# Patient Record
Sex: FEMALE | Race: BLACK OR AFRICAN AMERICAN | Employment: UNEMPLOYED | URBAN - METROPOLITAN AREA
[De-identification: names, ages, dates, MRNs, and addresses within clinical notes are randomized per-mention and may not be internally consistent; named-entity substitution may affect disease eponyms.]

---

## 2021-06-14 ENCOUNTER — TELEPHONE (OUTPATIENT)
Dept: OBGYN CLINIC | Age: 56
End: 2021-06-14

## 2021-06-14 NOTE — TELEPHONE ENCOUNTER
I called the patient back just a few minutes after receiving message. No answer. Left message for patient to call me back if she needs any assistance. Questions answered about his upcoming surgery

## 2021-06-14 NOTE — TELEPHONE ENCOUNTER
Patient is having burning when she urinates and wants to speak to a nurse about it she has some other questions, she does have an appt scheduled on 6/21/21 @ 8:15 am for this reason. She would like a call back.

## 2021-06-14 NOTE — TELEPHONE ENCOUNTER
Patient called back to the office stating that she was having burning with urination. Patient given appointment tomorrow per Dr Dulce Maria Ruff. Appointment for 6/21/2021 was cancelled.

## 2021-06-15 ENCOUNTER — TELEPHONE (OUTPATIENT)
Dept: OBGYN CLINIC | Age: 56
End: 2021-06-15

## 2021-06-15 ENCOUNTER — OFFICE VISIT (OUTPATIENT)
Dept: OBGYN CLINIC | Age: 56
End: 2021-06-15
Payer: COMMERCIAL

## 2021-06-15 VITALS
BODY MASS INDEX: 22.15 KG/M2 | HEART RATE: 77 BPM | DIASTOLIC BLOOD PRESSURE: 87 MMHG | OXYGEN SATURATION: 99 % | TEMPERATURE: 97.7 F | SYSTOLIC BLOOD PRESSURE: 107 MMHG | RESPIRATION RATE: 16 BRPM | HEIGHT: 63 IN | WEIGHT: 125 LBS

## 2021-06-15 DIAGNOSIS — N90.89 VULVAR LESION: ICD-10-CM

## 2021-06-15 DIAGNOSIS — R30.0 DYSURIA: Primary | ICD-10-CM

## 2021-06-15 DIAGNOSIS — N30.01 ACUTE CYSTITIS WITH HEMATURIA: ICD-10-CM

## 2021-06-15 DIAGNOSIS — N89.8 VAGINAL PRURITUS: ICD-10-CM

## 2021-06-15 LAB
BILIRUB UR QL STRIP: NEGATIVE
GLUCOSE UR-MCNC: NEGATIVE MG/DL
KETONES P FAST UR STRIP-MCNC: NEGATIVE MG/DL
PH UR STRIP: 5.5 [PH] (ref 4.6–8)
PROT UR QL STRIP: NEGATIVE
SP GR UR STRIP: 1.01 (ref 1–1.03)
UA UROBILINOGEN AMB POC: NORMAL (ref 0.2–1)
URINALYSIS CLARITY POC: NORMAL
URINALYSIS COLOR POC: NORMAL
URINE BLOOD POC: NORMAL
URINE LEUKOCYTES POC: NORMAL
URINE NITRITES POC: POSITIVE
WET MOUNT POCT, WMPOCT: NORMAL

## 2021-06-15 PROCEDURE — 81003 URINALYSIS AUTO W/O SCOPE: CPT | Performed by: OBSTETRICS & GYNECOLOGY

## 2021-06-15 PROCEDURE — 99214 OFFICE O/P EST MOD 30 MIN: CPT | Performed by: OBSTETRICS & GYNECOLOGY

## 2021-06-15 PROCEDURE — 87210 SMEAR WET MOUNT SALINE/INK: CPT | Performed by: OBSTETRICS & GYNECOLOGY

## 2021-06-15 RX ORDER — CROMOLYN SODIUM 40 MG/ML
1 SOLUTION/ DROPS OPHTHALMIC DAILY
COMMUNITY
Start: 2021-06-07 | End: 2022-10-20

## 2021-06-15 RX ORDER — MULTIVITAMIN WITH FOLIC ACID 400 MCG
TABLET ORAL
COMMUNITY
Start: 2021-04-28 | End: 2021-07-02 | Stop reason: SDUPTHER

## 2021-06-15 RX ORDER — NITROFURANTOIN 25; 75 MG/1; MG/1
100 CAPSULE ORAL 2 TIMES DAILY
Qty: 10 CAPSULE | Refills: 0 | Status: SHIPPED | OUTPATIENT
Start: 2021-06-15 | End: 2021-06-20

## 2021-06-15 NOTE — PATIENT INSTRUCTIONS
Urinary Tract Infection (UTI) in Women: Care Instructions  Overview     A urinary tract infection, or UTI, is a general term for an infection anywhere between the kidneys and the urethra (where urine comes out). Most UTIs are bladder infections. They often cause pain or burning when you urinate. UTIs are caused by bacteria and can be cured with antibiotics. Be sure to complete your treatment so that the infection does not get worse. Follow-up care is a key part of your treatment and safety. Be sure to make and go to all appointments, and call your doctor if you are having problems. It's also a good idea to know your test results and keep a list of the medicines you take. How can you care for yourself at home? · Take your antibiotics as directed. Do not stop taking them just because you feel better. You need to take the full course of antibiotics. · Drink extra water and other fluids for the next day or two. This will help make the urine less concentrated and help wash out the bacteria that are causing the infection. (If you have kidney, heart, or liver disease and have to limit fluids, talk with your doctor before you increase the amount of fluids you drink.)  · Avoid drinks that are carbonated or have caffeine. They can irritate the bladder. · Urinate often. Try to empty your bladder each time. · To relieve pain, take a hot bath or lay a heating pad set on low over your lower belly or genital area. Never go to sleep with a heating pad in place. To prevent UTIs  · Drink plenty of water each day. This helps you urinate often, which clears bacteria from your system. (If you have kidney, heart, or liver disease and have to limit fluids, talk with your doctor before you increase the amount of fluids you drink.)  · Urinate when you need to. · If you are sexually active, urinate right after you have sex. · Change sanitary pads often.   · Avoid douches, bubble baths, feminine hygiene sprays, and other feminine hygiene products that have deodorants. · After going to the bathroom, wipe from front to back. When should you call for help? Call your doctor now or seek immediate medical care if:    · Symptoms such as fever, chills, nausea, or vomiting get worse or appear for the first time.     · You have new pain in your back just below your rib cage. This is called flank pain.     · There is new blood or pus in your urine.     · You have any problems with your antibiotic medicine. Watch closely for changes in your health, and be sure to contact your doctor if:    · You are not getting better after taking an antibiotic for 2 days.     · Your symptoms go away but then come back. Where can you learn more? Go to http://www.gray.com/  Enter T430 in the search box to learn more about \"Urinary Tract Infection (UTI) in Women: Care Instructions. \"  Current as of: June 29, 2020               Content Version: 12.8  © 2006-2021 [employer]. Care instructions adapted under license by [employer] (which disclaims liability or warranty for this information). If you have questions about a medical condition or this instruction, always ask your healthcare professional. [address] any warranty or liability for your use of this information.

## 2021-06-15 NOTE — PROGRESS NOTES
Kristen Jarrell is a 63 y.o. female, , menopausal, who presents today for the following:  Chief Complaint   Patient presents with    Urinary Pain     Pain with urination    Vaginitis     Patient is also having some vaginal itching      She complains of dysuria for a few days- stinging sensation. She has vaginal pruritus. She denies vaginal discharge or odor. She is sexually active with one male partner. She does not use condoms. She denies h/o STIs. Review of Systems   Constitutional: Negative for chills and fever. Respiratory: Negative for shortness of breath. Cardiovascular: Negative for chest pain. Gastrointestinal: Positive for constipation. Negative for abdominal pain, diarrhea, nausea and vomiting. Genitourinary: Positive for dysuria and frequency. Allergies   Allergen Reactions    Bactrim [Sulfamethoprim] Swelling     Swelling of the lilps    Penicillin V Potassium Swelling       Current Outpatient Medications   Medication Sig    nitrofurantoin, macrocrystal-monohydrate, (MACROBID) 100 mg capsule Take 1 Capsule by mouth two (2) times a day for 5 days.  cromolyn (OPTICROM) 4 % ophthalmic solution     One Daily Essential 400 mcg tab tablet TAKE 1 TABLET BY MOUTH EVERY DAY     No current facility-administered medications for this visit. Past Medical History:   Diagnosis Date    Anxiety and depression            /87 (BP 1 Location: Left upper arm, BP Patient Position: Sitting, BP Cuff Size: Adult)   Pulse 77   Temp 97.7 °F (36.5 °C) (Temporal)   Resp 16   Ht 5' 3\" (1.6 m)   Wt 125 lb (56.7 kg)   SpO2 99%   BMI 22.14 kg/m²    Physical Exam  Constitutional:       Appearance: Normal appearance.    Genitourinary:      Genitourinary Comments: Vulva: no masses, atrophy, herpetic like lesion noted at introitus- superficial ulcer with circumferential erythema and yellow center, no discharge, tender; Vagina: no tenderness, erythema, cystocele, rectocele, abnormal vaginal discharge, or vesicle(s), lesions, or ulcers. Cervix: no cervical motion tenderness or abnormal discharge; swab obtained Uterus: normal size and shape and midline, mobile, non-tender, and no uterine prolapse. Bladder/Urethra: no urethral discharge or mass and normal meatus and bladder non distended. Adnexa/Parametria: no parametrial tenderness or mass and no adnexal tenderness or ovarian mass. HENT:      Head: Normocephalic and atraumatic. Eyes:      Extraocular Movements: Extraocular movements intact. Pulmonary:      Effort: Pulmonary effort is normal.   Abdominal:      General: Abdomen is flat. Palpations: Abdomen is soft. Musculoskeletal:      Cervical back: Normal range of motion. Neurological:      Mental Status: She is alert and oriented to person, place, and time. Skin:     General: Skin is warm and dry. Psychiatric:         Mood and Affect: Mood normal.         Behavior: Behavior normal.   Vitals reviewed. Results for orders placed or performed in visit on 06/15/21   AMB POC URINALYSIS DIP STICK AUTO W/O MICRO   Result Value Ref Range    Color (UA POC) Nabila     Clarity (UA POC) Slightly Cloudy     Glucose (UA POC) Negative Negative    Bilirubin (UA POC) Negative Negative    Ketones (UA POC) Negative Negative    Specific gravity (UA POC) 1.015 1.001 - 1.035    Blood (UA POC) Trace Negative    pH (UA POC) 5.5 4.6 - 8.0    Protein (UA POC) Negative Negative    Urobilinogen (UA POC) 0.2 mg/dL 0.2 - 1    Nitrites (UA POC) Positive Negative    Leukocyte esterase (UA POC) 4+ Negative   AMB POC SMEAR, STAIN & INTERPRET, WET MOUNT   Result Value Ref Range    Wet mount (POC)      Narrative    Wet mount: neg for BV, yeast, or trich  Koh prep: neg whiff, neg for yeast        Impression    Negative         Assessment/plan:     ICD-10-CM ICD-9-CM    1. Dysuria  R30.0 788.1 AMB POC URINALYSIS DIP STICK AUTO W/O MICRO      CULTURE, URINE   2.  Acute cystitis with hematuria  N30.01 595.0 nitrofurantoin, macrocrystal-monohydrate, (MACROBID) 100 mg capsule   3. Vulvar lesion  N90.89 624.8 HERPES SIMPLEX VIRUS (HSV) RAMÓN   4. Vaginal pruritus  N89.8 698.1 AMB POC SMEAR, STAIN & INTERPRET, WET MOUNT     -Urinalysis showing urinary tract infection. Nitrofurantoin Rx sent. Urine culture sent. -Vulvar lesion noted during vaginal exam. Patient denies history of herpes. Will test for herpes via vaginal swab. Declines STI testing. If swab comes back positive for herpes, will perform lab work to see if acute or chronic infection.  -Wet mount negative for vaginitis.

## 2021-06-15 NOTE — PROGRESS NOTES
Chief Complaint   Patient presents with    Urinary Pain     Pain with urination    Vaginitis     Patient is also having some vaginal itching

## 2021-06-15 NOTE — TELEPHONE ENCOUNTER
I called the patient and No Answer. Left message for patient to call me on my direct line if she needs anything.

## 2021-06-17 LAB
HSV1 DNA SPEC QL NAA+PROBE: NEGATIVE
HSV2 DNA SPEC QL NAA+PROBE: NEGATIVE

## 2021-06-18 ENCOUNTER — TELEPHONE (OUTPATIENT)
Dept: OBGYN CLINIC | Age: 56
End: 2021-06-18

## 2021-06-18 DIAGNOSIS — L29.2 VULVAR PRURITUS: Primary | ICD-10-CM

## 2021-06-18 LAB — BACTERIA UR CULT: ABNORMAL

## 2021-06-18 RX ORDER — TERCONAZOLE 8 MG/G
1 CREAM VAGINAL
Qty: 20 G | Refills: 1 | Status: SHIPPED | OUTPATIENT
Start: 2021-06-18 | End: 2021-06-21

## 2021-06-18 NOTE — PROGRESS NOTES
Please let her know that the vaginal swab actually came back negative for herpes. So the sores I saw could have been from her skin tearing from intercourse, versus dryness, versus irritation.

## 2021-07-02 ENCOUNTER — OFFICE VISIT (OUTPATIENT)
Dept: OBGYN CLINIC | Age: 56
End: 2021-07-02
Payer: COMMERCIAL

## 2021-07-02 VITALS
RESPIRATION RATE: 16 BRPM | WEIGHT: 126 LBS | HEART RATE: 72 BPM | DIASTOLIC BLOOD PRESSURE: 76 MMHG | SYSTOLIC BLOOD PRESSURE: 119 MMHG | TEMPERATURE: 97.6 F | HEIGHT: 63 IN | OXYGEN SATURATION: 100 % | BODY MASS INDEX: 22.32 KG/M2

## 2021-07-02 DIAGNOSIS — Z01.419 ENCOUNTER FOR GYNECOLOGICAL EXAMINATION WITHOUT ABNORMAL FINDING: Primary | ICD-10-CM

## 2021-07-02 DIAGNOSIS — Z12.4 SCREENING FOR CERVICAL CANCER: ICD-10-CM

## 2021-07-02 DIAGNOSIS — Z12.31 ENCOUNTER FOR SCREENING MAMMOGRAM FOR BREAST CANCER: ICD-10-CM

## 2021-07-02 DIAGNOSIS — B00.1 COLD SORE: ICD-10-CM

## 2021-07-02 PROBLEM — Z86.718 HISTORY OF BLOOD CLOTS: Status: ACTIVE | Noted: 2021-07-02

## 2021-07-02 PROCEDURE — 99396 PREV VISIT EST AGE 40-64: CPT | Performed by: OBSTETRICS & GYNECOLOGY

## 2021-07-02 RX ORDER — DOCOSANOL 100 MG/G
CREAM TOPICAL
Qty: 2 G | Refills: 5 | Status: SHIPPED | OUTPATIENT
Start: 2021-07-02 | End: 2022-03-14 | Stop reason: SDUPTHER

## 2021-07-02 RX ORDER — MULTIVITAMIN
1 TABLET ORAL DAILY
Qty: 90 TABLET | Refills: 3 | Status: SHIPPED | OUTPATIENT
Start: 2021-07-02 | End: 2022-02-09 | Stop reason: SDUPTHER

## 2021-07-02 NOTE — PATIENT INSTRUCTIONS
Preventing Osteoporosis: Care Instructions  Your Care Instructions     Osteoporosis means the bones are weak and thin enough that they can break easily. The older you are, the more likely you are to get osteoporosis. But with plenty of calcium, vitamin D, and exercise, you can help prevent osteoporosis. The preteen and teen years are a key time for bone building. With the help of calcium, vitamin D, and exercise in those early years and beyond, the bones reach their peak density and strength by age 27. After age 27, your bones naturally start to thin and weaken. The stronger your bones are at around age 27, the lower your risk for osteoporosis. But no matter what your age and risk are, your bones still need calcium, vitamin D, and exercise to stay strong. Also avoid smoking, and limit alcohol. Smoking and heavy alcohol use can make your bones thinner. Talk to your doctor about any special risks you might have, such as having a close relative with osteoporosis or taking a medicine that can weaken bones. Your doctor can tell you the best ways to protect your bones from thinning. Follow-up care is a key part of your treatment and safety. Be sure to make and go to all appointments, and call your doctor if you are having problems. It's also a good idea to know your test results and keep a list of the medicines you take. How can you care for yourself at home? Get enough calcium and vitamin D.  · Eat foods rich in calcium, like yogurt, cheese, milk, and dark green vegetables. · Eat foods rich in vitamin D, like eggs, fatty fish, cereal, and fortified milk. · Get some sunshine. Your body uses sunshine to make its own vitamin D.  · Talk to your doctor about taking a calcium plus vitamin D supplement if you don't think you are getting enough through your diet and sunshine. · Get regular bone-building exercise. Walking, jogging, dancing, and lifting weights can make your bones stronger. · Limit alcohol.  Drink no more than 1 alcohol drink a day if you are a woman. Drink no more than 2 alcohol drinks a day if you are a man. · Do not smoke. Smoking can make bones thin faster. If you need help quitting, talk to your doctor about stop-smoking programs and medicines. These can increase your chances of quitting for good. When should you call for help? Watch closely for changes in your health, and be sure to contact your doctor if you have any problems. Where can you learn more? Go to http://www.gray.com/  Enter E478 in the search box to learn more about \"Preventing Osteoporosis: Care Instructions. \"  Current as of: December 7, 2020               Content Version: 12.8  © 2006-2021 [employer]. Care instructions adapted under license by [employer] (which disclaims liability or warranty for this information). If you have questions about a medical condition or this instruction, always ask your healthcare professional. [address] any warranty or liability for your use of this information. Learning About Colon Cancer  What is colon cancer? Colon cancer happens when cells that are not normal grow in your colon. These cells grow together and form tumors. Colon cancer occurs most often in people older than 48. As with other cancers, treatment works best when colon cancer is found early. What happens when you have colon cancer? Most cases begin as polyps, which are small growths inside the colon. These polyps are very common, and most of them do not turn into cancer. Colon cancer usually grows very slowly. It usually takes years for the cancer to become large enough to cause symptoms. If the cancer is not removed and keeps growing, it eventually will invade and destroy nearby tissues and then spread farther, first to nearby lymph nodes. From there it may spread to other parts of the body. What are the symptoms?   Colon cancer in its early stages usually doesn't cause any symptoms. Symptoms occur later, when the cancer may be harder to treat. The most common symptoms include:  · Pain in the belly. · Blood in your stool or very dark stools. · A change in your bowel habits, such as more frequent stools or a feeling that your bowels are not emptying completely. · Always feeling tired. How can you prevent colon cancer? Screening tests can find or prevent many cases of colon cancer. They look for a certain disease or condition before any symptoms appear. Screening tests that may find colon cancer early include:  · Stool tests, such as the fecal immunochemical test or the guaiac fecal occult blood test.  · Sigmoidoscopy, which lets your doctor look at the inside of the lower part of your colon using a lighted tube. · Colonoscopy, which lets your doctor look at the inside of your entire colon using a thin, flexible tube. Your risk for colorectal cancer gets higher as you get older. Some experts say that adults should start regular screening at age 48 and stop at age 76. Others say to start before age 48 or continue after age 76. Talk with your doctor about your risk and when to start and stop screening. People with a higher risk, such as those with a strong family history of colon cancer, should be tested earlier than those with an average risk. Here are other things you can do to help prevent colon cancer:  · Watch your weight. Being very overweight may increase your chance of getting colon cancer. · Eat well. Eat more whole grains, fruits, vegetables, poultry, and fish. And eat less red meat, refined grains, and sweets. · If you drink alcohol, limit how much you drink. Any amount of alcohol may increase your risk for some types of cancer. · Get active. Keep up a physically active lifestyle. · Quit smoking. If you smoke cigarettes, quit smoking to reduce your chance of getting colon cancer. How is colon cancer treated?   · Surgery is almost always used to treat colon cancer. The cancer is more easily removed when it is found early. · If the cancer has spread into the wall of the colon or farther, you may also need radiation or chemotherapy. Follow-up care is a key part of your treatment and safety. Be sure to make and go to all appointments, and call your doctor if you are having problems. It's also a good idea to know your test results and keep a list of the medicines you take. Where can you learn more? Go to http://www.gray.com/  Enter M414 in the search box to learn more about \"Learning About Colon Cancer. \"  Current as of: December 17, 2020               Content Version: 12.8  © 2006-2021 [employer]. Care instructions adapted under license by [employer] (which disclaims liability or warranty for this information). If you have questions about a medical condition or this instruction, always ask your healthcare professional. [address] any warranty or liability for your use of this information.

## 2021-07-02 NOTE — PROGRESS NOTES
1. Have you been to the ER, urgent care clinic since your last visit? Hospitalized since your last visit? No    2. Have you seen or consulted any other health care providers outside of the [address] [place] [address] since your last visit? Include any pap smears or colon screening.  No   Chief Complaint   Patient presents with   Taya Fung

## 2021-07-02 NOTE — PROGRESS NOTES
HPI: Kelsie Habermann is a 61 y.o. female , postmenopausal, who presents today for the following:  Chief Complaint   Patient presents with   Lorence Chess Exam        She denies abnormal vaginal bleeding, vaginal/vulvar pruritus; abnormal vaginal discharge or vaginal odor. She has completed the antibiotics for Ecoli UTI from 6/15/21 visit. Denies h/o STIs or abnormal pap smears. Pap/HPV neg in 2019. Last mammogram: last year, neg. Last colonoscopy: never. Past Medical History:   Diagnosis Date    Anxiety and depression     Back pain     DVT (deep venous thrombosis) (HCC)     and PE    Rheumatoid arthritis (HCC)     Uterine fibroid        Past Surgical History:   Procedure Laterality Date    HX  SECTION         Family History   Problem Relation Age of Onset    Lung Cancer Mother     Hypertension Sister     Breast Cancer Maternal Aunt        Social History     Socioeconomic History    Marital status: SINGLE     Spouse name: Not on file    Number of children: Not on file    Years of education: Not on file    Highest education level: Some college, no degree   Occupational History    Occupation: disability   Tobacco Use    Smoking status: Former Smoker     Types: Cigarettes    Smokeless tobacco: Never Used    Tobacco comment: Quit 15 years ago   Vaping Use    Vaping Use: Never used   Substance and Sexual Activity    Alcohol use: Yes     Comment: Occasional    Drug use: Never    Sexual activity: Yes     Partners: Male   Other Topics Concern    Not on file   Social History Narrative    Not on file     Social Determinants of Health     Financial Resource Strain:     Difficulty of Paying Living Expenses:    Food Insecurity:     Worried About Running Out of Food in the Last Year:     Ran Out of Food in the Last Year:    Transportation Needs:     Lack of Transportation (Medical):      Lack of Transportation (Non-Medical):    Physical Activity: Inactive    Days of Exercise per Week: 0 days    Minutes of Exercise per Session: 0 min   Stress:     Feeling of Stress :    Social Connections:     Frequency of Communication with Friends and Family:     Frequency of Social Gatherings with Friends and Family:     Attends Mu-ism Services:     Active Member of Clubs or Organizations:     Attends Club or Organization Meetings:     Marital Status:    Intimate Partner Violence:     Fear of Current or Ex-Partner:     Emotionally Abused:     Physically Abused:     Sexually Abused: Allergies   Allergen Reactions    Bactrim [Sulfamethoprim] Swelling     Swelling of the lilps    Penicillin V Potassium Swelling          Current Outpatient Medications:     docosanoL (ABREVA) 10 % topical cream, Apply  to affected area five (5) times daily. , Disp: 2 g, Rfl: 5    multivitamin with folic acid (One Daily Essential) 400 mcg tab tablet, Take 1 Tablet by mouth daily. , Disp: 90 Tablet, Rfl: 3    cromolyn (OPTICROM) 4 % ophthalmic solution, , Disp: , Rfl:          Review of Systems: Denies issues with eyes, ears, mouth, nose. Denies fevers/chills, significant weight loss/gain. Denies chest pain, shortness of breath, nausea, vomiting, constipation, diarrhea or abdominal pain. Denies dysuria. Denies muscle weakness, numbness or tingling. +Back pain. Denies issues with breasts. Denies bleeding/clotting d/o's. Denies anxiety/depression, S/HI.        OBJECTIVE:  /76 (BP 1 Location: Left upper arm, BP Patient Position: Sitting, BP Cuff Size: Adult)   Pulse 72   Temp 97.6 °F (36.4 °C) (Temporal)   Resp 16   Ht 5' 3\" (1.6 m)   Wt 126 lb (57.2 kg)   LMP  (LMP Unknown)   SpO2 100%   BMI 22.32 kg/m²      Constitutional  · Appearance: well-nourished, well developed, alert, in no acute distress    HENT  · Head and Face: appears normal    Neck  · Inspection/Palpation: normal appearance      Breasts   Symmetric, no palpable masses, no tenderness, no skin changes, no nipple abnormality, no nipple discharge, no axillary or supraclavicular lymphadenopathy. Chest  · Respiratory Effort: normal      Gastrointestinal  · Abdominal Examination: abdomen non-tender to palpation, no masses present  · Liver and spleen: no hepatomegaly present, spleen not palpable      Genitourinary  · External Genitalia: normal appearance for age, no discharge present, no tenderness present, no inflammatory lesions present, no masses present; atrophy present; previously seen lesions on perineal skin have resolved  · Vagina: normal vaginal vault without central or paravaginal defects, no discharge present, no inflammatory lesions present, no masses present; atrophic  · Bladder: non-tender to palpation  · Urethra: appears normal  · Cervix: normal, no cervical motion tenderness or abnormal discharge  · Uterus: 6 week sized, palpable anterior fibroid  · Adnexa: no adnexal tenderness present, no adnexal masses present  · Perineum: perineum within normal limits, no evidence of trauma, no rashes or skin lesions present  · Anus: anus within normal limits, no hemorrhoids present    Skin  · General Inspection: no rash, no lesions identified    Neurologic/Psychiatric  · Mental Status:  · Orientation: grossly oriented to person, place and time  · Mood and Affect: mood normal, affect appropriate          Assessment/plan:    ICD-10-CM ICD-9-CM    1. Encounter for gynecological examination without abnormal finding  Z01.419 V72.31 multivitamin with folic acid (One Daily Essential) 400 mcg tab tablet   2. Encounter for screening mammogram for breast cancer  Z12.31 V76.12 [facility] 3D GABI W MAMMO BI SCREENING INCL CAD   3. Cold sore  B00.1 054.9 docosanoL (ABREVA) 10 % topical cream   4. Screening for cervical cancer  Z12.4 V76.2     Pap/HPV neg in 2019, due in 2024        -Annual gynecologic exam.    -Cervical cancer screening- pap smear and HPV co testing up to date- neg in 2019, due in 2024.     -Breast cancer screening- breast awareness discussed; mammogram ordered. -STI screening-declines testing.    -Colon cancer screening- declines colonoscopy. She is to speak to PCP about other modes of colon ca screening.    -Bone health-discussed weightbearing exercises, vitamin D and calcium supplementation. -H/o cold sores- requesting rx for abreva.

## 2021-07-08 ENCOUNTER — TELEPHONE (OUTPATIENT)
Dept: OBGYN CLINIC | Age: 56
End: 2021-07-08

## 2021-07-08 NOTE — TELEPHONE ENCOUNTER
Patient left message on my voice mail stating that her [place] needed a PA before it will be covered. I called the pharmacy and was told that they were out of stock and that the medication was on back order. I asked if this was covered under patient's insurance, and I was told that the only problem with filling this medication was that it is on back order. I called the patient and she stated that she is not currently having an outbreak and that she would check with the pharmacy daily to see if it has come in. I told her to let me know if she has any problem with insurance coverage when she gets it filled.

## 2021-07-29 ENCOUNTER — OFFICE VISIT (OUTPATIENT)
Dept: INTERNAL MEDICINE CLINIC | Age: 56
End: 2021-07-29
Payer: COMMERCIAL

## 2021-07-29 VITALS
SYSTOLIC BLOOD PRESSURE: 136 MMHG | DIASTOLIC BLOOD PRESSURE: 85 MMHG | TEMPERATURE: 97 F | HEIGHT: 63 IN | HEART RATE: 72 BPM | BODY MASS INDEX: 22.04 KG/M2 | WEIGHT: 124.4 LBS | OXYGEN SATURATION: 100 % | RESPIRATION RATE: 18 BRPM

## 2021-07-29 DIAGNOSIS — Z00.00 ROUTINE ADULT HEALTH MAINTENANCE: ICD-10-CM

## 2021-07-29 DIAGNOSIS — Z12.31 ENCOUNTER FOR SCREENING MAMMOGRAM FOR MALIGNANT NEOPLASM OF BREAST: Primary | ICD-10-CM

## 2021-07-29 DIAGNOSIS — B00.2 RECURRENT ORAL HERPES SIMPLEX: ICD-10-CM

## 2021-07-29 DIAGNOSIS — I83.92 VARICOSE VEINS OF LEFT LOWER EXTREMITY, UNSPECIFIED WHETHER COMPLICATED: ICD-10-CM

## 2021-07-29 DIAGNOSIS — E88.09 HYPERPROTEINEMIA: ICD-10-CM

## 2021-07-29 DIAGNOSIS — Z28.21 NO VACCINATION-PT REFUSE: ICD-10-CM

## 2021-07-29 DIAGNOSIS — Z12.11 COLON CANCER SCREENING: ICD-10-CM

## 2021-07-29 DIAGNOSIS — Z87.39 HISTORY OF RHEUMATOID ARTHRITIS: ICD-10-CM

## 2021-07-29 DIAGNOSIS — E61.1 IRON DEFICIENCY: ICD-10-CM

## 2021-07-29 DIAGNOSIS — R76.8 POSITIVE HEPATITIS C ANTIBODY TEST: ICD-10-CM

## 2021-07-29 DIAGNOSIS — M25.50 PAIN IN JOINT, MULTIPLE SITES: ICD-10-CM

## 2021-07-29 DIAGNOSIS — M05.79 RHEUMATOID ARTHRITIS INVOLVING MULTIPLE SITES WITH POSITIVE RHEUMATOID FACTOR (HCC): ICD-10-CM

## 2021-07-29 DIAGNOSIS — Z11.59 NEED FOR HEPATITIS C SCREENING TEST: ICD-10-CM

## 2021-07-29 PROCEDURE — 99204 OFFICE O/P NEW MOD 45 MIN: CPT | Performed by: INTERNAL MEDICINE

## 2021-07-29 RX ORDER — LANOLIN ALCOHOL/MO/W.PET/CERES
CREAM (GRAM) TOPICAL
COMMUNITY
End: 2021-10-13 | Stop reason: ALTCHOICE

## 2021-07-29 RX ORDER — VALACYCLOVIR HYDROCHLORIDE 1 G/1
1000 TABLET, FILM COATED ORAL DAILY
Qty: 90 TABLET | Refills: 1 | Status: SHIPPED | OUTPATIENT
Start: 2021-07-29 | End: 2021-12-13 | Stop reason: ALTCHOICE

## 2021-07-29 RX ORDER — DEXTROMETHORPHAN HYDROBROMIDE, GUAIFENESIN 5; 100 MG/5ML; MG/5ML
650 LIQUID ORAL
Qty: 90 TABLET | Refills: 2 | Status: SHIPPED | OUTPATIENT
Start: 2021-07-29 | End: 2021-12-21

## 2021-07-29 NOTE — PROGRESS NOTES
Jimmy Carrasco is a 61 y.o. female and presents with New Patient, Arthritis, Back Pain, Migraine, Fibroids, and Blood Clot    She says that she thinks she has Rheumatoid arthritis, she has seen a Rheumatologist in [address] last year, she can not recall the name, she was told she was ok and she didn't go back because it was vey far, denies any swelling or joint pain at this moment, but she has multiple joint pains on and off, she says they are not disabling and they get better with Tylenol. She says a long time ago she was on some medication for this but she has been fine for a long time without any other treatment. She says she has h/o of a distal leg dvt years ago, off asnticoagulants for years already, no recurrence. Will obtain records. She says she feels well, has no concerns, dos stretches sometimes, she walks sometimes. She sees obgyn Dr. Viviana Bills. Note reviewed and appreciated. She says she has appointment with eye doctor in August, last appointment was in May    She says she has mouth sores once a month or once every 2 months, this has been happening for years, she uses abreva when this happens but wants to know if there is anything else she can do to decrease the frequency of these episodes. Review of Systems  Review of Systems   Constitutional: Negative for chills, fatigue, fever and unexpected weight change. HENT: Negative for congestion, ear pain, sneezing and sore throat. Eyes: Negative for pain and discharge. Respiratory: Negative for cough, shortness of breath and wheezing. Cardiovascular: Negative for chest pain, palpitations and leg swelling. Gastrointestinal: Negative for abdominal pain, blood in stool, constipation and diarrhea. Endocrine: Negative for polydipsia and polyuria. Genitourinary: Negative for difficulty urinating, dysuria, frequency, hematuria and urgency. Musculoskeletal: Negative for arthralgias, back pain and joint swelling.    Skin: Negative for rash.   Allergic/Immunologic: Negative for environmental allergies and food allergies. Neurological: Negative for dizziness, speech difficulty, weakness, light-headedness, numbness and headaches. Hematological: Negative for adenopathy. Psychiatric/Behavioral: Negative for behavioral problems (Depression), sleep disturbance and suicidal ideas. Past Medical History:   Diagnosis Date    Anemia     Back pain     DVT (deep venous thrombosis) (HCC)     and PE    Rheumatoid arthritis (HCC)     Uterine fibroid      Past Surgical History:   Procedure Laterality Date    HX  SECTION       Social History     Socioeconomic History    Marital status: SINGLE     Spouse name: Not on file    Number of children: Not on file    Years of education: Not on file    Highest education level: Some college, no degree   Occupational History    Occupation: disability   Tobacco Use    Smoking status: Former Smoker     Packs/day: 0.25     Years: 30.00     Pack years: 7.50     Types: Cigarettes    Smokeless tobacco: Never Used    Tobacco comment: Quit 15 years ago   Vaping Use    Vaping Use: Never used   Substance and Sexual Activity    Alcohol use: Yes     Comment: Occasional    Drug use: Never    Sexual activity: Yes     Partners: Male     Social Determinants of Health     Financial Resource Strain:     Difficulty of Paying Living Expenses:    Food Insecurity:     Worried About Running Out of Food in the Last Year:     Ran Out of Food in the Last Year:    Transportation Needs:     Lack of Transportation (Medical):      Lack of Transportation (Non-Medical):    Physical Activity: Inactive    Days of Exercise per Week: 0 days    Minutes of Exercise per Session: 0 min   Stress:     Feeling of Stress :    Social Connections:     Frequency of Communication with Friends and Family:     Frequency of Social Gatherings with Friends and Family:     Attends Manda[employer] Services:     Active Member of [employer] or Organizations:     Attends Club or Organization Meetings:     Marital Status:      Family History   Problem Relation Age of Onset    Lung Cancer Mother     Hypertension Sister     Breast Cancer Maternal Aunt      Current Outpatient Medications   Medication Sig Dispense Refill    ferrous sulfate 325 mg (65 mg iron) tablet Take  by mouth Daily (before breakfast).  valACYclovir (VALTREX) 1 gram tablet Take 1 Tablet by mouth daily for 180 days. 90 Tablet 1    acetaminophen (Tylenol Arthritis Pain) 650 mg TbER Take 1 Tablet by mouth every eight (8) hours as needed for Pain for up to 90 days. 90 Tablet 2    docosanoL (ABREVA) 10 % topical cream Apply  to affected area five (5) times daily. 2 g 5    multivitamin with folic acid (One Daily Essential) 400 mcg tab tablet Take 1 Tablet by mouth daily. 90 Tablet 3    cromolyn (OPTICROM) 4 % ophthalmic solution Administer 1 Drop to both eyes daily. Allergies   Allergen Reactions    Bactrim [Sulfamethoprim] Swelling     Swelling of the lilps    Penicillin V Potassium Swelling       Objective:  Visit Vitals  /85 (BP 1 Location: Left upper arm, BP Patient Position: Sitting, BP Cuff Size: Adult)   Pulse 72   Temp 97 °F (36.1 °C) (Oral)   Resp 18   Ht 5' 3\" (1.6 m)   Wt 124 lb 6.4 oz (56.4 kg)   LMP  (LMP Unknown)   SpO2 100% Comment: RA   BMI 22.04 kg/m²     Physical Exam:   Physical Exam  Constitutional:       General: She is not in acute distress. Appearance: Normal appearance. HENT:      Head: Normocephalic and atraumatic. Mouth/Throat:      Mouth: Mucous membranes are moist.   Eyes:      Extraocular Movements: Extraocular movements intact. Conjunctiva/sclera: Conjunctivae normal.      Pupils: Pupils are equal, round, and reactive to light. Cardiovascular:      Rate and Rhythm: Normal rate and regular rhythm. Pulses: Normal pulses. Heart sounds: Normal heart sounds.       Comments: Grade 3 varicose veins left lower extremity. Distal   Pulmonary:      Effort: Pulmonary effort is normal.      Breath sounds: Normal breath sounds. Abdominal:      General: Abdomen is flat. Bowel sounds are normal. There is no distension. Palpations: Abdomen is soft. There is no mass. Tenderness: There is no abdominal tenderness. Musculoskeletal:         General: No swelling or deformity. Cervical back: Normal range of motion and neck supple. Right lower leg: No edema. Left lower leg: No edema. Lymphadenopathy:      Cervical: No cervical adenopathy. Skin:     General: Skin is warm and dry. Capillary Refill: Capillary refill takes less than 2 seconds. Coloration: Skin is not jaundiced or pale. Findings: No erythema or rash. Neurological:      General: No focal deficit present. Mental Status: She is alert and oriented to person, place, and time. Psychiatric:         Mood and Affect: Mood normal.         Behavior: Behavior normal.         Thought Content:  Thought content normal.         Judgment: Judgment normal.          Results for orders placed or performed in visit on 06/15/21   CULTURE, URINE   Result Value Ref Range    Urine Culture, Routine (A)      Escherichia coli  Greater than 100,000 colony forming units per mL         Susceptibility    Escherichia coli -  (no method available)*     Amoxicillin/Clavulanic A S Susceptible ug/mL     Ampicillin ($) S Susceptible ug/mL     Cefepime ($$) S Susceptible ug/mL     Ceftriaxone ($) S Susceptible ug/mL     Cefuroxime ($) I Intermediate ug/mL     Ciprofloxacin ($) S Susceptible ug/mL     Ertapenem ($$$$) S Susceptible ug/mL     Gentamicin ($) S Susceptible ug/mL     Imipenem S Susceptible ug/mL     Levofloxacin ($) S Susceptible ug/mL     Meropenem ($$) S Susceptible ug/mL     Nitrofurantoin S Susceptible ug/mL     Piperacillin/Tazobac ($) S Susceptible ug/mL     Tetracycline S Susceptible ug/mL     Tobramycin ($) S Susceptible ug/mL Trimeth-Sulfamethoxa S Susceptible ug/mL     * Performed at:  [address]  [id number] Director: David Reaves MD, Phone:  [phone]   HERPES SIMPLEX VIRUS (HSV) RAMÓN   Result Value Ref Range    HSV 1, RAMÓN Negative Negative    HSV 2, RAMÓN Negative Negative   AMB POC URINALYSIS DIP STICK AUTO W/O MICRO   Result Value Ref Range    Color (UA POC) Nabila     Clarity (UA POC) Slightly Cloudy     Glucose (UA POC) Negative Negative    Bilirubin (UA POC) Negative Negative    Ketones (UA POC) Negative Negative    Specific gravity (UA POC) 1.015 1.001 - 1.035    Blood (UA POC) Trace Negative    pH (UA POC) 5.5 4.6 - 8.0    Protein (UA POC) Negative Negative    Urobilinogen (UA POC) 0.2 mg/dL 0.2 - 1    Nitrites (UA POC) Positive Negative    Leukocyte esterase (UA POC) 4+ Negative   AMB POC SMEAR, STAIN & INTERPRET, WET MOUNT   Result Value Ref Range    Wet mount (POC)         Assessment/Plan:          ICD-10-CM ICD-9-CM    1. Encounter for screening mammogram for malignant neoplasm of breast  Z12.31 V76.12 [facility] MAMMO BI SCREENING INCL CAD   2. Need for hepatitis C screening test  Z11.59 V73.89 HEPATITIS C AB   3. Iron deficiency  E61.1 280.9 CBC WITH AUTOMATED DIFF      IRON PROFILE      FERRITIN   4. Routine adult health maintenance  J82.75 I91.8 METABOLIC PANEL, COMPREHENSIVE      LIPID PANEL      TSH 3RD GENERATION   5. Colon cancer screening  Z12.11 V76.51 COLOGUARD TEST (FECAL DNA COLORECTAL CANCER SCREENING)   6. No vaccination-pt refuse  Z28.21 V64.06    7. Varicose veins of left lower extremity, unspecified whether complicated  B59.89 670.4 REFERRAL TO CARDIOLOGY   8. History of rheumatoid arthritis  Z87.39 V13.4 RHEUMATOID FACTOR, QL      CAROLYN, DIRECT, W/REFLEX      C REACTIVE PROTEIN, QT      CYCLIC CITRUL PEPTIDE AB, IGG      acetaminophen (Tylenol Arthritis Pain) 650 mg TbER   9. Recurrent oral herpes simplex  B00.2 054.2 valACYclovir (VALTREX) 1 gram tablet   10.  Pain in joint, multiple sites  M25.50 719.49 acetaminophen (Tylenol Arthritis Pain) 650 mg TbER     Orders Placed This Encounter    [facility] MAMMO BI SCREENING INCL CAD     Standing Status:   Future     Standing Expiration Date:   8/29/2022    CBC WITH AUTOMATED DIFF    METABOLIC PANEL, COMPREHENSIVE    LIPID PANEL    TSH 3RD GENERATION    HEPATITIS C AB    COLOGUARD TEST (FECAL DNA COLORECTAL CANCER SCREENING)    IRON PROFILE    FERRITIN    RHEUMATOID FACTOR, QL    CAROLYN, DIRECT, W/REFLEX    C REACTIVE PROTEIN, QT    CYCLIC CITRUL PEPTIDE AB, IGG    [facility]ovascular [facility]     Referral Priority:   Routine     Referral Type:   Consultation     Referral Reason:   Specialty Services Required     Referred to Provider:   Chet Milner MD     Number of Visits Requested:   1    ferrous sulfate 325 mg (65 mg iron) tablet     Sig: Take  by mouth Daily (before breakfast).  valACYclovir (VALTREX) 1 gram tablet     Sig: Take 1 Tablet by mouth daily for 180 days. Dispense:  90 Tablet     Refill:  1    acetaminophen (Tylenol Arthritis Pain) 650 mg TbER     Sig: Take 1 Tablet by mouth every eight (8) hours as needed for Pain for up to 90 days. Dispense:  90 Tablet     Refill:  2     routine labs ordered, call if any problems  There are no Patient Instructions on file for this visit. Follow-up and Dispositions    · Return in about 6 months (around 1/29/2022).

## 2021-07-29 NOTE — PROGRESS NOTES
1. Have you been to the ER, urgent care clinic since your last visit? Hospitalized since your last visit? No    2. Have you seen or consulted any other health care providers outside of the [address] [place] [address] since your last visit? Include any pap smears or colon screening. No     Chief Complaint   Patient presents with    New Patient    Arthritis    Back Pain    Migraine    Fibroids    Blood Clot     Pt states that she needs to establish a new PCP. States that she has had problems with blood clots in her left leg that traveled to her brain she stated that happened a long time ago.       Previous PCP- Dr. Madi Teixeira Fax number [phone] (v) [phone]    Obgyn- Dr. Reno Molina     opthamologist- [facility] [address] [place] [address] located at [address] [place] [address]

## 2021-07-30 LAB
ALBUMIN SERPL-MCNC: 3.7 G/DL (ref 3.8–4.9)
ALBUMIN/GLOB SERPL: 0.5 {RATIO} (ref 1.2–2.2)
ALP SERPL-CCNC: 79 IU/L (ref 48–121)
ALT SERPL-CCNC: 9 IU/L (ref 0–32)
AST SERPL-CCNC: 20 IU/L (ref 0–40)
BASOPHILS # BLD AUTO: 0 X10E3/UL (ref 0–0.2)
BASOPHILS NFR BLD AUTO: 0 %
BILIRUB SERPL-MCNC: 0.8 MG/DL (ref 0–1.2)
BUN SERPL-MCNC: 15 MG/DL (ref 6–24)
BUN/CREAT SERPL: 25 (ref 9–23)
CALCIUM SERPL-MCNC: 9.5 MG/DL (ref 8.7–10.2)
CHLORIDE SERPL-SCNC: 103 MMOL/L (ref 96–106)
CHOLEST SERPL-MCNC: 141 MG/DL (ref 100–199)
CO2 SERPL-SCNC: 20 MMOL/L (ref 20–29)
CREAT SERPL-MCNC: 0.61 MG/DL (ref 0.57–1)
EOSINOPHIL # BLD AUTO: 0 X10E3/UL (ref 0–0.4)
EOSINOPHIL NFR BLD AUTO: 0 %
ERYTHROCYTE [DISTWIDTH] IN BLOOD BY AUTOMATED COUNT: 12.7 % (ref 11.7–15.4)
FERRITIN SERPL-MCNC: 377 NG/ML (ref 15–150)
GLOBULIN SER CALC-MCNC: 6.9 G/DL (ref 1.5–4.5)
GLUCOSE SERPL-MCNC: 97 MG/DL (ref 65–99)
HCT VFR BLD AUTO: 32.7 % (ref 34–46.6)
HCV AB S/CO SERPL IA: 1.9 S/CO RATIO (ref 0–0.9)
HDLC SERPL-MCNC: 57 MG/DL
HGB BLD-MCNC: 11.2 G/DL (ref 11.1–15.9)
IMM GRANULOCYTES # BLD AUTO: 0 X10E3/UL (ref 0–0.1)
IMM GRANULOCYTES NFR BLD AUTO: 0 %
IRON SATN MFR SERPL: 28 % (ref 15–55)
IRON SERPL-MCNC: 66 UG/DL (ref 27–159)
LDLC SERPL CALC-MCNC: 70 MG/DL (ref 0–99)
LYMPHOCYTES # BLD AUTO: 1 X10E3/UL (ref 0.7–3.1)
LYMPHOCYTES NFR BLD AUTO: 31 %
MCH RBC QN AUTO: 29.5 PG (ref 26.6–33)
MCHC RBC AUTO-ENTMCNC: 34.3 G/DL (ref 31.5–35.7)
MCV RBC AUTO: 86 FL (ref 79–97)
MONOCYTES # BLD AUTO: 0.3 X10E3/UL (ref 0.1–0.9)
MONOCYTES NFR BLD AUTO: 10 %
NEUTROPHILS # BLD AUTO: 2 X10E3/UL (ref 1.4–7)
NEUTROPHILS NFR BLD AUTO: 59 %
PLATELET # BLD AUTO: 215 X10E3/UL (ref 150–450)
POTASSIUM SERPL-SCNC: 4 MMOL/L (ref 3.5–5.2)
PROT SERPL-MCNC: 10.6 G/DL (ref 6–8.5)
RBC # BLD AUTO: 3.8 X10E6/UL (ref 3.77–5.28)
SODIUM SERPL-SCNC: 135 MMOL/L (ref 134–144)
TIBC SERPL-MCNC: 240 UG/DL (ref 250–450)
TRIGL SERPL-MCNC: 69 MG/DL (ref 0–149)
TSH SERPL DL<=0.005 MIU/L-ACNC: 1 UIU/ML (ref 0.45–4.5)
UIBC SERPL-MCNC: 174 UG/DL (ref 131–425)
VLDLC SERPL CALC-MCNC: 14 MG/DL (ref 5–40)
WBC # BLD AUTO: 3.4 X10E3/UL (ref 3.4–10.8)

## 2021-07-30 NOTE — PROGRESS NOTES
Please tell her her iron stores are high and she does not have anemia, she does not need to take iron, please ask her to completely stop it. Her Hepatitis C antibody is positive, we have to do a confirmatory test and if it confirms we have to do some other several things to treat the hepatitis C   Also, she has abnormal elevation of protein in the blood, we need to do additional testing for this, I will place orders please ask her to come to he lab to have that done. I will recommend further steps after I see those results.    Thanks

## 2021-07-31 LAB
ANA SER QL: POSITIVE
CCP IGA+IGG SERPL IA-ACNC: 14 UNITS (ref 0–19)
CENTROMERE B AB SER-ACNC: <0.2 AI (ref 0–0.9)
CHROMATIN AB SERPL-ACNC: <0.2 AI (ref 0–0.9)
CRP SERPL-MCNC: 15 MG/L (ref 0–10)
DSDNA AB SER-ACNC: <1 IU/ML (ref 0–9)
ENA JO1 AB SER-ACNC: <0.2 AI (ref 0–0.9)
ENA RNP AB SER-ACNC: 0.9 AI (ref 0–0.9)
ENA SCL70 AB SER-ACNC: <0.2 AI (ref 0–0.9)
ENA SM AB SER-ACNC: 0.3 AI (ref 0–0.9)
ENA SS-A AB SER-ACNC: >8 AI (ref 0–0.9)
ENA SS-B AB SER-ACNC: >8 AI (ref 0–0.9)
RHEUMATOID FACT SERPL-ACNC: 73.9 IU/ML (ref 0–13.9)
SEE BELOW, 164869: ABNORMAL

## 2021-08-03 NOTE — PROGRESS NOTES
Please let her know her labs confirm rheumatoid arthritis and there are signs of some inflammation at this moment. She needs treatment and needs to see Rheumatology. I am referring her to Dr. Gissell Ramirez who's in [facility]. Since she told me she does not have usually symptoms that are severe or frequent and she has managed with tylenol for now, I won't start anything at this moment, but, she definitely needs to see rheumatology and make sure there is no progression and she's started in the proper treatment for this.  Thanks

## 2021-08-12 ENCOUNTER — TELEPHONE (OUTPATIENT)
Dept: INTERNAL MEDICINE CLINIC | Age: 56
End: 2021-08-12

## 2021-08-12 NOTE — TELEPHONE ENCOUNTER
Patient called and would like Dr. Brina Bhakta to put in a order for a lift chair - hard to get up and down. Also she went to vein doctor and has to go back on Sept. 1st.    Please call her and let her know when the chair will be ordered.

## 2021-08-17 NOTE — TELEPHONE ENCOUNTER
Called patient and let  patient know that Dr. Edgar Ortega will discuss the lift chair on her next appointment 8/24.

## 2021-09-02 ENCOUNTER — OFFICE VISIT (OUTPATIENT)
Dept: INTERNAL MEDICINE CLINIC | Age: 56
End: 2021-09-02
Payer: COMMERCIAL

## 2021-09-02 VITALS
BODY MASS INDEX: 21.76 KG/M2 | RESPIRATION RATE: 18 BRPM | HEART RATE: 76 BPM | HEIGHT: 63 IN | OXYGEN SATURATION: 99 % | TEMPERATURE: 97.9 F | SYSTOLIC BLOOD PRESSURE: 155 MMHG | DIASTOLIC BLOOD PRESSURE: 82 MMHG | WEIGHT: 122.8 LBS

## 2021-09-02 DIAGNOSIS — B00.2 RECURRENT ORAL HERPES SIMPLEX: ICD-10-CM

## 2021-09-02 DIAGNOSIS — Z87.39 HISTORY OF RHEUMATOID ARTHRITIS: Primary | ICD-10-CM

## 2021-09-02 DIAGNOSIS — R26.2 DIFFICULTY WALKING: ICD-10-CM

## 2021-09-02 DIAGNOSIS — M25.50 PAIN IN JOINT, MULTIPLE SITES: ICD-10-CM

## 2021-09-02 DIAGNOSIS — M05.79 RHEUMATOID ARTHRITIS INVOLVING MULTIPLE SITES WITH POSITIVE RHEUMATOID FACTOR (HCC): ICD-10-CM

## 2021-09-02 DIAGNOSIS — I83.813 VARICOSE VEINS OF BOTH LOWER EXTREMITIES WITH PAIN: ICD-10-CM

## 2021-09-02 DIAGNOSIS — R76.8 POSITIVE ANA (ANTINUCLEAR ANTIBODY): ICD-10-CM

## 2021-09-02 PROCEDURE — 99214 OFFICE O/P EST MOD 30 MIN: CPT | Performed by: INTERNAL MEDICINE

## 2021-09-02 RX ORDER — CANE TIPS
EACH MISCELLANEOUS
Qty: 1 EACH | Refills: 0 | Status: SHIPPED | OUTPATIENT
Start: 2021-09-02 | End: 2022-09-13

## 2021-09-02 RX ORDER — FOLIC ACID 1 MG/1
1 TABLET ORAL DAILY
Qty: 90 TABLET | Refills: 1 | Status: SHIPPED | OUTPATIENT
Start: 2021-09-02 | End: 2021-12-30 | Stop reason: SDUPTHER

## 2021-09-02 RX ORDER — FLUTICASONE PROPIONATE 50 MCG
SPRAY, SUSPENSION (ML) NASAL
COMMUNITY
Start: 2021-07-27 | End: 2021-12-13 | Stop reason: ALTCHOICE

## 2021-09-02 RX ORDER — METHOTREXATE 2.5 MG/1
10 TABLET ORAL
Qty: 16 TABLET | Refills: 0 | Status: SHIPPED | OUTPATIENT
Start: 2021-09-03 | End: 2021-10-13 | Stop reason: SDUPTHER

## 2021-09-02 NOTE — PROGRESS NOTES
1. Have you been to the ER, urgent care clinic since your last visit? Hospitalized since your last visit? No    2. Have you seen or consulted any other health care providers outside of the [address] [place] [address] since your last visit? Include any pap smears or colon screening. No     Chief Complaint   Patient presents with    Gait Problem     Pt states that she needs to recliner that lifts up and down. States that she is having problems with walking.

## 2021-09-02 NOTE — PROGRESS NOTES
Arley Greenberg is a 61 y.o. female and presents with Gait Problem    Pastor Gonzalez says her pains are now getting bad, she has pain in her  Low back, the hips, the knees, she's having now difficulty going upstairs and downstairs. She says she's having problems walking many days and she can not stand up from bed or off the chair and she  She is asking for a prescription of a reclining chair to help her sit down and stand up. She says when she stries to stand up she feels weak on her hips, sometimes she feels unstable when she walks, but she absolutely refuses using a walked. Review of Systems  Review of Systems   Constitutional: Negative for chills, fatigue, fever and unexpected weight change. HENT: Negative for congestion, ear pain, sneezing and sore throat. Eyes: Negative for pain and discharge. Respiratory: Negative for cough, shortness of breath and wheezing. Cardiovascular: Negative for chest pain, palpitations and leg swelling. Gastrointestinal: Negative for abdominal pain, blood in stool, constipation and diarrhea. Endocrine: Negative for polydipsia and polyuria. Genitourinary: Negative for difficulty urinating, dysuria, frequency, hematuria and urgency. Musculoskeletal: Negative for arthralgias, back pain and joint swelling. Skin: Negative for rash. Allergic/Immunologic: Negative for environmental allergies and food allergies. Neurological: Negative for dizziness, speech difficulty, weakness, light-headedness, numbness and headaches. Hematological: Negative for adenopathy. Psychiatric/Behavioral: Negative for behavioral problems (Depression), sleep disturbance and suicidal ideas.           Past Medical History:   Diagnosis Date    Anemia     Back pain     DVT (deep venous thrombosis) ([facility])     and PE    Rheumatoid arthritis (HCC)     Uterine fibroid      Past Surgical History:   Procedure Laterality Date    HX  SECTION       Social History     Socioeconomic History  Marital status: SINGLE     Spouse name: Not on file    Number of children: Not on file    Years of education: Not on file    Highest education level: Some college, no degree   Occupational History    Occupation: disability   Tobacco Use    Smoking status: Former Smoker     Packs/day: 0.25     Years: 30.00     Pack years: 7.50     Types: Cigarettes    Smokeless tobacco: Never Used    Tobacco comment: Quit 15 years ago   Vaping Use    Vaping Use: Never used   Substance and Sexual Activity    Alcohol use: Yes     Comment: Occasional    Drug use: Never    Sexual activity: Yes     Partners: Male     Social Determinants of Health     Financial Resource Strain:     Difficulty of Paying Living Expenses:    Food Insecurity:     Worried About Running Out of Food in the Last Year:     [address] in the Last Year:    Transportation Needs:     Lack of Transportation (Medical):  Lack of Transportation (Non-Medical):    Physical Activity: Inactive    Days of Exercise per Week: 0 days    Minutes of Exercise per Session: 0 min   Stress:     Feeling of Stress :    Social Connections:     Frequency of Communication with Friends and Family:     Frequency of Social Gatherings with Friends and Family:     Attends [employer] Services:     Active Member of Clubs or Organizations:     Attends Club or Organization Meetings:     Marital Status:      Family History   Problem Relation Age of Onset    Lung Cancer Mother     Hypertension Sister     Breast Cancer Maternal Aunt      Current Outpatient Medications   Medication Sig Dispense Refill    fluticasone propionate (FLONASE) 50 mcg/actuation nasal spray SPRAY 1 SPRAY INTO EACH NOSTRIL EVERY DAY      [START ON 9/3/2021] methotrexate (RHEUMATREX) 2.5 mg tablet Take 4 Tablets by mouth Every Friday for 30 days. 16 Tablet 0    Comp. Stocking,Thigh,Reg,Medium misc For use during the day 2 Each 0    folic acid (FOLVITE) 1 mg tablet Take 1 Tablet by mouth daily for 180 days. 90 Tablet 1    Cane elizabeth For daily use to support your walking 1 Each 0    valACYclovir (VALTREX) 1 gram tablet Take 1 Tablet by mouth daily for 180 days. 90 Tablet 1    acetaminophen (Tylenol Arthritis Pain) 650 mg TbER Take 1 Tablet by mouth every eight (8) hours as needed for Pain for up to 90 days. 90 Tablet 2    docosanoL (ABREVA) 10 % topical cream Apply  to affected area five (5) times daily. 2 g 5    multivitamin with folic acid (One Daily Essential) 400 mcg tab tablet Take 1 Tablet by mouth daily. 90 Tablet 3    cromolyn (OPTICROM) 4 % ophthalmic solution Administer 1 Drop to both eyes daily.  ferrous sulfate 325 mg (65 mg iron) tablet Take  by mouth Daily (before breakfast). (Patient not taking: Reported on 9/2/2021)       Allergies   Allergen Reactions    Bactrim [Sulfamethoprim] Swelling     Swelling of the lilps    Penicillin V Potassium Swelling       Objective:  Visit Vitals  BP (!) 155/82 (BP 1 Location: Left upper arm, BP Patient Position: Sitting, BP Cuff Size: Adult)   Pulse 76   Temp 97.9 °F (36.6 °C) (Oral)   Resp 18   Ht 5' 3\" (1.6 m)   Wt 122 lb 12.8 oz (55.7 kg)   LMP  (LMP Unknown)   SpO2 99% Comment: RA   BMI 21.75 kg/m²     Physical Exam:   Physical Exam  Constitutional:       General: She is not in acute distress. Appearance: Normal appearance. HENT:      Head: Normocephalic and atraumatic. Mouth/Throat:      Mouth: Mucous membranes are moist.   Eyes:      Extraocular Movements: Extraocular movements intact. Conjunctiva/sclera: Conjunctivae normal.      Pupils: Pupils are equal, round, and reactive to light. Cardiovascular:      Rate and Rhythm: Normal rate and regular rhythm. Pulses: Normal pulses. Heart sounds: Normal heart sounds. Comments: Grade 2 and 3 varicose veins both lower extremities, more pronounced in the left   Pulmonary:      Effort: Pulmonary effort is normal.      Breath sounds: Normal breath sounds. Abdominal:      General: Abdomen is flat. Bowel sounds are normal. There is no distension. Palpations: Abdomen is soft. There is no mass. Tenderness: There is no abdominal tenderness. Musculoskeletal:         General: No swelling or deformity. Cervical back: Normal range of motion and neck supple. Lumbar back: Tenderness and bony tenderness present. Right hip: Tenderness present. Left hip: Tenderness present. Right knee: Bony tenderness and crepitus present. Left knee: Bony tenderness and crepitus present. Right lower leg: No edema. Left lower leg: No edema. Comments: Difficulty and delay standing up from the chair and going down the exam bed. Lymphadenopathy:      Cervical: No cervical adenopathy. Skin:     General: Skin is warm and dry. Capillary Refill: Capillary refill takes less than 2 seconds. Coloration: Skin is not jaundiced or pale. Findings: No erythema or rash. Neurological:      General: No focal deficit present. Mental Status: She is alert and oriented to person, place, and time. Psychiatric:         Mood and Affect: Mood normal.         Behavior: Behavior normal.         Thought Content: Thought content normal.         Judgment: Judgment normal.          Results for orders placed or performed in visit on 07/29/21   CBC WITH AUTOMATED DIFF   Result Value Ref Range    WBC 3.4 3.4 - 10.8 x10E3/uL    RBC 3.80 3.77 - 5.28 x10E6/uL    HGB 11.2 11.1 - 15.9 g/dL    HCT 32.7 (L) 34.0 - 46.6 %    MCV 86 79 - 97 fL    MCH 29.5 26.6 - 33.0 pg    MCHC 34.3 31.5 - 35.7 g/dL    RDW 12.7 11.7 - 15.4 %    PLATELET 208 004 - 636 x10E3/uL    NEUTROPHILS 59 Not Estab. %    Lymphocytes 31 Not Estab. %    MONOCYTES 10 Not Estab. %    EOSINOPHILS 0 Not Estab. %    BASOPHILS 0 Not Estab. %    ABS. NEUTROPHILS 2.0 1.4 - 7.0 x10E3/uL    Abs Lymphocytes 1.0 0.7 - 3.1 x10E3/uL    ABS. MONOCYTES 0.3 0.1 - 0.9 x10E3/uL    ABS.  EOSINOPHILS 0.0 0.0 - 0.4 x10E3/uL    ABS. BASOPHILS 0.0 0.0 - 0.2 x10E3/uL    IMMATURE GRANULOCYTES 0 Not Estab. %    ABS. IMM. GRANS. 0.0 0.0 - 0.1 [id number]/PU   METABOLIC PANEL, COMPREHENSIVE   Result Value Ref Range    Glucose 97 65 - 99 mg/dL    BUN 15 6 - 24 mg/dL    Creatinine 0.61 0.57 - 1.00 mg/dL    GFR est non- >59 mL/min/1.73    GFR est  >59 mL/min/1.73    BUN/Creatinine ratio 25 (H) 9 - 23    Sodium 135 134 - 144 mmol/L    Potassium 4.0 3.5 - 5.2 mmol/L    Chloride 103 96 - 106 mmol/L    CO2 20 20 - 29 mmol/L    Calcium 9.5 8.7 - 10.2 mg/dL    Protein, total 10.6 (HH) 6.0 - 8.5 g/dL    Albumin 3.7 (L) 3.8 - 4.9 g/dL    GLOBULIN, TOTAL 6.9 (H) 1.5 - 4.5 g/dL    A-G Ratio 0.5 (L) 1.2 - 2.2    Bilirubin, total 0.8 0.0 - 1.2 mg/dL    Alk.  phosphatase 79 48 - 121 IU/L    AST (SGOT) 20 0 - 40 IU/L    ALT (SGPT) 9 0 - 32 IU/L   LIPID PANEL   Result Value Ref Range    Cholesterol, total 141 100 - 199 mg/dL    Triglyceride 69 0 - 149 mg/dL    HDL Cholesterol 57 >39 mg/dL    VLDL, calculated 14 5 - 40 mg/dL    LDL, calculated 70 0 - 99 mg/dL   TSH 3RD GENERATION   Result Value Ref Range    TSH 1.000 0.450 - 4.500 uIU/mL   HEPATITIS C AB   Result Value Ref Range    Hep C Virus Ab 1.9 (H) 0.0 - 0.9 s/co ratio   IRON PROFILE   Result Value Ref Range    TIBC 240 (L) 250 - 450 ug/dL    UIBC 174 131 - 425 ug/dL    Iron 66 27 - 159 ug/dL    Iron % saturation 28 15 - 55 %   FERRITIN   Result Value Ref Range    Ferritin 377 (H) 15 - 150 ng/mL   RHEUMATOID FACTOR, QL   Result Value Ref Range    Rheumatoid factor 73.9 (H) 0.0 - 13.9 IU/mL   CAROLYN, DIRECT, W/REFLEX   Result Value Ref Range    Antinuclear Antibodies Direct Positive (A) Negative    Anti-DNA (DS) Ab, QT <1 0 - 9 IU/mL    RNP Abs 0.9 0.0 - 0.9 AI    Flores Abs 0.3 0.0 - 0.9 AI    Scleroderma-70 Ab <0.2 0.0 - 0.9 AI    Sjogren's Anti-SS-A >8.0 (H) 0.0 - 0.9 AI    Sjogren's Anti-SS-B >8.0 (H) 0.0 - 0.9 AI    Antichromatin Ab <0.2 0.0 - 0.9 AI    Anti-Kimberly-1 <0.2 0.0 - 0.9 AI Centromere B Ab <0.2 0.0 - 0.9 AI    See below Comment    C REACTIVE PROTEIN, QT   Result Value Ref Range    C-Reactive Protein, Qt 15 (H) 0 - 10 mg/L   CYCLIC CITRUL PEPTIDE AB, IGG   Result Value Ref Range    CCP Antibodies IgG/IgA 14 0 - 19 units       Assessment/Plan:    She has confirmed rheumatoid arthritis and it seems the pain she has her probably related to this, she has not had any treatment for this for years, she says she does not want to see the rheumatologist I referred her to around the area because of the reviews he has and he wants to be referred to a rheumatologist, explained him that only option is going to [place], she said that she was going to think about it but I explained she needs to see a rheumatologist and that is currently the only option and then she agreed for that. Referral placed. In the meantime I will go ahead and start her methotrexate starting 10 mg/week until she is able to see the rheumatologist.  She does not want to be on prednisone. She can continue taking Tylenol as needed for the pain in the meantime. Start folic acid along with her methotrexate, explained her possible side effects of methotrexate and the need to do serial blood tests to check on her blood counts and liver counts. Her walking has been worsening, she has difficulty standing up when she is sitting down or lying down, will order and recliner with lifting as per her request will find out where we can send this order. I spoke to her about using a walker but she absolutely refuses using it, had to convince her to use a cane to which she finally agreed. On the labs we did last time she was here she tested positive for hepatitis C and had proteinemia, I ordered additional work-up for her both but she never did the labs, gave her printout of those again today and explained her the importance of doing so.     She has bilateral varicose veins worse in the left side with pain and a history of ulcer, she went for first time to vein specialist but she was not happy with the attention and she wants to go to a different specialist, referring to vascular surgery as below. She needs compression stockings placing order, recommend her to use during the day. ICD-10-CM ICD-9-CM    1. History of rheumatoid arthritis  Z87.39 V13.4 REFERRAL TO RHEUMATOLOGY   2. Recurrent oral herpes simplex  B00.2 054.2    3. Pain in joint, multiple sites  M25.50 719.49 REFERRAL TO RHEUMATOLOGY      Cane elizabeth   4. Positive CAROLYN (antinuclear antibody)  R76.8 795.79 REFERRAL TO RHEUMATOLOGY   5. Rheumatoid arthritis involving multiple sites with positive rheumatoid factor (HCC)  M05.79 714.0 methotrexate (RHEUMATREX) 2.5 mg tablet      folic acid (FOLVITE) 1 mg tablet      Cane elizabeth      XR SPINE LUMB 2 OR 3 V      XR HIP LT W OR WO PELV 2-3 VWS      XR HIP RT W OR WO PELV 2-3 VWS   6. Difficulty walking  R26.2 719.7 Cane elizabeth   7. Varicose veins of both lower extremities with pain  I83.813 454.8 Comp. Stocking,Thigh,Reg,Medium misc      REFERRAL TO VASCULAR SURGERY     Orders Placed This Encounter    XR SPINE LUMB 2 OR 3 V     Standing Status:   Future     Standing Expiration Date:   10/2/2022    XR HIP LT W OR WO PELV 2-3 VWS     Standing Status:   Future     Standing Expiration Date:   10/2/2022    XR HIP RT W OR WO PELV 2-3 VWS     Standing Status:   Future     Standing Expiration Date:   10/2/2022    EMPL [facility]     Referral Priority:   Routine     Referral Type:   Consultation     Referral Reason:   Specialty Services Required     Referred to Provider:   Nate Ballesteros MD     Number of Visits Requested:   1    REFERRAL TO VASCULAR SURGERY     Referral Priority:   Routine     Referral Type:   Consultation     Referral Reason:   Specialty Services Required     Referred to Provider:   Antonio Venegas MD     Requested Specialty:   Vascular Surgery     Number of Visits Requested:   1    fluticasone propionate (FLONASE) 50 mcg/actuation nasal spray     Sig: SPRAY 1 SPRAY INTO EACH NOSTRIL EVERY DAY    methotrexate (RHEUMATREX) 2.5 mg tablet     Sig: Take 4 Tablets by mouth Every Friday for 30 days. Dispense:  16 Tablet     Refill:  0    Comp. Stocking,Thigh,Reg,Medium misc     Sig: For use during the day     Dispense:  2 Each     Refill:  0    folic acid (FOLVITE) 1 mg tablet     Sig: Take 1 Tablet by mouth daily for 180 days. Dispense:  90 Tablet     Refill:  1    Cane elizabeth     Sig: For daily use to support your walking     Dispense:  1 Each     Refill:  0     have labs drawn prior to ROV, call if any problems  There are no Patient Instructions on file for this visit. Follow-up and Dispositions    · Return in about 1 month (around 10/2/2021).

## 2021-09-03 ENCOUNTER — TELEPHONE (OUTPATIENT)
Dept: INTERNAL MEDICINE CLINIC | Age: 56
End: 2021-09-03

## 2021-09-03 RX ORDER — AMMONIUM LACTATE 12 G/100G
CREAM TOPICAL 2 TIMES DAILY
Qty: 280 G | Refills: 3 | Status: SHIPPED | OUTPATIENT
Start: 2021-09-03 | End: 2022-09-13

## 2021-09-09 LAB
ALBUMIN SERPL ELPH-MCNC: 3.4 G/DL (ref 2.9–4.4)
ALBUMIN/GLOB SERPL: 0.5 {RATIO} (ref 0.7–1.7)
ALPHA1 GLOB SERPL ELPH-MCNC: 0.2 G/DL (ref 0–0.4)
ALPHA2 GLOB SERPL ELPH-MCNC: 0.8 G/DL (ref 0.4–1)
B-GLOBULIN SERPL ELPH-MCNC: 1.4 G/DL (ref 0.7–1.3)
GAMMA GLOB SERPL ELPH-MCNC: 4.9 G/DL (ref 0.4–1.8)
GLOBULIN SER CALC-MCNC: 7.3 G/DL (ref 2.2–3.9)
HCV GENOTYPE: NORMAL
HCV RNA SERPL NAA+PROBE-ACNC: NORMAL IU/ML
HCV RNA SERPL NAA+PROBE-LOG IU: NORMAL LOG10 IU/ML
IGA SERPL-MCNC: 965 MG/DL (ref 87–352)
IGG SERPL-MCNC: 5589 MG/DL (ref 586–1602)
IGM SERPL-MCNC: 135 MG/DL (ref 26–217)
KAPPA LC FREE SER-MCNC: 103 MG/L (ref 3.3–19.4)
KAPPA LC FREE/LAMBDA FREE SER: 2.06 {RATIO} (ref 0.26–1.65)
LAMBDA LC FREE SERPL-MCNC: 50 MG/L (ref 5.7–26.3)
M PROTEIN SERPL ELPH-MCNC: ABNORMAL G/DL
PLEASE NOTE, 011150: ABNORMAL
PROT PATTERN SERPL IFE-IMP: ABNORMAL
PROT SERPL-MCNC: 10.7 G/DL (ref 6–8.5)
TEST INFORMATION: NORMAL

## 2021-09-10 DIAGNOSIS — R26.2 DIFFICULTY WALKING: Primary | ICD-10-CM

## 2021-09-17 DIAGNOSIS — D89.0 POLYCLONAL HYPERGAMMAGLOBULINEMIA: Primary | ICD-10-CM

## 2021-09-17 NOTE — PROGRESS NOTES
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 She needs to see hematology, I am placing referral. Please ask her if she has made appointment with the Rheumatologist and please send these results to them as well. Let her know there is no Hepatitis C.    Thanks

## 2021-09-21 ENCOUNTER — TELEPHONE (OUTPATIENT)
Dept: INTERNAL MEDICINE CLINIC | Age: 56
End: 2021-09-21

## 2021-09-21 NOTE — TELEPHONE ENCOUNTER
I called Dr. Tomás Warner office to schedule the pt an appt and they don't take the pt's insurance. I saw another referral in the pt's chart to schedule with Dr. Magdaleno Urena. I made the appt for oct 21, 2021 at 1230. I called to let the pt know and she stated that she was not going because she was not driving to [place]. I told the pt to call her insurance company and see who will take her insurance so she can see a rheumatologist. Also [address] does not take her insurance.  Can you refer her to a different hematologist.

## 2021-09-23 ENCOUNTER — TELEPHONE (OUTPATIENT)
Dept: INTERNAL MEDICINE CLINIC | Age: 56
End: 2021-09-23

## 2021-10-13 ENCOUNTER — OFFICE VISIT (OUTPATIENT)
Dept: INTERNAL MEDICINE CLINIC | Age: 56
End: 2021-10-13
Payer: COMMERCIAL

## 2021-10-13 VITALS
TEMPERATURE: 97.4 F | HEIGHT: 63 IN | WEIGHT: 125.6 LBS | DIASTOLIC BLOOD PRESSURE: 74 MMHG | OXYGEN SATURATION: 100 % | SYSTOLIC BLOOD PRESSURE: 122 MMHG | HEART RATE: 72 BPM | RESPIRATION RATE: 18 BRPM | BODY MASS INDEX: 22.25 KG/M2

## 2021-10-13 DIAGNOSIS — M05.79 RHEUMATOID ARTHRITIS INVOLVING MULTIPLE SITES WITH POSITIVE RHEUMATOID FACTOR (HCC): ICD-10-CM

## 2021-10-13 DIAGNOSIS — D89.0 POLYCLONAL HYPERGAMMAGLOBULINEMIA: ICD-10-CM

## 2021-10-13 DIAGNOSIS — H91.93 BILATERAL HEARING LOSS, UNSPECIFIED HEARING LOSS TYPE: Primary | ICD-10-CM

## 2021-10-13 PROCEDURE — 99214 OFFICE O/P EST MOD 30 MIN: CPT | Performed by: INTERNAL MEDICINE

## 2021-10-13 RX ORDER — METHOTREXATE 2.5 MG/1
10 TABLET ORAL
Qty: 48 TABLET | Refills: 0 | Status: SHIPPED | OUTPATIENT
Start: 2021-10-15 | End: 2021-12-17

## 2021-10-13 NOTE — PROGRESS NOTES
Abida Jones is a 61 y.o. female and presents with Follow-up and Arthritis    Gerardgm Lynn Has been taking the methotrexate 10 mg since last visit for the RA, she says she has felt improvement since she started, she continous taking the folic acid. She says she feels less pain in her joints, standing up is a little easier but still not completely improved. She's saying she doesn't think she will go to the rheumatologist next week because she has an appointment with vascular surgeon on 10/15 and 10/20 and she's not sure if she will be able to find transport to be able to go there. Denies hand stiffnness, her main issue is still standing up from sitting position, which takes her time and hurts sometimes. 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 I did not receive a note and she doesn't seem to sure about it. She really has no idea if she went and had no idea why would she go     Review of Systems  Review of Systems   Constitutional: Negative for chills, fatigue, fever and unexpected weight change. HENT: Negative for congestion, ear pain, sneezing and sore throat. Eyes: Negative for pain and discharge. Respiratory: Negative for cough, shortness of breath and wheezing. Cardiovascular: Negative for chest pain, palpitations and leg swelling. Gastrointestinal: Negative for abdominal pain, blood in stool, constipation and diarrhea. Endocrine: Negative for polydipsia and polyuria. Genitourinary: Negative for difficulty urinating, dysuria, frequency, hematuria and urgency. Musculoskeletal: Negative for arthralgias, back pain and joint swelling. Skin: Negative for rash. Allergic/Immunologic: Negative for environmental allergies and food allergies. Neurological: Negative for dizziness, speech difficulty, weakness, light-headedness, numbness and headaches. Hematological: Negative for adenopathy.    Psychiatric/Behavioral: Negative for behavioral problems (Depression), sleep disturbance and suicidal ideas. Past Medical History:   Diagnosis Date    Anemia     Back pain     DVT (deep venous thrombosis) (HCC)     and PE    Rheumatoid arthritis (HCC)     Uterine fibroid      Past Surgical History:   Procedure Laterality Date    HX  SECTION       Social History     Socioeconomic History    Marital status: SINGLE     Spouse name: Not on file    Number of children: Not on file    Years of education: Not on file    Highest education level: Some college, no degree   Occupational History    Occupation: disability   Tobacco Use    Smoking status: Former Smoker     Packs/day: 0.25     Years: 30.00     Pack years: 7.50     Types: Cigarettes    Smokeless tobacco: Never Used    Tobacco comment: Quit 15 years ago   Vaping Use    Vaping Use: Never used   Substance and Sexual Activity    Alcohol use: Yes     Comment: Occasional    Drug use: Never    Sexual activity: Yes     Partners: Male     Social Determinants of Health     Financial Resource Strain:     Difficulty of Paying Living Expenses:    Food Insecurity:     Worried About Running Out of Food in the Last Year:     Ran Out of Food in the Last Year:    Transportation Needs:     Lack of Transportation (Medical):      Lack of Transportation (Non-Medical):    Physical Activity: Inactive    Days of Exercise per Week: 0 days    Minutes of Exercise per Session: 0 min   Stress:     Feeling of Stress :    Social Connections:     Frequency of Communication with Friends and Family:     Frequency of Social Gatherings with Friends and Family:     Attends [employer] Services:     Active Member of Clubs or Organizations:     Attends Club or Organization Meetings:     Marital Status:      Family History   Problem Relation Age of Onset    Lung Cancer Mother     Hypertension Sister     Breast Cancer Maternal Aunt      Current Outpatient Medications   Medication Sig Dispense Refill    methotrexate (RHEUMATREX) 2.5 mg tablet Take 4 Tablets by mouth Every Friday for 90 days. 48 Tablet 0    ammonium lactate (AMLACTIN) 12 % topical cream Apply  to affected area two (2) times a day. rub in to affected area well 280 g 3    fluticasone propionate (FLONASE) 50 mcg/actuation nasal spray SPRAY 1 SPRAY INTO EACH NOSTRIL EVERY DAY      Comp. Stocking,Thigh,Reg,Medium misc For use during the day 2 Each 0    folic acid (FOLVITE) 1 mg tablet Take 1 Tablet by mouth daily for 180 days. 90 Tablet 1    Cane elizabeth For daily use to support your walking 1 Each 0    valACYclovir (VALTREX) 1 gram tablet Take 1 Tablet by mouth daily for 180 days. 90 Tablet 1    acetaminophen (Tylenol Arthritis Pain) 650 mg TbER Take 1 Tablet by mouth every eight (8) hours as needed for Pain for up to 90 days. 90 Tablet 2    docosanoL (ABREVA) 10 % topical cream Apply  to affected area five (5) times daily. 2 g 5    multivitamin with folic acid (One Daily Essential) 400 mcg tab tablet Take 1 Tablet by mouth daily. 90 Tablet 3    cromolyn (OPTICROM) 4 % ophthalmic solution Administer 1 Drop to both eyes daily.  miscellaneous medical supply [facility] Lift chair 1 Each 0    miscellaneous medical supply [facility] Lift chair (Patient not taking: Reported on 10/13/2021) 1 Each 0     Allergies   Allergen Reactions    Bactrim [Sulfamethoprim] Swelling     Swelling of the lilps    Penicillin V Potassium Swelling       Objective:  Visit Vitals  /74 (BP 1 Location: Right arm, BP Patient Position: Sitting, BP Cuff Size: Adult)   Pulse 72   Temp 97.4 °F (36.3 °C) (Oral)   Resp 18   Ht 5' 3\" (1.6 m)   Wt 125 lb 9.6 oz (57 kg)   LMP  (LMP Unknown)   SpO2 100% Comment: RA   BMI 22.25 kg/m²     Physical Exam:   Physical Exam  Constitutional:       General: She is not in acute distress. Appearance: Normal appearance. HENT:      Head: Normocephalic and atraumatic.       Mouth/Throat:      Mouth: Mucous membranes are moist.   Eyes: Extraocular Movements: Extraocular movements intact. Conjunctiva/sclera: Conjunctivae normal.      Pupils: Pupils are equal, round, and reactive to light. Cardiovascular:      Rate and Rhythm: Normal rate and regular rhythm. Pulses: Normal pulses. Heart sounds: Normal heart sounds. Pulmonary:      Effort: Pulmonary effort is normal.      Breath sounds: Normal breath sounds. Abdominal:      General: Abdomen is flat. Bowel sounds are normal. There is no distension. Palpations: Abdomen is soft. There is no mass. Tenderness: There is no abdominal tenderness. Musculoskeletal:         General: No swelling or deformity. Cervical back: Normal range of motion and neck supple. Right lower leg: No edema. Left lower leg: No edema. Lymphadenopathy:      Cervical: No cervical adenopathy. Skin:     General: Skin is warm and dry. Capillary Refill: Capillary refill takes less than 2 seconds. Coloration: Skin is not jaundiced or pale. Findings: No erythema or rash. Neurological:      General: No focal deficit present. Mental Status: She is alert and oriented to person, place, and time. Psychiatric:         Mood and Affect: Mood normal.         Behavior: Behavior normal.         Thought Content: Thought content normal.         Judgment: Judgment normal.          Results for orders placed or performed in visit on 07/29/21   CBC WITH AUTOMATED DIFF   Result Value Ref Range    WBC 3.4 3.4 - 10.8 x10E3/uL    RBC 3.80 3.77 - 5.28 x10E6/uL    HGB 11.2 11.1 - 15.9 g/dL    HCT 32.7 (L) 34.0 - 46.6 %    MCV 86 79 - 97 fL    MCH 29.5 26.6 - 33.0 pg    MCHC 34.3 31.5 - 35.7 g/dL    RDW 12.7 11.7 - 15.4 %    PLATELET 921 882 - 226 x10E3/uL    NEUTROPHILS 59 Not Estab. %    Lymphocytes 31 Not Estab. %    MONOCYTES 10 Not Estab. %    EOSINOPHILS 0 Not Estab. %    BASOPHILS 0 Not Estab. %    ABS.  NEUTROPHILS 2.0 1.4 - 7.0 x10E3/uL    Abs Lymphocytes 1.0 0.7 - 3.1 x10E3/uL ABS. MONOCYTES 0.3 0.1 - 0.9 x10E3/uL    ABS. EOSINOPHILS 0.0 0.0 - 0.4 x10E3/uL    ABS. BASOPHILS 0.0 0.0 - 0.2 x10E3/uL    IMMATURE GRANULOCYTES 0 Not Estab. %    ABS. IMM. GRANS. 0.0 0.0 - 0.1 X02B4/BG   METABOLIC PANEL, COMPREHENSIVE   Result Value Ref Range    Glucose 97 65 - 99 mg/dL    BUN 15 6 - 24 mg/dL    Creatinine 0.61 0.57 - 1.00 mg/dL    GFR est non- >59 mL/min/1.73    GFR est  >59 mL/min/1.73    BUN/Creatinine ratio 25 (H) 9 - 23    Sodium 135 134 - 144 mmol/L    Potassium 4.0 3.5 - 5.2 mmol/L    Chloride 103 96 - 106 mmol/L    CO2 20 20 - 29 mmol/L    Calcium 9.5 8.7 - 10.2 mg/dL    Protein, total 10.6 (HH) 6.0 - 8.5 g/dL    Albumin 3.7 (L) 3.8 - 4.9 g/dL    GLOBULIN, TOTAL 6.9 (H) 1.5 - 4.5 g/dL    A-G Ratio 0.5 (L) 1.2 - 2.2    Bilirubin, total 0.8 0.0 - 1.2 mg/dL    Alk.  phosphatase 79 48 - 121 IU/L    AST (SGOT) 20 0 - 40 IU/L    ALT (SGPT) 9 0 - 32 IU/L   LIPID PANEL   Result Value Ref Range    Cholesterol, total 141 100 - 199 mg/dL    Triglyceride 69 0 - 149 mg/dL    HDL Cholesterol 57 >39 mg/dL    VLDL, calculated 14 5 - 40 mg/dL    LDL, calculated 70 0 - 99 mg/dL   TSH 3RD GENERATION   Result Value Ref Range    TSH 1.000 0.450 - 4.500 uIU/mL   HEPATITIS C AB   Result Value Ref Range    Hep C Virus Ab 1.9 (H) 0.0 - 0.9 s/co ratio   IRON PROFILE   Result Value Ref Range    TIBC 240 (L) 250 - 450 ug/dL    UIBC 174 131 - 425 ug/dL    Iron 66 27 - 159 ug/dL    Iron % saturation 28 15 - 55 %   FERRITIN   Result Value Ref Range    Ferritin 377 (H) 15 - 150 ng/mL   RHEUMATOID FACTOR, QL   Result Value Ref Range    Rheumatoid factor 73.9 (H) 0.0 - 13.9 IU/mL   CAROLYN, DIRECT, W/REFLEX   Result Value Ref Range    Antinuclear Antibodies Direct Positive (A) Negative    Anti-DNA (DS) Ab, QT <1 0 - 9 IU/mL    RNP Abs 0.9 0.0 - 0.9 AI    Flores Abs 0.3 0.0 - 0.9 AI    Scleroderma-70 Ab <0.2 0.0 - 0.9 AI    Sjogren's Anti-SS-A >8.0 (H) 0.0 - 0.9 AI    Sjogren's Anti-SS-B >8.0 (H) 0.0 - 0.9 AI Antichromatin Ab <0.2 0.0 - 0.9 AI    Anti-Kimberly-1 <0.2 0.0 - 0.9 AI    Centromere B Ab <0.2 0.0 - 0.9 AI    See below Comment    C REACTIVE PROTEIN, QT   Result Value Ref Range    C-Reactive Protein, Qt 15 (H) 0 - 10 mg/L   CYCLIC CITRUL PEPTIDE AB, IGG   Result Value Ref Range    CCP Antibodies IgG/IgA 14 0 - 19 units   HEPATITIS C QT BY PCR WITH REFLEX GENOTYPE   Result Value Ref Range    Hepatitis C Quantitation HCV Not Detected IU/mL    HCV log10 CANCELED log10 IU/mL    Test information Comment     HCV Genotype CANCELED    PROTEIN ELECTROPHORESIS   Result Value Ref Range    Protein, total 10.7 (HH) 6.0 - 8.5 g/dL    Albumin 3.4 2.9 - 4.4 g/dL    Alpha-1-globulin 0.2 0.0 - 0.4 g/dL    ALPHA-2 GLOBULIN 0.8 0.4 - 1.0 g/dL    Beta globulin 1.4 (H) 0.7 - 1.3 g/dL    Gamma globulin 4.9 (H) 0.4 - 1.8 g/dL    M-Nathaniel Not Observed Not Observed g/dL    Globulin, total 7.3 (H) 2.2 - 3.9 g/dL    A/G ratio 0.5 (L) 0.7 - 1.7    Please note Comment    IMMUNOELECTROPHORESIS (IMMUNOFIX.)   Result Value Ref Range    Immunofixation, serum Comment (A)     Immunoglobulin G, Qt. 5,589 (H) 586 - 1,602 mg/dL    Immunoglobulin A, Qt. 965 (H) 87 - 352 mg/dL    Immunoglobulin M, Qt. 135 26 - 217 mg/dL   FREE LIGHT CHAINS, KAPPA/LAMBDA, QT   Result Value Ref Range    Free Kappa Lt Chains, serum 103.0 (H) 3.3 - 19.4 mg/L    Free Lambda Lt Chains, serum 50.0 (H) 5.7 - 26.3 mg/L    Kappa/Lambda ratio, serum 2.06 (H) 0.26 - 1.65       Assessment/Plan:    I had to explain her multiple times in different ways the reasons for she needs to go to rheumatology hematology, she did not understand when she had the call about the results does not understand today, I tried several times finally decided she was going to think about going to 1 or the other when she was not going to be able to make it may be due to a rheumatologist because his in [place] in the ED difficult for her to get transportation and she will not ask for transportation from [facility]. I asked her again to call that office encounter appointment she thinks she is not going to go and  to reschedule her appointments. I asked her to do her best to go to all her visits with someone from the family since she seems to forget things in not understanding quite well    She also has some hypoacusis, needs to see ENT. Terms of the urinary symptoms she feels she has improved with methotrexate, will continue same dose, she continues taking folic acid encouraged her to not stop it. And again explained her the importance to go to Rheumatology       ICD-10-CM ICD-9-CM    1. Bilateral hearing loss, unspecified hearing loss type  H91.93 389.9 REFERRAL TO ENT-OTOLARYNGOLOGY   2. Rheumatoid arthritis involving multiple sites with positive rheumatoid factor (HCC)  M05.79 714.0 methotrexate (RHEUMATREX) 2.5 mg tablet   3. Polyclonal hypergammaglobulinemia  D89.0 273.0      Orders Placed This Encounter   [facility] EMPL     Referral Priority:   Routine     Referral Type:   Consultation     Referral Reason:   Specialty Services Required     Referred to Provider:   Jose Zayas MD     Number of Visits Requested:   1    methotrexate (RHEUMATREX) 2.5 mg tablet     Sig: Take 4 Tablets by mouth Every Friday for 90 days. Dispense:  48 Tablet     Refill:  0     call if any problems  There are no Patient Instructions on file for this visit. Follow-up and Dispositions    · Return in about 2 months (around 12/13/2021).

## 2021-10-13 NOTE — PROGRESS NOTES
1. Have you been to the ER, urgent care clinic since your last visit? Hospitalized since your last visit? No    2. Have you seen or consulted any other health care providers outside of the [address] [place] [address] since your last visit? Include any pap smears or colon screening. No     Chief Complaint   Patient presents with    Follow-up    Arthritis     Pt states that she is here for a f/u visit.

## 2021-10-19 ENCOUNTER — TELEPHONE (OUTPATIENT)
Dept: RHEUMATOLOGY | Age: 56
End: 2021-10-19

## 2021-10-26 ENCOUNTER — TELEPHONE (OUTPATIENT)
Dept: OBGYN CLINIC | Age: 56
End: 2021-10-26

## 2021-10-26 NOTE — TELEPHONE ENCOUNTER
Spoke with patient who reports at end of stream has been urine is yellow. Denies any back pain at this time but was hurting at first.  Symptoms started two days ago.

## 2021-10-26 NOTE — TELEPHONE ENCOUNTER
Patient needs to speak to a nurse and she didn't disclose why, she said she is having an issue and only wants to discuss it with a nurse.

## 2021-10-27 ENCOUNTER — OFFICE VISIT (OUTPATIENT)
Dept: INTERNAL MEDICINE CLINIC | Age: 56
End: 2021-10-27
Payer: COMMERCIAL

## 2021-10-27 ENCOUNTER — TRANSCRIBE ORDER (OUTPATIENT)
Dept: NEUROLOGY | Age: 56
End: 2021-10-27

## 2021-10-27 ENCOUNTER — TELEPHONE (OUTPATIENT)
Dept: INTERNAL MEDICINE CLINIC | Age: 56
End: 2021-10-27

## 2021-10-27 DIAGNOSIS — R30.0 DYSURIA: Primary | ICD-10-CM

## 2021-10-27 LAB
BILIRUB UR QL STRIP: NEGATIVE
GLUCOSE UR-MCNC: NEGATIVE MG/DL
KETONES P FAST UR STRIP-MCNC: NEGATIVE MG/DL
PH UR STRIP: 6 [PH] (ref 4.6–8)
PROT UR QL STRIP: NEGATIVE
SP GR UR STRIP: 1.02 (ref 1–1.03)
UA UROBILINOGEN AMB POC: NORMAL (ref 0.2–1)
URINALYSIS CLARITY POC: NORMAL
URINALYSIS COLOR POC: YELLOW
URINE BLOOD POC: NORMAL
URINE LEUKOCYTES POC: NORMAL
URINE NITRITES POC: POSITIVE

## 2021-10-27 PROCEDURE — 81003 URINALYSIS AUTO W/O SCOPE: CPT | Performed by: INTERNAL MEDICINE

## 2021-10-27 RX ORDER — NITROFURANTOIN MACROCRYSTALS 50 MG/1
50 CAPSULE ORAL 4 TIMES DAILY
Qty: 28 CAPSULE | Refills: 0 | Status: SHIPPED | OUTPATIENT
Start: 2021-10-27 | End: 2021-12-13 | Stop reason: ALTCHOICE

## 2021-10-27 NOTE — TELEPHONE ENCOUNTER
Pt stated that she was having dysuria. I told the patient to come in and I would test her urine for a UTI. I did put the results in the computer and she is positive for nitrates and she has a small amount of leukocytes. She has a trace amount of intact blood in her urine. She states that she is allergic to Bactrim and penicillin.  Please send medication to the [facility] in [place] on [address]

## 2021-10-29 ENCOUNTER — TELEPHONE (OUTPATIENT)
Dept: ENT CLINIC | Age: 56
End: 2021-10-29

## 2021-10-29 NOTE — TELEPHONE ENCOUNTER
LVM stating appt on 11/1 has been cancelled due to [address] being out. Asked pt to return our call to reschedule.

## 2021-11-19 ENCOUNTER — TELEPHONE (OUTPATIENT)
Dept: INTERNAL MEDICINE CLINIC | Age: 56
End: 2021-11-19

## 2021-11-19 NOTE — TELEPHONE ENCOUNTER
Patient was wanting to know if you received the paperwork for her reclining lift chair.   Please call her at  [phone] or the [employer] At [phone] [place]

## 2021-12-13 ENCOUNTER — OFFICE VISIT (OUTPATIENT)
Dept: INTERNAL MEDICINE CLINIC | Age: 56
End: 2021-12-13
Payer: COMMERCIAL

## 2021-12-13 VITALS
SYSTOLIC BLOOD PRESSURE: 115 MMHG | BODY MASS INDEX: 22.18 KG/M2 | RESPIRATION RATE: 18 BRPM | HEIGHT: 63 IN | DIASTOLIC BLOOD PRESSURE: 74 MMHG | OXYGEN SATURATION: 98 % | WEIGHT: 125.2 LBS | HEART RATE: 72 BPM | TEMPERATURE: 97.4 F

## 2021-12-13 DIAGNOSIS — M05.79 RHEUMATOID ARTHRITIS INVOLVING MULTIPLE SITES WITH POSITIVE RHEUMATOID FACTOR (HCC): Primary | ICD-10-CM

## 2021-12-13 DIAGNOSIS — M05.79 RHEUMATOID ARTHRITIS INVOLVING MULTIPLE SITES WITH POSITIVE RHEUMATOID FACTOR (HCC): ICD-10-CM

## 2021-12-13 PROCEDURE — 99214 OFFICE O/P EST MOD 30 MIN: CPT | Performed by: INTERNAL MEDICINE

## 2021-12-13 NOTE — PROGRESS NOTES
1. Have you been to the ER, urgent care clinic since your last visit? Hospitalized since your last visit? No    2. Have you seen or consulted any other health care providers outside of the [address] [place] [address] since your last visit? Include any pap smears or colon screening.  No     Chief Complaint   Patient presents with    Follow-up    Arthritis     Pt states that she is here for a f/u visit

## 2021-12-13 NOTE — PROGRESS NOTES
Misael Speaker is a 61 y.o. female and presents with Follow-up and Arthritis    She says she has not been having any pains in her joints, no swelling, she has been taking the methotrexate and folic acid, she has not rescheduled appointment she missed with rheumatology. No ER visits     Review of Systems  Review of Systems   Constitutional: Negative for chills, fatigue, fever and unexpected weight change. HENT: Negative for congestion, ear pain, sneezing and sore throat. Eyes: Negative for pain and discharge. Respiratory: Negative for cough, shortness of breath and wheezing. Cardiovascular: Negative for chest pain, palpitations and leg swelling. Gastrointestinal: Negative for abdominal pain, blood in stool, constipation and diarrhea. Endocrine: Negative for polydipsia and polyuria. Genitourinary: Negative for difficulty urinating, dysuria, frequency, hematuria and urgency. Musculoskeletal: Negative for arthralgias, back pain and joint swelling. Skin: Negative for rash. Allergic/Immunologic: Negative for environmental allergies and food allergies. Neurological: Negative for dizziness, speech difficulty, weakness, light-headedness, numbness and headaches. Hematological: Negative for adenopathy. Psychiatric/Behavioral: Negative for behavioral problems (Depression), sleep disturbance and suicidal ideas.           Past Medical History:   Diagnosis Date    Anemia     Back pain     DVT (deep venous thrombosis) (HCC)     and PE    Rheumatoid arthritis (HCC)     Uterine fibroid      Past Surgical History:   Procedure Laterality Date    HX  SECTION       Social History     Socioeconomic History    Marital status: SINGLE    Highest education level: Some college, no degree   Occupational History    Occupation: disability   Tobacco Use    Smoking status: Former Smoker     Packs/day: 0.25     Years: 30.00     Pack years: 7.50     Types: Cigarettes    Smokeless tobacco: Never Used  Tobacco comment: Quit 15 years ago   Vaping Use    Vaping Use: Never used   Substance and Sexual Activity    Alcohol use: Yes     Comment: Occasional    Drug use: Never    Sexual activity: Yes     Partners: Male     Social Determinants of Health     Physical Activity: Inactive    Days of Exercise per Week: 0 days    Minutes of Exercise per Session: 0 min     Family History   Problem Relation Age of Onset    Lung Cancer Mother     Hypertension Sister     Breast Cancer Maternal Aunt      Current Outpatient Medications   Medication Sig Dispense Refill    miscellaneous medical supply misc Lift chair 1 Each 0    ammonium lactate (AMLACTIN) 12 % topical cream Apply  to affected area two (2) times a day. rub in to affected area well 280 g 3    Comp. Stocking,Thigh,Reg,Medium misc For use during the day 2 Each 0    folic acid (FOLVITE) 1 mg tablet Take 1 Tablet by mouth daily for 180 days. 90 Tablet 1    Cane elizabeth For daily use to support your walking 1 Each 0    docosanoL (ABREVA) 10 % topical cream Apply  to affected area five (5) times daily. 2 g 5    multivitamin with folic acid (One Daily Essential) 400 mcg tab tablet Take 1 Tablet by mouth daily. 90 Tablet 3    cromolyn (OPTICROM) 4 % ophthalmic solution Administer 1 Drop to both eyes daily.  methotrexate (RHEUMATREX) 2.5 mg tablet TAKE 4 TABLETS BY MOUTH EVERY FRIDAY 48 Tablet 0     Allergies   Allergen Reactions    Bactrim [Sulfamethoprim] Swelling     Swelling of the lilps    Penicillin V Potassium Swelling       Objective:  Visit Vitals  /74 (BP 1 Location: Right upper arm, BP Patient Position: Sitting, BP Cuff Size: Adult)   Pulse 72   Temp 97.4 °F (36.3 °C) (Oral)   Resp 18   Ht 5' 3\" (1.6 m)   Wt 125 lb 3.2 oz (56.8 kg)   LMP  (LMP Unknown)   SpO2 98% Comment: RA   BMI 22.18 kg/m²     Physical Exam:   Physical Exam  Constitutional:       General: She is not in acute distress. Appearance: Normal appearance.    HENT:      Head: Normocephalic and atraumatic. Mouth/Throat:      Mouth: Mucous membranes are moist.   Eyes:      Extraocular Movements: Extraocular movements intact. Conjunctiva/sclera: Conjunctivae normal.      Pupils: Pupils are equal, round, and reactive to light. Cardiovascular:      Rate and Rhythm: Normal rate and regular rhythm. Pulses: Normal pulses. Heart sounds: Normal heart sounds. Pulmonary:      Effort: Pulmonary effort is normal.      Breath sounds: Normal breath sounds. Abdominal:      General: Abdomen is flat. Bowel sounds are normal. There is no distension. Palpations: Abdomen is soft. There is no mass. Tenderness: There is no abdominal tenderness. Musculoskeletal:         General: No swelling or deformity. Cervical back: Normal range of motion and neck supple. Right lower leg: No edema. Left lower leg: No edema. Lymphadenopathy:      Cervical: No cervical adenopathy. Skin:     General: Skin is warm and dry. Capillary Refill: Capillary refill takes less than 2 seconds. Coloration: Skin is not jaundiced or pale. Findings: No erythema or rash. Neurological:      General: No focal deficit present. Mental Status: She is alert and oriented to person, place, and time. Psychiatric:         Mood and Affect: Mood normal.         Behavior: Behavior normal.         Thought Content:  Thought content normal.         Judgment: Judgment normal.          Results for orders placed or performed in visit on 10/27/21   AMB POC URINALYSIS DIP STICK AUTO W/O MICRO   Result Value Ref Range    Color (UA POC) Yellow     Clarity (UA POC) Cloudy     Glucose (UA POC) Negative Negative    Bilirubin (UA POC) Negative Negative    Ketones (UA POC) Negative Negative    Specific gravity (UA POC) 1.025 1.001 - 1.035    Blood (UA POC) Trace Negative    pH (UA POC) 6.0 4.6 - 8.0    Protein (UA POC) Negative Negative    Urobilinogen (UA POC) 0.2 mg/dL 0.2 - 1    Nitrites (UA POC) Positive Negative    Leukocyte esterase (UA POC) 1+ Negative       Assessment/Plan:    She's doing better in terms of RA symptoms, I recently sent refill on MTX, continue folic acid, check following labs. I counseled her once again about importance of making appointment with Rheumatologist.       ICD-10-CM ICD-9-CM    1. Rheumatoid arthritis involving multiple sites with positive rheumatoid factor (HCC)  M05.79 714.0 CBC WITH AUTOMATED DIFF      METABOLIC PANEL, COMPREHENSIVE      C REACTIVE PROTEIN, QT     Orders Placed This Encounter    CBC WITH AUTOMATED DIFF    METABOLIC PANEL, COMPREHENSIVE    C REACTIVE PROTEIN, QT     call if any problems  There are no Patient Instructions on file for this visit. Follow-up and Dispositions    · Return in about 3 months (around 3/13/2022) for RA (she has not made appointment with rheum due to distance) I have her on MTX and doing lab q3m.

## 2021-12-14 ENCOUNTER — TELEPHONE (OUTPATIENT)
Dept: RHEUMATOLOGY | Age: 56
End: 2021-12-14

## 2021-12-14 NOTE — TELEPHONE ENCOUNTER
Pt called to schedule her missed appt . Informed pt Wallace Baxter will be taking np and his appts are out until August. Informed pt she can give her referring doctor a call to see if its possible to get referred some where else or her doctor can try a peer to peer.

## 2021-12-15 ENCOUNTER — TELEPHONE (OUTPATIENT)
Dept: INTERNAL MEDICINE CLINIC | Age: 56
End: 2021-12-15

## 2021-12-15 NOTE — TELEPHONE ENCOUNTER
----- Message from Beverly Schwarz sent at 12/14/2021  9:02 AM EST -----  Subject: Message to Provider    QUESTIONS  Information for Provider? Patient has a rheumatoid arthritis referral. The   doctor she was referred to is not there and patient is asking for a new   referral and somewhere closer. ---------------------------------------------------------------------------  --------------  Vicki WHITAKER  What is the best way for the office to contact you? OK to leave message on   voicemail  Preferred Call Back Phone Number? [phone]  ---------------------------------------------------------------------------  --------------  SCRIPT ANSWERS  Relationship to Patient?  Self

## 2021-12-17 RX ORDER — METHOTREXATE 2.5 MG/1
TABLET ORAL
Qty: 48 TABLET | Refills: 0 | Status: SHIPPED | OUTPATIENT
Start: 2021-12-17 | End: 2022-09-13

## 2021-12-20 ENCOUNTER — TELEPHONE (OUTPATIENT)
Dept: INTERNAL MEDICINE CLINIC | Age: 56
End: 2021-12-20

## 2021-12-20 DIAGNOSIS — M25.50 PAIN IN JOINT, MULTIPLE SITES: ICD-10-CM

## 2021-12-20 DIAGNOSIS — M05.79 RHEUMATOID ARTHRITIS INVOLVING MULTIPLE SITES WITH POSITIVE RHEUMATOID FACTOR (HCC): ICD-10-CM

## 2021-12-20 DIAGNOSIS — Z87.39 HISTORY OF RHEUMATOID ARTHRITIS: ICD-10-CM

## 2021-12-20 NOTE — TELEPHONE ENCOUNTER
----- Message from Paul Evans sent at 12/20/2021  9:33 AM EST -----  Subject: Refill Request    QUESTIONS  Name of Medication? multivitamin with folic acid (One Daily Essential) 400   mcg tab tablet  Patient-reported dosage and instructions? once daiy  How many days do you have left? 0  Preferred Pharmacy? [id number]  Pharmacy phone number (if available)? [phone]  ---------------------------------------------------------------------------  --------------,  Name of Medication? terconazole (TERAZOL 3) 0.8 % vaginal cream  Patient-reported dosage and instructions? once daily  How many days do you have left? 0  Preferred Pharmacy? [id number]  Pharmacy phone number (if available)? [phone]  ---------------------------------------------------------------------------  --------------  Charline WHITAKER  What is the best way for the office to contact you? OK to leave message on   voicemail  Preferred Call Back Phone Number?  [phone]

## 2021-12-21 RX ORDER — GUAIFENESIN 100 MG/5ML
LIQUID (ML) ORAL
Qty: 90 TABLET | Refills: 2 | Status: SHIPPED | OUTPATIENT
Start: 2021-12-21 | End: 2022-02-09 | Stop reason: SDUPTHER

## 2021-12-30 RX ORDER — FOLIC ACID 1 MG/1
1 TABLET ORAL DAILY
Qty: 90 TABLET | Refills: 1 | Status: SHIPPED | OUTPATIENT
Start: 2021-12-30 | End: 2022-06-28

## 2022-01-07 DIAGNOSIS — M05.79 RHEUMATOID ARTHRITIS INVOLVING MULTIPLE SITES WITH POSITIVE RHEUMATOID FACTOR (HCC): ICD-10-CM

## 2022-01-16 RX ORDER — METHOTREXATE 2.5 MG/1
TABLET ORAL
Qty: 48 TABLET | Refills: 0 | OUTPATIENT
Start: 2022-01-16

## 2022-02-03 ENCOUNTER — TELEPHONE (OUTPATIENT)
Dept: INTERNAL MEDICINE CLINIC | Age: 57
End: 2022-02-03

## 2022-02-03 DIAGNOSIS — R05.9 COUGH: Primary | ICD-10-CM

## 2022-02-03 RX ORDER — CLOTRIMAZOLE AND BETAMETHASONE DIPROPIONATE 10; .64 MG/G; MG/G
CREAM TOPICAL
Qty: 15 G | Refills: 0 | Status: SHIPPED | OUTPATIENT
Start: 2022-02-03 | End: 2022-02-07

## 2022-02-03 RX ORDER — CODEINE PHOSPHATE AND GUAIFENESIN 10; 100 MG/5ML; MG/5ML
5 SOLUTION ORAL
Qty: 118 ML | Refills: 0 | Status: SHIPPED | OUTPATIENT
Start: 2022-02-03 | End: 2022-02-11

## 2022-02-03 NOTE — TELEPHONE ENCOUNTER
Patient called and a message was sent to Dr. Krista Walters requesting a prescription be sent in for her cough and medicine for her chin

## 2022-02-03 NOTE — TELEPHONE ENCOUNTER
Patient is calling because she has a cough and would like cough medicine sent to [facility] on [address]. And she would like some cream for her chin. She has some bumps on it.

## 2022-02-03 NOTE — TELEPHONE ENCOUNTER
----- Message from Omar Araujo sent at 2/1/2022  8:49 AM EST -----  Subject: Appointment Request    Reason for Call: Semi-Routine Cough, Cold Symptoms    QUESTIONS  Type of Appointment? Established Patient  Reason for appointment request? No appointments available during search  Additional Information for Provider? Patient would like schedule an   appointment for her cough that she noticed she had yesterday, and   something that has popped up on her chin- she would like to have that   looked at   ---------------------------------------------------------------------------  --------------  [address]  What is the best way for the office to contact you? OK to leave message on   voicemail  Preferred Call Back Phone Number? [phone]  ---------------------------------------------------------------------------  --------------  SCRIPT ANSWERS  Relationship to Patient? Self  Are you currently unable to finish sentences due to any difficulty   breathing? No  Are you unable to swallow liquids? No  Are you having fevers (100.4 or greater), chills, or sweats? No  Do you have COPD, asthma or a chronic lung condition? No  Have your symptoms been present for more than 5 days? No  Have you recently (14 days) been seen by a provider for this issue? No  Have you been diagnosed with, awaiting test results for, or told that you   are suspected of having COVID-19 (Coronavirus)? (If patient has tested   negative or was tested as a requirement for work, school, or travel and   not based on symptoms, answer no)? No  Within the past two weeks have you developed any of the following symptoms   (answer no if symptoms have been present longer than 2 weeks or began   more than 2 weeks ago)?  Fever or Chills, Cough, Shortness of breath or   difficulty breathing, Loss of taste or smell, Sore throat, Nasal   congestion, Sneezing or runny nose, Fatigue or generalized body aches   (answer no if pain is specific to a body part e.g. back pain), Diarrhea,   Headache? No  Have you had close contact with someone with COVID-19 in the last 14 days? No  (Service Expert  click yes below to proceed with [employer] As Usual   Scheduling)?  Yes

## 2022-02-07 RX ORDER — CLOTRIMAZOLE AND BETAMETHASONE DIPROPIONATE 10; .64 MG/G; MG/G
CREAM TOPICAL
Qty: 15 G | Refills: 0 | Status: SHIPPED | OUTPATIENT
Start: 2022-02-07 | End: 2022-09-13

## 2022-02-09 DIAGNOSIS — R76.8 POSITIVE ANA (ANTINUCLEAR ANTIBODY): ICD-10-CM

## 2022-02-09 DIAGNOSIS — Z87.39 HISTORY OF RHEUMATOID ARTHRITIS: ICD-10-CM

## 2022-02-09 DIAGNOSIS — M05.79 RHEUMATOID ARTHRITIS INVOLVING MULTIPLE SITES WITH POSITIVE RHEUMATOID FACTOR (HCC): ICD-10-CM

## 2022-02-09 DIAGNOSIS — Z87.39 HISTORY OF RHEUMATOID ARTHRITIS: Primary | ICD-10-CM

## 2022-02-09 DIAGNOSIS — M25.50 PAIN IN JOINT, MULTIPLE SITES: ICD-10-CM

## 2022-02-09 DIAGNOSIS — Z01.419 ENCOUNTER FOR GYNECOLOGICAL EXAMINATION WITHOUT ABNORMAL FINDING: ICD-10-CM

## 2022-02-09 RX ORDER — METHOTREXATE 2.5 MG/1
TABLET ORAL
Qty: 48 TABLET | Refills: 0 | Status: CANCELLED | OUTPATIENT
Start: 2022-02-09

## 2022-02-09 RX ORDER — FOLIC ACID 1 MG/1
1 TABLET ORAL DAILY
Qty: 90 TABLET | Refills: 1 | Status: CANCELLED | OUTPATIENT
Start: 2022-02-09 | End: 2022-08-08

## 2022-02-22 DIAGNOSIS — Z87.39 HISTORY OF RHEUMATOID ARTHRITIS: Primary | ICD-10-CM

## 2022-02-22 DIAGNOSIS — R76.8 POSITIVE ANA (ANTINUCLEAR ANTIBODY): ICD-10-CM

## 2022-02-22 DIAGNOSIS — M25.50 PAIN IN JOINT, MULTIPLE SITES: ICD-10-CM

## 2022-02-25 ENCOUNTER — TELEPHONE (OUTPATIENT)
Dept: INTERNAL MEDICINE CLINIC | Age: 57
End: 2022-02-25

## 2022-02-25 NOTE — TELEPHONE ENCOUNTER
----- Message from Shi Mills sent at 2/22/2022 10:27 AM EST -----  Subject: Message to Provider    QUESTIONS  Information for Provider? PT was speaking with Madhavi Satinder, she was given a   phone number for her referral, that is not in service. PT would like to   get a better phone number. ---------------------------------------------------------------------------  --------------  Naila WHITAKER  What is the best way for the office to contact you? Do not leave any   message, patient will call back for answer  Preferred Call Back Phone Number?  [phone]  ---------------------------------------------------------------------------  --------------  SCRIPT ANSWERS  undefined

## 2022-03-04 RX ORDER — MULTIVITAMIN
1 TABLET ORAL DAILY
Qty: 90 TABLET | Refills: 0 | Status: ON HOLD | OUTPATIENT
Start: 2022-03-04

## 2022-03-04 RX ORDER — DEXTROMETHORPHAN HYDROBROMIDE, GUAIFENESIN 5; 100 MG/5ML; MG/5ML
650 LIQUID ORAL
Qty: 90 TABLET | Refills: 2 | Status: SHIPPED | OUTPATIENT
Start: 2022-03-04 | End: 2022-06-09

## 2022-03-14 DIAGNOSIS — B00.1 COLD SORE: ICD-10-CM

## 2022-03-14 RX ORDER — DOCOSANOL 100 MG/G
CREAM TOPICAL
Qty: 2 G | Refills: 5 | Status: SHIPPED | OUTPATIENT
Start: 2022-03-14 | End: 2022-07-12 | Stop reason: SDUPTHER

## 2022-03-19 PROBLEM — Z86.718 HISTORY OF BLOOD CLOTS: Status: ACTIVE | Noted: 2021-07-02

## 2022-04-07 DIAGNOSIS — B00.2 RECURRENT ORAL HERPES SIMPLEX: ICD-10-CM

## 2022-04-07 RX ORDER — VALACYCLOVIR HYDROCHLORIDE 1 G/1
TABLET, FILM COATED ORAL
Qty: 90 TABLET | Refills: 0 | Status: SHIPPED | OUTPATIENT
Start: 2022-04-07 | End: 2022-06-09

## 2022-04-07 NOTE — TELEPHONE ENCOUNTER
Called pt back [facility] we have canceled her 7/9/22 3:30PM appointment with Dr. Renee Evangelista and rescheduled it to 7/11/22 at 3:30PM with Dr. Kely Giordano if the time/date does not work with your schedule call back at [phone] to change if not we will see you 7/11/22 at 3:30PM.

## 2022-04-07 NOTE — TELEPHONE ENCOUNTER
Called pt lm we need to cancel 7/8/22 appointment with Dr. Fernando Akins and schedule you with your doctor Dr. Marcy Marroquin, please call back to r/s .

## 2022-04-19 ENCOUNTER — TELEPHONE (OUTPATIENT)
Dept: INTERNAL MEDICINE CLINIC | Age: 57
End: 2022-04-19

## 2022-04-19 NOTE — TELEPHONE ENCOUNTER
----- Message from Coleman Parrish sent at 4/18/2022 12:20 PM EDT -----  Subject: Referral Request    QUESTIONS   Reason for referral request? Rheumatology   Has the physician seen you for this condition before? No   Preferred Specialist (if applicable)? Do you already have an appointment scheduled? Additional Information for Provider? Patient states that the referral   doesn't accept insurance (Jeff Mondragon MD). She also stated that   another location stated that they could see her next year. She would also   like to see if there a provider closer that is not in [facility]. Patient   also want to inform that she was told not to take Methotrexate, which she   takes 4-pills every Friday. I could not get any information as to who told   her not to take this medication. Please advise.   ---------------------------------------------------------------------------  --------------  CALL BACK INFO  What is the best way for the office to contact you? OK to leave message on   voicemail  Preferred Call Back Phone Number? [phone]  ---------------------------------------------------------------------------  --------------  SCRIPT ANSWERS  Relationship to Patient?  Self

## 2022-04-27 ENCOUNTER — TELEPHONE (OUTPATIENT)
Dept: ENT CLINIC | Age: 57
End: 2022-04-27

## 2022-04-27 NOTE — TELEPHONE ENCOUNTER
LVM informing pt that her appt scheduled 5/19 has been cancelled due to [facility] no longer practicing at our office. Asked pt to contact office if she is interested in rescheduling.  Pt has scheduled/rescheduled 9x

## 2022-06-06 DIAGNOSIS — B00.2 RECURRENT ORAL HERPES SIMPLEX: ICD-10-CM

## 2022-06-06 DIAGNOSIS — Z87.39 HISTORY OF RHEUMATOID ARTHRITIS: ICD-10-CM

## 2022-06-06 DIAGNOSIS — M25.50 PAIN IN JOINT, MULTIPLE SITES: ICD-10-CM

## 2022-06-09 RX ORDER — VALACYCLOVIR HYDROCHLORIDE 1 G/1
TABLET, FILM COATED ORAL
Qty: 90 TABLET | Refills: 0 | Status: SHIPPED | OUTPATIENT
Start: 2022-06-09 | End: 2022-08-05

## 2022-06-09 RX ORDER — GUAIFENESIN 100 MG/5ML
LIQUID (ML) ORAL
Qty: 100 TABLET | Refills: 2 | Status: SHIPPED | OUTPATIENT
Start: 2022-06-09 | End: 2022-10-12 | Stop reason: SDUPTHER

## 2022-07-11 ENCOUNTER — TELEPHONE (OUTPATIENT)
Dept: INTERNAL MEDICINE CLINIC | Age: 57
End: 2022-07-11

## 2022-07-11 NOTE — TELEPHONE ENCOUNTER
----- Message from Enrique Dahl sent at 7/11/2022 11:52 AM EDT -----  Subject: Refill Request    QUESTIONS  Name of Medication? docosanoL (ABREVA) 10 % topical cream  Patient-reported dosage and instructions? As needed   How many days do you have left? 0  Preferred Pharmacy? [id number]  Pharmacy phone number (if available)? [phone]  ---------------------------------------------------------------------------  --------------  Charline WHITAKER  What is the best way for the office to contact you? OK to leave message on   voicemail  Preferred Call Back Phone Number? [phone]  ---------------------------------------------------------------------------  --------------  SCRIPT ANSWERS  Relationship to Patient?  Self

## 2022-07-12 DIAGNOSIS — B00.1 COLD SORE: ICD-10-CM

## 2022-07-15 DIAGNOSIS — Z01.419 ENCOUNTER FOR GYNECOLOGICAL EXAMINATION (GENERAL) (ROUTINE) WITHOUT ABNORMAL FINDINGS: ICD-10-CM

## 2022-07-15 RX ORDER — DOCOSANOL 100 MG/G
CREAM TOPICAL
Qty: 2 G | Refills: 5 | Status: SHIPPED | OUTPATIENT
Start: 2022-07-15 | End: 2022-09-13

## 2022-07-15 RX ORDER — MULTIVITAMIN
TABLET ORAL
Qty: 30 TABLET | Refills: 1 | Status: SHIPPED | OUTPATIENT
Start: 2022-07-15 | End: 2022-09-27

## 2022-08-03 DIAGNOSIS — B00.2 RECURRENT ORAL HERPES SIMPLEX: ICD-10-CM

## 2022-08-05 RX ORDER — VALACYCLOVIR HYDROCHLORIDE 1 G/1
TABLET, FILM COATED ORAL
Qty: 90 TABLET | Refills: 0 | Status: SHIPPED | OUTPATIENT
Start: 2022-08-05 | End: 2022-09-13

## 2022-08-29 ENCOUNTER — TELEPHONE (OUTPATIENT)
Dept: INTERNAL MEDICINE CLINIC | Age: 57
End: 2022-08-29

## 2022-08-29 DIAGNOSIS — K59.00 CONSTIPATION, UNSPECIFIED CONSTIPATION TYPE: Primary | ICD-10-CM

## 2022-08-29 RX ORDER — AMOXICILLIN 250 MG
2 CAPSULE ORAL DAILY
Qty: 90 TABLET | Refills: 0 | Status: SHIPPED | OUTPATIENT
Start: 2022-08-29 | End: 2022-09-13

## 2022-08-29 NOTE — TELEPHONE ENCOUNTER
----- Message from Saray Modi sent at 8/26/2022  9:01 AM EDT -----  Subject: Medication Problem    Medication: Daily-Luan tablet  Dosage: daily  Ordering Provider: loly    Question/Problem: Patient states the vitamins she is taking has   constipated her. She would like to have something called into her pharmacy   to help her. Please call and advise.    Additional Information for Provider:     Pharmacy: [id number]- [place], [place] - [address] [place] [address]    ---------------------------------------------------------------------------  --------------  [address]  [phone]; OK to leave message on voicemail  ---------------------------------------------------------------------------  --------------    SCRIPT ANSWERS  Relationship to Patient: Self

## 2022-09-02 ENCOUNTER — TELEPHONE (OUTPATIENT)
Dept: INTERNAL MEDICINE CLINIC | Age: 57
End: 2022-09-02

## 2022-09-02 DIAGNOSIS — M35.00 SJOGREN'S SYNDROME WITHOUT EXTRAGLANDULAR INVOLVEMENT (HCC): Primary | ICD-10-CM

## 2022-09-02 DIAGNOSIS — R68.2 DRY MOUTH: ICD-10-CM

## 2022-09-02 NOTE — TELEPHONE ENCOUNTER
Pt has complaints of severe dry mouth. States that she is drinking plenty of water and nothing seems to help it go away. Please advise.

## 2022-09-02 NOTE — TELEPHONE ENCOUNTER
----- Message from [address] sent at 9/1/2022  8:14 AM EDT -----  Subject: Referral Request    Reason for referral request? REFERRAL TO RHEUMATOLOGY pt needs new   referral that provider cannot see her until next year. Pt wants someone in   the [facility] area. Provider patient wants to be referred to(if known):     Provider Phone Number(if known):     Additional Information for Provider?   ---------------------------------------------------------------------------  --------------  [address]    [phone]; OK to leave message on voicemail  ---------------------------------------------------------------------------  --------------

## 2022-09-02 NOTE — TELEPHONE ENCOUNTER
----- Message from [address] sent at 9/1/2022  8:18 AM EDT -----  Subject: Message to Provider    QUESTIONS  Information for Provider? Pt would like chap stick sent to the pharm her   lips are badly chapped   ---------------------------------------------------------------------------  --------------  Sabrina WHITAKER  [phone]; OK to leave message on voicemail  ---------------------------------------------------------------------------  --------------  SCRIPT ANSWERS  Relationship to Patient?  Self

## 2022-09-06 ENCOUNTER — TELEPHONE (OUTPATIENT)
Dept: INTERNAL MEDICINE CLINIC | Age: 57
End: 2022-09-06

## 2022-09-06 NOTE — TELEPHONE ENCOUNTER
----- Message from Elizabeth Corona sent at 9/6/2022 10:25 AM EDT -----  Subject: Appointment Request    Reason for Call: Established Patient Appointment needed: Routine Existing   Condition Follow Up    QUESTIONS    Reason for appointment request? Available appointments did not meet   patient need     Additional Information for Provider? needs an appt after 09/17 with her   PCP, no available appts, requesting a callback   ---------------------------------------------------------------------------  --------------  [id number]  [phone]; OK to leave message on voicemail  ---------------------------------------------------------------------------  --------------  SCRIPT ANSWERS  GENESISID Screen: Adan Flores

## 2022-09-07 ENCOUNTER — TELEPHONE (OUTPATIENT)
Dept: INTERNAL MEDICINE CLINIC | Age: 57
End: 2022-09-07

## 2022-09-07 RX ORDER — XYLITOL/YERBA SANTA
1 AEROSOL, SPRAY WITH PUMP (ML) MUCOUS MEMBRANE
Qty: 236 ML | Refills: 2 | Status: SHIPPED | OUTPATIENT
Start: 2022-09-07 | End: 2022-09-13

## 2022-09-07 NOTE — TELEPHONE ENCOUNTER
----- Message from Sheilalibia Montanez sent at 9/7/2022 11:45 AM EDT -----  Subject: Message to Provider    QUESTIONS  Information for Provider? Patient needs vitamins that she takes. Not sure   what she takes if we could call her back to confirm this.   ---------------------------------------------------------------------------  --------------  [address]  [phone]; OK to leave message on voicemail  ---------------------------------------------------------------------------  --------------  SCRIPT ANSWERS  Relationship to Patient?  Self

## 2022-09-11 ENCOUNTER — APPOINTMENT (OUTPATIENT)
Dept: GENERAL RADIOLOGY | Age: 57
DRG: 446 | End: 2022-09-11
Attending: STUDENT IN AN ORGANIZED HEALTH CARE EDUCATION/TRAINING PROGRAM
Payer: MEDICAID

## 2022-09-11 ENCOUNTER — HOSPITAL ENCOUNTER (EMERGENCY)
Age: 57
Discharge: HOME | DRG: 446 | End: 2022-09-11
Attending: STUDENT IN AN ORGANIZED HEALTH CARE EDUCATION/TRAINING PROGRAM
Payer: MEDICAID

## 2022-09-11 VITALS
HEART RATE: 125 BPM | TEMPERATURE: 98.4 F | HEIGHT: 63 IN | WEIGHT: 115 LBS | SYSTOLIC BLOOD PRESSURE: 138 MMHG | RESPIRATION RATE: 20 BRPM | OXYGEN SATURATION: 100 % | BODY MASS INDEX: 20.38 KG/M2 | DIASTOLIC BLOOD PRESSURE: 82 MMHG

## 2022-09-11 DIAGNOSIS — D50.9 IRON DEFICIENCY ANEMIA, UNSPECIFIED IRON DEFICIENCY ANEMIA TYPE: Primary | ICD-10-CM

## 2022-09-11 DIAGNOSIS — K59.04 CHRONIC IDIOPATHIC CONSTIPATION: ICD-10-CM

## 2022-09-11 LAB
ALBUMIN SERPL-MCNC: 2.2 G/DL (ref 3.5–5)
ALBUMIN/GLOB SERPL: 0.2 {RATIO} (ref 1.1–2.2)
ALP SERPL-CCNC: 81 U/L (ref 45–117)
ALT SERPL-CCNC: 23 U/L (ref 12–78)
ANION GAP SERPL CALC-SCNC: 11 MMOL/L (ref 5–15)
AST SERPL W P-5'-P-CCNC: 51 U/L (ref 15–37)
BASOPHILS # BLD: 0 K/UL (ref 0–0.1)
BASOPHILS NFR BLD: 0 % (ref 0–1)
BILIRUB SERPL-MCNC: 1.4 MG/DL (ref 0.2–1)
BUN SERPL-MCNC: 17 MG/DL (ref 6–20)
BUN/CREAT SERPL: 14 (ref 12–20)
CA-I BLD-MCNC: 9.2 MG/DL (ref 8.5–10.1)
CHLORIDE SERPL-SCNC: 96 MMOL/L (ref 97–108)
CO2 SERPL-SCNC: 22 MMOL/L (ref 21–32)
CREAT SERPL-MCNC: 1.19 MG/DL (ref 0.55–1.02)
DIFFERENTIAL METHOD BLD: ABNORMAL
EOSINOPHIL # BLD: 0 K/UL (ref 0–0.4)
EOSINOPHIL NFR BLD: 0 % (ref 0–7)
ERYTHROCYTE [DISTWIDTH] IN BLOOD BY AUTOMATED COUNT: 14.4 % (ref 11.5–14.5)
GLOBULIN SER CALC-MCNC: 9.1 G/DL (ref 2–4)
GLUCOSE SERPL-MCNC: 107 MG/DL (ref 65–100)
HCT VFR BLD AUTO: 23.6 % (ref 35–47)
HGB BLD-MCNC: 7.5 G/DL (ref 11.5–16)
IMM GRANULOCYTES # BLD AUTO: 0.1 K/UL (ref 0–0.04)
IMM GRANULOCYTES NFR BLD AUTO: 1 % (ref 0–0.5)
LYMPHOCYTES # BLD: 1.2 K/UL (ref 0.8–3.5)
LYMPHOCYTES NFR BLD: 16 % (ref 12–49)
MCH RBC QN AUTO: 26.7 PG (ref 26–34)
MCHC RBC AUTO-ENTMCNC: 31.8 G/DL (ref 30–36.5)
MCV RBC AUTO: 84 FL (ref 80–99)
MONOCYTES # BLD: 0.6 K/UL (ref 0–1)
MONOCYTES NFR BLD: 8 % (ref 5–13)
NEUTS SEG # BLD: 5.7 K/UL (ref 1.8–8)
NEUTS SEG NFR BLD: 75 % (ref 32–75)
PLATELET # BLD AUTO: 474 K/UL (ref 150–400)
PMV BLD AUTO: 9.6 FL (ref 8.9–12.9)
POTASSIUM SERPL-SCNC: 4.7 MMOL/L (ref 3.5–5.1)
PROT SERPL-MCNC: 11.3 G/DL (ref 6.4–8.2)
RBC # BLD AUTO: 2.81 M/UL (ref 3.8–5.2)
SODIUM SERPL-SCNC: 129 MMOL/L (ref 136–145)
WBC # BLD AUTO: 7.5 K/UL (ref 3.6–11)

## 2022-09-11 PROCEDURE — 74022 RADEX COMPL AQT ABD SERIES: CPT

## 2022-09-11 PROCEDURE — 85025 COMPLETE CBC W/AUTO DIFF WBC: CPT

## 2022-09-11 PROCEDURE — 74011250636 HC RX REV CODE- 250/636: Performed by: STUDENT IN AN ORGANIZED HEALTH CARE EDUCATION/TRAINING PROGRAM

## 2022-09-11 PROCEDURE — 80053 COMPREHEN METABOLIC PANEL: CPT

## 2022-09-11 PROCEDURE — 36415 COLL VENOUS BLD VENIPUNCTURE: CPT

## 2022-09-11 RX ORDER — LACTULOSE 10 G/15ML
20 SOLUTION ORAL; RECTAL 2 TIMES DAILY
Qty: 480 ML | Refills: 0 | Status: SHIPPED | OUTPATIENT
Start: 2022-09-11 | End: 2022-09-17

## 2022-09-11 RX ORDER — MAGNESIUM CITRATE
296 SOLUTION, ORAL ORAL
Qty: 1 EACH | Refills: 0 | Status: SHIPPED | OUTPATIENT
Start: 2022-09-11 | End: 2022-09-13 | Stop reason: ALTCHOICE

## 2022-09-11 RX ORDER — FACIAL-BODY WIPES
10 EACH TOPICAL
Qty: 10 SUPPOSITORY | Refills: 0 | Status: SHIPPED | OUTPATIENT
Start: 2022-09-11 | End: 2022-09-27

## 2022-09-11 RX ADMIN — SODIUM CHLORIDE 1000 ML: 9 INJECTION, SOLUTION INTRAVENOUS at 11:22

## 2022-09-11 NOTE — ED TRIAGE NOTES
Pt has had no desire to eat for a week and can't move her bowels. She is able to drink lots of fluids.

## 2022-09-11 NOTE — ED PROVIDER NOTES
EMERGENCY DEPARTMENT HISTORY AND PHYSICAL EXAM      Date: 9/11/2022  Patient Name: Selene Guerrero    History of Presenting Illness     Chief Complaint   Patient presents with    Constipation       History Provided By: Patient    HPI: Selene Guerrero, 61 y.o. female with a past medical history significant  DVT, anemia, lung cancer  presents to the ED with cc of inability to move bowels in over a week. Patient states that she has had poor p.o. intake over the last week, denies any nausea vomiting, denies any abdominal pain, is passing flatus. Patient denies any chest pain, shortness of breath, fevers or chills. There are no other complaints, changes, or physical findings at this time. PCP: Jorge Ordaz MD    No current facility-administered medications on file prior to encounter. Current Outpatient Medications on File Prior to Encounter   Medication Sig Dispense Refill    Daily-Luan tablet TAKE 1 TABLET BY MOUTH EVERY DAY 30 Tablet 1    Arthritis Pain Relief 650 mg TbER TAKE 1 TABLET BY MOUTH THREE (3) TIMES DAILY AS NEEDED FOR PAIN. 100 Tablet 2    multivitamin with folic acid (One Daily Essential) 400 mcg tab tablet Take 1 Tablet by mouth daily. 90 Tablet 0    cromolyn (OPTICROM) 4 % ophthalmic solution Administer 1 Drop to both eyes daily. artificial saliva (Mouth Kote) spra Take 1 Spray by mouth every four (4) hours as needed (dry mouth). (Patient not taking: Reported on 9/11/2022) 236 mL 2    senna-docusate (PERICOLACE) 8.6-50 mg per tablet Take 2 Tablets by mouth daily. (Patient not taking: Reported on 9/11/2022) 90 Tablet 0    valACYclovir (VALTREX) 1 gram tablet TAKE 1 TABLET BY MOUTH EVERY DAY (Patient not taking: Reported on 9/11/2022) 90 Tablet 0    docosanoL (ABREVA) 10 % topical cream Apply  to affected area five (5) times daily.  (Patient not taking: Reported on 9/11/2022) 2 g 5    clotrimazole-betamethasone (LOTRISONE) topical cream APPLY TO AFFECTED AREA TWICE A DAY (Patient not taking: Reported on 2022) 15 g 0    methotrexate (RHEUMATREX) 2.5 mg tablet TAKE 4 TABLETS BY MOUTH EVERY FRIDAY (Patient not taking: Reported on 2022) 48 Tablet 0    miscellaneous medical supply misc Lift chair 1 Each 0    ammonium lactate (AMLACTIN) 12 % topical cream Apply  to affected area two (2) times a day. rub in to affected area well (Patient not taking: Reported on 2022) 280 g 3    Comp. Stocking,Thigh,Reg,Medium misc For use during the day 2 Each 0    Cane elizabeth For daily use to support your walking 1 Each 0       Past History     Past Medical History:  Past Medical History:   Diagnosis Date    Anemia     Back pain     DVT (deep venous thrombosis) (HCC)     and PE    Rheumatoid arthritis (HCC)     Uterine fibroid        Past Surgical History:  Past Surgical History:   Procedure Laterality Date    HX  SECTION         Family History:  Family History   Problem Relation Age of Onset    Lung Cancer Mother     Hypertension Sister     Breast Cancer Maternal Aunt        Social History:  Social History     Tobacco Use    Smoking status: Former     Packs/day: 0.25     Years: 30.00     Pack years: 7.50     Types: Cigarettes    Smokeless tobacco: Never    Tobacco comments:     Quit 15 years ago   Vaping Use    Vaping Use: Never used   Substance Use Topics    Alcohol use: Yes     Comment: Occasional    Drug use: Never       Allergies: Allergies   Allergen Reactions    Bactrim [Sulfamethoprim] Swelling     Swelling of the lilps    Penicillin V Potassium Swelling         Review of Systems     Review of Systems   Constitutional:  Positive for appetite change. Negative for activity change, chills, fatigue and fever. Respiratory:  Negative for chest tightness and shortness of breath. Cardiovascular:  Negative for chest pain and palpitations. Gastrointestinal:  Positive for constipation. Negative for abdominal pain, nausea and vomiting.    Genitourinary:  Negative for dysuria, hematuria and vaginal bleeding. Musculoskeletal:  Negative for arthralgias and back pain. Skin:  Negative for color change. Neurological:  Negative for dizziness, numbness and headaches. Physical Exam     Physical Exam  Vitals and nursing note reviewed. Constitutional:       General: She is not in acute distress. Appearance: Normal appearance. She is normal weight. She is not ill-appearing. HENT:      Head: Normocephalic and atraumatic. Nose: Nose normal.      Mouth/Throat:      Mouth: Mucous membranes are moist.   Eyes:      Extraocular Movements: Extraocular movements intact. Pupils: Pupils are equal, round, and reactive to light. Cardiovascular:      Rate and Rhythm: Regular rhythm. Tachycardia present. Pulses: Normal pulses. Pulmonary:      Effort: Pulmonary effort is normal.      Breath sounds: Normal breath sounds. Abdominal:      General: Abdomen is flat. Bowel sounds are normal.      Palpations: Abdomen is soft. Tenderness: There is no abdominal tenderness. There is no guarding. Musculoskeletal:         General: No tenderness. Normal range of motion. Cervical back: Normal range of motion and neck supple. No muscular tenderness. Skin:     General: Skin is warm and dry. Neurological:      General: No focal deficit present. Mental Status: She is alert and oriented to person, place, and time. Sensory: No sensory deficit. Motor: No weakness.        Diagnostic Study Results     Labs -     Recent Results (from the past 12 hour(s))   CBC WITH AUTOMATED DIFF    Collection Time: 09/11/22 11:15 AM   Result Value Ref Range    WBC 7.5 3.6 - 11.0 K/uL    RBC 2.81 (L) 3.80 - 5.20 M/uL    HGB 7.5 (L) 11.5 - 16.0 g/dL    HCT 23.6 (L) 35.0 - 47.0 %    MCV 84.0 80.0 - 99.0 FL    MCH 26.7 26.0 - 34.0 PG    MCHC 31.8 30.0 - 36.5 g/dL    RDW 14.4 11.5 - 14.5 %    PLATELET 228 (H) 692 - 400 K/uL    MPV 9.6 8.9 - 12.9 FL    NEUTROPHILS 75 32 - 75 %    LYMPHOCYTES 16 12 - 49 %    MONOCYTES 8 5 - 13 %    EOSINOPHILS 0 0 - 7 %    BASOPHILS 0 0 - 1 %    IMMATURE GRANULOCYTES 1 (H) 0.0 - 0.5 %    ABS. NEUTROPHILS 5.7 1.8 - 8.0 K/UL    ABS. LYMPHOCYTES 1.2 0.8 - 3.5 K/UL    ABS. MONOCYTES 0.6 0.0 - 1.0 K/UL    ABS. EOSINOPHILS 0.0 0.0 - 0.4 K/UL    ABS. BASOPHILS 0.0 0.0 - 0.1 K/UL    ABS. IMM. GRANS. 0.1 (H) 0.00 - 0.04 K/UL    DF AUTOMATED     METABOLIC PANEL, COMPREHENSIVE    Collection Time: 09/11/22 11:15 AM   Result Value Ref Range    Sodium 129 (L) 136 - 145 mmol/L    Potassium 4.7 3.5 - 5.1 mmol/L    Chloride 96 (L) 97 - 108 mmol/L    CO2 22 21 - 32 mmol/L    Anion gap 11 5 - 15 mmol/L    Glucose 107 (H) 65 - 100 mg/dL    BUN 17 6 - 20 mg/dL    Creatinine 1.19 (H) 0.55 - 1.02 mg/dL    BUN/Creatinine ratio 14 12 - 20      GFR est AA 56 (L) >60 ml/min/1.73m2    GFR est non-AA 47 (L) >60 ml/min/1.73m2    Calcium 9.2 8.5 - 10.1 mg/dL    Bilirubin, total 1.4 (H) 0.2 - 1.0 mg/dL    AST (SGOT) 51 (H) 15 - 37 U/L    ALT (SGPT) 23 12 - 78 U/L    Alk. phosphatase 81 45 - 117 U/L    Protein, total 11.3 (H) 6.4 - 8.2 g/dL    Albumin 2.2 (L) 3.5 - 5.0 g/dL    Globulin 9.1 (H) 2.0 - 4.0 g/dL    A-G Ratio 0.2 (L) 1.1 - 2.2         Radiologic Studies -   @lastxrresult@  CT Results  (Last 48 hours)      None          CXR Results  (Last 48 hours)                 09/11/22 1108  XR ABD ACUTE W 1 V CHEST Final result    Impression:  Nonobstructive bowel gas pattern without evidence of free air. Cholelithiasis. Narrative:  EXAM:  XR ABD ACUTE W 1 V CHEST       INDICATION: Abdominal pain and constipation. COMPARISON: None       TECHNIQUE: Erect chest and abdomen and supine abdomen radiographs (acute   abdominal series). FINDINGS: The cardiomediastinal and hilar contours are within normal limits. The   lungs and pleural spaces are clear. No free air under the diaphragm. There is scattered air within the colon and small bowel.  No bowel dilatation to   suggest obstruction. No evidence of free intraperitoneal air. There are numerous subcentimeter gallstones. IVC filter is visible. No evidence   of nephrolithiasis. Bones are osteopenic. Medical Decision Making   I am the first provider for this patient. I reviewed the vital signs, available nursing notes, past medical history, past surgical history, family history and social history. Vital Signs-Reviewed the patient's vital signs. Patient Vitals for the past 12 hrs:   Temp Pulse Resp BP SpO2   09/11/22 1025 98.4 °F (36.9 °C) (!) 125 20 138/82 100 %       Records Reviewed: Nursing Notes    The patient presents with constipation, and decreased p.o. intake with a differential diagnosis of functional constipation, drug-induced constipation, obstruction, enteritis      Provider Notes (Medical Decision Making):     MDM       48-year-old female history of DVT, lung cancer presents emergency department for constipation x1 week. Next    Physical shows well-appearing female no distress, moderate tachycardia noted at triage 120, otherwise afebrile normotensive saturating 100%. Patient does not complain of any chest pains or shortness of breath, do not suspect acute PE. Patient afebrile, do not suspect sepsis. Abdomen soft nontender nondistended normal bowel sounds, low suspicion for obstruction. Will obtain abdominal x-rays to evaluate for constipation, given tachycardia will draw CBC CMP, administer 1 L normal saline. ED Course:   Initial assessment performed. The patients presenting problems have been discussed, and they are in agreement with the care plan formulated and outlined with them. I have encouraged them to ask questions as they arise throughout their visit. ED Course as of 09/11/22 1654   Sun Sep 11, 2022   1157 Sodium(!): 129 [PZ]   1157 HGB(!): 7.5  Patient's lab work resulting CBC significant for anemia hemoglobin 7.5 hematocrit 23.6, MCV 84.   Last blood work on record from 1 year ago hemoglobin was 11. Patient denies any black or tarry stool, no blood loss otherwise. At this time no indication for emergent transfusion. However instructed patient to closely follow-up her primary care physician as she may need repeat blood work if any further drop hemoglobin or patient starts to experience symptoms of anemia may require transfusion. X-ray does not show any signs of acute obstruction. Will discharge patient home with prescription for magnesium citrate, lactulose, bisacodyl, iron supplements. Instructed to follow-up with primary care physician in the next week, return to emergency department for any worsening weakness dizziness chest pains or palpitations. [PZ]      ED Course User Index  [PZ] Travis Faustin MD         PROCEDURES  Procedures         PLAN:  1. Discharge Medication List as of 9/11/2022 12:02 PM        START taking these medications    Details   Ferrous Sulfate 27 mg iron tab Take 1 Tablet by mouth daily. , Normal, Disp-30 Tablet, R-0      magnesium citrate solution Take 296 mL by mouth now for 1 dose., Normal, Disp-1 Each, R-0      lactulose (CHRONULAC) 10 gram/15 mL solution Take 30 mL by mouth two (2) times a day for 8 days. , Normal, Disp-480 mL, R-0      bisacodyL (DULCOLAX) 10 mg supp Insert 10 mg into rectum daily as needed for Constipation. , Normal, Disp-10 Suppository, R-0           CONTINUE these medications which have NOT CHANGED    Details   Daily-Luan tablet TAKE 1 TABLET BY MOUTH EVERY DAY, Normal, Disp-30 Tablet, R-1      Arthritis Pain Relief 650 mg TbER TAKE 1 TABLET BY MOUTH THREE (3) TIMES DAILY AS NEEDED FOR PAIN., Normal, Disp-100 Tablet, R-2      multivitamin with folic acid (One Daily Essential) 400 mcg tab tablet Take 1 Tablet by mouth daily. , Normal, Disp-90 Tablet, R-0Refill given in the absence of Dr. Vikash Urena send the refill request to her after 3 months      cromolyn (OPTICROM) 4 % ophthalmic solution Administer 1 Drop to both eyes daily., Historical Med      artificial saliva (Mouth Kote) spra Take 1 Spray by mouth every four (4) hours as needed (dry mouth). , Normal, Disp-236 mL, R-2      senna-docusate (PERICOLACE) 8.6-50 mg per tablet Take 2 Tablets by mouth daily. , Normal, Disp-90 Tablet, R-0      valACYclovir (VALTREX) 1 gram tablet TAKE 1 TABLET BY MOUTH EVERY DAY, Normal, Disp-90 Tablet, R-0      docosanoL (ABREVA) 10 % topical cream Apply  to affected area five (5) times daily. , Normal, Disp-2 g, R-5      clotrimazole-betamethasone (LOTRISONE) topical cream APPLY TO AFFECTED AREA TWICE A DAY, Normal, Disp-15 g, R-0      methotrexate (RHEUMATREX) 2.5 mg tablet TAKE 4 TABLETS BY MOUTH EVERY FRIDAY, Normal, Disp-48 Tablet, R-0      miscellaneous medical supply misc Lift chair, Print, Disp-1 Each, R-0      ammonium lactate (AMLACTIN) 12 % topical cream Apply  to affected area two (2) times a day. rub in to affected area well, Normal, Disp-280 g, R-3      Comp. Stocking,Thigh,Reg,Medium misc For use during the day, Print, Disp-2 Each, R-0      Cane elizabeth For daily use to support your walking, Print, Disp-1 Each, R-0           2. Follow-up Information    None       Return to ED if worse     Diagnosis     Clinical Impression:   1. Iron deficiency anemia, unspecified iron deficiency anemia type    2.  Chronic idiopathic constipation

## 2022-09-12 ENCOUNTER — APPOINTMENT (OUTPATIENT)
Dept: CT IMAGING | Age: 57
DRG: 446 | End: 2022-09-12
Attending: STUDENT IN AN ORGANIZED HEALTH CARE EDUCATION/TRAINING PROGRAM
Payer: MEDICAID

## 2022-09-12 ENCOUNTER — HOSPITAL ENCOUNTER (INPATIENT)
Age: 57
LOS: 4 days | Discharge: HOME | DRG: 446 | End: 2022-09-17
Attending: STUDENT IN AN ORGANIZED HEALTH CARE EDUCATION/TRAINING PROGRAM | Admitting: HOSPITALIST
Payer: MEDICAID

## 2022-09-12 DIAGNOSIS — Q62.11 HYDRONEPHROSIS WITH URETEROPELVIC JUNCTION (UPJ) OBSTRUCTION: Primary | ICD-10-CM

## 2022-09-12 DIAGNOSIS — Z86.718 HISTORY OF BLOOD CLOTS: ICD-10-CM

## 2022-09-12 DIAGNOSIS — N35.92 STRICTURE OF FEMALE URETHRA, UNSPECIFIED STRICTURE TYPE: ICD-10-CM

## 2022-09-12 DIAGNOSIS — K59.00 CONSTIPATION, UNSPECIFIED CONSTIPATION TYPE: ICD-10-CM

## 2022-09-12 DIAGNOSIS — C67.9 MALIGNANT NEOPLASM OF URINARY BLADDER, UNSPECIFIED SITE (HCC): ICD-10-CM

## 2022-09-12 DIAGNOSIS — D64.9 ANEMIA, UNSPECIFIED TYPE: ICD-10-CM

## 2022-09-12 LAB
ALBUMIN SERPL-MCNC: 1.9 G/DL (ref 3.5–5)
ALBUMIN/GLOB SERPL: 0.2 {RATIO} (ref 1.1–2.2)
ALP SERPL-CCNC: 70 U/L (ref 45–117)
ALT SERPL-CCNC: 28 U/L (ref 12–78)
ANION GAP SERPL CALC-SCNC: 10 MMOL/L (ref 5–15)
AST SERPL W P-5'-P-CCNC: ABNORMAL U/L (ref 15–37)
BASOPHILS # BLD: 0 K/UL (ref 0–0.1)
BASOPHILS NFR BLD: 0 % (ref 0–1)
BILIRUB SERPL-MCNC: 1 MG/DL (ref 0.2–1)
BUN SERPL-MCNC: 7 MG/DL (ref 6–20)
BUN/CREAT SERPL: 8 (ref 12–20)
CA-I BLD-MCNC: 9 MG/DL (ref 8.5–10.1)
CHLORIDE SERPL-SCNC: 99 MMOL/L (ref 97–108)
CO2 SERPL-SCNC: 19 MMOL/L (ref 21–32)
CREAT SERPL-MCNC: 0.9 MG/DL (ref 0.55–1.02)
DIFFERENTIAL METHOD BLD: ABNORMAL
EOSINOPHIL # BLD: 0 K/UL (ref 0–0.4)
EOSINOPHIL NFR BLD: 0 % (ref 0–7)
ERYTHROCYTE [DISTWIDTH] IN BLOOD BY AUTOMATED COUNT: 14.7 % (ref 11.5–14.5)
GLOBULIN SER CALC-MCNC: 8.7 G/DL (ref 2–4)
GLUCOSE SERPL-MCNC: 99 MG/DL (ref 65–100)
HCT VFR BLD AUTO: 22.3 % (ref 35–47)
HGB BLD-MCNC: 7 G/DL (ref 11.5–16)
IMM GRANULOCYTES # BLD AUTO: 0.1 K/UL (ref 0–0.04)
IMM GRANULOCYTES NFR BLD AUTO: 1 % (ref 0–0.5)
INR PPP: 1.2 (ref 0.9–1.1)
LIPASE SERPL-CCNC: 168 U/L (ref 73–393)
LYMPHOCYTES # BLD: 1.4 K/UL (ref 0.8–3.5)
LYMPHOCYTES NFR BLD: 23 % (ref 12–49)
MCH RBC QN AUTO: 26.3 PG (ref 26–34)
MCHC RBC AUTO-ENTMCNC: 31.4 G/DL (ref 30–36.5)
MCV RBC AUTO: 83.8 FL (ref 80–99)
MONOCYTES # BLD: 0.5 K/UL (ref 0–1)
MONOCYTES NFR BLD: 8 % (ref 5–13)
NEUTS SEG # BLD: 4 K/UL (ref 1.8–8)
NEUTS SEG NFR BLD: 68 % (ref 32–75)
NRBC # BLD: 0 K/UL (ref 0–0.01)
NRBC BLD-RTO: 0 PER 100 WBC
PLATELET # BLD AUTO: 429 K/UL (ref 150–400)
PMV BLD AUTO: 9.6 FL (ref 8.9–12.9)
POTASSIUM SERPL-SCNC: ABNORMAL MMOL/L (ref 3.5–5.1)
PROT SERPL-MCNC: 10.6 G/DL (ref 6.4–8.2)
PROTHROMBIN TIME: 15.2 SEC (ref 11.9–14.6)
RBC # BLD AUTO: 2.66 M/UL (ref 3.8–5.2)
SODIUM SERPL-SCNC: 128 MMOL/L (ref 136–145)
WBC # BLD AUTO: 6 K/UL (ref 3.6–11)

## 2022-09-12 PROCEDURE — 74011000636 HC RX REV CODE- 636: Performed by: STUDENT IN AN ORGANIZED HEALTH CARE EDUCATION/TRAINING PROGRAM

## 2022-09-12 PROCEDURE — 86900 BLOOD TYPING SEROLOGIC ABO: CPT

## 2022-09-12 PROCEDURE — 85610 PROTHROMBIN TIME: CPT

## 2022-09-12 PROCEDURE — 87077 CULTURE AEROBIC IDENTIFY: CPT

## 2022-09-12 PROCEDURE — 87086 URINE CULTURE/COLONY COUNT: CPT

## 2022-09-12 PROCEDURE — 99285 EMERGENCY DEPT VISIT HI MDM: CPT

## 2022-09-12 PROCEDURE — 85025 COMPLETE CBC W/AUTO DIFF WBC: CPT

## 2022-09-12 PROCEDURE — 36415 COLL VENOUS BLD VENIPUNCTURE: CPT

## 2022-09-12 PROCEDURE — 83690 ASSAY OF LIPASE: CPT

## 2022-09-12 PROCEDURE — 86923 COMPATIBILITY TEST ELECTRIC: CPT

## 2022-09-12 PROCEDURE — 80053 COMPREHEN METABOLIC PANEL: CPT

## 2022-09-12 PROCEDURE — 87186 SC STD MICRODIL/AGAR DIL: CPT

## 2022-09-12 PROCEDURE — 81001 URINALYSIS AUTO W/SCOPE: CPT

## 2022-09-12 PROCEDURE — 74177 CT ABD & PELVIS W/CONTRAST: CPT

## 2022-09-12 RX ADMIN — IOPAMIDOL 100 ML: 755 INJECTION, SOLUTION INTRAVENOUS at 23:52

## 2022-09-13 ENCOUNTER — APPOINTMENT (OUTPATIENT)
Dept: NON INVASIVE DIAGNOSTICS | Age: 57
DRG: 446 | End: 2022-09-13
Attending: INTERNAL MEDICINE
Payer: MEDICAID

## 2022-09-13 ENCOUNTER — APPOINTMENT (OUTPATIENT)
Dept: GENERAL RADIOLOGY | Age: 57
DRG: 446 | End: 2022-09-13
Attending: STUDENT IN AN ORGANIZED HEALTH CARE EDUCATION/TRAINING PROGRAM
Payer: MEDICAID

## 2022-09-13 ENCOUNTER — APPOINTMENT (OUTPATIENT)
Dept: CT IMAGING | Age: 57
DRG: 446 | End: 2022-09-13
Attending: HOSPITALIST
Payer: MEDICAID

## 2022-09-13 PROBLEM — N13.30 HYDRONEPHROSIS OF RIGHT KIDNEY: Status: ACTIVE | Noted: 2022-09-13

## 2022-09-13 PROBLEM — D62 ANEMIA DUE TO ACUTE BLOOD LOSS: Status: ACTIVE | Noted: 2022-09-13

## 2022-09-13 PROBLEM — C67.9 CARCINOMA OF BLADDER (HCC): Status: ACTIVE | Noted: 2022-09-13

## 2022-09-13 PROBLEM — E83.52 HYPERCALCEMIA: Status: ACTIVE | Noted: 2022-09-13

## 2022-09-13 PROBLEM — C67.9 BLADDER CANCER (HCC): Status: ACTIVE | Noted: 2022-09-13

## 2022-09-13 LAB
ALBUMIN SERPL-MCNC: 1.8 G/DL (ref 3.5–5)
ALBUMIN/GLOB SERPL: 0.3 {RATIO} (ref 1.1–2.2)
ALP SERPL-CCNC: 64 U/L (ref 45–117)
ALT SERPL-CCNC: 22 U/L (ref 12–78)
ANION GAP SERPL CALC-SCNC: 7 MMOL/L (ref 5–15)
APPEARANCE UR: ABNORMAL
AST SERPL W P-5'-P-CCNC: 27 U/L (ref 15–37)
ATRIAL RATE: 93 BPM
BACTERIA URNS QL MICRO: NEGATIVE /HPF
BASOPHILS # BLD: 0 K/UL (ref 0–0.1)
BASOPHILS NFR BLD: 0 % (ref 0–1)
BILIRUB SERPL-MCNC: 0.6 MG/DL (ref 0.2–1)
BILIRUB UR QL: NEGATIVE
BUN SERPL-MCNC: 6 MG/DL (ref 6–20)
BUN/CREAT SERPL: 8 (ref 12–20)
CA-I BLD-MCNC: 8.8 MG/DL (ref 8.5–10.1)
CALCULATED P AXIS, ECG09: 10 DEGREES
CALCULATED R AXIS, ECG10: 26 DEGREES
CALCULATED T AXIS, ECG11: 44 DEGREES
CHLORIDE SERPL-SCNC: 110 MMOL/L (ref 97–108)
CO2 SERPL-SCNC: 21 MMOL/L (ref 21–32)
COLLECT DATE STL: NORMAL
COLOR UR: ABNORMAL
CREAT SERPL-MCNC: 0.71 MG/DL (ref 0.55–1.02)
CRP SERPL-MCNC: 10.2 MG/DL (ref 0–0.6)
DIAGNOSIS, 93000: NORMAL
DIFFERENTIAL METHOD BLD: ABNORMAL
EOSINOPHIL # BLD: 0 K/UL (ref 0–0.4)
EOSINOPHIL NFR BLD: 0 % (ref 0–7)
ERYTHROCYTE [DISTWIDTH] IN BLOOD BY AUTOMATED COUNT: 14.8 % (ref 11.5–14.5)
ERYTHROCYTE [SEDIMENTATION RATE] IN BLOOD: 128 MM/HR (ref 0–30)
FERRITIN SERPL-MCNC: 832 NG/ML (ref 26–388)
GLOBULIN SER CALC-MCNC: 7 G/DL (ref 2–4)
GLUCOSE SERPL-MCNC: 85 MG/DL (ref 65–100)
GLUCOSE UR STRIP.AUTO-MCNC: NEGATIVE MG/DL
HCT VFR BLD AUTO: 19.4 % (ref 35–47)
HEMOCCULT SP1 STL QL: NEGATIVE
HGB BLD-MCNC: 6.1 G/DL (ref 11.5–16)
HGB UR QL STRIP: ABNORMAL
IMM GRANULOCYTES # BLD AUTO: 0 K/UL (ref 0–0.04)
IMM GRANULOCYTES NFR BLD AUTO: 1 % (ref 0–0.5)
IRON SATN MFR SERPL: 28 % (ref 20–50)
IRON SERPL-MCNC: 35 UG/DL (ref 35–150)
KETONES UR QL STRIP.AUTO: NEGATIVE MG/DL
LACTATE SERPL-SCNC: 1.2 MMOL/L (ref 0.4–2)
LEUKOCYTE ESTERASE UR QL STRIP.AUTO: ABNORMAL
LYMPHOCYTES # BLD: 1 K/UL (ref 0.8–3.5)
LYMPHOCYTES NFR BLD: 28 % (ref 12–49)
MCH RBC QN AUTO: 26.3 PG (ref 26–34)
MCHC RBC AUTO-ENTMCNC: 31.4 G/DL (ref 30–36.5)
MCV RBC AUTO: 83.6 FL (ref 80–99)
MONOCYTES # BLD: 0.3 K/UL (ref 0–1)
MONOCYTES NFR BLD: 9 % (ref 5–13)
NEUTS SEG # BLD: 2.1 K/UL (ref 1.8–8)
NEUTS SEG NFR BLD: 62 % (ref 32–75)
NITRITE UR QL STRIP.AUTO: POSITIVE
NRBC # BLD: 0 K/UL (ref 0–0.01)
NRBC BLD-RTO: 0 PER 100 WBC
OTHER URINE MICRO,5051: 1
P-R INTERVAL, ECG05: 100 MS
PH UR STRIP: 6 [PH]
PLATELET # BLD AUTO: 358 K/UL (ref 150–400)
PMV BLD AUTO: 9.1 FL (ref 8.9–12.9)
POTASSIUM SERPL-SCNC: 3.8 MMOL/L (ref 3.5–5.1)
PROCALCITONIN SERPL-MCNC: 0.19 NG/ML
PROT SERPL-MCNC: 8.8 G/DL (ref 6.4–8.2)
PROT UR STRIP-MCNC: 30 MG/DL
Q-T INTERVAL, ECG07: 372 MS
QRS DURATION, ECG06: 84 MS
QTC CALCULATION (BEZET), ECG08: 462 MS
RBC # BLD AUTO: 2.32 M/UL (ref 3.8–5.2)
RBC #/AREA URNS HPF: ABNORMAL /HPF (ref 0–5)
SODIUM SERPL-SCNC: 138 MMOL/L (ref 136–145)
SP GR UR REFRACTOMETRY: 1.01 (ref 1–1.03)
TIBC SERPL-MCNC: 123 UG/DL (ref 250–450)
UA: UC IF INDICATED,UAUC: ABNORMAL
UROBILINOGEN UR QL STRIP.AUTO: 1 EU/DL (ref 0.2–1)
VENTRICULAR RATE, ECG03: 93 BPM
WBC # BLD AUTO: 3.4 K/UL (ref 3.6–11)
WBC URNS QL MICRO: >100 /HPF (ref 0–4)

## 2022-09-13 PROCEDURE — 36415 COLL VENOUS BLD VENIPUNCTURE: CPT

## 2022-09-13 PROCEDURE — 96374 THER/PROPH/DIAG INJ IV PUSH: CPT

## 2022-09-13 PROCEDURE — 93970 EXTREMITY STUDY: CPT | Performed by: SURGERY

## 2022-09-13 PROCEDURE — 74011250636 HC RX REV CODE- 250/636: Performed by: STUDENT IN AN ORGANIZED HEALTH CARE EDUCATION/TRAINING PROGRAM

## 2022-09-13 PROCEDURE — 85025 COMPLETE CBC W/AUTO DIFF WBC: CPT

## 2022-09-13 PROCEDURE — 84145 PROCALCITONIN (PCT): CPT

## 2022-09-13 PROCEDURE — 70450 CT HEAD/BRAIN W/O DYE: CPT

## 2022-09-13 PROCEDURE — 74011250637 HC RX REV CODE- 250/637: Performed by: STUDENT IN AN ORGANIZED HEALTH CARE EDUCATION/TRAINING PROGRAM

## 2022-09-13 PROCEDURE — 86140 C-REACTIVE PROTEIN: CPT

## 2022-09-13 PROCEDURE — P9016 RBC LEUKOCYTES REDUCED: HCPCS

## 2022-09-13 PROCEDURE — 87040 BLOOD CULTURE FOR BACTERIA: CPT

## 2022-09-13 PROCEDURE — 82728 ASSAY OF FERRITIN: CPT

## 2022-09-13 PROCEDURE — 36430 TRANSFUSION BLD/BLD COMPNT: CPT

## 2022-09-13 PROCEDURE — 80053 COMPREHEN METABOLIC PANEL: CPT

## 2022-09-13 PROCEDURE — 99222 1ST HOSP IP/OBS MODERATE 55: CPT | Performed by: UROLOGY

## 2022-09-13 PROCEDURE — 83605 ASSAY OF LACTIC ACID: CPT

## 2022-09-13 PROCEDURE — 74011250637 HC RX REV CODE- 250/637: Performed by: HOSPITALIST

## 2022-09-13 PROCEDURE — 74011250636 HC RX REV CODE- 250/636: Performed by: INTERNAL MEDICINE

## 2022-09-13 PROCEDURE — 83540 ASSAY OF IRON: CPT

## 2022-09-13 PROCEDURE — 85652 RBC SED RATE AUTOMATED: CPT

## 2022-09-13 PROCEDURE — 71045 X-RAY EXAM CHEST 1 VIEW: CPT

## 2022-09-13 PROCEDURE — 93005 ELECTROCARDIOGRAM TRACING: CPT

## 2022-09-13 PROCEDURE — 65270000029 HC RM PRIVATE

## 2022-09-13 PROCEDURE — 74011000250 HC RX REV CODE- 250: Performed by: HOSPITALIST

## 2022-09-13 PROCEDURE — 82272 OCCULT BLD FECES 1-3 TESTS: CPT

## 2022-09-13 PROCEDURE — 93970 EXTREMITY STUDY: CPT

## 2022-09-13 PROCEDURE — 85018 HEMOGLOBIN: CPT

## 2022-09-13 RX ORDER — LANOLIN ALCOHOL/MO/W.PET/CERES
325 CREAM (GRAM) TOPICAL DAILY
Status: ON HOLD | COMMUNITY
Start: 2022-09-11

## 2022-09-13 RX ORDER — ONDANSETRON 2 MG/ML
4 INJECTION INTRAMUSCULAR; INTRAVENOUS
Status: DISCONTINUED | OUTPATIENT
Start: 2022-09-13 | End: 2022-09-17 | Stop reason: HOSPADM

## 2022-09-13 RX ORDER — SODIUM CHLORIDE 9 MG/ML
50 INJECTION, SOLUTION INTRAVENOUS CONTINUOUS
Status: DISCONTINUED | OUTPATIENT
Start: 2022-09-13 | End: 2022-09-14

## 2022-09-13 RX ORDER — THERA TABS 400 MCG
1 TAB ORAL DAILY
Status: DISCONTINUED | OUTPATIENT
Start: 2022-09-13 | End: 2022-09-17 | Stop reason: HOSPADM

## 2022-09-13 RX ORDER — ACETAMINOPHEN 650 MG/1
650 SUPPOSITORY RECTAL
Status: DISCONTINUED | OUTPATIENT
Start: 2022-09-13 | End: 2022-09-17 | Stop reason: HOSPADM

## 2022-09-13 RX ORDER — SODIUM CHLORIDE 0.9 % (FLUSH) 0.9 %
5-40 SYRINGE (ML) INJECTION AS NEEDED
Status: DISCONTINUED | OUTPATIENT
Start: 2022-09-13 | End: 2022-09-17 | Stop reason: HOSPADM

## 2022-09-13 RX ORDER — LANOLIN ALCOHOL/MO/W.PET/CERES
325 CREAM (GRAM) TOPICAL DAILY
Status: DISCONTINUED | OUTPATIENT
Start: 2022-09-13 | End: 2022-09-16

## 2022-09-13 RX ORDER — ONDANSETRON 4 MG/1
4 TABLET, ORALLY DISINTEGRATING ORAL
Status: DISCONTINUED | OUTPATIENT
Start: 2022-09-13 | End: 2022-09-17 | Stop reason: HOSPADM

## 2022-09-13 RX ORDER — SODIUM CHLORIDE 9 MG/ML
250 INJECTION, SOLUTION INTRAVENOUS AS NEEDED
Status: DISCONTINUED | OUTPATIENT
Start: 2022-09-13 | End: 2022-09-17 | Stop reason: HOSPADM

## 2022-09-13 RX ORDER — SODIUM CHLORIDE 0.9 % (FLUSH) 0.9 %
5-40 SYRINGE (ML) INJECTION EVERY 8 HOURS
Status: DISCONTINUED | OUTPATIENT
Start: 2022-09-13 | End: 2022-09-17 | Stop reason: HOSPADM

## 2022-09-13 RX ORDER — BISMUTH SUBSALICYLATE 262 MG
1 TABLET,CHEWABLE ORAL DAILY
Status: DISCONTINUED | OUTPATIENT
Start: 2022-09-13 | End: 2022-09-13 | Stop reason: SDUPTHER

## 2022-09-13 RX ORDER — ACETAMINOPHEN 325 MG/1
650 TABLET ORAL
Status: DISCONTINUED | OUTPATIENT
Start: 2022-09-13 | End: 2022-09-17 | Stop reason: HOSPADM

## 2022-09-13 RX ORDER — LEVOFLOXACIN 5 MG/ML
750 INJECTION, SOLUTION INTRAVENOUS ONCE
Status: COMPLETED | OUTPATIENT
Start: 2022-09-13 | End: 2022-09-13

## 2022-09-13 RX ORDER — SODIUM CHLORIDE 0.9 % (FLUSH) 0.9 %
5-10 SYRINGE (ML) INJECTION AS NEEDED
Status: DISCONTINUED | OUTPATIENT
Start: 2022-09-13 | End: 2022-09-17 | Stop reason: HOSPADM

## 2022-09-13 RX ADMIN — LEVOFLOXACIN 750 MG: 5 INJECTION, SOLUTION INTRAVENOUS at 02:49

## 2022-09-13 RX ADMIN — SODIUM CHLORIDE, PRESERVATIVE FREE 10 ML: 5 INJECTION INTRAVENOUS at 23:12

## 2022-09-13 RX ADMIN — SODIUM CHLORIDE 1000 ML: 9 INJECTION, SOLUTION INTRAVENOUS at 01:03

## 2022-09-13 RX ADMIN — SODIUM CHLORIDE 50 ML/HR: 9 INJECTION, SOLUTION INTRAVENOUS at 16:49

## 2022-09-13 RX ADMIN — FERROUS SULFATE TAB 325 MG (65 MG ELEMENTAL FE) 325 MG: 325 (65 FE) TAB at 13:38

## 2022-09-13 RX ADMIN — SODIUM PHOSPHATE 1 ENEMA: 7; 19 ENEMA RECTAL at 01:04

## 2022-09-13 NOTE — CONSULTS
Hematology and Oncology Inpatient Consult Note     Patient: Adrianna Capone MRN: [id number]  SSN: [id number]    YOB: 1965  Age: 61 y.o. Sex: female    Chief Complaint: Patient was admitted with abdominal pain and constipation    Reason for consult: Evaluation and management of patient with bladder mass    Subjective: Adrianna Capone is a 61 y.o. -American female who started having generalized weakness for last several weeks. And she started having lower abdominal pain and constipation. Patient's appetite is decreased and she is losing weight. She denies any blood in urine or stool. Patient was admitted for further evaluation and was found to have bladder mass. Patient has no previous diagnosis of any malignancy leukemia or lymphoma. She is accompanied by her  at bedside. Past Medical History:   Diagnosis Date    Anemia     Back pain     DVT (deep venous thrombosis) (HCC)     and PE    Rheumatoid arthritis (HCC)     Uterine fibroid    -Sciatica  -There is a question whether she had IVC filter placed  Past Surgical History:   Procedure Laterality Date    HX  SECTION        Family History   Problem Relation Age of Onset    Lung Cancer Mother     Hypertension Sister     Breast Cancer Maternal Aunt      Social History     Tobacco Use    Smoking status: Former     Packs/day: 0.25     Years: 30.00     Pack years: 7.50     Types: Cigarettes    Smokeless tobacco: Never    Tobacco comments:     Quit 15 years ago   Substance Use Topics    Alcohol use: Yes     Comment: Occasional      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       Current Facility-Administered Medications   Medication Dose Route Frequency Provider Last Rate Last Admin    sodium chloride (NS) flush 5-10 mL  5-10 mL IntraVENous PRN Russell Royal MD        therapeutic multivitamin (THERAGRAN) tablet 1 Tablet  1 Tablet Oral DAILY Lidya William MD        ferrous sulfate tablet 325 mg 325 mg Oral DAILY Read MD Tiffanie        sodium chloride (NS) flush 5-40 mL  5-40 mL IntraVENous Q8H Read MD Tiffanie        sodium chloride (NS) flush 5-40 mL  5-40 mL IntraVENous PRN Read MD Tiffanie        acetaminophen (TYLENOL) tablet 650 mg  650 mg Oral Q6H PRN Read MD Tiffanie        Or    acetaminophen (TYLENOL) suppository 650 mg  650 mg Rectal Q6H PRN Read MD Tiffanie        ondansetron (ZOFRAN ODT) tablet 4 mg  4 mg Oral Q6H PRN Read MD Tiffanie        Or    ondansetron (ZOFRAN) injection 4 mg  4 mg IntraVENous Q6H PRN Read MD Tiffanie            Allergies   Allergen Reactions    Bactrim [Sulfamethoprim] Swelling     Swelling of the lilps    Penicillin V Potassium Swelling       Review of Systems:  CONSTITUTIONAL: No fever, no chills. No repeated infections. No night sweats. Patient has no energy and feeling tired  HEENT: No mouth sores. No Epistaxis. No hearing impairment. No change in taste or smell sensations. CARDIOVASCULAR: No  palpitations or chest pain. No edema. No syncope. RESPIRATORY: No cough. She does have dyspnea on exertion. No Hemoptysis. No wheezing. No hoarseness of voice. GI: Patient has no dysphagia. She has nausea and vomiting. She has abdominal pain and constipation. She has no bright red blood per rectum or hematemesis or melena. : No dysuria, no hematuria. No frequency of urination. INTEGUMENTARY: No skin rash or palpable lumps or bumps. HEMATOLOGIC: No history of easy bruisability. No gingival bleeding  NEURO: No focal weakness, No paresthesia. No headache or seizures. MUSCULOSKELETAL: No back pain.     Objective:     Vitals:    09/12/22 2222 09/12/22 2222 09/13/22 0741   BP: (!) 142/83  110/66   Pulse: (!) 114  90   Resp: 18  18   Temp: 98.5 °F (36.9 °C)  97.6 °F (36.4 °C)   SpO2: 98%     Weight:  52.2 kg (115 lb)    Height:  5' 6\" (1.676 m)         Physical Exam:  Constitutional: Middle-aged -American female looks chronically ill. Eyes: Sclerae anicteric. Conjunctivae shows pallor. ENMT: Oral mucosa is moist, no thrush, mucositis, or petechiae. Neck: No adenopathy, JVD or thyromegaly. Hematologic/Lymphatic: Bilateral axillary/inguinal regions showed no adenopathy. Respiratory: Lungs are clear bilaterally. Cardiovascular: Normal sinus rhythm; no gallop or murmur. Abdomen: Soft, nontender, no hepatosplenomegaly. No guarding or rigidity. Bowel sounds present. Back/Spine: No spinal tenderness; no costovertebral angle tenderness. Extremities: No edema, cyanosis or clubbing. Skin: No petechiae; no skin rash. Neurologic: Alert/oriented x 3; no focal neurological deficits. Recent Results (from the past 24 hour(s))   TYPE & SCREEN    Collection Time: 09/12/22 10:42 PM   Result Value Ref Range    Crossmatch Expiration 09/15/2022,2359     ABO/Rh(D) A Positive     Antibody screen Negative    CBC WITH AUTOMATED DIFF    Collection Time: 09/12/22 10:43 PM   Result Value Ref Range    WBC 6.0 3.6 - 11.0 K/uL    RBC 2.66 (L) 3.80 - 5.20 M/uL    HGB 7.0 (L) 11.5 - 16.0 g/dL    HCT 22.3 (L) 35.0 - 47.0 %    MCV 83.8 80.0 - 99.0 FL    MCH 26.3 26.0 - 34.0 PG    MCHC 31.4 30.0 - 36.5 g/dL    RDW 14.7 (H) 11.5 - 14.5 %    PLATELET 415 (H) 546 - 400 K/uL    MPV 9.6 8.9 - 12.9 FL    NRBC 0.0 0.0  WBC    ABSOLUTE NRBC 0.00 0.00 - 0.01 K/uL    NEUTROPHILS 68 32 - 75 %    LYMPHOCYTES 23 12 - 49 %    MONOCYTES 8 5 - 13 %    EOSINOPHILS 0 0 - 7 %    BASOPHILS 0 0 - 1 %    IMMATURE GRANULOCYTES 1 (H) 0 - 0.5 %    ABS. NEUTROPHILS 4.0 1.8 - 8.0 K/UL    ABS. LYMPHOCYTES 1.4 0.8 - 3.5 K/UL    ABS. MONOCYTES 0.5 0.0 - 1.0 K/UL    ABS. EOSINOPHILS 0.0 0.0 - 0.4 K/UL    ABS. BASOPHILS 0.0 0.0 - 0.1 K/UL    ABS. IMM.  GRANS. 0.1 (H) 0.00 - 0.04 K/UL    DF AUTOMATED     METABOLIC PANEL, COMPREHENSIVE    Collection Time: 09/12/22 10:43 PM   Result Value Ref Range    Sodium 128 (L) 136 - 145 mmol/L    Potassium Hemolyzed, recollect requested 3.5 - 5.1 mmol/L    Chloride 99 97 - 108 mmol/L    CO2 19 (L) 21 - 32 mmol/L    Anion gap 10 5 - 15 mmol/L    Glucose 99 65 - 100 mg/dL    BUN 7 6 - 20 mg/dL    Creatinine 0.90 0.55 - 1.02 mg/dL    BUN/Creatinine ratio 8 (L) 12 - 20      GFR est AA >60 >60 ml/min/1.73m2    GFR est non-AA >60 >60 ml/min/1.73m2    Calcium 9.0 8.5 - 10.1 mg/dL    Bilirubin, total 1.0 0.2 - 1.0 mg/dL    AST (SGOT) Hemolyzed, recollect requested 15 - 37 U/L    ALT (SGPT) 28 12 - 78 U/L    Alk.  phosphatase 70 45 - 117 U/L    Protein, total 10.6 (H) 6.4 - 8.2 g/dL    Albumin 1.9 (L) 3.5 - 5.0 g/dL    Globulin 8.7 (H) 2.0 - 4.0 g/dL    A-G Ratio 0.2 (L) 1.1 - 2.2     PROTHROMBIN TIME + INR    Collection Time: 09/12/22 10:43 PM   Result Value Ref Range    Prothrombin time 15.2 (H) 11.9 - 14.6 sec    INR 1.2 (H) 0.9 - 1.1     LIPASE    Collection Time: 09/12/22 10:43 PM   Result Value Ref Range    Lipase 168 73 - 393 U/L   URINALYSIS W/ REFLEX CULTURE    Collection Time: 09/12/22 10:57 PM    Specimen: Urine   Result Value Ref Range    Color Yellow/Straw      Appearance Turbid (A) Clear      Specific gravity 1.010 1.003 - 1.030      pH (UA) 6.0      Protein 30 (A) Negative mg/dL    Glucose Negative Negative mg/dL    Ketone Negative Negative mg/dL    Bilirubin Negative Negative      Blood Large (A) Negative      Urobilinogen 1.0 0.2 - 1.0 EU/dL    Nitrites Positive (A) Negative      Leukocyte Esterase Large (A) Negative      UA:UC IF INDICATED Urine Culture Ordered (A) Culture not indicated by UA result      WBC >100 (H) 0 - 4 /hpf    RBC 10-20 0 - 5 /hpf    Bacteria Negative Negative /hpf    Other Urine Micro 1     EKG, 12 LEAD, INITIAL    Collection Time: 09/13/22 12:27 AM   Result Value Ref Range    Ventricular Rate 93 BPM    Atrial Rate 93 BPM    P-R Interval 100 ms    QRS Duration 84 ms    Q-T Interval 372 ms    QTC Calculation (Bezet) 462 ms    Calculated P Axis 10 degrees    Calculated R Axis 26 degrees Calculated T Axis 44 degrees    Diagnosis       Sinus rhythm with short KS  Cannot rule out Anterior infarct , age undetermined  Abnormal ECG  No previous ECGs available  Confirmed by Elaine Rabago ([id number]) on 9/13/2022 8:42:37 AM     OCCULT BLOOD, STOOL    Collection Time: 09/13/22 12:35 AM   Result Value Ref Range    Occult Blood,day 1 Negative Negative      Day 1 date: 9,132,022     LACTIC ACID    Collection Time: 09/13/22  2:55 AM   Result Value Ref Range    Lactic acid 1.2 0.4 - 2.0 mmol/L        CT HEAD WO CONT   Final Result   No acute intracranial abnormality. XR CHEST PORT   Final Result   Clear lungs. CT ABD PELV W CONT   Final Result   1. 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 2. Possible extension into the distal right ureter. 3. Severe right hydronephrosis. 4. No justus metastases. 5. Right gonadal vein thrombosis. The findings were called to Dr. Kelsie Dillon on 9/13/2022 at [address] hours by Dr. Montana Luna. [phone]             Assessment:     Hospital Problems  Date Reviewed: 9/13/2022            Codes Class Noted POA    Bladder cancer ([facility].) ICD-10-CM: C67.9  ICD-9-CM: 188.9  9/13/2022 Yes        Anemia due to acute blood loss ICD-10-CM: D62  ICD-9-CM: 285.1  9/13/2022 Yes        Hypercalcemia ICD-10-CM: E83.52  ICD-9-CM: 275.42  9/13/2022 Yes        Carcinoma of bladder ([facility].) ICD-10-CM: C67.9  ICD-9-CM: 188.9  9/13/2022 Unknown        Hydronephrosis of right kidney ICD-10-CM: N13.30  ICD-9-CM: 950  9/13/2022 Unknown           Assessment & Plan:     62-year-old -American female who came with abdominal pain and constipation. She is found to have bladder mass and anemia. 1) bladder mass: Obviously there is a concern for possible bladder carcinoma. Patient will need further diagnostic procedure like cystoscopy to confirm the diagnosis. -Urology evaluation will be needed. 2) anemia: May be multifactorial.  We will start anemia work-up. Patient has no obvious GI bleeding.  -If she never had screening colonoscopy she may need GI work-up. 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 Obviously because of her concern about possible malignancy which may be causing thrombosis. We will hold off anticoagulation until we have neurologic evaluation and recommendations. I have discussed my recommendations with the patient. Case was discussed with attending physician Dr. Sindi Leach. Patient's protein is somewhat high and albumin is low. In light of anemia obviously there is concern about possible monoclonal gammopathy so I will initiate that work-up. This dictation was done by dragon, computer voice recognition software. Often unanticipated grammatical, syntax, phones and other interpretive errors are inadvertently transcribed. Please excuse errors that have escaped final proofreading.      Signed By: Leroy Beach MD     September 13, 2022

## 2022-09-13 NOTE — ED NOTES
TRANSFER - OUT REPORT:    Verbal report given to Regina Ruiz RN(name) on [address] [facility]  being transferred to [facility](unit) for routine progression of care       Report consisted of patients Situation, Background, Assessment and   Recommendations(SBAR). Information from the following report(s) SBAR, Kardex, ED Summary, MAR, Accordion, and Recent Results was reviewed with the receiving nurse. Lines:   Peripheral IV 09/12/22 Right Antecubital (Active)        Opportunity for questions and clarification was provided.       Patient transported with:   Cryptonator

## 2022-09-13 NOTE — ED PROVIDER NOTES
Jan [address]88  EMERGENCY DEPARTMENT ENCOUNTER NOTE    Date: 2022  Patient Name: Dora Garcia    History of Presenting Illness     Chief Complaint   Patient presents with    Abdominal Pain    Constipation     HPI: Dora Garcia, 61 y.o. female with a past medical history and outpatient medications as listed and reviewed below  presents for constipation abdominal pain. She was seen yesterday at the freestanding emergency department for similar symptoms. During her evaluation yesterday, her CBC showed new anemia of 7.5. She denied any melena or hematochezia. KUB was showing an nonobstructive pattern. She was discharged with symptomatic treatment and follow-up with PCP for anemia. She presents today for nonresolution of her pain, persistent constipation. She denies any melena or hematochezia. No fevers or chills. No nausea or vomiting. Has not been able to pass a bowel movement for the past few days. Never had similar symptoms in the past.  She does have a history of anemia and had needed blood transfusion in the past but she is unsure of what diagnosis she had back then. She denies any lightheadedness, dizziness, or syncope. No chest pain or shortness of breath. No dysuria or hematuria. She reports some polyuria over the past few days however.     Medical History   I reviewed the medical, surgical, family, and social history, as well as allergies:    PCP: Dinesh Lay MD    Past Medical History:  Past Medical History:   Diagnosis Date    Anemia     Back pain     DVT (deep venous thrombosis) ([facility])     and PE    Rheumatoid arthritis ([address]     Uterine fibroid      Past Surgical History:  Past Surgical History:   Procedure Laterality Date    HX  SECTION       Current Outpatient Medications:  Current Outpatient Medications   Medication Instructions    ammonium lactate (AMLACTIN) 12 % topical cream Topical, 2 TIMES DAILY, rub in to affected area well    Arthritis Pain Relief 650 mg TbER TAKE 1 TABLET BY MOUTH THREE (3) TIMES DAILY AS NEEDED FOR PAIN. artificial saliva (Mouth Kote) spra 1 Spray, Oral, EVERY 4 HOURS AS NEEDED    bisacodyL (DULCOLAX) 10 mg, Rectal, DAILY AS NEEDED    Cane elizabeth For daily use to support your walking    clotrimazole-betamethasone (LOTRISONE) topical cream APPLY TO AFFECTED AREA TWICE A DAY    Comp. Stocking,Thigh,Reg,Medium misc For use during the day    cromolyn (OPTICROM) 4 % ophthalmic solution 1 Drop, Both Eyes, DAILY    Daily-Luan tablet TAKE 1 TABLET BY MOUTH EVERY DAY    docosanoL (ABREVA) 10 % topical cream Topical, 5 TIMES DAILY    Ferrous Sulfate 27 mg iron tab 1 Tablet, Oral, DAILY    lactulose (CHRONULAC) 10 gram/15 mL solution 30 mL, Oral, 2 TIMES DAILY    methotrexate (RHEUMATREX) 2.5 mg tablet TAKE 4 TABLETS BY MOUTH EVERY FRIDAY    miscellaneous medical supply misc Lift chair    multivitamin with folic acid (One Daily Essential) 400 mcg tab tablet 1 Tablet, Oral, DAILY    senna-docusate (PERICOLACE) 8.6-50 mg per tablet 2 Tablets, Oral, DAILY    valACYclovir (VALTREX) 1 gram tablet TAKE 1 TABLET BY MOUTH EVERY DAY      Family History:  Family History   Problem Relation Age of Onset    Lung Cancer Mother     Hypertension Sister     Breast Cancer Maternal Aunt      Social History:  Social History     Tobacco Use    Smoking status: Former     Packs/day: 0.25     Years: 30.00     Pack years: 7.50     Types: Cigarettes    Smokeless tobacco: Never    Tobacco comments:     Quit 15 years ago   Vaping Use    Vaping Use: Never used   Substance Use Topics    Alcohol use: Yes     Comment: Occasional    Drug use: Never     Allergies: Allergies   Allergen Reactions    Bactrim [Sulfamethoprim] Swelling     Swelling of the lilps    Penicillin V Potassium Swelling       Review of Systems     Review of Systems  Negative: Positives and pertinent negatives as per HPI.  All other systems were reviewed and are negative. Physical Exam & Vital Signs   Vital Signs - Reviewed the patient's vital signs. Patient Vitals for the past 12 hrs:   Temp Pulse Resp BP SpO2   09/12/22 2222 98.5 °F (36.9 °C) (!) 114 18 (!) 142/83 98 %     Physical Exam:    GENERAL: awake, alert, cooperative, not in distress  HEENT:  * Pupils equal, EOMI  * Head atraumatic  CV:  * audible heart sounds  * warm and perfused extremities bilaterally  PULMONARY: Good air movement, no wheezes, no crackles  ABDOMEN/: soft, no distension, no guarding, noted mild diffuse abdominal tenderness. Rectal examination reveals nonmelanotic stools without any hematochezia. Occult card sent. EXTREMITIES/BACK: warm and perfused, no tenderness, no edema  SKIN: no rashes or signs of trauma  NEURO:  * Speech clear  * Moves U&LE to command    Medical Decision Making   - I am the first and primary provider for this patient and am the primary provider of record. - I reviewed the vital signs, available nursing notes, past medical history, past surgical history, family history and social history. - Initial assessment performed. The patients presenting problems have been discussed, and the staff are in agreement with the care plan formulated and outlined with them. I have encouraged them to ask questions as they arise throughout their visit. - Available medical records, nursing notes, old EKGs, and EMS run sheets (if patient was EMS transported) were reviewed    MDM:   Patient is a 61 y.o. female presenting for AP. Vitals reveal  tachycardia  and physical exam reveals  abdominal tenderness . EKG showed no significant abnormalities. Based on the history, physical exam, risk factors, and vital signs, differential includes: Obstruction, GI bleed, colon cancer, JEROME, dehydration, electrolyte abnormalities. See ED Course and Reassessment for evaluation and discussion.     Results     Labs:  Recent Results (from the past 12 hour(s))   TYPE & SCREEN    Collection Time: 09/12/22 10:42 PM   Result Value Ref Range    Crossmatch Expiration 09/15/2022,2359     ABO/Rh(D) A Positive     Antibody screen Negative    CBC WITH AUTOMATED DIFF    Collection Time: 09/12/22 10:43 PM   Result Value Ref Range    WBC 6.0 3.6 - 11.0 K/uL    RBC 2.66 (L) 3.80 - 5.20 M/uL    HGB 7.0 (L) 11.5 - 16.0 g/dL    HCT 22.3 (L) 35.0 - 47.0 %    MCV 83.8 80.0 - 99.0 FL    MCH 26.3 26.0 - 34.0 PG    MCHC 31.4 30.0 - 36.5 g/dL    RDW 14.7 (H) 11.5 - 14.5 %    PLATELET 009 (H) 270 - 400 K/uL    MPV 9.6 8.9 - 12.9 FL    NRBC 0.0 0.0  WBC    ABSOLUTE NRBC 0.00 0.00 - 0.01 K/uL    NEUTROPHILS 68 32 - 75 %    LYMPHOCYTES 23 12 - 49 %    MONOCYTES 8 5 - 13 %    EOSINOPHILS 0 0 - 7 %    BASOPHILS 0 0 - 1 %    IMMATURE GRANULOCYTES 1 (H) 0 - 0.5 %    ABS. NEUTROPHILS 4.0 1.8 - 8.0 K/UL    ABS. LYMPHOCYTES 1.4 0.8 - 3.5 K/UL    ABS. MONOCYTES 0.5 0.0 - 1.0 K/UL    ABS. EOSINOPHILS 0.0 0.0 - 0.4 K/UL    ABS. BASOPHILS 0.0 0.0 - 0.1 K/UL    ABS. IMM. GRANS. 0.1 (H) 0.00 - 0.04 K/UL    DF AUTOMATED     METABOLIC PANEL, COMPREHENSIVE    Collection Time: 09/12/22 10:43 PM   Result Value Ref Range    Sodium 128 (L) 136 - 145 mmol/L    Potassium Hemolyzed, recollect requested 3.5 - 5.1 mmol/L    Chloride 99 97 - 108 mmol/L    CO2 19 (L) 21 - 32 mmol/L    Anion gap 10 5 - 15 mmol/L    Glucose 99 65 - 100 mg/dL    BUN 7 6 - 20 mg/dL    Creatinine 0.90 0.55 - 1.02 mg/dL    BUN/Creatinine ratio 8 (L) 12 - 20      GFR est AA >60 >60 ml/min/1.73m2    GFR est non-AA >60 >60 ml/min/1.73m2    Calcium 9.0 8.5 - 10.1 mg/dL    Bilirubin, total 1.0 0.2 - 1.0 mg/dL    AST (SGOT) Hemolyzed, recollect requested 15 - 37 U/L    ALT (SGPT) 28 12 - 78 U/L    Alk.  phosphatase 70 45 - 117 U/L    Protein, total 10.6 (H) 6.4 - 8.2 g/dL    Albumin 1.9 (L) 3.5 - 5.0 g/dL    Globulin 8.7 (H) 2.0 - 4.0 g/dL    A-G Ratio 0.2 (L) 1.1 - 2.2     PROTHROMBIN TIME + INR    Collection Time: 09/12/22 10:43 PM   Result Value Ref Range    Prothrombin time 15. 2 (H) 11.9 - 14.6 sec    INR 1.2 (H) 0.9 - 1.1     LIPASE    Collection Time: 09/12/22 10:43 PM   Result Value Ref Range    Lipase 168 73 - 393 U/L   URINALYSIS W/ REFLEX CULTURE    Collection Time: 09/12/22 10:57 PM    Specimen: Urine   Result Value Ref Range    Color Yellow/Straw      Appearance Turbid (A) Clear      Specific gravity 1.010 1.003 - 1.030      pH (UA) 6.0      Protein 30 (A) Negative mg/dL    Glucose Negative Negative mg/dL    Ketone Negative Negative mg/dL    Bilirubin Negative Negative      Blood Large (A) Negative      Urobilinogen 1.0 0.2 - 1.0 EU/dL    Nitrites Positive (A) Negative      Leukocyte Esterase Large (A) Negative      UA:UC IF INDICATED Urine Culture Ordered (A) Culture not indicated by UA result      WBC >100 (H) 0 - 4 /hpf    RBC 10-20 0 - 5 /hpf    Bacteria Negative Negative /hpf    Other Urine Micro 1     OCCULT BLOOD, STOOL    Collection Time: 09/13/22 12:35 AM   Result Value Ref Range    Occult Blood,day 1 Negative Negative      Day 1 date: 9,132,022     LACTIC ACID    Collection Time: 09/13/22  2:55 AM   Result Value Ref Range    Lactic acid 1.2 0.4 - 2.0 mmol/L     Radiologic Studies:  CT Results  (Last 48 hours)                 09/12/22 2352  CT ABD PELV W CONT Final result    Impression:  1. 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 2. Possible extension into the distal right ureter. 3. Severe right hydronephrosis. 4. No justus metastases. 5. Right gonadal vein thrombosis. The findings were called to Dr. Jas Boyd on 9/13/2022 at [address] hours by Dr. Carmelo Olivas. [phone]       Narrative:  CT ABDOMEN AND PELVIS WITH CONTRAST. 9/12/2022 11:52 PM        INDICATION: New anemia, abdominal pain and tenderness, constipation. COMPARISON: None. TECHNIQUE: CT of the abdomen and pelvis was performed after the administration   100 cc IV Isovue-370.  CT dose reduction was achieved through use of a   standardized protocol tailored for this examination and automatic exposure   control for dose modulation. FINDINGS:   Urography: A sessile bladder mass encases the right ureterovesical junction   (2-109, 80,480-50). This measures up to 31 x 26 x 30 mm in greatest dimensions. Greatest dimensions over estimate volume, as it is centrally urine filled. It   measures up to 13 mm in greatest thickness. Tumor may extend into the   distal-most right ureter (05,322-63). Severe right hydroureter, severe right   hydronephrosis, delayed right renal perfusion are associated. The left kidney is   normal.       Abdomen: One of the legs of an IVC filter extends into the right gonadal vein,   in the right gonadal vein is thrombosed (2-49, 97,821-30). There are numerous   tiny stones in the gallbladder and proximal cystic duct. The lung bases are   clear. The heart size is normal. Incidental note is made of an hepatic cyst. The   distal esophagus, stomach, duodenum, liver, pancreas, spleen, and adrenals are   otherwise normal.       Pelvis: The uterus is enlarged and replaced by numerous fibroids. The small   bowel, ileocecal junction, appendix, and colon are normal. There is trace free   pelvic fluid. No free air and no abdominopelvic lymphadenopathy. CXR Results  (Last 48 hours)                 09/13/22 0144  XR CHEST PORT Final result    Impression:  Clear lungs. Narrative:  PORTABLE CHEST RADIOGRAPH/S: 9/13/2022 1:44 AM       INDICATION: Infiltrate. COMPARISON: 9/11/2022. TECHNIQUE: Portable frontal semiupright radiograph/s of the chest.       FINDINGS:    The lungs are clear. The central airways are patent. No pneumothorax or pleural   effusion. Incidental note is made of right hemidiaphragm eventration. 09/11/22 1108  XR ABD ACUTE W 1 V CHEST Final result    Impression:  Nonobstructive bowel gas pattern without evidence of free air. Cholelithiasis.            Narrative:  EXAM:  XR ABD ACUTE W 1 V CHEST INDICATION: Abdominal pain and constipation. COMPARISON: None       TECHNIQUE: Erect chest and abdomen and supine abdomen radiographs (acute   abdominal series). FINDINGS: The cardiomediastinal and hilar contours are within normal limits. The   lungs and pleural spaces are clear. No free air under the diaphragm. There is scattered air within the colon and small bowel. No bowel dilatation to   suggest obstruction. No evidence of free intraperitoneal air. There are numerous subcentimeter gallstones. IVC filter is visible. No evidence   of nephrolithiasis. Bones are osteopenic. Medications ordered:  Medications   sodium chloride (NS) flush 5-10 mL (has no administration in time range)   sodium chloride 0.9 % bolus infusion 1,000 mL (1,000 mL IntraVENous New Bag 9/13/22 0103)   iopamidoL (ISOVUE-370) 76 % injection 100 mL (100 mL IntraVENous Given 9/12/22 2352)   sodium phosphate (FLEET'S) enema 1 Enema (1 Enema Rectal Given 9/13/22 0104)   levoFLOXacin (LEVAQUIN) 750 mg in D5W IVPB (750 mg IntraVENous New Bag 9/13/22 0249)     ED Course & Reassessment     ED Course:     ED Course as of 09/13/22 0419   Mon Sep 12, 2022   2320 Leukocytosis not present to suggest infection. Hemoglobin noted to be low suggesting acute anemia. [SS]   5611 Noted Hyponatremia. No other significant electrolyte derangements. Creatinine is not elevated more than baseline range making JEROME unlikely. No significant transaminitis noted. Normal bilirubin. Lipase is not significantly elevated. [SS]   Tue Sep 13, 2022   5220 CT:  1. 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 2. Possible extension into the distal right ureter. 3. Severe right hydronephrosis. 4. No justus metastases. 5. Right gonadal vein thrombosis. [SS]   [id number] Patient needs urology eval. [SS]   0118 UA possible UTI, will give atbx.  [SS]   0124 Atbx and cultures and lactate ordered [SS]   0200 Occult blood negative. [SS]   0240 Patient and family informed of diagnosis [SS]   0326 Chest x-ray negative for acute pathology: no CXR evidence of pulmonary edema, pleural effusion, pneumothorax, or pneumonia. [SS]   0338 Lactate is within normal limits. No concern for severe sepsis, septic shock, or ischemia. [SS]      ED Course User Index  [SS] Kevin Clifton MD       Reassessment:    Patient needs transfer for urology eval.    4:19 AM  Urology will be available at 7 AM.  Will admit here. Final Disposition     ADMITTED TO HOSPITAL    After completion of ED workup and discussion of results and diagnoses, patient was admitted to the hospital. Case was discussed with admitting provider. ED Procedures & EKGs   Performed by: Rodney Capone MD  Procedures     EKG interpretation (Preliminary):  Rhythm: normal sinus rhythm; and regular . Rate (approx.): 95. Axis: normal;  WV interval: normal;  QRS interval: normal ;  ST/T wave: normal;  Other findings: normal.    Clinical Tools & Critical Care     Heart Score: Not Applicable. CRITICAL CARE DOCUMENTATION: Critical care criteria and/or time were not met. Diagnosis     Clinical Impression:   1. Hydronephrosis with ureteropelvic junction (UPJ) obstruction    2. Malignant neoplasm of urinary bladder, unspecified site ([address]    3. Anemia, unspecified type    4. Constipation, unspecified constipation type      Attestations:    Rodney Capone MD    Please note that this dictation was completed with [employer], the computer voice recognition software. Quite often unanticipated grammatical, syntax, homophones, and other interpretive errors are inadvertently transcribed by the computer software. Please disregard these errors. Please excuse any errors that have escaped final proofreading. Thank you.

## 2022-09-13 NOTE — PROGRESS NOTES
Pt called nurse to room wanted RN to pass along contact info for her \"leg doctor\"     Kaycee Branch   [phone]

## 2022-09-13 NOTE — H&P
Hospitalist History & Physical Notes. [facility]. Name : Trinidad Honeycutt      MRN number : [id number]     YOB: 1965     Subjective :   Chief Complaint : Constipation with abdominal discomfort and pain    Source of information : Patient not a great historian but trying to give information. Speech little stuttering. From ED provider, old medical records not much available. History of present illness:   61 y.o. female with a history of venous thrombosis but not on any anticoagulation, rheumatoid arthritis stopped taking medications presents to the emergency room complaining of no bowel movement for a week, no improvement with medications given on a ED visit 2 days ago. Denies any trouble urinating, states appetite is good and eating well. No nausea or vomiting, no fever or chills. Denies any chest pain or palpitations. On evaluation she has right ureterovesical junction area of bladder cancer that is new with possible extension into right ureter with hydronephrosis. Hemoglobin is drifting down to 7.0. Laboratory data with  hypercalcemia. And electrolyte abnormalities. Past Medical History:   Diagnosis Date    Anemia     Back pain     DVT (deep venous thrombosis) (HCC)     and PE    Rheumatoid arthritis (HCC)     Uterine fibroid      Past Surgical History:   Procedure Laterality Date    HX  SECTION       Family History   Problem Relation Age of Onset    Lung Cancer Mother     Hypertension Sister     Breast Cancer Maternal Aunt       Social History     Tobacco Use    Smoking status: Former     Packs/day: 0.25     Years: 30.00     Pack years: 7.50     Types: Cigarettes    Smokeless tobacco: Never    Tobacco comments:     Quit 15 years ago   Substance Use Topics    Alcohol use: Yes     Comment: Occasional       Prior to Admission medications    Medication Sig Start Date End Date Taking?  Authorizing Provider   ferrous sulfate 325 mg (65 mg iron) tablet Take 325 mg by mouth daily. 9/11/22   Provider, Historical   lactulose (CHRONULAC) 10 gram/15 mL solution Take 30 mL by mouth two (2) times a day for 8 days. 9/11/22 9/19/22  Munir Clancy MD   bisacodyL (DULCOLAX) 10 mg supp Insert 10 mg into rectum daily as needed for Constipation. 9/11/22   Munir Clancy MD   Daily-Luan tablet TAKE 1 TABLET BY MOUTH EVERY DAY 7/15/22   John Sumner MD   Arthritis Pain Relief 650 mg TbER TAKE 1 TABLET BY MOUTH THREE (3) TIMES DAILY AS NEEDED FOR PAIN. 6/9/22   Jay Mena MD   multivitamin with folic acid (One Daily Essential) 400 mcg tab tablet Take 1 Tablet by mouth daily. 3/4/22   Parish Rivers MD   cromolyn (OPTICROM) 4 % ophthalmic solution Administer 1 Drop to both eyes daily. 6/7/21   Provider, Historical     Allergies   Allergen Reactions    Bactrim [Sulfamethoprim] Swelling     Swelling of the lilps    Penicillin V Potassium Swelling             Review of Systems: Very good historian. Reliable. Constitutional: Appetite is good, admits weight loss, no fever, no chills, no night sweats. Eye: No recent visual disturbances, no discharge, no double vision. Ear/nose/mouth/throat : No hearing disturbance, no ear pain, no nasal congestion, no sore throat, no trouble swallowing. Respiratory : No trouble breathing, no cough, no shortness of breath,  Cardiovascular : No chest pain, no palpitation,  no orthopnea, no  peripheral edema. Gastrointestinal : No nausea, no vomiting, No abdominal pain. Genitourinary : She denies any trouble urination, denies any blood in the urine. The urine in the emergency room collection shows clear. .  Endocrine : Unable to answer. .  Immunologic :  No seasonal allergies. Musculoskeletal : Denies any joint pain or swelling, no trouble ambulation.     Integumentary : Denies rash or itching.,   Hematology : Unable to answer  Neurology : Denies change in mental status,  No numbness or tingling. Psychiatric : No mood swings,     Vitals:   Patient Vitals for the past 12 hrs:   Temp Pulse Resp BP SpO2   09/12/22 2222 98.5 °F (36.9 °C) (!) 114 18 (!) 142/83 98 %       Physical Exam:   General : Looks comfortable, no acute respiratory distress noted. HEENT : PERRLA, dry oral mucosa, atraumatic normocephalic, Normal ear and nose. Neck : Supple, no JVD, no masses noted, no carotid bruit. Lungs : Breath sounds with good air entry bilaterally, no wheezes or rales, no accessory muscle use. CVS : Rhythm rate regular, S1+, S2+, no murmur or gallop. Tachycardia noted. Abdomen : Soft, nontender,  bowel sounds active. Extremities : No edema noted,  pedal pulses not palpable. Musculoskeletal : Fair range of motion, no joint swelling or effusion, muscle tone and power appears fair. Skin : Dry, warm. No rash noted  Lymphatic : No cervical lymphadenopathy. Neurological : Awake, alert, oriented to time place person. No focal deficits. When she is speaking a little stuttering is noted that she could not express properly. Psychiatric : Pleasant. Data Review:   Recent Results (from the past 24 hour(s))   TYPE & SCREEN    Collection Time: 09/12/22 10:42 PM   Result Value Ref Range    Crossmatch Expiration 09/15/2022,2359     ABO/Rh(D) A Positive     Antibody screen Negative    CBC WITH AUTOMATED DIFF    Collection Time: 09/12/22 10:43 PM   Result Value Ref Range    WBC 6.0 3.6 - 11.0 K/uL    RBC 2.66 (L) 3.80 - 5.20 M/uL    HGB 7.0 (L) 11.5 - 16.0 g/dL    HCT 22.3 (L) 35.0 - 47.0 %    MCV 83.8 80.0 - 99.0 FL    MCH 26.3 26.0 - 34.0 PG    MCHC 31.4 30.0 - 36.5 g/dL    RDW 14.7 (H) 11.5 - 14.5 %    PLATELET 636 (H) 630 - 400 K/uL    MPV 9.6 8.9 - 12.9 FL    NRBC 0.0 0.0  WBC    ABSOLUTE NRBC 0.00 0.00 - 0.01 K/uL    NEUTROPHILS 68 32 - 75 %    LYMPHOCYTES 23 12 - 49 %    MONOCYTES 8 5 - 13 %    EOSINOPHILS 0 0 - 7 %    BASOPHILS 0 0 - 1 %    IMMATURE GRANULOCYTES 1 (H) 0 - 0.5 %    ABS. NEUTROPHILS 4.0 1.8 - 8.0 K/UL    ABS. LYMPHOCYTES 1.4 0.8 - 3.5 K/UL    ABS. MONOCYTES 0.5 0.0 - 1.0 K/UL    ABS. EOSINOPHILS 0.0 0.0 - 0.4 K/UL    ABS. BASOPHILS 0.0 0.0 - 0.1 K/UL    ABS. IMM. GRANS. 0.1 (H) 0.00 - 0.04 K/UL    DF AUTOMATED     METABOLIC PANEL, COMPREHENSIVE    Collection Time: 09/12/22 10:43 PM   Result Value Ref Range    Sodium 128 (L) 136 - 145 mmol/L    Potassium Hemolyzed, recollect requested 3.5 - 5.1 mmol/L    Chloride 99 97 - 108 mmol/L    CO2 19 (L) 21 - 32 mmol/L    Anion gap 10 5 - 15 mmol/L    Glucose 99 65 - 100 mg/dL    BUN 7 6 - 20 mg/dL    Creatinine 0.90 0.55 - 1.02 mg/dL    BUN/Creatinine ratio 8 (L) 12 - 20      GFR est AA >60 >60 ml/min/1.73m2    GFR est non-AA >60 >60 ml/min/1.73m2    Calcium 9.0 8.5 - 10.1 mg/dL    Bilirubin, total 1.0 0.2 - 1.0 mg/dL    AST (SGOT) Hemolyzed, recollect requested 15 - 37 U/L    ALT (SGPT) 28 12 - 78 U/L    Alk.  phosphatase 70 45 - 117 U/L    Protein, total 10.6 (H) 6.4 - 8.2 g/dL    Albumin 1.9 (L) 3.5 - 5.0 g/dL    Globulin 8.7 (H) 2.0 - 4.0 g/dL    A-G Ratio 0.2 (L) 1.1 - 2.2     PROTHROMBIN TIME + INR    Collection Time: 09/12/22 10:43 PM   Result Value Ref Range    Prothrombin time 15.2 (H) 11.9 - 14.6 sec    INR 1.2 (H) 0.9 - 1.1     LIPASE    Collection Time: 09/12/22 10:43 PM   Result Value Ref Range    Lipase 168 73 - 393 U/L   URINALYSIS W/ REFLEX CULTURE    Collection Time: 09/12/22 10:57 PM    Specimen: Urine   Result Value Ref Range    Color Yellow/Straw      Appearance Turbid (A) Clear      Specific gravity 1.010 1.003 - 1.030      pH (UA) 6.0      Protein 30 (A) Negative mg/dL    Glucose Negative Negative mg/dL    Ketone Negative Negative mg/dL    Bilirubin Negative Negative      Blood Large (A) Negative      Urobilinogen 1.0 0.2 - 1.0 EU/dL    Nitrites Positive (A) Negative      Leukocyte Esterase Large (A) Negative      UA:UC IF INDICATED Urine Culture Ordered (A) Culture not indicated by UA result      WBC >100 (H) 0 - 4 /hpf RBC 10-20 0 - 5 /hpf    Bacteria Negative Negative /hpf    Other Urine Micro 1     OCCULT BLOOD, STOOL    Collection Time: 09/13/22 12:35 AM   Result Value Ref Range    Occult Blood,day 1 Negative Negative      Day 1 date: 9,132,022     LACTIC ACID    Collection Time: 09/13/22  2:55 AM   Result Value Ref Range    Lactic acid 1.2 0.4 - 2.0 mmol/L       Radiologic Studies :   CT Results  (Last 48 hours)                 09/12/22 2352  CT ABD PELV W CONT Final result    Impression:  1. 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 2. Possible extension into the distal right ureter. 3. Severe right hydronephrosis. 4. No justus metastases. 5. Right gonadal vein thrombosis. The findings were called to Dr. Vika Callahan on 9/13/2022 at [address] hours by Dr. Vicente Aguiar. [phone]       Narrative:  CT ABDOMEN AND PELVIS WITH CONTRAST. 9/12/2022 11:52 PM        INDICATION: New anemia, abdominal pain and tenderness, constipation. COMPARISON: None. TECHNIQUE: CT of the abdomen and pelvis was performed after the administration   100 cc IV Isovue-[id number]. CT dose reduction was achieved through use of a   standardized protocol tailored for this examination and automatic exposure   control for dose modulation. FINDINGS:   Urography: A sessile bladder mass encases the right ureterovesical junction   (2-109, 80,480-50). This measures up to 31 x 26 x 30 mm in greatest dimensions. Greatest dimensions over estimate volume, as it is centrally urine filled. It   measures up to 13 mm in greatest thickness. Tumor may extend into the   distal-most right ureter (74,591-97). Severe right hydroureter, severe right   hydronephrosis, delayed right renal perfusion are associated. The left kidney is   normal.       Abdomen: One of the legs of an IVC filter extends into the right gonadal vein,   in the right gonadal vein is thrombosed (2-49, 44,352-68).  There are numerous   tiny stones in the gallbladder and proximal cystic duct. The lung bases are   clear. The heart size is normal. Incidental note is made of an hepatic cyst. The   distal esophagus, stomach, duodenum, liver, pancreas, spleen, and adrenals are   otherwise normal.       Pelvis: The uterus is enlarged and replaced by numerous fibroids. The small   bowel, ileocecal junction, appendix, and colon are normal. There is trace free   pelvic fluid. No free air and no abdominopelvic lymphadenopathy. CXR Results  (Last 48 hours)                 09/13/22 0144  XR CHEST PORT Final result    Impression:  Clear lungs. Narrative:  PORTABLE CHEST RADIOGRAPH/S: 9/13/2022 1:44 AM       INDICATION: Infiltrate. COMPARISON: 9/11/2022. TECHNIQUE: Portable frontal semiupright radiograph/s of the chest.       FINDINGS:    The lungs are clear. The central airways are patent. No pneumothorax or pleural   effusion. Incidental note is made of right hemidiaphragm eventration. 09/11/22 1108  XR ABD ACUTE W 1 V CHEST Final result    Impression:  Nonobstructive bowel gas pattern without evidence of free air. Cholelithiasis. Narrative:  EXAM:  XR ABD ACUTE W 1 V CHEST       INDICATION: Abdominal pain and constipation. COMPARISON: None       TECHNIQUE: Erect chest and abdomen and supine abdomen radiographs (acute   abdominal series). FINDINGS: The cardiomediastinal and hilar contours are within normal limits. The   lungs and pleural spaces are clear. No free air under the diaphragm. There is scattered air within the colon and small bowel. No bowel dilatation to   suggest obstruction. No evidence of free intraperitoneal air. There are numerous subcentimeter gallstones. IVC filter is visible. No evidence   of nephrolithiasis. Bones are osteopenic. Assessment and Plan :     New diagnosis of bladder cancer near the right ureterovesical junction.   Associated with right hydronephrosis. We will consult urology and follow-up with recommendations    Anemia: Seems to be secondary to acute loss. But the last lab work to compare was 1 year ago with normal hemoglobin. No recent labs to compare need to get it from the PCPs office. 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 Cannot start on anticoagulation at this time without ruling out bleeding    History of venous thrombosis lower extremities with IVC filter, not sure why she is not on any anticoagulation. The way she is describing appears she may had a bleed from anticoagulation. She is showing the leg with blood clot and states some thing in the head and the did something in her chest.    Hypercalcemia: Secondary to malignancy and volume contraction    Multiple electrolyte abnormalities secondary to her current situation. There is hyponatremia and do not have the potassium as it was hemolyzed. Admitted to surgical floor, full CODE STATUS, home medications reviewed but not taking any medications except vitamins. Has no advance medical directives. Am not sure about her her understanding of the condition at this time, I tried my best to explain. CC : Tawana Rodriguez MD  Signed By: Obdulia Peter MD     September 13, 2022      This dictation was done by dragon, computer voice recognition software. Often unanticipated grammatical, syntax, [place] phones and other interpretive errors are inadvertently transcribed. Please excuse errors that have escaped final proofreading.

## 2022-09-13 NOTE — PROGRESS NOTES
Comprehensive Nutrition Assessment    Type and Reason for Visit: Initial    Nutrition Recommendations/Plan:   Continue current diet  Add prune juice and vanilla magic cups/day (580kcals, 18g prot) 2xday  Monitor for continued wt changes     Malnutrition Assessment:  Malnutrition Status: Moderate malnutrition (09/13/22 1321)    Context:  Chronic illness     Findings of the 6 clinical characteristics of malnutrition:   Energy Intake:  Mild decrease in energy intake (specify) (decr appetite/intakeas x1wk)  Weight Loss:  Mild weight loss (specify amount and time period) (unknown per pt, x1wk)     Body Fat Loss:  Mild body fat loss, Orbital, Buccal region   Muscle Mass Loss:  Mild muscle mass loss, Calf (gastrocnemius), Clavicles (pectoralis &deltoids), Temples (temporalis)  Fluid Accumulation:  No significant fluid accumulation,     Strength:  Normal  strength     Nutrition Assessment:    Pt admitted for constipation with abd pain. Remains inpt for constipation mgmt, carcinoma of the bladder, and hydronephrosis of R kidney. Pt endorses poor appetite and intakes (25%) with questionable wt loss x1day (10#) per EMR. Pt appeared to have bags on bed, RD removed them to get accurate bed scale wt. Per EMR, 7# wt loss x1yr, insignificant. Pt reported only eating steamed broccoli yesterday. Pt reports stronger appetite today d/t NPO since midnight, although just advanced to regular 09/13, intake expected to increase. RD discussed magic cup preference and prune juice for caloric incr and constipation remedy. Labs: H/H: 7.0/22.3, Na 128, BUN 8, prot 10.6. Meds: Ferrous sulfate, zofran, theragran, sodium phosphate enema. Nutrition Related Findings:    Per NFPE, muscle and fat loss. No edema. +N/-V. -+C,/-D last BM 09/12 with enema. No C/S difficulties.  Wound Type: None     Current Nutrition Intake & Therapies:  Average Meal Intake: 1-25%  Average Supplement Intake: None ordered  ADULT ORAL NUTRITION SUPPLEMENT Lunch, Dinner; Frozen Supplement  ADULT DIET Regular; Low Fat/Low Chol/High Fiber/2 gm Na; LIKES: Spaghetti, beef, broccoli, greens, chix, turkey, cran juice. DISLIKES: PORK  DIET NPO    Anthropometric Measures:  Height: 5' 3\" (160 cm)  Ideal Body Weight (IBW): 115 lbs (52 kg)  Admission Body Weight: 115 lb  Current Body Wt:  47.9 kg (105 lb 9.6 oz), 81.2 % IBW.  Bed scale  Current BMI (kg/m2): 17.1  Usual Body Weight: 52.2 kg (115 lb)  % Weight Change (Calculated): -8.2                    BMI Category: Underweight (BMI less than 18.5)    Wt Readings from Last 5 Encounters:   09/14/22 53 kg (116 lb 12.8 oz)   09/11/22 52.2 kg (115 lb)   12/13/21 56.8 kg (125 lb 3.2 oz)   10/13/21 57 kg (125 lb 9.6 oz)   09/02/21 55.7 kg (122 lb 12.8 oz)       Estimated Daily Nutrient Needs:  Energy Requirements Based On: Kcal/kg  Weight Used for Energy Requirements: Current  Energy (kcal/day): 1437-1676kcals/day (30-35kcals/kg)  Weight Used for Protein Requirements: Current  Protein (g/day): 67g/day (1.4g/kg)  Method Used for Fluid Requirements: 1 ml/kcal  Fluid (ml/day): 1500mL (hydronephrosis)    Nutrition Diagnosis:   Moderate malnutrition, In context of chronic illness related to altered GI function, catabolic illness (carcinoma of bladder, constipation) as evidenced by intake 0-25%, moderate loss of subcutaneous fat, moderate muscle loss    Nutrition Interventions:   Food and/or Nutrient Delivery: Continue current diet, Start oral nutrition supplement (2 Vanilla magic cups/day)  Nutrition Education/Counseling: No recommendations at this time  Coordination of Nutrition Care: Continue to monitor while inpatient  Plan of Care discussed with: pt    Goals:     Goals: PO intake 50% or greater, by next RD assessment       Nutrition Monitoring and Evaluation:   Behavioral-Environmental Outcomes: None identified  Food/Nutrient Intake Outcomes: Food and nutrient intake, Diet advancement/tolerance, Supplement intake  Physical Signs/Symptoms Outcomes: Constipation, GI status, Weight, Nutrition focused physical findings, Nausea/vomiting    Discharge Planning:     Too soon to determine    [address]: [facility] [id number]

## 2022-09-13 NOTE — PROGRESS NOTES
[facility] Hospitalist Progress Note  John Nguyen MD    Date:2022       Room:[facility]  Patient Name:Torres Etienne     YOB: 1965     Age:57 y.o.    22 admission course  61 y.o. female with a history of venous thrombosis but not on any anticoagulation, rheumatoid arthritis stopped taking medications presents to the emergency room complaining of no bowel movement for a week, no improvement with medications given on a ED visit 2 days ago. Denies any trouble urinating, states appetite is good and eating well. No nausea or vomiting, no fever or chills. Denies any chest pain or palpitations. On evaluation she has right ureterovesical junction area of bladder cancer that is new with possible extension into right ureter with hydronephrosis. Hemoglobin is drifting down to 7.0. Laboratory data with  hypercalcemia. And electrolyte abnormalities. 22 no new complaints, tolerating medications well  Oncology and urology anticipated    Subjective    Subjective:  Symptoms:  Stable. Review of Systems   All other systems reviewed and are negative. Objective         Vitals Last 24 Hours:  TEMPERATURE:  Temp  Av.1 °F (36.7 °C)  Min: 97.6 °F (36.4 °C)  Max: 98.5 °F (36.9 °C)  RESPIRATIONS RANGE: Resp  Av  Min: 18  Max: 18  PULSE OXIMETRY RANGE: SpO2  Av %  Min: 98 %  Max: 98 %  PULSE RANGE: Pulse  Av  Min: 90  Max: 114  BLOOD PRESSURE RANGE: Systolic (33MHC), SVZ:184 , Min:110 , RUR:532   ; Diastolic (26AYW), IRU:69, Min:66, Max:83    I/O (24Hr): No intake or output data in the 24 hours ending 22 1030  Objective:  General Appearance:  Comfortable. Vital signs: (most recent): Blood pressure 110/66, pulse 90, temperature 97.6 °F (36.4 °C), resp. rate 18, height 5' 6\" (1.676 m), weight 52.2 kg (115 lb), SpO2 98 %. HEENT: Normal HEENT exam.    Lungs:  Normal effort. Heart: Normal rate. Regular rhythm. Abdomen: Abdomen is soft.     Pulses: Distal pulses are intact. Neurological: Patient is alert and oriented to person, place and time. Pupils:  Pupils are equal, round, and reactive to light. Skin:  Warm and dry. Labs/Imaging/Diagnostics    Labs:  CBC:  Recent Labs     09/12/22 2243 09/11/22  1115   WBC 6.0 7.5   RBC 2.66* 2.81*   HGB 7.0* 7.5*   HCT 22.3* 23.6*   MCV 83.8 84.0   RDW 14.7* 14.4   * 474*     CHEMISTRIES:  Recent Labs     09/12/22 2243 09/11/22  1115   * 129*   K Hemolyzed, recollect requested 4.7   CL 99 96*   CO2 19* 22   BUN 7 17   CA 9.0 9.2   PT/INR:  Recent Labs     09/12/22 2243   INR 1.2*     APTT:No results for input(s): APTT in the last 72 hours. LIVER PROFILE:  Recent Labs     09/12/22 2243 09/11/22  1115   AST Hemolyzed, recollect requested 51*   ALT 28 23     Lab Results   Component Value Date/Time    ALT (SGPT) 28 09/12/2022 10:43 PM    AST (SGOT) Hemolyzed, recollect requested 09/12/2022 10:43 PM    Alk. phosphatase 70 09/12/2022 10:43 PM    Bilirubin, total 1.0 09/12/2022 10:43 PM       Imaging Last 24 Hours:  CT HEAD WO CONT    Result Date: 9/13/2022  HEAD CT WITHOUT CONTRAST: 9/13/2022 6:19 AM INDICATION: History of procedure the patient cannot explain COMPARISON: None. PROCEDURE: Axial images of the head were obtained without contrast. Coronal and sagittal reformats were performed. CT dose reduction was achieved through use of a standardized protocol tailored for this examination and automatic exposure control for dose modulation. FINDINGS: The ventricles and sulci are appropriate in size and configuration for age. No loss of gray-white differentiation to suggest late acute or early subacute infarction. No mass effect or intracranial hemorrhage. There are multiple punctate calcifications in the partially imaged parotid glands. No acute intracranial abnormality. CT ABD PELV W CONT    Result Date: 9/13/2022  CT ABDOMEN AND PELVIS WITH CONTRAST.  9/12/2022 11:52 PM INDICATION: New anemia, abdominal pain and tenderness, constipation. COMPARISON: None. TECHNIQUE: CT of the abdomen and pelvis was performed after the administration 100 cc IV Isovue-370. CT dose reduction was achieved through use of a standardized protocol tailored for this examination and automatic exposure control for dose modulation. FINDINGS: Urography: A sessile bladder mass encases the right ureterovesical junction (2-109, 80,480-50). This measures up to 31 x 26 x 30 mm in greatest dimensions. Greatest dimensions over estimate volume, as it is centrally urine filled. It measures up to 13 mm in greatest thickness. Tumor may extend into the distal-most right ureter (89,647-06). Severe right hydroureter, severe right hydronephrosis, delayed right renal perfusion are associated. The left kidney is normal. Abdomen: One of the legs of an IVC filter extends into the right gonadal vein, in the right gonadal vein is thrombosed (2-49, 72,597-28). There are numerous tiny stones in the gallbladder and proximal cystic duct. The lung bases are clear. The heart size is normal. Incidental note is made of an hepatic cyst. The distal esophagus, stomach, duodenum, liver, pancreas, spleen, and adrenals are otherwise normal. Pelvis: The uterus is enlarged and replaced by numerous fibroids. The small bowel, ileocecal junction, appendix, and colon are normal. There is trace free pelvic fluid. No free air and no abdominopelvic lymphadenopathy. 1. 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 2. Possible extension into the distal right ureter. 3. Severe right hydronephrosis. 4. No justus metastases. 5. Right gonadal vein thrombosis. The findings were called to Dr. Caroline Martin on 9/13/2022 at [address] hours by Dr. Zaynab Brannon. [phone]    XR CHEST PORT    Result Date: 9/13/2022  PORTABLE CHEST RADIOGRAPH/S: 9/13/2022 1:44 AM INDICATION: Infiltrate. COMPARISON: 9/11/2022.  TECHNIQUE: Portable frontal semiupright radiograph/s of the chest. FINDINGS: The lungs are clear. The central airways are patent. No pneumothorax or pleural effusion. Incidental note is made of right hemidiaphragm eventration. Clear lungs. Assessment//Plan   Active Problems:    Bladder cancer ([address] (9/13/2022)      Anemia due to acute blood loss (9/13/2022)      Hypercalcemia (9/13/2022)      Carcinoma of bladder ([address] (9/13/2022)      Hydronephrosis of right kidney (9/13/2022)      Assessment & Plan    9/12/22 admission course  61 y.o. female with a history of venous thrombosis but not on any anticoagulation, rheumatoid arthritis stopped taking medications presents to the emergency room complaining of no bowel movement for a week, no improvement with medications given on a ED visit 2 days ago. Denies any trouble urinating, states appetite is good and eating well. No nausea or vomiting, no fever or chills. Denies any chest pain or palpitations. On evaluation she has right ureterovesical junction area of bladder cancer that is new with possible extension into right ureter with hydronephrosis. Hemoglobin is drifting down to 7.0. Laboratory data with hypercalcemia. And electrolyte abnormalities. 9/13/22 no new complaints, tolerating medications well  Oncology and urology anticipated    MICROBIOLOGY    9/12/22 Urine  Pending  9/13/22 Blood  Pending    ASSESSMENT AND PLAN    1) Right sided hydronephrosis in the setting of newly discovered bladder cancer near the right ureterovesical junction. Supportive care   Intravenous fluids   Operative management as needed per urology    2) Anemia in the setting of acute blood loss and/or chronic disease.      Supportive care   Transfuse as needed    3) History of DVT of the lower extremity in the past, off anticoagulation     Lower extremity duplex        Electronically signed by Nic Mccallum MD on 9/13/2022 at 10:30 AM

## 2022-09-13 NOTE — CONSULTS
UROLOGY CONSULT    [place], [address] [facility] Office    Patient: Randy Patel MRN: [id number]  SSN: [id number]    YOB: 1965  Age: 61 y.o. Sex: female          Date of Encounter:  September 13, 2022  ADMITTED: 9/12/2022 to Jos Lomas MD by Rebecca Hobson MD for Carcinoma of bladder [facility]) [C67.9]; Hydronephrosis of right kidney [N13.30]; Anemia due to acute blood loss [D62]  Chief Complaint:  constipation  Reason for consult: bladder tumor           History of Present Illness:  Patient is a 61 y.o. female admitted 9/12/2022 to the hospital for [facility] bladder [facility] [C67.9]; Hydronephrosis of right kidney [N13.30]; Anemia due to acute blood loss [D62]. She has a history of DVT status post IVC filter, not on anticoagulation, rheumatoid arthritis off medication who presented to the emergency room with no bowel movement for 1 week. She was seen 2 days prior in the emergency room and had not responded to medication. She states she had an enema in the ER and it was effective    States her appetite is good and is eating well. She denies urinary frequency or dysuria. She did report some polyuria over the past few days. When asked about blood in her urine, she denied gross hematuria. She has found electrolyte abnormalities. Albumin 1.8. CRP elevated at 10.2 with procalcitonin of 0.19, concerning. Urine and blood cultures were sent. Hemoglobin  7.0, now 6.1. CT scan reveals right-sided hydronephrosis and hydroureter down to a right-sided bladder tumor approximately 3 cm in size with possible distal ureteral extension. She reportedly had a history of anemia requiring transfusion in the past.  Unsure of the diagnosis then. Past Medical History: Allergies   Allergen Reactions    Bactrim [Sulfamethoprim] Swelling     Swelling of the lilps    Penicillin V Potassium Swelling      Prior to Admission medications    Medication Sig Start Date End Date Taking? Authorizing Provider   ferrous sulfate 325 mg (65 mg iron) tablet Take 325 mg by mouth daily. 22   Provider, Historical   lactulose (CHRONULAC) 10 gram/15 mL solution Take 30 mL by mouth two (2) times a day for 8 days. 22  Diamond Taylor MD   bisacodyL (DULCOLAX) 10 mg supp Insert 10 mg into rectum daily as needed for Constipation. 22   Diamond Taylor MD   Daily-Luan tablet TAKE 1 TABLET BY MOUTH EVERY DAY 7/15/22   John Yo MD   Arthritis Pain Relief 650 mg TbER TAKE 1 TABLET BY MOUTH THREE (3) TIMES DAILY AS NEEDED FOR PAIN. 22   Radha Cotto MD   multivitamin with folic acid (One Daily Essential) 400 mcg tab tablet Take 1 Tablet by mouth daily. 3/4/22   Helen Arevalo MD   cromolyn (OPTICROM) 4 % ophthalmic solution Administer 1 Drop to both eyes daily. 21   Provider, Historical      PMHx:  has a past medical history of Anemia, Back pain, DVT (deep venous thrombosis) ([address] Rheumatoid arthritis ([address] and Uterine fibroid. PSurgHx:  has a past surgical history that includes hx  section. PSocHx:  reports that she has quit smoking. Her smoking use included cigarettes. She has a 7.50 pack-year smoking history. She has never used smokeless tobacco. She reports current alcohol use. She reports that she does not use drugs. FamilyHx:   Family History   Problem Relation Age of Onset    Lung Cancer Mother     Hypertension Sister     Breast Cancer Maternal Aunt      ROS:  Admission ROS by Shakira Bojorquez MD from 2022 were reviewed with the patient and changes (other than per HPI) include: none. All 12 systems were reviewed and were otherwise negative. Physical Exam:            Vitals[de-identified]    Temp (24hrs), Av.1 °F (36.7 °C), Min:97.6 °F (36.4 °C), Max:98.5 °F (36.9 °C)   Blood pressure 110/66, pulse 90, temperature 97.6 °F (36.4 °C), resp. rate 18, height 5' 6\" (1.676 m), weight 115 lb (52.2 kg), SpO2 98 %. Estimated body mass index is 18.56 kg/m² as calculated from the following:    Height as of this encounter: 5' 6\" (1.676 m). Weight as of this encounter: 115 lb (52.2 kg). I&O's:    No intake/output data recorded.    General Well developed, in NAD   Conjunctiva/Lids Normal without gross defects   Pupil/Iris Pupils equal, round, reactive, anicteric   External Ears/Nose No lesions or deformities   Hearing  Grossly intact   Neck Supple without obvious, masses   Lymphatic No obvious supraclavicular or cervical adenopathy   Respiratory Effort Breathing easily, no audible wheezing, rhonchi, stridor   CV RRR   Abdomen / Flank Soft, non tender without guarding or rebound, without obvious masses, no CVA tenderness   Digits/ Nails No clubbing, cyanosis, petechiae         Skin Inspection Warm and dry, no obvious rashes   Neurologic Grossly normal without focal deficits   Judgement / Insight intact   Mood / Affect normal       Lab Results   Component Value Date/Time    WBC 3.4 (L) 09/13/2022 11:02 AM    HCT 19.4 (L) 09/13/2022 11:02 AM    PLATELET 955 73/04/9556 11:02 AM    Sodium 138 09/13/2022 11:02 AM    Potassium 3.8 09/13/2022 11:02 AM    Chloride 110 (H) 09/13/2022 11:02 AM    CO2 21 09/13/2022 11:02 AM    BUN 6 09/13/2022 11:02 AM    Creatinine 0.71 09/13/2022 11:02 AM    Glucose 85 09/13/2022 11:02 AM    Calcium 8.8 09/13/2022 11:02 AM    INR 1.2 (H) 09/12/2022 10:43 PM       UA:   Lab Results   Component Value Date/Time    Color Yellow/Straw 09/12/2022 10:57 PM    Appearance Turbid (A) 09/12/2022 10:57 PM    Specific gravity 1.010 09/12/2022 10:57 PM    pH (UA) 6.0 09/12/2022 10:57 PM    Protein 30 (A) 09/12/2022 10:57 PM    Glucose Negative 09/12/2022 10:57 PM    Ketone Negative 09/12/2022 10:57 PM    Bilirubin Negative 09/12/2022 10:57 PM    Urobilinogen 1.0 09/12/2022 10:57 PM    Nitrites Positive (A) 09/12/2022 10:57 PM    Leukocyte Esterase Large (A) 09/12/2022 10:57 PM    Bacteria Negative 09/12/2022 10:57 PM    WBC >100 (H) 09/12/2022 10:57 PM    RBC 10-20 09/12/2022 10:57 PM       Xrays:       Diagnoses and all orders for this visit:    1. Hydronephrosis with ureteropelvic junction (UPJ) obstruction    2. Malignant neoplasm of urinary bladder, unspecified site ([facility]    3. Anemia, unspecified type    4.  Constipation, unspecified constipation type    Other orders  -     CBC WITH AUTOMATED DIFF; Standing  -     METABOLIC PANEL, COMPREHENSIVE; Standing  -     OCCULT BLOOD, STOOL; Standing  -     CT ABD PELV W CONT; Standing  -     TYPE & SCREEN; Standing  -     PROTHROMBIN TIME + INR; Standing  -     LIPASE; Standing  -     URINALYSIS W/ REFLEX CULTURE; Standing  -     EKG, 12 LEAD, INITIAL; Standing  -     sodium chloride 0.9 % bolus infusion 1,000 mL  -     iopamidoL (ISOVUE-370) 76 % injection 100 mL  -     sodium phosphate (FLEET'S) enema 1 Enema  -     XR CHEST PORT; Standing  -     EKG, 12 LEAD, INITIAL; Standing  -     VITAL SIGNS; Standing  -     STRICT I & O; Standing  -     MEASURE HEIGHT; Standing  -     WEIGH PATIENT; Standing  -     NEUROLOGIC STATUS ASSESSMENT; Standing  -     NOTIFY PROVIDER: SPECIFY; Standing  -     sodium chloride (NS) flush 5-10 mL  -     CULTURE, BLOOD; Standing  -     LACTIC ACID; Standing  -     levoFLOXacin (LEVAQUIN) 750 mg in D5W IVPB  -     CULTURE, URINE; Standing  -     CULTURE, BLOOD; Standing  -     therapeutic multivitamin (THERAGRAN) tablet 1 Tablet  -     ferrous sulfate tablet 325 mg  -     INITIAL PHYSICIAN ORDER: INPATIENT; Standing  -     FULL CODE; Standing  -     VITAL SIGNS; Standing  -     UP AD MARGIE; Standing  -     INTAKE AND OUTPUT; Standing  -     RT--OXIMETRY, SPOT CHECK; Standing  -     sodium chloride (NS) flush 5-40 mL  -     sodium chloride (NS) flush 5-40 mL  -     acetaminophen (TYLENOL) tablet 650 mg  -     acetaminophen (TYLENOL) suppository 650 mg  -     ondansetron (ZOFRAN ODT) tablet 4 mg  -     ondansetron (ZOFRAN) injection 4 mg  -     ANTICOAGULANT THERAPY IS CONTRAINDICATED; Standing  -     CT HEAD WO CONT; Standing  -     IP CONSULT TO UROLOGY; Standing  -     IP CONSULT TO ONCOLOGY; Standing  -     DUPLEX LOWER EXT VENOUS BILAT; Standing  -     IP CONSULT TO NEPHROLOGY; Standing  -     DIET ADULT; Standing  -     0.9% sodium chloride infusion  -     CBC WITH AUTOMATED DIFF; Standing  -     METABOLIC PANEL, COMPREHENSIVE; Standing  -     SED RATE (ESR); Standing  -     C REACTIVE PROTEIN, QT; Standing  -     IRON PROFILE; Standing  -     FERRITIN; Standing  -     PROCALCITONIN; Standing  -     CBC W/O DIFF; Standing  -     METABOLIC PANEL, BASIC; Standing             Hospital Problems  Date Reviewed: 9/13/2022            Codes Class Noted POA    [facility] ICD-10-CM: C67.9  ICD-9-CM: 188.9  9/13/2022 Yes        Anemia due to acute blood loss ICD-10-CM: D62  ICD-9-CM: 285.1  9/13/2022 Yes        Hypercalcemia ICD-10-CM: E83.52  ICD-9-CM: 275.42  9/13/2022 Yes        Carcinoma of bladder ([facility] ICD-10-CM: C67.9  ICD-9-CM: 188.9  9/13/2022 Unknown        Hydronephrosis of right kidney ICD-10-CM: N13.30  ICD-9-CM: 028  9/13/2022 Unknown           Assessment/Plan:  Hydronephrosis - right, No JEROME. No need for nephrostomy. Concern for obstructing bladder tumor   Ureteral tumor. Concern for distal right ureteral tumor extension. Bladder mass >3cm with associated right hydronephrosis. Concerning for malignancy  Needs evaluation with cystoscopy, TURBT, ureteroscopy   Anemia: Possibly associated with malignancy. She will need transfusion prior to surgery. H/o DVT with IVC filter. Gonadal vein is thrombosed. Poor nutrition with an albumin of 1.8.   Recent constipation or ileus    Plan on procedure 9/15/2022     Surgery planned: Cystoscopy, retrograde pyelograms  RIGHT  ureteral stent placement  transurethral resection of bladder tumor > 2cm  ureteroscopy    Signed By: Travis Petersen MD  - September 13, 2022

## 2022-09-13 NOTE — PROGRESS NOTES
Reason for Admission:  Anemia due to acute blood loss, Bladder cancer, Hypercalcemia, Hydronephrosis of right kidney                   RUR Score: 10%                    Plan for utilizing home health: None         PCP: First and Last name:  MD Raissa     Name of Practice:    Are you a current patient: Yes/No:    Approximate date of last visit: A few months ago   Can you participate in a virtual visit with your PCP:                     Current Advanced Directive/Advance Care Plan: Full Code    Healthcare Decision Maker:   Click here to complete [address] including selection of the Healthcare Decision Maker Relationship (ie \"Primary\")           Mimi Lawn: [phone]                  Transition of Care Plan:  CM met with patient at bedside to complete assessment. Address verified. Patient lives at home alone. DME: cane. Independent in all ADL's. Transport: friend or niece provides transportation.  No HH in the past.

## 2022-09-13 NOTE — ED TRIAGE NOTES
Abd pain, constipation seen at [facility] yesterday, sent home with mag citrate states she still has not had bm

## 2022-09-13 NOTE — PROGRESS NOTES
Verbal shift change report given to Marc Lopez (oncoming nurse) by Bruce Cabello  (offgoing nurse). Report included the following information SBAR, Kardex, and ED Summary.       Pt arrived on unit at 07:47

## 2022-09-13 NOTE — PROGRESS NOTES
Bedside and Verbal shift change report given to Cordell (oncoming nurse) by Grace Yadav (offgoing nurse). Report included the following information SBAR, Kardex, ED Summary, Intake/Output, and Recent Results.

## 2022-09-14 ENCOUNTER — APPOINTMENT (OUTPATIENT)
Dept: ULTRASOUND IMAGING | Age: 57
DRG: 446 | End: 2022-09-14
Attending: PHYSICIAN ASSISTANT
Payer: MEDICAID

## 2022-09-14 LAB
ANION GAP SERPL CALC-SCNC: 6 MMOL/L (ref 5–15)
BUN SERPL-MCNC: 5 MG/DL (ref 6–20)
BUN/CREAT SERPL: 7 (ref 12–20)
CA-I BLD-MCNC: 8.9 MG/DL (ref 8.5–10.1)
CHLORIDE SERPL-SCNC: 112 MMOL/L (ref 97–108)
CO2 SERPL-SCNC: 21 MMOL/L (ref 21–32)
CREAT SERPL-MCNC: 0.72 MG/DL (ref 0.55–1.02)
ERYTHROCYTE [DISTWIDTH] IN BLOOD BY AUTOMATED COUNT: 15.6 % (ref 11.5–14.5)
FOLATE SERPL-MCNC: 20.8 NG/ML (ref 5–21)
GLUCOSE SERPL-MCNC: 117 MG/DL (ref 65–100)
HCT VFR BLD AUTO: 19.9 % (ref 35–47)
HCT VFR BLD AUTO: 28.4 % (ref 35–47)
HGB BLD-MCNC: 6.1 G/DL (ref 11.5–16)
HGB BLD-MCNC: 9.1 G/DL (ref 11.5–16)
LDH SERPL L TO P-CCNC: 253 U/L (ref 81–246)
MCH RBC QN AUTO: 26.5 PG (ref 26–34)
MCHC RBC AUTO-ENTMCNC: 32 G/DL (ref 30–36.5)
MCV RBC AUTO: 82.8 FL (ref 80–99)
NRBC # BLD: 0 K/UL (ref 0–0.01)
NRBC BLD-RTO: 0 PER 100 WBC
PLATELET # BLD AUTO: 380 K/UL (ref 150–400)
PMV BLD AUTO: 9.4 FL (ref 8.9–12.9)
POTASSIUM SERPL-SCNC: 3.4 MMOL/L (ref 3.5–5.1)
RBC # BLD AUTO: 3.43 M/UL (ref 3.8–5.2)
RETICS # AUTO: 0.03 M/UL (ref 0.02–0.08)
RETICS/RBC NFR AUTO: 0.9 % (ref 0.7–2.1)
SODIUM SERPL-SCNC: 139 MMOL/L (ref 136–145)
VIT B12 SERPL-MCNC: 444 PG/ML (ref 193–986)
WBC # BLD AUTO: 7.2 K/UL (ref 3.6–11)

## 2022-09-14 PROCEDURE — 36430 TRANSFUSION BLD/BLD COMPNT: CPT

## 2022-09-14 PROCEDURE — 80048 BASIC METABOLIC PNL TOTAL CA: CPT

## 2022-09-14 PROCEDURE — 36415 COLL VENOUS BLD VENIPUNCTURE: CPT

## 2022-09-14 PROCEDURE — 74011250637 HC RX REV CODE- 250/637: Performed by: PHYSICIAN ASSISTANT

## 2022-09-14 PROCEDURE — 82784 ASSAY IGA/IGD/IGG/IGM EACH: CPT

## 2022-09-14 PROCEDURE — 74011000258 HC RX REV CODE- 258: Performed by: PHYSICIAN ASSISTANT

## 2022-09-14 PROCEDURE — 74011250636 HC RX REV CODE- 250/636: Performed by: INTERNAL MEDICINE

## 2022-09-14 PROCEDURE — 74011250637 HC RX REV CODE- 250/637: Performed by: HOSPITALIST

## 2022-09-14 PROCEDURE — P9016 RBC LEUKOCYTES REDUCED: HCPCS

## 2022-09-14 PROCEDURE — 83521 IG LIGHT CHAINS FREE EACH: CPT

## 2022-09-14 PROCEDURE — 65270000029 HC RM PRIVATE

## 2022-09-14 PROCEDURE — 82607 VITAMIN B-12: CPT

## 2022-09-14 PROCEDURE — 94761 N-INVAS EAR/PLS OXIMETRY MLT: CPT

## 2022-09-14 PROCEDURE — 99232 SBSQ HOSP IP/OBS MODERATE 35: CPT | Performed by: NURSE PRACTITIONER

## 2022-09-14 PROCEDURE — 83615 LACTATE (LD) (LDH) ENZYME: CPT

## 2022-09-14 PROCEDURE — 86335 IMMUNFIX E-PHORSIS/URINE/CSF: CPT

## 2022-09-14 PROCEDURE — 76705 ECHO EXAM OF ABDOMEN: CPT

## 2022-09-14 PROCEDURE — 85027 COMPLETE CBC AUTOMATED: CPT

## 2022-09-14 PROCEDURE — 74011000250 HC RX REV CODE- 250: Performed by: HOSPITALIST

## 2022-09-14 PROCEDURE — 74011250636 HC RX REV CODE- 250/636: Performed by: PHYSICIAN ASSISTANT

## 2022-09-14 PROCEDURE — 74011250637 HC RX REV CODE- 250/637: Performed by: INTERNAL MEDICINE

## 2022-09-14 PROCEDURE — 74011250637 HC RX REV CODE- 250/637: Performed by: NURSE PRACTITIONER

## 2022-09-14 PROCEDURE — 85045 AUTOMATED RETICULOCYTE COUNT: CPT

## 2022-09-14 RX ORDER — SODIUM CHLORIDE AND POTASSIUM CHLORIDE .9; .15 G/100ML; G/100ML
SOLUTION INTRAVENOUS CONTINUOUS
Status: DISCONTINUED | OUTPATIENT
Start: 2022-09-14 | End: 2022-09-15

## 2022-09-14 RX ORDER — DOCUSATE SODIUM 100 MG/1
100 CAPSULE, LIQUID FILLED ORAL 2 TIMES DAILY
Status: DISCONTINUED | OUTPATIENT
Start: 2022-09-14 | End: 2022-09-17 | Stop reason: HOSPADM

## 2022-09-14 RX ORDER — FACIAL-BODY WIPES
10 EACH TOPICAL DAILY
Status: DISCONTINUED | OUTPATIENT
Start: 2022-09-14 | End: 2022-09-15

## 2022-09-14 RX ORDER — POTASSIUM CHLORIDE 20 MEQ/1
40 TABLET, EXTENDED RELEASE ORAL
Status: COMPLETED | OUTPATIENT
Start: 2022-09-14 | End: 2022-09-14

## 2022-09-14 RX ADMIN — LACTULOSE 30 ML: 20 SOLUTION ORAL at 23:24

## 2022-09-14 RX ADMIN — FERROUS SULFATE TAB 325 MG (65 MG ELEMENTAL FE) 325 MG: 325 (65 FE) TAB at 10:54

## 2022-09-14 RX ADMIN — THERA TABS 1 TABLET: TAB at 10:54

## 2022-09-14 RX ADMIN — MEROPENEM 1 G: 1 INJECTION, POWDER, FOR SOLUTION INTRAVENOUS at 18:31

## 2022-09-14 RX ADMIN — DOCUSATE SODIUM 100 MG: 100 CAPSULE, LIQUID FILLED ORAL at 23:24

## 2022-09-14 RX ADMIN — BISACODYL 10 MG: 10 SUPPOSITORY RECTAL at 14:18

## 2022-09-14 RX ADMIN — POTASSIUM CHLORIDE 40 MEQ: 1500 TABLET, EXTENDED RELEASE ORAL at 14:18

## 2022-09-14 RX ADMIN — SODIUM CHLORIDE, PRESERVATIVE FREE 10 ML: 5 INJECTION INTRAVENOUS at 14:00

## 2022-09-14 RX ADMIN — DOCUSATE SODIUM 100 MG: 100 CAPSULE, LIQUID FILLED ORAL at 10:54

## 2022-09-14 RX ADMIN — SODIUM CHLORIDE, PRESERVATIVE FREE 10 ML: 5 INJECTION INTRAVENOUS at 22:00

## 2022-09-14 RX ADMIN — LACTULOSE 30 ML: 20 SOLUTION ORAL at 18:25

## 2022-09-14 RX ADMIN — POTASSIUM CHLORIDE AND SODIUM CHLORIDE: 900; 150 INJECTION, SOLUTION INTRAVENOUS at 14:18

## 2022-09-14 RX ADMIN — SODIUM CHLORIDE, PRESERVATIVE FREE 10 ML: 5 INJECTION INTRAVENOUS at 05:56

## 2022-09-14 NOTE — PROGRESS NOTES
Patient NPO until 2200 for US abdomen. Lactulose and soap suds enema ordered now to cleanse bowels. Patient aware of NPO status and bowel cleansing. Kaycee Cano

## 2022-09-14 NOTE — CONSULTS
Gastroenterology Consult     Referring Physician: Jules Sosa MD     Consult Date: 9/14/2022     Subjective:     Chief Complaint: Abdominal pain, constipation    History of Present Illness: Kev Quintanilla is a 61 y.o. female who is seen in consultation for constipation, possible blood loss. Ms. Cristhian Hollins seen on medical floor she reports she was given and enema in the ED and did have a normal soft stool. She denies black, tarry stool, denies hematemesis. She denies nausea, vomiting, and diarrhea. She does report constipation. Prior to admission she did not have a bowel movement in a week. She had a KUB on 9/11 showing nonobstructive bowel gas pattern without evidence of free air, cholelithiasis. She reports she has never had a colonoscopy. She reports she has a past medical history significant for uterine fibroids, rheumatoid arthritis, sciatic, DVT with IVC filter placement, and anemia. She is not taking anticoagulation. She states she has had blood transfusions in the past. Her hgB on 9/12 was 7.0 and decreased to 6.1 on 9/13. Her stool for occult on 9/13 is negative. HgB this morning is 9.1. 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 Urology consulted and plan for cystoscopy, transurethral resection of bladder tumor planned for 9/15/22. Hematology following for anemia. CT of abdomen shows numerous tiny stones in the gallbladder and proximal cystic duct. Anemia may be associated with malignancy, no obvious GI bleed. Ms. Cristhian Hollins does have dulcolax suppository daily, along with colace and lactulose. Will follow patient. CT abdomen 9/13/22: IMPRESSION  1. 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 2. Possible extension into the distal right ureter. 3. Severe right hydronephrosis. 4. No justus metastases. 5. Right gonadal vein thrombosis.     Past Medical History:   Diagnosis Date Anemia     Back pain     DVT (deep venous thrombosis) (HCC)     and PE    Rheumatoid arthritis (HCC)     Uterine fibroid      Past Surgical History:   Procedure Laterality Date    HX  SECTION        Family History   Problem Relation Age of Onset    Lung Cancer Mother     Hypertension Sister     Breast Cancer Maternal Aunt      Social History     Tobacco Use    Smoking status: Former     Packs/day: 0.25     Years: 30.00     Pack years: 7.50     Types: Cigarettes    Smokeless tobacco: Never    Tobacco comments:     Quit 15 years ago   Substance Use Topics    Alcohol use: Yes     Comment: Occasional      Allergies   Allergen Reactions    Bactrim [Sulfamethoprim] Swelling     Swelling of the lilps    Penicillin V Potassium Swelling     Current Facility-Administered Medications   Medication Dose Route Frequency    docusate sodium (COLACE) capsule 100 mg  100 mg Oral BID    bisacodyL (DULCOLAX) suppository 10 mg  10 mg Rectal DAILY    0.9% sodium chloride with KCl 20 mEq/L infusion   IntraVENous CONTINUOUS    lactulose (CHRONULAC) 10 gram/15 mL solution 30 mL  30 mL Oral TID    sodium chloride (NS) flush 5-10 mL  5-10 mL IntraVENous PRN    therapeutic multivitamin (THERAGRAN) tablet 1 Tablet  1 Tablet Oral DAILY    ferrous sulfate tablet 325 mg  325 mg Oral DAILY    sodium chloride (NS) flush 5-40 mL  5-40 mL IntraVENous Q8H    sodium chloride (NS) flush 5-40 mL  5-40 mL IntraVENous PRN    acetaminophen (TYLENOL) tablet 650 mg  650 mg Oral Q6H PRN    Or    acetaminophen (TYLENOL) suppository 650 mg  650 mg Rectal Q6H PRN    ondansetron (ZOFRAN ODT) tablet 4 mg  4 mg Oral Q6H PRN    Or    ondansetron (ZOFRAN) injection 4 mg  4 mg IntraVENous Q6H PRN    0.9% sodium chloride infusion 250 mL  250 mL IntraVENous PRN        Review of Systems:  A detailed 10 organ review of systems is obtained with pertinent positives as listed in the History of Present Illness and Past Medical History.  All others are negative. Objective:     Physical Exam:  Visit Vitals  /86   Pulse 91   Temp 97.9 °F (36.6 °C)   Resp 16   Ht 5' 3\" (1.6 m)   Wt 53 kg (116 lb 12.8 oz)   LMP  (LMP Unknown)   SpO2 100%   BMI 20.69 kg/m²        Skin:  Extremities and face reveal no rashes. No rivers erythema. No telangiectasias on the chest wall. HEENT: Sclerae anicteric. Extra-occular muscles are intact. No oral ulcers. No abnormal pigmentation of the lips. The neck is supple. Cardiovascular: Regular rate and rhythm. Respiratory:  Comfortable breathing with no accessory muscle use. GI:  Abdomen nondistended, soft, and nontender. Normal active bowel sounds. No enlargement of the liver or spleen. No masses palpable. Rectal:  Deferred  Musculoskeletal: Extremities have good range of motion. Neurological:  Gross memory appears intact. Patient is alert and oriented. Psychiatric:  Mood appears appropriate with judgement intact. Lymphatic:  No cervical or supraclavicular adenopathy. Lab/Data Review:  CMP:   Lab Results   Component Value Date/Time     09/14/2022 09:24 AM    K 3.4 (L) 09/14/2022 09:24 AM     (H) 09/14/2022 09:24 AM    CO2 21 09/14/2022 09:24 AM    AGAP 6 09/14/2022 09:24 AM     (H) 09/14/2022 09:24 AM    BUN 5 (L) 09/14/2022 09:24 AM    CREA 0.72 09/14/2022 09:24 AM    GFRAA >60 09/14/2022 09:24 AM    GFRNA >60 09/14/2022 09:24 AM    CA 8.9 09/14/2022 09:24 AM     CBC:   Lab Results   Component Value Date/Time    WBC 7.2 09/14/2022 09:24 AM    HGB 9.1 (L) 09/14/2022 09:24 AM    HCT 28.4 (L) 09/14/2022 09:24 AM     09/14/2022 09:24 AM         Assessment/Plan:   Constipation      Continue dulcolax suppository daily      Colace daily      Lactulose daily  2.  Anemia possibly 2/2 malignancy      Hx of anemia with hx of blood transfusions     Stool for occult negative     Transfuse for hgB less than 7     Iron 35, TIBC 123, Iron % 28     Folate 20.8, Vitamin B12 444     Monitor H&H     S/P blood transfusion on 9/12/22     Hematology input appreciated  3. Possible Bladder cancer      Urology following       Cystoscopy with stent placement and tumor resection planned for 9/15/22        Thank you for allowing me to participate in this patients care   Plan discussed with Dr. Fernando Hanson and he approves.

## 2022-09-14 NOTE — PROGRESS NOTES
UROLOGY Progress Note         [phone]      Daily Progress Note: 9/14/2022      Subjective: The patient is seen for UROLOGIC follow up for bladder mass. CT scan reveals right-sided hydronephrosis and hydroureter down to a right-sided bladder tumor approximately 3 cm in size with possible distal ureteral extension. H/H low with hx of anemia. She has a history of DVT status post IVC filter, not on anticoagulation, rheumatoid arthritis off medication who presented to the emergency room with no bowel movement for 1 week. No voiding symptoms. No gross hematuria. No hx of tobacco usage, chemical exposure. Problem List:  Patient Active Problem List   Diagnosis Code    History of blood clots Z86.718    Bladder cancer ([facility] [address] C67.9    Anemia due to acute blood loss D62    Hypercalcemia E83.52    Carcinoma of bladder (HCC) C67.9    Hydronephrosis of right kidney N13.30         Medications reviewed  Current Facility-Administered Medications   Medication Dose Route Frequency    sodium chloride (NS) flush 5-10 mL  5-10 mL IntraVENous PRN    therapeutic multivitamin (THERAGRAN) tablet 1 Tablet  1 Tablet Oral DAILY    ferrous sulfate tablet 325 mg  325 mg Oral DAILY    sodium chloride (NS) flush 5-40 mL  5-40 mL IntraVENous Q8H    sodium chloride (NS) flush 5-40 mL  5-40 mL IntraVENous PRN    acetaminophen (TYLENOL) tablet 650 mg  650 mg Oral Q6H PRN    Or    acetaminophen (TYLENOL) suppository 650 mg  650 mg Rectal Q6H PRN    ondansetron (ZOFRAN ODT) tablet 4 mg  4 mg Oral Q6H PRN    Or    ondansetron (ZOFRAN) injection 4 mg  4 mg IntraVENous Q6H PRN    0.9% sodium chloride infusion  50 mL/hr IntraVENous CONTINUOUS    0.9% sodium chloride infusion 250 mL  250 mL IntraVENous PRN       Review of Systems:   Review of Systems   Constitutional:  Negative for chills and fever. Respiratory:  Negative for cough, sputum production and shortness of breath. Cardiovascular:  Negative for palpitations. Gastrointestinal:  Positive for constipation. Negative for abdominal pain, nausea and vomiting. Genitourinary:  Negative for dysuria, flank pain, frequency, hematuria and urgency. Musculoskeletal:  Negative for back pain. Objective:   Physical Exam  Constitutional:       Appearance: Normal appearance. She is not ill-appearing. Cardiovascular:      Rate and Rhythm: Normal rate. Pulmonary:      Effort: Pulmonary effort is normal.   Abdominal:      General: Abdomen is flat. There is no distension. Genitourinary:     Comments: voiding  Skin:     General: Skin is warm and dry. Neurological:      Mental Status: She is alert and oriented to person, place, and time.         Visit Vitals  BP (!) 130/94 (BP 1 Location: Left upper arm, BP Patient Position: Supine)   Pulse 93   Temp 97.8 °F (36.6 °C)   Resp 16   Ht 5' 6\" (1.676 m)   Wt 115 lb 1.3 oz (52.2 kg)   SpO2 99%   BMI 18.57 kg/m²         Data Review:       Recent Days:  Recent Labs     09/13/22  2347 09/13/22 1102 09/12/22 2243 09/11/22  1115   WBC  --  3.4* 6.0 7.5   HGB 6.1* 6.1* 7.0* 7.5*   HCT 19.9* 19.4* 22.3* 23.6*   PLT  --  358 429* 474*     Recent Labs     09/13/22  1102 09/12/22 2243 09/11/22  1115    128* 129*   K 3.8 Hemolyzed, recollect requested 4.7   * 99 96*   CO2 21 19* 22   GLU 85 99 107*   BUN 6 7 17   CREA 0.71 0.90 1.19*   CA 8.8 9.0 9.2   ALB 1.8* 1.9* 2.2*   TBILI 0.6 1.0 1.4*   ALT 22 28 23   INR  --  1.2*  --        24 Hour Results:  Recent Results (from the past 24 hour(s))   CBC WITH AUTOMATED DIFF    Collection Time: 09/13/22 11:02 AM   Result Value Ref Range    WBC 3.4 (L) 3.6 - 11.0 K/uL    RBC 2.32 (L) 3.80 - 5.20 M/uL    HGB 6.1 (L) 11.5 - 16.0 g/dL    HCT 19.4 (L) 35.0 - 47.0 %    MCV 83.6 80.0 - 99.0 FL    MCH 26.3 26.0 - 34.0 PG    MCHC 31.4 30.0 - 36.5 g/dL    RDW 14.8 (H) 11.5 - 14.5 %    PLATELET 746 812 - 764 K/uL    MPV 9.1 8.9 - 12.9 FL    NRBC 0.0 0.0  WBC    ABSOLUTE NRBC 0.00 0.00 - 0.01 K/uL    NEUTROPHILS 62 32 - 75 %    LYMPHOCYTES 28 12 - 49 %    MONOCYTES 9 5 - 13 %    EOSINOPHILS 0 0 - 7 %    BASOPHILS 0 0 - 1 %    IMMATURE GRANULOCYTES 1 (H) 0 - 0.5 %    ABS. NEUTROPHILS 2.1 1.8 - 8.0 K/UL    ABS. LYMPHOCYTES 1.0 0.8 - 3.5 K/UL    ABS. MONOCYTES 0.3 0.0 - 1.0 K/UL    ABS. EOSINOPHILS 0.0 0.0 - 0.4 K/UL    ABS. BASOPHILS 0.0 0.0 - 0.1 K/UL    ABS. IMM. GRANS. 0.0 0.00 - 0.04 K/UL    DF AUTOMATED     METABOLIC PANEL, COMPREHENSIVE    Collection Time: 09/13/22 11:02 AM   Result Value Ref Range    Sodium 138 136 - 145 mmol/L    Potassium 3.8 3.5 - 5.1 mmol/L    Chloride 110 (H) 97 - 108 mmol/L    CO2 21 21 - 32 mmol/L    Anion gap 7 5 - 15 mmol/L    Glucose 85 65 - 100 mg/dL    BUN 6 6 - 20 mg/dL    Creatinine 0.71 0.55 - 1.02 mg/dL    BUN/Creatinine ratio 8 (L) 12 - 20      GFR est AA >60 >60 ml/min/1.73m2    GFR est non-AA >60 >60 ml/min/1.73m2    Calcium 8.8 8.5 - 10.1 mg/dL    Bilirubin, total 0.6 0.2 - 1.0 mg/dL    AST (SGOT) 27 15 - 37 U/L    ALT (SGPT) 22 12 - 78 U/L    Alk.  phosphatase 64 45 - 117 U/L    Protein, total 8.8 (H) 6.4 - 8.2 g/dL    Albumin 1.8 (L) 3.5 - 5.0 g/dL    Globulin 7.0 (H) 2.0 - 4.0 g/dL    A-G Ratio 0.3 (L) 1.1 - 2.2     SED RATE (ESR)    Collection Time: 09/13/22 11:02 AM   Result Value Ref Range    Sed rate, automated 128 (H) 0 - 30 mm/hr   C REACTIVE PROTEIN, QT    Collection Time: 09/13/22 11:02 AM   Result Value Ref Range    C-Reactive protein 10.20 (H) 0.00 - 0.60 mg/dL   IRON PROFILE    Collection Time: 09/13/22 11:02 AM   Result Value Ref Range    Iron 35 35 - 150 ug/dL    TIBC 123 (L) 250 - 450 ug/dL    Iron % saturation 28 20 - 50 %   FERRITIN    Collection Time: 09/13/22 11:02 AM   Result Value Ref Range    Ferritin 832 (H) 26 - 388 ng/mL   PROCALCITONIN    Collection Time: 09/13/22 11:02 AM   Result Value Ref Range    Procalcitonin 0.19 (H) 0 ng/mL   HGB & HCT    Collection Time: 09/13/22 11:47 PM   Result Value Ref Range    HGB 6.1 (L) 11.5 - 16.0 g/dL    HCT 19.9 (L) 35.0 - 47.0 %         Assessment/     Patient Active Problem List   Diagnosis Code    History of blood clots Z86.718    Bladder cancer ([facility] [address] C67.9    Anemia due to acute blood loss D62    Hypercalcemia E83.52    Carcinoma of bladder (HCC) C67.9    Hydronephrosis of right kidney N13.30       Plan:    BLADDER MASS: bladder mass seen on CT scan with possible extension to the right ureter. Evaluation via cystoscopy, ureteroscopy, retrograde pyelogram, TURBT. Discussed with patient. Plan for add on 9/15/22. NPO at midnight. URETERAL TUMOR: bladder mass seen on CT scan with possible extension to the right ureter. Evaluation via cystoscopy, ureteroscopy, retrograde pyelogram, TURBT. Discussed with patient. Plan for add on 9/15/22. NPO at midnight. RIGHT HYDRONEPHROSIS: right hydronephrosis; possible tumor extension from bladder to ureter. Plan for TURBT and ureteroscopy. Stent if necessary. ANEMIA: may be related to malignancy. Transfuse as necessary. /prior to surgery on 9/15/22. HX OF DVT: IVC filter in place, no anticoagulation. Thrombosis in the gonadal vein noted on imaging. CONSTIPATION: reason for admission; managed by Primary team, bowel regimen. PLAN IS FOR SURGERY ON 9/15/22. She has no questions at this time. NPO AT MIDNIGHT. AM LABS.     Care Plan discussed with: Dr. Shea Roper, NP

## 2022-09-14 NOTE — PROGRESS NOTES
Physician Progress Note      Ty Chang  CSN #:                  [id number]  :                       1965  ADMIT DATE:       2022 10:23 PM  DISCH DATE:  RESPONDING  PROVIDER #:        JOSE LUIS BROWN MD          QUERY TEXT:    Pt admitted with Bladder cancer and has malnutrition documented. Please further specify type of malnutrition with documentation in the medical record. The medical record reflects the following:  Risk Factors: Bladder cancer, Anemia, hydronephrosis, constipation  Clinical Indicators: RD PN: \"Malnutrition Status: Moderate malnutrition. Mild body fat loss, Mild muscle mass loss, Mild weight loss. Pt endorses poor appetite and intakes (25%)\" BMI 18.56, ALB 1.8  Treatment: RD Consulted, Add prune juice and vanilla magic cups/day , monitor weight changes. ASPEN Criteria:  https://aspenjournals. onlinelibrary. bradley. com/doi/full/10.1177/1850636596872399    Thank HONG Navarro, RN, CDI Specialist  [email or web address] or [phone]  Options provided:  -- Moderate Malnutrition  -- Mild Malnutrition  -- Severe Malnutrition  -- Other - I will add my own diagnosis  -- Disagree - Not applicable / Not valid  -- Disagree - Clinically unable to determine / Unknown  -- Refer to Clinical Documentation Reviewer    PROVIDER RESPONSE TEXT:    This patient has moderate malnutrition.     Query created by: Nikunj Copeland on 2022 4:31 PM      Electronically signed by:  Cassandra Palmer MD 2022 7:14 AM

## 2022-09-14 NOTE — CONSULTS
Patient's chart reviewed.   Right gonadal vein thrombosis - consider consult to IR and not OBGYN at this time

## 2022-09-14 NOTE — PROGRESS NOTES
Patient has been doing well with no voiced complaints of any discomfort thus far into the shift. She has started blood transfusions as ordered mo adverse reactions noted.

## 2022-09-14 NOTE — PROGRESS NOTES
CM reviewed chart. Patient seen by Urology yesterday, 09/13/2022, with plans for procedure on 09/15/2022. Current discharge plans once medically stable are home self care.

## 2022-09-14 NOTE — PROGRESS NOTES
Hospitalist Progress Note    Subjective:   Daily Progress Note: 2022 4:31 PM    Patient still with abdominal pain which she feels is related to constipation. Current Facility-Administered Medications   Medication Dose Route Frequency    docusate sodium (COLACE) capsule 100 mg  100 mg Oral BID    bisacodyL (DULCOLAX) suppository 10 mg  10 mg Rectal DAILY    0.9% sodium chloride with KCl 20 mEq/L infusion   IntraVENous CONTINUOUS    lactulose (CHRONULAC) 10 gram/15 mL solution 30 mL  30 mL Oral TID    sodium chloride (NS) flush 5-10 mL  5-10 mL IntraVENous PRN    therapeutic multivitamin (THERAGRAN) tablet 1 Tablet  1 Tablet Oral DAILY    ferrous sulfate tablet 325 mg  325 mg Oral DAILY    sodium chloride (NS) flush 5-40 mL  5-40 mL IntraVENous Q8H    sodium chloride (NS) flush 5-40 mL  5-40 mL IntraVENous PRN    acetaminophen (TYLENOL) tablet 650 mg  650 mg Oral Q6H PRN    Or    acetaminophen (TYLENOL) suppository 650 mg  650 mg Rectal Q6H PRN    ondansetron (ZOFRAN ODT) tablet 4 mg  4 mg Oral Q6H PRN    Or    ondansetron (ZOFRAN) injection 4 mg  4 mg IntraVENous Q6H PRN    0.9% sodium chloride infusion 250 mL  250 mL IntraVENous PRN        Review of Systems  Review of Systems   Constitutional:  Positive for malaise/fatigue. Respiratory:  Negative for cough and shortness of breath. Cardiovascular:  Negative for chest pain and leg swelling. Gastrointestinal:  Positive for abdominal pain and constipation. Negative for nausea and vomiting. Genitourinary:  Positive for dysuria. Musculoskeletal: Negative. Skin: Negative. Neurological: Negative. Psychiatric/Behavioral: Negative.             Objective:     Visit Vitals  /86   Pulse 91   Temp 97.9 °F (36.6 °C)   Resp 16   Ht 5' 3\" (1.6 m)   Wt 53 kg (116 lb 12.8 oz)   LMP  (LMP Unknown)   SpO2 100%   BMI 20.69 kg/m²      O2 Device: None (Room air)    Temp (24hrs), Av.2 °F (36.8 °C), Min:97.8 °F (36.6 °C), Max:98.7 °F (37.1 °C)       0701 - 09/14 1900  In: 120 [P.O.:120]  Out: 200 [Urine:200]  09/12 1901 - 09/14 0700  In: 1714.8 [P.O.:240; I.V.:600]  Out: 1000 [Urine:1000]    Recent Results (from the past 24 hour(s))   HGB & HCT    Collection Time: 09/13/22 11:47 PM   Result Value Ref Range    HGB 6.1 (L) 11.5 - 16.0 g/dL    HCT 19.9 (L) 35.0 - 47.0 %   CBC W/O DIFF    Collection Time: 09/14/22  9:24 AM   Result Value Ref Range    WBC 7.2 3.6 - 11.0 K/uL    RBC 3.43 (L) 3.80 - 5.20 M/uL    HGB 9.1 (L) 11.5 - 16.0 g/dL    HCT 28.4 (L) 35.0 - 47.0 %    MCV 82.8 80.0 - 99.0 FL    MCH 26.5 26.0 - 34.0 PG    MCHC 32.0 30.0 - 36.5 g/dL    RDW 15.6 (H) 11.5 - 14.5 %    PLATELET 393 465 - 748 K/uL    MPV 9.4 8.9 - 12.9 FL    NRBC 0.0 0.0  WBC    ABSOLUTE NRBC 0.00 0.00 - 1.32 K/uL   METABOLIC PANEL, BASIC    Collection Time: 09/14/22  9:24 AM   Result Value Ref Range    Sodium 139 136 - 145 mmol/L    Potassium 3.4 (L) 3.5 - 5.1 mmol/L    Chloride 112 (H) 97 - 108 mmol/L    CO2 21 21 - 32 mmol/L    Anion gap 6 5 - 15 mmol/L    Glucose 117 (H) 65 - 100 mg/dL    BUN 5 (L) 6 - 20 mg/dL    Creatinine 0.72 0.55 - 1.02 mg/dL    BUN/Creatinine ratio 7 (L) 12 - 20      GFR est AA >60 >60 ml/min/1.73m2    GFR est non-AA >60 >60 ml/min/1.73m2    Calcium 8.9 8.5 - 10.1 mg/dL   VITAMIN B12 & FOLATE    Collection Time: 09/14/22  9:30 AM   Result Value Ref Range    Vitamin B12 444 193 - 986 pg/mL    Folate 20.8 5.0 - 21.0 ng/mL   LD    Collection Time: 09/14/22  9:30 AM   Result Value Ref Range     (H) 81 - 246 U/L   RETICULOCYTE COUNT    Collection Time: 09/14/22  9:30 AM   Result Value Ref Range    Reticulocyte count 0.9 0.7 - 2.1 %    Absolute Retic Cnt. 0.0313 0.0164 - 0.0776 M/ul        DUPLEX LOWER EXT VENOUS BILAT   Final Result      CT HEAD WO CONT   Final Result   No acute intracranial abnormality. XR CHEST PORT   Final Result   Clear lungs. CT ABD PELV W CONT   Final Result   1.  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 2. Possible extension into the distal right ureter. 3. Severe right hydronephrosis. 4. No justus metastases. 5. Right gonadal vein thrombosis. The findings were called to Dr. Gael Regan on 9/13/2022 at [address] hours by Dr. Myah Gonsalves. [facility]    (Results Pending)        PHYSICAL EXAM:    Physical Exam  Vitals reviewed. Constitutional:       General: She is not in acute distress. Appearance: She is not ill-appearing. HENT:      Head: Normocephalic and atraumatic. Mouth/Throat:      Pharynx: Oropharynx is clear. Eyes:      Conjunctiva/sclera: Conjunctivae normal.   Cardiovascular:      Rate and Rhythm: Normal rate and regular rhythm. Heart sounds: Normal heart sounds. Pulmonary:      Effort: Pulmonary effort is normal.      Breath sounds: Normal breath sounds. Abdominal:      General: Abdomen is flat. Bowel sounds are normal.      Palpations: Abdomen is soft. Tenderness: There is no right CVA tenderness or left CVA tenderness. Comments: Mild tenderness in the right lower left lower quadrant   Musculoskeletal:         General: Normal range of motion. Cervical back: Normal range of motion and neck supple. Skin:     General: Skin is warm and dry. Neurological:      General: No focal deficit present. Mental Status: She is alert and oriented to person, place, and time. Mental status is at baseline.    Psychiatric:         Mood and Affect: Mood normal.        Data Review    Recent Results (from the past 24 hour(s))   HGB & HCT    Collection Time: 09/13/22 11:47 PM   Result Value Ref Range    HGB 6.1 (L) 11.5 - 16.0 g/dL    HCT 19.9 (L) 35.0 - 47.0 %   CBC W/O DIFF    Collection Time: 09/14/22  9:24 AM   Result Value Ref Range    WBC 7.2 3.6 - 11.0 K/uL    RBC 3.43 (L) 3.80 - 5.20 M/uL    HGB 9.1 (L) 11.5 - 16.0 g/dL    HCT 28.4 (L) 35.0 - 47.0 %    MCV 82.8 80.0 - 99.0 FL    MCH 26.5 26.0 - 34.0 PG    MCHC 32.0 30.0 - 36.5 g/dL    RDW 15.6 (H) 11.5 - 14.5 %    PLATELET 362 020 - 819 K/uL    MPV 9.4 8.9 - 12.9 FL    NRBC 0.0 0.0  WBC    ABSOLUTE NRBC 0.00 0.00 - 1.35 K/uL   METABOLIC PANEL, BASIC    Collection Time: 09/14/22  9:24 AM   Result Value Ref Range    Sodium 139 136 - 145 mmol/L    Potassium 3.4 (L) 3.5 - 5.1 mmol/L    Chloride 112 (H) 97 - 108 mmol/L    CO2 21 21 - 32 mmol/L    Anion gap 6 5 - 15 mmol/L    Glucose 117 (H) 65 - 100 mg/dL    BUN 5 (L) 6 - 20 mg/dL    Creatinine 0.72 0.55 - 1.02 mg/dL    BUN/Creatinine ratio 7 (L) 12 - 20      GFR est AA >60 >60 ml/min/1.73m2    GFR est non-AA >60 >60 ml/min/1.73m2    Calcium 8.9 8.5 - 10.1 mg/dL   VITAMIN B12 & FOLATE    Collection Time: 09/14/22  9:30 AM   Result Value Ref Range    Vitamin B12 444 193 - 986 pg/mL    Folate 20.8 5.0 - 21.0 ng/mL   LD    Collection Time: 09/14/22  9:30 AM   Result Value Ref Range     (H) 81 - 246 U/L   RETICULOCYTE COUNT    Collection Time: 09/14/22  9:30 AM   Result Value Ref Range    Reticulocyte count 0.9 0.7 - 2.1 %    Absolute Retic Cnt. 0.0313 0.0164 - 0.0776 M/ul        Assessment/Plan:     Active Problems:    Bladder cancer ([address] (9/13/2022)      Anemia due to acute blood loss (9/13/2022)      Hypercalcemia (9/13/2022)      Carcinoma of bladder ([facility] [address] (9/13/2022)      Hydronephrosis of right kidney (9/13/2022)      Hospital course: This a 29-year-old female admitted on 9/12/2022 with a history of venous thrombosis although not on anticoagulation which is presumed secondary to bleeding, rheumatoid arthritis and recently discontinued medications who presented with abdominal pain and a feeling of constipation. CT scan of the abdomen pelvis was performed revealing sessile bladder carcinoma encasing the right urethrovesical junction with a possible extension in the right distal ureter. There was severe right hydronephrosis and right gonadal vein thrombosis.   Further review of the CT reveals cholelithiasis with proximal cystic duct encasement of these stones. There was obvious common bile duct distention. Total bilirubin is normal.  Ultrasound is pending. GI consultation. Hematology consultation and concern for possible monoclonal gammopathy and work-up has been instituted. Urology consultation, Dr. Kerby Lennox to perform cystoscopy with retrograde pyelograms and right ureteral stent placement as well as transurethral resection of bladder tumor and patient simply found to have a urinary tract infection with leukoesterase and nitrates and greater than 100 white cells per high-power field with urine culture showing gram-negative rods. In addition nephrology consultation and following along. Patient also found to have a hemoglobin of 6.1 most likely from acute bleeding although denies hematuria and stool occult is negative    Pression\plan:    1. Right sided hydronephrosis with presumed bladder cancer obstructing the distal UVJ. Urology consultation and plan cystoscopy with stent placement and tumor resection    2. Urinary tract infection  Urine culture gram-negative rods  Initiate meropenem as patient is allergic to penicillin and has excessive swelling consistent with anaphylaxis    3. Right gonadal vein thrombosis  Status post IVC filter placement  IR consultation for possible treatment modalities    4. Questionable monoclonal gammopathy  Hematology consultation and following along    5. JEROME  Resolving    6. Acute blood loss anemia  Transfuse for hemoglobin less than 7    7. Cholelithiasis with proximal neck stones and possible choledocholithiasis  Appears to be nonobstructing  Bilirubin normal  Gastrointestinal consultation  Ultrasound of the right upper quadrant    8.   Constipation  Continue lactulose  Enemas as needed    CODE STATUS: Full code    DVT prophylaxis: SCDs  Ulcer prophylaxis: Not indicated    Discharge barriers resolution of hydronephrosis, urinary tract infection GI consultation, possible surgical consultation    Discussed case with patient's family member and patient at [facility] discussed with: Patient/Family    Total time spent with patient: >35 minutes.

## 2022-09-14 NOTE — PROGRESS NOTES
Hematology and Oncology Progress Note    Patient: Sarah Camejo MRN: [id number]  SSN: [id number]    YOB: 1965  Age: 61 y.o. Sex: female      Admit Date: 9/12/2022    LOS: 1 day     Chief Complaint: Patient is complaining of abdominal discomfort and constipation    Subjective:     Patient is feeling tired. She has some abdominal discomfort and constipation. She has no mucosal bleeding. She has lost weight lately    Objective:     Vitals:    09/14/22 0245 09/14/22 0333 09/14/22 0400 09/14/22 0615   BP: 128/77 131/79 128/87 (!) 130/94   Pulse: 88 91 88 93   Resp: 17 16 18 16   Temp: 98.1 °F (36.7 °C) 98.1 °F (36.7 °C) 98.5 °F (36.9 °C) 97.8 °F (36.6 °C)   SpO2: 100% 100% 98% 99%   Weight:       Height:            Physical Exam:  Constitutional: Middle-aged -American female looks chronically ill. Eyes: Sclerae anicteric. Conjunctivae shows pallor. ENMT: Oral mucosa is moist, no thrush, mucositis, or petechiae. Neck: No adenopathy. Respiratory: Lungs are clear bilaterally. Cardiovascular: Normal sinus rhythm; no gallop or murmur. Abdomen: Soft, nontender, no hepatosplenomegaly. No guarding or rigidity. Bowel sounds present. Extremities: No edema. Skin: No petechiae; no skin rash. Neurologic: Alert/oriented x 3.     Lab/Data Review:    Recent Results (from the past 24 hour(s))   CBC WITH AUTOMATED DIFF    Collection Time: 09/13/22 11:02 AM   Result Value Ref Range    WBC 3.4 (L) 3.6 - 11.0 K/uL    RBC 2.32 (L) 3.80 - 5.20 M/uL    HGB 6.1 (L) 11.5 - 16.0 g/dL    HCT 19.4 (L) 35.0 - 47.0 %    MCV 83.6 80.0 - 99.0 FL    MCH 26.3 26.0 - 34.0 PG    MCHC 31.4 30.0 - 36.5 g/dL    RDW 14.8 (H) 11.5 - 14.5 %    PLATELET 485 108 - 227 K/uL    MPV 9.1 8.9 - 12.9 FL    NRBC 0.0 0.0  WBC    ABSOLUTE NRBC 0.00 0.00 - 0.01 K/uL    NEUTROPHILS 62 32 - 75 %    LYMPHOCYTES 28 12 - 49 %    MONOCYTES 9 5 - 13 %    EOSINOPHILS 0 0 - 7 %    BASOPHILS 0 0 - 1 %    IMMATURE GRANULOCYTES 1 (H) 0 - 0.5 %    ABS. NEUTROPHILS 2.1 1.8 - 8.0 K/UL    ABS. LYMPHOCYTES 1.0 0.8 - 3.5 K/UL    ABS. MONOCYTES 0.3 0.0 - 1.0 K/UL    ABS. EOSINOPHILS 0.0 0.0 - 0.4 K/UL    ABS. BASOPHILS 0.0 0.0 - 0.1 K/UL    ABS. IMM. GRANS. 0.0 0.00 - 0.04 K/UL    DF AUTOMATED     METABOLIC PANEL, COMPREHENSIVE    Collection Time: 09/13/22 11:02 AM   Result Value Ref Range    Sodium 138 136 - 145 mmol/L    Potassium 3.8 3.5 - 5.1 mmol/L    Chloride 110 (H) 97 - 108 mmol/L    CO2 21 21 - 32 mmol/L    Anion gap 7 5 - 15 mmol/L    Glucose 85 65 - 100 mg/dL    BUN 6 6 - 20 mg/dL    Creatinine 0.71 0.55 - 1.02 mg/dL    BUN/Creatinine ratio 8 (L) 12 - 20      GFR est AA >60 >60 ml/min/1.73m2    GFR est non-AA >60 >60 ml/min/1.73m2    Calcium 8.8 8.5 - 10.1 mg/dL    Bilirubin, total 0.6 0.2 - 1.0 mg/dL    AST (SGOT) 27 15 - 37 U/L    ALT (SGPT) 22 12 - 78 U/L    Alk. phosphatase 64 45 - 117 U/L    Protein, total 8.8 (H) 6.4 - 8.2 g/dL    Albumin 1.8 (L) 3.5 - 5.0 g/dL    Globulin 7.0 (H) 2.0 - 4.0 g/dL    A-G Ratio 0.3 (L) 1.1 - 2.2     SED RATE (ESR)    Collection Time: 09/13/22 11:02 AM   Result Value Ref Range    Sed rate, automated 128 (H) 0 - 30 mm/hr   C REACTIVE PROTEIN, QT    Collection Time: 09/13/22 11:02 AM   Result Value Ref Range    C-Reactive protein 10.20 (H) 0.00 - 0.60 mg/dL   IRON PROFILE    Collection Time: 09/13/22 11:02 AM   Result Value Ref Range    Iron 35 35 - 150 ug/dL    TIBC 123 (L) 250 - 450 ug/dL    Iron % saturation 28 20 - 50 %   FERRITIN    Collection Time: 09/13/22 11:02 AM   Result Value Ref Range    Ferritin 832 (H) 26 - 388 ng/mL   PROCALCITONIN    Collection Time: 09/13/22 11:02 AM   Result Value Ref Range    Procalcitonin 0.19 (H) 0 ng/mL   HGB & HCT    Collection Time: 09/13/22 11:47 PM   Result Value Ref Range    HGB 6.1 (L) 11.5 - 16.0 g/dL    HCT 19.9 (L) 35.0 - 47.0 %           Assessment and plan:     59-year-old -American female who came with abdominal pain and constipation.   She is found to have bladder mass and anemia. 1) bladder mass: Obviously there is a concern for possible bladder carcinoma. Patient will need further diagnostic procedure like cystoscopy to confirm the diagnosis. -Urology evaluation will be needed.  -Patient is scheduled for cystoscopy as per patient's nurse     2) anemia: May be multifactorial.  We will start anemia work-up. Patient has no obvious GI bleeding.  -Patient's hemoglobin is 9.1 on 14 September after receiving blood transfusion. Hemoglobin was 6.1 on 13 September.  -Patient's protein is somewhat high and albumin is low. In light of anemia obviously there is concern about possible monoclonal gammopathy so I have initiated work-up for that     3) possible gonadal vein thrombosis: May need evaluation by gynecology. Obviously because of her concern about possible malignancy which may be causing thrombosis. We will hold off anticoagulation until we have neurologic evaluation and recommendations.  -Patient's bilateral lower extremity Dopplers did not show any DVT of either lower extremities. I have discussed my recommendations with the patient. Case was discussed with attending physician Dr. Nel Kerr. This dictation was done by dragon, computer voice recognition software. Often unanticipated grammatical, syntax, phones and other interpretive errors are inadvertently transcribed. Please excuse errors that have escaped final proofreading.        Signed By: Sherryle Merl, MD     September 14, 2022

## 2022-09-14 NOTE — CONSULTS
NAME:  Renny Modi   :   1965   MRN:   [id number]     ATTENDING: Hussein Arteaga MD  PCP:  Lo Goldman MD    Date/Time:  2022 12:57 PM      Subjective:   REQUESTING PHYSICIAN: Dr. Thanh Kelleyr:   hydronephrosis     History of presenting illness: Patient is a 69-year-old -American female with past medical history of DVT, rheumatoid arthritis, history of anemia presented to the emergency room with complaints of abdominal discomfort and pain. CT scan of abdomen done in the emergency room showed a sessile bladder mass encasing the right UV junction with right-sided hydronephrosis. Patient seen at bedside, she is alert and awake, well-oriented. Reported some recent weight loss and feeling weak and tired. Initial labs done in the emergency room showed a BUN of 17, creatinine of 1.19, sodium of 129. Nephrology consultation is requested for further evaluation management. Patient received IV hydration with some improvement in renal functions. She is feeling better today, her abdominal pain has significantly improved. Improved creatinine from 1.19-0.72.    Past Medical History:   Diagnosis Date    Anemia     Back pain     DVT (deep venous thrombosis) (HCC)     and PE    Rheumatoid arthritis ([address]     Uterine fibroid       Past Surgical History:   Procedure Laterality Date    HX  SECTION       Social History     Tobacco Use    Smoking status: Former     Packs/day: 0.25     Years: 30.00     Pack years: 7.50     Types: Cigarettes    Smokeless tobacco: Never    Tobacco comments:     Quit 15 years ago   Substance Use Topics    Alcohol use: Yes     Comment: Occasional      Family History   Problem Relation Age of Onset    Lung Cancer Mother     Hypertension Sister     Breast Cancer Maternal Aunt        Allergies   Allergen Reactions    Bactrim [Sulfamethoprim] Swelling     Swelling of the lilps    Penicillin V Potassium Swelling      Prior to Admission medications    Medication Sig Start Date End Date Taking? Authorizing Provider   ferrous sulfate 325 mg (65 mg iron) tablet Take 325 mg by mouth daily. 9/11/22   Provider, Historical   lactulose (CHRONULAC) 10 gram/15 mL solution Take 30 mL by mouth two (2) times a day for 8 days. 9/11/22 9/19/22  Monico Henderson MD   bisacodyL (DULCOLAX) 10 mg supp Insert 10 mg into rectum daily as needed for Constipation. 9/11/22   Monico Henderson MD   Daily-Luan tablet TAKE 1 TABLET BY MOUTH EVERY DAY 7/15/22   John Rahman MD   Arthritis Pain Relief 650 mg TbER TAKE 1 TABLET BY MOUTH THREE (3) TIMES DAILY AS NEEDED FOR PAIN. 6/9/22   Khalif Styles MD   multivitamin with folic acid (One Daily Essential) 400 mcg tab tablet Take 1 Tablet by mouth daily. 3/4/22   Rose Mary Xie MD   cromolyn (OPTICROM) 4 % ophthalmic solution Administer 1 Drop to both eyes daily. 6/7/21   Provider, Historical     Current Facility-Administered Medications   Medication Dose Route Frequency    docusate sodium (COLACE) capsule 100 mg  100 mg Oral BID    bisacodyL (DULCOLAX) suppository 10 mg  10 mg Rectal DAILY    sodium chloride (NS) flush 5-10 mL  5-10 mL IntraVENous PRN    therapeutic multivitamin (THERAGRAN) tablet 1 Tablet  1 Tablet Oral DAILY    ferrous sulfate tablet 325 mg  325 mg Oral DAILY    sodium chloride (NS) flush 5-40 mL  5-40 mL IntraVENous Q8H    sodium chloride (NS) flush 5-40 mL  5-40 mL IntraVENous PRN    acetaminophen (TYLENOL) tablet 650 mg  650 mg Oral Q6H PRN    Or    acetaminophen (TYLENOL) suppository 650 mg  650 mg Rectal Q6H PRN    ondansetron (ZOFRAN ODT) tablet 4 mg  4 mg Oral Q6H PRN    Or    ondansetron (ZOFRAN) injection 4 mg  4 mg IntraVENous Q6H PRN    0.9% sodium chloride infusion  50 mL/hr IntraVENous CONTINUOUS    0.9% sodium chloride infusion 250 mL  250 mL IntraVENous PRN       REVIEW OF SYSTEMS:     Complaints as mentioned in the history of presenting illness.    No other significant complaints on complete review of systems. Objective:   VITALS:    Visit Vitals  BP (!) 130/94 (BP 1 Location: Left upper arm, BP Patient Position: Supine)   Pulse 93   Temp 97.8 °F (36.6 °C)   Resp 16   Ht 5' 6\" (1.676 m)   Wt 52.2 kg (115 lb 1.3 oz)   LMP  (LMP Unknown)   SpO2 99%   BMI 18.57 kg/m²     Temp (24hrs), Av.2 °F (36.8 °C), Min:97.8 °F (36.6 °C), Max:98.7 °F (37.1 °C)      PHYSICAL EXAM:   General: Thinly built 74-year-old -American female , alert awake well-oriented, no acute distress. HEENT: EOMI, no Icterus, no Pallor, pupils reactive, mucosa moist, normal inspection of ears and nose, throat clear. Neck: Neck is supple, No JVD, no thyromegaly,   Lungs: breathsounds normal, no respiratory distress on inspection, no rhonchi, no rales,  CVS: heart sounds normal, regular rate and rhythm, no murmurs, no rubs. GI: soft, nontender, normal BS, no palpable organomegaly, no renal angle tenderness. Extremeties: no clubbing, no cyanosis, no edema,  Neuro: Alert, awake, oriented x3, No new focal deficits, moving all extremeties well. Skin: normal skin turgor, no skin rashes or ulcerations. Musculoskeletal: no acute joint swellings, normal gait.     LAB DATA REVIEWED:    Recent Results (from the past 24 hour(s))   HGB & HCT    Collection Time: 22 11:47 PM   Result Value Ref Range    HGB 6.1 (L) 11.5 - 16.0 g/dL    HCT 19.9 (L) 35.0 - 47.0 %   CBC W/O DIFF    Collection Time: 22  9:24 AM   Result Value Ref Range    WBC 7.2 3.6 - 11.0 K/uL    RBC 3.43 (L) 3.80 - 5.20 M/uL    HGB 9.1 (L) 11.5 - 16.0 g/dL    HCT 28.4 (L) 35.0 - 47.0 %    MCV 82.8 80.0 - 99.0 FL    MCH 26.5 26.0 - 34.0 PG    MCHC 32.0 30.0 - 36.5 g/dL    RDW 15.6 (H) 11.5 - 14.5 %    PLATELET 513 218 - 443 K/uL    MPV 9.4 8.9 - 12.9 FL    NRBC 0.0 0.0  WBC    ABSOLUTE NRBC 0.00 0.00 - 6.67 K/uL   METABOLIC PANEL, BASIC    Collection Time: 22  9:24 AM   Result Value Ref Range    Sodium 139 136 - 145 mmol/L    Potassium 3.4 (L) 3.5 - 5.1 mmol/L    Chloride 112 (H) 97 - 108 mmol/L    CO2 21 21 - 32 mmol/L    Anion gap 6 5 - 15 mmol/L    Glucose 117 (H) 65 - 100 mg/dL    BUN 5 (L) 6 - 20 mg/dL    Creatinine 0.72 0.55 - 1.02 mg/dL    BUN/Creatinine ratio 7 (L) 12 - 20      GFR est AA >60 >60 ml/min/1.73m2    GFR est non-AA >60 >60 ml/min/1.73m2    Calcium 8.9 8.5 - 10.1 mg/dL   LD    Collection Time: 09/14/22  9:30 AM   Result Value Ref Range     (H) 81 - 246 U/L   RETICULOCYTE COUNT    Collection Time: 09/14/22  9:30 AM   Result Value Ref Range    Reticulocyte count 0.9 0.7 - 2.1 %    Absolute Retic Cnt. 0.0313 0.0164 - 0.0776 M/ul       Assessment and plan:     Acute kidney injury: Probably prerenal azotemia with poor p.o. intake/dehydration  Improved renal functions noted with IV hydration. Continue gentle IV hydration and continue to monitor renal functions    2. Right-sided hydronephrosis:   Probably related to ureteral obstruction with bladder mass with suspected extension into ureter. Patient was evaluated by urology and cystoscopy intervention planned    3. Hyponatremia: Probably related to poor salt intake and hypotonic fluid consumption  Improved sodium levels with IV normal saline    4. Hypokalemia: We will give p.o. potassium supplements  Repeated potassium and magnesium level tomorrow    5. Anemia/possible bladder cancer:  Oncology following the patient    Thank you for the consultation      Signed: Pratik Kay MD

## 2022-09-15 ENCOUNTER — ANESTHESIA (OUTPATIENT)
Dept: SURGERY | Age: 57
DRG: 446 | End: 2022-09-15
Payer: MEDICAID

## 2022-09-15 ENCOUNTER — ANESTHESIA EVENT (OUTPATIENT)
Dept: SURGERY | Age: 57
DRG: 446 | End: 2022-09-15
Payer: MEDICAID

## 2022-09-15 ENCOUNTER — APPOINTMENT (OUTPATIENT)
Dept: GENERAL RADIOLOGY | Age: 57
DRG: 446 | End: 2022-09-15
Attending: UROLOGY
Payer: MEDICAID

## 2022-09-15 LAB
ABO + RH BLD: NORMAL
ALBUMIN SERPL-MCNC: 2 G/DL (ref 3.5–5)
ANION GAP SERPL CALC-SCNC: 7 MMOL/L (ref 5–15)
BASOPHILS # BLD: 0 K/UL (ref 0–0.1)
BASOPHILS NFR BLD: 0 % (ref 0–1)
BLD PROD TYP BPU: NORMAL
BLD PROD TYP BPU: NORMAL
BLOOD GROUP ANTIBODIES SERPL: NEGATIVE
BPU ID: NORMAL
BPU ID: NORMAL
BUN SERPL-MCNC: 3 MG/DL (ref 6–20)
BUN/CREAT SERPL: 5 (ref 12–20)
CA-I BLD-MCNC: 9.2 MG/DL (ref 8.5–10.1)
CHLORIDE SERPL-SCNC: 116 MMOL/L (ref 97–108)
CO2 SERPL-SCNC: 17 MMOL/L (ref 21–32)
CREAT SERPL-MCNC: 0.6 MG/DL (ref 0.55–1.02)
CROSSMATCH RESULT,%XM: NORMAL
CROSSMATCH RESULT,%XM: NORMAL
DIFFERENTIAL METHOD BLD: ABNORMAL
EOSINOPHIL # BLD: 0 K/UL (ref 0–0.4)
EOSINOPHIL NFR BLD: 0 % (ref 0–7)
ERYTHROCYTE [DISTWIDTH] IN BLOOD BY AUTOMATED COUNT: 16.2 % (ref 11.5–14.5)
GLUCOSE SERPL-MCNC: 87 MG/DL (ref 65–100)
HCT VFR BLD AUTO: 26.1 % (ref 35–47)
HGB BLD-MCNC: 8.4 G/DL (ref 11.5–16)
IMM GRANULOCYTES # BLD AUTO: 0.1 K/UL (ref 0–0.04)
IMM GRANULOCYTES NFR BLD AUTO: 1 % (ref 0–0.5)
KAPPA LC FREE SER-MCNC: 139.9 MG/L (ref 3.3–19.4)
KAPPA LC FREE/LAMBDA FREE SER: 1.4 {RATIO} (ref 0.26–1.65)
LAMBDA LC FREE SERPL-MCNC: 99.7 MG/L (ref 5.7–26.3)
LYMPHOCYTES # BLD: 1.4 K/UL (ref 0.8–3.5)
LYMPHOCYTES NFR BLD: 26 % (ref 12–49)
MAGNESIUM SERPL-MCNC: 1.9 MG/DL (ref 1.6–2.4)
MCH RBC QN AUTO: 26.6 PG (ref 26–34)
MCHC RBC AUTO-ENTMCNC: 32.2 G/DL (ref 30–36.5)
MCV RBC AUTO: 82.6 FL (ref 80–99)
MONOCYTES # BLD: 0.4 K/UL (ref 0–1)
MONOCYTES NFR BLD: 7 % (ref 5–13)
NEUTS SEG # BLD: 3.6 K/UL (ref 1.8–8)
NEUTS SEG NFR BLD: 66 % (ref 32–75)
NRBC # BLD: 0 K/UL (ref 0–0.01)
NRBC BLD-RTO: 0 PER 100 WBC
PHOSPHATE SERPL-MCNC: 2.8 MG/DL (ref 2.6–4.7)
PLATELET # BLD AUTO: 384 K/UL (ref 150–400)
PMV BLD AUTO: 9 FL (ref 8.9–12.9)
POTASSIUM SERPL-SCNC: 4.2 MMOL/L (ref 3.5–5.1)
RBC # BLD AUTO: 3.16 M/UL (ref 3.8–5.2)
SODIUM SERPL-SCNC: 140 MMOL/L (ref 136–145)
SPECIMEN EXP DATE BLD: NORMAL
STATUS OF UNIT,%ST: NORMAL
STATUS OF UNIT,%ST: NORMAL
TRANSFUSION STATUS PATIENT QL: NORMAL
TRANSFUSION STATUS PATIENT QL: NORMAL
UNIT DIVISION, %UDIV: 0
UNIT DIVISION, %UDIV: 0
WBC # BLD AUTO: 5.5 K/UL (ref 3.6–11)

## 2022-09-15 PROCEDURE — 74011000258 HC RX REV CODE- 258: Performed by: PHYSICIAN ASSISTANT

## 2022-09-15 PROCEDURE — 77030005518 HC CATH URETH FOL 2W BARD -B: Performed by: UROLOGY

## 2022-09-15 PROCEDURE — 74011250636 HC RX REV CODE- 250/636: Performed by: PHYSICIAN ASSISTANT

## 2022-09-15 PROCEDURE — 76210000006 HC OR PH I REC 0.5 TO 1 HR: Performed by: UROLOGY

## 2022-09-15 PROCEDURE — 74011000250 HC RX REV CODE- 250: Performed by: HOSPITALIST

## 2022-09-15 PROCEDURE — 52332 CYSTOSCOPY AND TREATMENT: CPT | Performed by: UROLOGY

## 2022-09-15 PROCEDURE — 30233N1 TRANSFUSION OF NONAUTOLOGOUS RED BLOOD CELLS INTO PERIPHERAL VEIN, PERCUTANEOUS APPROACH: ICD-10-PCS | Performed by: UROLOGY

## 2022-09-15 PROCEDURE — 83735 ASSAY OF MAGNESIUM: CPT

## 2022-09-15 PROCEDURE — 85025 COMPLETE CBC W/AUTO DIFF WBC: CPT

## 2022-09-15 PROCEDURE — 74011250637 HC RX REV CODE- 250/637: Performed by: NURSE PRACTITIONER

## 2022-09-15 PROCEDURE — 74011000250 HC RX REV CODE- 250: Performed by: REGISTERED NURSE

## 2022-09-15 PROCEDURE — 76000 FLUOROSCOPY <1 HR PHYS/QHP: CPT

## 2022-09-15 PROCEDURE — 0TBB8ZZ EXCISION OF BLADDER, VIA NATURAL OR ARTIFICIAL OPENING ENDOSCOPIC: ICD-10-PCS | Performed by: UROLOGY

## 2022-09-15 PROCEDURE — 88307 TISSUE EXAM BY PATHOLOGIST: CPT

## 2022-09-15 PROCEDURE — 76060000033 HC ANESTHESIA 1 TO 1.5 HR: Performed by: UROLOGY

## 2022-09-15 PROCEDURE — 77030004561 HC CATH ANGI DX COBRA ANGI -B: Performed by: UROLOGY

## 2022-09-15 PROCEDURE — 77030007851 HC IRR CYSTO/TUR BSC -B: Performed by: UROLOGY

## 2022-09-15 PROCEDURE — 65270000029 HC RM PRIVATE

## 2022-09-15 PROCEDURE — 74011250636 HC RX REV CODE- 250/636: Performed by: REGISTERED NURSE

## 2022-09-15 PROCEDURE — 74011250637 HC RX REV CODE- 250/637: Performed by: HOSPITALIST

## 2022-09-15 PROCEDURE — 0T768DZ DILATION OF RIGHT URETER WITH INTRALUMINAL DEVICE, VIA NATURAL OR ARTIFICIAL OPENING ENDOSCOPIC: ICD-10-PCS | Performed by: UROLOGY

## 2022-09-15 PROCEDURE — 77030018706 HC CORD MPLR COVD -A: Performed by: UROLOGY

## 2022-09-15 PROCEDURE — 74420 UROGRAPHY RTRGR +-KUB: CPT | Performed by: UROLOGY

## 2022-09-15 PROCEDURE — 0T7D8ZZ DILATION OF URETHRA, VIA NATURAL OR ARTIFICIAL OPENING ENDOSCOPIC: ICD-10-PCS | Performed by: UROLOGY

## 2022-09-15 PROCEDURE — C1758 CATHETER, URETERAL: HCPCS | Performed by: UROLOGY

## 2022-09-15 PROCEDURE — 76010000161 HC OR TIME 1 TO 1.5 HR INTENSV-TIER 1: Performed by: UROLOGY

## 2022-09-15 PROCEDURE — 2709999900 HC NON-CHARGEABLE SUPPLY: Performed by: UROLOGY

## 2022-09-15 PROCEDURE — 74011000636 HC RX REV CODE- 636: Performed by: UROLOGY

## 2022-09-15 PROCEDURE — 80069 RENAL FUNCTION PANEL: CPT

## 2022-09-15 PROCEDURE — BT141ZZ FLUOROSCOPY OF KIDNEYS, URETERS AND BLADDER USING LOW OSMOLAR CONTRAST: ICD-10-PCS | Performed by: UROLOGY

## 2022-09-15 PROCEDURE — C1769 GUIDE WIRE: HCPCS | Performed by: UROLOGY

## 2022-09-15 PROCEDURE — C2617 STENT, NON-COR, TEM W/O DEL: HCPCS | Performed by: UROLOGY

## 2022-09-15 PROCEDURE — 36415 COLL VENOUS BLD VENIPUNCTURE: CPT

## 2022-09-15 DEVICE — URETERAL STENT
Type: IMPLANTABLE DEVICE | Site: URETER | Status: NON-FUNCTIONAL
Brand: PERCUFLEX™ PLUS
Removed: 2022-10-03

## 2022-09-15 RX ORDER — ONDANSETRON 2 MG/ML
4 INJECTION INTRAMUSCULAR; INTRAVENOUS AS NEEDED
Status: DISCONTINUED | OUTPATIENT
Start: 2022-09-15 | End: 2022-09-15 | Stop reason: HOSPADM

## 2022-09-15 RX ORDER — DIPHENHYDRAMINE HYDROCHLORIDE 50 MG/ML
12.5 INJECTION, SOLUTION INTRAMUSCULAR; INTRAVENOUS AS NEEDED
Status: DISCONTINUED | OUTPATIENT
Start: 2022-09-15 | End: 2022-09-15 | Stop reason: HOSPADM

## 2022-09-15 RX ORDER — DEXTROSE, SODIUM CHLORIDE, AND POTASSIUM CHLORIDE 5; .45; .15 G/100ML; G/100ML; G/100ML
75 INJECTION INTRAVENOUS CONTINUOUS
Status: DISCONTINUED | OUTPATIENT
Start: 2022-09-15 | End: 2022-09-17 | Stop reason: HOSPADM

## 2022-09-15 RX ORDER — DEXAMETHASONE SODIUM PHOSPHATE 4 MG/ML
INJECTION, SOLUTION INTRA-ARTICULAR; INTRALESIONAL; INTRAMUSCULAR; INTRAVENOUS; SOFT TISSUE AS NEEDED
Status: DISCONTINUED | OUTPATIENT
Start: 2022-09-15 | End: 2022-09-15 | Stop reason: HOSPADM

## 2022-09-15 RX ORDER — HYDROMORPHONE HYDROCHLORIDE 2 MG/1
1 TABLET ORAL
Status: DISCONTINUED | OUTPATIENT
Start: 2022-09-15 | End: 2022-09-15 | Stop reason: HOSPADM

## 2022-09-15 RX ORDER — HYDROMORPHONE HYDROCHLORIDE 1 MG/ML
INJECTION, SOLUTION INTRAMUSCULAR; INTRAVENOUS; SUBCUTANEOUS AS NEEDED
Status: DISCONTINUED | OUTPATIENT
Start: 2022-09-15 | End: 2022-09-15 | Stop reason: HOSPADM

## 2022-09-15 RX ORDER — OXYCODONE HYDROCHLORIDE 5 MG/1
5 TABLET ORAL
Status: DISCONTINUED | OUTPATIENT
Start: 2022-09-15 | End: 2022-09-15 | Stop reason: HOSPADM

## 2022-09-15 RX ORDER — POLYETHYLENE GLYCOL 3350 17 G/17G
17 POWDER, FOR SOLUTION ORAL DAILY
Status: DISCONTINUED | OUTPATIENT
Start: 2022-09-16 | End: 2022-09-17 | Stop reason: HOSPADM

## 2022-09-15 RX ORDER — HYDRALAZINE HYDROCHLORIDE 20 MG/ML
10 INJECTION INTRAMUSCULAR; INTRAVENOUS
Status: DISCONTINUED | OUTPATIENT
Start: 2022-09-15 | End: 2022-09-15 | Stop reason: HOSPADM

## 2022-09-15 RX ORDER — PROPOFOL 10 MG/ML
INJECTION, EMULSION INTRAVENOUS AS NEEDED
Status: DISCONTINUED | OUTPATIENT
Start: 2022-09-15 | End: 2022-09-15 | Stop reason: HOSPADM

## 2022-09-15 RX ORDER — MIDAZOLAM HYDROCHLORIDE 1 MG/ML
1 INJECTION, SOLUTION INTRAMUSCULAR; INTRAVENOUS AS NEEDED
Status: DISCONTINUED | OUTPATIENT
Start: 2022-09-15 | End: 2022-09-15 | Stop reason: HOSPADM

## 2022-09-15 RX ORDER — OXYCODONE AND ACETAMINOPHEN 5; 325 MG/1; MG/1
1 TABLET ORAL AS NEEDED
Status: DISCONTINUED | OUTPATIENT
Start: 2022-09-15 | End: 2022-09-15 | Stop reason: HOSPADM

## 2022-09-15 RX ORDER — NORETHINDRONE AND ETHINYL ESTRADIOL 0.5-0.035
5 KIT ORAL AS NEEDED
Status: DISCONTINUED | OUTPATIENT
Start: 2022-09-15 | End: 2022-09-15 | Stop reason: HOSPADM

## 2022-09-15 RX ORDER — HYDROMORPHONE HYDROCHLORIDE 1 MG/ML
0.5 INJECTION, SOLUTION INTRAMUSCULAR; INTRAVENOUS; SUBCUTANEOUS
Status: DISCONTINUED | OUTPATIENT
Start: 2022-09-15 | End: 2022-09-15 | Stop reason: HOSPADM

## 2022-09-15 RX ORDER — SODIUM CHLORIDE 0.9 % (FLUSH) 0.9 %
5-40 SYRINGE (ML) INJECTION EVERY 8 HOURS
Status: DISCONTINUED | OUTPATIENT
Start: 2022-09-15 | End: 2022-09-15 | Stop reason: HOSPADM

## 2022-09-15 RX ORDER — LIDOCAINE HYDROCHLORIDE 10 MG/ML
0.1 INJECTION, SOLUTION EPIDURAL; INFILTRATION; INTRACAUDAL; PERINEURAL AS NEEDED
Status: DISCONTINUED | OUTPATIENT
Start: 2022-09-15 | End: 2022-09-15 | Stop reason: HOSPADM

## 2022-09-15 RX ORDER — LABETALOL HCL 20 MG/4 ML
5 SYRINGE (ML) INTRAVENOUS
Status: DISCONTINUED | OUTPATIENT
Start: 2022-09-15 | End: 2022-09-15 | Stop reason: HOSPADM

## 2022-09-15 RX ORDER — HYDROCODONE BITARTRATE AND ACETAMINOPHEN 5; 325 MG/1; MG/1
1 TABLET ORAL
Status: DISCONTINUED | OUTPATIENT
Start: 2022-09-15 | End: 2022-09-15 | Stop reason: HOSPADM

## 2022-09-15 RX ORDER — FENTANYL CITRATE 50 UG/ML
50 INJECTION, SOLUTION INTRAMUSCULAR; INTRAVENOUS
Status: DISCONTINUED | OUTPATIENT
Start: 2022-09-15 | End: 2022-09-15 | Stop reason: HOSPADM

## 2022-09-15 RX ORDER — SODIUM CHLORIDE 0.9 % (FLUSH) 0.9 %
5-40 SYRINGE (ML) INJECTION AS NEEDED
Status: DISCONTINUED | OUTPATIENT
Start: 2022-09-15 | End: 2022-09-15 | Stop reason: HOSPADM

## 2022-09-15 RX ORDER — MIDAZOLAM HYDROCHLORIDE 1 MG/ML
0.5 INJECTION, SOLUTION INTRAMUSCULAR; INTRAVENOUS
Status: DISCONTINUED | OUTPATIENT
Start: 2022-09-15 | End: 2022-09-15 | Stop reason: HOSPADM

## 2022-09-15 RX ORDER — SODIUM CHLORIDE, SODIUM LACTATE, POTASSIUM CHLORIDE, CALCIUM CHLORIDE 600; 310; 30; 20 MG/100ML; MG/100ML; MG/100ML; MG/100ML
INJECTION, SOLUTION INTRAVENOUS
Status: DISCONTINUED | OUTPATIENT
Start: 2022-09-15 | End: 2022-09-15 | Stop reason: HOSPADM

## 2022-09-15 RX ORDER — OXYCODONE AND ACETAMINOPHEN 5; 325 MG/1; MG/1
1 TABLET ORAL
Status: DISCONTINUED | OUTPATIENT
Start: 2022-09-15 | End: 2022-09-15 | Stop reason: HOSPADM

## 2022-09-15 RX ORDER — FENTANYL CITRATE 50 UG/ML
50 INJECTION, SOLUTION INTRAMUSCULAR; INTRAVENOUS AS NEEDED
Status: DISCONTINUED | OUTPATIENT
Start: 2022-09-15 | End: 2022-09-15 | Stop reason: HOSPADM

## 2022-09-15 RX ORDER — MORPHINE SULFATE 2 MG/ML
2 INJECTION, SOLUTION INTRAMUSCULAR; INTRAVENOUS
Status: DISCONTINUED | OUTPATIENT
Start: 2022-09-15 | End: 2022-09-15 | Stop reason: HOSPADM

## 2022-09-15 RX ORDER — MORPHINE SULFATE 15 MG/1
15 TABLET ORAL
Status: DISCONTINUED | OUTPATIENT
Start: 2022-09-15 | End: 2022-09-15 | Stop reason: HOSPADM

## 2022-09-15 RX ORDER — METOPROLOL TARTRATE 5 MG/5ML
2.5 INJECTION INTRAVENOUS
Status: DISCONTINUED | OUTPATIENT
Start: 2022-09-15 | End: 2022-09-15 | Stop reason: HOSPADM

## 2022-09-15 RX ORDER — LIDOCAINE HYDROCHLORIDE 20 MG/ML
INJECTION, SOLUTION EPIDURAL; INFILTRATION; INTRACAUDAL; PERINEURAL AS NEEDED
Status: DISCONTINUED | OUTPATIENT
Start: 2022-09-15 | End: 2022-09-15 | Stop reason: HOSPADM

## 2022-09-15 RX ORDER — MIDAZOLAM HYDROCHLORIDE 1 MG/ML
INJECTION, SOLUTION INTRAMUSCULAR; INTRAVENOUS AS NEEDED
Status: DISCONTINUED | OUTPATIENT
Start: 2022-09-15 | End: 2022-09-15 | Stop reason: HOSPADM

## 2022-09-15 RX ORDER — ONDANSETRON 2 MG/ML
INJECTION INTRAMUSCULAR; INTRAVENOUS AS NEEDED
Status: DISCONTINUED | OUTPATIENT
Start: 2022-09-15 | End: 2022-09-15 | Stop reason: HOSPADM

## 2022-09-15 RX ADMIN — ONDANSETRON 4 MG: 2 INJECTION INTRAMUSCULAR; INTRAVENOUS at 16:59

## 2022-09-15 RX ADMIN — HYDROMORPHONE HYDROCHLORIDE 0.5 MG: 1 INJECTION, SOLUTION INTRAMUSCULAR; INTRAVENOUS; SUBCUTANEOUS at 17:26

## 2022-09-15 RX ADMIN — PHENYLEPHRINE HYDROCHLORIDE 100 MCG: 10 INJECTION INTRAVENOUS at 17:16

## 2022-09-15 RX ADMIN — SODIUM CHLORIDE, POTASSIUM CHLORIDE, SODIUM LACTATE AND CALCIUM CHLORIDE: 600; 310; 30; 20 INJECTION, SOLUTION INTRAVENOUS at 16:39

## 2022-09-15 RX ADMIN — PHENYLEPHRINE HYDROCHLORIDE 100 MCG: 10 INJECTION INTRAVENOUS at 17:06

## 2022-09-15 RX ADMIN — MIDAZOLAM HYDROCHLORIDE 2 MG: 2 INJECTION, SOLUTION INTRAMUSCULAR; INTRAVENOUS at 16:40

## 2022-09-15 RX ADMIN — POTASSIUM CHLORIDE, DEXTROSE MONOHYDRATE AND SODIUM CHLORIDE 75 ML/HR: 150; 5; 450 INJECTION, SOLUTION INTRAVENOUS at 09:34

## 2022-09-15 RX ADMIN — PROPOFOL 110 MG: 10 INJECTION, EMULSION INTRAVENOUS at 16:50

## 2022-09-15 RX ADMIN — PHENYLEPHRINE HYDROCHLORIDE 100 MCG: 10 INJECTION INTRAVENOUS at 17:44

## 2022-09-15 RX ADMIN — PROPOFOL 60 MG: 10 INJECTION, EMULSION INTRAVENOUS at 17:26

## 2022-09-15 RX ADMIN — MEROPENEM 1 G: 1 INJECTION, POWDER, FOR SOLUTION INTRAVENOUS at 02:00

## 2022-09-15 RX ADMIN — PHENYLEPHRINE HYDROCHLORIDE 100 MCG: 10 INJECTION INTRAVENOUS at 17:35

## 2022-09-15 RX ADMIN — PHENYLEPHRINE HYDROCHLORIDE 100 MCG: 10 INJECTION INTRAVENOUS at 17:30

## 2022-09-15 RX ADMIN — MEROPENEM 1 G: 1 INJECTION, POWDER, FOR SOLUTION INTRAVENOUS at 09:34

## 2022-09-15 RX ADMIN — THERA TABS 1 TABLET: TAB at 09:34

## 2022-09-15 RX ADMIN — ACETAMINOPHEN 650 MG: 325 TABLET, FILM COATED ORAL at 20:05

## 2022-09-15 RX ADMIN — SODIUM CHLORIDE, PRESERVATIVE FREE 10 ML: 5 INJECTION INTRAVENOUS at 14:25

## 2022-09-15 RX ADMIN — DOCUSATE SODIUM 100 MG: 100 CAPSULE, LIQUID FILLED ORAL at 02:24

## 2022-09-15 RX ADMIN — LIDOCAINE HYDROCHLORIDE 50 MG: 20 INJECTION, SOLUTION EPIDURAL; INFILTRATION; INTRACAUDAL; PERINEURAL at 16:50

## 2022-09-15 RX ADMIN — PROPOFOL 40 MG: 10 INJECTION, EMULSION INTRAVENOUS at 17:20

## 2022-09-15 RX ADMIN — DEXAMETHASONE SODIUM PHOSPHATE 4 MG: 4 INJECTION, SOLUTION INTRA-ARTICULAR; INTRALESIONAL; INTRAMUSCULAR; INTRAVENOUS; SOFT TISSUE at 16:59

## 2022-09-15 RX ADMIN — MEROPENEM 1 G: 1 INJECTION, POWDER, FOR SOLUTION INTRAVENOUS at 19:04

## 2022-09-15 RX ADMIN — SODIUM CHLORIDE, PRESERVATIVE FREE 10 ML: 5 INJECTION INTRAVENOUS at 22:00

## 2022-09-15 RX ADMIN — SODIUM CHLORIDE, PRESERVATIVE FREE 10 ML: 5 INJECTION INTRAVENOUS at 06:37

## 2022-09-15 RX ADMIN — FERROUS SULFATE TAB 325 MG (65 MG ELEMENTAL FE) 325 MG: 325 (65 FE) TAB at 09:34

## 2022-09-15 RX ADMIN — SODIUM CHLORIDE, PRESERVATIVE FREE 10 ML: 5 INJECTION INTRAVENOUS at 06:31

## 2022-09-15 NOTE — PROGRESS NOTES
Progress Note    Patient: Irma Cisneros MRN: [id number]  SSN: [id number]    YOB: 1965  Age: 61 y.o. Sex: female      Admit Date: 9/12/2022    LOS: 2 days     Subjective:   GI in consult for constipation, possible blood loss    9/15: Patient seen resting comfortably she is scheduled for cystoscopy today. She reports her abdominal pain is improved. She states she has multiple bowel movements today. Will discontinue the suppository. History of Present Illness: Irma Cisneros is a 61 y.o. female who is seen in consultation for constipation, possible blood loss. Ms. Cain Cam seen on medical floor she reports she was given and enema in the ED and did have a normal soft stool. She denies black, tarry stool, denies hematemesis. She denies nausea, vomiting, and diarrhea. She does report constipation. Prior to admission she did not have a bowel movement in a week. She had a KUB on 9/11 showing nonobstructive bowel gas pattern without evidence of free air, cholelithiasis. She reports she has never had a colonoscopy. She reports she has a past medical history significant for uterine fibroids, rheumatoid arthritis, sciatic, DVT with IVC filter placement, and anemia. She is not taking anticoagulation. She states she has had blood transfusions in the past. Her hgB on 9/12 was 7.0 and decreased to 6.1 on 9/13. Her stool for occult on 9/13 is negative. HgB this morning is 9.1. 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 Urology consulted and plan for cystoscopy, transurethral resection of bladder tumor planned for 9/15/22. Hematology following for anemia. CT of abdomen shows numerous tiny stones in the gallbladder and proximal cystic duct. Anemia may be associated with malignancy, no obvious GI bleed. Ms. Cain Cam does have dulcolax suppository daily, along with colace and lactulose. Will follow patient.       CT abdomen 9/13/22: IMPRESSION  1. 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 2. Possible extension into the distal right ureter. 3. Severe right hydronephrosis. 4. No justus metastases. 5. Right gonadal vein thrombosis. Objective:     Vitals:    09/14/22 2029 09/15/22 0935 09/15/22 1543 09/15/22 1756   BP:  (!) 141/88 130/83 135/82   Pulse:  87 91 76   Resp:  18 18 15   Temp:  97.6 °F (36.4 °C) 98.4 °F (36.9 °C) 97.3 °F (36.3 °C)   SpO2: 100% 100% 100% 100%   Weight:       Height:            Intake and Output:  Current Shift: 09/15 0701 - 09/15 1900  In: 400 [I.V.:400]  Out: -   Last three shifts: 09/13 1901 - 09/15 0700  In: 2095.8 [P.O.:360; I.V.:861]  Out: 1000 [Urine:1000]    Physical Exam:   Skin:  Extremities and face reveal no rashes. No rivers erythema. HEENT: Sclerae anicteric. Extra-occular muscles are intact. No abnormal pigmentation of the lips. The neck is supple. Cardiovascular: Regular rate and rhythm. Respiratory:  Comfortable breathing with no accessory muscle use. GI:  Abdomen nondistended, soft, and nontender. No enlargement of the liver or spleen. No masses palpable. Rectal:  Deferred  Musculoskeletal: Extremities have good range of motion. Neurological:  Gross memory appears intact. Patient is alert and oriented. Psychiatric:  Mood appears appropriate with judgement intact.   Lymphatic:  No visible adenopathy      Lab/Data Review:  Recent Results (from the past 24 hour(s))   CBC WITH AUTOMATED DIFF    Collection Time: 09/15/22  8:04 AM   Result Value Ref Range    WBC 5.5 3.6 - 11.0 K/uL    RBC 3.16 (L) 3.80 - 5.20 M/uL    HGB 8.4 (L) 11.5 - 16.0 g/dL    HCT 26.1 (L) 35.0 - 47.0 %    MCV 82.6 80.0 - 99.0 FL    MCH 26.6 26.0 - 34.0 PG    MCHC 32.2 30.0 - 36.5 g/dL    RDW 16.2 (H) 11.5 - 14.5 %    PLATELET 781 600 - 299 K/uL    MPV 9.0 8.9 - 12.9 FL    NRBC 0.0 0.0  WBC    ABSOLUTE NRBC 0.00 0.00 - 0.01 K/uL    NEUTROPHILS 66 32 - 75 %    LYMPHOCYTES 26 12 - 49 %    MONOCYTES 7 5 - 13 %    EOSINOPHILS 0 0 - 7 %    BASOPHILS 0 0 - 1 %    IMMATURE GRANULOCYTES 1 (H) 0 - 0.5 %    ABS. NEUTROPHILS 3.6 1.8 - 8.0 K/UL    ABS. LYMPHOCYTES 1.4 0.8 - 3.5 K/UL    ABS. MONOCYTES 0.4 0.0 - 1.0 K/UL    ABS. EOSINOPHILS 0.0 0.0 - 0.4 K/UL    ABS. BASOPHILS 0.0 0.0 - 0.1 K/UL    ABS. IMM. GRANS. 0.1 (H) 0.00 - 0.04 K/UL    DF AUTOMATED     RENAL FUNCTION PANEL    Collection Time: 09/15/22  8:04 AM   Result Value Ref Range    Sodium 140 136 - 145 mmol/L    Potassium 4.2 3.5 - 5.1 mmol/L    Chloride 116 (H) 97 - 108 mmol/L    CO2 17 (L) 21 - 32 mmol/L    Anion gap 7 5 - 15 mmol/L    Glucose 87 65 - 100 mg/dL    BUN 3 (L) 6 - 20 mg/dL    Creatinine 0.60 0.55 - 1.02 mg/dL    BUN/Creatinine ratio 5 (L) 12 - 20      GFR est AA >60 >60 ml/min/1.73m2    GFR est non-AA >60 >60 ml/min/1.73m2    Calcium 9.2 8.5 - 10.1 mg/dL    Phosphorus 2.8 2.6 - 4.7 mg/dL    Albumin 2.0 (L) 3.5 - 5.0 g/dL   MAGNESIUM    Collection Time: 09/15/22  8:04 AM   Result Value Ref Range    Magnesium 1.9 1.6 - 2.4 mg/dL              US ABD LTD   Final Result   1. Gallstones. No ductal dilatation   2. Severe right-sided hydronephrosis      DUPLEX LOWER EXT VENOUS BILAT   Final Result      CT HEAD WO CONT   Final Result   No acute intracranial abnormality. XR CHEST PORT   Final Result   Clear lungs. CT ABD PELV W CONT   Final Result   1. 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 2. Possible extension into the distal right ureter. 3. Severe right hydronephrosis. 4. No justus metastases. 5. Right gonadal vein thrombosis. The findings were called to Dr. Doron Castro on 9/13/2022 at [address] hours by Dr. Tata Draper.   789      XR FLUOROSCOPY UNDER 60 MINUTES    (Results Pending)       Assessment:     Active Problems:    Bladder cancer ([facility] (9/13/2022)      Anemia due to acute blood loss (9/13/2022)      Hypercalcemia (9/13/2022)      Carcinoma of bladder ([facility] (9/13/2022)      Hydronephrosis of right kidney (9/13/2022)        Plan:   Constipation (Resolved)      Colace daily      Miralax daily      High fiber diet  2. Anemia possibly 2/2 malignancy      Hx of anemia with hx of blood transfusions     Stool for occult negative     Transfuse for hgB less than 7     Iron 35, TIBC 123, Iron % 28     Folate 20.8, Vitamin B12 444     Monitor H&H     S/P blood transfusion on 9/12/22     Hematology input appreciated  3. Possible Bladder cancer      Urology following       Cystoscopy with stent placement and tumor resection planned for 9/15/22        Thank you for allowing me to participate in this patients care. Plan discussed with Dr. Quincy More and he approves.     Signed By: Trevon Youssef NP     September 15, 2022

## 2022-09-15 NOTE — PROGRESS NOTES
Hospitalist Progress Note    Subjective:   Daily Progress Note: 9/15/2022 4:31 PM    Multiple bowel movements overnight. Scheduled for cystoscopy later today    Current Facility-Administered Medications   Medication Dose Route Frequency    dextrose 5% - 0.45% NaCl with KCl 20 mEq/L infusion  75 mL/hr IntraVENous CONTINUOUS    docusate sodium (COLACE) capsule 100 mg  100 mg Oral BID    bisacodyL (DULCOLAX) suppository 10 mg  10 mg Rectal DAILY    meropenem (MERREM) 1 g in 0.9% sodium chloride (MBP/ADV) 50 mL MBP  1 g IntraVENous Q8H    sodium chloride (NS) flush 5-10 mL  5-10 mL IntraVENous PRN    therapeutic multivitamin (THERAGRAN) tablet 1 Tablet  1 Tablet Oral DAILY    ferrous sulfate tablet 325 mg  325 mg Oral DAILY    sodium chloride (NS) flush 5-40 mL  5-40 mL IntraVENous Q8H    sodium chloride (NS) flush 5-40 mL  5-40 mL IntraVENous PRN    acetaminophen (TYLENOL) tablet 650 mg  650 mg Oral Q6H PRN    Or    acetaminophen (TYLENOL) suppository 650 mg  650 mg Rectal Q6H PRN    ondansetron (ZOFRAN ODT) tablet 4 mg  4 mg Oral Q6H PRN    Or    ondansetron (ZOFRAN) injection 4 mg  4 mg IntraVENous Q6H PRN    0.9% sodium chloride infusion 250 mL  250 mL IntraVENous PRN        Review of Systems  Review of Systems   Constitutional:  Positive for malaise/fatigue. Respiratory:  Negative for cough and shortness of breath. Cardiovascular:  Negative for chest pain and leg swelling. Gastrointestinal:  Positive for abdominal pain and constipation. Negative for nausea and vomiting. Genitourinary:  Positive for dysuria. Musculoskeletal: Negative. Skin: Negative. Neurological: Negative. Psychiatric/Behavioral: Negative. Objective:     Visit Vitals  BP (!) 141/88 (BP 1 Location: Left upper arm, BP Patient Position: Supine; At rest)   Pulse 87   Temp 97.6 °F (36.4 °C)   Resp 18   Ht 5' 3\" (1.6 m)   Wt 53 kg (116 lb 12.8 oz)   LMP  (LMP Unknown)   SpO2 100%   BMI 20.69 kg/m²      O2 Device: None (Room air)    Temp (24hrs), Av.1 °F (36.7 °C), Min:97.6 °F (36.4 °C), Max:98.5 °F (36.9 °C)      No intake/output data recorded.  1901 - 09/15 0700  In: 2095.8 [P.O.:360; I.V.:861]  Out: 1000 [Urine:1000]    Recent Results (from the past 24 hour(s))   CBC WITH AUTOMATED DIFF    Collection Time: 09/15/22  8:04 AM   Result Value Ref Range    WBC 5.5 3.6 - 11.0 K/uL    RBC 3.16 (L) 3.80 - 5.20 M/uL    HGB 8.4 (L) 11.5 - 16.0 g/dL    HCT 26.1 (L) 35.0 - 47.0 %    MCV 82.6 80.0 - 99.0 FL    MCH 26.6 26.0 - 34.0 PG    MCHC 32.2 30.0 - 36.5 g/dL    RDW 16.2 (H) 11.5 - 14.5 %    PLATELET 437 683 - 712 K/uL    MPV 9.0 8.9 - 12.9 FL    NRBC 0.0 0.0  WBC    ABSOLUTE NRBC 0.00 0.00 - 0.01 K/uL    NEUTROPHILS 66 32 - 75 %    LYMPHOCYTES 26 12 - 49 %    MONOCYTES 7 5 - 13 %    EOSINOPHILS 0 0 - 7 %    BASOPHILS 0 0 - 1 %    IMMATURE GRANULOCYTES 1 (H) 0 - 0.5 %    ABS. NEUTROPHILS 3.6 1.8 - 8.0 K/UL    ABS. LYMPHOCYTES 1.4 0.8 - 3.5 K/UL    ABS. MONOCYTES 0.4 0.0 - 1.0 K/UL    ABS. EOSINOPHILS 0.0 0.0 - 0.4 K/UL    ABS. BASOPHILS 0.0 0.0 - 0.1 K/UL    ABS. IMM. GRANS. 0.1 (H) 0.00 - 0.04 K/UL    DF AUTOMATED     RENAL FUNCTION PANEL    Collection Time: 09/15/22  8:04 AM   Result Value Ref Range    Sodium 140 136 - 145 mmol/L    Potassium 4.2 3.5 - 5.1 mmol/L    Chloride 116 (H) 97 - 108 mmol/L    CO2 17 (L) 21 - 32 mmol/L    Anion gap 7 5 - 15 mmol/L    Glucose 87 65 - 100 mg/dL    BUN 3 (L) 6 - 20 mg/dL    Creatinine 0.60 0.55 - 1.02 mg/dL    BUN/Creatinine ratio 5 (L) 12 - 20      GFR est AA >60 >60 ml/min/1.73m2    GFR est non-AA >60 >60 ml/min/1.73m2    Calcium 9.2 8.5 - 10.1 mg/dL    Phosphorus 2.8 2.6 - 4.7 mg/dL    Albumin 2.0 (L) 3.5 - 5.0 g/dL   MAGNESIUM    Collection Time: 09/15/22  8:04 AM   Result Value Ref Range    Magnesium 1.9 1.6 - 2.4 mg/dL        US ABD LTD   Final Result   1. Gallstones. No ductal dilatation   2.  Severe right-sided hydronephrosis      DUPLEX LOWER EXT VENOUS BILAT   Final Result      CT HEAD WO CONT   Final Result   No acute intracranial abnormality. XR CHEST PORT   Final Result   Clear lungs. CT ABD PELV W CONT   Final Result   1. 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 2. Possible extension into the distal right ureter. 3. Severe right hydronephrosis. 4. No justus metastases. 5. Right gonadal vein thrombosis. The findings were called to Dr. Kristen Low on 9/13/2022 at [address] hours by Dr. Becca Escobar. Farhan Erazo 1944:    Physical Exam  Vitals reviewed. Constitutional:       General: She is not in acute distress. Appearance: She is not ill-appearing. HENT:      Head: Normocephalic and atraumatic. Mouth/Throat:      Pharynx: Oropharynx is clear. Eyes:      Conjunctiva/sclera: Conjunctivae normal.   Cardiovascular:      Rate and Rhythm: Normal rate and regular rhythm. Heart sounds: Normal heart sounds. Pulmonary:      Effort: Pulmonary effort is normal.      Breath sounds: Normal breath sounds. Abdominal:      General: Abdomen is flat. Bowel sounds are normal.      Palpations: Abdomen is soft. Tenderness: There is no right CVA tenderness or left CVA tenderness. Comments: Mild tenderness in the right lower left lower quadrant   Musculoskeletal:         General: Normal range of motion. Cervical back: Normal range of motion and neck supple. Skin:     General: Skin is warm and dry. Neurological:      General: No focal deficit present. Mental Status: She is alert and oriented to person, place, and time. Mental status is at baseline.    Psychiatric:         Mood and Affect: Mood normal.        Data Review    Recent Results (from the past 24 hour(s))   CBC WITH AUTOMATED DIFF    Collection Time: 09/15/22  8:04 AM   Result Value Ref Range    WBC 5.5 3.6 - 11.0 K/uL    RBC 3.16 (L) 3.80 - 5.20 M/uL    HGB 8.4 (L) 11.5 - 16.0 g/dL    HCT 26.1 (L) 35.0 - 47.0 %    MCV 82.6 80.0 - 99.0 FL    MCH 26.6 26.0 - 34.0 PG    MCHC 32.2 30.0 - 36.5 g/dL    RDW 16.2 (H) 11.5 - 14.5 %    PLATELET 438 689 - 772 K/uL    MPV 9.0 8.9 - 12.9 FL    NRBC 0.0 0.0  WBC    ABSOLUTE NRBC 0.00 0.00 - 0.01 K/uL    NEUTROPHILS 66 32 - 75 %    LYMPHOCYTES 26 12 - 49 %    MONOCYTES 7 5 - 13 %    EOSINOPHILS 0 0 - 7 %    BASOPHILS 0 0 - 1 %    IMMATURE GRANULOCYTES 1 (H) 0 - 0.5 %    ABS. NEUTROPHILS 3.6 1.8 - 8.0 K/UL    ABS. LYMPHOCYTES 1.4 0.8 - 3.5 K/UL    ABS. MONOCYTES 0.4 0.0 - 1.0 K/UL    ABS. EOSINOPHILS 0.0 0.0 - 0.4 K/UL    ABS. BASOPHILS 0.0 0.0 - 0.1 K/UL    ABS. IMM. GRANS. 0.1 (H) 0.00 - 0.04 K/UL    DF AUTOMATED     RENAL FUNCTION PANEL    Collection Time: 09/15/22  8:04 AM   Result Value Ref Range    Sodium 140 136 - 145 mmol/L    Potassium 4.2 3.5 - 5.1 mmol/L    Chloride 116 (H) 97 - 108 mmol/L    CO2 17 (L) 21 - 32 mmol/L    Anion gap 7 5 - 15 mmol/L    Glucose 87 65 - 100 mg/dL    BUN 3 (L) 6 - 20 mg/dL    Creatinine 0.60 0.55 - 1.02 mg/dL    BUN/Creatinine ratio 5 (L) 12 - 20      GFR est AA >60 >60 ml/min/1.73m2    GFR est non-AA >60 >60 ml/min/1.73m2    Calcium 9.2 8.5 - 10.1 mg/dL    Phosphorus 2.8 2.6 - 4.7 mg/dL    Albumin 2.0 (L) 3.5 - 5.0 g/dL   MAGNESIUM    Collection Time: 09/15/22  8:04 AM   Result Value Ref Range    Magnesium 1.9 1.6 - 2.4 mg/dL        Assessment/Plan:     Active Problems:    Bladder cancer (HCC) (9/13/2022)      Anemia due to acute blood loss (9/13/2022)      Hypercalcemia (9/13/2022)      Carcinoma of bladder ([address] (9/13/2022)      Hydronephrosis of right kidney (9/13/2022)    Hospital course: This a 42-year-old female admitted on 9/12/2022 with a history of venous thrombosis although not on anticoagulation which is presumed secondary to bleeding, rheumatoid arthritis and recently discontinued medications who presented with abdominal pain and a feeling of constipation.   CT scan of the abdomen pelvis was performed revealing sessile bladder carcinoma encasing the right urethrovesical junction with a possible extension in the right distal ureter. There was severe right hydronephrosis and right gonadal vein thrombosis. Further review of the CT reveals cholelithiasis with proximal cystic duct encasement of these stones. There was obvious common bile duct distention. Total bilirubin is normal.  Ultrasound is pending. GI consultation. Hematology consultation and concern for possible monoclonal gammopathy and work-up has been instituted. Urology consultation, Dr. Stroud Learn to perform cystoscopy with retrograde pyelograms and right ureteral stent placement as well as transurethral resection of bladder tumor and patient simply found to have a urinary tract infection with leukoesterase and nitrates and greater than 100 white cells per high-power field with urine culture showing gram-negative rods. In addition nephrology consultation and following along. Patient also found to have a hemoglobin of 6.1 most likely from acute bleeding although denies hematuria and stool occult is negative    Pression\plan:    1. Right sided hydronephrosis with presumed bladder cancer obstructing the distal UVJ. Urology consultation and plan cystoscopy with stent placement and tumor resection    2. Urinary tract infection  Urine culture gram-negative rods  Initiate meropenem as patient is allergic to penicillin and has excessive swelling consistent with anaphylaxis    3. 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 Questionable monoclonal gammopathy  Hematology consultation and following along    5. JEROME  Resolving    6. Acute blood loss anemia  Transfuse for hemoglobin less than 7    7.   Cholelithiasis with proximal neck stones and possible choledocholithiasis  Appears to be nonobstructing  Bilirubin normal  Gastrointestinal consultation  Ultrasound of the right upper quadrant cholelithiasis without ductal dilatation    8. Constipation  Hold lactulose    9. Metabolic acidosis  Most likely from multiple bowel movements  Hold electrolytes  Repeat laboratory data      CODE STATUS: Full code    DVT prophylaxis: SCDs  Ulcer prophylaxis: Not indicated    Discharge barriers resolution of hydronephrosis, urinary tract infection GI consultation, possible surgical consultation    Discussed case with patient's family member and patient at [facility] discussed with: Patient/Family    Total time spent with patient: >35 minutes.

## 2022-09-15 NOTE — PROGRESS NOTES
Interventional Radiology Consult      Patient: Milton Kenney 61 y.o. female  [id number]    Consult Requested by:  Barry Young PA-C    Chief Complaint: Abdominal Pain and Constipation      History of Present Illness: 60 yo female with hx of lung cancer, DVT not on anticoagulation but with IVC filter in place, rheumatoid arthritis admitted with constipation and imaging findings of newly diagnosed bladder cancer involving R UVJ. Also noted was right hydronephrosis and right gonadal vein thrombosis, of uncertain duration. Interventional Radiology consulted for eval/treatment options for Mrs. Coronel's right gonadal vein thrombosis. History:  Past Medical History:   Diagnosis Date    Anemia     Back pain     DVT (deep venous thrombosis) (HCC)     and PE    Rheumatoid arthritis (HCC)     Uterine fibroid      Family History   Problem Relation Age of Onset    Lung Cancer Mother     Hypertension Sister     Breast Cancer Maternal Aunt      Social History     Socioeconomic History    Marital status: SINGLE     Spouse name: Not on file    Number of children: Not on file    Years of education: Not on file    Highest education level: Some college, no degree   Occupational History    Occupation: disability   Tobacco Use    Smoking status: Former     Packs/day: 0.25     Years: 30.00     Pack years: 7.50     Types: Cigarettes    Smokeless tobacco: Never    Tobacco comments:     Quit 15 years ago   Vaping Use    Vaping Use: Never used   Substance and Sexual Activity    Alcohol use: Yes     Comment: Occasional    Drug use: Never    Sexual activity: Yes     Partners: Male   Other Topics Concern    Not on file   Social History Narrative    Lives at home alone.      Social Determinants of Health     Financial Resource Strain: Not on file   Food Insecurity: Not on file   Transportation Needs: Not on file   Physical Activity: Not on file   Stress: Not on file   Social Connections: Not on file   Intimate Partner Violence: Not on file   Housing Stability: Not on file       Allergies: Allergies   Allergen Reactions    Bactrim [Sulfamethoprim] Swelling     Swelling of the lilps    Penicillin V Potassium Swelling       Current Medications:  Current Facility-Administered Medications   Medication Dose Route Frequency    dextrose 5% - 0.45% NaCl with KCl 20 mEq/L infusion  75 mL/hr IntraVENous CONTINUOUS    docusate sodium (COLACE) capsule 100 mg  100 mg Oral BID    bisacodyL (DULCOLAX) suppository 10 mg  10 mg Rectal DAILY    meropenem (MERREM) 1 g in 0.9% sodium chloride (MBP/ADV) 50 mL MBP  1 g IntraVENous Q8H    sodium chloride (NS) flush 5-10 mL  5-10 mL IntraVENous PRN    therapeutic multivitamin (THERAGRAN) tablet 1 Tablet  1 Tablet Oral DAILY    ferrous sulfate tablet 325 mg  325 mg Oral DAILY    sodium chloride (NS) flush 5-40 mL  5-40 mL IntraVENous Q8H    sodium chloride (NS) flush 5-40 mL  5-40 mL IntraVENous PRN    acetaminophen (TYLENOL) tablet 650 mg  650 mg Oral Q6H PRN    Or    acetaminophen (TYLENOL) suppository 650 mg  650 mg Rectal Q6H PRN    ondansetron (ZOFRAN ODT) tablet 4 mg  4 mg Oral Q6H PRN    Or    ondansetron (ZOFRAN) injection 4 mg  4 mg IntraVENous Q6H PRN    0.9% sodium chloride infusion 250 mL  250 mL IntraVENous PRN        Review of Systems:  Reports abdominal discomfort, recent constipation- improving, fatigue. Patient denies fever, chills, shortness of breath, chest pain, vomiting, extremity weakness, numbness, tingling. The remainder of the review of systems is negative for any additional contributing elements. Physical Exam:  Blood pressure 118/85, pulse 94, temperature 98.5 °F (36.9 °C), resp. rate 16, height 5' 3\" (1.6 m), weight 53 kg (116 lb 12.8 oz), SpO2 100 %. GENERAL: alert, no distress,   LUNG: no respiratory distress,   HEART: regular rate and rhythm,   SKIN: warm, dry,   NEUROLOGIC: AOx3. Cranial nerves 2-12 and sensation grossly intact.       Alerts:    Hospital Problems  Date Reviewed: 9/13/2022            Codes Class Noted POA    Bladder cancer ([facility] ICD-10-CM: C67.9  ICD-9-CM: 188.9  9/13/2022 Yes        Anemia due to acute blood loss ICD-10-CM: D62  ICD-9-CM: 285.1  9/13/2022 Yes        Hypercalcemia ICD-10-CM: E83.52  ICD-9-CM: 275.42  9/13/2022 Yes        Carcinoma of bladder ([facility] ICD-10-CM: C67.9  ICD-9-CM: 188.9  9/13/2022 Unknown        Hydronephrosis of right kidney ICD-10-CM: N13.30  ICD-9-CM: 071  9/13/2022 Unknown           Laboratory:      Recent Labs     09/15/22  0804 09/13/22  1102 09/12/22  2243   HGB 8.4*   < > 7.0*   HCT 26.1*   < > 22.3*   WBC 5.5   < > 6.0      < > 429*   INR  --   --  1.2*   BUN 3*   < > 7   CREA 0.60   < > 0.90   K 4.2   < > Hemolyzed, recollect requested    < > = values in this interval not displayed. Imaging:  CT HEAD WO CONT    Result Date: 9/13/2022  No acute intracranial abnormality. CT ABD PELV W CONT    Result Date: 9/13/2022  1. 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 2. Possible extension into the distal right ureter. 3. Severe right hydronephrosis. 4. No justus metastases. 5. Right gonadal vein thrombosis. The findings were called to Dr. Marline Loyola on 9/13/2022 at [address] hours by Dr. Sergio Johnson. [address] [facility]    Result Date: 9/14/2022  1. Gallstones. No ductal dilatation 2. Severe right-sided hydronephrosis    XR CHEST PORT    Result Date: 9/13/2022  Clear lungs. Impression/Plan:  60 yo female with recent diagnosis of bladder cancer involving R UVJ, right hydronephrosis and right gonadal vein thrombosis. Pt has history of DVT but was not on systemic anticoagulation, presumed for history of bleeding. Unfortunately, IR does not offer any intervention for the treatment of gonadal vein thrombosis. Primary treatment would involve systemic anticoagulation- may wish to confirm that pt cannot tolerated these medications.  Due to collateral venous return in the area directed treatment of gonadal vein thrombosis is not necessary. Please note that Dr. Maine Bailey was consulted, has reviewed the appropriate imaging and agrees with the above plan. We appreciate the kind consultation and the opportunity to participate in [facility].         Stu Tim PA-C  Interventional Radiology  [facility] Radiology, [facility].  [facility]  [phone]  [facility] [phone]      CC: Hailey Valiente PA-C

## 2022-09-15 NOTE — PROGRESS NOTES
Bedside and Verbal shift change report given to Escobar Sanchez LPN (oncoming nurse) by Denisha Lopez LPN (offgoing nurse). Report included the following information SBAR, Kardex, Intake/Output, MAR, Accordion, Recent Results, and Med Rec Status.

## 2022-09-15 NOTE — ANESTHESIA PREPROCEDURE EVALUATION
Relevant Problems   No relevant active problems       Anesthetic History   No history of anesthetic complications            Review of Systems / Medical History  Patient summary reviewed, nursing notes reviewed and pertinent labs reviewed    Pulmonary  Within defined limits                 Neuro/Psych   Within defined limits           Cardiovascular  Within defined limits                     GI/Hepatic/Renal  Within defined limits              Endo/Other        Arthritis and anemia     Other Findings   Comments: Rheumatoid arthritis         Past Medical History:   Diagnosis Date    Anemia     Back pain     DVT (deep venous thrombosis) (HCC)     and PE    Rheumatoid arthritis (HCC)     Uterine fibroid        Past Surgical History:   Procedure Laterality Date    HX  SECTION         Current Outpatient Medications   Medication Instructions    Arthritis Pain Relief 650 mg TbER TAKE 1 TABLET BY MOUTH THREE (3) TIMES DAILY AS NEEDED FOR PAIN.     bisacodyL (DULCOLAX) 10 mg, Rectal, DAILY AS NEEDED    cromolyn (OPTICROM) 4 % ophthalmic solution 1 Drop, Both Eyes, DAILY    Daily-Luan tablet TAKE 1 TABLET BY MOUTH EVERY DAY    ferrous sulfate 325 mg, Oral, DAILY    lactulose (CHRONULAC) 10 gram/15 mL solution 30 mL, Oral, 2 TIMES DAILY    multivitamin with folic acid (One Daily Essential) 400 mcg tab tablet 1 Tablet, Oral, DAILY       Current Facility-Administered Medications   Medication Dose Route Frequency    dextrose 5% - 0.45% NaCl with KCl 20 mEq/L infusion  75 mL/hr IntraVENous CONTINUOUS    docusate sodium (COLACE) capsule 100 mg  100 mg Oral BID    bisacodyL (DULCOLAX) suppository 10 mg  10 mg Rectal DAILY    meropenem (MERREM) 1 g in 0.9% sodium chloride (MBP/ADV) 50 mL MBP  1 g IntraVENous Q8H    sodium chloride (NS) flush 5-10 mL  5-10 mL IntraVENous PRN    therapeutic multivitamin (THERAGRAN) tablet 1 Tablet  1 Tablet Oral DAILY    ferrous sulfate tablet 325 mg  325 mg Oral DAILY    sodium chloride (NS) flush 5-40 mL  5-40 mL IntraVENous Q8H    sodium chloride (NS) flush 5-40 mL  5-40 mL IntraVENous PRN    acetaminophen (TYLENOL) tablet 650 mg  650 mg Oral Q6H PRN    Or    acetaminophen (TYLENOL) suppository 650 mg  650 mg Rectal Q6H PRN    ondansetron (ZOFRAN ODT) tablet 4 mg  4 mg Oral Q6H PRN    Or    ondansetron (ZOFRAN) injection 4 mg  4 mg IntraVENous Q6H PRN    0.9% sodium chloride infusion 250 mL  250 mL IntraVENous PRN       Patient Vitals for the past 24 hrs:   Temp Pulse Resp BP SpO2   09/15/22 1543 36.9 °C (98.4 °F) 91 18 130/83 100 %   09/15/22 0935 36.4 °C (97.6 °F) 87 18 (!) 141/88 100 %   09/14/22 2029 -- -- -- -- 100 %   09/14/22 1931 36.9 °C (98.5 °F) 94 16 118/85 100 %       Lab Results   Component Value Date/Time    WBC 5.5 09/15/2022 08:04 AM    HGB 8.4 (L) 09/15/2022 08:04 AM    HCT 26.1 (L) 09/15/2022 08:04 AM    PLATELET 330 29/03/6388 08:04 AM    MCV 82.6 09/15/2022 08:04 AM     Lab Results   Component Value Date/Time    Sodium 140 09/15/2022 08:04 AM    Potassium 4.2 09/15/2022 08:04 AM    Chloride 116 (H) 09/15/2022 08:04 AM    CO2 17 (L) 09/15/2022 08:04 AM    Anion gap 7 09/15/2022 08:04 AM    Glucose 87 09/15/2022 08:04 AM    BUN 3 (L) 09/15/2022 08:04 AM    Creatinine 0.60 09/15/2022 08:04 AM    BUN/Creatinine ratio 5 (L) 09/15/2022 08:04 AM    GFR est AA >60 09/15/2022 08:04 AM    GFR est non-AA >60 09/15/2022 08:04 AM    Calcium 9.2 09/15/2022 08:04 AM     No results found for: APTT, PTP, INR, INREXT  Lab Results   Component Value Date/Time    Glucose 87 09/15/2022 08:04 AM     Physical Exam    Airway  Mallampati: II  TM Distance: 4 - 6 cm  Neck ROM: normal range of motion   Mouth opening: Normal     Cardiovascular    Rhythm: regular  Rate: normal         Dental  No notable dental hx       Pulmonary  Breath sounds clear to auscultation               Abdominal  GI exam deferred       Other Findings            Anesthetic Plan    ASA: 3  Anesthesia type: general          Induction: Intravenous  Anesthetic plan and risks discussed with: Patient and Family

## 2022-09-15 NOTE — PROGRESS NOTES
Patient refused the soap suda enema due to already having bowel movements from previous prep medications; and didn't want to continue with that part of her prep.

## 2022-09-15 NOTE — OP NOTES
UROLOGY OPERATIVE NOTE    Patient: Gt Santamaria MRN: [id number]  SSN: [id number]    YOB: 1965  Age: 61 y.o. Sex: female          Pre-operative Diagnosis: BLADDER MASS  Post-operative Diagnosis: BLADDER MASS  Procedure:   Cystoscopy, urethral dilation    RIGHT  ureteral stent placement  transurethral resection of bladder tumor > 2cm  Bilateral retrograde pyelograms    Surgeon: Wil Cruz MD  Assistant: none    Anesthesia:  General  Findings: Urethral stenosis requiring dilation  Infiltrating bladder tumor at the right base and trigone, not completely resectable endoscopically    Interpretation of left retrograde pyelogram:  Normal appearing left ureter and renal pelvis. No strictures, dilation, radiopacities or filling defects seen. The calyces were sharp and pristine. Interpretation of right retrograde pyelogram: Markedly dilated ureter and renal pelvis severe hydronephrosis. Level of obstruction at bladder tumor at the ureteral orifice. No proximal strictures or lesions seen. Estimated Blood Loss:  scant  Drains:  8 Greek by 20 cm double-J right ureteral stent, 18 Greek Gibson catheter  Specimens: Bladder tumor  Complications: none           Procedure Details: The patient was seen in the pre-operative area. The risks, benefits, complications, alternative treatment options, and expected outcomes were again discussed with the patient. The possibilities of reaction to medication, pain, infection, bleeding, major cardiovascular event, death, damage to surrounding structures were specifically addressed. Informed consent was obtained. Upon arrival to the operative suite, the patient, procedure, and side were confirmed via a pre-operative \"time-out\". All were in agreement. The patient was carefully positioned and anesthesia was undertaken. Sterile prep and drape was accomplished. The patient was in the lithotomy position.   Using a 21 Greek cystoscope with 30 and 70 degree lenses complete cystoscopy was performed. The urethra was stenotic and required urethral dilators from 12-28 Western Danika. The cystoscope was unable to be introduced. The bladder mucosa was abnormal at the right trigone and distal bladder. Left ureteral orifice was seen however the right was unable to be identified.  fluoroscopic imaging did not reveal obvious stones overlying the urinary tract. Using an open-ended catheter the left ureteral orifice was cannulated. Using Isovue a gentle retrograde pyelogram was performed. The ureter appeared patent without stricturing. There were no fixed filling defects, stones, strictures or hydronephrosis seen. The calyces were sharp. Using a [place] obturator, continuous-flow resectoscope sheath was introduced. Using a 24 Western Danika Loop the bladder tumor was systematically resected. It appeared infiltrating and not completely resectable. The ureteral orifice was unroofed. Using an Groves angled catheter the right ureteral orifice was able to be cannulated. A 0.035 guidewire was used to advance the catheter. The catheter was then exchanged for a 5 Western Danika open-ended catheter. Using Isovue a gentle retrograde pyelogram was performed. The ureter appeared patent and markedly dilated with tortuosity. The renal pelvis and calyces were severely dilated. There was a clear hydronephrotic drip after catheterization. The wire was left in the renal pelvis. An 8 Western Danika by 20 cm double-J stent was passed over the wire with a curl in the renal pelvis and in the bladder on fluoroscopy and direct visualization. An 25 Mongolian Gibson cath was placed. The bladder was drained to a Gibson bag. The patient was transported in stable condition to recovery.      Tran Zelaya MD

## 2022-09-16 LAB
ALBUMIN SERPL-MCNC: 1.8 G/DL (ref 3.5–5)
ALBUMIN/GLOB SERPL: 0.3 {RATIO} (ref 1.1–2.2)
ALP SERPL-CCNC: 61 U/L (ref 45–117)
ALT SERPL-CCNC: 17 U/L (ref 12–78)
ANION GAP SERPL CALC-SCNC: 7 MMOL/L (ref 5–15)
AST SERPL W P-5'-P-CCNC: 17 U/L (ref 15–37)
BACTERIA SPEC CULT: ABNORMAL
BASOPHILS # BLD: 0 K/UL (ref 0–0.1)
BASOPHILS NFR BLD: 0 % (ref 0–1)
BILIRUB SERPL-MCNC: 0.4 MG/DL (ref 0.2–1)
BUN SERPL-MCNC: 5 MG/DL (ref 6–20)
BUN/CREAT SERPL: 7 (ref 12–20)
CA-I BLD-MCNC: 9.1 MG/DL (ref 8.5–10.1)
CHLORIDE SERPL-SCNC: 110 MMOL/L (ref 97–108)
CO2 SERPL-SCNC: 20 MMOL/L (ref 21–32)
COLONY COUNT,CNT: ABNORMAL
CREAT SERPL-MCNC: 0.76 MG/DL (ref 0.55–1.02)
DIFFERENTIAL METHOD BLD: ABNORMAL
EOSINOPHIL # BLD: 0 K/UL (ref 0–0.4)
EOSINOPHIL NFR BLD: 0 % (ref 0–7)
ERYTHROCYTE [DISTWIDTH] IN BLOOD BY AUTOMATED COUNT: 16.5 % (ref 11.5–14.5)
GLOBULIN SER CALC-MCNC: 7 G/DL (ref 2–4)
GLUCOSE SERPL-MCNC: 103 MG/DL (ref 65–100)
HCT VFR BLD AUTO: 28.8 % (ref 35–47)
HGB BLD-MCNC: 9 G/DL (ref 11.5–16)
IMM GRANULOCYTES # BLD AUTO: 0.1 K/UL (ref 0–0.04)
IMM GRANULOCYTES NFR BLD AUTO: 1 % (ref 0–0.5)
LYMPHOCYTES # BLD: 1.1 K/UL (ref 0.8–3.5)
LYMPHOCYTES NFR BLD: 25 % (ref 12–49)
MCH RBC QN AUTO: 26.5 PG (ref 26–34)
MCHC RBC AUTO-ENTMCNC: 31.3 G/DL (ref 30–36.5)
MCV RBC AUTO: 84.7 FL (ref 80–99)
MONOCYTES # BLD: 0.2 K/UL (ref 0–1)
MONOCYTES NFR BLD: 6 % (ref 5–13)
NEUTS SEG # BLD: 3 K/UL (ref 1.8–8)
NEUTS SEG NFR BLD: 68 % (ref 32–75)
NRBC # BLD: 0 K/UL (ref 0–0.01)
NRBC BLD-RTO: 0 PER 100 WBC
PLATELET # BLD AUTO: 394 K/UL (ref 150–400)
PMV BLD AUTO: 9.3 FL (ref 8.9–12.9)
POTASSIUM SERPL-SCNC: 4.2 MMOL/L (ref 3.5–5.1)
PROT SERPL-MCNC: 8.8 G/DL (ref 6.4–8.2)
RBC # BLD AUTO: 3.4 M/UL (ref 3.8–5.2)
SODIUM SERPL-SCNC: 137 MMOL/L (ref 136–145)
SPECIAL REQUESTS,SREQ: ABNORMAL
WBC # BLD AUTO: 4.4 K/UL (ref 3.6–11)

## 2022-09-16 PROCEDURE — 74011000258 HC RX REV CODE- 258: Performed by: PHYSICIAN ASSISTANT

## 2022-09-16 PROCEDURE — 74011250636 HC RX REV CODE- 250/636: Performed by: PHYSICIAN ASSISTANT

## 2022-09-16 PROCEDURE — 86803 HEPATITIS C AB TEST: CPT

## 2022-09-16 PROCEDURE — 74011250637 HC RX REV CODE- 250/637: Performed by: NURSE PRACTITIONER

## 2022-09-16 PROCEDURE — 87522 HEPATITIS C REVRS TRNSCRPJ: CPT

## 2022-09-16 PROCEDURE — 85025 COMPLETE CBC W/AUTO DIFF WBC: CPT

## 2022-09-16 PROCEDURE — 36415 COLL VENOUS BLD VENIPUNCTURE: CPT

## 2022-09-16 PROCEDURE — 99232 SBSQ HOSP IP/OBS MODERATE 35: CPT | Performed by: UROLOGY

## 2022-09-16 PROCEDURE — 74011000250 HC RX REV CODE- 250: Performed by: HOSPITALIST

## 2022-09-16 PROCEDURE — 80074 ACUTE HEPATITIS PANEL: CPT

## 2022-09-16 PROCEDURE — 65270000029 HC RM PRIVATE

## 2022-09-16 PROCEDURE — 80053 COMPREHEN METABOLIC PANEL: CPT

## 2022-09-16 PROCEDURE — 74011250637 HC RX REV CODE- 250/637: Performed by: HOSPITALIST

## 2022-09-16 PROCEDURE — 74011250637 HC RX REV CODE- 250/637: Performed by: PHYSICIAN ASSISTANT

## 2022-09-16 RX ORDER — LANOLIN ALCOHOL/MO/W.PET/CERES
325 CREAM (GRAM) TOPICAL
Status: DISCONTINUED | OUTPATIENT
Start: 2022-09-17 | End: 2022-09-17 | Stop reason: HOSPADM

## 2022-09-16 RX ORDER — HYDROMORPHONE HYDROCHLORIDE 1 MG/ML
0.5 INJECTION, SOLUTION INTRAMUSCULAR; INTRAVENOUS; SUBCUTANEOUS
Status: DISCONTINUED | OUTPATIENT
Start: 2022-09-16 | End: 2022-09-17 | Stop reason: HOSPADM

## 2022-09-16 RX ORDER — CIPROFLOXACIN 500 MG/1
500 TABLET ORAL EVERY 12 HOURS
Status: DISCONTINUED | OUTPATIENT
Start: 2022-09-16 | End: 2022-09-17 | Stop reason: HOSPADM

## 2022-09-16 RX ADMIN — THERA TABS 1 TABLET: TAB at 10:26

## 2022-09-16 RX ADMIN — MEROPENEM 1 G: 1 INJECTION, POWDER, FOR SOLUTION INTRAVENOUS at 01:48

## 2022-09-16 RX ADMIN — HYDROMORPHONE HYDROCHLORIDE 0.5 MG: 1 INJECTION, SOLUTION INTRAMUSCULAR; INTRAVENOUS; SUBCUTANEOUS at 19:49

## 2022-09-16 RX ADMIN — SODIUM CHLORIDE, PRESERVATIVE FREE 20 ML: 5 INJECTION INTRAVENOUS at 05:58

## 2022-09-16 RX ADMIN — POLYETHYLENE GLYCOL 3350 17 G: 17 POWDER, FOR SOLUTION ORAL at 10:26

## 2022-09-16 RX ADMIN — MEROPENEM 1 G: 1 INJECTION, POWDER, FOR SOLUTION INTRAVENOUS at 10:26

## 2022-09-16 RX ADMIN — CIPROFLOXACIN 500 MG: 500 TABLET, FILM COATED ORAL at 21:38

## 2022-09-16 RX ADMIN — POTASSIUM CHLORIDE, DEXTROSE MONOHYDRATE AND SODIUM CHLORIDE 75 ML/HR: 150; 5; 450 INJECTION, SOLUTION INTRAVENOUS at 13:19

## 2022-09-16 RX ADMIN — DOCUSATE SODIUM 100 MG: 100 CAPSULE, LIQUID FILLED ORAL at 10:26

## 2022-09-16 RX ADMIN — FERROUS SULFATE TAB 325 MG (65 MG ELEMENTAL FE) 325 MG: 325 (65 FE) TAB at 10:26

## 2022-09-16 RX ADMIN — SODIUM CHLORIDE, PRESERVATIVE FREE 10 ML: 5 INJECTION INTRAVENOUS at 21:38

## 2022-09-16 RX ADMIN — ACETAMINOPHEN 650 MG: 325 TABLET, FILM COATED ORAL at 05:49

## 2022-09-16 RX ADMIN — CIPROFLOXACIN 500 MG: 500 TABLET, FILM COATED ORAL at 13:18

## 2022-09-16 RX ADMIN — HYDROMORPHONE HYDROCHLORIDE 0.5 MG: 1 INJECTION, SOLUTION INTRAMUSCULAR; INTRAVENOUS; SUBCUTANEOUS at 12:03

## 2022-09-16 RX ADMIN — SODIUM CHLORIDE, PRESERVATIVE FREE 10 ML: 5 INJECTION INTRAVENOUS at 13:20

## 2022-09-16 RX ADMIN — POTASSIUM CHLORIDE, DEXTROSE MONOHYDRATE AND SODIUM CHLORIDE 75 ML/HR: 150; 5; 450 INJECTION, SOLUTION INTRAVENOUS at 01:48

## 2022-09-16 NOTE — PROGRESS NOTES
CM reviewed chart. Patient has procedure yesterday. Looks like their is plans for more testing to be done. Once medically stable current plan is home self care.

## 2022-09-16 NOTE — PROGRESS NOTES
Hospitalist Progress Note    Subjective:   Daily Progress Note: 9/16/2022 4:31 PM    Hematuria status post tumor resection and cystoscopy with stent placement. Mild abdominal pain    Current Facility-Administered Medications   Medication Dose Route Frequency    HYDROmorphone (DILAUDID) syringe 0.5 mg  0.5 mg IntraVENous Q4H PRN    dextrose 5% - 0.45% NaCl with KCl 20 mEq/L infusion  75 mL/hr IntraVENous CONTINUOUS    polyethylene glycol (MIRALAX) packet 17 g  17 g Oral DAILY    docusate sodium (COLACE) capsule 100 mg  100 mg Oral BID    meropenem (MERREM) 1 g in 0.9% sodium chloride (MBP/ADV) 50 mL MBP  1 g IntraVENous Q8H    sodium chloride (NS) flush 5-10 mL  5-10 mL IntraVENous PRN    therapeutic multivitamin (THERAGRAN) tablet 1 Tablet  1 Tablet Oral DAILY    ferrous sulfate tablet 325 mg  325 mg Oral DAILY    sodium chloride (NS) flush 5-40 mL  5-40 mL IntraVENous Q8H    sodium chloride (NS) flush 5-40 mL  5-40 mL IntraVENous PRN    acetaminophen (TYLENOL) tablet 650 mg  650 mg Oral Q6H PRN    Or    acetaminophen (TYLENOL) suppository 650 mg  650 mg Rectal Q6H PRN    ondansetron (ZOFRAN ODT) tablet 4 mg  4 mg Oral Q6H PRN    Or    ondansetron (ZOFRAN) injection 4 mg  4 mg IntraVENous Q6H PRN    0.9% sodium chloride infusion 250 mL  250 mL IntraVENous PRN        Review of Systems  Review of Systems   Constitutional:  Positive for malaise/fatigue. Respiratory:  Negative for cough and shortness of breath. Cardiovascular:  Negative for chest pain and leg swelling. Gastrointestinal:  Positive for abdominal pain. Negative for constipation, nausea and vomiting. Genitourinary:  Positive for dysuria and hematuria. Musculoskeletal: Negative. Skin: Negative. Neurological: Negative. Psychiatric/Behavioral: Negative.             Objective:     Visit Vitals  /81 (BP 1 Location: Left upper arm, BP Patient Position: At rest;Lying)   Pulse 80   Temp 97.6 °F (36.4 °C)   Resp 22   Ht 5' 3\" (1.6 m)   Wt 53 kg (116 lb 12.8 oz)   LMP  (LMP Unknown)   SpO2 100%   BMI 20.69 kg/m²      O2 Device: None (Room air)    Temp (24hrs), Av.7 °F (36.5 °C), Min:97.3 °F (36.3 °C), Max:98.4 °F (36.9 °C)      No intake/output data recorded.  1901 -  0700  In: 400 [I.V.:400]  Out: 1150 [Urine:1150]    Recent Results (from the past 24 hour(s))   CBC WITH AUTOMATED DIFF    Collection Time: 22  9:40 AM   Result Value Ref Range    WBC 4.4 3.6 - 11.0 K/uL    RBC 3.40 (L) 3.80 - 5.20 M/uL    HGB 9.0 (L) 11.5 - 16.0 g/dL    HCT 28.8 (L) 35.0 - 47.0 %    MCV 84.7 80.0 - 99.0 FL    MCH 26.5 26.0 - 34.0 PG    MCHC 31.3 30.0 - 36.5 g/dL    RDW 16.5 (H) 11.5 - 14.5 %    PLATELET 582 963 - 202 K/uL    MPV 9.3 8.9 - 12.9 FL    NRBC 0.0 0.0  WBC    ABSOLUTE NRBC 0.00 0.00 - 0.01 K/uL    NEUTROPHILS 68 32 - 75 %    LYMPHOCYTES 25 12 - 49 %    MONOCYTES 6 5 - 13 %    EOSINOPHILS 0 0 - 7 %    BASOPHILS 0 0 - 1 %    IMMATURE GRANULOCYTES 1 (H) 0 - 0.5 %    ABS. NEUTROPHILS 3.0 1.8 - 8.0 K/UL    ABS. LYMPHOCYTES 1.1 0.8 - 3.5 K/UL    ABS. MONOCYTES 0.2 0.0 - 1.0 K/UL    ABS. EOSINOPHILS 0.0 0.0 - 0.4 K/UL    ABS. BASOPHILS 0.0 0.0 - 0.1 K/UL    ABS. IMM. GRANS. 0.1 (H) 0.00 - 0.04 K/UL    DF AUTOMATED     METABOLIC PANEL, COMPREHENSIVE    Collection Time: 22  9:40 AM   Result Value Ref Range    Sodium 137 136 - 145 mmol/L    Potassium 4.2 3.5 - 5.1 mmol/L    Chloride 110 (H) 97 - 108 mmol/L    CO2 20 (L) 21 - 32 mmol/L    Anion gap 7 5 - 15 mmol/L    Glucose 103 (H) 65 - 100 mg/dL    BUN 5 (L) 6 - 20 mg/dL    Creatinine 0.76 0.55 - 1.02 mg/dL    BUN/Creatinine ratio 7 (L) 12 - 20      GFR est AA >60 >60 ml/min/1.73m2    GFR est non-AA >60 >60 ml/min/1.73m2    Calcium 9.1 8.5 - 10.1 mg/dL    Bilirubin, total 0.4 0.2 - 1.0 mg/dL    AST (SGOT) 17 15 - 37 U/L    ALT (SGPT) 17 12 - 78 U/L    Alk.  phosphatase 61 45 - 117 U/L    Protein, total 8.8 (H) 6.4 - 8.2 g/dL    Albumin 1.8 (L) 3.5 - 5.0 g/dL    Globulin 7.0 (H) 2.0 - 4.0 g/dL    A-G Ratio 0.3 (L) 1.1 - 2.2          XR FLUOROSCOPY UNDER 60 MINUTES   Final Result   Visualized. Intraoperative findings. Please see operative report. David Weaver US ABD LTD   Final Result   1. Gallstones. No ductal dilatation   2. Severe right-sided hydronephrosis      DUPLEX LOWER EXT VENOUS BILAT   Final Result      CT HEAD WO CONT   Final Result   No acute intracranial abnormality. XR CHEST PORT   Final Result   Clear lungs. CT ABD PELV W CONT   Final Result   1. 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 2. Possible extension into the distal right ureter. 3. Severe right hydronephrosis. 4. No justus metastases. 5. Right gonadal vein thrombosis. The findings were called to  [facility] [place] on 9/13/2022 at [address] hours by Dr. Bhavna Caldwell. Farhan Erazo 1944:    Physical Exam  Vitals reviewed. Constitutional:       General: She is not in acute distress. Appearance: She is not ill-appearing. HENT:      Head: Normocephalic and atraumatic. Mouth/Throat:      Pharynx: Oropharynx is clear. Eyes:      Conjunctiva/sclera: Conjunctivae normal.   Cardiovascular:      Rate and Rhythm: Normal rate and regular rhythm. Heart sounds: Normal heart sounds. Pulmonary:      Effort: Pulmonary effort is normal.      Breath sounds: Normal breath sounds. Abdominal:      General: Abdomen is flat. Bowel sounds are normal.      Palpations: Abdomen is soft. Tenderness: There is no right CVA tenderness or left CVA tenderness. Comments: Suprapubic abdominal pain   Musculoskeletal:         General: Normal range of motion. Cervical back: Normal range of motion and neck supple. Skin:     General: Skin is warm and dry. Neurological:      General: No focal deficit present. Mental Status: She is alert and oriented to person, place, and time. Mental status is at baseline.    Psychiatric:         Mood and Affect: Mood normal. Data Review    Recent Results (from the past 24 hour(s))   CBC WITH AUTOMATED DIFF    Collection Time: 09/16/22  9:40 AM   Result Value Ref Range    WBC 4.4 3.6 - 11.0 K/uL    RBC 3.40 (L) 3.80 - 5.20 M/uL    HGB 9.0 (L) 11.5 - 16.0 g/dL    HCT 28.8 (L) 35.0 - 47.0 %    MCV 84.7 80.0 - 99.0 FL    MCH 26.5 26.0 - 34.0 PG    MCHC 31.3 30.0 - 36.5 g/dL    RDW 16.5 (H) 11.5 - 14.5 %    PLATELET 830 668 - 887 K/uL    MPV 9.3 8.9 - 12.9 FL    NRBC 0.0 0.0  WBC    ABSOLUTE NRBC 0.00 0.00 - 0.01 K/uL    NEUTROPHILS 68 32 - 75 %    LYMPHOCYTES 25 12 - 49 %    MONOCYTES 6 5 - 13 %    EOSINOPHILS 0 0 - 7 %    BASOPHILS 0 0 - 1 %    IMMATURE GRANULOCYTES 1 (H) 0 - 0.5 %    ABS. NEUTROPHILS 3.0 1.8 - 8.0 K/UL    ABS. LYMPHOCYTES 1.1 0.8 - 3.5 K/UL    ABS. MONOCYTES 0.2 0.0 - 1.0 K/UL    ABS. EOSINOPHILS 0.0 0.0 - 0.4 K/UL    ABS. BASOPHILS 0.0 0.0 - 0.1 K/UL    ABS. IMM. GRANS. 0.1 (H) 0.00 - 0.04 K/UL    DF AUTOMATED     METABOLIC PANEL, COMPREHENSIVE    Collection Time: 09/16/22  9:40 AM   Result Value Ref Range    Sodium 137 136 - 145 mmol/L    Potassium 4.2 3.5 - 5.1 mmol/L    Chloride 110 (H) 97 - 108 mmol/L    CO2 20 (L) 21 - 32 mmol/L    Anion gap 7 5 - 15 mmol/L    Glucose 103 (H) 65 - 100 mg/dL    BUN 5 (L) 6 - 20 mg/dL    Creatinine 0.76 0.55 - 1.02 mg/dL    BUN/Creatinine ratio 7 (L) 12 - 20      GFR est AA >60 >60 ml/min/1.73m2    GFR est non-AA >60 >60 ml/min/1.73m2    Calcium 9.1 8.5 - 10.1 mg/dL    Bilirubin, total 0.4 0.2 - 1.0 mg/dL    AST (SGOT) 17 15 - 37 U/L    ALT (SGPT) 17 12 - 78 U/L    Alk.  phosphatase 61 45 - 117 U/L    Protein, total 8.8 (H) 6.4 - 8.2 g/dL    Albumin 1.8 (L) 3.5 - 5.0 g/dL    Globulin 7.0 (H) 2.0 - 4.0 g/dL    A-G Ratio 0.3 (L) 1.1 - 2.2          Assessment/Plan:     Active Problems:    Bladder cancer ([facility].) (9/13/2022)      Anemia due to acute blood loss (9/13/2022)      Hypercalcemia (9/13/2022)      [facility] (9/13/2022)      Hydronephrosis of right kidney (9/13/2022)    Hospital course: This a 68-year-old female admitted on 9/12/2022 with a history of venous thrombosis although not on anticoagulation which is presumed secondary to bleeding, rheumatoid arthritis and recently discontinued medications who presented with abdominal pain and a feeling of constipation. CT scan of the abdomen pelvis was performed revealing sessile bladder carcinoma encasing the right urethrovesical junction with a possible extension in the right distal ureter. There was severe right hydronephrosis and right gonadal vein thrombosis. Further review of the CT reveals cholelithiasis with proximal cystic duct encasement of these stones. There was obvious common bile duct distention. Total bilirubin is normal.  Ultrasound is pending. GI consultation. Hematology consultation and concern for possible monoclonal gammopathy and work-up has been instituted. Urology consultation, Dr. Foster Forge to perform cystoscopy with retrograde pyelograms and right ureteral stent placement as well as transurethral resection of bladder tumor and patient simply found to have a urinary tract infection with leukoesterase and nitrates and greater than 100 white cells per high-power field with urine culture showing gram-negative rods. In addition nephrology consultation and following along. Patient also found to have a hemoglobin of 6.1 most likely from acute bleeding although denies hematuria and stool occult is negative. Underwent cystoscopy and tumor resection with stent placement. Awaiting hematuria resolution. E. coli found in the urine which is pansensitive. Will change to p.o. Cipro for 7-day course    Impression\plan:    1. Right sided hydronephrosis with presumed bladder cancer obstructing the distal UVJ. Cystoscopy with right ureteral stent placement and tumor resection    2.   Urinary tract infection  Urine culture gram-negative rods E. coli  Discontinue meropenem and start 7 days worth of Cipro due to hematuria    3. 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 JEROME  Resolved    6. Acute blood loss anemia  Stable  Transfuse for hemoglobin less than 7    7. Cholelithiasis with proximal neck stones and possible choledocholithiasis  Appears to be nonobstructing  Bilirubin normal  Gastrointestinal consultation  Ultrasound of the right upper quadrant cholelithiasis without ductal dilatation    8. Constipation  Resolved    9. Metabolic acidosis  Resolved    CODE STATUS: Full code    DVT prophylaxis: SCDs  Ulcer prophylaxis: Not indicated    Discharge barriers resolution of hematuria and stable blood count    Discussed case with patient's family member and patient at [facility] discussed with: Patient/Family    Total time spent with patient: >35 minutes.

## 2022-09-16 NOTE — PROGRESS NOTES
Patient c/o of low abdominal sharp pain 8/10. DIEGO Perea notified of patient's condition. PA stated he would place orders.

## 2022-09-16 NOTE — PROGRESS NOTES
Progress Note    Patient: Terrie Giordano MRN: [id number]  SSN: [id number]    YOB: 1965  Age: 61 y.o. Sex: female      Admit Date: 9/12/2022    LOS: 3 days     Subjective:   GI in consultation for constipation, possible blood loss    9/16: Ms Chadd Roy seen resting comfortably. She reports her lower abdomen is slightly \"sore and tender\" but over all she feels \"well\" . She is s/p cystoscopy,urethral dilatation, right ureteral stent, transurethral resection of bladder tumor > 2 cm. Pathology pending. Her abarca has been removed and she has voided. She does have hematuria. History of Present Illness: Terrie Giordano is a 61 y.o. female who is seen in consultation for constipation, possible blood loss. Ms. Chadd Roy seen on medical floor she reports she was given and enema in the ED and did have a normal soft stool. She denies black, tarry stool, denies hematemesis. She denies nausea, vomiting, and diarrhea. She does report constipation. Prior to admission she did not have a bowel movement in a week. She had a KUB on 9/11 showing nonobstructive bowel gas pattern without evidence of free air, cholelithiasis. She reports she has never had a colonoscopy. She reports she has a past medical history significant for uterine fibroids, rheumatoid arthritis, sciatic, DVT with IVC filter placement, and anemia. She is not taking anticoagulation. She states she has had blood transfusions in the past. Her hgB on 9/12 was 7.0 and decreased to 6.1 on 9/13. Her stool for occult on 9/13 is negative. HgB this morning is 9.1. 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 Urology consulted and plan for cystoscopy, transurethral resection of bladder tumor planned for 9/15/22. Hematology following for anemia. CT of abdomen shows numerous tiny stones in the gallbladder and proximal cystic duct.   Anemia may be associated with malignancy, no obvious GI bleed. Ms. Hayden Guillaume does have dulcolax suppository daily, along with colace and lactulose. Will follow patient. CT abdomen 9/13/22: IMPRESSION  1. 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 2. Possible extension into the distal right ureter. 3. Severe right hydronephrosis. 4. No justus metastases. 5. Right gonadal vein thrombosis. Objective:     Vitals:    09/15/22 1912 09/15/22 2000 09/16/22 0552 09/16/22 0738   BP:   130/65 134/81   Pulse: 84  80 80   Resp: 16 18 22   Temp: 97.3 °F (36.3 °C)  98 °F (36.7 °C) 97.6 °F (36.4 °C)   SpO2: 100% 99%  100%   Weight:       Height:            Intake and Output:  Current Shift: No intake/output data recorded. Last three shifts: 09/14 1901 - 09/16 0700  In: 400 [I.V.:400]  Out: 1150 [Urine:1150]    Physical Exam:   Skin:  Extremities and face reveal no rashes. No rivers erythema. HEENT: Sclerae anicteric. Extra-occular muscles are intact. No abnormal pigmentation of the lips. The neck is supple. Cardiovascular: Regular rate and rhythm. Respiratory:  Comfortable breathing with no accessory muscle use. GI:  Abdomen nondistended, soft, and nontender. No enlargement of the liver or spleen. No masses palpable. Rectal:  Deferred  Musculoskeletal: Extremities have good range of motion. Neurological:  Gross memory appears intact. Patient is alert and oriented. Psychiatric:  Mood appears appropriate with judgement intact.   Lymphatic:  No visible adenopathy      Lab/Data Review:  Recent Results (from the past 24 hour(s))   CBC WITH AUTOMATED DIFF    Collection Time: 09/16/22  9:40 AM   Result Value Ref Range    WBC 4.4 3.6 - 11.0 K/uL    RBC 3.40 (L) 3.80 - 5.20 M/uL    HGB 9.0 (L) 11.5 - 16.0 g/dL    HCT 28.8 (L) 35.0 - 47.0 %    MCV 84.7 80.0 - 99.0 FL    MCH 26.5 26.0 - 34.0 PG    MCHC 31.3 30.0 - 36.5 g/dL    RDW 16.5 (H) 11.5 - 14.5 %    PLATELET 514 987 - 099 K/uL    MPV 9.3 8.9 - 12.9 FL    NRBC 0.0 0.0  WBC ABSOLUTE NRBC 0.00 0.00 - 0.01 K/uL    NEUTROPHILS 68 32 - 75 %    LYMPHOCYTES 25 12 - 49 %    MONOCYTES 6 5 - 13 %    EOSINOPHILS 0 0 - 7 %    BASOPHILS 0 0 - 1 %    IMMATURE GRANULOCYTES 1 (H) 0 - 0.5 %    ABS. NEUTROPHILS 3.0 1.8 - 8.0 K/UL    ABS. LYMPHOCYTES 1.1 0.8 - 3.5 K/UL    ABS. MONOCYTES 0.2 0.0 - 1.0 K/UL    ABS. EOSINOPHILS 0.0 0.0 - 0.4 K/UL    ABS. BASOPHILS 0.0 0.0 - 0.1 K/UL    ABS. IMM. GRANS. 0.1 (H) 0.00 - 0.04 K/UL    DF AUTOMATED     METABOLIC PANEL, COMPREHENSIVE    Collection Time: 09/16/22  9:40 AM   Result Value Ref Range    Sodium 137 136 - 145 mmol/L    Potassium 4.2 3.5 - 5.1 mmol/L    Chloride 110 (H) 97 - 108 mmol/L    CO2 20 (L) 21 - 32 mmol/L    Anion gap 7 5 - 15 mmol/L    Glucose 103 (H) 65 - 100 mg/dL    BUN 5 (L) 6 - 20 mg/dL    Creatinine 0.76 0.55 - 1.02 mg/dL    BUN/Creatinine ratio 7 (L) 12 - 20      GFR est AA >60 >60 ml/min/1.73m2    GFR est non-AA >60 >60 ml/min/1.73m2    Calcium 9.1 8.5 - 10.1 mg/dL    Bilirubin, total 0.4 0.2 - 1.0 mg/dL    AST (SGOT) 17 15 - 37 U/L    ALT (SGPT) 17 12 - 78 U/L    Alk. phosphatase 61 45 - 117 U/L    Protein, total 8.8 (H) 6.4 - 8.2 g/dL    Albumin 1.8 (L) 3.5 - 5.0 g/dL    Globulin 7.0 (H) 2.0 - 4.0 g/dL    A-G Ratio 0.3 (L) 1.1 - 2.2                XR FLUOROSCOPY UNDER 60 MINUTES   Final Result   Visualized. Intraoperative findings. Please see operative report. [facility]   Final Result   1. Gallstones. No ductal dilatation   2. Severe right-sided hydronephrosis      DUPLEX LOWER EXT VENOUS BILAT   Final Result      CT HEAD WO CONT   Final Result   No acute intracranial abnormality. XR CHEST PORT   Final Result   Clear lungs. CT ABD PELV W CONT   Final Result   1. 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 2. Possible extension into the distal right ureter. 3. Severe right hydronephrosis. 4. No justus metastases. 5. Right gonadal vein thrombosis.       The findings were called to Dr. Analia Hardwick on 9/13/2022 at [address] hours by Dr. Bhavna Caldwell. [phone]          Assessment:     Active Problems:    Bladder cancer ([facility] [address] (9/13/2022)      Anemia due to acute blood loss (9/13/2022)      Hypercalcemia (9/13/2022)      Carcinoma of bladder ([facility] [address] (9/13/2022)      Hydronephrosis of right kidney (9/13/2022)        Plan:   Constipation (Resolved)      Colace daily      Miralax daily      High fiber diet  2. Anemia possibly 2/2 malignancy      Hx of anemia with hx of blood transfusions     Stool for occult negative     Transfuse for hgB less than 7     Iron 35, TIBC 123, Iron % 28     Folate 20.8, Vitamin B12 444     Monitor H&H     S/P blood transfusion on 9/12/22     Hematology input appreciated  3. Possible Bladder cancer      Urology following       S/P Cystoscopy with stent placement and tumor resection planned on  9/15/22     Thank you for allowing me to participate in this patients care. Plan discussed with Dr. Colby Alicea and he approves.     Signed By: Yoshi Baird NP     September 16, 2022

## 2022-09-16 NOTE — PROGRESS NOTES
Hematology and Oncology Progress Note    Patient: Sarah Camejo MRN: [id number]  SSN: [id number]    YOB: 1965  Age: 61 y.o. Sex: female      Admit Date: 9/12/2022    LOS: 3 days     Chief Complaint: Patient is complaining of abdominal discomfort and constipation    Subjective:   Patient is feeling better. She had cystoscopy on 15 September. she also had a stent placement along with right ureteral dilation. No mucosal bleeding. Patient does have a history of rheumatoid arthritis/Sjogren's syndrome. Objective:     Vitals:    09/15/22 2000 09/16/22 0552 09/16/22 0738 09/16/22 1012   BP:  130/65 134/81 135/88   Pulse:  80 80 90   Resp:  18 22 21   Temp:  98 °F (36.7 °C) 97.6 °F (36.4 °C) 99.3 °F (37.4 °C)   SpO2: 99%  100% 99%   Weight:       Height:            Physical Exam:  Constitutional: Middle-aged -American female looks chronically ill. Eyes: Sclerae anicteric. Conjunctivae shows pallor. ENMT: Oral mucosa is moist, no thrush, mucositis, or petechiae. Neck: No adenopathy. Respiratory: Lungs are clear bilaterally. Cardiovascular: Normal sinus rhythm; no gallop or murmur. Abdomen: Soft, nontender, no hepatosplenomegaly. No guarding or rigidity. Bowel sounds present. Extremities: No edema. Skin: No petechiae; no skin rash. Neurologic: Alert/oriented x 3.     Lab/Data Review:    Recent Results (from the past 24 hour(s))   CBC WITH AUTOMATED DIFF    Collection Time: 09/16/22  9:40 AM   Result Value Ref Range    WBC 4.4 3.6 - 11.0 K/uL    RBC 3.40 (L) 3.80 - 5.20 M/uL    HGB 9.0 (L) 11.5 - 16.0 g/dL    HCT 28.8 (L) 35.0 - 47.0 %    MCV 84.7 80.0 - 99.0 FL    MCH 26.5 26.0 - 34.0 PG    MCHC 31.3 30.0 - 36.5 g/dL    RDW 16.5 (H) 11.5 - 14.5 %    PLATELET 353 301 - 773 K/uL    MPV 9.3 8.9 - 12.9 FL    NRBC 0.0 0.0  WBC    ABSOLUTE NRBC 0.00 0.00 - 0.01 K/uL    NEUTROPHILS 68 32 - 75 %    LYMPHOCYTES 25 12 - 49 %    MONOCYTES 6 5 - 13 %    EOSINOPHILS 0 0 - 7 % BASOPHILS 0 0 - 1 %    IMMATURE GRANULOCYTES 1 (H) 0 - 0.5 %    ABS. NEUTROPHILS 3.0 1.8 - 8.0 K/UL    ABS. LYMPHOCYTES 1.1 0.8 - 3.5 K/UL    ABS. MONOCYTES 0.2 0.0 - 1.0 K/UL    ABS. EOSINOPHILS 0.0 0.0 - 0.4 K/UL    ABS. BASOPHILS 0.0 0.0 - 0.1 K/UL    ABS. IMM. GRANS. 0.1 (H) 0.00 - 0.04 K/UL    DF AUTOMATED     METABOLIC PANEL, COMPREHENSIVE    Collection Time: 09/16/22  9:40 AM   Result Value Ref Range    Sodium 137 136 - 145 mmol/L    Potassium 4.2 3.5 - 5.1 mmol/L    Chloride 110 (H) 97 - 108 mmol/L    CO2 20 (L) 21 - 32 mmol/L    Anion gap 7 5 - 15 mmol/L    Glucose 103 (H) 65 - 100 mg/dL    BUN 5 (L) 6 - 20 mg/dL    Creatinine 0.76 0.55 - 1.02 mg/dL    BUN/Creatinine ratio 7 (L) 12 - 20      GFR est AA >60 >60 ml/min/1.73m2    GFR est non-AA >60 >60 ml/min/1.73m2    Calcium 9.1 8.5 - 10.1 mg/dL    Bilirubin, total 0.4 0.2 - 1.0 mg/dL    AST (SGOT) 17 15 - 37 U/L    ALT (SGPT) 17 12 - 78 U/L    Alk. phosphatase 61 45 - 117 U/L    Protein, total 8.8 (H) 6.4 - 8.2 g/dL    Albumin 1.8 (L) 3.5 - 5.0 g/dL    Globulin 7.0 (H) 2.0 - 4.0 g/dL    A-G Ratio 0.3 (L) 1.1 - 2.2             Assessment and plan:     54-year-old -American female who came with abdominal pain and constipation. She is found to have bladder mass and anemia. 1) bladder mass: Obviously there is a concern for possible bladder carcinoma. Patient will need further diagnostic procedure like cystoscopy to confirm the diagnosis.  -Case was discussed with urologist Dr. Daniela Otoole. According to him patient has aggressive tumor and most likely it will be unresectable. We will wait for biopsy report. Discussed with patient about this finding. 2) anemia: May be multifactorial.  We will start anemia work-up. Patient has no obvious GI bleeding.  -Patient's hemoglobin is 9.1 on 14 September after receiving blood transfusion.   Hemoglobin was 6.1 on 13 September.  -Patient's hemoglobin is 8.4 15 September.  -Hemoglobin is 9 on 16 September  -Patient's protein is somewhat high and albumin is low. In light of anemia obviously there is concern about possible monoclonal gammopathy so I have initiated work-up for that.  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 Patient also had positive test for hepatitis C but she is not aware of her hepatitis C test result. We may consider repeating her hepatitis C serology.  -Hep C serology was ordered on 15 September. This dictation was done by dragon, computer voice recognition software. Often unanticipated grammatical, syntax, phones and other interpretive errors are inadvertently transcribed. Please excuse errors that have escaped final proofreading.        Signed By: Gissell Rasmussen MD     September 16, 2022

## 2022-09-16 NOTE — PROGRESS NOTES
Renal Progress Note    Patient: Elfego Rapp MRN: [id number]  SSN: [id number]    YOB: 1965  Age: 61 y.o. Sex: female      Admit Date: 9/12/2022    LOS: 3 days     Subjective:   Patient seen at bedside. Alert and awake, no acute distress. She has complaints of mild abdominal discomfort   Status post cystoscopy and right ureteral dilatation and stent placement,  biopsy of bladder tumor 9/15      Current Facility-Administered Medications   Medication Dose Route Frequency    HYDROmorphone (DILAUDID) syringe 0.5 mg  0.5 mg IntraVENous Q4H PRN    ciprofloxacin HCl (CIPRO) tablet 500 mg  500 mg Oral Q12H    [START ON 9/17/2022] ferrous sulfate tablet 325 mg  325 mg Oral DAILY WITH LUNCH    dextrose 5% - 0.45% NaCl with KCl 20 mEq/L infusion  75 mL/hr IntraVENous CONTINUOUS    polyethylene glycol (MIRALAX) packet 17 g  17 g Oral DAILY    docusate sodium (COLACE) capsule 100 mg  100 mg Oral BID    sodium chloride (NS) flush 5-10 mL  5-10 mL IntraVENous PRN    therapeutic multivitamin (THERAGRAN) tablet 1 Tablet  1 Tablet Oral DAILY    sodium chloride (NS) flush 5-40 mL  5-40 mL IntraVENous Q8H    sodium chloride (NS) flush 5-40 mL  5-40 mL IntraVENous PRN    acetaminophen (TYLENOL) tablet 650 mg  650 mg Oral Q6H PRN    Or    acetaminophen (TYLENOL) suppository 650 mg  650 mg Rectal Q6H PRN    ondansetron (ZOFRAN ODT) tablet 4 mg  4 mg Oral Q6H PRN    Or    ondansetron (ZOFRAN) injection 4 mg  4 mg IntraVENous Q6H PRN    0.9% sodium chloride infusion 250 mL  250 mL IntraVENous PRN        Vitals:    09/15/22 2000 09/16/22 0552 09/16/22 0738 09/16/22 1012   BP:  130/65 134/81 135/88   Pulse:  80 80 90   Resp:  18 22 21   Temp:  98 °F (36.7 °C) 97.6 °F (36.4 °C) 99.3 °F (37.4 °C)   SpO2: 99%  100% 99%   Weight:       Height:         Objective:   General: alert awake well-oriented, no acute distress.   HEENT: EOMI, no Icterus, no Pallor,  mucosa moist.  Neck: Neck is supple, No JVD  Lungs: breathsounds normal, no respiratory distress on inspection, no rhonchi, no rales,   CVS: heart sounds normal, regular rate and rhythm, no murmurs, no rubs. GI: soft, nontender, normal BS. Extremeties: no cyanosis, no edema,   Neuro: Alert, awake, oriented x3, No new focal deficits, moving all extremeties well. Skin: normal skin turgor, no skin rashes. Intake and Output:  Current Shift: No intake/output data recorded. Last three shifts: 09/14 1901 - 09/16 0700  In: 400 [I.V.:400]  Out: 1150 [Urine:1150]      Lab/Data Review:  Recent Labs     09/16/22  0940 09/15/22  0804 09/14/22 0924   WBC 4.4 5.5 7.2   HGB 9.0* 8.4* 9.1*   HCT 28.8* 26.1* 28.4*    384 380     Recent Labs     09/16/22  0940 09/15/22  0804 09/14/22  0924    140 139   K 4.2 4.2 3.4*   * 116* 112*   CO2 20* 17* 21   * 87 117*   BUN 5* 3* 5*   CREA 0.76 0.60 0.72   CA 9.1 9.2 8.9   MG  --  1.9  --    PHOS  --  2.8  --    ALB 1.8* 2.0*  --    TBILI 0.4  --   --    ALT 17  --   --      No results for input(s): PH, PCO2, PO2, HCO3, FIO2 in the last 72 hours. Recent Results (from the past 24 hour(s))   CBC WITH AUTOMATED DIFF    Collection Time: 09/16/22  9:40 AM   Result Value Ref Range    WBC 4.4 3.6 - 11.0 K/uL    RBC 3.40 (L) 3.80 - 5.20 M/uL    HGB 9.0 (L) 11.5 - 16.0 g/dL    HCT 28.8 (L) 35.0 - 47.0 %    MCV 84.7 80.0 - 99.0 FL    MCH 26.5 26.0 - 34.0 PG    MCHC 31.3 30.0 - 36.5 g/dL    RDW 16.5 (H) 11.5 - 14.5 %    PLATELET 334 744 - 760 K/uL    MPV 9.3 8.9 - 12.9 FL    NRBC 0.0 0.0  WBC    ABSOLUTE NRBC 0.00 0.00 - 0.01 K/uL    NEUTROPHILS 68 32 - 75 %    LYMPHOCYTES 25 12 - 49 %    MONOCYTES 6 5 - 13 %    EOSINOPHILS 0 0 - 7 %    BASOPHILS 0 0 - 1 %    IMMATURE GRANULOCYTES 1 (H) 0 - 0.5 %    ABS. NEUTROPHILS 3.0 1.8 - 8.0 K/UL    ABS. LYMPHOCYTES 1.1 0.8 - 3.5 K/UL    ABS. MONOCYTES 0.2 0.0 - 1.0 K/UL    ABS. EOSINOPHILS 0.0 0.0 - 0.4 K/UL    ABS. BASOPHILS 0.0 0.0 - 0.1 K/UL    ABS. IMM.  GRANS. 0.1 (H) 0.00 - 0.04 K/UL    DF AUTOMATED     METABOLIC PANEL, COMPREHENSIVE    Collection Time: 09/16/22  9:40 AM   Result Value Ref Range    Sodium 137 136 - 145 mmol/L    Potassium 4.2 3.5 - 5.1 mmol/L    Chloride 110 (H) 97 - 108 mmol/L    CO2 20 (L) 21 - 32 mmol/L    Anion gap 7 5 - 15 mmol/L    Glucose 103 (H) 65 - 100 mg/dL    BUN 5 (L) 6 - 20 mg/dL    Creatinine 0.76 0.55 - 1.02 mg/dL    BUN/Creatinine ratio 7 (L) 12 - 20      GFR est AA >60 >60 ml/min/1.73m2    GFR est non-AA >60 >60 ml/min/1.73m2    Calcium 9.1 8.5 - 10.1 mg/dL    Bilirubin, total 0.4 0.2 - 1.0 mg/dL    AST (SGOT) 17 15 - 37 U/L    ALT (SGPT) 17 12 - 78 U/L    Alk. phosphatase 61 45 - 117 U/L    Protein, total 8.8 (H) 6.4 - 8.2 g/dL    Albumin 1.8 (L) 3.5 - 5.0 g/dL    Globulin 7.0 (H) 2.0 - 4.0 g/dL    A-G Ratio 0.3 (L) 1.1 - 2.2          Assessment and Plan:     Acute kidney injury: Probably prerenal azotemia with poor p.o. intake/dehydration  Improved renal functions with IV hydration. Continue gentle IV hydration and continue to monitor renal functions     2. Right-sided hydronephrosis:    related to ureteral obstruction with bladder mass with suspected extension into ureter. Patient was evaluated by urology, s/p cystoscopy/tomor biopsy,  right ureteral dilatation and stent placement 9/15     3. Hyponatremia: Probably related to poor salt intake and hypotonic fluid consumption  Improved sodium levels with IV normal saline     4. Hypokalemia:  improved with p.o. potassium supplements  Repeated potassium  is 4.2 and magnesium level  1.9     5. Anemia/possible bladder cancer:  Oncology following the patient       Signed By: Ahsan Lebron MD     September 16, 2022

## 2022-09-16 NOTE — PROGRESS NOTES
UROLOGY Progress Note         [phone]      Daily Progress Note: 9/16/2022      Subjective: The patient is seen for UROLOGIC follow up for bladder mass. CT scan reveals right-sided hydronephrosis and hydroureter down to a right-sided bladder tumor approximately 3 cm in size with possible distal ureteral extension. Now s/p cystoscopy, urethral dilation, RIGHT ureteral stent placement, transurethral resection of bladder tumor > 2cm, and bilateral retrograde pyelograms on 9/15/22. Urethral stenosis requiring dilation  Bladder mass not completely resectable. Pathology pending. Gibson is out. She has voided. No stent discomfort.        Problem List:  Patient Active Problem List   Diagnosis Code    History of blood clots Z86.718    Bladder cancer ([facility] [address] C67.9    Anemia due to acute blood loss D62    Hypercalcemia E83.52    Carcinoma of bladder (HCC) C67.9    Hydronephrosis of right kidney N13.30         Medications reviewed  Current Facility-Administered Medications   Medication Dose Route Frequency    dextrose 5% - 0.45% NaCl with KCl 20 mEq/L infusion  75 mL/hr IntraVENous CONTINUOUS    polyethylene glycol (MIRALAX) packet 17 g  17 g Oral DAILY    docusate sodium (COLACE) capsule 100 mg  100 mg Oral BID    meropenem (MERREM) 1 g in 0.9% sodium chloride (MBP/ADV) 50 mL MBP  1 g IntraVENous Q8H    sodium chloride (NS) flush 5-10 mL  5-10 mL IntraVENous PRN    therapeutic multivitamin (THERAGRAN) tablet 1 Tablet  1 Tablet Oral DAILY    ferrous sulfate tablet 325 mg  325 mg Oral DAILY    sodium chloride (NS) flush 5-40 mL  5-40 mL IntraVENous Q8H    sodium chloride (NS) flush 5-40 mL  5-40 mL IntraVENous PRN    acetaminophen (TYLENOL) tablet 650 mg  650 mg Oral Q6H PRN    Or    acetaminophen (TYLENOL) suppository 650 mg  650 mg Rectal Q6H PRN    ondansetron (ZOFRAN ODT) tablet 4 mg  4 mg Oral Q6H PRN    Or    ondansetron (ZOFRAN) injection 4 mg  4 mg IntraVENous Q6H PRN    0.9% sodium chloride infusion 250 mL  250 mL IntraVENous PRN       Review of Systems:   Review of Systems   Constitutional:  Negative for chills and fever. Respiratory:  Negative for cough, sputum production and shortness of breath. Cardiovascular:  Negative for palpitations. Gastrointestinal:  Positive for constipation. Negative for abdominal pain, nausea and vomiting. Genitourinary:         Now with abarca catheter   Musculoskeletal:  Negative for back pain. All other systems reviewed and are negative. Objective:   Physical Exam  Constitutional:       Appearance: Normal appearance. She is not ill-appearing. Comments: Ambulating. Cleaning her room   HENT:      Head: Normocephalic and atraumatic. Eyes:      Extraocular Movements: Extraocular movements intact. Pupils: Pupils are equal, round, and reactive to light. Cardiovascular:      Rate and Rhythm: Normal rate. Pulmonary:      Effort: Pulmonary effort is normal. No respiratory distress. Breath sounds: No wheezing. Abdominal:      General: Abdomen is flat. There is no distension. Genitourinary:     Comments: Now with abarca catheter; pink clear urine draining  Musculoskeletal:      Cervical back: Normal range of motion. No rigidity. Skin:     General: Skin is warm and dry. Neurological:      Mental Status: She is alert and oriented to person, place, and time. Mental status is at baseline.    Psychiatric:         Mood and Affect: Mood normal.         Behavior: Behavior normal.        Visit Vitals  /81 (BP 1 Location: Left upper arm, BP Patient Position: At rest;Lying)   Pulse 80   Temp 97.6 °F (36.4 °C)   Resp 22   Ht 5' 3\" (1.6 m)   Wt 116 lb 12.8 oz (53 kg)   SpO2 100%   BMI 20.69 kg/m²         Data Review:       Recent Days:  Recent Labs     09/15/22  0804 09/14/22  0924 09/13/22  2347 09/13/22  1102   WBC 5.5 7.2  --  3.4*   HGB 8.4* 9.1* 6.1* 6.1*   HCT 26.1* 28.4* 19.9* 19.4*    380  --  358       Recent Labs 09/15/22  0804 09/14/22  0924 09/13/22  1102    139 138   K 4.2 3.4* 3.8   * 112* 110*   CO2 17* 21 21   GLU 87 117* 85   BUN 3* 5* 6   CREA 0.60 0.72 0.71   CA 9.2 8.9 8.8   MG 1.9  --   --    PHOS 2.8  --   --    ALB 2.0*  --  1.8*   TBILI  --   --  0.6   ALT  --   --  22         Assessment/     Patient Active Problem List   Diagnosis Code    History of blood clots Z86.718    Bladder cancer (HCC) C67.9    Anemia due to acute blood loss D62    Hypercalcemia E83.52    Carcinoma of bladder (HCC) C67.9    Hydronephrosis of right kidney N13.30       Plan:    BLADDER MASS: now s/p cystoscopy, RPG, incomplete resection, right ureteral stent placement on 9/15/22. Pathology pending. URETERAL TUMOR: Markedly dilated ureter and renal pelvis severe hydronephrosis. Level of obstruction at bladder tumor at the ureteral orifice. No proximal strictures or lesions seen. RIGHT HYDRONEPHROSIS: Markedly dilated ureter and renal pelvis severe hydronephrosis. Level of obstruction at bladder tumor at the ureteral orifice. No proximal strictures or lesions seen. Now with right ureteral stent. RETAINED URETERAL STENT: now s/p right ureteral stent placement. URETHRAL STENOSIS: now s/p dilation to 28F. ANEMIA: may be related to malignancy. Transfuse as necessary. Discussion of systemic anticoagulation; not recommended at this brittany. May exacerbate anemia s/p bladder tumor resection. HX OF DVT: IVC filter in place, no anticoagulation. Thrombosis in the gonadal vein noted on imaging. I do not recommend anticoagulation. Contraindicated postop. Use SCDs. CONSTIPATION: reason for admission; managed by Primary team, bowel regimen, had BM. I personally had a face to face encounter with the patient and performed the history, physical, assessment and plan.    MD Avani Rausch NP

## 2022-09-16 NOTE — PROGRESS NOTES
Bedside and Verbal shift change report given to Nic Gray RN (oncoming nurse) by Alex Ballard LPN (offgoing nurse). Report included the following information SBAR, Kardex, Intake/Output, MAR, Accordion, Recent Results, and Med Rec Status.

## 2022-09-16 NOTE — PROGRESS NOTES
Hematology and Oncology Progress Note    Patient: Terrie Giordano MRN: [id number]  SSN: [id number]    YOB: 1965  Age: 61 y.o. Sex: female      Admit Date: 9/12/2022    LOS: 2 days     Chief Complaint: Patient is complaining of abdominal discomfort and constipation    Subjective:   Patient is feeling somewhat better. She had some diarrhea. No mucosal bleeding. Patient does have a history of rheumatoid arthritis/Sjogren's syndrome. Objective:     Vitals:    09/15/22 1815 09/15/22 1820 09/15/22 1834 09/15/22 1912   BP: (!) 146/89 (!) 143/86 137/89    Pulse: 79 85 89 84   Resp: 15 16 16 16   Temp:    97.3 °F (36.3 °C)   SpO2: 100% 100% 97% 100%   Weight:       Height:            Physical Exam:  Constitutional: Middle-aged -American female looks chronically ill. Eyes: Sclerae anicteric. Conjunctivae shows pallor. ENMT: Oral mucosa is moist, no thrush, mucositis, or petechiae. Neck: No adenopathy. Respiratory: Lungs are clear bilaterally. Cardiovascular: Normal sinus rhythm; no gallop or murmur. Abdomen: Soft, nontender, no hepatosplenomegaly. No guarding or rigidity. Bowel sounds present. Extremities: No edema. Skin: No petechiae; no skin rash. Neurologic: Alert/oriented x 3. Lab/Data Review:    Recent Results (from the past 24 hour(s))   CBC WITH AUTOMATED DIFF    Collection Time: 09/15/22  8:04 AM   Result Value Ref Range    WBC 5.5 3.6 - 11.0 K/uL    RBC 3.16 (L) 3.80 - 5.20 M/uL    HGB 8.4 (L) 11.5 - 16.0 g/dL    HCT 26.1 (L) 35.0 - 47.0 %    MCV 82.6 80.0 - 99.0 FL    MCH 26.6 26.0 - 34.0 PG    MCHC 32.2 30.0 - 36.5 g/dL    RDW 16.2 (H) 11.5 - 14.5 %    PLATELET 142 540 - 188 K/uL    MPV 9.0 8.9 - 12.9 FL    NRBC 0.0 0.0  WBC    ABSOLUTE NRBC 0.00 0.00 - 0.01 K/uL    NEUTROPHILS 66 32 - 75 %    LYMPHOCYTES 26 12 - 49 %    MONOCYTES 7 5 - 13 %    EOSINOPHILS 0 0 - 7 %    BASOPHILS 0 0 - 1 %    IMMATURE GRANULOCYTES 1 (H) 0 - 0.5 %    ABS.  NEUTROPHILS 3.6 1.8 - 8.0 K/UL    ABS. LYMPHOCYTES 1.4 0.8 - 3.5 K/UL    ABS. MONOCYTES 0.4 0.0 - 1.0 K/UL    ABS. EOSINOPHILS 0.0 0.0 - 0.4 K/UL    ABS. BASOPHILS 0.0 0.0 - 0.1 K/UL    ABS. IMM. GRANS. 0.1 (H) 0.00 - 0.04 K/UL    DF AUTOMATED     RENAL FUNCTION PANEL    Collection Time: 09/15/22  8:04 AM   Result Value Ref Range    Sodium 140 136 - 145 mmol/L    Potassium 4.2 3.5 - 5.1 mmol/L    Chloride 116 (H) 97 - 108 mmol/L    CO2 17 (L) 21 - 32 mmol/L    Anion gap 7 5 - 15 mmol/L    Glucose 87 65 - 100 mg/dL    BUN 3 (L) 6 - 20 mg/dL    Creatinine 0.60 0.55 - 1.02 mg/dL    BUN/Creatinine ratio 5 (L) 12 - 20      GFR est AA >60 >60 ml/min/1.73m2    GFR est non-AA >60 >60 ml/min/1.73m2    Calcium 9.2 8.5 - 10.1 mg/dL    Phosphorus 2.8 2.6 - 4.7 mg/dL    Albumin 2.0 (L) 3.5 - 5.0 g/dL   MAGNESIUM    Collection Time: 09/15/22  8:04 AM   Result Value Ref Range    Magnesium 1.9 1.6 - 2.4 mg/dL           Assessment and plan:     65-year-old -American female who came with abdominal pain and constipation. She is found to have bladder mass and anemia. 1) bladder mass: Obviously there is a concern for possible bladder carcinoma. Patient will need further diagnostic procedure like cystoscopy to confirm the diagnosis. -Urology evaluation will be needed.  -Patient is scheduled for cystoscopy as per patient's nurse     2) anemia: May be multifactorial.  We will start anemia work-up. Patient has no obvious GI bleeding.  -Patient's hemoglobin is 9.1 on 14 September after receiving blood transfusion. Hemoglobin was 6.1 on 13 September.  -Patient's hemoglobin is 8.4 15 September.  -Patient's protein is somewhat high and albumin is low. In light of anemia obviously there is concern about possible monoclonal gammopathy so I have initiated work-up for that.  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   Patient also had positive test for hepatitis C but she is not aware of her hepatitis C test result. We may consider repeating her hepatitis C serology. 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 Obviously because of her concern about possible malignancy which may be causing thrombosis. We will hold off anticoagulation until we have urologic evaluation and recommendations. -Gynecology consult has been requested and waiting for their formal input.  -Patient's bilateral lower extremity Dopplers did not show any DVT of either lower extremities. This dictation was done by dragon, computer voice recognition software. Often unanticipated grammatical, syntax, phones and other interpretive errors are inadvertently transcribed. Please excuse errors that have escaped final proofreading.        Signed By: Ralph Damon MD     September 15, 2022

## 2022-09-17 VITALS
HEART RATE: 90 BPM | BODY MASS INDEX: 20.98 KG/M2 | HEIGHT: 63 IN | SYSTOLIC BLOOD PRESSURE: 116 MMHG | RESPIRATION RATE: 18 BRPM | TEMPERATURE: 97.8 F | WEIGHT: 118.39 LBS | OXYGEN SATURATION: 99 % | DIASTOLIC BLOOD PRESSURE: 72 MMHG

## 2022-09-17 PROBLEM — N30.00 ACUTE CYSTITIS: Status: ACTIVE | Noted: 2022-09-17

## 2022-09-17 LAB
ALBUMIN SERPL-MCNC: 2.1 G/DL (ref 3.5–5)
ALBUMIN/GLOB SERPL: 0.3 {RATIO} (ref 1.1–2.2)
ALP SERPL-CCNC: 68 U/L (ref 45–117)
ALT SERPL-CCNC: 18 U/L (ref 12–78)
ANION GAP SERPL CALC-SCNC: 7 MMOL/L (ref 5–15)
AST SERPL W P-5'-P-CCNC: 20 U/L (ref 15–37)
BASOPHILS # BLD: 0 K/UL (ref 0–0.1)
BASOPHILS NFR BLD: 0 % (ref 0–1)
BILIRUB SERPL-MCNC: 0.4 MG/DL (ref 0.2–1)
BUN SERPL-MCNC: 6 MG/DL (ref 6–20)
BUN/CREAT SERPL: 7 (ref 12–20)
CA-I BLD-MCNC: 9.1 MG/DL (ref 8.5–10.1)
CHLORIDE SERPL-SCNC: 109 MMOL/L (ref 97–108)
CO2 SERPL-SCNC: 22 MMOL/L (ref 21–32)
CREAT SERPL-MCNC: 0.82 MG/DL (ref 0.55–1.02)
DIFFERENTIAL METHOD BLD: ABNORMAL
EOSINOPHIL # BLD: 0 K/UL (ref 0–0.4)
EOSINOPHIL NFR BLD: 0 % (ref 0–7)
ERYTHROCYTE [DISTWIDTH] IN BLOOD BY AUTOMATED COUNT: 16.7 % (ref 11.5–14.5)
GLOBULIN SER CALC-MCNC: 7.2 G/DL (ref 2–4)
GLUCOSE SERPL-MCNC: 76 MG/DL (ref 65–100)
HAV IGM SER QL: NONREACTIVE
HBV CORE IGM SER QL: NONREACTIVE
HBV SURFACE AG SER QL: <0.1 INDEX
HBV SURFACE AG SER QL: NEGATIVE
HCT VFR BLD AUTO: 32.5 % (ref 35–47)
HCV AB SER IA-ACNC: 1.39 INDEX
HCV AB SERPL QL IA: REACTIVE
HGB BLD-MCNC: 10 G/DL (ref 11.5–16)
IGA SERPL-MCNC: 722 MG/DL (ref 87–352)
IGG SERPL-MCNC: 4086 MG/DL (ref 586–1602)
IGM SERPL-MCNC: 89 MG/DL (ref 26–217)
IMM GRANULOCYTES # BLD AUTO: 0.1 K/UL (ref 0–0.04)
IMM GRANULOCYTES NFR BLD AUTO: 1 % (ref 0–0.5)
LYMPHOCYTES # BLD: 1.8 K/UL (ref 0.8–3.5)
LYMPHOCYTES NFR BLD: 24 % (ref 12–49)
MCH RBC QN AUTO: 26.2 PG (ref 26–34)
MCHC RBC AUTO-ENTMCNC: 30.8 G/DL (ref 30–36.5)
MCV RBC AUTO: 85.1 FL (ref 80–99)
MONOCYTES # BLD: 0.5 K/UL (ref 0–1)
MONOCYTES NFR BLD: 6 % (ref 5–13)
NEUTS SEG # BLD: 4.9 K/UL (ref 1.8–8)
NEUTS SEG NFR BLD: 69 % (ref 32–75)
NRBC # BLD: 0 K/UL (ref 0–0.01)
NRBC BLD-RTO: 0 PER 100 WBC
PLATELET # BLD AUTO: 431 K/UL (ref 150–400)
PMV BLD AUTO: 9 FL (ref 8.9–12.9)
POTASSIUM SERPL-SCNC: 4.3 MMOL/L (ref 3.5–5.1)
PROT PATTERN SERPL IFE-IMP: ABNORMAL
PROT SERPL-MCNC: 9.3 G/DL (ref 6.4–8.2)
RBC # BLD AUTO: 3.82 M/UL (ref 3.8–5.2)
SODIUM SERPL-SCNC: 138 MMOL/L (ref 136–145)
SP1: ABNORMAL
SP2: ABNORMAL
SP3: ABNORMAL
WBC # BLD AUTO: 7.2 K/UL (ref 3.6–11)

## 2022-09-17 PROCEDURE — 74011250636 HC RX REV CODE- 250/636: Performed by: PHYSICIAN ASSISTANT

## 2022-09-17 PROCEDURE — 36415 COLL VENOUS BLD VENIPUNCTURE: CPT

## 2022-09-17 PROCEDURE — 80053 COMPREHEN METABOLIC PANEL: CPT

## 2022-09-17 PROCEDURE — 85025 COMPLETE CBC W/AUTO DIFF WBC: CPT

## 2022-09-17 PROCEDURE — 99232 SBSQ HOSP IP/OBS MODERATE 35: CPT | Performed by: UROLOGY

## 2022-09-17 PROCEDURE — 74011250637 HC RX REV CODE- 250/637: Performed by: PHYSICIAN ASSISTANT

## 2022-09-17 PROCEDURE — 74011250637 HC RX REV CODE- 250/637: Performed by: HOSPITALIST

## 2022-09-17 PROCEDURE — 94762 N-INVAS EAR/PLS OXIMTRY CONT: CPT

## 2022-09-17 PROCEDURE — 74011000250 HC RX REV CODE- 250: Performed by: HOSPITALIST

## 2022-09-17 RX ORDER — CIPROFLOXACIN 500 MG/1
500 TABLET ORAL EVERY 12 HOURS
Qty: 11 TABLET | Refills: 0 | Status: SHIPPED | OUTPATIENT
Start: 2022-09-17 | End: 2022-09-23

## 2022-09-17 RX ADMIN — THERA TABS 1 TABLET: TAB at 08:32

## 2022-09-17 RX ADMIN — FERROUS SULFATE TAB 325 MG (65 MG ELEMENTAL FE) 325 MG: 325 (65 FE) TAB at 12:07

## 2022-09-17 RX ADMIN — POTASSIUM CHLORIDE, DEXTROSE MONOHYDRATE AND SODIUM CHLORIDE 75 ML/HR: 150; 5; 450 INJECTION, SOLUTION INTRAVENOUS at 02:23

## 2022-09-17 RX ADMIN — SODIUM CHLORIDE, PRESERVATIVE FREE 10 ML: 5 INJECTION INTRAVENOUS at 06:23

## 2022-09-17 RX ADMIN — CIPROFLOXACIN 500 MG: 500 TABLET, FILM COATED ORAL at 08:32

## 2022-09-17 RX ADMIN — HYDROMORPHONE HYDROCHLORIDE 0.5 MG: 1 INJECTION, SOLUTION INTRAMUSCULAR; INTRAVENOUS; SUBCUTANEOUS at 08:37

## 2022-09-17 NOTE — PROGRESS NOTES
Hematology and Oncology Progress Note    Patient: Elizabeth Mari MRN: [id number]  SSN: [id number]    YOB: 1965  Age: 61 y.o. Sex: female      Admit Date: 9/12/2022    LOS: 4 days     Chief Complaint: Is feeling much better  Subjective:     She is accompanied by her niece at bedside. Patient has no hematuria. Abdominal pain is better. She has no other mucosal bleeding. No dyspnea    Objective:     Vitals:    09/16/22 1947 09/17/22 0220 09/17/22 0517 09/17/22 0728   BP: 120/78 107/60  116/72   Pulse: 87 84  90   Resp: 16 18  18   Temp: 97.5 °F (36.4 °C) 97.8 °F (36.6 °C)  97.8 °F (36.6 °C)   SpO2:  100% 99% 99%   Weight:       Height:            Physical Exam:  Constitutional: Middle-aged -American female looks chronically ill. Eyes: Sclerae anicteric. Conjunctivae shows pallor. ENMT: Oral mucosa is moist, no thrush, mucositis, or petechiae. Neck: No adenopathy. Respiratory: Lungs are clear bilaterally. Cardiovascular: Normal sinus rhythm; no gallop or murmur. Abdomen: Soft, nontender, no hepatosplenomegaly. No guarding or rigidity. Bowel sounds present. Extremities: No edema. Skin: No petechiae; no skin rash. Neurologic: Alert/oriented x 3. Lab/Data Review:    No results found for this or any previous visit (from the past 24 hour(s)). Assessment and plan:     48-year-old -American female who came with abdominal pain and constipation. She is found to have bladder mass and anemia. 1) bladder mass: Obviously there is a concern for possible bladder carcinoma. Patient will need further diagnostic procedure like cystoscopy to confirm the diagnosis.  -Case was discussed with urologist Dr. Aleks Lemons. According to him patient has aggressive tumor and most likely it will be unresectable. We will wait for biopsy report. Discussed with patient about this finding.  -Discussed with patient and her niece about her pending biopsy result.   She will see me in about next 1 to 2 weeks for follow-up. By then we should have her biopsy result back. 2) anemia: May be multifactorial.  We will start anemia work-up. Patient has no obvious GI bleeding.  -Patient's hemoglobin is 9.1 on 14 September after receiving blood transfusion. Hemoglobin was 6.1 on 13 September.  -Patient's hemoglobin is 8.4 15 September.  -Hemoglobin is 9 on 16 September.  -Hemoglobin is 10 today  -Patient's protein is somewhat high and albumin is low. In light of anemia obviously there is concern about possible monoclonal gammopathy so I have initiated work-up for that.  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 Patient also had positive test for hepatitis C but she is not aware of her hepatitis C test result. We may consider repeating her hepatitis C serology.  -Hep C serology was ordered on 15 September.    -Hepatitis C antibody is positive.  -Serum free Kappa to lambda light chain ratio is within normal limits               This dictation was done by dragon, computer voice recognition software. Often unanticipated grammatical, syntax, phones and other interpretive errors are inadvertently transcribed. Please excuse errors that have escaped final proofreading.        Signed By: Salud Kaur MD     September 17, 2022

## 2022-09-17 NOTE — PROGRESS NOTES
I have reviewed discharge instructions with Ms. Coronel. Nurse removed IV access, reviewed medication list, and follow up appointments with the patient. Patient transportation provided by family member in there personal vehicle. Patient is being discharge to home to home self care per doctor's order. Patient understood and verbalized understanding of all discharge orders. Discharge plan of care/case management plan validated with provider discharge order.

## 2022-09-17 NOTE — DISCHARGE SUMMARY
Admit date: 9/12/2022   Admitting Provider: Karen Shah MD    Discharge date: 9/17/2022  Discharging Provider: Leila Rosales PA-C      * Admission Diagnoses: Carcinoma of bladder [facility]) [C67.9]  Hydronephrosis of right kidney [N13.30]  Anemia due to acute blood loss [D62]    * Discharge Diagnoses:    Hospital Problems as of 9/17/2022 Date Reviewed: 9/13/2022            Codes Class Noted - Resolved POA    Acute cystitis ICD-10-CM: N30.00  ICD-9-CM: 595.0  9/17/2022 - Present Unknown        Bladder cancer ([facility] ICD-10-CM: C67.9  ICD-9-CM: 188.9  9/13/2022 - Present Yes        Anemia due to acute blood loss ICD-10-CM: D62  ICD-9-CM: 285.1  9/13/2022 - Present Yes        Hypercalcemia ICD-10-CM: E83.52  ICD-9-CM: 275.42  9/13/2022 - Present Yes        Carcinoma of bladder ([facility] ICD-10-CM: C67.9  ICD-9-CM: 188.9  9/13/2022 - Present Unknown        Hydronephrosis of right kidney ICD-10-CM: N13.30  ICD-9-CM: 212  9/13/2022 - Present Unknown           * Hospital Course: This a 59-year-old female admitted on 9/12/2022 with a history of venous thrombosis although not on anticoagulation which is presumed secondary to bleeding, rheumatoid arthritis and recently discontinued medications who presented with abdominal pain and a feeling of constipation. CT scan of the abdomen pelvis was performed revealing sessile bladder carcinoma encasing the right urethrovesical junction with a possible extension in the right distal ureter. There was severe right hydronephrosis and right gonadal vein thrombosis. Further review of the CT reveals cholelithiasis with proximal cystic duct encasement of these stones. There was obvious common bile duct distention. Total bilirubin is normal.  Ultrasound with gallstone and ductal dilitation. GI consultation. Hematology consultation and concern for possible monoclonal gammopathy and work-up has been instituted.   Urology consultation, Dr. Bryan Marshall to perform cystoscopy with retrograde pyelograms and right ureteral stent placement as well as transurethral resection of bladder tumor and patient simply found to have a urinary tract infection with leukoesterase and nitrates and greater than 100 white cells per high-power field with urine culture showing gram-negative rods. In addition nephrology consultation and following along. Patient also found to have a hemoglobin of 6.1 most likely from acute bleeding although denies hematuria and stool occult is negative. Underwent cystoscopy and tumor resection with stent placement. Hematuria has resolved. We will continue Cipro for total of 7 days due to the E. coli that sensitive to Cipro. Patient follow-up with hematology\oncology in regards to hepatitis C and monoclonal\polyclonal gammopathy testing. Also for clearance for anticoagulation in regards to the gonadal vein thrombosis after urology has cleared on the standpoint of tumor resection. Patient will follow up with urology, Dr. Anna Guillen in approximately 1 week for pathology results in regards to the tumor resection. Patient will follow with Dr. Valentina Yadav as an outpatient regards to the cholelithiasis. Impression\plan:    1. Right sided hydronephrosis with presumed bladder cancer obstructing the distal UVJ. Cystoscopy with right ureteral stent placement and tumor resection  Hematuria resolved    2. Urinary tract infection  Urine culture gram-negative rods E. 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 Questionable monoclonal/Poloclonal gammopathy  Repeat hepatitis C panel pending follow up with results  Follow-up with hematology as outpatient     5. JEROME  Resolved    6.  Acute blood loss anemia  Stable  Transfuse for hemoglobin less than 7  HGB 10 and trending up    * Procedures: Procedure(s):  CYSTOURETEROSCOPY, TRANSURETHRAL RESECTION OF BLADDER TUMOR, BILATERAL RETROGRADE PYELOGRAM, RIGHT URETEROSCOPY; right ureterial stent placement; urethral dialation; resection of bladder tumor  . Consults:   Hematology, urology, nephrology and GI    Significant Diagnostic Studies: As discussed in hospital course    Discharge Exam:  Visit Vitals  /72 (BP 1 Location: Right upper arm, BP Patient Position: Lying; At rest)   Pulse 90   Temp 97.8 °F (36.6 °C)   Resp 18   Ht 5' 3\" (1.6 m)   Wt 53.7 kg (118 lb 6.2 oz)   LMP  (LMP Unknown)   SpO2 99%   BMI 20.97 kg/m²     PHYSICAL EXAM:  Constitutional: Alert in no acute distress   HEENT: Sclerae anicteric, The neck is supple. Cardiovascular: Regular rate and rhythm. No murmurs, gallops, or rubs. Marcos Ra Respiratory: Clear breath sounds with no wheezes, rales, or rhonchi. GI: Abdomen nondistended, soft, and nontender. Normal active bowel sounds. Rectal: Deferred   Musculoskeletal: No pitting edema of the lower legs. Extremities have good range of motion. Neurological:  Patient is alert and oriented. Cranial nerves II-XII intact  Psychiatric: Mood appears appropriate with judgement intact. Lymphatic: No cervical or supraclavicular adenopathy. Skin: No rashes or breakdown of the skin      * Discharge Condition: stable  * Disposition: Home    Discharge Medications:  Current Discharge Medication List        START taking these medications    Details   ciprofloxacin HCl (CIPRO) 500 mg tablet Take 1 Tablet by mouth every twelve (12) hours for 11 doses. Qty: 11 Tablet, Refills: 0  Start date: 9/17/2022, End date: 9/23/2022           CONTINUE these medications which have NOT CHANGED    Details   ferrous sulfate 325 mg (65 mg iron) tablet Take 325 mg by mouth daily. bisacodyL (DULCOLAX) 10 mg supp Insert 10 mg into rectum daily as needed for Constipation.   Qty: 10 Suppository, Refills: 0      Daily-Luan tablet TAKE 1 TABLET BY MOUTH EVERY DAY  Qty: 30 Tablet, Refills: 1    Associated Diagnoses: Encounter for gynecological examination (general) (routine) without abnormal findings      Arthritis Pain Relief 650 mg TbER TAKE 1 TABLET BY MOUTH THREE (3) TIMES DAILY AS NEEDED FOR PAIN. Qty: 100 Tablet, Refills: 2    Associated Diagnoses: History of rheumatoid arthritis; Pain in joint, multiple sites      multivitamin with folic acid (One Daily Essential) 400 mcg tab tablet Take 1 Tablet by mouth daily. Qty: 90 Tablet, Refills: 0    Comments: Refill given in the absence of Dr. Brendon Zuniga send the refill request to her after 3 months  Associated Diagnoses: Encounter for gynecological examination without abnormal finding      cromolyn (OPTICROM) 4 % ophthalmic solution Administer 1 Drop to both eyes daily. STOP taking these medications       lactulose (CHRONULAC) 10 gram/15 mL solution Comments:   Reason for Stopping:         magnesium citrate solution Comments:   Reason for Stopping:               * Follow-up Care/Patient Instructions: Activity: Activity as tolerated  Diet: Regular Diet  Wound Care: None needed    Follow-up Information       Follow up With Specialties Details Why [address] [facility] Saira Jacobs MD Internal Medicine Physician Follow up in 1 week(s)  [facility],[address] [facility], [facility]  [phone]      Tomeka Yo MD General Surgery Follow up in 1 week(s)  [facility]      Jose G Torres MD Urology Follow up in 1 week(s)  [address] [id number]  [phone]      Braxton Bennett MD Hematology and Oncology Follow up in 1 week(s)  [address] [facility]. [facility]  [phone]              Discharge summary greater than 35 minutes spent with the patient performing discharge instructions, medication review and physical exam    Signed:  Héctor Randall PA-C  9/17/2022  11:34 AM

## 2022-09-17 NOTE — PROGRESS NOTES
UROLOGY Progress Note         [phone]      Daily Progress Note: 9/17/2022      Subjective: The patient is seen for UROLOGIC follow up for bladder mass. CT scan reveals right-sided hydronephrosis and hydroureter down to a right-sided bladder tumor approximately 3 cm in size with possible distal ureteral extension. Now s/p cystoscopy, urethral dilation, RIGHT ureteral stent placement, transurethral resection of bladder tumor > 2cm, and bilateral retrograde pyelograms on 9/15/22. Urethral stenosis requiring dilation  Bladder mass not completely resectable. Pathology pending. Gibson is out. She has voided. No stent discomfort.        Problem List:  Patient Active Problem List   Diagnosis Code    History of blood clots Z86.718    Bladder cancer ([facility] C67.9    Anemia due to acute blood loss D62    Hypercalcemia E83.52    Carcinoma of bladder (HCC) C67.9    Hydronephrosis of right kidney N13.30         Medications reviewed  Current Facility-Administered Medications   Medication Dose Route Frequency    HYDROmorphone (DILAUDID) syringe 0.5 mg  0.5 mg IntraVENous Q4H PRN    ciprofloxacin HCl (CIPRO) tablet 500 mg  500 mg Oral Q12H    ferrous sulfate tablet 325 mg  325 mg Oral DAILY WITH LUNCH    dextrose 5% - 0.45% NaCl with KCl 20 mEq/L infusion  75 mL/hr IntraVENous CONTINUOUS    polyethylene glycol (MIRALAX) packet 17 g  17 g Oral DAILY    docusate sodium (COLACE) capsule 100 mg  100 mg Oral BID    sodium chloride (NS) flush 5-10 mL  5-10 mL IntraVENous PRN    therapeutic multivitamin (THERAGRAN) tablet 1 Tablet  1 Tablet Oral DAILY    sodium chloride (NS) flush 5-40 mL  5-40 mL IntraVENous Q8H    sodium chloride (NS) flush 5-40 mL  5-40 mL IntraVENous PRN    acetaminophen (TYLENOL) tablet 650 mg  650 mg Oral Q6H PRN    Or    acetaminophen (TYLENOL) suppository 650 mg  650 mg Rectal Q6H PRN    ondansetron (ZOFRAN ODT) tablet 4 mg  4 mg Oral Q6H PRN    Or    ondansetron (ZOFRAN) injection 4 mg 4 mg IntraVENous Q6H PRN    0.9% sodium chloride infusion 250 mL  250 mL IntraVENous PRN       Review of Systems:   Review of Systems   Constitutional:  Negative for chills and fever. Respiratory:  Negative for cough, sputum production and shortness of breath. Cardiovascular:  Negative for palpitations. Gastrointestinal:  Positive for constipation. Negative for abdominal pain, nausea and vomiting. Genitourinary:         Now with abarca catheter   Musculoskeletal:  Negative for back pain. All other systems reviewed and are negative. Objective:   Physical Exam  Constitutional:       Appearance: Normal appearance. She is not ill-appearing. Comments: Ambulating. Cleaning her room   HENT:      Head: Normocephalic and atraumatic. Eyes:      Extraocular Movements: Extraocular movements intact. Pupils: Pupils are equal, round, and reactive to light. Cardiovascular:      Rate and Rhythm: Normal rate. Pulmonary:      Effort: Pulmonary effort is normal. No respiratory distress. Breath sounds: No wheezing. Abdominal:      General: Abdomen is flat. There is no distension. Genitourinary:     Comments: Now with abarca catheter; pink clear urine draining  Musculoskeletal:      Cervical back: Normal range of motion. No rigidity. Skin:     General: Skin is warm and dry. Neurological:      Mental Status: She is alert and oriented to person, place, and time. Mental status is at baseline. Psychiatric:         Mood and Affect: Mood normal.         Behavior: Behavior normal.        Visit Vitals  /72 (BP 1 Location: Right upper arm, BP Patient Position: Lying; At rest)   Pulse 90   Temp 97.8 °F (36.6 °C)   Resp 18   Ht 5' 3\" (1.6 m)   Wt 118 lb 6.2 oz (53.7 kg)   SpO2 99%   BMI 20.97 kg/m²         Data Review:       Recent Days:  Recent Labs     09/16/22  0940 09/15/22  0804   WBC 4.4 5.5   HGB 9.0* 8.4*   HCT 28.8* 26.1*    384       Recent Labs     09/16/22  0940 09/15/22  0804  140   K 4.2 4.2   * 116*   CO2 20* 17*   * 87   BUN 5* 3*   CREA 0.76 0.60   CA 9.1 9.2   MG  --  1.9   PHOS  --  2.8   ALB 1.8* 2.0*   TBILI 0.4  --    ALT 17  --          Assessment/     Patient Active Problem List   Diagnosis Code    History of blood clots Z86.718    Bladder cancer (HCC) C67.9    Anemia due to acute blood loss D62    Hypercalcemia E83.52    Carcinoma of bladder (HCC) C67.9    Hydronephrosis of right kidney N13.30       Plan:    BLADDER MASS: now s/p cystoscopy, RPG, incomplete resection, right ureteral stent placement on 9/15/22. Pathology pending. URETERAL TUMOR: Markedly dilated ureter and renal pelvis severe hydronephrosis. Level of obstruction at bladder tumor at the ureteral orifice. No proximal strictures or lesions seen. RIGHT HYDRONEPHROSIS: Markedly dilated ureter and renal pelvis severe hydronephrosis. Level of obstruction at bladder tumor at the ureteral orifice. No proximal strictures or lesions seen. Now with right ureteral stent. RETAINED URETERAL STENT: now s/p right ureteral stent placement. URETHRAL STENOSIS: now s/p dilation to 28F. ANEMIA: may be related to malignancy. Transfuse as necessary. Discussion of systemic anticoagulation; not recommended at this brittany. May exacerbate anemia s/p bladder tumor resection. HX OF DVT: IVC filter in place, no anticoagulation. Thrombosis in the gonadal vein noted on imaging. I do not recommend anticoagulation. Contraindicated postop. Use SCDs. CONSTIPATION: reason for admission; managed by Primary team, bowel regimen, had BM.           Joshua Menjivar MD

## 2022-09-17 NOTE — PROGRESS NOTES
Problem: Pain  Goal: *Control of Pain  Outcome: Progressing Towards Goal  Goal: *PALLIATIVE CARE:  Alleviation of Pain  Outcome: Progressing Towards Goal     Problem: Patient Education: Go to Patient Education Activity  Goal: Patient/Family Education  Outcome: Progressing Towards Goal     Problem: Falls - Risk of  Goal: *Absence of Falls  Description: Document Holton Fall Risk and appropriate interventions in the flowsheet. Outcome: Progressing Towards Goal  Note: Fall Risk Interventions:            Medication Interventions: Assess postural VS orthostatic hypotension, Bed/chair exit alarm, Patient to call before getting OOB, Teach patient to arise slowly                   Problem: Patient Education: Go to Patient Education Activity  Goal: Patient/Family Education  Outcome: Progressing Towards Goal    Bedside shift change report given to Zakiya Haney LPN (oncoming nurse) by Renetta Mckeon RN (offgoing nurse). Report included the following information SBAR, Kardex, Intake/Output, MAR, and Recent Results.

## 2022-09-18 LAB
HCV AB SER IA-ACNC: 1.37 INDEX
HCV AB SERPL QL IA: REACTIVE
HCV RNA SERPL NAA+PROBE-ACNC: NORMAL IU/ML
HCV RNA SERPL NAA+PROBE-LOG IU: NORMAL LOG10 IU/ML
TEST INFORMATION: NORMAL

## 2022-09-19 PROBLEM — Z96.0 RETAINED URETERAL STENT: Status: ACTIVE | Noted: 2022-09-19

## 2022-09-19 PROBLEM — C67.9 CARCINOMA OF BLADDER (HCC): Status: RESOLVED | Noted: 2022-09-13 | Resolved: 2022-09-19

## 2022-09-19 LAB
BACTERIA SPEC CULT: NORMAL
BACTERIA SPEC CULT: NORMAL
SPECIAL REQUESTS,SREQ: NORMAL
SPECIAL REQUESTS,SREQ: NORMAL

## 2022-09-19 NOTE — PROGRESS NOTES
HISTORY OF PRESENT ILLNESS  Moshe Lights is a 61 y.o. female. Chief Complaint   Patient presents with    Post OP Follow Up    New Patient    Mass     Past Medical History:  PMHx (including negatives):  has a past medical history of Anemia, Back pain, Cancer ([address] DVT (deep venous thrombosis) ([address] Rheumatoid arthritis ([address] and Uterine fibroid. PSurgHx:  has a past surgical history that includes hx  section; hx urological (09/15/2022); hx urological (09/15/2022); hx urological (09/15/2022); hx urological (09/15/2022); hx urological (Bilateral, 09/15/2022); and pr cardiac surg procedure unlist.  PSocHx:  reports that she has quit smoking. Her smoking use included cigarettes. She has a 2.50 pack-year smoking history. She has never used smokeless tobacco. She reports that she does not currently use alcohol. She reports that she does not use drugs. She is here with her niece. She has a bladder mass which was not completely resectable. Pathology significant for high-grade urothelial carcinoma with squamous differentiation, widely invasive of the lamina propria and muscularis propria. Perineural invasion is present. .    22 CT scan reveals right-sided hydronephrosis and hydroureter down to a right-sided bladder tumor approximately 3 cm in size with possible distal ureteral extension. Now s/p cystoscopy, urethral dilation, RIGHT ureteral stent placement, transurethral resection of bladder tumor > 2cm, and bilateral retrograde pyelograms on 9/15/22. She did have urethral stenosis requiring dilation. She had some issues with anemia   Prior gonadal vein thrombus. IVC filter in place. Chronic Conditions Addressed Today       1. Bladder cancer [facility]) - Primary     Overview      22 CT scan reveals right-sided hydronephrosis and hydroureter down to a right-sided bladder tumor approximately 3 cm in size with possible distal ureteral extension.      Now s/p cystoscopy, urethral dilation, RIGHT ureteral stent placement, transurethral resection of bladder tumor > 2cm, and bilateral retrograde pyelograms on 9/15/22. Urethral stenosis requiring dilation  Bladder mass not completely resectable at the trigone and bladder neck. Current Assessment & Plan      She has aggressive invasive bladder cancer, not endoscopically resectable. Urothelial, muscle invasive, high grade with squamous differentiation, perineural invasion. She has right ureteral obstruction with possible distal ureteral involvement. We discussed management options. Treatment modalities include surgery, chemotherapy or other systemic therapy and radiation. Strategies could include chemotherapy and radiation, neoadjuvant chemotherapy and surgery or surgery with subsequent adjuvant therapy as indicated. Given she has an obstructed kidney, she will likely need surgical management. She was not interested in bladder preservation strategies with radiation and chemo. We discussed cystectomy, pelvic lymph node dissection. We discussed ileal conduit urinary diversion vs continent diversions. Orthotopic diversion is not prudent due to the proximity of cancer to the bladder neck. She is sexually active. She is postmenopausal and has not had prior pelvic surgery. The patient was counseled on the risks, benefits and expected course of surgery. Surgical risk factors include concurrent diagnoses and PMH. Surgery has risks of bleeding, infection, injury, pain, death or other consequences. Perioperative medications and antibiotic use were discussed including the potential for reactions and side effects. Some specific risks of surgery were discussed as well. Her vaginal canal may be consequently shortened or narrow and sexual intercourse may not be possible postop. She expressed understanding of the risks. She wishes to proceed to anterior pelvic exenteration, PLND, ileal conduit.               Relevant Medications     PEG 3350-Electrolytes (GO-LYTELY) 236-22.74-6.74 -5.86 gram suspension     Other Relevant Orders     AMB POC URINALYSIS DIP STICK AUTO W/O MICRO (Completed)    2. Hydronephrosis of right kidney     Overview      9/12/22 CT scan reveals right-sided hydronephrosis and hydroureter down to a right-sided bladder tumor approximately 3 cm in size with possible distal ureteral extension. Now s/p cystoscopy, urethral dilation, RIGHT ureteral stent placement, transurethral resection of bladder tumor > 2cm, and bilateral retrograde pyelograms on 9/15/22. Urethral stenosis requiring dilation  Bladder mass not completely resectable. Current Assessment & Plan       Obstructing bladder tumor with possible ureteral involvement. Creatinine normal. Ureteral stent in place. Reimplantation a possibility if she wants to attempt bladder sparing. Bladder sparing may or may not be prudent. 3. Acute cystitis     Current Assessment & Plan       Inflammation due to cancer and ureteral stent. Will check urine culture when prudent. Relevant Orders     AMB POC URINALYSIS DIP STICK AUTO W/O MICRO (Completed)     CULTURE, URINE    4. Retained ureteral stent     Overview      9/12/22 CT scan reveals right-sided hydronephrosis and hydroureter down to a right-sided bladder tumor approximately 3 cm in size with possible distal ureteral extension. Now s/p cystoscopy, urethral dilation, RIGHT ureteral stent placement, transurethral resection of bladder tumor > 2cm, and bilateral retrograde pyelograms on 9/15/22. Urethral stenosis requiring dilation  Bladder mass not completely resectable. Current Assessment & Plan       Right ureteral stent          Relevant Orders     AMB POC URINALYSIS DIP STICK AUTO W/O MICRO (Completed)     Acute Diagnoses Addressed Today       History of DVT (deep vein thrombosis)        no on anticoagulation currently.   H/o IVC filter    Low serum albumin She is instructed on exercise and protein supplementation                 ROS  Patient denies the symptoms of COVID-19 per routine screening guidelines. Physical Exam    ASSESSMENT and PLAN  Diagnoses and all orders for this visit:    1. Malignant neoplasm of urinary bladder, unspecified site [facility])  Assessment & Plan:  She has aggressive invasive bladder cancer, not endoscopically resectable. Urothelial, muscle invasive, high grade with squamous differentiation, perineural invasion. She has right ureteral obstruction with possible distal ureteral involvement. We discussed management options. Treatment modalities include surgery, chemotherapy or other systemic therapy and radiation. Strategies could include chemotherapy and radiation, neoadjuvant chemotherapy and surgery or surgery with subsequent adjuvant therapy as indicated. Given she has an obstructed kidney, she will likely need surgical management. She was not interested in bladder preservation strategies with radiation and chemo. We discussed cystectomy, pelvic lymph node dissection. We discussed ileal conduit urinary diversion vs continent diversions. Orthotopic diversion is not prudent due to the proximity of cancer to the bladder neck. She is sexually active. She is postmenopausal and has not had prior pelvic surgery. The patient was counseled on the risks, benefits and expected course of surgery. Surgical risk factors include concurrent diagnoses and PMH. Surgery has risks of bleeding, infection, injury, pain, death or other consequences. Perioperative medications and antibiotic use were discussed including the potential for reactions and side effects. Some specific risks of surgery were discussed as well. Her vaginal canal may be consequently shortened or narrow and sexual intercourse may not be possible postop. She expressed understanding of the risks.  She wishes to proceed to anterior pelvic exenteration, PLND, ileal conduit. Orders:  -     AMB POC URINALYSIS DIP STICK AUTO W/O MICRO  -     PEG 3350-Electrolytes (GO-LYTELY) 236-22.74-6.74 -5.86 gram suspension; Take 4,000 mL by mouth now for 1 dose. Drink 240 ml every 10-20 minutes until gone. Follow pre-op directions given to you. 2. Hydronephrosis of right kidney  Assessment & Plan:   Obstructing bladder tumor with possible ureteral involvement. Creatinine normal. Ureteral stent in place. Reimplantation a possibility if she wants to attempt bladder sparing. Bladder sparing may or may not be prudent. 3. Retained ureteral stent  Assessment & Plan:   Right ureteral stent    Orders:  -     AMB POC URINALYSIS DIP STICK AUTO W/O MICRO    4. Acute cystitis with hematuria  Assessment & Plan:   Inflammation due to cancer and ureteral stent. Will check urine culture when prudent. Orders:  -     AMB POC URINALYSIS DIP STICK AUTO W/O MICRO  -     CULTURE, URINE    5. History of DVT (deep vein thrombosis)  Comments:  no on anticoagulation currently. H/o IVC filter    6. Low serum albumin  Comments:  She is instructed on exercise and protein supplementation         Follow-up and Dispositions    Return for Surgery to be scheduled. Maryjo Mills may have a reminder for a \"due or due soon\" health maintenance. The patient has been encouraged to contact their primary care provider for follow-up on this health maintenance or other necessary and/or routine health screening.      Korin Haley MD

## 2022-09-19 NOTE — ANESTHESIA POSTPROCEDURE EVALUATION
Procedure(s):  CYSTOURETEROSCOPY, TRANSURETHRAL RESECTION OF BLADDER TUMOR, BILATERAL RETROGRADE PYELOGRAM, RIGHT URETEROSCOPY; right ureterial stent placement; urethral dialation; resection of bladder tumor  . .    general    Anesthesia Post Evaluation      Multimodal analgesia: multimodal analgesia used between 6 hours prior to anesthesia start to PACU discharge  Patient location during evaluation: PACU  Patient participation: complete - patient participated  Level of consciousness: awake  Pain score: 0  Pain management: adequate  Airway patency: patent  Anesthetic complications: no  Cardiovascular status: acceptable  Respiratory status: acceptable  Hydration status: acceptable  Post anesthesia nausea and vomiting:  controlled  Final Post Anesthesia Temperature Assessment:  Normothermia (36.0-37.5 degrees C)      INITIAL Post-op Vital signs:   Vitals Value Taken Time   /89 09/15/22 1834   Temp 36.3 °C (97.3 °F) 09/15/22 1756   Pulse 89 09/15/22 1834   Resp 16 09/15/22 1834   SpO2 97 % 09/15/22 1834

## 2022-09-27 ENCOUNTER — OFFICE VISIT (OUTPATIENT)
Dept: UROLOGY | Age: 57
End: 2022-09-27
Payer: MEDICAID

## 2022-09-27 ENCOUNTER — TRANSCRIBE ORDER (OUTPATIENT)
Dept: SCHEDULING | Age: 57
End: 2022-09-27

## 2022-09-27 VITALS — WEIGHT: 113 LBS | HEIGHT: 63 IN | BODY MASS INDEX: 20.02 KG/M2

## 2022-09-27 DIAGNOSIS — R77.0 LOW SERUM ALBUMIN: ICD-10-CM

## 2022-09-27 DIAGNOSIS — N13.30 HYDRONEPHROSIS OF RIGHT KIDNEY: ICD-10-CM

## 2022-09-27 DIAGNOSIS — N32.9 DISEASE OF BLADDER: Primary | ICD-10-CM

## 2022-09-27 DIAGNOSIS — Z96.0 RETAINED URETERAL STENT: ICD-10-CM

## 2022-09-27 DIAGNOSIS — C67.9 MALIGNANT NEOPLASM OF URINARY BLADDER, UNSPECIFIED SITE (HCC): Primary | ICD-10-CM

## 2022-09-27 DIAGNOSIS — N30.01 ACUTE CYSTITIS WITH HEMATURIA: ICD-10-CM

## 2022-09-27 DIAGNOSIS — Z86.718 HISTORY OF DVT (DEEP VEIN THROMBOSIS): ICD-10-CM

## 2022-09-27 LAB
BILIRUB UR QL: NEGATIVE
GLUCOSE UR-MCNC: NEGATIVE MG/DL
KETONES P FAST UR STRIP-MCNC: NEGATIVE MG/DL
PH UR STRIP: 7 [PH] (ref 4.6–8)
PROT UR QL STRIP: 300
SP GR UR STRIP: 1.02 (ref 1–1.03)
UA UROBILINOGEN AMB POC: NORMAL (ref 0.2–1)
URINALYSIS CLARITY POC: CLEAR
URINALYSIS COLOR POC: YELLOW
URINE BLOOD POC: NORMAL
URINE LEUKOCYTES POC: NORMAL
URINE NITRITES POC: NEGATIVE

## 2022-09-27 PROCEDURE — 81003 URINALYSIS AUTO W/O SCOPE: CPT | Performed by: UROLOGY

## 2022-09-27 PROCEDURE — 99215 OFFICE O/P EST HI 40 MIN: CPT | Performed by: UROLOGY

## 2022-09-27 RX ORDER — POLYETHYLENE GLYCOL 3350, SODIUM SULFATE ANHYDROUS, SODIUM BICARBONATE, SODIUM CHLORIDE, POTASSIUM CHLORIDE 236; 22.74; 6.74; 5.86; 2.97 G/4L; G/4L; G/4L; G/4L; G/4L
4 POWDER, FOR SOLUTION ORAL
Qty: 4000 ML | Refills: 0 | Status: SHIPPED | OUTPATIENT
Start: 2022-09-27 | End: 2022-09-27

## 2022-09-27 NOTE — LETTER
9/27/2022    Patient: Paresh Rueda   YOB: 1965   Date of Visit: 9/27/2022     Jordan Pan MD  [facility]    Dear Jordan Pan MD,      Thank you for referring Ms. Torres Coronel to [place] [address] for evaluation. My notes for this consultation are attached. If you have questions, please do not hesitate to call me. I look forward to following your patient along with you.       Sincerely,    Nadia Barrera MD

## 2022-09-27 NOTE — ASSESSMENT & PLAN NOTE
Obstructing bladder tumor with possible ureteral involvement. Creatinine normal. Ureteral stent in place. Reimplantation a possibility if she wants to attempt bladder sparing. Bladder sparing may or may not be prudent.

## 2022-09-27 NOTE — PROGRESS NOTES
Chief Complaint   Patient presents with    Post OP Follow Up    New Patient    Mass     1. Have you been to the ER, urgent care clinic since your last visit? Hospitalized since your last visit? Yes Where: [facility]    2. Have you seen or consulted any other health care providers outside of the [address] [place] [address] since your last visit? Include any pap smears or colon screening.  No  Visit Vitals  Ht 5' 3\" (1.6 m)   Wt 113 lb (51.3 kg)   LMP  (LMP Unknown)   BMI 20.02 kg/m²

## 2022-09-27 NOTE — ASSESSMENT & PLAN NOTE
She has aggressive invasive bladder cancer, not endoscopically resectable. Urothelial, muscle invasive, high grade with squamous differentiation, perineural invasion. She has right ureteral obstruction with possible distal ureteral involvement. We discussed management options. Treatment modalities include surgery, chemotherapy or other systemic therapy and radiation. Strategies could include chemotherapy and radiation, neoadjuvant chemotherapy and surgery or surgery with subsequent adjuvant therapy as indicated. Given she has an obstructed kidney, she will likely need surgical management. She was not interested in bladder preservation strategies with radiation and chemo. We discussed cystectomy, pelvic lymph node dissection. We discussed ileal conduit urinary diversion vs continent diversions. Orthotopic diversion is not prudent due to the proximity of cancer to the bladder neck. She is sexually active. She is postmenopausal and has not had prior pelvic surgery. The patient was counseled on the risks, benefits and expected course of surgery. Surgical risk factors include concurrent diagnoses and PMH. Surgery has risks of bleeding, infection, injury, pain, death or other consequences. Perioperative medications and antibiotic use were discussed including the potential for reactions and side effects. Some specific risks of surgery were discussed as well. Her vaginal canal may be consequently shortened or narrow and sexual intercourse may not be possible postop. She expressed understanding of the risks. She wishes to proceed to anterior pelvic exenteration, PLND, ileal conduit.

## 2022-09-28 ENCOUNTER — TELEPHONE (OUTPATIENT)
Dept: UROLOGY | Age: 57
End: 2022-09-28

## 2022-09-28 DIAGNOSIS — C67.9 MALIGNANT NEOPLASM OF URINARY BLADDER, UNSPECIFIED SITE (HCC): Primary | ICD-10-CM

## 2022-09-28 NOTE — TELEPHONE ENCOUNTER
Notified pt of appt 10/18 at 11:15am per pt request to relay info to liz DUARTE with liz to call back

## 2022-09-28 NOTE — TELEPHONE ENCOUNTER
Referral placed for Dasha Van, RN at [facility] for ostomy care after cystectomy on 10/3/22. Can you help coordinate? I would anticipate a hospital stay of 10 days. Schedule for sometime around 10/18. Thank you!

## 2022-09-30 ENCOUNTER — HOSPITAL ENCOUNTER (OUTPATIENT)
Dept: PREADMISSION TESTING | Age: 57
Discharge: HOME | DRG: 441 | End: 2022-09-30
Payer: MEDICAID

## 2022-09-30 ENCOUNTER — HOSPITAL ENCOUNTER (OUTPATIENT)
Dept: GENERAL RADIOLOGY | Age: 57
Discharge: HOME | DRG: 441 | End: 2022-09-30
Attending: UROLOGY
Payer: MEDICAID

## 2022-09-30 LAB
ABO + RH BLD: NORMAL
ANION GAP SERPL CALC-SCNC: 4 MMOL/L (ref 5–15)
APPEARANCE UR: ABNORMAL
APTT PPP: 26.9 SEC (ref 21.2–34.1)
BACTERIA URNS QL MICRO: ABNORMAL /HPF
BILIRUB UR QL: NEGATIVE
BLOOD GROUP ANTIBODIES SERPL: NEGATIVE
BUN SERPL-MCNC: 14 MG/DL (ref 6–20)
BUN/CREAT SERPL: 19 (ref 12–20)
CA-I BLD-MCNC: 9.7 MG/DL (ref 8.5–10.1)
CHLORIDE SERPL-SCNC: 105 MMOL/L (ref 97–108)
CO2 SERPL-SCNC: 25 MMOL/L (ref 21–32)
COLOR UR: ABNORMAL
CREAT SERPL-MCNC: 0.72 MG/DL (ref 0.55–1.02)
ERYTHROCYTE [DISTWIDTH] IN BLOOD BY AUTOMATED COUNT: 17 % (ref 11.5–14.5)
EST. AVERAGE GLUCOSE BLD GHB EST-MCNC: 117 MG/DL
GLUCOSE SERPL-MCNC: 89 MG/DL (ref 65–100)
GLUCOSE UR STRIP.AUTO-MCNC: NEGATIVE MG/DL
HBA1C MFR BLD: 5.7 % (ref 4–5.6)
HCT VFR BLD AUTO: 29.8 % (ref 35–47)
HGB BLD-MCNC: 9.2 G/DL (ref 11.5–16)
HGB UR QL STRIP: ABNORMAL
INR PPP: 1 (ref 0.9–1.1)
KETONES UR QL STRIP.AUTO: NEGATIVE MG/DL
LEUKOCYTE ESTERASE UR QL STRIP.AUTO: ABNORMAL
MCH RBC QN AUTO: 26.3 PG (ref 26–34)
MCHC RBC AUTO-ENTMCNC: 30.9 G/DL (ref 30–36.5)
MCV RBC AUTO: 85.1 FL (ref 80–99)
MRSA DNA SPEC QL NAA+PROBE: NOT DETECTED
NITRITE UR QL STRIP.AUTO: NEGATIVE
NRBC # BLD: 0 K/UL (ref 0–0.01)
NRBC BLD-RTO: 0 PER 100 WBC
PH UR STRIP: 7 [PH] (ref 5–8)
PLATELET # BLD AUTO: 299 K/UL (ref 150–400)
PMV BLD AUTO: 9.6 FL (ref 8.9–12.9)
POTASSIUM SERPL-SCNC: 4.4 MMOL/L (ref 3.5–5.1)
PROT UR STRIP-MCNC: 100 MG/DL
PROTHROMBIN TIME: 13 SEC (ref 11.9–14.6)
RBC # BLD AUTO: 3.5 M/UL (ref 3.8–5.2)
RBC #/AREA URNS HPF: >100 /HPF (ref 0–5)
SODIUM SERPL-SCNC: 134 MMOL/L (ref 136–145)
SP GR UR REFRACTOMETRY: 1.01 (ref 1–1.03)
SPECIMEN EXP DATE BLD: NORMAL
THERAPEUTIC RANGE,PTTT: NORMAL SEC (ref 82–109)
UROBILINOGEN UR QL STRIP.AUTO: 0.1 EU/DL (ref 0.1–1)
WBC # BLD AUTO: 5.9 K/UL (ref 3.6–11)
WBC URNS QL MICRO: >100 /HPF (ref 0–4)

## 2022-09-30 PROCEDURE — 83036 HEMOGLOBIN GLYCOSYLATED A1C: CPT

## 2022-09-30 PROCEDURE — 87641 MR-STAPH DNA AMP PROBE: CPT

## 2022-09-30 PROCEDURE — 80048 BASIC METABOLIC PNL TOTAL CA: CPT

## 2022-09-30 PROCEDURE — 85027 COMPLETE CBC AUTOMATED: CPT

## 2022-09-30 PROCEDURE — 36415 COLL VENOUS BLD VENIPUNCTURE: CPT

## 2022-09-30 PROCEDURE — 85610 PROTHROMBIN TIME: CPT

## 2022-09-30 PROCEDURE — 85730 THROMBOPLASTIN TIME PARTIAL: CPT

## 2022-09-30 PROCEDURE — 87086 URINE CULTURE/COLONY COUNT: CPT

## 2022-09-30 PROCEDURE — 86900 BLOOD TYPING SEROLOGIC ABO: CPT

## 2022-09-30 PROCEDURE — 81001 URINALYSIS AUTO W/SCOPE: CPT

## 2022-09-30 PROCEDURE — 71046 X-RAY EXAM CHEST 2 VIEWS: CPT

## 2022-09-30 RX ORDER — FACIAL-BODY WIPES
10 EACH TOPICAL AS NEEDED
COMMUNITY
End: 2022-10-20

## 2022-09-30 NOTE — PERIOP NOTES
[id number] Dr. Chang Espinoza called, made aware of pt's history and ekg done 9/13/2022 during recent hospital stay. No new orders given, stated ok to proceed with surgery as planned.

## 2022-09-30 NOTE — PERIOP NOTES
[id number] Dr. Delisa Dunaway office called, with permission given by patient during PAT visit , requested most recent office note to be faxed to PAT dept as soon as possible. Patient stated she has received bowel prep instructions from Dr. Javier Walker to use day prior to surgery.

## 2022-09-30 NOTE — PERIOP NOTES
0 Dr. Araseli Lynn office called , spoke Dony Soriano, made aware of lab results, bmp, na 134, cbc hgb 9.2, hct 29.8, urinalysis, 100 protein, lg leuk, greater than 100 wbc and made aware hgb A1C will be result 10/1/2022, requested for Dr. Tyree Frankel to review results. Chava Smith stated she will make him aware.

## 2022-10-01 LAB
BACTERIA SPEC CULT: NORMAL
BACTERIA UR CULT: NO GROWTH
SPECIAL REQUESTS,SREQ: NORMAL

## 2022-10-03 ENCOUNTER — HOSPITAL ENCOUNTER (INPATIENT)
Age: 57
LOS: 7 days | Discharge: HOME | DRG: 441 | End: 2022-10-10
Attending: UROLOGY | Admitting: UROLOGY
Payer: MEDICAID

## 2022-10-03 ENCOUNTER — ANESTHESIA EVENT (OUTPATIENT)
Dept: SURGERY | Age: 57
DRG: 441 | End: 2022-10-03
Payer: MEDICAID

## 2022-10-03 ENCOUNTER — ANESTHESIA (OUTPATIENT)
Dept: SURGERY | Age: 57
DRG: 441 | End: 2022-10-03
Payer: MEDICAID

## 2022-10-03 LAB
GLUCOSE BLD STRIP.AUTO-MCNC: 146 MG/DL (ref 65–100)
GLUCOSE BLD STRIP.AUTO-MCNC: 87 MG/DL (ref 65–100)
PERFORMED BY, TECHID: ABNORMAL
PERFORMED BY, TECHID: NORMAL

## 2022-10-03 PROCEDURE — 0UT20ZZ RESECTION OF BILATERAL OVARIES, OPEN APPROACH: ICD-10-PCS | Performed by: UROLOGY

## 2022-10-03 PROCEDURE — 77030018842 HC SOL IRR SOD CL 9% BAXT -A: Performed by: UROLOGY

## 2022-10-03 PROCEDURE — 74011250636 HC RX REV CODE- 250/636

## 2022-10-03 PROCEDURE — 77030010507 HC ADH SKN DERMBND J&J -B: Performed by: UROLOGY

## 2022-10-03 PROCEDURE — 77030010541: Performed by: UROLOGY

## 2022-10-03 PROCEDURE — 77030008603 HC TRCR ENDOSC EPATH J&J -C: Performed by: UROLOGY

## 2022-10-03 PROCEDURE — 0UT90ZZ RESECTION OF UTERUS, OPEN APPROACH: ICD-10-PCS | Performed by: UROLOGY

## 2022-10-03 PROCEDURE — 77030038552 HC DRN WND MDII -A: Performed by: UROLOGY

## 2022-10-03 PROCEDURE — 88304 TISSUE EXAM BY PATHOLOGIST: CPT

## 2022-10-03 PROCEDURE — 99221 1ST HOSP IP/OBS SF/LOW 40: CPT | Performed by: OBSTETRICS & GYNECOLOGY

## 2022-10-03 PROCEDURE — 88305 TISSUE EXAM BY PATHOLOGIST: CPT

## 2022-10-03 PROCEDURE — 74011250637 HC RX REV CODE- 250/637: Performed by: NURSE PRACTITIONER

## 2022-10-03 PROCEDURE — 77030019908 HC STETH ESOPH SIMS -A: Performed by: ANESTHESIOLOGY

## 2022-10-03 PROCEDURE — 77030013079 HC BLNKT BAIR HGGR 3M -A: Performed by: ANESTHESIOLOGY

## 2022-10-03 PROCEDURE — 74011000250 HC RX REV CODE- 250

## 2022-10-03 PROCEDURE — 77030002996 HC SUT SLK J&J -A: Performed by: UROLOGY

## 2022-10-03 PROCEDURE — 77030002935 HC SUT MCRYL J&J -C: Performed by: UROLOGY

## 2022-10-03 PROCEDURE — 0DTJ0ZZ RESECTION OF APPENDIX, OPEN APPROACH: ICD-10-PCS | Performed by: UROLOGY

## 2022-10-03 PROCEDURE — 2709999900 HC NON-CHARGEABLE SUPPLY: Performed by: UROLOGY

## 2022-10-03 PROCEDURE — 0TTB0ZZ RESECTION OF BLADDER, OPEN APPROACH: ICD-10-PCS | Performed by: UROLOGY

## 2022-10-03 PROCEDURE — 77030008684 HC TU ET CUF COVD -B: Performed by: ANESTHESIOLOGY

## 2022-10-03 PROCEDURE — 77030031139 HC SUT VCRL2 J&J -A: Performed by: UROLOGY

## 2022-10-03 PROCEDURE — 0UT70ZZ RESECTION OF BILATERAL FALLOPIAN TUBES, OPEN APPROACH: ICD-10-PCS | Performed by: UROLOGY

## 2022-10-03 PROCEDURE — 77030005513 HC CATH URETH FOL11 MDII -B: Performed by: UROLOGY

## 2022-10-03 PROCEDURE — 77030002916 HC SUT ETHLN J&J -A: Performed by: UROLOGY

## 2022-10-03 PROCEDURE — 77030009968 HC RELD STPLR ENDOSC J&J -D: Performed by: UROLOGY

## 2022-10-03 PROCEDURE — 77030020061 HC IV BLD WRMR ADMIN SET 3M -B: Performed by: ANESTHESIOLOGY

## 2022-10-03 PROCEDURE — 77030008756 HC TU IRR SUC STRY -B: Performed by: UROLOGY

## 2022-10-03 PROCEDURE — 77030018813 HC SCIS LAPSCP EPIX DISP AMR -B: Performed by: UROLOGY

## 2022-10-03 PROCEDURE — 65610000006 HC RM INTENSIVE CARE

## 2022-10-03 PROCEDURE — 77030002933 HC SUT MCRYL J&J -A: Performed by: UROLOGY

## 2022-10-03 PROCEDURE — C1769 GUIDE WIRE: HCPCS | Performed by: UROLOGY

## 2022-10-03 PROCEDURE — 77030040361 HC SLV COMPR DVT MDII -B: Performed by: UROLOGY

## 2022-10-03 PROCEDURE — 77030010522: Performed by: UROLOGY

## 2022-10-03 PROCEDURE — 82962 GLUCOSE BLOOD TEST: CPT

## 2022-10-03 PROCEDURE — 74011250636 HC RX REV CODE- 250/636: Performed by: NURSE PRACTITIONER

## 2022-10-03 PROCEDURE — 77030010935 HC CLP LIG ABSRB TELE -B: Performed by: UROLOGY

## 2022-10-03 PROCEDURE — 74011000250 HC RX REV CODE- 250: Performed by: NURSE PRACTITIONER

## 2022-10-03 PROCEDURE — 58548 LAP RADICAL HYST: CPT | Performed by: UROLOGY

## 2022-10-03 PROCEDURE — 74011250636 HC RX REV CODE- 250/636: Performed by: NURSE ANESTHETIST, CERTIFIED REGISTERED

## 2022-10-03 PROCEDURE — 77030002888 HC SUT CHRMC J&J -A: Performed by: UROLOGY

## 2022-10-03 PROCEDURE — 77030002986 HC SUT PROL J&J -A: Performed by: UROLOGY

## 2022-10-03 PROCEDURE — 77030020703 HC SEAL CANN DISP INTU -B: Performed by: UROLOGY

## 2022-10-03 PROCEDURE — 76060000047 HC ANESTHESIA 8 TO 8.5 HR: Performed by: UROLOGY

## 2022-10-03 PROCEDURE — 88307 TISSUE EXAM BY PATHOLOGIST: CPT

## 2022-10-03 PROCEDURE — 77030009851 HC PCH RTVR ENDOSC AMR -B: Performed by: UROLOGY

## 2022-10-03 PROCEDURE — 07BC0ZX EXCISION OF PELVIS LYMPHATIC, OPEN APPROACH, DIAGNOSTIC: ICD-10-PCS | Performed by: UROLOGY

## 2022-10-03 PROCEDURE — 0T1807C BYPASS BILATERAL URETERS TO ILEOCUTANEOUS WITH AUTOLOGOUS TISSUE SUBSTITUTE, OPEN APPROACH: ICD-10-PCS | Performed by: UROLOGY

## 2022-10-03 PROCEDURE — 51595 REMOVE BLADDER/REVISE TRACT: CPT | Performed by: UROLOGY

## 2022-10-03 PROCEDURE — 77030035277 HC OBTRTR BLDELSS DISP INTU -B: Performed by: UROLOGY

## 2022-10-03 PROCEDURE — 74011250636 HC RX REV CODE- 250/636: Performed by: UROLOGY

## 2022-10-03 PROCEDURE — 77030026438 HC STYL ET INTUB CARD -A: Performed by: ANESTHESIOLOGY

## 2022-10-03 PROCEDURE — 77030035279 HC SEAL VSL ENDOWR XI INTU -I2: Performed by: UROLOGY

## 2022-10-03 PROCEDURE — 77030011808 HC STPLR ENDOSCOPIC J&J -D: Performed by: UROLOGY

## 2022-10-03 PROCEDURE — 8E0W0CZ ROBOTIC ASSISTED PROCEDURE OF TRUNK REGION, OPEN APPROACH: ICD-10-PCS | Performed by: UROLOGY

## 2022-10-03 PROCEDURE — 94002 VENT MGMT INPAT INIT DAY: CPT

## 2022-10-03 PROCEDURE — 77030013567 HC DRN WND RESERV BARD -A: Performed by: UROLOGY

## 2022-10-03 PROCEDURE — C2617 STENT, NON-COR, TEM W/O DEL: HCPCS | Performed by: UROLOGY

## 2022-10-03 PROCEDURE — 74011000258 HC RX REV CODE- 258

## 2022-10-03 PROCEDURE — 88309 TISSUE EXAM BY PATHOLOGIST: CPT

## 2022-10-03 PROCEDURE — 76010000888 HC OR TIME 8 TO 8.5HR INTENSV - TIER 2: Performed by: UROLOGY

## 2022-10-03 DEVICE — GRAFT HUM TISS W2XL4CM CRYOPRESERVED PLCNTA TISS UMB AMNION: Type: IMPLANTABLE DEVICE | Site: ABDOMEN | Status: FUNCTIONAL

## 2022-10-03 DEVICE — URETERAL STENT
Type: IMPLANTABLE DEVICE | Site: URETER | Status: FUNCTIONAL
Brand: PERCUFLEX™ PLUS

## 2022-10-03 RX ORDER — ONDANSETRON 2 MG/ML
4 INJECTION INTRAMUSCULAR; INTRAVENOUS
Status: DISCONTINUED | OUTPATIENT
Start: 2022-10-03 | End: 2022-10-10 | Stop reason: HOSPADM

## 2022-10-03 RX ORDER — HYDROMORPHONE HYDROCHLORIDE 1 MG/ML
0.5 INJECTION, SOLUTION INTRAMUSCULAR; INTRAVENOUS; SUBCUTANEOUS
Status: CANCELLED | OUTPATIENT
Start: 2022-10-03

## 2022-10-03 RX ORDER — DEXAMETHASONE SODIUM PHOSPHATE 4 MG/ML
INJECTION, SOLUTION INTRA-ARTICULAR; INTRALESIONAL; INTRAMUSCULAR; INTRAVENOUS; SOFT TISSUE AS NEEDED
Status: DISCONTINUED | OUTPATIENT
Start: 2022-10-03 | End: 2022-10-03 | Stop reason: HOSPADM

## 2022-10-03 RX ORDER — SODIUM CHLORIDE 0.9 % (FLUSH) 0.9 %
5-40 SYRINGE (ML) INJECTION AS NEEDED
Status: DISCONTINUED | OUTPATIENT
Start: 2022-10-03 | End: 2022-10-03 | Stop reason: HOSPADM

## 2022-10-03 RX ORDER — MORPHINE SULFATE 4 MG/ML
4 INJECTION INTRAVENOUS
Status: DISCONTINUED | OUTPATIENT
Start: 2022-10-03 | End: 2022-10-05

## 2022-10-03 RX ORDER — LEVOFLOXACIN 5 MG/ML
500 INJECTION, SOLUTION INTRAVENOUS EVERY 24 HOURS
Status: DISCONTINUED | OUTPATIENT
Start: 2022-10-04 | End: 2022-10-06

## 2022-10-03 RX ORDER — LEVOFLOXACIN 5 MG/ML
500 INJECTION, SOLUTION INTRAVENOUS ONCE
Status: DISCONTINUED | OUTPATIENT
Start: 2022-10-03 | End: 2022-10-03

## 2022-10-03 RX ORDER — HYDROMORPHONE HYDROCHLORIDE 1 MG/ML
INJECTION, SOLUTION INTRAMUSCULAR; INTRAVENOUS; SUBCUTANEOUS AS NEEDED
Status: DISCONTINUED | OUTPATIENT
Start: 2022-10-03 | End: 2022-10-03 | Stop reason: HOSPADM

## 2022-10-03 RX ORDER — SODIUM CHLORIDE 0.9 % (FLUSH) 0.9 %
5-40 SYRINGE (ML) INJECTION EVERY 8 HOURS
Status: DISCONTINUED | OUTPATIENT
Start: 2022-10-03 | End: 2022-10-03 | Stop reason: HOSPADM

## 2022-10-03 RX ORDER — LEVOFLOXACIN 5 MG/ML
500 INJECTION, SOLUTION INTRAVENOUS EVERY 24 HOURS
Status: DISCONTINUED | OUTPATIENT
Start: 2022-10-03 | End: 2022-10-03

## 2022-10-03 RX ORDER — LIDOCAINE HYDROCHLORIDE 20 MG/ML
INJECTION, SOLUTION EPIDURAL; INFILTRATION; INTRACAUDAL; PERINEURAL AS NEEDED
Status: DISCONTINUED | OUTPATIENT
Start: 2022-10-03 | End: 2022-10-03 | Stop reason: HOSPADM

## 2022-10-03 RX ORDER — ONDANSETRON 2 MG/ML
INJECTION INTRAMUSCULAR; INTRAVENOUS AS NEEDED
Status: DISCONTINUED | OUTPATIENT
Start: 2022-10-03 | End: 2022-10-03 | Stop reason: HOSPADM

## 2022-10-03 RX ORDER — PROPOFOL 10 MG/ML
INJECTION, EMULSION INTRAVENOUS AS NEEDED
Status: DISCONTINUED | OUTPATIENT
Start: 2022-10-03 | End: 2022-10-03 | Stop reason: HOSPADM

## 2022-10-03 RX ORDER — FACIAL-BODY WIPES
10 EACH TOPICAL DAILY
Status: DISCONTINUED | OUTPATIENT
Start: 2022-10-04 | End: 2022-10-04

## 2022-10-03 RX ORDER — MIDAZOLAM HYDROCHLORIDE 1 MG/ML
INJECTION, SOLUTION INTRAMUSCULAR; INTRAVENOUS AS NEEDED
Status: DISCONTINUED | OUTPATIENT
Start: 2022-10-03 | End: 2022-10-03 | Stop reason: HOSPADM

## 2022-10-03 RX ORDER — SODIUM CHLORIDE 0.9 % (FLUSH) 0.9 %
5-40 SYRINGE (ML) INJECTION EVERY 8 HOURS
Status: CANCELLED | OUTPATIENT
Start: 2022-10-03

## 2022-10-03 RX ORDER — SODIUM CHLORIDE, SODIUM LACTATE, POTASSIUM CHLORIDE, CALCIUM CHLORIDE 600; 310; 30; 20 MG/100ML; MG/100ML; MG/100ML; MG/100ML
20 INJECTION, SOLUTION INTRAVENOUS CONTINUOUS
Status: DISCONTINUED | OUTPATIENT
Start: 2022-10-03 | End: 2022-10-04

## 2022-10-03 RX ORDER — FENTANYL CITRATE 50 UG/ML
25 INJECTION, SOLUTION INTRAMUSCULAR; INTRAVENOUS
Status: CANCELLED | OUTPATIENT
Start: 2022-10-03

## 2022-10-03 RX ORDER — METRONIDAZOLE 500 MG/100ML
500 INJECTION, SOLUTION INTRAVENOUS EVERY 8 HOURS
Status: COMPLETED | OUTPATIENT
Start: 2022-10-03 | End: 2022-10-06

## 2022-10-03 RX ORDER — DEXMEDETOMIDINE HYDROCHLORIDE 100 UG/ML
INJECTION, SOLUTION INTRAVENOUS AS NEEDED
Status: DISCONTINUED | OUTPATIENT
Start: 2022-10-03 | End: 2022-10-03 | Stop reason: HOSPADM

## 2022-10-03 RX ORDER — SODIUM CHLORIDE 0.9 % (FLUSH) 0.9 %
5-40 SYRINGE (ML) INJECTION AS NEEDED
Status: CANCELLED | OUTPATIENT
Start: 2022-10-03

## 2022-10-03 RX ORDER — DEXTROSE, SODIUM CHLORIDE, AND POTASSIUM CHLORIDE 5; .45; .15 G/100ML; G/100ML; G/100ML
50 INJECTION INTRAVENOUS CONTINUOUS
Status: DISCONTINUED | OUTPATIENT
Start: 2022-10-03 | End: 2022-10-07

## 2022-10-03 RX ORDER — ONDANSETRON 2 MG/ML
4 INJECTION INTRAMUSCULAR; INTRAVENOUS AS NEEDED
Status: CANCELLED | OUTPATIENT
Start: 2022-10-03

## 2022-10-03 RX ORDER — MORPHINE SULFATE 2 MG/ML
2 INJECTION, SOLUTION INTRAMUSCULAR; INTRAVENOUS
Status: DISPENSED | OUTPATIENT
Start: 2022-10-03 | End: 2022-10-04

## 2022-10-03 RX ORDER — ACETAMINOPHEN 325 MG/1
650 TABLET ORAL
Status: DISCONTINUED | OUTPATIENT
Start: 2022-10-03 | End: 2022-10-05

## 2022-10-03 RX ORDER — LIDOCAINE 4 G/100G
1 PATCH TOPICAL EVERY 12 HOURS
Status: DISCONTINUED | OUTPATIENT
Start: 2022-10-03 | End: 2022-10-10 | Stop reason: HOSPADM

## 2022-10-03 RX ORDER — DIPHENHYDRAMINE HYDROCHLORIDE 50 MG/ML
12.5 INJECTION, SOLUTION INTRAMUSCULAR; INTRAVENOUS
Status: ACTIVE | OUTPATIENT
Start: 2022-10-03 | End: 2022-10-05

## 2022-10-03 RX ORDER — METRONIDAZOLE 500 MG/100ML
500 INJECTION, SOLUTION INTRAVENOUS ONCE
Status: COMPLETED | OUTPATIENT
Start: 2022-10-03 | End: 2022-10-03

## 2022-10-03 RX ORDER — DOCUSATE SODIUM 100 MG/1
100 CAPSULE, LIQUID FILLED ORAL 2 TIMES DAILY
Status: DISCONTINUED | OUTPATIENT
Start: 2022-10-03 | End: 2022-10-10 | Stop reason: HOSPADM

## 2022-10-03 RX ORDER — FENTANYL CITRATE 50 UG/ML
INJECTION, SOLUTION INTRAMUSCULAR; INTRAVENOUS AS NEEDED
Status: DISCONTINUED | OUTPATIENT
Start: 2022-10-03 | End: 2022-10-03 | Stop reason: HOSPADM

## 2022-10-03 RX ORDER — ATROPA BELLADONNA AND OPIUM 16.2; 3 MG/1; MG/1
1 SUPPOSITORY RECTAL AS NEEDED
Status: DISCONTINUED | OUTPATIENT
Start: 2022-10-03 | End: 2022-10-04

## 2022-10-03 RX ORDER — SODIUM CHLORIDE, SODIUM LACTATE, POTASSIUM CHLORIDE, CALCIUM CHLORIDE 600; 310; 30; 20 MG/100ML; MG/100ML; MG/100ML; MG/100ML
INJECTION, SOLUTION INTRAVENOUS
Status: DISCONTINUED | OUTPATIENT
Start: 2022-10-03 | End: 2022-10-03 | Stop reason: HOSPADM

## 2022-10-03 RX ORDER — ROCURONIUM BROMIDE 10 MG/ML
INJECTION, SOLUTION INTRAVENOUS AS NEEDED
Status: DISCONTINUED | OUTPATIENT
Start: 2022-10-03 | End: 2022-10-03 | Stop reason: HOSPADM

## 2022-10-03 RX ORDER — HYDRALAZINE HYDROCHLORIDE 20 MG/ML
10 INJECTION INTRAMUSCULAR; INTRAVENOUS
Status: DISCONTINUED | OUTPATIENT
Start: 2022-10-03 | End: 2022-10-10 | Stop reason: HOSPADM

## 2022-10-03 RX ADMIN — ROCURONIUM BROMIDE 10 MG: 10 INJECTION, SOLUTION INTRAVENOUS at 13:06

## 2022-10-03 RX ADMIN — ROCURONIUM BROMIDE 20 MG: 10 INJECTION, SOLUTION INTRAVENOUS at 14:52

## 2022-10-03 RX ADMIN — LEVOFLOXACIN 500 MG: 5 INJECTION, SOLUTION INTRAVENOUS at 12:07

## 2022-10-03 RX ADMIN — ROCURONIUM BROMIDE 20 MG: 10 INJECTION, SOLUTION INTRAVENOUS at 18:09

## 2022-10-03 RX ADMIN — DEXAMETHASONE SODIUM PHOSPHATE 4 MG: 4 INJECTION, SOLUTION INTRA-ARTICULAR; INTRALESIONAL; INTRAMUSCULAR; INTRAVENOUS; SOFT TISSUE at 11:48

## 2022-10-03 RX ADMIN — POTASSIUM CHLORIDE, DEXTROSE MONOHYDRATE AND SODIUM CHLORIDE 125 ML/HR: 150; 5; 450 INJECTION, SOLUTION INTRAVENOUS at 21:33

## 2022-10-03 RX ADMIN — SODIUM CHLORIDE: 9 INJECTION, SOLUTION INTRAVENOUS at 12:07

## 2022-10-03 RX ADMIN — DEXMEDETOMIDINE HYDROCHLORIDE 6 MCG: 100 INJECTION, SOLUTION INTRAVENOUS at 12:27

## 2022-10-03 RX ADMIN — METRONIDAZOLE 500 MG: 500 INJECTION, SOLUTION INTRAVENOUS at 12:07

## 2022-10-03 RX ADMIN — ROCURONIUM BROMIDE 10 MG: 10 INJECTION, SOLUTION INTRAVENOUS at 13:39

## 2022-10-03 RX ADMIN — MIDAZOLAM HYDROCHLORIDE 2 MG: 2 INJECTION, SOLUTION INTRAMUSCULAR; INTRAVENOUS at 11:48

## 2022-10-03 RX ADMIN — PHENYLEPHRINE HYDROCHLORIDE 30 MCG/MIN: 10 INJECTION INTRAVENOUS at 12:07

## 2022-10-03 RX ADMIN — ROCURONIUM BROMIDE 10 MG: 10 INJECTION, SOLUTION INTRAVENOUS at 17:30

## 2022-10-03 RX ADMIN — SODIUM CHLORIDE, POTASSIUM CHLORIDE, SODIUM LACTATE AND CALCIUM CHLORIDE: 600; 310; 30; 20 INJECTION, SOLUTION INTRAVENOUS at 14:12

## 2022-10-03 RX ADMIN — SODIUM CHLORIDE, POTASSIUM CHLORIDE, SODIUM LACTATE AND CALCIUM CHLORIDE: 600; 310; 30; 20 INJECTION, SOLUTION INTRAVENOUS at 11:48

## 2022-10-03 RX ADMIN — PROPOFOL 100 MG: 10 INJECTION, EMULSION INTRAVENOUS at 11:48

## 2022-10-03 RX ADMIN — PROPOFOL 50 MG: 10 INJECTION, EMULSION INTRAVENOUS at 15:12

## 2022-10-03 RX ADMIN — ROCURONIUM BROMIDE 10 MG: 10 INJECTION, SOLUTION INTRAVENOUS at 19:05

## 2022-10-03 RX ADMIN — FENTANYL CITRATE 50 MCG: 50 INJECTION, SOLUTION INTRAMUSCULAR; INTRAVENOUS at 11:52

## 2022-10-03 RX ADMIN — PHENYLEPHRINE HYDROCHLORIDE 100 MCG: 10 INJECTION INTRAVENOUS at 15:21

## 2022-10-03 RX ADMIN — FENTANYL CITRATE 50 MCG: 50 INJECTION, SOLUTION INTRAMUSCULAR; INTRAVENOUS at 15:09

## 2022-10-03 RX ADMIN — FAMOTIDINE 20 MG: 10 INJECTION INTRAVENOUS at 21:34

## 2022-10-03 RX ADMIN — DEXMEDETOMIDINE HYDROCHLORIDE 8 MCG: 100 INJECTION, SOLUTION INTRAVENOUS at 13:06

## 2022-10-03 RX ADMIN — DOCUSATE SODIUM 100 MG: 100 CAPSULE, LIQUID FILLED ORAL at 21:33

## 2022-10-03 RX ADMIN — METRONIDAZOLE 500 MG: 500 INJECTION, SOLUTION INTRAVENOUS at 21:35

## 2022-10-03 RX ADMIN — FENTANYL CITRATE 50 MCG: 50 INJECTION, SOLUTION INTRAMUSCULAR; INTRAVENOUS at 16:40

## 2022-10-03 RX ADMIN — LIDOCAINE HYDROCHLORIDE 40 MG: 20 INJECTION, SOLUTION EPIDURAL; INFILTRATION; INTRACAUDAL; PERINEURAL at 11:52

## 2022-10-03 RX ADMIN — HYDROMORPHONE HYDROCHLORIDE 0.5 MG: 1 INJECTION, SOLUTION INTRAMUSCULAR; INTRAVENOUS; SUBCUTANEOUS at 12:27

## 2022-10-03 RX ADMIN — DEXMEDETOMIDINE HYDROCHLORIDE 6 MCG: 100 INJECTION, SOLUTION INTRAVENOUS at 13:36

## 2022-10-03 RX ADMIN — FENTANYL CITRATE 100 MCG: 50 INJECTION, SOLUTION INTRAMUSCULAR; INTRAVENOUS at 20:07

## 2022-10-03 RX ADMIN — HYDROMORPHONE HYDROCHLORIDE 0.5 MG: 1 INJECTION, SOLUTION INTRAMUSCULAR; INTRAVENOUS; SUBCUTANEOUS at 14:25

## 2022-10-03 RX ADMIN — ONDANSETRON 4 MG: 2 INJECTION INTRAMUSCULAR; INTRAVENOUS at 19:46

## 2022-10-03 RX ADMIN — ROCURONIUM BROMIDE 40 MG: 10 INJECTION, SOLUTION INTRAVENOUS at 11:48

## 2022-10-03 RX ADMIN — FENTANYL CITRATE 50 MCG: 50 INJECTION, SOLUTION INTRAMUSCULAR; INTRAVENOUS at 12:16

## 2022-10-03 RX ADMIN — ROCURONIUM BROMIDE 10 MG: 10 INJECTION, SOLUTION INTRAVENOUS at 16:19

## 2022-10-03 NOTE — H&P
UROLOGY HISTORY AND PHYSICAL  [place]OU[place], [address] [facility] Office    Patient: Tee Toscano MRN: [id number]  SSN: [id number]    YOB: 1965  Age: 61 y.o. Sex: female          Date of Encounter:  October 3, 2022  ADMITTED: (Not on file) to Rosaura Huertas MD by Juan Pablo Osborne MD for BLADDER CANCER  Chief Complaint:  bladder cancer             History of Present Illness:  Patient is a 61 y.o. female admitted (Not on file) to the [facility] for [address] [place] [address]. The patient is here for surgery. She has a bladder mass which was not completely resectable. Pathology significant for high-grade urothelial carcinoma with squamous differentiation, widely invasive of the lamina propria and muscularis propria. Perineural invasion is present. .     9/12/22 CT scan reveals right-sided hydronephrosis and hydroureter down to a right-sided bladder tumor approximately 3 cm in size with possible distal ureteral extension. Now s/p cystoscopy, urethral dilation, RIGHT ureteral stent placement, transurethral resection of bladder tumor > 2cm, and bilateral retrograde pyelograms on 9/15/22. See the problem list.     Problem List  Date Reviewed: 9/27/2022            Codes Class Noted    Retained ureteral stent ICD-10-CM: Z96.0  ICD-9-CM: V43.89  9/19/2022    Overview Signed 9/19/2022  1:48 PM by Suzy Jacob NP     9/12/22 CT scan reveals right-sided hydronephrosis and hydroureter down to a right-sided bladder tumor approximately 3 cm in size with possible distal ureteral extension. Now s/p cystoscopy, urethral dilation, RIGHT ureteral stent placement, transurethral resection of bladder tumor > 2cm, and bilateral retrograde pyelograms on 9/15/22. Urethral stenosis requiring dilation  Bladder mass not completely resectable.               Acute cystitis ICD-10-CM: N30.00  ICD-9-CM: 595.0  9/17/2022        Bladder cancer ([facility] ICD-10-CM: C67.9  ICD-9-CM: 188.9  9/13/2022    Overview Addendum 9/27/2022  8:23 AM by Jose Francisco MD     9/12/22 CT scan reveals right-sided hydronephrosis and hydroureter down to a right-sided bladder tumor approximately 3 cm in size with possible distal ureteral extension. Now s/p cystoscopy, urethral dilation, RIGHT ureteral stent placement, transurethral resection of bladder tumor > 2cm, and bilateral retrograde pyelograms on 9/15/22. Urethral stenosis requiring dilation  Bladder mass not completely resectable at the trigone and bladder neck. Anemia due to acute blood loss ICD-10-CM: D62  ICD-9-CM: 285.1  9/13/2022        Hypercalcemia ICD-10-CM: E83.52  ICD-9-CM: 275.42  9/13/2022        Hydronephrosis of right kidney ICD-10-CM: N13.30  ICD-9-CM: 187  9/13/2022    Overview Signed 9/19/2022  1:47 PM by Joe Marshall NP     9/12/22 CT scan reveals right-sided hydronephrosis and hydroureter down to a right-sided bladder tumor approximately 3 cm in size with possible distal ureteral extension. Now s/p cystoscopy, urethral dilation, RIGHT ureteral stent placement, transurethral resection of bladder tumor > 2cm, and bilateral retrograde pyelograms on 9/15/22. Urethral stenosis requiring dilation  Bladder mass not completely resectable. History of blood clots ICD-10-CM: Z86.718  ICD-9-CM: V12.51  7/2/2021            Past Medical History: Allergies   Allergen Reactions    Bactrim [Sulfamethoprim] Swelling     Swelling of the lilps    Penicillin V Potassium Swelling      Prior to Admission medications    Medication Sig Start Date End Date Taking? Authorizing Provider   bisacodyL (Dulcolax, bisacodyl,) 10 mg supp Insert 10 mg into rectum as needed for Constipation. Provider, Historical   ferrous sulfate 325 mg (65 mg iron) tablet Take 325 mg by mouth daily.  9/11/22   Provider, Historical   Arthritis Pain Relief 650 mg TbER TAKE 1 TABLET BY MOUTH THREE (3) TIMES DAILY AS NEEDED FOR PAIN. 6/9/22   Zacarias Rajan MD multivitamin with folic acid (One Daily Essential) 400 mcg tab tablet Take 1 Tablet by mouth daily. 3/4/22   Milad Bauer MD   cromolyn (OPTICROM) 4 % ophthalmic solution Administer 1 Drop to both eyes daily. 21   Provider, Historical      PMHx:  has a past medical history of Anemia, Back pain, Cancer ([facility] DVT (deep venous thrombosis) ([facility] Hepatitis C, Liver disease, Presence of IVC filter, Rheumatoid arthritis ([facility] Sciatic leg pain, and Uterine fibroid. PSurgHx:  has a past surgical history that includes hx  section; hx urological (09/15/2022); hx urological (09/15/2022); hx urological (09/15/2022); hx urological (09/15/2022); hx urological (Bilateral, 09/15/2022); and pr cardiac surg procedure unlist.  PSocHx:  reports that she has quit smoking. Her smoking use included cigarettes. She has never used smokeless tobacco. She reports that she does not currently use alcohol. She reports that she does not use drugs. FamHx:   Family History   Problem Relation Age of Onset    Cancer Mother     Lung Cancer Mother     Heart Disease Father     Hypertension Sister     Cancer Maternal Aunt     Breast Cancer Maternal Aunt      ROS:  Admission ROS by Mike Cohen MD from (Not on file) were reviewed with the patient and changes (other than per HPI) include: none. Physical Exam:            Vitals[de-identified]    No data recorded. There were no vitals taken for this visit. Estimated body mass index is 20.57 kg/m² (pended) as calculated from the following:    Height as of 22: (P) 5' 2.6\" (1.59 m). Weight as of 22: (P) 114 lb 10.2 oz (52 kg). I&O's:    No intake/output data recorded.    General Well developed, in NAD   Conjunctiva/Lids Normal without gross defects   Neck Supple without obvious, masses   Respiratory Effort Breathing easily, no audible wheezing, rhonchi, stridor   CV RRR   Abdomen / Flank Soft, non tender without guarding or rebound, without obvious masses, no CVA tenderness   Neurologic Grossly normal without focal deficits           Assessment/Plan:   She has aggressive invasive bladder cancer, not endoscopically resectable. Urothelial, muscle invasive, high grade with squamous differentiation, perineural invasion. She has right ureteral obstruction with possible distal ureteral involvement. We discussed management options. H/o DVT, right ureteral obstruction due to tumor with a ureteral stent, anemia, low serum albumin   The patient was counseled on the risks, benefits and expected course of surgery. Surgical risk factors include concurrent diagnoses and PMH. Surgery has risks of bleeding, infection, injury, pain, death or other consequences. Perioperative medications and antibiotic use were discussed including the potential for reactions and side effects. Some specific risks of surgery were discussed as well.        Robotic anterior pelvic exenteration including cystectomy, hysterectomy and partial vaginectomy  Ileal conduit urinary diversion  Extended pelvic lymph node dissection    Signed By: Valentino Bocanegra MD  - October 3, 2022

## 2022-10-03 NOTE — CONSULTS
62 yo with bladder ca diagnosis undergoing Robotic assisted laparoscopic cystectomy with ureteroileal conduit. Intraoperative consult placed for better defining pelvic anatomy at the level of right pelvis and right infundibulopelvic ligament. Chart reviewed, pt anatomy examined, recommendation discussed with . Findings: severe adhesion at the level of fimbriated portion of the right fallopian tube, atrophic right ovary, normal uterus with small ant fibroid present.

## 2022-10-03 NOTE — ANESTHESIA PREPROCEDURE EVALUATION
Relevant Problems   RENAL FAILURE   (+) Hydronephrosis of right kidney      HEMATOLOGY   (+) Anemia due to acute blood loss      PERSONAL HX & FAMILY HX OF CANCER   (+) Bladder cancer (HCC)       Anesthetic History   No history of anesthetic complications            Review of Systems / Medical History  Patient summary reviewed and pertinent labs reviewed    Pulmonary  Within defined limits            Pertinent negatives: No smoker     Neuro/Psych   Within defined limits           Cardiovascular  Within defined limits              Pertinent negatives: No hypertension and CAD    Comments: Sinus rhythm with short ID   Cannot rule out Anterior infarct , age undetermined   GI/Hepatic/Renal       Hepatitis: type C         Endo/Other  Within defined limits           Other Findings            Past Medical History:   Diagnosis Date    Anemia     pt states had recent blood transfusion 2022    Back pain     Cancer ([facility] [address]     bladder    DVT (deep venous thrombosis) (HCC)     and PE, pt denies lung PE , only leg several years ago    Hepatitis C     pt states dx 1 month ago    Liver disease     Presence of IVC filter     pt states several years ago    Rheumatoid arthritis ([facility] [address]     Sciatic leg pain     pt states both legs    Uterine fibroid        Past Surgical History:   Procedure Laterality Date    HX  SECTION      HX UROLOGICAL  09/15/2022    CYSTOSCOPY    HX UROLOGICAL  09/15/2022    URETHRAL DILATION    HX UROLOGICAL  09/15/2022    URETERAL STENT PLACEMENT    HX UROLOGICAL  09/15/2022    TRANSURETHRAL RESECTION OF BLADDER TUMOR >2CM    HX UROLOGICAL Bilateral 09/15/2022    RETROGRADE PYELOGRAM    ID CARDIAC SURG PROCEDURE UNLIST      stent placement       Current Outpatient Medications   Medication Instructions    Arthritis Pain Relief 650 mg TbER TAKE 1 TABLET BY MOUTH THREE (3) TIMES DAILY AS NEEDED FOR PAIN.     bisacodyL (DULCOLAX (BISACODYL)) 10 mg, Rectal, AS NEEDED    cromolyn (OPTICROM) 4 % ophthalmic solution 1 Drop, Both Eyes, DAILY    ferrous sulfate 325 mg, Oral, DAILY    multivitamin with folic acid (One Daily Essential) 400 mcg tab tablet 1 Tablet, Oral, DAILY       No current facility-administered medications for this encounter. Current Outpatient Medications   Medication Sig    bisacodyL (Dulcolax, bisacodyl,) 10 mg supp Insert 10 mg into rectum as needed for Constipation.  ferrous sulfate 325 mg (65 mg iron) tablet Take 325 mg by mouth daily.  Arthritis Pain Relief 650 mg TbER TAKE 1 TABLET BY MOUTH THREE (3) TIMES DAILY AS NEEDED FOR PAIN.  multivitamin with folic acid (One Daily Essential) 400 mcg tab tablet Take 1 Tablet by mouth daily.  cromolyn (OPTICROM) 4 % ophthalmic solution Administer 1 Drop to both eyes daily. No data found.     Lab Results   Component Value Date/Time    WBC 5.9 09/30/2022 09:10 AM    HGB 9.2 (L) 09/30/2022 09:10 AM    HCT 29.8 (L) 09/30/2022 09:10 AM    PLATELET 169 12/87/3178 09:10 AM    MCV 85.1 09/30/2022 09:10 AM     Lab Results   Component Value Date/Time    Sodium 134 (L) 09/30/2022 09:10 AM    Potassium 4.4 09/30/2022 09:10 AM    Chloride 105 09/30/2022 09:10 AM    CO2 25 09/30/2022 09:10 AM    Anion gap 4 (L) 09/30/2022 09:10 AM    Glucose 89 09/30/2022 09:10 AM    BUN 14 09/30/2022 09:10 AM    Creatinine 0.72 09/30/2022 09:10 AM    BUN/Creatinine ratio 19 09/30/2022 09:10 AM    GFR est AA >60 09/30/2022 09:10 AM    GFR est non-AA >60 09/30/2022 09:10 AM    Calcium 9.7 09/30/2022 09:10 AM     No results found for: APTT, PTP, INR, INREXT  Lab Results   Component Value Date/Time    Glucose 89 09/30/2022 09:10 AM         Physical Exam    Airway  Mallampati: II    Neck ROM: normal range of motion        Cardiovascular    Rhythm: regular  Rate: normal         Dental         Pulmonary  Breath sounds clear to auscultation               Abdominal         Other Findings            Anesthetic Plan    ASA: 2  Anesthesia type: general    Monitoring Plan: Continuous noninvasive hemodynamic monitoring      Induction: Intravenous  Anesthetic plan and risks discussed with: Patient

## 2022-10-04 LAB
ANION GAP SERPL CALC-SCNC: 5 MMOL/L (ref 5–15)
ARTERIAL PATENCY WRIST A: ABNORMAL
BASE DEFICIT BLDA-SCNC: 4 MMOL/L
BDY SITE: ABNORMAL
BODY TEMPERATURE: 96.5
BUN SERPL-MCNC: 8 MG/DL (ref 6–20)
BUN/CREAT SERPL: 11 (ref 12–20)
CA-I BLD-MCNC: 1.13 MMOL/L (ref 1.13–1.32)
CA-I BLD-MCNC: 8 MG/DL (ref 8.5–10.1)
CHLORIDE SERPL-SCNC: 113 MMOL/L (ref 97–108)
CO2 SERPL-SCNC: 20 MMOL/L (ref 21–32)
COHGB MFR BLD: 0.3 % (ref 1–2)
CREAT SERPL-MCNC: 0.73 MG/DL (ref 0.55–1.02)
ERYTHROCYTE [DISTWIDTH] IN BLOOD BY AUTOMATED COUNT: 16.9 % (ref 11.5–14.5)
FIO2 ON VENT: 30 %
GAS FLOW.O2 SETTING OXYMISER: 12 L/MIN
GLUCOSE BLD STRIP.AUTO-MCNC: 112 MG/DL (ref 65–100)
GLUCOSE BLD STRIP.AUTO-MCNC: 112 MG/DL (ref 65–100)
GLUCOSE BLD STRIP.AUTO-MCNC: 157 MG/DL (ref 65–100)
GLUCOSE BLD STRIP.AUTO-MCNC: 169 MG/DL (ref 65–100)
GLUCOSE BLD STRIP.AUTO-MCNC: 179 MG/DL (ref 65–100)
GLUCOSE SERPL-MCNC: 110 MG/DL (ref 65–100)
HCO3 BLDA-SCNC: 20 MMOL/L (ref 22–26)
HCT VFR BLD AUTO: 22.4 % (ref 35–47)
HCT VFR BLD AUTO: 24.5 % (ref 35–47)
HGB BLD-MCNC: 6.9 G/DL (ref 11.5–16)
HGB BLD-MCNC: 7.6 G/DL (ref 11.5–16)
MCH RBC QN AUTO: 26.5 PG (ref 26–34)
MCHC RBC AUTO-ENTMCNC: 30.8 G/DL (ref 30–36.5)
MCV RBC AUTO: 86.2 FL (ref 80–99)
METHGB MFR BLD: 0.5 % (ref 0–1.4)
NRBC # BLD: 0 K/UL (ref 0–0.01)
NRBC BLD-RTO: 0 PER 100 WBC
OXYHGB MFR BLD: 97.7 % (ref 95–99)
PCO2 BLDA: 31 MMHG (ref 35–45)
PEEP RESPIRATORY: 5 CM[H2O]
PERFORMED BY, TECHID: ABNORMAL
PH BLDA: 7.42 [PH] (ref 7.35–7.45)
PLATELET # BLD AUTO: 244 K/UL (ref 150–400)
PMV BLD AUTO: 9.9 FL (ref 8.9–12.9)
PO2 BLDA: 134 MMHG (ref 80–100)
POTASSIUM SERPL-SCNC: 4.2 MMOL/L (ref 3.5–5.1)
PRESSURE SUPPORT SETTING VENT: 10 CM[H2O]
RBC # BLD AUTO: 2.6 M/UL (ref 3.8–5.2)
SAO2 % BLD: 99 % (ref 95–99)
SAO2% DEVICE SAO2% SENSOR NAME: ABNORMAL
SODIUM SERPL-SCNC: 138 MMOL/L (ref 136–145)
SPECIMEN SITE: ABNORMAL
VENTILATION MODE VENT: ABNORMAL
VT SETTING VENT: 400 ML
WBC # BLD AUTO: 4.9 K/UL (ref 3.6–11)

## 2022-10-04 PROCEDURE — 65610000006 HC RM INTENSIVE CARE

## 2022-10-04 PROCEDURE — 86923 COMPATIBILITY TEST ELECTRIC: CPT

## 2022-10-04 PROCEDURE — 36430 TRANSFUSION BLD/BLD COMPNT: CPT

## 2022-10-04 PROCEDURE — 82962 GLUCOSE BLOOD TEST: CPT

## 2022-10-04 PROCEDURE — P9016 RBC LEUKOCYTES REDUCED: HCPCS

## 2022-10-04 PROCEDURE — 86900 BLOOD TYPING SEROLOGIC ABO: CPT

## 2022-10-04 PROCEDURE — 74011250636 HC RX REV CODE- 250/636: Performed by: INTERNAL MEDICINE

## 2022-10-04 PROCEDURE — 74011250636 HC RX REV CODE- 250/636: Performed by: UROLOGY

## 2022-10-04 PROCEDURE — 36415 COLL VENOUS BLD VENIPUNCTURE: CPT

## 2022-10-04 PROCEDURE — 94003 VENT MGMT INPAT SUBQ DAY: CPT

## 2022-10-04 PROCEDURE — 85018 HEMOGLOBIN: CPT

## 2022-10-04 PROCEDURE — 99024 POSTOP FOLLOW-UP VISIT: CPT | Performed by: NURSE PRACTITIONER

## 2022-10-04 PROCEDURE — 74011250636 HC RX REV CODE- 250/636: Performed by: NURSE PRACTITIONER

## 2022-10-04 PROCEDURE — 82803 BLOOD GASES ANY COMBINATION: CPT

## 2022-10-04 PROCEDURE — 74011000250 HC RX REV CODE- 250: Performed by: NURSE PRACTITIONER

## 2022-10-04 PROCEDURE — 36600 WITHDRAWAL OF ARTERIAL BLOOD: CPT

## 2022-10-04 PROCEDURE — 74011250637 HC RX REV CODE- 250/637: Performed by: NURSE PRACTITIONER

## 2022-10-04 PROCEDURE — 85027 COMPLETE CBC AUTOMATED: CPT

## 2022-10-04 PROCEDURE — 80048 BASIC METABOLIC PNL TOTAL CA: CPT

## 2022-10-04 RX ORDER — PROPOFOL 10 MG/ML
0-50 VIAL (ML) INTRAVENOUS
Status: DISCONTINUED | OUTPATIENT
Start: 2022-10-04 | End: 2022-10-04

## 2022-10-04 RX ORDER — SODIUM CHLORIDE 9 MG/ML
250 INJECTION, SOLUTION INTRAVENOUS AS NEEDED
Status: DISCONTINUED | OUTPATIENT
Start: 2022-10-04 | End: 2022-10-10 | Stop reason: HOSPADM

## 2022-10-04 RX ADMIN — POTASSIUM CHLORIDE, DEXTROSE MONOHYDRATE AND SODIUM CHLORIDE 125 ML/HR: 150; 5; 450 INJECTION, SOLUTION INTRAVENOUS at 05:10

## 2022-10-04 RX ADMIN — MORPHINE SULFATE 2 MG: 2 INJECTION, SOLUTION INTRAMUSCULAR; INTRAVENOUS at 09:18

## 2022-10-04 RX ADMIN — PROPOFOL 50 MCG/KG/MIN: 10 INJECTION, EMULSION INTRAVENOUS at 01:04

## 2022-10-04 RX ADMIN — MORPHINE SULFATE 4 MG: 4 INJECTION INTRAVENOUS at 19:14

## 2022-10-04 RX ADMIN — POTASSIUM CHLORIDE, DEXTROSE MONOHYDRATE AND SODIUM CHLORIDE 125 ML/HR: 150; 5; 450 INJECTION, SOLUTION INTRAVENOUS at 12:50

## 2022-10-04 RX ADMIN — LEVOFLOXACIN 500 MG: 5 INJECTION, SOLUTION INTRAVENOUS at 08:55

## 2022-10-04 RX ADMIN — FAMOTIDINE 20 MG: 10 INJECTION INTRAVENOUS at 20:31

## 2022-10-04 RX ADMIN — METRONIDAZOLE 500 MG: 500 INJECTION, SOLUTION INTRAVENOUS at 05:07

## 2022-10-04 RX ADMIN — METRONIDAZOLE 500 MG: 500 INJECTION, SOLUTION INTRAVENOUS at 20:32

## 2022-10-04 RX ADMIN — MORPHINE SULFATE 4 MG: 4 INJECTION INTRAVENOUS at 15:34

## 2022-10-04 RX ADMIN — BISACODYL 10 MG: 10 SUPPOSITORY RECTAL at 08:55

## 2022-10-04 RX ADMIN — MORPHINE SULFATE 4 MG: 4 INJECTION INTRAVENOUS at 11:43

## 2022-10-04 RX ADMIN — METRONIDAZOLE 500 MG: 500 INJECTION, SOLUTION INTRAVENOUS at 12:50

## 2022-10-04 RX ADMIN — FAMOTIDINE 20 MG: 10 INJECTION INTRAVENOUS at 08:55

## 2022-10-04 NOTE — OP NOTES
Patient: Deidra Mishra MRN: [id number]  SSN: [id number]    YOB: 1965  Age: 61 y.o. Sex: female              UROLOGY OPERATIVE NOTE    Pre-operative Diagnosis: bladder cancer  Post-operative Diagnosis: same    Procedure: robotic anterior pelvic exenteration including hysterectomy/ bilateral salpingoophrorectomy, incidental appendectomy, pelvic lymph node dissection, ileal conduit urinary diversion    Surgeon: Lambert Villagomez MD  Assistant: Goldie Cuellar  Anesthesia:  Per anesthesia  Findings:    Estimated Blood Loss:    500cc  Drains: 19F pelvic drain, 16F abarca in urostomy, 8F x 26cm JJ ureteral stents  Specimens: bladder, uterus, fallopian tubes and ovaries, bilateral lymph nodes from obturator, external iliac and common iliac chains, appendix  Complications: rectal injury     Intraoperative consults:  Sameera Campos, General Surgery        Procedure Details: The patient was seen in the pre-operative area. The risks, benefits, complications, alternative treatment options, and expected outcomes were again discussed with the patient. The possibilities of reaction to medication, pain, infection, bleeding, major cardiovascular event, death, damage to surrounding structures were specifically addressed. Informed consent was then obtained. Upon arrival to the operative suite, the patient, procedure, and side were confirmed via a pre-operative \"time-out\". All were in agreement. The patient was carefully positioned and anesthesia was undertaken. Sterile prep and drape was accomplished. An 8mm supraumbilical incision was made and a Veress needle was inserted. CO2 insufflation was started to 15mm Hg pressure. 8mm daVinci trocars were inserted in the paramedian line bilaterally and right abdomen. A 12mm trocar was placed in the left abdomen and 5mm trocar was placed in the left upper quadrant. The Ludes[employer] Xi surgical robot was then docked to the patient.      The uterus was enlarged with leiomyomata. The right ovary was present and the fallopian tube coursed retrocecally. Dr. Ang Byers was consulted to help identify the anatomy. The appendix was scarred down over the course of right ureter. The left pelvis structures were more normal in appearance. The right ovary and fallopian tube were dissected out. The gonadal vessels were ligated. The round ligament and broad ligaments were released. A similar dissection was performed on the left side. The mesoappendix was ligated with the vessel sealer and the base was doubly clipped with Hemolok clips. The appendix was then removed. The right ureter was dissected out. It was markedly thickened and inflamed. The ureter was dissect toward the bladder. Near the hiatus it entered a dense inflammatory mass. The ureter was ligated at the point. The prior ureteral stent was removed and placed within an endocatch bag without contact with the viscera. The right ureter was mobilzed above to above the iliac vessels. The common iliac and external iliac lymph nodes were dissected and collected for specimens. Similarly the left ureter was dissected. The left ureter was thin walled and normal caliber. The ureter was dissected to the bladder hiatus and clipped and incised. It was mobilized to above the iliac vessels. The ureteral ends were send for frozen section and there was no evidence of malignancy or dysplasia. The uterine vessels were dissected out and ligated with the vessel sealer. The superior pedicles of the bladder were dissected out and vessel ligated. The inferior pedicles were dissected and ligated. The     A EEA sizer was placed. It was intended to be placed vaginally. Despite checking for proper placement, it was unintentionally placed rectally. The rectum was opened longitudinally in linear fashion about 4 cm. This was addressed after the bladder dissection.    The endopelvic fascia was opened and the urethra and bladder neck were dissected. Again the right distal bladder was indurated. Wide dissection was performed, excising the anterior vaginal wall. The urethra was ligated at the midway jaci and the distal urethral open was oversewn with 2-0 Vicryl. The bladder, uterus and ovaries with tubes were then placed in a 15mm endocatch and extracted vaginally. The vaginal cuff was closed, bringing the cuff toward the anterior apex. 2-0 Vicryl interrupted and running sutures were placed. A second imbricating layer was used to oversew the line. Later irrigation of the vaginal revealed the suture lines to be water tight. Dr. Casie Brown consulted intraoperatively regarding rectal injury. The bowel had been prepped and the rectum was clean. Broad spectrum antibiotic coverage was given. Primary repair was deemed appropriate. 3-0 Vicryl interrupted sutures were placed longitudinally. 3-0 silk interrupted suture were used to imbricate the suture line. The pelvis was irrigated and transrectal air leak test was performed. The suture line was reinforced at the distal aspect and was subsequently air tight. The obturator lymph nodes were dissected bilaterally in standard fashion and sent for specimen. The left ureter was tunneled under the sigmoid mesentery to the right side. The ileocecal junction was identified. 20cm proximal to this, the ileum was tagged with a 3-0 silk suture. Over 20cm proximal to this the ileum was tagged with chromic suture. The mesentery was opened under the selected locations after identifying the vasculature. An endoGIA stapler was used to stable ligate the proximal and distal ends of the conduit. The conduit was brought inferior and the proximal and distal ends of the ileum were superior. This was lined up in side to side, inline fashion and secured with 3-0 Vicryl suture.  The bowel was opened and a 60mm stapler was used to create the anastomosis. The open enterotomy was closed with interupted 3-0 Vicry. The ureters were anastomosed to the proximal conduit in Juan Luis fashion. 4-0 monocryl was used in running fashion. Using a 0.035 guidewire, 8F by 26cm JJ ureteral stents were placed prior to completing the ureteral anastomosis. A 19F round drain was placed into the pelvis via the right lateral port. It was secured with 2-0 nylon. The robot was undocked and the trocars were removed. The stoma site was marked preoperatively. A circular skin excision was performed and the subcutaneous fat was excised down the the fascia. It was opened in cruciate fashion. 0 Vicryl suture was place in each corner. The muscles were split and the peritoneum was entered. The end of the conduit was brought out. The preplaced fascia sutures were sewn at the mid conduit. 3-0 Vicryl was used to create a rosebud stoma. A 16F abarca was place in the stoma, subfascially. 5mL were inflated in the balloon. Mucus and bilious debris was irrigated out. The skin incisions were closed with 4-0 monocryl suture and dermabond skin glue. A stoma applicance was applied. The drain was place to bulb suction. The patient was left intubated and transferred to ICU.      Linnea Orozco MD

## 2022-10-04 NOTE — PROGRESS NOTES
Reason for Admission:  Bladder Cancer, Pt admitted three weeks ago with Hydronephrosis                      RUR Score:  15%                   Plan for utilizing home health:  TBD        PCP: First and Last name:  Keely Lemons MD     Name of Practice:    Are you a current patient: Yes/No:    Approximate date of last visit:  2 months ago   Can you participate in a virtual visit with your PCP:                     Current Advanced Directive/Advance Care Plan: Full Code      Healthcare Decision Maker:   Click here to complete [address] including selection of the Healthcare Decision Maker Relationship (ie \"Primary\")      Robin ReginaSandip, [phone]                       Transition of Care Plan:       Met f/f with Pt, she confirmed that the information on the face sheet is correct. Pt stated that she lives by herself but that her support system is is Niece. Pt stated no HH, has a cane and independent with ADL. Pt stated that she uses [facility] [address]. Pt stated that family will give her a ride home when she is D/C. Pt stated that she was independent with ADL before she came into the hospital.  Pt stated that she stated that she is not sure now if she will be able to do ADL. Pt stated that her Niece will take care of her when she is D/C. Pt may need a UAI for Personal Care. CM dispo: TBD.

## 2022-10-04 NOTE — PROGRESS NOTES
UROLOGY Progress Note         [phone]      Daily Progress Note: 10/4/2022      Subjective: The patient is seen for UROLOGIC follow up after cystectomy, ileal conduit diversion, pelvic exenteration, etc on 10/3/22. Surgery complicated by rectal injury; no IA meds at this time. She is extubated, doing well. Some generalized abdominal soreness. Tolerating NGT. NPO except minimal ice chips for comfort. PRBC for decrease in H/H to 6.9 and 22. Problem List:  Patient Active Problem List   Diagnosis Code    History of blood clots Z86.718    Bladder cancer ([facility] [address] C67.9    Anemia due to acute blood loss D62    Hypercalcemia E83.52    Hydronephrosis of right kidney N13.30    Acute cystitis N30.00    Retained ureteral stent Z96.0         Medications reviewed  Current Facility-Administered Medications   Medication Dose Route Frequency    0.9% sodium chloride infusion 250 mL  250 mL IntraVENous PRN    dextrose 5% - 0.45% NaCl with KCl 20 mEq/L infusion  125 mL/hr IntraVENous CONTINUOUS    lidocaine 4 % patch 1 Patch  1 Patch TransDERmal Q12H    acetaminophen (TYLENOL) tablet 650 mg  650 mg Oral Q4H PRN    ondansetron (ZOFRAN) injection 4 mg  4 mg IntraVENous Q6H PRN    metroNIDAZOLE (FLAGYL) IVPB premix 500 mg  500 mg IntraVENous Q8H    morphine injection 2 mg  2 mg IntraVENous Q3H PRN    morphine injection 4 mg  4 mg IntraVENous Q4H PRN    docusate sodium (COLACE) capsule 100 mg  100 mg Oral BID    diphenhydrAMINE (BENADRYL) injection 12.5 mg  12.5 mg IntraVENous Q4H PRN    hydrALAZINE (APRESOLINE) 20 mg/mL injection 10 mg  10 mg IntraVENous Q6H PRN    famotidine (PF) (PEPCID) 20 mg in 0.9% sodium chloride 10 mL injection  20 mg IntraVENous Q12H    levoFLOXacin (LEVAQUIN) 500 mg in D5W IVPB  500 mg IntraVENous Q24H       Review of Systems:   Review of Systems   Constitutional:  Negative for chills and fever. Gastrointestinal:  Positive for abdominal pain.         Generalized soreness Genitourinary:         Ileal conduit         Objective:   Physical Exam  Vitals and nursing note reviewed. Constitutional:       Appearance: Normal appearance. Eyes:      Extraocular Movements: Extraocular movements intact. Cardiovascular:      Rate and Rhythm: Normal rate. Pulmonary:      Effort: Pulmonary effort is normal.   Abdominal:      General: Abdomen is flat. There is no distension. Tenderness: There is abdominal tenderness. There is guarding. Comments: ALLI drain; bloody   Genitourinary:     Comments: Conduit with clear, yellow urine and pink stoma. Gibson via stoma  Musculoskeletal:      Cervical back: Normal range of motion. Skin:     General: Skin is warm and dry. Comments: Incision site(s) CDI. Neurological:      Mental Status: She is alert and oriented to person, place, and time.    Psychiatric:         Behavior: Behavior normal.        Visit Vitals  /78 (BP 1 Location: Left upper arm, BP Patient Position: At rest)   Pulse 89   Temp 98.9 °F (37.2 °C)   Resp 19   Ht 5' 2\" (1.575 m)   Wt 113 lb (51.3 kg)   SpO2 100%   BMI 20.67 kg/m²         Data Review:       Recent Days:  Recent Labs     10/04/22  0450   WBC 4.9   HGB 6.9*   HCT 22.4*        24 Hour Results:  Recent Results (from the past 24 hour(s))   GLUCOSE, POC    Collection Time: 10/03/22 11:00 AM   Result Value Ref Range    Glucose (POC) 87 65 - 100 mg/dL    Performed by Nishi Dixon    GLUCOSE, POC    Collection Time: 10/03/22  9:54 PM   Result Value Ref Range    Glucose (POC) 146 (H) 65 - 100 mg/dL    Performed by Melody Neff, POC    Collection Time: 10/04/22  2:09 AM   Result Value Ref Range    Glucose (POC) 169 (H) 65 - 100 mg/dL    Performed by Ratna Snow    BLOOD GAS + IONIZED CALCIUM    Collection Time: 10/04/22  4:10 AM   Result Value Ref Range    pH 7.42 7.35 - 7.45      PCO2 31 (L) 35 - 45 mmHg    PO2 134 (H) 80 - 100 mmHg    BICARBONATE 20 (L) 22 - 26 mmol/L    BASE DEFICIT 4.0 mmol/L    O2 SATURATION 99 95 - 99 %    Calcium, ionized 1.13 1.13 - 1.32 mmol/L    O2 METHOD VENT      FIO2 30 %    MODE SIMV      Tidal volume 400.0      SET RATE 12      PRESSURE SUPPORT 10      PEEP/CPAP 5.0      Sample source Arterial      SITE Right Brachial      JUANITO'S TEST NOT APPLICABLE      Carboxy-Hgb 0.3 (L) 1 - 2 %    Methemoglobin 0.5 0 - 1.4 %    Oxyhemoglobin 97.7 95 - 99 %    Performed by Tanya Claros Minor     TEMPERATURE 96.5     CBC W/O DIFF    Collection Time: 10/04/22  4:50 AM   Result Value Ref Range    WBC 4.9 3.6 - 11.0 K/uL    RBC 2.60 (L) 3.80 - 5.20 M/uL    HGB 6.9 (L) 11.5 - 16.0 g/dL    HCT 22.4 (L) 35.0 - 47.0 %    MCV 86.2 80.0 - 99.0 FL    MCH 26.5 26.0 - 34.0 PG    MCHC 30.8 30.0 - 36.5 g/dL    RDW 16.9 (H) 11.5 - 14.5 %    PLATELET 040 206 - 149 K/uL    MPV 9.9 8.9 - 12.9 FL    NRBC 0.0 0.0  WBC    ABSOLUTE NRBC 0.00 0.00 - 0.01 K/uL   GLUCOSE, POC    Collection Time: 10/04/22  6:15 AM   Result Value Ref Range    Glucose (POC) 179 (H) 65 - 100 mg/dL    Performed by Sia Sotelo    TYPE & SCREEN    Collection Time: 10/04/22  6:45 AM   Result Value Ref Range    Crossmatch Expiration 10/07/2022,2359     ABO/Rh(D) A Positive     Antibody screen Negative     Unit number [id number]     Blood component type RC LR     Unit division 00     Status of unit Issued     [address] to transfuse     Crossmatch result Compatible    GLUCOSE, POC    Collection Time: 10/04/22  7:07 AM   Result Value Ref Range    Glucose (POC) 157 (H) 65 - 100 mg/dL    Performed by Melanie Cueto            Assessment/     Patient Active Problem List   Diagnosis Code    History of blood clots Z86.718    Bladder cancer (HCC) C67.9    Anemia due to acute blood loss D62    Hypercalcemia E83.52    Hydronephrosis of right kidney N13.30    Acute cystitis N30.00    Retained ureteral stent Z96.0       Plan:    BLADDER CANCER:   Urothelial, muscle invasive, high grade with squamous differentiation, perineural invasion. She has right ureteral obstruction with possible distal ureteral involvement. She is now s/p cystectomy/ pelvic exenteration/ ileal conduit. Pathology pending. Extubated this morning. Doing well. ALLI drain with 200 cc out/24 hours  Conduit with 500 cc out/24 hours. NGT to wall sxn. NPO; await bowel function. Strict NPO. Rectal injury s/p primary repair. Nothing by rectum. Plan on CT with rectal contrast POD3    Metronidazole and Levaquin. SCD's on; needs machine. IS; not at bedside; re-ordered; pt provided instructions by me. ANEMIA: H/H low; receiving transfusion, repeat labs as appropriate after. HX of DVT: not on anticoagulation currently; SCD in place. LOW SERUM ALBUMIN: nutrition will be important. We will have consult placed after pt is taking PO diet. CONSULTS:  GYN: Spasic; intra-operative to help identify right ovarian and IP ligament. No ongoing concerns after that. GEN SURG: Jose Antonio, intra-operative, rectal injury. WOUND CARE: new ileal conduit diversion  PT/OT: when appropriate  : discharge planning, home health, etc.  OSTOMY CLINIC: follow up scheduled. I personally had a face to face encounter with the patient and performed the history, physical, assessment and plan.      MD Brian Guillen NP

## 2022-10-04 NOTE — PROGRESS NOTES
Patient extubated per MD order. Extubated to RA, SpO2 99%. All vitals stable pre- and post-extubation.

## 2022-10-04 NOTE — WOUND CARE
FAROOQ first met with patient and Walter hill on Friday 9/30/22 in [facility]. At that time, a urostomy educational folder was provided. I reviewed basic ostomy care and reviewed the education booklet including the following:   -Measuring the stoma  -Cutting wafer to fit stoma and wafer and pouch application.  -Empty pouch when 1/3-1/2 full of urine.  -Change appliance (wafer and pouch) 2-3 per week. If leaking, change as soon as possible to prevent skin irritation.  -Best time for routine change of appliance is first thing in the morning before consuming liquids so that urine output is less. -Clean stoma and peristomal skin with plain water or NS, may use a mild soap. No soaps with lotions as they may prevent adhesive from adhering to skin. May bathe with appliance on or off. Met with patient and Ms. Aleksandr Bosch today and they both denied having questions at this time stating that the information provided prior to surgery helped them to be prepared for what to expect post-op. They have not watched the education videos, but Ms. Aleksandr Bosch stated that she will this evening. Current appliance is well-fitting and without leaks with abarca in stoma. Emptied pouch of approximately 275 mL of moisés colored urine. Advised patient to feel the pouch and examine it periodically. When 1/3-1/2 full, request Rn empty pouch and observe process. Patient and Ms. Aleksandr Bosch observed when I emptied the pouch and verbalized understanding of emptying of pouch. Heber Saeed will continue to follow for education and pouching needs.

## 2022-10-04 NOTE — PROGRESS NOTES
Problem: Pressure Injury - Risk of  Goal: *Prevention of pressure injury  Description: Document Alphonso Scale and appropriate interventions in the flowsheet. Outcome: Progressing Towards Goal  Note: Pressure Injury Interventions:                                            Problem: Patient Education: Go to Patient Education Activity  Goal: Patient/Family Education  Outcome: Progressing Towards Goal     Problem: Discharge Planning  Goal: *Discharge to safe environment  Outcome: Progressing Towards Goal  Goal: *Knowledge of medication management  Outcome: Progressing Towards Goal  Goal: *Knowledge of discharge instructions  Outcome: Progressing Towards Goal     Problem: Patient Education: Go to Patient Education Activity  Goal: Patient/Family Education  Outcome: Progressing Towards Goal     Problem: Pain  Goal: *Control of Pain  Outcome: Progressing Towards Goal  Goal: *PALLIATIVE CARE:  Alleviation of Pain  Outcome: Progressing Towards Goal     Problem: Patient Education: Go to Patient Education Activity  Goal: Patient/Family Education  Outcome: Progressing Towards Goal     Problem: Ventilator Management  Goal: *Adequate oxygenation and ventilation  Outcome: Progressing Towards Goal  Goal: *Patient maintains clear airway/free of aspiration  Outcome: Progressing Towards Goal  Goal: *Absence of infection signs and symptoms  Outcome: Progressing Towards Goal  Goal: *Normal spontaneous ventilation  Outcome: Progressing Towards Goal     Problem: Patient Education: Go to Patient Education Activity  Goal: Patient/Family Education  Outcome: Progressing Towards Goal     Problem: Falls - Risk of  Goal: *Absence of Falls  Description: Document Iris Fall Risk and appropriate interventions in the flowsheet.   Outcome: Progressing Towards Goal  Note: Fall Risk Interventions:                                Problem: Patient Education: Go to Patient Education Activity  Goal: Patient/Family Education  Outcome: Progressing Towards Goal

## 2022-10-04 NOTE — PROGRESS NOTES
1: Writer attempted to call Dr. Joao Mullen to inform him of labs hgb-6.9 hct-22.4. [facility] 0: Writer called Dr. Romana Lucky and informed him of findings. He stated it wasn't a concern at this time and he will informed Dr. Joao Mullen of findings. 0615: Dr. Ruth Mayer called back to the unit and instructed writer to transfuse one unit and obtain a type and screen. 0700: Writer attempted to call patient liz Herrera who is her POA to obtain consent several times. Message was left informed day shift nurse. However orders was placed for blood products and type & screen was collected.

## 2022-10-05 LAB
ABO + RH BLD: NORMAL
ALBUMIN SERPL-MCNC: 1.7 G/DL (ref 3.5–5)
ALBUMIN/GLOB SERPL: 0.3 {RATIO} (ref 1.1–2.2)
ALP SERPL-CCNC: 57 U/L (ref 45–117)
ALT SERPL-CCNC: 9 U/L (ref 12–78)
ANION GAP SERPL CALC-SCNC: 5 MMOL/L (ref 5–15)
ANION GAP SERPL CALC-SCNC: 6 MMOL/L (ref 5–15)
AST SERPL W P-5'-P-CCNC: 15 U/L (ref 15–37)
BILIRUB SERPL-MCNC: 0.8 MG/DL (ref 0.2–1)
BLD PROD TYP BPU: NORMAL
BLOOD GROUP ANTIBODIES SERPL: NEGATIVE
BPU ID: NORMAL
BUN SERPL-MCNC: 4 MG/DL (ref 6–20)
BUN SERPL-MCNC: 4 MG/DL (ref 6–20)
BUN/CREAT SERPL: 6 (ref 12–20)
BUN/CREAT SERPL: 6 (ref 12–20)
CA-I BLD-MCNC: 7.9 MG/DL (ref 8.5–10.1)
CA-I BLD-MCNC: 8.2 MG/DL (ref 8.5–10.1)
CHLORIDE SERPL-SCNC: 110 MMOL/L (ref 97–108)
CHLORIDE SERPL-SCNC: 110 MMOL/L (ref 97–108)
CO2 SERPL-SCNC: 19 MMOL/L (ref 21–32)
CO2 SERPL-SCNC: 20 MMOL/L (ref 21–32)
CREAT FLD-MCNC: 0.66 MG/DL
CREAT FLD-MCNC: 0.69 MG/DL
CREAT SERPL-MCNC: 0.63 MG/DL (ref 0.55–1.02)
CREAT SERPL-MCNC: 0.67 MG/DL (ref 0.55–1.02)
CROSSMATCH RESULT,%XM: NORMAL
ERYTHROCYTE [DISTWIDTH] IN BLOOD BY AUTOMATED COUNT: 17 % (ref 11.5–14.5)
GLOBULIN SER CALC-MCNC: 5.2 G/DL (ref 2–4)
GLUCOSE BLD STRIP.AUTO-MCNC: 115 MG/DL (ref 65–100)
GLUCOSE SERPL-MCNC: 115 MG/DL (ref 65–100)
GLUCOSE SERPL-MCNC: 116 MG/DL (ref 65–100)
HCT VFR BLD AUTO: 24.8 % (ref 35–47)
HGB BLD-MCNC: 7.7 G/DL (ref 11.5–16)
MAGNESIUM SERPL-MCNC: 1.6 MG/DL (ref 1.6–2.4)
MCH RBC QN AUTO: 26.9 PG (ref 26–34)
MCHC RBC AUTO-ENTMCNC: 31 G/DL (ref 30–36.5)
MCV RBC AUTO: 86.7 FL (ref 80–99)
NRBC # BLD: 0 K/UL (ref 0–0.01)
NRBC BLD-RTO: 0 PER 100 WBC
PERFORMED BY, TECHID: ABNORMAL
PHOSPHATE SERPL-MCNC: 2.1 MG/DL (ref 2.6–4.7)
PLATELET # BLD AUTO: 220 K/UL (ref 150–400)
PMV BLD AUTO: 9.8 FL (ref 8.9–12.9)
POTASSIUM SERPL-SCNC: 4.1 MMOL/L (ref 3.5–5.1)
POTASSIUM SERPL-SCNC: 4.5 MMOL/L (ref 3.5–5.1)
PROT SERPL-MCNC: 6.9 G/DL (ref 6.4–8.2)
RBC # BLD AUTO: 2.86 M/UL (ref 3.8–5.2)
SODIUM SERPL-SCNC: 134 MMOL/L (ref 136–145)
SODIUM SERPL-SCNC: 136 MMOL/L (ref 136–145)
SPECIMEN EXP DATE BLD: NORMAL
SPECIMEN SOURCE FLD: NORMAL
SPECIMEN SOURCE FLD: NORMAL
STATUS OF UNIT,%ST: NORMAL
TRANSFUSION STATUS PATIENT QL: NORMAL
UNIT DIVISION, %UDIV: 0
WBC # BLD AUTO: 7.7 K/UL (ref 3.6–11)

## 2022-10-05 PROCEDURE — 97161 PT EVAL LOW COMPLEX 20 MIN: CPT

## 2022-10-05 PROCEDURE — 99024 POSTOP FOLLOW-UP VISIT: CPT | Performed by: NURSE PRACTITIONER

## 2022-10-05 PROCEDURE — 74011250636 HC RX REV CODE- 250/636: Performed by: UROLOGY

## 2022-10-05 PROCEDURE — 74011250637 HC RX REV CODE- 250/637: Performed by: NURSE PRACTITIONER

## 2022-10-05 PROCEDURE — 65610000006 HC RM INTENSIVE CARE

## 2022-10-05 PROCEDURE — 83735 ASSAY OF MAGNESIUM: CPT

## 2022-10-05 PROCEDURE — 80048 BASIC METABOLIC PNL TOTAL CA: CPT

## 2022-10-05 PROCEDURE — 36415 COLL VENOUS BLD VENIPUNCTURE: CPT

## 2022-10-05 PROCEDURE — 82570 ASSAY OF URINE CREATININE: CPT

## 2022-10-05 PROCEDURE — 84100 ASSAY OF PHOSPHORUS: CPT

## 2022-10-05 PROCEDURE — 97530 THERAPEUTIC ACTIVITIES: CPT

## 2022-10-05 PROCEDURE — 74011000250 HC RX REV CODE- 250: Performed by: NURSE PRACTITIONER

## 2022-10-05 PROCEDURE — 82962 GLUCOSE BLOOD TEST: CPT

## 2022-10-05 PROCEDURE — 74011250636 HC RX REV CODE- 250/636: Performed by: NURSE PRACTITIONER

## 2022-10-05 PROCEDURE — 85027 COMPLETE CBC AUTOMATED: CPT

## 2022-10-05 PROCEDURE — 80053 COMPREHEN METABOLIC PANEL: CPT

## 2022-10-05 RX ORDER — HYDROCODONE BITARTRATE AND ACETAMINOPHEN 5; 325 MG/1; MG/1
1 TABLET ORAL
Status: DISCONTINUED | OUTPATIENT
Start: 2022-10-05 | End: 2022-10-10 | Stop reason: HOSPADM

## 2022-10-05 RX ORDER — ACETAMINOPHEN 325 MG/1
650 TABLET ORAL
Status: DISPENSED | OUTPATIENT
Start: 2022-10-05 | End: 2022-10-08

## 2022-10-05 RX ADMIN — MORPHINE SULFATE 4 MG: 4 INJECTION INTRAVENOUS at 09:33

## 2022-10-05 RX ADMIN — MORPHINE SULFATE 4 MG: 4 INJECTION INTRAVENOUS at 00:33

## 2022-10-05 RX ADMIN — ACETAMINOPHEN 650 MG: 325 TABLET ORAL at 13:47

## 2022-10-05 RX ADMIN — FAMOTIDINE 20 MG: 10 INJECTION INTRAVENOUS at 08:29

## 2022-10-05 RX ADMIN — LEVOFLOXACIN 500 MG: 5 INJECTION, SOLUTION INTRAVENOUS at 08:29

## 2022-10-05 RX ADMIN — METRONIDAZOLE 500 MG: 500 INJECTION, SOLUTION INTRAVENOUS at 12:17

## 2022-10-05 RX ADMIN — HYDROCODONE BITARTRATE AND ACETAMINOPHEN 1 TABLET: 5; 325 TABLET ORAL at 15:48

## 2022-10-05 RX ADMIN — METRONIDAZOLE 500 MG: 500 INJECTION, SOLUTION INTRAVENOUS at 05:55

## 2022-10-05 NOTE — WOUND CARE
Met with patient and niece for urostomy education review. Patient no longer has a NG tube in nares and can visualize appliance better. Supervised patient while she emptied her pouch of 300 mL of urine and resecured the spout. Advised patient that she is to empty her pouch independently with Rn supervision going forward (informed Jazmin Taylor). Provided iPad and education video sheet and showed patient how to access the videos using the iPad. Left a ostomy model for patient to practice measuring stomas, cutting barriers to fit, and apply pouch. PT came in to work with the patient. Will continue to follow.

## 2022-10-05 NOTE — PROGRESS NOTES
UROLOGY Progress Note         [phone]      Daily Progress Note: 10/5/2022      Subjective: The patient is seen for UROLOGIC follow up after cystectomy, ileal conduit diversion, pelvic exenteration, etc on 10/3/22. Surgery complicated by rectal injury; no LA meds at this time. She is extubated, doing well. Some generalized abdominal soreness. She was seen by Reji Cid yesterday. Good awareness of when bag is full. She is interested in caring for herself. Remove NGT today. NPO except minimal ice chips for comfort. No flatus. H/H responded to transfusion. BP low side; remain in ICU today. Problem List:  Patient Active Problem List   Diagnosis Code    History of blood clots Z86.718    Bladder cancer ([facility] [address] C67.9    Anemia due to acute blood loss D62    Hypercalcemia E83.52    Hydronephrosis of right kidney N13.30    Acute cystitis N30.00    Retained ureteral stent Z96.0         Medications reviewed  Current Facility-Administered Medications   Medication Dose Route Frequency    acetaminophen (TYLENOL) tablet 650 mg  650 mg Oral Q4H PRN    HYDROcodone-acetaminophen (NORCO) 5-325 mg per tablet 1 Tablet  1 Tablet Oral Q6H PRN    0.9% sodium chloride infusion 250 mL  250 mL IntraVENous PRN    dextrose 5% - 0.45% NaCl with KCl 20 mEq/L infusion  125 mL/hr IntraVENous CONTINUOUS    lidocaine 4 % patch 1 Patch  1 Patch TransDERmal Q12H    ondansetron (ZOFRAN) injection 4 mg  4 mg IntraVENous Q6H PRN    metroNIDAZOLE (FLAGYL) IVPB premix 500 mg  500 mg IntraVENous Q8H    docusate sodium (COLACE) capsule 100 mg  100 mg Oral BID    diphenhydrAMINE (BENADRYL) injection 12.5 mg  12.5 mg IntraVENous Q4H PRN    hydrALAZINE (APRESOLINE) 20 mg/mL injection 10 mg  10 mg IntraVENous Q6H PRN    levoFLOXacin (LEVAQUIN) 500 mg in D5W IVPB  500 mg IntraVENous Q24H       Review of Systems:   Review of Systems   Constitutional:  Negative for chills and fever.    Gastrointestinal:  Positive for abdominal pain.        Generalized soreness   Genitourinary:         Ileal conduit         Objective:   Physical Exam  Vitals and nursing note reviewed. Constitutional:       Appearance: Normal appearance. Eyes:      Extraocular Movements: Extraocular movements intact. Cardiovascular:      Rate and Rhythm: Normal rate. Pulmonary:      Effort: Pulmonary effort is normal.   Abdominal:      General: Abdomen is flat. There is no distension. Tenderness: There is abdominal tenderness. There is guarding. Comments: ALLI drain; bloody   Genitourinary:     Comments: Conduit with clear, yellow urine and pink stoma. Gibson via stoma  Musculoskeletal:      Cervical back: Normal range of motion. Skin:     General: Skin is warm and dry. Comments: Incision site(s) CDI. Neurological:      Mental Status: She is alert and oriented to person, place, and time.    Psychiatric:         Behavior: Behavior normal.        Visit Vitals  BP (!) 96/57   Pulse (!) 109   Temp (!) 100.8 °F (38.2 °C)   Resp 17   Ht 5' 2\" (1.575 m)   Wt 112 lb 7 oz (51 kg)   SpO2 98%   BMI 20.56 kg/m²         Data Review:       Recent Days:  Recent Labs     10/05/22  0602 10/04/22  1206 10/04/22  0450   WBC 7.7  --  4.9   HGB 7.7* 7.6* 6.9*   HCT 24.8* 24.5* 22.4*     --  244       24 Hour Results:  Recent Results (from the past 24 hour(s))   GLUCOSE, POC    Collection Time: 10/04/22 11:07 AM   Result Value Ref Range    Glucose (POC) 112 (H) 65 - 100 mg/dL    Performed by Melanie Cueto    METABOLIC PANEL, BASIC    Collection Time: 10/04/22 12:05 PM   Result Value Ref Range    Sodium 138 136 - 145 mmol/L    Potassium 4.2 3.5 - 5.1 mmol/L    Chloride 113 (H) 97 - 108 mmol/L    CO2 20 (L) 21 - 32 mmol/L    Anion gap 5 5 - 15 mmol/L    Glucose 110 (H) 65 - 100 mg/dL    BUN 8 6 - 20 mg/dL    Creatinine 0.73 0.55 - 1.02 mg/dL    BUN/Creatinine ratio 11 (L) 12 - 20      eGFR >60 >60 ml/min/1.73m2    Calcium 8.0 (L) 8.5 - 10.1 mg/dL   HGB & HCT Collection Time: 10/04/22 12:06 PM   Result Value Ref Range    HGB 7.6 (L) 11.5 - 16.0 g/dL    HCT 24.5 (L) 35.0 - 47.0 %   GLUCOSE, POC    Collection Time: 10/04/22  2:51 PM   Result Value Ref Range    Glucose (POC) 112 (H) 65 - 100 mg/dL    Performed by Mariana Nayak    CBC W/O DIFF    Collection Time: 10/05/22  6:02 AM   Result Value Ref Range    WBC 7.7 3.6 - 11.0 K/uL    RBC 2.86 (L) 3.80 - 5.20 M/uL    HGB 7.7 (L) 11.5 - 16.0 g/dL    HCT 24.8 (L) 35.0 - 47.0 %    MCV 86.7 80.0 - 99.0 FL    MCH 26.9 26.0 - 34.0 PG    MCHC 31.0 30.0 - 36.5 g/dL    RDW 17.0 (H) 11.5 - 14.5 %    PLATELET 753 161 - 839 K/uL    MPV 9.8 8.9 - 12.9 FL    NRBC 0.0 0.0  WBC    ABSOLUTE NRBC 0.00 0.00 - 1.78 K/uL   METABOLIC PANEL, BASIC    Collection Time: 10/05/22  6:02 AM   Result Value Ref Range    Sodium 134 (L) 136 - 145 mmol/L    Potassium 4.5 3.5 - 5.1 mmol/L    Chloride 110 (H) 97 - 108 mmol/L    CO2 19 (L) 21 - 32 mmol/L    Anion gap 5 5 - 15 mmol/L    Glucose 115 (H) 65 - 100 mg/dL    BUN 4 (L) 6 - 20 mg/dL    Creatinine 0.63 0.55 - 1.02 mg/dL    BUN/Creatinine ratio 6 (L) 12 - 20      eGFR >60 >60 ml/min/1.73m2    Calcium 7.9 (L) 8.5 - 10.1 mg/dL           Assessment/     Patient Active Problem List   Diagnosis Code    History of blood clots Z86.718    Bladder cancer (HCC) C67.9    Anemia due to acute blood loss D62    Hypercalcemia E83.52    Hydronephrosis of right kidney N13.30    Acute cystitis N30.00    Retained ureteral stent Z96.0       Plan:    BLADDER CANCER:   Urothelial, muscle invasive, high grade with squamous differentiation, perineural invasion. She had right ureteral obstruction with possible distal ureteral involvement. She is now s/p cystectomy/ pelvic exenteration/ ileal conduit. Pathology pending. ALLI roque with 310 cc out/24 hours  Conduit with 2300+ cc out/24 hours. Clear yellow. Gibson via stoma. NPO; await bowel function. Strict NPO except ice chips and meds.     Rectal injury s/p primary repair. Nothing by rectum. Plan on CT with rectal contrast tomorrow. Metronidazole and Levaquin. Metronidazole ending today. SCD's on; can get OOB with PT today. Ambulate as tolerated. IS; at goal.    ANEMIA: H/H ok today. Continue daily labs. HX of DVT: not on anticoagulation currently; SCD in place. LOW SERUM ALBUMIN: nutrition will be important. We will have consult placed after pt is taking PO diet. CONSULTS:  GYN: Spasic; intra-operative to help identify right ovarian and IP ligament. No ongoing concerns after that. GEN SURG: Jose Antonio, intra-operative, rectal injury. WOUND CARE: new ileal conduit diversion  PT/OT: when appropriate  : discharge planning, home health, etc.  OSTOMY CLINIC: follow up scheduled.     Margaret Mohr NP

## 2022-10-05 NOTE — ANTIMICROBIAL STEWARDSHIP
The Antimicrobial Stewardship Team has reviewed current therapy. Patient is on day #2 of Levaquin for surgical prophylaxis s/p cystectomy. Consider d/c Levaquin, as it puts the patient at increased risk for developing resistance, along with serious adverse events, including QT prolongation and tendon rupture.

## 2022-10-05 NOTE — CONSULTS
Comprehensive Nutrition Assessment    Type and Reason for Visit: Initial, Positive nutrition screen (MST 2)    Nutrition Recommendations/Plan:   Initiate PO diet as medically able, goal of regular diet  RD to add ONS once diet initiated   D5 at 125ml/hr providing 510kcals/d, D/c as medically able once diet initiated      Malnutrition Assessment:  Malnutrition Status:  Severe malnutrition (10/05/22 1317)    Context:  Chronic illness     Findings of the 6 clinical characteristics of malnutrition:   Energy Intake:  75% or less est energy requirements for 1 month or longer  Weight Loss:  Greater than 7.5% over 3 months     Body Fat Loss:  Unable to assess,     Muscle Mass Loss:  Unable to assess,    Fluid Accumulation:  No significant fluid accumulation,        Nutrition Assessment:    Admitted for elective cystectomy, ileal conduit diversion, pelvic exenteration 2/2 bladder cancer. (10/3) extubated, remains NPO x2d. RD consulted for ONS, however pt remains NPO per Dr. Regina Le. Also noted pt MST 2, unsure wt loss-- per EMR, Wt loss of 6.2kg x3 months (10.8%, severe). Once PO diet initiated, RD to add ONS. Will continue to monitor. Labs: H/H 7.7/24.8, Na 134, BUN 4, -179. Meds: D5 at 125ml/hr, colace, levofloxacin, flagyl, pepcid. Nutrition Related Findings:    NFPE deferred, will attempt at follow-up as medically able. No hx dysphagia. No N/V, +hx constipation. Last BM 10/2. No edema. Wound Type: Surgical incision    Current Nutrition Intake & Therapies:  Average Meal Intake: NPO  Average Supplement Intake: NPO  DIET NPO Ice Chips    Anthropometric Measures:  Height: 5' 2.01\" (157.5 cm)  Ideal Body Weight (IBW): 110 lbs (50 kg)     Current Body Wt:  51 kg (112 lb 7 oz) (10/5), 102.2 % IBW. Bed scale  Current BMI (kg/m2): 20.6  Usual Body Weight: 52.2 kg (115 lb) (per pt 3 weeks ago)  % Weight Change (Calculated): -2.2                    BMI Category: Normal weight (BMI 18.5-24. 9)  Wt Readings from Last 10 Encounters:   10/05/22 51 kg (112 lb 7 oz)   09/30/22 (P) 52 kg (114 lb 10.2 oz)   09/27/22 51.3 kg (113 lb)   09/16/22 53.7 kg (118 lb 6.2 oz)   09/11/22 52.2 kg (115 lb)   12/13/21 56.8 kg (125 lb 3.2 oz)   10/13/21 57 kg (125 lb 9.6 oz)   09/02/21 55.7 kg (122 lb 12.8 oz)   07/29/21 56.4 kg (124 lb 6.4 oz)   07/02/21 57.2 kg (126 lb)   Wt loss of 6.2kg x3 months (10.8%, severe)    Estimated Daily Nutrient Needs:  Energy Requirements Based On: Kcal/kg  Weight Used for Energy Requirements: Current  Energy (kcal/day): 1530-1785kcals (30-35kcals/kg, cancer)  Weight Used for Protein Requirements: Current  Protein (g/day): 71-82g (1.4-1.6g/kg, ERAS, Cancer)  Method Used for Fluid Requirements: 1 ml/kcal  Fluid (ml/day): 1530-1785kcals    Nutrition Diagnosis:   Severe malnutrition, In context of chronic illness related to catabolic illness (cancer) as evidenced by intake 0-25%, weight loss 7.5% in 3 months    Nutrition Interventions:   Food and/or Nutrient Delivery: Start oral diet, Start oral nutrition supplement  Nutrition Education/Counseling: No recommendations at this time  Coordination of Nutrition Care: Continue to monitor while inpatient       Goals:     Goals: Initiate PO diet, PO intake 50% or greater, within 2 days, other (specify)  Specify Other Goals: wt maintenance within +/-0.5kg in 7days    Nutrition Monitoring and Evaluation:   Behavioral-Environmental Outcomes: None identified  Food/Nutrient Intake Outcomes: Diet advancement/tolerance, Food and nutrient intake, Supplement intake  Physical Signs/Symptoms Outcomes: Weight, Skin, GI status, Biochemical data    Discharge Planning:     Too soon to determine    [employer]: Ext [phone], or via [address] [place] [address]

## 2022-10-05 NOTE — PROGRESS NOTES
Met w/patient and niece at the bedside. Assigned RN present in the room. Writer informed by Ginger Khan RN PT/OT worked w/patient and therapy recommendation is for home health. Patient gave verbal consent for writer to send [facility] referrals out on her behalf. Pending acceptance.           Kina Conrado, MSW

## 2022-10-05 NOTE — PROGRESS NOTES
PHYSICAL THERAPY EVALUATION  Patient: Lourdes Gaucher (56 y.o. female)  Date: 10/5/2022  Primary Diagnosis: Bladder cancer ([facility] [C67.9]  Procedure(s) (LRB):  ROBOTIC ASSISTED LAPAROSCOPIC CYSTECTOMY WITH URETEROILEAL CONDUIT INCLUDING INTESTINE ANASTOMOSIS/PELVIC NODE DISSECTION AND ANTERIOR PELVIC EXENTERATION, APPENDECTOMY (N/A) 2 Days Post-Op   Precautions: falls      ASSESSMENT  Pt is a 61 y.o. female admitted on 10/3/2022 for bladder cancer; pt underwent robotic anterior pelvic exenteration including hysterectomy/ bilateral salpingoophrorectomy, incidental appendectomy, pelvic lymph node dissection, ileal conduit urinary diversion on 10/3/22 by Dr. Kenzie Umanzor. Pt remained intubated following procedure until 10/4/22. Pt semi-supine in bed niece present (permission from pt given) in ICU bed upon PT arrival, agreeable to evaluation. Pt A&O x 4. Based on the objective data described, the patient presents with generalized weakness, impaired functional mobility, impaired amb, impaired balance, and decreased activity tolerance with single HHA required for amb. (See below for objective details and assist levels). Overall pt tolerated session fair today with c/o 9/10 pain throughout session. Pt required increased time for mobility due to pain as well as HHA for amb due to unsteadiness (uses a SPC at baseline). Pt will benefit from continued skilled PT to address above deficits and return to PLOF. Current PT DC recommendation Home with Home Health Therapy with family care once medically appropriate.     Current Level of Function Impacting Discharge (mobility/balance): SBA to CGA    Other factors to consider for discharge: severity of deficits       PLAN :  Recommendations and Planned Interventions: bed mobility training, transfer training, gait training, therapeutic exercises, patient and family training/education, and therapeutic activities      Recommend for staff: Out of bed to chair for meals and Amb to bathroom for toileting with gt belt and AD    Frequency/Duration: Patient will be followed by physical therapy:  3-5x/week to address goals. Recommendation for discharge: (in order for the patient to meet his/her long term goals)  Home with [address] [place] [address] with family care    This discharge recommendation:  Has been made in collaboration with the attending provider and/or case management         SUBJECTIVE:   Patient stated i'm ready to get up.     OBJECTIVE DATA SUMMARY:   HISTORY:    Past Medical History:   Diagnosis Date    Anemia     pt states had recent blood transfusion 2022    Back pain     Cancer (HCC)     bladder    DVT (deep venous thrombosis) (HCC)     and PE, pt denies lung PE , only leg several years ago    Hepatitis C     pt states dx 1 month ago    Liver disease     Presence of IVC filter     pt states several years ago    Rheumatoid arthritis ([address]     Sciatic leg pain     pt states both legs    Uterine fibroid      Past Surgical History:   Procedure Laterality Date    HX  SECTION      HX UROLOGICAL  09/15/2022    CYSTOSCOPY    HX UROLOGICAL  09/15/2022    URETHRAL DILATION    HX UROLOGICAL  09/15/2022    URETERAL STENT PLACEMENT    HX UROLOGICAL  09/15/2022    TRANSURETHRAL RESECTION OF BLADDER TUMOR >2CM    HX UROLOGICAL Bilateral 09/15/2022    RETROGRADE PYELOGRAM    [place] CARDIAC SURG PROCEDURE UNLIST      stent placement       Home Situation  Home Environment: Apartment (2nd floor)  # Steps to Enter: 9  Rails to Enter: Yes  Hand Rails : Bilateral  One/Two Story Residence: One story  Living Alone: Yes  Support Systems: Other Family Member(s) (niece)  Patient Expects to be Discharged to[de-identified] Home with family assistance  Current DME Used/Available at Home: Cane, straight, Lift chair  Tub or Shower Type: Tub/Shower combination    EXAMINATION/PRESENTATION/DECISION MAKING:   Critical Behavior:  Neurologic State: Alert  Orientation Level: Oriented X4  Cognition: Appropriate decision making, Appropriate for age attention/concentration, Appropriate safety awareness     Hearing: Auditory  Auditory Impairment: None  Skin:  ostomy noted right side  Edema: none noted   Range Of Motion:  AROM: Generally decreased, functional           PROM: Generally decreased, functional           Strength:    Strength: Generally decreased, functional          Functional Mobility:  Bed Mobility:  Rolling: Contact guard assistance; Additional time  Supine to Sit: Contact guard assistance; Additional time     Scooting: Contact guard assistance  Transfers:  Sit to Stand: Stand-by assistance;Contact guard assistance  Stand to Sit: Stand-by assistance;Contact guard assistance                       Balance:   Sitting: Intact; Without support  Standing: Intact; Without support  Ambulation/Gait Training:  Distance (ft): 30 Feet (ft) (bed>commode>around room to bedside recliner)  Assistive Device: Gait belt;Walker, rolling  Ambulation - Level of Assistance: Contact guard assistance;Minimal assistance     Gait Description (WDL): Exceptions to WDL           Base of Support: Narrowed     Speed/Vera: Slow;Shuffled    Therapeutic Exercises:   Pt would benefit from LE HEP to improve overall LE AROM/strength and can be further educated in next treatment session. Functional Measure:  [address] [id number] AM-PAC 6 Clicks         Basic Mobility Inpatient Short Form  How much difficulty does the patient currently have. .. Unable A Lot A Little None   1. Turning over in bed (including adjusting bedclothes, sheets and blankets)? [] 1   [] 2   [x] 3   [] 4   2. Sitting down on and standing up from a chair with arms ( e.g., wheelchair, bedside commode, etc.)   [] 1   [] 2   [x] 3   [] 4   3. Moving from lying on back to sitting on the side of the bed? [] 1   [] 2   [x] 3   [] 4          How much help from another person does the patient currently need. .. Total A Lot A Little None   4.   Moving to and from a bed to a chair (including a wheelchair)? [] 1   [] 2   [x] 3   [] 4   5. Need to walk in hospital room? [] 1   [] 2   [x] 3   [] 4   6. Climbing 3-5 steps with a railing? [] 1   [] 2   [x] 3   [] 4   © , Trustees of [employer], under license to [employer]. All rights reserved     Score:  Initial:  Most Recent: X (Date: 10/5/22 )   Interpretation of Tool:  Represents activities that are increasingly more difficult (i.e. Bed mobility, Transfers, Gait). Score 24 23 22-20 19-15 14-10 9-7 6   Modifier CH CI CJ CK CL CM CN         Physical Therapy Evaluation Charge Determination   History Examination Presentation Decision-Making   HIGH Complexity :3+ comorbidities / personal factors will impact the outcome/ POC  HIGH Complexity : 4+ Standardized tests and measures addressing body structure, function, activity limitation and / or participation in recreation  LOW Complexity : Stable, uncomplicated  Other outcome measures ampac 6  mod      Based on the above components, the patient evaluation is determined to be of the following complexity level: LOW     Pain Ratin/10 reported     Activity Tolerance:   Fair and requires rest breaks    After treatment patient left in no apparent distress:   Bed locked and in lowest position Sitting in chair, Call bell within reach, and Caregiver / family present and nsg in room and updated. GOALS:    Problem: Mobility Impaired (Adult and Pediatric)  Goal: *Acute Goals and Plan of Care (Insert Text)  Description: FUNCTIONAL STATUS PRIOR TO ADMISSION: Patient was independent and active without use of DME.    HOME SUPPORT PRIOR TO ADMISSION: The patient lived alone with niece to provide assistance. Physical Therapy Goals  Initiated 10/5/2022  Pt stated goal: to go home  Pt will be I with LE HEP in 7 days. Pt will perform bed mobility with mod I in 7 days. Pt will perform transfers with mod I in 7 days. Pt will amb  feet with LRAD safely with mod I in 7 days.    Pt will ascend/descend 4 steps x2 with B handrails and CGA in 7 days to safely enter home. Outcome: Not Met       COMMUNICATION/EDUCATION:   The patients plan of care was discussed with: Registered nurse and Case management. Fall prevention education was provided and the patient/caregiver indicated understanding., Patient/family have participated as able in goal setting and plan of care. , and Patient/family agree to work toward stated goals and plan of care.        Thank you for this referral.  Lili Jon, PT, DPT   Time Calculation: 30 mins

## 2022-10-06 ENCOUNTER — TRANSCRIBE ORDER (OUTPATIENT)
Dept: SCHEDULING | Age: 57
End: 2022-10-06

## 2022-10-06 ENCOUNTER — APPOINTMENT (OUTPATIENT)
Dept: CT IMAGING | Age: 57
DRG: 441 | End: 2022-10-06
Attending: UROLOGY
Payer: MEDICAID

## 2022-10-06 DIAGNOSIS — K80.20 BILIARY CALCULUS: ICD-10-CM

## 2022-10-06 DIAGNOSIS — R10.9 STOMACH ACHE: Primary | ICD-10-CM

## 2022-10-06 LAB
ANION GAP SERPL CALC-SCNC: 6 MMOL/L (ref 5–15)
BUN SERPL-MCNC: 4 MG/DL (ref 6–20)
BUN/CREAT SERPL: 7 (ref 12–20)
CA-I BLD-MCNC: 8.4 MG/DL (ref 8.5–10.1)
CHLORIDE SERPL-SCNC: 110 MMOL/L (ref 97–108)
CO2 SERPL-SCNC: 21 MMOL/L (ref 21–32)
CREAT SERPL-MCNC: 0.55 MG/DL (ref 0.55–1.02)
ERYTHROCYTE [DISTWIDTH] IN BLOOD BY AUTOMATED COUNT: 17.1 % (ref 11.5–14.5)
GLUCOSE BLD STRIP.AUTO-MCNC: 91 MG/DL (ref 65–100)
GLUCOSE SERPL-MCNC: 89 MG/DL (ref 65–100)
HCT VFR BLD AUTO: 27.1 % (ref 35–47)
HGB BLD-MCNC: 8.5 G/DL (ref 11.5–16)
MCH RBC QN AUTO: 27 PG (ref 26–34)
MCHC RBC AUTO-ENTMCNC: 31.4 G/DL (ref 30–36.5)
MCV RBC AUTO: 86 FL (ref 80–99)
NRBC # BLD: 0 K/UL (ref 0–0.01)
NRBC BLD-RTO: 0 PER 100 WBC
PERFORMED BY, TECHID: NORMAL
PLATELET # BLD AUTO: 224 K/UL (ref 150–400)
PMV BLD AUTO: 10.2 FL (ref 8.9–12.9)
POTASSIUM SERPL-SCNC: 4.4 MMOL/L (ref 3.5–5.1)
RBC # BLD AUTO: 3.15 M/UL (ref 3.8–5.2)
SODIUM SERPL-SCNC: 137 MMOL/L (ref 136–145)
WBC # BLD AUTO: 9.4 K/UL (ref 3.6–11)

## 2022-10-06 PROCEDURE — 74011250637 HC RX REV CODE- 250/637: Performed by: NURSE PRACTITIONER

## 2022-10-06 PROCEDURE — 74011250636 HC RX REV CODE- 250/636: Performed by: NURSE PRACTITIONER

## 2022-10-06 PROCEDURE — 74176 CT ABD & PELVIS W/O CONTRAST: CPT

## 2022-10-06 PROCEDURE — 74011250636 HC RX REV CODE- 250/636: Performed by: UROLOGY

## 2022-10-06 PROCEDURE — 80048 BASIC METABOLIC PNL TOTAL CA: CPT

## 2022-10-06 PROCEDURE — 82962 GLUCOSE BLOOD TEST: CPT

## 2022-10-06 PROCEDURE — 74011000250 HC RX REV CODE- 250: Performed by: NURSE PRACTITIONER

## 2022-10-06 PROCEDURE — 74011250637 HC RX REV CODE- 250/637: Performed by: UROLOGY

## 2022-10-06 PROCEDURE — 65270000029 HC RM PRIVATE

## 2022-10-06 PROCEDURE — 36415 COLL VENOUS BLD VENIPUNCTURE: CPT

## 2022-10-06 PROCEDURE — 85027 COMPLETE CBC AUTOMATED: CPT

## 2022-10-06 RX ORDER — FAMOTIDINE 20 MG/1
20 TABLET, FILM COATED ORAL 2 TIMES DAILY
Status: DISCONTINUED | OUTPATIENT
Start: 2022-10-06 | End: 2022-10-10 | Stop reason: HOSPADM

## 2022-10-06 RX ORDER — CALCIUM CARBONATE 200(500)MG
200 TABLET,CHEWABLE ORAL
Status: DISCONTINUED | OUTPATIENT
Start: 2022-10-06 | End: 2022-10-10 | Stop reason: HOSPADM

## 2022-10-06 RX ADMIN — HYDROCODONE BITARTRATE AND ACETAMINOPHEN 1 TABLET: 5; 325 TABLET ORAL at 08:04

## 2022-10-06 RX ADMIN — LEVOFLOXACIN 500 MG: 5 INJECTION, SOLUTION INTRAVENOUS at 08:04

## 2022-10-06 RX ADMIN — FAMOTIDINE 20 MG: 20 TABLET ORAL at 12:20

## 2022-10-06 RX ADMIN — ONDANSETRON 4 MG: 2 INJECTION INTRAMUSCULAR; INTRAVENOUS at 16:15

## 2022-10-06 RX ADMIN — HYDROCODONE BITARTRATE AND ACETAMINOPHEN 1 TABLET: 5; 325 TABLET ORAL at 20:01

## 2022-10-06 RX ADMIN — POTASSIUM CHLORIDE, DEXTROSE MONOHYDRATE AND SODIUM CHLORIDE 50 ML/HR: 150; 5; 450 INJECTION, SOLUTION INTRAVENOUS at 16:18

## 2022-10-06 RX ADMIN — DOCUSATE SODIUM 100 MG: 100 CAPSULE, LIQUID FILLED ORAL at 08:04

## 2022-10-06 RX ADMIN — ONDANSETRON 4 MG: 2 INJECTION INTRAMUSCULAR; INTRAVENOUS at 08:04

## 2022-10-06 RX ADMIN — FAMOTIDINE 20 MG: 20 TABLET ORAL at 20:01

## 2022-10-06 RX ADMIN — POTASSIUM CHLORIDE, DEXTROSE MONOHYDRATE AND SODIUM CHLORIDE 125 ML/HR: 150; 5; 450 INJECTION, SOLUTION INTRAVENOUS at 05:14

## 2022-10-06 RX ADMIN — DOCUSATE SODIUM 100 MG: 100 CAPSULE, LIQUID FILLED ORAL at 20:01

## 2022-10-06 RX ADMIN — CALCIUM CARBONATE (ANTACID) CHEW TAB 500 MG 200 MG: 500 CHEW TAB at 13:30

## 2022-10-06 NOTE — PROGRESS NOTES
04:20am - The radiology unit was called as regard her CT scan , the technician who picked the call said she does not know how to go about rectum contrast  said she should come down around 7:30am -8:00am

## 2022-10-06 NOTE — PROGRESS NOTES
UROLOGY Progress Note         [phone]      Daily Progress Note: 10/6/2022      Subjective: The patient is seen for UROLOGIC follow up after cystectomy, ileal conduit diversion, pelvic exenteration on 10/3/22. Surgery complicated by rectal injury; no NC meds at this time. Mild abdominal soreness. C/o heartburn, worse after rectal contrast CT.  + BM after CT. Problem List:  Patient Active Problem List   Diagnosis Code    History of blood clots Z86.718    Bladder cancer ([facility] [address] C67.9    Anemia due to acute blood loss D62    Hypercalcemia E83.52    Hydronephrosis of right kidney N13.30    Acute cystitis N30.00    Retained ureteral stent Z96.0         Medications reviewed  Current Facility-Administered Medications   Medication Dose Route Frequency    famotidine (PEPCID) tablet 20 mg  20 mg Oral BID    acetaminophen (TYLENOL) tablet 650 mg  650 mg Oral Q4H PRN    HYDROcodone-acetaminophen (NORCO) 5-325 mg per tablet 1 Tablet  1 Tablet Oral Q6H PRN    0.9% sodium chloride infusion 250 mL  250 mL IntraVENous PRN    dextrose 5% - 0.45% NaCl with KCl 20 mEq/L infusion  50 mL/hr IntraVENous CONTINUOUS    lidocaine 4 % patch 1 Patch  1 Patch TransDERmal Q12H    ondansetron (ZOFRAN) injection 4 mg  4 mg IntraVENous Q6H PRN    docusate sodium (COLACE) capsule 100 mg  100 mg Oral BID    hydrALAZINE (APRESOLINE) 20 mg/mL injection 10 mg  10 mg IntraVENous Q6H PRN    levoFLOXacin (LEVAQUIN) 500 mg in D5W IVPB  500 mg IntraVENous Q24H       Review of Systems:   Review of Systems   Constitutional:  Negative for chills and fever. Gastrointestinal:  Positive for abdominal pain. Generalized soreness   Genitourinary:         Ileal conduit         Objective:   Physical Exam  Vitals and nursing note reviewed. Constitutional:       Appearance: Normal appearance. Eyes:      Extraocular Movements: Extraocular movements intact. Cardiovascular:      Rate and Rhythm: Normal rate.    Pulmonary: Effort: Pulmonary effort is normal.   Abdominal:      General: Abdomen is flat. There is no distension. Tenderness: There is abdominal tenderness. There is guarding. Comments: ALLI drain; bloody   Genitourinary:     Comments: Conduit with clear, yellow urine and pink stoma. Gibson via stoma  Musculoskeletal:      Cervical back: Normal range of motion. Skin:     General: Skin is warm and dry. Comments: Incision site(s) CDI. Neurological:      Mental Status: She is alert and oriented to person, place, and time. Psychiatric:         Behavior: Behavior normal.        Visit Vitals  /66 (BP 1 Location: Left upper arm, BP Patient Position: At rest)   Pulse (!) 104   Temp 98.3 °F (36.8 °C)   Resp 18   Ht 5' 2.01\" (1.575 m)   Wt 112 lb 7 oz (51 kg)   SpO2 100%   BMI 20.56 kg/m²         Data Review:       Recent Days:  Recent Labs     10/06/22  0410 10/05/22  0602 10/04/22  1206 10/04/22  0450   WBC 9.4 7.7  --  4.9   HGB 8.5* 7.7* 7.6* 6.9*   HCT 27.1* 24.8* 24.5* 22.4*    220  --  244       24 Hour Results:  Recent Results (from the past 24 hour(s))   METABOLIC PANEL, COMPREHENSIVE    Collection Time: 10/05/22  9:20 PM   Result Value Ref Range    Sodium 136 136 - 145 mmol/L    Potassium 4.1 3.5 - 5.1 mmol/L    Chloride 110 (H) 97 - 108 mmol/L    CO2 20 (L) 21 - 32 mmol/L    Anion gap 6 5 - 15 mmol/L    Glucose 116 (H) 65 - 100 mg/dL    BUN 4 (L) 6 - 20 mg/dL    Creatinine 0.67 0.55 - 1.02 mg/dL    BUN/Creatinine ratio 6 (L) 12 - 20      eGFR >60 >60 ml/min/1.73m2    Calcium 8.2 (L) 8.5 - 10.1 mg/dL    Bilirubin, total 0.8 0.2 - 1.0 mg/dL    AST (SGOT) 15 15 - 37 U/L    ALT (SGPT) 9 (L) 12 - 78 U/L    Alk.  phosphatase 57 45 - 117 U/L    Protein, total 6.9 6.4 - 8.2 g/dL    Albumin 1.7 (L) 3.5 - 5.0 g/dL    Globulin 5.2 (H) 2.0 - 4.0 g/dL    A-G Ratio 0.3 (L) 1.1 - 2.2     MAGNESIUM    Collection Time: 10/05/22  9:20 PM   Result Value Ref Range    Magnesium 1.6 1.6 - 2.4 mg/dL   PHOSPHORUS Collection Time: 10/05/22  9:20 PM   Result Value Ref Range    Phosphorus 2.1 (L) 2.6 - 4.7 mg/dL   GLUCOSE, POC    Collection Time: 10/05/22 11:38 PM   Result Value Ref Range    Glucose (POC) 115 (H) 65 - 100 mg/dL    Performed by Yuki Suero    CBC W/O DIFF    Collection Time: 10/06/22  4:10 AM   Result Value Ref Range    WBC 9.4 3.6 - 11.0 K/uL    RBC 3.15 (L) 3.80 - 5.20 M/uL    HGB 8.5 (L) 11.5 - 16.0 g/dL    HCT 27.1 (L) 35.0 - 47.0 %    MCV 86.0 80.0 - 99.0 FL    MCH 27.0 26.0 - 34.0 PG    MCHC 31.4 30.0 - 36.5 g/dL    RDW 17.1 (H) 11.5 - 14.5 %    PLATELET 969 086 - 548 K/uL    MPV 10.2 8.9 - 12.9 FL    NRBC 0.0 0.0  WBC    ABSOLUTE NRBC 0.00 0.00 - 3.96 K/uL   METABOLIC PANEL, BASIC    Collection Time: 10/06/22  4:10 AM   Result Value Ref Range    Sodium 137 136 - 145 mmol/L    Potassium 4.4 3.5 - 5.1 mmol/L    Chloride 110 (H) 97 - 108 mmol/L    CO2 21 21 - 32 mmol/L    Anion gap 6 5 - 15 mmol/L    Glucose 89 65 - 100 mg/dL    BUN 4 (L) 6 - 20 mg/dL    Creatinine 0.55 0.55 - 1.02 mg/dL    BUN/Creatinine ratio 7 (L) 12 - 20      eGFR >60 >60 ml/min/1.73m2    Calcium 8.4 (L) 8.5 - 10.1 mg/dL           Assessment/     Patient Active Problem List   Diagnosis Code    History of blood clots Z86.718    Bladder cancer (HCC) C67.9    Anemia due to acute blood loss D62    Hypercalcemia E83.52    Hydronephrosis of right kidney N13.30    Acute cystitis N30.00    Retained ureteral stent Z96.0       Plan:    BLADDER CANCER:   Urothelial, muscle invasive, high grade with squamous differentiation, perineural invasion. She has right ureteral obstruction with possible distal ureteral involvement. She is now s/p cystectomy/ pelvic exenteration/ ileal conduit. Pathology pending. Rectal injury - appears healed on CT. D/c drain. D/c abx. ANEMIA: stable    HX of DVT: not on anticoagulation currently; SCD in place. LOW SERUM ALBUMIN: nutrition important. Add PO supplements.   Adv diet    WOUND CARE: new ileal conduit diversion. She is actively participating in management. PT/OT: when appropriate  : discharge planning, home health, etc.  OSTOMY CLINIC: follow up scheduled.     Transfer out of ICU to floor        Tania Chou MD

## 2022-10-06 NOTE — PROGRESS NOTES
Report called to Justin on 5E via phone prior to transfer. Niece/POA in room and notified of new room assignment and transferred patient's belongings. Patient transferred via wheelchair and chart transferred.  Justin notified of patient arrival to 18

## 2022-10-06 NOTE — PROGRESS NOTES
Patient declined PT session today. Education provided for OOB mobility and therex however pt politely declined due to fatigue and not feeling well. Will follow up with pt at another time.

## 2022-10-06 NOTE — PROGRESS NOTES
Problem: Pressure Injury - Risk of  Goal: *Prevention of pressure injury  Description: Document Alphonso Scale and appropriate interventions in the flowsheet. Note: Pressure Injury Interventions:  Sensory Interventions: Keep linens dry and wrinkle-free, Pressure redistribution bed/mattress (bed type), Maintain/enhance activity level, Check visual cues for pain, Monitor skin under medical devices    Moisture Interventions: Absorbent underpads, Check for incontinence Q2 hours and as needed, Maintain skin hydration (lotion/cream)    Activity Interventions: Pressure redistribution bed/mattress(bed type)    Mobility Interventions: Pressure redistribution bed/mattress (bed type), HOB 30 degrees or less    Nutrition Interventions: Document food/fluid/supplement intake    Friction and Shear Interventions: Minimize layers                Problem: Patient Education: Go to Patient Education Activity  Goal: Patient/Family Education  Outcome: Progressing Towards Goal     Problem: Discharge Planning  Goal: *Discharge to safe environment  Outcome: Progressing Towards Goal  Goal: *Knowledge of medication management  Outcome: Progressing Towards Goal  Goal: *Knowledge of discharge instructions  Outcome: Progressing Towards Goal     Problem: Pain  Goal: *Control of Pain  Outcome: Progressing Towards Goal  Goal: *PALLIATIVE CARE:  Alleviation of Pain  Outcome: Progressing Towards Goal     Problem: Falls - Risk of  Goal: *Absence of Falls  Description: Document Iris Fall Risk and appropriate interventions in the flowsheet.   Outcome: Progressing Towards Goal  Note: Fall Risk Interventions:       Mentation Interventions: Adequate sleep, hydration, pain control, Bed/chair exit alarm, Toileting rounds    Medication Interventions: Bed/chair exit alarm         History of Falls Interventions: Bed/chair exit alarm         Problem: Patient Education: Go to Patient Education Activity  Goal: Patient/Family Education  Outcome: Progressing Towards Goal     Problem: Patient Education: Go to Patient Education Activity  Goal: Patient/Family Education  Outcome: Progressing Towards Goal

## 2022-10-06 NOTE — WOUND CARE
Met with patient and niece. Both confirmed they watched the educational videos. Neither had questions for me pertaining to urostomy care at this time. Patient confirmed she has been emptying her pouch. Currently appliance remains well-fitting and without leaks. Plan on changing appliance at 0800 tomorrow. Nideloris plans on being here at that time. Will continue to follow.

## 2022-10-06 NOTE — PROGRESS NOTES
Bedside shift change report given to    Miles Camp RN (oncoming nurse) by Augie Curiel (offgoing nurse). Report included the following information SBAR.

## 2022-10-06 NOTE — PROGRESS NOTES
15: 40PM Declined by the additional four [facility] agencies. Patient has been declined by a total of [address] agencies. At this time patient has no [facility] agency. Karen Ying, MSW            14:57PM [facility] referrals sent out on patient's behalf. Patient initially accepted by two [facility] agencies'. After admissions spoke w/patient and family she's been declined by both d/t patient is planning on switching health insurance companies soon. The [facility] companies' who initially accepted her informed writer \"d/t her not knowing anything about the new insurance. We're unable to accept as we don't know if we're in network w/the new insurance. Our acceptance was based on her current insurance and in network. At this time patient hasn't been accepted by any [facility] agency. Additional [facility] referrals sent out on patient's behalf. Pending acceptance.       Karen Ying, MSW   YAG CAPS W/ AES @ &nbsp;<br />

## 2022-10-07 PROBLEM — E43 SEVERE PROTEIN-CALORIE MALNUTRITION (HCC): Status: ACTIVE | Noted: 2022-10-07

## 2022-10-07 LAB
ALBUMIN SERPL-MCNC: 1.7 G/DL (ref 3.5–5)
ALBUMIN/GLOB SERPL: 0.3 {RATIO} (ref 1.1–2.2)
ALP SERPL-CCNC: 65 U/L (ref 45–117)
ALT SERPL-CCNC: 10 U/L (ref 12–78)
ANION GAP SERPL CALC-SCNC: 3 MMOL/L (ref 5–15)
ANION GAP SERPL CALC-SCNC: 5 MMOL/L (ref 5–15)
AST SERPL W P-5'-P-CCNC: 16 U/L (ref 15–37)
BILIRUB SERPL-MCNC: 0.7 MG/DL (ref 0.2–1)
BUN SERPL-MCNC: 6 MG/DL (ref 6–20)
BUN SERPL-MCNC: 8 MG/DL (ref 6–20)
BUN/CREAT SERPL: 10 (ref 12–20)
BUN/CREAT SERPL: 12 (ref 12–20)
CA-I BLD-MCNC: 8.5 MG/DL (ref 8.5–10.1)
CA-I BLD-MCNC: 8.7 MG/DL (ref 8.5–10.1)
CHLORIDE SERPL-SCNC: 106 MMOL/L (ref 97–108)
CHLORIDE SERPL-SCNC: 107 MMOL/L (ref 97–108)
CO2 SERPL-SCNC: 23 MMOL/L (ref 21–32)
CO2 SERPL-SCNC: 26 MMOL/L (ref 21–32)
CREAT SERPL-MCNC: 0.61 MG/DL (ref 0.55–1.02)
CREAT SERPL-MCNC: 0.66 MG/DL (ref 0.55–1.02)
ERYTHROCYTE [DISTWIDTH] IN BLOOD BY AUTOMATED COUNT: 16.9 % (ref 11.5–14.5)
GLOBULIN SER CALC-MCNC: 5.8 G/DL (ref 2–4)
GLUCOSE BLD STRIP.AUTO-MCNC: 105 MG/DL (ref 65–100)
GLUCOSE BLD STRIP.AUTO-MCNC: 110 MG/DL (ref 65–100)
GLUCOSE BLD STRIP.AUTO-MCNC: 112 MG/DL (ref 65–100)
GLUCOSE BLD STRIP.AUTO-MCNC: 116 MG/DL (ref 65–100)
GLUCOSE SERPL-MCNC: 116 MG/DL (ref 65–100)
GLUCOSE SERPL-MCNC: 99 MG/DL (ref 65–100)
HCT VFR BLD AUTO: 25.5 % (ref 35–47)
HGB BLD-MCNC: 8 G/DL (ref 11.5–16)
MAGNESIUM SERPL-MCNC: 1.9 MG/DL (ref 1.6–2.4)
MCH RBC QN AUTO: 26.7 PG (ref 26–34)
MCHC RBC AUTO-ENTMCNC: 31.4 G/DL (ref 30–36.5)
MCV RBC AUTO: 85 FL (ref 80–99)
NRBC # BLD: 0 K/UL (ref 0–0.01)
NRBC BLD-RTO: 0 PER 100 WBC
PERFORMED BY, TECHID: ABNORMAL
PHOSPHATE SERPL-MCNC: 2.8 MG/DL (ref 2.6–4.7)
PLATELET # BLD AUTO: 250 K/UL (ref 150–400)
PMV BLD AUTO: 9.6 FL (ref 8.9–12.9)
POTASSIUM SERPL-SCNC: 3.9 MMOL/L (ref 3.5–5.1)
POTASSIUM SERPL-SCNC: 4.1 MMOL/L (ref 3.5–5.1)
PROT SERPL-MCNC: 7.5 G/DL (ref 6.4–8.2)
RBC # BLD AUTO: 3 M/UL (ref 3.8–5.2)
SODIUM SERPL-SCNC: 135 MMOL/L (ref 136–145)
SODIUM SERPL-SCNC: 135 MMOL/L (ref 136–145)
WBC # BLD AUTO: 9.3 K/UL (ref 3.6–11)

## 2022-10-07 PROCEDURE — 74011250637 HC RX REV CODE- 250/637: Performed by: NURSE PRACTITIONER

## 2022-10-07 PROCEDURE — 97116 GAIT TRAINING THERAPY: CPT

## 2022-10-07 PROCEDURE — 83735 ASSAY OF MAGNESIUM: CPT

## 2022-10-07 PROCEDURE — 74011000250 HC RX REV CODE- 250: Performed by: NURSE PRACTITIONER

## 2022-10-07 PROCEDURE — 80048 BASIC METABOLIC PNL TOTAL CA: CPT

## 2022-10-07 PROCEDURE — 36415 COLL VENOUS BLD VENIPUNCTURE: CPT

## 2022-10-07 PROCEDURE — 84100 ASSAY OF PHOSPHORUS: CPT

## 2022-10-07 PROCEDURE — 65270000029 HC RM PRIVATE

## 2022-10-07 PROCEDURE — 82962 GLUCOSE BLOOD TEST: CPT

## 2022-10-07 PROCEDURE — 74011250636 HC RX REV CODE- 250/636: Performed by: NURSE PRACTITIONER

## 2022-10-07 PROCEDURE — 74011250637 HC RX REV CODE- 250/637: Performed by: UROLOGY

## 2022-10-07 PROCEDURE — 80053 COMPREHEN METABOLIC PANEL: CPT

## 2022-10-07 PROCEDURE — 85027 COMPLETE CBC AUTOMATED: CPT

## 2022-10-07 RX ORDER — ADHESIVE BANDAGE
30 BANDAGE TOPICAL DAILY PRN
Status: DISCONTINUED | OUTPATIENT
Start: 2022-10-07 | End: 2022-10-10 | Stop reason: HOSPADM

## 2022-10-07 RX ADMIN — FAMOTIDINE 20 MG: 20 TABLET ORAL at 22:12

## 2022-10-07 RX ADMIN — HYDROCODONE BITARTRATE AND ACETAMINOPHEN 1 TABLET: 5; 325 TABLET ORAL at 13:24

## 2022-10-07 RX ADMIN — DOCUSATE SODIUM 100 MG: 100 CAPSULE, LIQUID FILLED ORAL at 22:12

## 2022-10-07 RX ADMIN — POTASSIUM CHLORIDE, DEXTROSE MONOHYDRATE AND SODIUM CHLORIDE 50 ML/HR: 150; 5; 450 INJECTION, SOLUTION INTRAVENOUS at 09:23

## 2022-10-07 RX ADMIN — MAGNESIUM HYDROXIDE 30 ML: 400 SUSPENSION ORAL at 01:06

## 2022-10-07 RX ADMIN — DOCUSATE SODIUM 100 MG: 100 CAPSULE, LIQUID FILLED ORAL at 09:22

## 2022-10-07 RX ADMIN — FAMOTIDINE 20 MG: 20 TABLET ORAL at 09:22

## 2022-10-07 NOTE — PROGRESS NOTES
Problem: Mobility Impaired (Adult and Pediatric)  Goal: *Acute Goals and Plan of Care (Insert Text)  Description: FUNCTIONAL STATUS PRIOR TO ADMISSION: Patient was independent and active without use of DME.    HOME SUPPORT PRIOR TO ADMISSION: The patient lived alone with niece to provide assistance. Physical Therapy Goals  Initiated 10/5/2022  Pt stated goal: to go home  Pt will be I with [facility] in 7 days. Pt will perform bed mobility with mod I in 7 days. Pt will perform transfers with mod I in 7 days. Pt will amb  feet with LRAD safely with mod I in 7 days. Pt will ascend/descend 4 steps x2 with B handrails and CGA in 7 days to safely enter home. Outcome: Progressing Towards Goal   PHYSICAL THERAPY TREATMENT  Patient: Taylor Villagomez (65 y.o. female)  Date: 10/7/2022  Diagnosis: Bladder cancer ([facility] [address] [C67.9] <principal problem not specified>  Procedure(s) (LRB):  ROBOTIC ASSISTED LAPAROSCOPIC CYSTECTOMY WITH URETEROILEAL CONDUIT INCLUDING INTESTINE ANASTOMOSIS/PELVIC NODE DISSECTION AND ANTERIOR PELVIC EXENTERATION, APPENDECTOMY (N/A) 4 Days Post-Op  Precautions:    Chart, physical therapy assessment, plan of care and goals were reviewed. ASSESSMENT  Patient continues with skilled PT services and is progressing towards goals. Pt semi-supine upon PT arrival, agreeable to session. (See below for objective details and assist levels). Overall pt tolerated session well today. Decreased assistance required for bed mobility and transfers. Demos slow steady coy with no overt LOB noted, no AD used. Noted occ post lean with weight displaced through heels with turns during gait training, education regarding COG awareness and posture provided. Will continue to benefit from skilled PT services, and will continue to progress as tolerated. Current Level of Function Impacting Discharge (mobility/balance): Good-fair balance.             PLAN :  Patient continues to benefit from skilled intervention to address the above impairments. Continue treatment per established plan of care to address goals. Recommend with staff: Amb in [facility]    Recommendation for discharge: (in order for the patient to meet his/her long term goals)  Home with [address] [place] [address]      IF patient discharges home will need the following DME: patient owns DME required for discharge       SUBJECTIVE:   Patient stated I was out of bed for 3 hours today.     OBJECTIVE DATA SUMMARY:   Critical Behavior:  Neurologic State: Alert  Orientation Level: Oriented X4  Cognition: Appropriate decision making     Functional Mobility Training:  Bed Mobility:     Supine to Sit: Independent  Sit to Supine: Independent           Transfers:  Sit to Stand: Stand-by assistance  Stand to Sit: Stand-by assistance                             Balance:  Sitting: Intact  Standing: Intact  Ambulation/Gait Training:  Distance (ft): 150 Feet (ft)  Assistive Device: Gait belt;Walker, rolling  Ambulation - Level of Assistance: Contact guard assistance;Stand-by assistance                 Base of Support: Narrowed     Speed/Vera: Slow          Pain Rating:  Calmly states 10/10 at beginning of tx, however did not appear to be in pain/did not impact mobility.      Activity Tolerance:   Good    After treatment patient left in no apparent distress:   Bed locked and returned to lowest position, Supine in bed and Call bell within reach          Thais Corona PT   Time Calculation: 15 mins

## 2022-10-07 NOTE — PROGRESS NOTES
UROLOGY Progress Note         [phone]      Daily Progress Note: 10/7/2022      Subjective: The patient is seen for UROLOGIC follow up after cystectomy, ileal conduit diversion, pelvic exenteration on 10/3/22. Mild abdominal soreness. + BM after CT yesterday. She had some emesis after CT yesterday too ~ 7pm.  No ongoing issue with nausea. She has been OOB to chair and ambulatory in room. There is some trouble with ensuring home health services on discharge. PT has recommended home with home health. Patient and niece feel comfortable with  and are working toward a solution. Pt active in caring for her ostomy. We will plan to remove abarca tomorrow.        Problem List:  Patient Active Problem List   Diagnosis Code    History of blood clots Z86.718    Bladder cancer ([facility] [address] C67.9    Anemia due to acute blood loss D62    Hypercalcemia E83.52    Hydronephrosis of right kidney N13.30    Acute cystitis N30.00    Retained ureteral stent Z96.0    Severe protein-calorie malnutrition (HCC) E43       Medications reviewed  Current Facility-Administered Medications   Medication Dose Route Frequency    magnesium hydroxide (MILK OF MAGNESIA) 400 mg/5 mL oral suspension 30 mL  30 mL Oral DAILY PRN    famotidine (PEPCID) tablet 20 mg  20 mg Oral BID    calcium carbonate (TUMS) chewable tablet 200 mg [elemental]  200 mg Oral BID PRN    acetaminophen (TYLENOL) tablet 650 mg  650 mg Oral Q4H PRN    HYDROcodone-acetaminophen (NORCO) 5-325 mg per tablet 1 Tablet  1 Tablet Oral Q6H PRN    0.9% sodium chloride infusion 250 mL  250 mL IntraVENous PRN    lidocaine 4 % patch 1 Patch  1 Patch TransDERmal Q12H    ondansetron (ZOFRAN) injection 4 mg  4 mg IntraVENous Q6H PRN    docusate sodium (COLACE) capsule 100 mg  100 mg Oral BID    hydrALAZINE (APRESOLINE) 20 mg/mL injection 10 mg  10 mg IntraVENous Q6H PRN       Review of Systems:   Review of Systems   Constitutional:  Negative for chills and fever. Gastrointestinal:  Positive for abdominal pain. Generalized soreness   Genitourinary:         Ileal conduit         Objective:   Physical Exam  Vitals and nursing note reviewed. Constitutional:       Appearance: Normal appearance. Eyes:      Extraocular Movements: Extraocular movements intact. Cardiovascular:      Rate and Rhythm: Normal rate. Pulmonary:      Effort: Pulmonary effort is normal.   Abdominal:      General: Abdomen is flat. There is no distension. Tenderness: There is abdominal tenderness. There is guarding. Genitourinary:     Comments: Conduit with clear, yellow urine and pink stoma. Gibson via stoma  Musculoskeletal:      Cervical back: Normal range of motion. Skin:     General: Skin is warm and dry. Comments: Incision site(s) CDI. Neurological:      Mental Status: She is alert and oriented to person, place, and time.    Psychiatric:         Behavior: Behavior normal.        Visit Vitals  /78 (BP 1 Location: Left upper arm, BP Patient Position: At rest)   Pulse 91   Temp 97.8 °F (36.6 °C)   Resp 17   Ht 5' 2.01\" (1.575 m)   Wt 112 lb 7 oz (51 kg)   SpO2 99%   BMI 20.56 kg/m²         Data Review:       Recent Days:  Recent Labs     10/07/22  1051 10/06/22  0410 10/05/22  0602   WBC 9.3 9.4 7.7   HGB 8.0* 8.5* 7.7*   HCT 25.5* 27.1* 24.8*    224 220       24 Hour Results:  Recent Results (from the past 24 hour(s))   GLUCOSE, POC    Collection Time: 10/06/22  4:14 PM   Result Value Ref Range    Glucose (POC) 91 65 - 100 mg/dL    Performed by Delvis HIRSCH[facility]    Collection Time: 10/07/22 12:46 AM   Result Value Ref Range    Glucose (POC) 116 (H) 65 - 100 mg/dL    Performed by Ignacio Horowitz    GLUCOSE, POC    Collection Time: 10/07/22  8:03 AM   Result Value Ref Range    Glucose (POC) 110 (H) 65 - 100 mg/dL    Performed by Bertha Veras    CBC W/O DIFF    Collection Time: 10/07/22 10:51 AM   Result Value Ref Range    WBC 9.3 3.6 - 11.0 K/uL RBC 3.00 (L) 3.80 - 5.20 M/uL    HGB 8.0 (L) 11.5 - 16.0 g/dL    HCT 25.5 (L) 35.0 - 47.0 %    MCV 85.0 80.0 - 99.0 FL    MCH 26.7 26.0 - 34.0 PG    MCHC 31.4 30.0 - 36.5 g/dL    RDW 16.9 (H) 11.5 - 14.5 %    PLATELET 092 486 - 309 K/uL    MPV 9.6 8.9 - 12.9 FL    NRBC 0.0 0.0  WBC    ABSOLUTE NRBC 0.00 0.00 - 0.39 K/uL   METABOLIC PANEL, BASIC    Collection Time: 10/07/22 10:51 AM   Result Value Ref Range    Sodium 135 (L) 136 - 145 mmol/L    Potassium 4.1 3.5 - 5.1 mmol/L    Chloride 107 97 - 108 mmol/L    CO2 23 21 - 32 mmol/L    Anion gap 5 5 - 15 mmol/L    Glucose 116 (H) 65 - 100 mg/dL    BUN 6 6 - 20 mg/dL    Creatinine 0.61 0.55 - 1.02 mg/dL    BUN/Creatinine ratio 10 (L) 12 - 20      eGFR >60 >60 ml/min/1.73m2    Calcium 8.5 8.5 - 10.1 mg/dL   GLUCOSE, POC    Collection Time: 10/07/22 12:20 PM   Result Value Ref Range    Glucose (POC) 112 (H) 65 - 100 mg/dL    Performed by Abhishek Mari            Assessment/     Patient Active Problem List   Diagnosis Code    History of blood clots Z86.718    Bladder cancer (HCC) C67.9    Anemia due to acute blood loss D62    Hypercalcemia E83.52    Hydronephrosis of right kidney N13.30    Acute cystitis N30.00    Retained ureteral stent Z96.0    Severe protein-calorie malnutrition ([facility] [address] E43       Plan:    BLADDER CANCER:   Urothelial, muscle invasive, high grade with squamous differentiation, perineural invasion. She has right ureteral obstruction with possible distal ureteral involvement. She is now s/p cystectomy/ pelvic exenteration/ ileal conduit. Pathology pending. Rectal injury - appears healed on CT. ANEMIA: stable; restart iron when having regular BM    HX of DVT: not on anticoagulation currently; SCD in place. LOW SERUM ALBUMIN: nutrition important. Add PO supplements per Nutrition recommendations. Possible discharge 24-48 hours.     Rahat Green, NP

## 2022-10-07 NOTE — PROGRESS NOTES
Problem: Pressure Injury - Risk of  Goal: *Prevention of pressure injury  Description: Document Alphonso Scale and appropriate interventions in the flowsheet. Outcome: Progressing Towards Goal  Note: Pressure Injury Interventions:  Sensory Interventions: Keep linens dry and wrinkle-free    Moisture Interventions: Absorbent underpads, Contain wound drainage, Minimize layers    Activity Interventions: PT/OT evaluation, Increase time out of bed    Mobility Interventions: HOB 30 degrees or less, PT/OT evaluation    Nutrition Interventions: Offer support with meals,snacks and hydration    Friction and Shear Interventions: HOB 30 degrees or less, Minimize layers                Problem: Pain  Goal: *Control of Pain  Outcome: Progressing Towards Goal     Problem: Falls - Risk of  Goal: *Absence of Falls  Description: Document Iris Fall Risk and appropriate interventions in the flowsheet.   Outcome: Progressing Towards Goal  Note: Fall Risk Interventions:  Mobility Interventions: Patient to call before getting OOB, Bed/chair exit alarm    Mentation Interventions: Adequate sleep, hydration, pain control    Medication Interventions: Bed/chair exit alarm, Evaluate medications/consider consulting pharmacy, Patient to call before getting OOB         History of Falls Interventions: Bed/chair exit alarm, Door open when patient unattended

## 2022-10-07 NOTE — PROGRESS NOTES
Physician Progress Note      Singh Buchanan  CSN #:                  [id number]  :                       1965  ADMIT DATE:       10/3/2022 9:49 AM  DISCH DATE:  RESPONDING  PROVIDER #:        Chiquita LOZANO NP          QUERY TEXT:    Pt admitted with Bladder cancer. Noted documentation of Severe Malnutrition in 10/5 Dietician consult note. If possible, please document in progress notes and discharge summary:      The medical record reflects the following:  Risk Factors: 61year old female, bladder cancer, weight loss, BMI 20.5, Albumin 1.7  Clinical Indicators: 10/5 Dietician consult - Severe Malnutrition. Chronic illness  Energy Intake:  75% or less est energy requirements for 1 month or longer  Weight Loss:  Greater than 7.5% over 3 months  -Severe malnutrition, In context of chronic illness related to catabolic illness (cancer) as evidenced by intake 0-25%, weight loss 7.5% in 3 months  Treatment: 1. Initiate PO diet as medically able, goal of regular diet  2. RD to add ONS once diet initiated  3. D5 at 125ml/hr providing 510kcals/d, D/c as medically able once diet initiated    Please email [email or web address] with any questions  Options provided:  -- Severe malnutrition confirmed present on admission  -- severe malnutrition ruled out  -- Other - I will add my own diagnosis  -- Disagree - Not applicable / Not valid  -- Disagree - Clinically unable to determine / Unknown  -- Refer to Clinical Documentation Reviewer    PROVIDER RESPONSE TEXT:    The diagnosis of severe malnutrition was confirmed as present on admission. Query created by:  Cl Dumont on 10/7/2022 7:11 AM      Electronically signed by:  Chiquita Mike NP [address] [place] [address] 10/7/2022 9:15 AM

## 2022-10-07 NOTE — PROGRESS NOTES
Comprehensive Nutrition Assessment    Type and Reason for Visit: Reassess (Goal)    Nutrition Recommendations/Plan:   Continue diet, advance per SLP rec's. Ensure HP x1/d (160 kcal, 16 gm PRO). Monitor and document PO and ONS intake, BMs in I/Os. Malnutrition Assessment:  Malnutrition Status:  Severe malnutrition (10/07/22 1240)    Context:  Chronic illness       Nutrition Assessment:    Admitted for elective cystectomy, ileal conduit diversion, pelvic exenteration 2/2 bladder cancer. (10/3) extubated, remains NPO x2d. RD consulted for ONS, however pt remains NPO per Dr. Gregory Quinteros. Once PO diet initiated, RD to add ONS. Will continue to monitor. (10/7) Appetite and PO intake endorsed as good- >50% of meal consumed at baseline. Diet modified s/p emesis x1 yesterday. Observed on SB6/Thin diet- no concerns verbalized. Labs: , H/H 8.0/25. 5. Meds: colace, pepcid, milk of magnesia. Nutrition Related Findings:    NFPE w/o acute findings- nourished w/ small frame. +V x1 yesterday- managed w/ meds; no N/V/D reported today. Denies chewing/swallowing issues w/ meals. Last BM 10/2; +C- Pt endorsed receiving Mg hydroxide. No edema. Wound Type: Surgical incision    Current Nutrition Intake & Therapies:  Average Meal Intake: 51-75%  Average Supplement Intake: None ordered  ADULT DIET Dysphagia - Soft & Bite Sized; GI [place] (GERD/Peptic Ulcer)    Anthropometric Measures:  Height: 5' 2.01\" (157.5 cm)  Ideal Body Weight (IBW): 110 lbs (50 kg)  Current Body Wt:  51 kg (112 lb 7 oz) (10/5), 102.2 % IBW. Bed scale  Current BMI (kg/m2): 20.6  Usual Body Weight: 52.2 kg (115 lb) (per pt 3 weeks ago)  % Weight Change (Calculated): -2.2  BMI Category: Normal weight (BMI 18.5-24. 9)    Estimated Daily Nutrient Needs:  Energy Requirements Based On: Kcal/kg  Weight Used for Energy Requirements: Current  Energy (kcal/day): 1530-1785kcals (30-35kcals/kg, cancer)  Weight Used for Protein Requirements: Current  Protein (g/day): 71-82g (1.4-1.6g/kg, ERAS, Cancer)  Method Used for Fluid Requirements: 1 ml/kcal  Fluid (ml/day): 1530-1785kcals    Nutrition Diagnosis:   Severe malnutrition, In context of chronic illness related to catabolic illness (cancer) as evidenced by intake 0-25%, weight loss 7.5% in 3 months    Nutrition Interventions:   Food and/or Nutrient Delivery: Continue current diet, Start oral nutrition supplement  Nutrition Education/Counseling: Education not indicated  Coordination of Nutrition Care: Continue to monitor while inpatient  Plan of Care discussed with: RN, Pt    Goals:  Previous Goal Met: Progressing toward goal(s)  Goals: Meet at least 75% of estimated needs, PO intake 50% or greater, within 7 days, other (specify)  Specify Other Goals: wt maintenance within +/-0.5kg in 7days    Nutrition Monitoring and Evaluation:   Behavioral-Environmental Outcomes: None identified  Food/Nutrient Intake Outcomes: Diet advancement/tolerance, Food and nutrient intake, Supplement intake  Physical Signs/Symptoms Outcomes: Biochemical data, Chewing or swallowing, Meal time behavior, Constipation, Weight, Nausea/vomiting    Discharge Planning:    Continue oral nutrition supplement, Continue current diet    Darlyn Rodriguez RD  Contact: ext. [phone].

## 2022-10-07 NOTE — WOUND CARE
OSTOMY Assessment    Surgery Date:___Monday 10/3/22______    Stoma Tissue Assessment: ___Post-op ___Follow-up       Type of Diversion: __x_New___ Established ___Revision__x_Permanent____Temporary    _____  Ileostomy   _____  Colostomy: ____ Ascending, ____ Transverse, _____.  Sigmoid   _____  Urostomy   __x___  Ileal Conduit   _____  Mucous Fistul      Pressure Injury Interventions:  Sensory Interventions: Assess changes in LOC, Check visual cues for pain    Moisture Interventions: Absorbent underpads, Apply protective barrier, creams and emollients    Activity Interventions: Pressure redistribution bed/mattress(bed type)    Mobility Interventions: Pressure redistribution bed/mattress (bed type)    Nutrition Interventions: Document food/fluid/supplement intake    Friction and Shear Interventions: Apply protective barrier, creams and emollients               Appearance of Ostomy:   ___ Bright Red /Moist/Viable ___ Dark Red __x_ Pink ___ Sloughing ___Necrotic____Pallor ____Red  ___Dry __x__Moist    Stoma Size: ____22_____ (mm) _x__Round ___ Pearly Pitcher ___Irregular     Stoma Height:   __x__Flush ____Retracted ____Budded ____Edematous ____Prolapse     Stoma Location  ____ Left Side          __x__Right Side     _____Umbilicus  _____Incision Line  ____ Above Belt       ____ Below Belt    Abdominal Contours  ____ Firm ____ Flat ____Flabby _x__Soft ___Round ___ Hard ___ Other     Stoma Function: _x_Yes __No  Output:   ___x_ Yellow ____Amber ____ Pink(Hematuria) ____ Tea Colored   ____ Clots ____Foul Odor ___x_ Mucous     Peristomal skin:   _x__ Intact ___ Irritant Dermatitis ___ Allergic Contact Dermatitis ___Candidiasis ___Caput Medusae ___Folliculitis ___Mechanical Trauma ___Mucosal Transplantation    ___Pyoderma Gangrenosum ___Hyperplasia ___Radiation Trauma ___Allergies mpling  ___ Dimpling ____Blistered ____Fragile ____Macerated ___Peeling  ___Ulceration  ___ Fungal ___Peristomal Hernia ___Non-blanchable ___ Hypergranulation      Stoma Complications: None  __Excessive Bleeding __Ischemia __ Abscess __Necrosis __Prolapse   __Hernia __ Retraction __Stenosis __Mucosal Separation __Melanosis Coli   __Laceration __Other     Application for Patient    Wafer and pouch used during assessment and education: 2-piece w/ flat skin barrier    Pouch System Recommended: Anticipate that patient will need convexity as her stoma is flush to skin. Recommend using 1) Coloplast SenSura Aneesh Click Standard-wear Barrier Deep Convex [id number] with SenSura Aneesh Click Urostomy Pouch Maxi [id number], or 2) SenSura [place] Stand-wear Maxi Urostomy Pouch w/ Deep Convexity 1-piece [id number]. Patient will need a ostomy belt which can be used with either appliance. __One-Piece _x_Two-Piece ___Custom     Flat:   ___Pre-cut   __x_Cut-to fit     Convexity: ___Shallow __x_Deep___ Flexible ___Creative Convexity   ___Pre-cut ___Cut to [employer]     Accessory Products   __ Adhesive Seals __Adhesive Remover Wipes __Barrier Wafer __Barrier Wipes   __Deodorizer __Liquid Adhesive __ Paste __ Powder __Strip   __ Support Belt __Tape      Education for Patient & Family  1. Booklet of information on specific ostomy given and some verbal discussion of patients ostomy and expectations. 2. Instruction/demostration of ostomy wafer & pouch change. 3. Discuss concerns/ answer questions. 4. Provide follow up education in clinic if needed. Appliance was changed in presence of niece. Reviewed the following:  -Measuring the stoma  -Cutting wafer to fit stoma and wafer and pouch application.  -Empty pouch when 1/3-1/2 full of urine.  -Change appliance (wafer and pouch) 2-3 per week. If leaking, change as soon as possible to prevent skin irritation.  -Best time for routine change of appliance is first thing in the morning before consuming liquids.  -Clean stoma and peristomal skin with plain water or NS, may use a mild soap.   No soaps with lotions as they may prevent adhesive from adhering to skin. May bathe with appliance on or off.  -Purposes and how to use barrier rings, stoma paste, and stoma powder.   -Stoma should always be red and moist.  If stoma appears dry and dusky, notify surgeon immediately. Patient has been enrolled in [employer] and will receive a complimentary kit of supplies to residence within 5-7 business days. Placed copy in chart. Also placed copy of Physicians Written Order form and Supplies Order form in patient's chart. Informed Victor Manuel Blunt NP. Neither patient nor niece had questions at this time. [facility] will follow up Monday 10/10/22.

## 2022-10-08 LAB
ANION GAP SERPL CALC-SCNC: 6 MMOL/L (ref 5–15)
BUN SERPL-MCNC: 13 MG/DL (ref 6–20)
BUN/CREAT SERPL: 19 (ref 12–20)
CA-I BLD-MCNC: 8.6 MG/DL (ref 8.5–10.1)
CHLORIDE SERPL-SCNC: 106 MMOL/L (ref 97–108)
CO2 SERPL-SCNC: 24 MMOL/L (ref 21–32)
CREAT SERPL-MCNC: 0.68 MG/DL (ref 0.55–1.02)
ERYTHROCYTE [DISTWIDTH] IN BLOOD BY AUTOMATED COUNT: 16.8 % (ref 11.5–14.5)
GLUCOSE SERPL-MCNC: 90 MG/DL (ref 65–100)
HCT VFR BLD AUTO: 24.1 % (ref 35–47)
HGB BLD-MCNC: 7.4 G/DL (ref 11.5–16)
MCH RBC QN AUTO: 26.4 PG (ref 26–34)
MCHC RBC AUTO-ENTMCNC: 30.7 G/DL (ref 30–36.5)
MCV RBC AUTO: 86.1 FL (ref 80–99)
NRBC # BLD: 0 K/UL (ref 0–0.01)
NRBC BLD-RTO: 0 PER 100 WBC
PLATELET # BLD AUTO: 222 K/UL (ref 150–400)
PMV BLD AUTO: 9.6 FL (ref 8.9–12.9)
POTASSIUM SERPL-SCNC: 4.2 MMOL/L (ref 3.5–5.1)
RBC # BLD AUTO: 2.8 M/UL (ref 3.8–5.2)
SODIUM SERPL-SCNC: 136 MMOL/L (ref 136–145)
WBC # BLD AUTO: 7.6 K/UL (ref 3.6–11)

## 2022-10-08 PROCEDURE — 36415 COLL VENOUS BLD VENIPUNCTURE: CPT

## 2022-10-08 PROCEDURE — 80048 BASIC METABOLIC PNL TOTAL CA: CPT

## 2022-10-08 PROCEDURE — 65270000029 HC RM PRIVATE

## 2022-10-08 PROCEDURE — 85027 COMPLETE CBC AUTOMATED: CPT

## 2022-10-08 PROCEDURE — 74011000250 HC RX REV CODE- 250: Performed by: NURSE PRACTITIONER

## 2022-10-08 PROCEDURE — 74011250637 HC RX REV CODE- 250/637: Performed by: NURSE PRACTITIONER

## 2022-10-08 RX ADMIN — HYDROCODONE BITARTRATE AND ACETAMINOPHEN 1 TABLET: 5; 325 TABLET ORAL at 01:41

## 2022-10-08 RX ADMIN — DOCUSATE SODIUM 100 MG: 100 CAPSULE, LIQUID FILLED ORAL at 08:39

## 2022-10-08 RX ADMIN — FAMOTIDINE 20 MG: 20 TABLET ORAL at 20:54

## 2022-10-08 RX ADMIN — HYDROCODONE BITARTRATE AND ACETAMINOPHEN 1 TABLET: 5; 325 TABLET ORAL at 08:39

## 2022-10-08 RX ADMIN — FAMOTIDINE 20 MG: 20 TABLET ORAL at 08:39

## 2022-10-08 RX ADMIN — HYDROCODONE BITARTRATE AND ACETAMINOPHEN 1 TABLET: 5; 325 TABLET ORAL at 15:51

## 2022-10-08 RX ADMIN — DOCUSATE SODIUM 100 MG: 100 CAPSULE, LIQUID FILLED ORAL at 20:54

## 2022-10-08 RX ADMIN — HYDROCODONE BITARTRATE AND ACETAMINOPHEN 1 TABLET: 5; 325 TABLET ORAL at 21:46

## 2022-10-08 NOTE — PROGRESS NOTES
Patient assessment completed for this shift. Bed in lowest position. Call bell within reach. Patient voiced  understanding to call for assistance as needed. No c/o pain voiced by patient, at this time. No falls reported at this time. Patient remains stable. Urostomy patent, clear yellow urine in appliance bag noted. Progressing in plan of care. Will continue to monitor during this admission. Problem: Pressure Injury - Risk of  Goal: *Prevention of pressure injury  Description: Document Alphonso Scale and appropriate interventions in the flowsheet.   Outcome: Progressing Towards Goal  Note: Pressure Injury Interventions:  Sensory Interventions: Keep linens dry and wrinkle-free    Moisture Interventions: Absorbent underpads    Activity Interventions: PT/OT evaluation    Mobility Interventions: PT/OT evaluation    Nutrition Interventions: Document food/fluid/supplement intake, Offer support with meals,snacks and hydration    Friction and Shear Interventions: HOB 30 degrees or less, Minimize layers                Problem: Patient Education: Go to Patient Education Activity  Goal: Patient/Family Education  Outcome: Progressing Towards Goal     Problem: Discharge Planning  Goal: *Discharge to safe environment  Outcome: Progressing Towards Goal  Goal: *Knowledge of medication management  Outcome: Progressing Towards Goal  Goal: *Knowledge of discharge instructions  Outcome: Progressing Towards Goal     Problem: Patient Education: Go to Patient Education Activity  Goal: Patient/Family Education  Outcome: Progressing Towards Goal     Problem: Pain  Goal: *Control of Pain  Outcome: Progressing Towards Goal  Goal: *PALLIATIVE CARE:  Alleviation of Pain  Outcome: Progressing Towards Goal     Problem: Patient Education: Go to Patient Education Activity  Goal: Patient/Family Education  Outcome: Progressing Towards Goal     Problem: Falls - Risk of  Goal: *Absence of Falls  Description: Document Iris Fall Risk and appropriate interventions in the flowsheet.   Outcome: Progressing Towards Goal  Note: Fall Risk Interventions:  Mobility Interventions: Patient to call before getting OOB    Mentation Interventions: Adequate sleep, hydration, pain control    Medication Interventions: Patient to call before getting OOB    Elimination Interventions: Patient to call for help with toileting needs    History of Falls Interventions: Bed/chair exit alarm, Door open when patient unattended         Problem: Patient Education: Go to Patient Education Activity  Goal: Patient/Family Education  Outcome: Progressing Towards Goal     Problem: Patient Education: Go to Patient Education Activity  Goal: Patient/Family Education  Outcome: Progressing Towards Goal

## 2022-10-08 NOTE — PROGRESS NOTES
Problem: Pressure Injury - Risk of  Goal: *Prevention of pressure injury  Description: Document Alphonso Scale and appropriate interventions in the flowsheet. Outcome: Progressing Towards Goal  Note: Pressure Injury Interventions:  Sensory Interventions: Keep linens dry and wrinkle-free, Minimize linen layers    Moisture Interventions: Absorbent underpads, Minimize layers, Contain wound drainage    Activity Interventions: Increase time out of bed    Mobility Interventions: HOB 30 degrees or less, PT/OT evaluation    Nutrition Interventions: Offer support with meals,snacks and hydration    Friction and Shear Interventions: HOB 30 degrees or less, Minimize layers                Problem: Falls - Risk of  Goal: *Absence of Falls  Description: Document Iris Fall Risk and appropriate interventions in the flowsheet.   Outcome: Progressing Towards Goal  Note: Fall Risk Interventions:  Mobility Interventions: Patient to call before getting OOB, PT Consult for mobility concerns, OT consult for ADLs    Mentation Interventions: Adequate sleep, hydration, pain control    Medication Interventions: Patient to call before getting OOB, Teach patient to arise slowly    Elimination Interventions: Call light in reach, Patient to call for help with toileting needs    History of Falls Interventions: Door open when patient unattended         Problem: Pain  Goal: *Control of Pain  Outcome: Progressing Towards Goal

## 2022-10-08 NOTE — PROGRESS NOTES
UROLOGY Progress Note         [phone]      Daily Progress Note: 10/8/2022      Subjective: The patient is seen for UROLOGIC follow up after cystectomy, ileal conduit diversion, pelvic exenteration on 10/3/22. Surgery complicated by rectal injury; CT with rectal contrast revealed no leakage. Mild abdominal soreness. She had heartburn, nausea and vomiting after the CT rectal contrast.  Since that time she has had no further emesis. Heartburn has resolved. She is tolerating a soft diet. Problem List:  Patient Active Problem List   Diagnosis Code    History of blood clots Z86.718    Bladder cancer ([facility] [address] C67.9    Anemia due to acute blood loss D62    Hypercalcemia E83.52    Hydronephrosis of right kidney N13.30    Acute cystitis N30.00    Retained ureteral stent Z96.0    Severe protein-calorie malnutrition (HCC) E43         Medications reviewed  Current Facility-Administered Medications   Medication Dose Route Frequency    magnesium hydroxide (MILK OF MAGNESIA) 400 mg/5 mL oral suspension 30 mL  30 mL Oral DAILY PRN    famotidine (PEPCID) tablet 20 mg  20 mg Oral BID    calcium carbonate (TUMS) chewable tablet 200 mg [elemental]  200 mg Oral BID PRN    HYDROcodone-acetaminophen (NORCO) 5-325 mg per tablet 1 Tablet  1 Tablet Oral Q6H PRN    0.9% sodium chloride infusion 250 mL  250 mL IntraVENous PRN    lidocaine 4 % patch 1 Patch  1 Patch TransDERmal Q12H    ondansetron (ZOFRAN) injection 4 mg  4 mg IntraVENous Q6H PRN    docusate sodium (COLACE) capsule 100 mg  100 mg Oral BID    hydrALAZINE (APRESOLINE) 20 mg/mL injection 10 mg  10 mg IntraVENous Q6H PRN       Review of Systems:   Review of Systems   Constitutional:  Negative for chills and fever. Gastrointestinal:  Positive for abdominal pain. Generalized soreness   Genitourinary:         Ileal conduit         Objective:   Physical Exam  Vitals and nursing note reviewed. Constitutional:       Appearance: Normal appearance.    Eyes: Extraocular Movements: Extraocular movements intact. Cardiovascular:      Rate and Rhythm: Normal rate. Pulmonary:      Effort: Pulmonary effort is normal.   Abdominal:      General: Abdomen is flat. There is no distension. Tenderness: There is abdominal tenderness. There is guarding. Comments: ALLI site is dry, no drain   Genitourinary:     Comments: Conduit with clear, yellow urine and pink stoma. Gibson removed from stoma at bedside  Musculoskeletal:      Cervical back: Normal range of motion. Skin:     General: Skin is warm and dry. Comments: Incision site(s) CDI. Neurological:      Mental Status: She is alert and oriented to person, place, and time.    Psychiatric:         Behavior: Behavior normal.        Visit Vitals  /76 (BP 1 Location: Left upper arm, BP Patient Position: At rest)   Pulse 92   Temp 97.8 °F (36.6 °C)   Resp 16   Ht 5' 2.01\" (1.575 m)   Wt 131 lb 13.4 oz (59.8 kg)   SpO2 100%   BMI 24.11 kg/m²         Data Review:       Recent Days:  Recent Labs     10/08/22  0612 10/07/22  1051 10/06/22  0410   WBC 7.6 9.3 9.4   HGB 7.4* 8.0* 8.5*   HCT 24.1* 25.5* 27.1*    250 224       24 Hour Results:  Recent Results (from the past 24 hour(s))   CBC W/O DIFF    Collection Time: 10/07/22 10:51 AM   Result Value Ref Range    WBC 9.3 3.6 - 11.0 K/uL    RBC 3.00 (L) 3.80 - 5.20 M/uL    HGB 8.0 (L) 11.5 - 16.0 g/dL    HCT 25.5 (L) 35.0 - 47.0 %    MCV 85.0 80.0 - 99.0 FL    MCH 26.7 26.0 - 34.0 PG    MCHC 31.4 30.0 - 36.5 g/dL    RDW 16.9 (H) 11.5 - 14.5 %    PLATELET 263 478 - 853 K/uL    MPV 9.6 8.9 - 12.9 FL    NRBC 0.0 0.0  WBC    ABSOLUTE NRBC 0.00 0.00 - 4.63 K/uL   METABOLIC PANEL, BASIC    Collection Time: 10/07/22 10:51 AM   Result Value Ref Range    Sodium 135 (L) 136 - 145 mmol/L    Potassium 4.1 3.5 - 5.1 mmol/L    Chloride 107 97 - 108 mmol/L    CO2 23 21 - 32 mmol/L    Anion gap 5 5 - 15 mmol/L    Glucose 116 (H) 65 - 100 mg/dL    BUN 6 6 - 20 mg/dL Creatinine 0.61 0.55 - 1.02 mg/dL    BUN/Creatinine ratio 10 (L) 12 - 20      eGFR >60 >60 ml/min/1.73m2    Calcium 8.5 8.5 - 10.1 mg/dL   GLUCOSE, POC    Collection Time: 10/07/22 12:20 PM   Result Value Ref Range    Glucose (POC) 112 (H) 65 - 100 mg/dL    Performed by Mirza BOYD    GLUCOSE, POC    Collection Time: 10/07/22  5:05 PM   Result Value Ref Range    Glucose (POC) 105 (H) 65 - 100 mg/dL    Performed by Sita Neal    METABOLIC PANEL, COMPREHENSIVE    Collection Time: 10/07/22  8:50 PM   Result Value Ref Range    Sodium 135 (L) 136 - 145 mmol/L    Potassium 3.9 3.5 - 5.1 mmol/L    Chloride 106 97 - 108 mmol/L    CO2 26 21 - 32 mmol/L    Anion gap 3 (L) 5 - 15 mmol/L    Glucose 99 65 - 100 mg/dL    BUN 8 6 - 20 mg/dL    Creatinine 0.66 0.55 - 1.02 mg/dL    BUN/Creatinine ratio 12 12 - 20      eGFR >60 >60 ml/min/1.73m2    Calcium 8.7 8.5 - 10.1 mg/dL    Bilirubin, total 0.7 0.2 - 1.0 mg/dL    AST (SGOT) 16 15 - 37 U/L    ALT (SGPT) 10 (L) 12 - 78 U/L    Alk.  phosphatase 65 45 - 117 U/L    Protein, total 7.5 6.4 - 8.2 g/dL    Albumin 1.7 (L) 3.5 - 5.0 g/dL    Globulin 5.8 (H) 2.0 - 4.0 g/dL    A-G Ratio 0.3 (L) 1.1 - 2.2     MAGNESIUM    Collection Time: 10/07/22  8:50 PM   Result Value Ref Range    Magnesium 1.9 1.6 - 2.4 mg/dL   PHOSPHORUS    Collection Time: 10/07/22  8:50 PM   Result Value Ref Range    Phosphorus 2.8 2.6 - 4.7 mg/dL   CBC W/O DIFF    Collection Time: 10/08/22  6:12 AM   Result Value Ref Range    WBC 7.6 3.6 - 11.0 K/uL    RBC 2.80 (L) 3.80 - 5.20 M/uL    HGB 7.4 (L) 11.5 - 16.0 g/dL    HCT 24.1 (L) 35.0 - 47.0 %    MCV 86.1 80.0 - 99.0 FL    MCH 26.4 26.0 - 34.0 PG    MCHC 30.7 30.0 - 36.5 g/dL    RDW 16.8 (H) 11.5 - 14.5 %    PLATELET 809 080 - 809 K/uL    MPV 9.6 8.9 - 12.9 FL    NRBC 0.0 0.0  WBC    ABSOLUTE NRBC 0.00 0.00 - 7.30 K/uL   METABOLIC PANEL, BASIC    Collection Time: 10/08/22  6:12 AM   Result Value Ref Range    Sodium 136 136 - 145 mmol/L    Potassium 4.2 3.5 - 5.1 mmol/L    Chloride 106 97 - 108 mmol/L    CO2 24 21 - 32 mmol/L    Anion gap 6 5 - 15 mmol/L    Glucose 90 65 - 100 mg/dL    BUN 13 6 - 20 mg/dL    Creatinine 0.68 0.55 - 1.02 mg/dL    BUN/Creatinine ratio 19 12 - 20      eGFR >60 >60 ml/min/1.73m2    Calcium 8.6 8.5 - 10.1 mg/dL           Assessment/     Patient Active Problem List   Diagnosis Code    History of blood clots Z86.718    Bladder cancer (HCC) C67.9    Anemia due to acute blood loss D62    Hypercalcemia E83.52    Hydronephrosis of right kidney N13.30    Acute cystitis N30.00    Retained ureteral stent Z96.0    Severe protein-calorie malnutrition (HCC) E43       Plan:    BLADDER CANCER:   Urothelial, muscle invasive, high grade with squamous differentiation, perineural invasion. She has right ureteral obstruction with possible distal ureteral involvement. She is now s/p cystectomy/ pelvic exenteration/ ileal conduit. Pathology pending. Rectal injury - appears healed on CT. ANEMIA: stable    HX of DVT: not on anticoagulation currently; SCD in place. She is participating with activity. LOW SERUM ALBUMIN: nutrition important. Add PO supplements. Adv diet    WOUND CARE: new ileal conduit diversion. She is actively participating in management. Outpatient stoma clinic recommended for education. PT/OT: Participating with ambulation. She has a walker at the bedside. : discharge planning, home health, etc.  OSTOMY CLINIC: follow up scheduled.             Valentino Bocanegra MD

## 2022-10-09 PROBLEM — N30.00 ACUTE CYSTITIS: Status: RESOLVED | Noted: 2022-09-17 | Resolved: 2022-10-09

## 2022-10-09 LAB
ERYTHROCYTE [DISTWIDTH] IN BLOOD BY AUTOMATED COUNT: 16.7 % (ref 11.5–14.5)
HCT VFR BLD AUTO: 22 % (ref 35–47)
HGB BLD-MCNC: 6.9 G/DL (ref 11.5–16)
MCH RBC QN AUTO: 27.2 PG (ref 26–34)
MCHC RBC AUTO-ENTMCNC: 31.4 G/DL (ref 30–36.5)
MCV RBC AUTO: 86.6 FL (ref 80–99)
NRBC # BLD: 0 K/UL (ref 0–0.01)
NRBC BLD-RTO: 0 PER 100 WBC
PLATELET # BLD AUTO: 194 K/UL (ref 150–400)
PMV BLD AUTO: 9.5 FL (ref 8.9–12.9)
RBC # BLD AUTO: 2.54 M/UL (ref 3.8–5.2)
WBC # BLD AUTO: 8.8 K/UL (ref 3.6–11)

## 2022-10-09 PROCEDURE — P9016 RBC LEUKOCYTES REDUCED: HCPCS

## 2022-10-09 PROCEDURE — 86900 BLOOD TYPING SEROLOGIC ABO: CPT

## 2022-10-09 PROCEDURE — 85027 COMPLETE CBC AUTOMATED: CPT

## 2022-10-09 PROCEDURE — 74011250636 HC RX REV CODE- 250/636: Performed by: NURSE PRACTITIONER

## 2022-10-09 PROCEDURE — 86923 COMPATIBILITY TEST ELECTRIC: CPT

## 2022-10-09 PROCEDURE — 74011250637 HC RX REV CODE- 250/637: Performed by: NURSE PRACTITIONER

## 2022-10-09 PROCEDURE — 65270000029 HC RM PRIVATE

## 2022-10-09 PROCEDURE — 99024 POSTOP FOLLOW-UP VISIT: CPT | Performed by: NURSE PRACTITIONER

## 2022-10-09 PROCEDURE — 74011000250 HC RX REV CODE- 250: Performed by: NURSE PRACTITIONER

## 2022-10-09 PROCEDURE — 36415 COLL VENOUS BLD VENIPUNCTURE: CPT

## 2022-10-09 PROCEDURE — 36430 TRANSFUSION BLD/BLD COMPNT: CPT

## 2022-10-09 RX ORDER — LANOLIN ALCOHOL/MO/W.PET/CERES
325 CREAM (GRAM) TOPICAL DAILY
Status: DISCONTINUED | OUTPATIENT
Start: 2022-10-09 | End: 2022-10-10 | Stop reason: HOSPADM

## 2022-10-09 RX ORDER — SODIUM CHLORIDE 9 MG/ML
250 INJECTION, SOLUTION INTRAVENOUS AS NEEDED
Status: DISCONTINUED | OUTPATIENT
Start: 2022-10-09 | End: 2022-10-10 | Stop reason: HOSPADM

## 2022-10-09 RX ORDER — DEXTROSE, SODIUM CHLORIDE, AND POTASSIUM CHLORIDE 5; .45; .15 G/100ML; G/100ML; G/100ML
25 INJECTION INTRAVENOUS CONTINUOUS
Status: DISCONTINUED | OUTPATIENT
Start: 2022-10-09 | End: 2022-10-10 | Stop reason: HOSPADM

## 2022-10-09 RX ADMIN — HYDROCODONE BITARTRATE AND ACETAMINOPHEN 1 TABLET: 5; 325 TABLET ORAL at 09:15

## 2022-10-09 RX ADMIN — HYDROCODONE BITARTRATE AND ACETAMINOPHEN 1 TABLET: 5; 325 TABLET ORAL at 14:36

## 2022-10-09 RX ADMIN — DOCUSATE SODIUM 100 MG: 100 CAPSULE, LIQUID FILLED ORAL at 09:15

## 2022-10-09 RX ADMIN — DOCUSATE SODIUM 100 MG: 100 CAPSULE, LIQUID FILLED ORAL at 20:43

## 2022-10-09 RX ADMIN — HYDROCODONE BITARTRATE AND ACETAMINOPHEN 1 TABLET: 5; 325 TABLET ORAL at 20:43

## 2022-10-09 RX ADMIN — POTASSIUM CHLORIDE, DEXTROSE MONOHYDRATE AND SODIUM CHLORIDE 25 ML/HR: 150; 5; 450 INJECTION, SOLUTION INTRAVENOUS at 11:31

## 2022-10-09 RX ADMIN — FERROUS SULFATE TAB 325 MG (65 MG ELEMENTAL FE) 325 MG: 325 (65 FE) TAB at 11:31

## 2022-10-09 RX ADMIN — FAMOTIDINE 20 MG: 20 TABLET ORAL at 20:43

## 2022-10-09 RX ADMIN — FAMOTIDINE 20 MG: 20 TABLET ORAL at 09:15

## 2022-10-09 NOTE — PROGRESS NOTES
Patient assessment completed for this shift. Bed in lowest position. Call bell within reach. Patient voiced  understanding to call for assistance as needed. C/O pain voiced by patient, will manage per [facility] Elana Lopez No falls reported at this time. Patient remains stable. Urostomy patent, clear yellow urine noted. Progressing in plan of care. Will continue to monitor during this admission. Problem: Pressure Injury - Risk of  Goal: *Prevention of pressure injury  Description: Document Alphonso Scale and appropriate interventions in the flowsheet.   Outcome: Progressing Towards Goal  Note: Pressure Injury Interventions:  Sensory Interventions: Keep linens dry and wrinkle-free    Moisture Interventions: Absorbent underpads    Activity Interventions: Increase time out of bed    Mobility Interventions: Pressure redistribution bed/mattress (bed type)    Nutrition Interventions: Document food/fluid/supplement intake    Friction and Shear Interventions: HOB 30 degrees or less, Minimize layers                Problem: Patient Education: Go to Patient Education Activity  Goal: Patient/Family Education  Outcome: Progressing Towards Goal     Problem: Discharge Planning  Goal: *Discharge to safe environment  Outcome: Progressing Towards Goal  Goal: *Knowledge of medication management  Outcome: Progressing Towards Goal  Goal: *Knowledge of discharge instructions  Outcome: Progressing Towards Goal     Problem: Patient Education: Go to Patient Education Activity  Goal: Patient/Family Education  Outcome: Progressing Towards Goal     Problem: Pain  Goal: *Control of Pain  Outcome: Progressing Towards Goal  Goal: *PALLIATIVE CARE:  Alleviation of Pain  Outcome: Progressing Towards Goal     Problem: Patient Education: Go to Patient Education Activity  Goal: Patient/Family Education  Outcome: Progressing Towards Goal     Problem: Falls - Risk of  Goal: *Absence of Falls  Description: Document Iris Fall Risk and appropriate interventions in the flowsheet.   Outcome: Progressing Towards Goal  Note: Fall Risk Interventions:  Mobility Interventions: Patient to call before getting OOB    Mentation Interventions: Adequate sleep, hydration, pain control    Medication Interventions: Patient to call before getting OOB, Teach patient to arise slowly    Elimination Interventions: Patient to call for help with toileting needs    History of Falls Interventions: Evaluate medications/consider consulting pharmacy         Problem: Patient Education: Go to Patient Education Activity  Goal: Patient/Family Education  Outcome: Progressing Towards Goal     Problem: Patient Education: Go to Patient Education Activity  Goal: Patient/Family Education  Outcome: Progressing Towards Goal

## 2022-10-09 NOTE — PROGRESS NOTES
UROLOGY Progress Note         [phone]      Daily Progress Note: 10/9/2022      Subjective: The patient is seen for UROLOGIC follow up after cystectomy, ileal conduit diversion, pelvic exenteration on 10/3/22. Surgery complicated by rectal injury; CT with rectal contrast revealed no leakage. Mild abdominal soreness; improving. Tolerating diet. Having BM. Restart iron today. Transfuse 1 unit PRBC. Anticipate discharge in 24 hours. Problem List:  Patient Active Problem List   Diagnosis Code    History of blood clots Z86.718    Bladder cancer ([facility] [address] C67.9    Anemia due to acute blood loss D62    Hypercalcemia E83.52    Hydronephrosis of right kidney N13.30    Retained ureteral stent Z96.0    Severe protein-calorie malnutrition (HCC) E43         Medications reviewed  Current Facility-Administered Medications   Medication Dose Route Frequency    ferrous sulfate tablet 325 mg  325 mg Oral DAILY    dextrose 5% - 0.45% NaCl with KCl 20 mEq/L infusion  25 mL/hr IntraVENous CONTINUOUS    0.9% sodium chloride infusion 250 mL  250 mL IntraVENous PRN    magnesium hydroxide (MILK OF MAGNESIA) 400 mg/5 mL oral suspension 30 mL  30 mL Oral DAILY PRN    famotidine (PEPCID) tablet 20 mg  20 mg Oral BID    calcium carbonate (TUMS) chewable tablet 200 mg [elemental]  200 mg Oral BID PRN    HYDROcodone-acetaminophen (NORCO) 5-325 mg per tablet 1 Tablet  1 Tablet Oral Q6H PRN    0.9% sodium chloride infusion 250 mL  250 mL IntraVENous PRN    lidocaine 4 % patch 1 Patch  1 Patch TransDERmal Q12H    ondansetron (ZOFRAN) injection 4 mg  4 mg IntraVENous Q6H PRN    docusate sodium (COLACE) capsule 100 mg  100 mg Oral BID    hydrALAZINE (APRESOLINE) 20 mg/mL injection 10 mg  10 mg IntraVENous Q6H PRN       Review of Systems:   Review of Systems   Constitutional:  Negative for chills and fever. Gastrointestinal:  Negative for abdominal pain.         Generalized soreness   Genitourinary:         Ileal conduit         Objective:   Physical Exam  Vitals and nursing note reviewed. Constitutional:       Appearance: Normal appearance. Eyes:      Extraocular Movements: Extraocular movements intact. Cardiovascular:      Rate and Rhythm: Normal rate. Pulmonary:      Effort: Pulmonary effort is normal.   Abdominal:      General: Abdomen is flat. There is no distension. Tenderness: There is abdominal tenderness. There is guarding. Comments: ALLI site is dry, no drain   Genitourinary:     Comments: Conduit with clear, yellow urine and pink stoma. Musculoskeletal:      Cervical back: Normal range of motion. Skin:     General: Skin is warm and dry. Comments: Incision site(s) CDI. Neurological:      Mental Status: She is alert and oriented to person, place, and time.    Psychiatric:         Behavior: Behavior normal.        Visit Vitals  /68 (BP 1 Location: Left upper arm, BP Patient Position: At rest)   Pulse 92   Temp 97.8 °F (36.6 °C)   Resp 14   Ht 5' 2.01\" (1.575 m)   Wt 131 lb 6.3 oz (59.6 kg)   SpO2 100%   BMI 24.03 kg/m²         Data Review:       Recent Days:  Recent Labs     10/09/22  0638 10/08/22  0612 10/07/22  1051   WBC 8.8 7.6 9.3   HGB 6.9* 7.4* 8.0*   HCT 22.0* 24.1* 25.5*    222 250       24 Hour Results:  Recent Results (from the past 24 hour(s))   CBC W/O DIFF    Collection Time: 10/09/22  6:38 AM   Result Value Ref Range    WBC 8.8 3.6 - 11.0 K/uL    RBC 2.54 (L) 3.80 - 5.20 M/uL    HGB 6.9 (L) 11.5 - 16.0 g/dL    HCT 22.0 (L) 35.0 - 47.0 %    MCV 86.6 80.0 - 99.0 FL    MCH 27.2 26.0 - 34.0 PG    MCHC 31.4 30.0 - 36.5 g/dL    RDW 16.7 (H) 11.5 - 14.5 %    PLATELET 030 560 - 509 K/uL    MPV 9.5 8.9 - 12.9 FL    NRBC 0.0 0.0  WBC    ABSOLUTE NRBC 0.00 0.00 - 0.01 K/uL           Assessment/     Patient Active Problem List   Diagnosis Code    History of blood clots Z86.718    Bladder cancer (HCC) C67.9    Anemia due to acute blood loss D62    Hypercalcemia E83.52 Hydronephrosis of right kidney N13.30    Retained ureteral stent Z96.0    Severe protein-calorie malnutrition ([facility] [address] E43       Plan:    BLADDER CANCER:   Urothelial, muscle invasive, high grade with squamous differentiation, perineural invasion. She has right ureteral obstruction with possible distal ureteral involvement. She is now s/p cystectomy/ pelvic exenteration/ ileal conduit. Pathology significant for invasive bladder cancer, pT3bN0. Rectal injury - appears healed on CT. ANEMIA: stable; transfuse 1 unit PRBC today. HX of DVT: not on anticoagulation currently; SCD in place. She is participating with activity. LOW SERUM ALBUMIN: nutrition important. PO supplements. Easy to chew diet today. WOUND CARE: new ileal conduit diversion. She is actively participating in management. Outpatient stoma clinic recommended for education. PT/OT: Participating with ambulation. She has a walker at the bedside. : discharge planning, home health, etc.  Anticipate discharge in 24 hours. OSTOMY CLINIC: follow up scheduled.     Amy Wade NP

## 2022-10-10 VITALS
WEIGHT: 131.39 LBS | TEMPERATURE: 98.2 F | RESPIRATION RATE: 17 BRPM | HEIGHT: 62 IN | OXYGEN SATURATION: 99 % | SYSTOLIC BLOOD PRESSURE: 115 MMHG | BODY MASS INDEX: 24.18 KG/M2 | DIASTOLIC BLOOD PRESSURE: 72 MMHG | HEART RATE: 92 BPM

## 2022-10-10 LAB
ABO + RH BLD: NORMAL
ALBUMIN SERPL-MCNC: 2.1 G/DL (ref 3.5–5)
ALBUMIN/GLOB SERPL: 0.4 {RATIO} (ref 1.1–2.2)
ALP SERPL-CCNC: 92 U/L (ref 45–117)
ALT SERPL-CCNC: 11 U/L (ref 12–78)
ANION GAP SERPL CALC-SCNC: 7 MMOL/L (ref 5–15)
AST SERPL W P-5'-P-CCNC: 20 U/L (ref 15–37)
BILIRUB SERPL-MCNC: 0.6 MG/DL (ref 0.2–1)
BLD PROD TYP BPU: NORMAL
BLOOD GROUP ANTIBODIES SERPL: NEGATIVE
BPU ID: NORMAL
BUN SERPL-MCNC: 17 MG/DL (ref 6–20)
BUN/CREAT SERPL: 24 (ref 12–20)
CA-I BLD-MCNC: 8.7 MG/DL (ref 8.5–10.1)
CHLORIDE SERPL-SCNC: 105 MMOL/L (ref 97–108)
CO2 SERPL-SCNC: 22 MMOL/L (ref 21–32)
CREAT SERPL-MCNC: 0.7 MG/DL (ref 0.55–1.02)
CROSSMATCH RESULT,%XM: NORMAL
ERYTHROCYTE [DISTWIDTH] IN BLOOD BY AUTOMATED COUNT: 16.3 % (ref 11.5–14.5)
GLOBULIN SER CALC-MCNC: 5.6 G/DL (ref 2–4)
GLUCOSE SERPL-MCNC: 89 MG/DL (ref 65–100)
HCT VFR BLD AUTO: 25 % (ref 35–47)
HGB BLD-MCNC: 7.9 G/DL (ref 11.5–16)
MAGNESIUM SERPL-MCNC: 2 MG/DL (ref 1.6–2.4)
MCH RBC QN AUTO: 27.6 PG (ref 26–34)
MCHC RBC AUTO-ENTMCNC: 31.6 G/DL (ref 30–36.5)
MCV RBC AUTO: 87.4 FL (ref 80–99)
NRBC # BLD: 0.02 K/UL (ref 0–0.01)
NRBC BLD-RTO: 0.2 PER 100 WBC
PHOSPHATE SERPL-MCNC: 3.3 MG/DL (ref 2.6–4.7)
PLATELET # BLD AUTO: 192 K/UL (ref 150–400)
PMV BLD AUTO: 10.1 FL (ref 8.9–12.9)
POTASSIUM SERPL-SCNC: 4.3 MMOL/L (ref 3.5–5.1)
PROT SERPL-MCNC: 7.7 G/DL (ref 6.4–8.2)
RBC # BLD AUTO: 2.86 M/UL (ref 3.8–5.2)
SODIUM SERPL-SCNC: 134 MMOL/L (ref 136–145)
SPECIMEN EXP DATE BLD: NORMAL
STATUS OF UNIT,%ST: NORMAL
TRANSFUSION STATUS PATIENT QL: NORMAL
UNIT DIVISION, %UDIV: 0
WBC # BLD AUTO: 9.6 K/UL (ref 3.6–11)

## 2022-10-10 PROCEDURE — 99024 POSTOP FOLLOW-UP VISIT: CPT | Performed by: NURSE PRACTITIONER

## 2022-10-10 PROCEDURE — 83735 ASSAY OF MAGNESIUM: CPT

## 2022-10-10 PROCEDURE — 84100 ASSAY OF PHOSPHORUS: CPT

## 2022-10-10 PROCEDURE — 36415 COLL VENOUS BLD VENIPUNCTURE: CPT

## 2022-10-10 PROCEDURE — 80053 COMPREHEN METABOLIC PANEL: CPT

## 2022-10-10 PROCEDURE — 74011000250 HC RX REV CODE- 250: Performed by: NURSE PRACTITIONER

## 2022-10-10 PROCEDURE — 85027 COMPLETE CBC AUTOMATED: CPT

## 2022-10-10 PROCEDURE — 74011250637 HC RX REV CODE- 250/637: Performed by: NURSE PRACTITIONER

## 2022-10-10 RX ADMIN — FERROUS SULFATE TAB 325 MG (65 MG ELEMENTAL FE) 325 MG: 325 (65 FE) TAB at 09:39

## 2022-10-10 RX ADMIN — HYDROCODONE BITARTRATE AND ACETAMINOPHEN 1 TABLET: 5; 325 TABLET ORAL at 11:27

## 2022-10-10 RX ADMIN — FAMOTIDINE 20 MG: 20 TABLET ORAL at 09:39

## 2022-10-10 RX ADMIN — HYDROCODONE BITARTRATE AND ACETAMINOPHEN 1 TABLET: 5; 325 TABLET ORAL at 05:08

## 2022-10-10 RX ADMIN — DOCUSATE SODIUM 100 MG: 100 CAPSULE, LIQUID FILLED ORAL at 09:39

## 2022-10-10 NOTE — WOUND CARE
Met with patient and niece for appliance change and education review. Provided the patient with a 1-piece pouch with deep convexity. Patient removed existing appliance and cleaned stoma and peristomal skin with NS moistened gauze. No peristomal skin breakdown noted. Patient applied non-sting skin prep barrier wipe to peristomal skin. Patient then cut skin barrier to fit stoma and applied. We secured a stoma belt to appliance using belt tabs. Bottom edge of skin barrier was noted to buckle when patient sat forward so we applied a elastic barrier strip to reinforce the bottom of the skin barrier. Patient had a good fit and was without leaks when we finished. All questions were answered. The patient was supplied with 3 x 2-piece ostomy appliances, 1 x box of stoma powder, and 1 x box of stoma paste. Patient with discharge order for today.

## 2022-10-10 NOTE — PROGRESS NOTES
Patient assessment completed for this shift. Bed in lowest position. Call bell within reach. Patient voiced  understanding to call for assistance as needed. c/o pain voiced by patient, medicated per MAR. No falls reported at this time. Patient remains stable. Urostomy patent and appliance intact. Progressing in plan of care. Will continue to monitor during this admission. Problem: Pressure Injury - Risk of  Goal: *Prevention of pressure injury  Description: Document Alphonso Scale and appropriate interventions in the flowsheet.   Outcome: Progressing Towards Goal  Note: Pressure Injury Interventions:  Sensory Interventions: Minimize linen layers, Keep linens dry and wrinkle-free, Pressure redistribution bed/mattress (bed type)    Moisture Interventions: Minimize layers    Activity Interventions: Pressure redistribution bed/mattress(bed type)    Mobility Interventions: Pressure redistribution bed/mattress (bed type)    Nutrition Interventions: Document food/fluid/supplement intake    Friction and Shear Interventions: Minimize layers                Problem: Patient Education: Go to Patient Education Activity  Goal: Patient/Family Education  Outcome: Progressing Towards Goal     Problem: Discharge Planning  Goal: *Discharge to safe environment  Outcome: Progressing Towards Goal  Goal: *Knowledge of medication management  Outcome: Progressing Towards Goal  Goal: *Knowledge of discharge instructions  Outcome: Progressing Towards Goal     Problem: Patient Education: Go to Patient Education Activity  Goal: Patient/Family Education  Outcome: Progressing Towards Goal     Problem: Pain  Goal: *Control of Pain  Outcome: Progressing Towards Goal  Goal: *PALLIATIVE CARE:  Alleviation of Pain  Outcome: Progressing Towards Goal     Problem: Patient Education: Go to Patient Education Activity  Goal: Patient/Family Education  Outcome: Progressing Towards Goal     Problem: Falls - Risk of  Goal: *Absence of Falls  Description: Document Asia Martinoter Fall Risk and appropriate interventions in the flowsheet.   Outcome: Progressing Towards Goal  Note: Fall Risk Interventions:  Mobility Interventions: Patient to call before getting OOB    Mentation Interventions: Eyeglasses and hearing aids    Medication Interventions: Patient to call before getting OOB    Elimination Interventions: Call light in reach, Bed/chair exit alarm    History of Falls Interventions: Bed/chair exit alarm         Problem: Patient Education: Go to Patient Education Activity  Goal: Patient/Family Education  Outcome: Progressing Towards Goal     Problem: Patient Education: Go to Patient Education Activity  Goal: Patient/Family Education  Outcome: Progressing Towards Goal

## 2022-10-10 NOTE — DISCHARGE SUMMARY
UROLOGY Progress Note         [phone]        PROVIDER DISCHARGE SUMMARY     Patient ID:    Jesse Sims  [id number]  61 y.o.  1965    Admit date: 10/3/2022    Discharge date : 10/10/2022    Diagnoses: bladder cancer, pathology significant for invasive bladder cancer, pT3bN0. Reason for Hospitalization: surgery, or treatment for above mentioned diagnosis. Procedures: anterior pelvic sideration including cystectomy, hysterectomy, BSO, ileal conduit diversion, pelvic lymph node dissection, incidental appendectomy on 10/3/22. Surgery complicated by rectal injury. Subsequent CT with rectal contrast reveals no leak. Consults: Intraoperatively to Dr. Pruitt Red surgery  Consult: Dr. Brian Velarde intraoperatively to help identify abnormal fallopian tub anatomy    Hospital Course: Uncomplicated hospital course following surgery. Pain is well-controlled. Patient is tolerating a diet. No problems with bowel movements. Patient is ambulatory. Labs are reviewed and felt to be satisfactory for discharge. Incision sites are clean, dry and intact. Patient is caring for her ostomy with assist.    Follow up Care: Your appointment with us is on: 11/4/22 for stent removal    Discharge Medications:   Current Discharge Medication List        CONTINUE these medications which have NOT CHANGED    Details   ferrous sulfate 325 mg (65 mg iron) tablet Take 325 mg by mouth daily. multivitamin with folic acid (One Daily Essential) 400 mcg tab tablet Take 1 Tablet by mouth daily. Qty: 90 Tablet, Refills: 0    Comments: Refill given in the absence of Dr. Susan Nix send the refill request to her after 3 months  Associated Diagnoses: Encounter for gynecological examination without abnormal finding      cromolyn (OPTICROM) 4 % ophthalmic solution Administer 1 Drop to both eyes daily. bisacodyL (Dulcolax, bisacodyl,) 10 mg supp Insert 10 mg into rectum as needed for Constipation.       Arthritis Pain Relief 650 mg TbER TAKE 1 TABLET BY MOUTH THREE (3) TIMES DAILY AS NEEDED FOR PAIN. Qty: 100 Tablet, Refills: 2    Associated Diagnoses: History of rheumatoid arthritis; Pain in joint, multiple sites             Diet: ok to resume home diet. Do not overeat. Use your bowel regimen to facilitate regular, soft, formed bowel movements. If you are experiencing discomfort on post-op day 1-3, stick to a bland, liquid diet until you are feeling less uncomfortable, or having bowel movements. Adhere to guidelines given to you by the Nutritionist.    Activity:  No heavy lifting or strenuous exercise. No soaking in tubs or pools. Walking is encouraged. Disposition: prefer home with home health x 2 weeks. Discharge Exam:  Physical Exam   Vitals and nursing note reviewed. Constitutional:       Appearance: Normal appearance. Eyes:      Extraocular Movements: Extraocular movements intact. Cardiovascular:      Rate and Rhythm: Normal rate. Pulmonary:      Effort: Pulmonary effort is normal.   Abdominal:      General: Abdomen is flat. There is no distension. Tenderness: There is mild abdominal tenderness. There is minimal guarding. Comments:   no drain, site dry  Genitourinary:     Comments: Conduit with clear, yellow urine and pink stoma. Musculoskeletal:      Cervical back: Normal range of motion. Skin:     General: Skin is warm and dry. Comments: Incision site(s) CDI. Neurological:      Mental Status: She is alert and oriented to person, place, and time. Psychiatric:         Behavior: Behavior normal.     Visit Vitals  /72 (BP 1 Location: Right upper arm, BP Patient Position: At rest)   Pulse 80   Temp 98 °F (36.7 °C)   Resp 17   Ht 5' 2.01\" (1.575 m)   Wt 131 lb 6.3 oz (59.6 kg)   SpO2 98%   BMI 24.03 kg/m²       Significant Diagnostic Studies:   10/4/2022: BUN 8 mg/dL (Ref range: 6 - 20 mg/dL);  Calcium 8.0 mg/dL (L; Ref range: 8.5 - 10.1 mg/dL); CO2 20 mmol/L (L; Ref range: 21 - 32 mmol/L); Creatinine 0.73 mg/dL (Ref range: 0.55 - 1.02 mg/dL); Glucose 110 mg/dL (H; Ref range: 65 - 100 mg/dL); HCT 22.4 % (L; Ref range: 35.0 - 47.0 %); HCT 24.5 % (L; Ref range: 35.0 - 47.0 %); HGB 6.9 g/dL (L; Ref range: 11.5 - 16.0 g/dL); HGB 7.6 g/dL (L; Ref range: 11.5 - 16.0 g/dL); Potassium 4.2 mmol/L (Ref range: 3.5 - 5.1 mmol/L); Sodium 138 mmol/L (Ref range: 136 - 145 mmol/L)  Recent Labs     10/09/22  0638 10/08/22  0612   WBC 8.8 7.6   HGB 6.9* 7.4*   HCT 22.0* 24.1*    222     Recent Labs     10/08/22  0612 10/07/22  2050 10/07/22  1051    135* 135*   K 4.2 3.9 4.1    106 107   CO2 24 26 23   BUN 13 8 6   CREA 0.68 0.66 0.61   GLU 90 99 116*   CA 8.6 8.7 8.5   MG  --  1.9  --    PHOS  --  2.8  --      Recent Labs     10/07/22  2050   ALT 10*   AP 65   TBILI 0.7   TP 7.5   ALB 1.7*   GLOB 5.8*     Lab Results   Component Value Date/Time    Glucose (POC) 105 (H) 10/07/2022 05:05 PM    Glucose (POC) 112 (H) 10/07/2022 12:20 PM    Glucose (POC) 110 (H) 10/07/2022 08:03 AM    Glucose (POC) 116 (H) 10/07/2022 12:46 AM    Glucose (POC) 91 10/06/2022 04:14 PM     INPATIENT CONSULTATIONS: , PT/OT, Nutrition, Wound Care    OUTPATIENT FOLLOW UP REMINDERS: Ostomy Clinic    SURGICAL DRAINS/TUBES ON DISCHARGE: retained ureteral stents bilaterally. PLAN FOR DISCHARGE. PATIENT TO FOLLOW UP AS SCHEDULED. NURSING STAFF TO PROVIDE EDUCATION MATERIALS AND DISCHARGE INSTRUCTION SET SPECIFIC TO THIS PATIENT AS REQUESTED BY ME. POST-OPERATIVE COUNSELING PROVIDED BY ME PRIOR TO DISCHARGE.       Signed:  Van Peters NP  10/10/2022  8:29 AM

## 2022-10-10 NOTE — PROGRESS NOTES
Spoke with Dr. Karuna Soliman about patient's pain medication prescription. Dr. Karuna Soliman unable to call in narcotic pain medication due to [place] law. Dr. Karuna Soliman ok with patient staying overnight and to discharge in the AM after medication reconciliation completed and prescription at pharmacy. Primary RN notified.

## 2022-10-10 NOTE — PROGRESS NOTES
CM unable to secure home health service due to patient  between insurance [employer]. Patient will discharge home self care. CM spoke with patient and liz Saucedo at bedside. Liz will assist patient with care and changing her urostomy bag and dressing at ALLI drain site. No further CM services required.

## 2022-10-10 NOTE — CONSULTS
Nutrition Education      Educated on [facility]  Learners: Patient and Family  Readiness: Eager  Method: Explanation    RD consulted for diet ed s/p cystectomy. Pt and family encouraged to resume/consume diet/meals as tolerated and maintain hydration. RD verbalized sources for additional protein d/t surgery. Response: Verbalizes Understanding      Wenceslao Green RD  Contact Number: ext. [phone].

## 2022-10-10 NOTE — DISCHARGE INSTRUCTIONS
I have placed a referral for you to the Wound and Ostomy Clinic. The contact and location information is below. Your appointment is on 10/18/22 as we discussed. This will be a great resource for you. They can assist you with long term care and obtaining supplies. [facility] [place] [address]. [facility], [address]  Tel: [phone]  Fax: [phone]     You can also obtain supplies from [facility].  They are located in the shopping center across from the [facility] [place] office. [facility] [facility], [address] [facility], [address] [place] [address]  [phone]     If you need to reach the Wound and Ostomy Nurse who saw you as an inpatient, you may call the hospital and ask to speak with her. She will have enrolled you in a program to help with supplies and provided you with a folder full of information. Her name is Dave Mercado. She is the only Wound Nurse for [facility]. You may have to leave her a message.   Hospital Phone is [phone], ext. [phone]

## 2022-10-11 ENCOUNTER — TELEPHONE (OUTPATIENT)
Dept: UROLOGY | Age: 57
End: 2022-10-11

## 2022-10-11 NOTE — TELEPHONE ENCOUNTER
Pt recently had surgery with Dr. James Winn on 10/3 and was told that some pain medication was going to be sent to her pharmacy [facility] on [address] but none was sent there.  She also wanted to know if she could take vitamins to help her eat more

## 2022-10-12 ENCOUNTER — TELEPHONE (OUTPATIENT)
Dept: INTERNAL MEDICINE CLINIC | Age: 57
End: 2022-10-12

## 2022-10-12 DIAGNOSIS — M25.50 PAIN IN JOINT, MULTIPLE SITES: ICD-10-CM

## 2022-10-12 DIAGNOSIS — K59.00 CONSTIPATION, UNSPECIFIED CONSTIPATION TYPE: ICD-10-CM

## 2022-10-12 DIAGNOSIS — L76.82 PAIN AT SURGICAL INCISION: Primary | ICD-10-CM

## 2022-10-12 DIAGNOSIS — Z87.39 HISTORY OF RHEUMATOID ARTHRITIS: ICD-10-CM

## 2022-10-12 RX ORDER — DOCUSATE SODIUM 100 MG/1
100 CAPSULE, LIQUID FILLED ORAL 2 TIMES DAILY
Qty: 20 CAPSULE | Refills: 0 | Status: SHIPPED | OUTPATIENT
Start: 2022-10-12 | End: 2022-10-20

## 2022-10-12 RX ORDER — TRAMADOL HYDROCHLORIDE 50 MG/1
50 TABLET ORAL
Qty: 10 TABLET | Refills: 0 | Status: SHIPPED | OUTPATIENT
Start: 2022-10-12 | End: 2022-10-17

## 2022-10-12 RX ORDER — DEXTROMETHORPHAN HYDROBROMIDE, GUAIFENESIN 5; 100 MG/5ML; MG/5ML
650 LIQUID ORAL EVERY 8 HOURS
Qty: 30 TABLET | Refills: 0 | Status: SHIPPED | OUTPATIENT
Start: 2022-10-12 | End: 2022-10-20

## 2022-10-12 RX ORDER — FAMOTIDINE 20 MG/1
20 TABLET, FILM COATED ORAL 2 TIMES DAILY
Qty: 60 TABLET | Refills: 0 | Status: SHIPPED | OUTPATIENT
Start: 2022-10-12 | End: 2022-10-20

## 2022-10-12 NOTE — TELEPHONE ENCOUNTER
The patient needs to be seen for a hospital follow up can you please look at Dr. Isabel Billings schedule and see when she can be added to the schedule?

## 2022-10-12 NOTE — TELEPHONE ENCOUNTER
Spoke to patient; will order 10 tablets of tramadol. She is to alternate with her Tylenol (ordered). Caution to not exceed daily dose recommendations. No Norco.    Docusate to help with soft stool on iron.

## 2022-10-13 ENCOUNTER — HOSPITAL ENCOUNTER (EMERGENCY)
Age: 57
Discharge: HOME | End: 2022-10-13
Attending: STUDENT IN AN ORGANIZED HEALTH CARE EDUCATION/TRAINING PROGRAM | Admitting: STUDENT IN AN ORGANIZED HEALTH CARE EDUCATION/TRAINING PROGRAM
Payer: MEDICAID

## 2022-10-13 VITALS
OXYGEN SATURATION: 100 % | HEART RATE: 100 BPM | SYSTOLIC BLOOD PRESSURE: 116 MMHG | WEIGHT: 113 LBS | DIASTOLIC BLOOD PRESSURE: 67 MMHG | BODY MASS INDEX: 20.8 KG/M2 | HEIGHT: 62 IN | TEMPERATURE: 98.3 F | RESPIRATION RATE: 18 BRPM

## 2022-10-13 DIAGNOSIS — Z98.890 HISTORY OF UROSTOMY: Primary | ICD-10-CM

## 2022-10-13 DIAGNOSIS — N99.528 COMPLICATION OF UROSTOMY (HCC): ICD-10-CM

## 2022-10-13 LAB
ANION GAP SERPL CALC-SCNC: 7 MMOL/L (ref 5–15)
BASOPHILS # BLD: 0 K/UL (ref 0–0.1)
BASOPHILS NFR BLD: 0 % (ref 0–1)
BUN SERPL-MCNC: 22 MG/DL (ref 6–20)
BUN/CREAT SERPL: 22 (ref 12–20)
CA-I BLD-MCNC: 9.6 MG/DL (ref 8.5–10.1)
CHLORIDE SERPL-SCNC: 105 MMOL/L (ref 97–108)
CO2 SERPL-SCNC: 20 MMOL/L (ref 21–32)
CREAT SERPL-MCNC: 1 MG/DL (ref 0.55–1.02)
DIFFERENTIAL METHOD BLD: ABNORMAL
EOSINOPHIL # BLD: 0 K/UL (ref 0–0.4)
EOSINOPHIL NFR BLD: 0 % (ref 0–7)
ERYTHROCYTE [DISTWIDTH] IN BLOOD BY AUTOMATED COUNT: 16.8 % (ref 11.5–14.5)
GLUCOSE SERPL-MCNC: 91 MG/DL (ref 65–100)
HCT VFR BLD AUTO: 27.7 % (ref 35–47)
HGB BLD-MCNC: 8.5 G/DL (ref 11.5–16)
IMM GRANULOCYTES # BLD AUTO: 0.2 K/UL (ref 0–0.04)
IMM GRANULOCYTES NFR BLD AUTO: 1 % (ref 0–0.5)
LYMPHOCYTES # BLD: 1.1 K/UL (ref 0.8–3.5)
LYMPHOCYTES NFR BLD: 9 % (ref 12–49)
MCH RBC QN AUTO: 27.3 PG (ref 26–34)
MCHC RBC AUTO-ENTMCNC: 30.7 G/DL (ref 30–36.5)
MCV RBC AUTO: 89.1 FL (ref 80–99)
MONOCYTES # BLD: 0.5 K/UL (ref 0–1)
MONOCYTES NFR BLD: 4 % (ref 5–13)
NEUTS SEG # BLD: 10.5 K/UL (ref 1.8–8)
NEUTS SEG NFR BLD: 86 % (ref 32–75)
NRBC # BLD: 0 K/UL (ref 0–0.01)
NRBC BLD-RTO: 0 PER 100 WBC
PLATELET # BLD AUTO: 314 K/UL (ref 150–400)
PMV BLD AUTO: 9.4 FL (ref 8.9–12.9)
POTASSIUM SERPL-SCNC: 4.1 MMOL/L (ref 3.5–5.1)
RBC # BLD AUTO: 3.11 M/UL (ref 3.8–5.2)
SODIUM SERPL-SCNC: 132 MMOL/L (ref 136–145)
WBC # BLD AUTO: 12.3 K/UL (ref 3.6–11)

## 2022-10-13 PROCEDURE — 80048 BASIC METABOLIC PNL TOTAL CA: CPT

## 2022-10-13 PROCEDURE — 36415 COLL VENOUS BLD VENIPUNCTURE: CPT

## 2022-10-13 PROCEDURE — 85025 COMPLETE CBC W/AUTO DIFF WBC: CPT

## 2022-10-13 PROCEDURE — 99283 EMERGENCY DEPT VISIT LOW MDM: CPT

## 2022-10-13 RX ORDER — CIPROFLOXACIN 500 MG/1
500 TABLET ORAL 2 TIMES DAILY
Qty: 10 TABLET | Refills: 0 | Status: SHIPPED | OUTPATIENT
Start: 2022-10-13 | End: 2022-10-20

## 2022-10-13 NOTE — ED PROVIDER NOTES
EMERGENCY DEPARTMENT HISTORY AND PHYSICAL EXAM      Date: 10/13/2022  Patient Name: Makayla Navas    History of Presenting Illness     Chief Complaint   Patient presents with    Other     Urostomy Problem       History Provided By: Patient    HPI: Makayla Navas, 61 y.o. female history of bladder cancer presenting for urostomy problem. Patient reports she had a urostomy stoma placed October 3 by Dr. Osvaldo Salazar. She reports she was cleaning the stoma site that had a Gibson catheter in the stoma this morning when she was cleaning it she noticed green wires coming from the stoma site. She denies any pain or symptoms, has not had a fever or any other issues but is concerned about the site of the wires and because the Gibson came out so came into the ED for evaluation    There are no other complaints, changes, or physical findings at this time. PCP: Jany Tamayo MD    No current facility-administered medications on file prior to encounter. Current Outpatient Medications on File Prior to Encounter   Medication Sig Dispense Refill    acetaminophen (Arthritis Pain Relief) 650 mg TbER Take 1 Tablet by mouth every eight (8) hours for 10 days. 30 Tablet 0    traMADoL (ULTRAM) 50 mg tablet Take 1 Tablet by mouth every six (6) hours as needed for Pain for up to 5 days. Max Daily Amount: 200 mg. 10 Tablet 0    docusate sodium (COLACE) 100 mg capsule Take 1 Capsule by mouth two (2) times a day for 10 days. 20 Capsule 0    famotidine (PEPCID) 20 mg tablet Take 1 Tablet by mouth two (2) times a day for 30 days. 60 Tablet 0    bisacodyL (Dulcolax, bisacodyl,) 10 mg supp Insert 10 mg into rectum as needed for Constipation. ferrous sulfate 325 mg (65 mg iron) tablet Take 325 mg by mouth daily. multivitamin with folic acid (One Daily Essential) 400 mcg tab tablet Take 1 Tablet by mouth daily. 90 Tablet 0    cromolyn (OPTICROM) 4 % ophthalmic solution Administer 1 Drop to both eyes daily. Past History     Past Medical History:  Past Medical History:   Diagnosis Date    Anemia     pt states had recent blood transfusion 2022    Back pain     Cancer (HCC)     bladder    DVT (deep venous thrombosis) (HCC)     and PE, pt denies lung PE , only leg several years ago    Hepatitis C     pt states dx 1 month ago    Liver disease     Presence of IVC filter     pt states several years ago    Rheumatoid arthritis ([address]     Sciatic leg pain     pt states both legs    Uterine fibroid        Past Surgical History:  Past Surgical History:   Procedure Laterality Date    HX APPENDECTOMY  10/03/2022    HX  SECTION      HX GYN  10/03/2022    ROBOT ANTERIOR PELVIC EXENTERATION    HX HYSTERECTOMY  10/03/2022    HX SALPINGO-OOPHORECTOMY Bilateral 10/03/2022    HX UROLOGICAL  09/15/2022    CYSTOSCOPY    HX UROLOGICAL  09/15/2022    URETHRAL DILATION    HX UROLOGICAL  09/15/2022    URETERAL STENT PLACEMENT    HX UROLOGICAL  09/15/2022    TRANSURETHRAL RESECTION OF BLADDER TUMOR >2CM    HX UROLOGICAL Bilateral 09/15/2022    RETROGRADE PYELOGRAM    HX UROLOGICAL  10/03/2022    PELVIC LYMPH NODE DISSECTION    HX UROLOGICAL  10/03/2022    ILEAL CONDUIT URINARY DIVERSION    [place] CARDIAC SURG PROCEDURE UNLIST      stent placement       Family History:  Family History   Problem Relation Age of Onset    Cancer Mother     Lung Cancer Mother     Heart Disease Father     Hypertension Sister     Cancer Maternal Aunt     Breast Cancer Maternal Aunt        Social History:  Social History     Tobacco Use    Smoking status: Former     Years: 10.00     Types: Cigarettes    Smokeless tobacco: Never    Tobacco comments:     Quit 15 years ago   Vaping Use    Vaping Use: Never used   Substance Use Topics    Alcohol use: Not Currently    Drug use: Never       Allergies:   Allergies   Allergen Reactions    Bactrim [Sulfamethoprim] Swelling     Swelling of the lilps    Penicillin V Potassium Swelling       Review of Systems   Review of Systems   Constitutional:  Negative for activity change and fever. HENT:  Negative for drooling. Eyes:  Negative for redness. Respiratory:  Negative for shortness of breath. Cardiovascular:  Negative for chest pain and palpitations. Gastrointestinal:  Negative for abdominal pain. Genitourinary:  Negative for pelvic pain. Urostomy problem   Musculoskeletal:  Negative for back pain and neck pain. Skin:  Negative for color change. Neurological:  Negative for headaches. Psychiatric/Behavioral:  Negative for agitation. All other systems reviewed and are negative. Physical Exam   Physical Exam  Vitals and nursing note reviewed. Constitutional:       Appearance: She is normal weight. HENT:      Head: Normocephalic. Cardiovascular:      Rate and Rhythm: Normal rate. Heart sounds: Normal heart sounds. Pulmonary:      Effort: Pulmonary effort is normal.      Breath sounds: Normal breath sounds. Abdominal:      General: There is no distension. Palpations: Abdomen is soft. Tenderness: There is no abdominal tenderness. There is no guarding or rebound. Comments: There is a slight stoma on the right side of the abdomen with green wires emerging from it. There is surrounding to that appears to be or fatty   Musculoskeletal:         General: Normal range of motion. Cervical back: Neck supple. Skin:     General: Skin is warm. Neurological:      General: No focal deficit present. Mental Status: She is alert.    Psychiatric:         Mood and Affect: Mood normal.       Lab and Diagnostic Study Results   Labs -     Recent Results (from the past 12 hour(s))   CBC WITH AUTOMATED DIFF    Collection Time: 10/13/22 10:15 AM   Result Value Ref Range    WBC 12.3 (H) 3.6 - 11.0 K/uL    RBC 3.11 (L) 3.80 - 5.20 M/uL    HGB 8.5 (L) 11.5 - 16.0 g/dL    HCT 27.7 (L) 35.0 - 47.0 %    MCV 89.1 80.0 - 99.0 FL    MCH 27.3 26.0 - 34.0 PG    MCHC 30.7 30.0 - 36.5 g/dL    RDW 16. 8 (H) 11.5 - 14.5 %    PLATELET 028 443 - 898 K/uL    MPV 9.4 8.9 - 12.9 FL    NRBC 0.0 0.0  WBC    ABSOLUTE NRBC 0.00 0.00 - 0.01 K/uL    NEUTROPHILS 86 (H) 32 - 75 %    LYMPHOCYTES 9 (L) 12 - 49 %    MONOCYTES 4 (L) 5 - 13 %    EOSINOPHILS 0 0 - 7 %    BASOPHILS 0 0 - 1 %    IMMATURE GRANULOCYTES 1 (H) 0 - 0.5 %    ABS. NEUTROPHILS 10.5 (H) 1.8 - 8.0 K/UL    ABS. LYMPHOCYTES 1.1 0.8 - 3.5 K/UL    ABS. MONOCYTES 0.5 0.0 - 1.0 K/UL    ABS. EOSINOPHILS 0.0 0.0 - 0.4 K/UL    ABS. BASOPHILS 0.0 0.0 - 0.1 K/UL    ABS. IMM. GRANS. 0.2 (H) 0.00 - 0.04 K/UL    DF AUTOMATED     METABOLIC PANEL, BASIC    Collection Time: 10/13/22 10:15 AM   Result Value Ref Range    Sodium 132 (L) 136 - 145 mmol/L    Potassium 4.1 3.5 - 5.1 mmol/L    Chloride 105 97 - 108 mmol/L    CO2 20 (L) 21 - 32 mmol/L    Anion gap 7 5 - 15 mmol/L    Glucose 91 65 - 100 mg/dL    BUN 22 (H) 6 - 20 mg/dL    Creatinine 1.00 0.55 - 1.02 mg/dL    BUN/Creatinine ratio 22 (H) 12 - 20      eGFR >60 >60 ml/min/1.73m2    Calcium 9.6 8.5 - 10.1 mg/dL       Radiologic Studies -   @lastxrresult@  CT Results  (Last 48 hours)      None          CXR Results  (Last 48 hours)      None            Medical Decision Making and ED Course   Differential Diagnosis & Medical Decision Making Provider Note:   50-year-old female with history of bladder cancer presenting for urostomy tube problem. On exam the patient is comfortable and alert, watching TV there is no tenderness to the abdomen, but there is green wire emerging from the stoma. I discussed the case with Dr. Vaishnavi Bills from urology who states he will come and see the patient. The patient is currently comfortable. Will obtain basic labs at this time     - I am the first provider for this patient. I reviewed the vital signs, available nursing notes, past medical history, past surgical history, family history and social history.  The patients presenting problems have been discussed, and they are in agreement with the care plan formulated and outlined with them. I have encouraged them to ask questions as they arise throughout their visit. Vital Signs-Reviewed the patient's vital signs. Patient Vitals for the past 12 hrs:   Temp Pulse Resp BP SpO2   10/13/22 1103 -- -- -- 116/67 100 %   10/13/22 1033 -- -- -- 118/70 99 %   10/13/22 1003 -- -- -- 115/70 100 %   10/13/22 0933 98.3 °F (36.8 °C) 100 18 118/78 100 %       ED Course:   ED Course as of 10/13/22 1206   Thu Oct 13, 2022   1054 Jeana Tovar, urology saw patient at the bedside and will have the urology nurse practitioner who has been following the patient, and evaluate. He recommended that wound care nurse come down [PC]   [phone] Urology nurse practitioner came to evaluate patient and was able to replace bag and fix stents, patient reports nurse practitioner will be sending antibioticto her pharmacy. She is prepared for discharge [PC]      ED Course User Index  [PC] Paty Cobb MD     Blood work similar to prior [facility] count noted but is likely due to stress as there is no signs of infection    Procedures   Performed by: Leonid Street MD  Procedures      Disposition   Disposition: DC- Adult Discharges: All of the diagnostic tests were reviewed and questions answered. Diagnosis, care plan and treatment options were discussed. The patient understands the instructions and will follow up as directed. The patients results have been reviewed with them. They have been counseled regarding their diagnosis. The patient verbally convey understanding and agreement of the signs, symptoms, diagnosis, treatment and prognosis and additionally agrees to follow up as recommended with their PCP in 24 - 48 hours. They also agree with the care-plan and convey that all of their questions have been answered.   I have also put together some discharge instructions for them that include: 1) educational information regarding their diagnosis, 2) how to care for their diagnosis at home, as well a 3) list of reasons why they would want to return to the ED prior to their follow-up appointment, should their condition change. DISCHARGE PLAN:  1. Current Discharge Medication List        CONTINUE these medications which have NOT CHANGED    Details   acetaminophen (Arthritis Pain Relief) 650 mg TbER Take 1 Tablet by mouth every eight (8) hours for 10 days. Qty: 30 Tablet, Refills: 0    Associated Diagnoses: History of rheumatoid arthritis; Pain in joint, multiple sites      traMADoL (ULTRAM) 50 mg tablet Take 1 Tablet by mouth every six (6) hours as needed for Pain for up to 5 days. Max Daily Amount: 200 mg. Qty: 10 Tablet, Refills: 0    Associated Diagnoses: History of rheumatoid arthritis; Pain in joint, multiple sites; Pain at surgical incision      docusate sodium (COLACE) 100 mg capsule Take 1 Capsule by mouth two (2) times a day for 10 days. Qty: 20 Capsule, Refills: 0    Associated Diagnoses: Constipation, unspecified constipation type      famotidine (PEPCID) 20 mg tablet Take 1 Tablet by mouth two (2) times a day for 30 days. Qty: 60 Tablet, Refills: 0      bisacodyL (Dulcolax, bisacodyl,) 10 mg supp Insert 10 mg into rectum as needed for Constipation. ferrous sulfate 325 mg (65 mg iron) tablet Take 325 mg by mouth daily. multivitamin with folic acid (One Daily Essential) 400 mcg tab tablet Take 1 Tablet by mouth daily. Qty: 90 Tablet, Refills: 0    Comments: Refill given in the absence of Dr. Tomás Osuna send the refill request to her after 3 months  Associated Diagnoses: Encounter for gynecological examination without abnormal finding      cromolyn (OPTICROM) 4 % ophthalmic solution Administer 1 Drop to both eyes daily. 2.   Follow-up Information       Follow up With Specialties Details Why Contact Info    Dr. Melissa Collins  In 1 day            3. Return to ED if worse   4.    Current Discharge Medication List            Diagnosis/Clinical Impression     Clinical Impression:   1. History of urostomy    2. Complication of urostomy [facility])        Attestations: Jazmyne Anderson MD, am the primary clinician of record. Please note that this dictation was completed with [employer], the computer voice recognition software. Quite often unanticipated grammatical, syntax, homophones, and other interpretive errors are inadvertently transcribed by the computer software. Please disregard these errors. Please excuse any errors that have escaped final proofreading. Thank you.

## 2022-10-13 NOTE — ED TRIAGE NOTES
EMS dispatched for urostomy becoming dislodged. Recently had this placed for bladder cancer. . Was cleaning it and saw wires.

## 2022-10-13 NOTE — CONSULTS
UROLOGY CONSULT NOTE  [phone]        Patient: Newton Cancer MRN: [id number]  SSN: [id number]    YOB: 1965  Age: 61 y.o. Sex: female      REFERRING PROVIDER: Rhona Villareal MD  Subjective: Newton Maldonado is a 61 y.o. female who is being seen in urologic consultation for stent protrusion via stoma. She is seen in the ER with both ureteral stents protruding from stoma. They are about 90% out and entangled. There is a moderate amount of mucous expelled in the distal curls. She is s/p cystectomy on 10/3/22. They have been in place for 10 days. Typically speaking, they would remain in place for 21 days.       Past Medical History:   Diagnosis Date    Anemia     pt states had recent blood transfusion sept     Back pain     Cancer (HCC)     bladder    DVT (deep venous thrombosis) (HCC)     and PE, pt denies lung PE , only leg several years ago    Hepatitis C     pt states dx 1 month ago    Liver disease     Presence of IVC filter     pt states several years ago    Rheumatoid arthritis ([address]     Sciatic leg pain     pt states both legs    Uterine fibroid      Past Surgical History:   Procedure Laterality Date    HX APPENDECTOMY  10/03/2022    HX  SECTION      HX GYN  10/03/2022    ROBOT ANTERIOR PELVIC EXENTERATION    HX HYSTERECTOMY  10/03/2022    HX SALPINGO-OOPHORECTOMY Bilateral 10/03/2022    HX UROLOGICAL  09/15/2022    CYSTOSCOPY    HX UROLOGICAL  09/15/2022    URETHRAL DILATION    HX UROLOGICAL  09/15/2022    URETERAL STENT PLACEMENT    HX UROLOGICAL  09/15/2022    TRANSURETHRAL RESECTION OF BLADDER TUMOR >2CM    HX UROLOGICAL Bilateral 09/15/2022    RETROGRADE PYELOGRAM    HX UROLOGICAL  10/03/2022    PELVIC LYMPH NODE DISSECTION    HX UROLOGICAL  10/03/2022    ILEAL CONDUIT URINARY DIVERSION    [place] CARDIAC SURG PROCEDURE UNLIST      stent placement      Family History   Problem Relation Age of Onset    Cancer Mother     Lung Cancer Mother     Heart Disease Father     Hypertension Sister     Cancer Maternal Aunt     Breast Cancer Maternal Aunt      Social History     Tobacco Use    Smoking status: Former     Years: 10.00     Types: Cigarettes    Smokeless tobacco: Never    Tobacco comments:     Quit 15 years ago   Substance Use Topics    Alcohol use: Not Currently      Current Outpatient Medications   Medication Sig Dispense Refill    acetaminophen (Arthritis Pain Relief) 650 mg TbER Take 1 Tablet by mouth every eight (8) hours for 10 days. 30 Tablet 0    traMADoL (ULTRAM) 50 mg tablet Take 1 Tablet by mouth every six (6) hours as needed for Pain for up to 5 days. Max Daily Amount: 200 mg. 10 Tablet 0    docusate sodium (COLACE) 100 mg capsule Take 1 Capsule by mouth two (2) times a day for 10 days. 20 Capsule 0    famotidine (PEPCID) 20 mg tablet Take 1 Tablet by mouth two (2) times a day for 30 days. 60 Tablet 0    bisacodyL (Dulcolax, bisacodyl,) 10 mg supp Insert 10 mg into rectum as needed for Constipation. ferrous sulfate 325 mg (65 mg iron) tablet Take 325 mg by mouth daily. multivitamin with folic acid (One Daily Essential) 400 mcg tab tablet Take 1 Tablet by mouth daily. 90 Tablet 0    cromolyn (OPTICROM) 4 % ophthalmic solution Administer 1 Drop to both eyes daily. Allergies   Allergen Reactions    Bactrim [Sulfamethoprim] Swelling     Swelling of the lilps    Penicillin V Potassium Swelling       Review of Systems:  Review of Systems   Constitutional:  Negative for chills and fever. Respiratory:  Negative for sputum production and shortness of breath. Cardiovascular:  Negative for chest pain and palpitations. Gastrointestinal:  Negative for abdominal pain, constipation, nausea and vomiting.      Objective:     Vitals:    10/13/22 0933 10/13/22 1003 10/13/22 1033   BP: 118/78 115/70 118/70   Pulse: 100     Resp: 18     Temp: 98.3 °F (36.8 °C)     SpO2: 100% 100% 99%   Weight: 113 lb (51.3 kg)     Height: 5' 2\" (1.575 m) Recent Labs     10/13/22  1015 10/10/22  1155   WBC 12.3* 9.6   HGB 8.5* 7.9*   HCT 27.7* 25.0*    192     Recent Labs     10/13/22  1015 10/10/22  1155   * 134*   K 4.1 4.3    105   CO2 20* 22   GLU 91 89   BUN 22* 17   CREA 1.00 0.70   CA 9.6 8.7   MG  --  2.0   PHOS  --  3.3   ALB  --  2.1*   TBILI  --  0.6   ALT  --  11*     No results for input(s): PH, PCO2, PO2, HCO3, FIO2 in the last 72 hours. 24 Hour Results:  Recent Results (from the past 24 hour(s))   CBC WITH AUTOMATED DIFF    Collection Time: 10/13/22 10:15 AM   Result Value Ref Range    WBC 12.3 (H) 3.6 - 11.0 K/uL    RBC 3.11 (L) 3.80 - 5.20 M/uL    HGB 8.5 (L) 11.5 - 16.0 g/dL    HCT 27.7 (L) 35.0 - 47.0 %    MCV 89.1 80.0 - 99.0 FL    MCH 27.3 26.0 - 34.0 PG    MCHC 30.7 30.0 - 36.5 g/dL    RDW 16.8 (H) 11.5 - 14.5 %    PLATELET 485 437 - 699 K/uL    MPV 9.4 8.9 - 12.9 FL    NRBC 0.0 0.0  WBC    ABSOLUTE NRBC 0.00 0.00 - 0.01 K/uL    NEUTROPHILS 86 (H) 32 - 75 %    LYMPHOCYTES 9 (L) 12 - 49 %    MONOCYTES 4 (L) 5 - 13 %    EOSINOPHILS 0 0 - 7 %    BASOPHILS 0 0 - 1 %    IMMATURE GRANULOCYTES 1 (H) 0 - 0.5 %    ABS. NEUTROPHILS 10.5 (H) 1.8 - 8.0 K/UL    ABS. LYMPHOCYTES 1.1 0.8 - 3.5 K/UL    ABS. MONOCYTES 0.5 0.0 - 1.0 K/UL    ABS. EOSINOPHILS 0.0 0.0 - 0.4 K/UL    ABS. BASOPHILS 0.0 0.0 - 0.1 K/UL    ABS. IMM. GRANS. 0.2 (H) 0.00 - 0.04 K/UL    DF AUTOMATED     METABOLIC PANEL, BASIC    Collection Time: 10/13/22 10:15 AM   Result Value Ref Range    Sodium 132 (L) 136 - 145 mmol/L    Potassium 4.1 3.5 - 5.1 mmol/L    Chloride 105 97 - 108 mmol/L    CO2 20 (L) 21 - 32 mmol/L    Anion gap 7 5 - 15 mmol/L    Glucose 91 65 - 100 mg/dL    BUN 22 (H) 6 - 20 mg/dL    Creatinine 1.00 0.55 - 1.02 mg/dL    BUN/Creatinine ratio 22 (H) 12 - 20      eGFR >60 >60 ml/min/1.73m2    Calcium 9.6 8.5 - 10.1 mg/dL       Physical Exam:  Physical Exam  Constitutional:       Appearance: Normal appearance. She is not ill-appearing. Pulmonary:      Effort: Pulmonary effort is normal.   Abdominal:      General: Abdomen is flat. There is no distension. Palpations: There is no mass. Hernia: No hernia is present. Comments: RLQ conduit with pink, budded stoma. Sutures present and intact. Bilateral ureteral stents expelled about 90%. Removed completely. Mucous noted. Urine is clear, yellow. Incision sites are clean and dry. Musculoskeletal:         General: Normal range of motion. Cervical back: Normal range of motion. Skin:     General: Skin is warm and dry. Neurological:      Mental Status: She is alert and oriented to person, place, and time. Assessment:     Hospital Problems  Date Reviewed: 9/27/2022   None      Plan:     Hospital Problems  Date Reviewed: 9/27/2022   None      RETAINED URETERAL STENTS: bilateral ureteral stents placed at time of cystectomy and ileal conduit diversion on 10/3/22. Spontaneous expulsion, subsequent bedside removal today. Antibiotics x 5 days. May repeat labs early next week. BLADDER CANCER: s/p cystectomy on 10/3/22. They have been in place for 10 days. Plan for discharge home today. Abx as directed. Will check in early next week to determine need for labs. Discussed with Dr. Alecia Bacon,  Dr. Shannan Hood, patient and niece.   Signed By: Ania Stanley NP     October 13, 2022

## 2022-10-17 ENCOUNTER — HOSPITAL ENCOUNTER (OUTPATIENT)
Dept: WOUND CARE | Age: 57
Discharge: HOME | End: 2022-10-17
Payer: MEDICAID

## 2022-10-17 ENCOUNTER — TELEPHONE (OUTPATIENT)
Dept: INTERNAL MEDICINE CLINIC | Age: 57
End: 2022-10-17

## 2022-10-17 VITALS
RESPIRATION RATE: 16 BRPM | TEMPERATURE: 98.7 F | SYSTOLIC BLOOD PRESSURE: 103 MMHG | DIASTOLIC BLOOD PRESSURE: 58 MMHG | HEART RATE: 94 BPM

## 2022-10-17 PROCEDURE — 99213 OFFICE O/P EST LOW 20 MIN: CPT

## 2022-10-17 NOTE — TELEPHONE ENCOUNTER
----- Message from Nathalie Clinton sent at 10/17/2022  3:35 PM EDT -----  Subject: Message to Provider    QUESTIONS  Information for Provider? pt is calling stating that she is needing   handlebars to go in the bathroom for shower pt is needing a sit to go in   the tub as well pt is also requesting a walker the one she can sit down on   pt does have hector on the 10/19/2022  ---------------------------------------------------------------------------  --------------  [id number] [employer]  [phone]; OK to leave message on voicemail  ---------------------------------------------------------------------------  --------------  SCRIPT ANSWERS  Relationship to Patient?  Self

## 2022-10-17 NOTE — WOUND CARE
OSTOMY Assessment    Stoma Tissue Assessment: __x_Post-op ___Follow-up       Type of Diversion: _x__New___ Established ___Revision___Permanent____Temporary    _____  Ileostomy   _____  Colostomy: ____ Ascending, ____ Transverse, _____.  Sigmoid   _____  Janet Cook   __x___  Ileal Conduit   _____  Mucous Fistula     Appearance of Ostomy:   __x_ Melinda Gault /Moist/Viable ___ Dark Red ___ Pink ___ Sloughing ___Necrotic____Pallor ____Red  ___Dry ____Moist    Stoma Size: ____25_____ (mm) ___Round _x__ Radha Locks ___Irregular     Stoma Height:   ____Flush __x__Retracted ____Budded ____Edematous ____Prolapse     Stoma Location  ____ Left Side          __x__Right Side     _____Umbilicus  _____Incision Line  ____ Above Belt       __x__ Below Belt    Abdominal Contours  ____ Firm ____ Flat ____Flabby _x__Soft _x__Round ___ Hard ___ Other     Stoma Function: x__Yes __No  Output:   __x__ Yellow ____Amber ____ Pink(Hematuria) ____ Tea Colored   ____ Clots ____Foul Odor ____ Mucous     Peristomal skin:   __x_ Intact ___ Irritant Dermatitis ___ Allergic Contact Dermatitis ___Candidiasis ___Caput Medusae ___Folliculitis ___Mechanical Trauma ___Mucosal Transplantation    ___Pyoderma Gangrenosum ___Hyperplasia ___Radiation Trauma ___Allergies mpling  ___ Dimpling ____Blistered ____Fragile ____Macerated ___Peeling  ___Ulceration  ___ Fungal ___Peristomal Hernia ___Non-blanchable ___ Hypergranulation      Stoma Complications:   __Excessive Bleeding __Ischemia __ Abscess __Necrosis __Prolapse   __Hernia _x_ Retraction __Stenosis __Mucosal Separation __Melanosis Coli   __Laceration __Other     Application for Patient    Wafer and pouch used during assessment and education ___Erik [id number]_________    Pouch System Recommended:   _x_One-Piece __Two-Piece ___Custom     Flat:   ___Pre-cut   __x_Cut-to fit     Convexity: ___Shallow ___Deep_x__ Flexible ___Creative Convexity   ___Pre-cut __x_Cut to [employer]     Accessory Products   __ [employer] __Adhesive Remover Wipes __Barrier [employer] __Barrier Wipes   __Deodorizer __Liquid Adhesive _x_ Paste __ Powder __Strip   _x_ Support Belt __Tape      Education for Patient & Family  1. Booklet of information on specific ostomy given and some verbal discussion of patients ostomy and expectations. 2. Instruction/demostration of ostomy wafer & pouch change. 3. Discuss concerns/ answer questions. 4. Provide follow up education in clinic if needed.        PICTURE INSERT:

## 2022-10-17 NOTE — WOUND CARE
Clinical Level of Care Assessment    Outpatient Ostomy Care      NAME:  Simi Ibarra  YOB: 1965  MEDICAL RECORD NUMBER:  [id number]   DATE:  10/17/2022      Patient Khushboo Hutton Assessment- Document in Flowsheet I&O   Points   Review of chart []   0   Assess Complete Ostomy tab in Navigator for assessment of; stoma status, peristomal skin, presence of hernia/stool consistency/diet/related medications   Simple adjustments to pouch size/pouch system, new stoma pattern, accessory addition/deletion. []   1   Assess Complete Ostomy tab in Navigator for assessment of; stoma status, peristomal skin, presence of hernia/stool consistency/diet/related medications   Moderate adjustments to pouch size/pouch system, new stoma pattern, accessory addition/deletion. Observe patient/caregiver with hands-on care. 1-2 adjustments to pouch size/system/skin care/accessory addition or deletion. [x]   2   Assess Complete Ostomy tab in Navigator for assessment of; stoma status, peristomal skin, presence of hernia/stool consistency/diet/related medications   Complex adjustments to pouch size/pouch system, new stoma pattern, accessory addition/deletion. 3 or more complex adjustments to pouch size/system/skin care/accessory addition or deletion. Observe patient/caregiver with hands-on care. Assess patient/patient abdomen for optimal pre-marked stoma site. Assess patient abdomen for type of hernia belt/accessory needed. []   3         Ambulation Status Documented in [facility] Clinical Note  Status Definition Points   Independent Independently able to ambulate. Fully able (without any assistance) to get on/off exam table/chair. []   0   Minimal Physical Assistance Requires physical assistance of one person to ambulate and/or position patient to be examined. Includes necessary physical assistance to position lower extremities on/off stool.    [x]   1   Moderate Physical Assistance Requires at least one staff member to physically assist patient in ambulating into treatment room, and/or on off chair/bed. Requires assistance to bathroom. []   2   Full Assistance Requires assistance of at least two staff members to transfer patient into treatment room and/or on/off bed/chair. \"Total Transfer\". Unable to use bathroom requires bedside commode and/or bedpan []   3       Teaching Effort Documented in [facility] Clinical Note  Effort Definition Points   No Teaching  []   0   General Initial/Simple lesson:  Assess readiness to learn, assess patient learning style to determine educational flow/special needs for learning. Teaching related to 1-3 topics  Documentation in [facility] completed. []   1   Intermediate Assess readiness to learn, assess patient learning style to determine educational flow/special needs for learning. Teaching related to 3-4 topics. Hernia belt application and care considerations  Documentation in [facility] completed. []   2   Complex Assess readiness to learn, assess patient learning style to determine educational flow/special needs for learning. Teaching of greater than 5 additional topics   Pre-operative ostomy education with review of written resources for patient/family/caregiver as needed. Demonstration/return demonstration of ostomy irrigation  Documentation in [facility] completed. [x]   3     Patient Assessment and Planning in [facility] Clinical Note   Planning Definition Points   Simple Simple pouch change procedure completed and reviewed with patient/family/caregiver   Documentation in [facility] completed. []   1   Intermediate Moderate level of follow-up needs:   Pouch change/discharge procedure revised and reviewed with patient/caregiver. Communications with outside resources; i.e. Telephone calls to Surgeon/ PCP, family/caregiver, home health, ECF. Documentation in [facility] completed.      [x]   2   Complex Complex level of instructions/changes:   Family/Caregiver learning/demonstration/return demonstration visit. Pouching/discharge procedure revised/reviewed with patient/family/caregiver. Contact with outside resources; i.e. communication with Surgeon/ PCP, home health, ECF. Contact/referral to ostomy appliance supplier for new or additional products. Review when to call [facility] or schedule follow-up visit. Referral to Emergency Department   Documentation in [facility] completed. []   3       Is this the Patient's First Visit with [facility] @ [facility]? Yes    Is this Patient Established to this [facility] within the last 3 years?    Yes             Clinical Level of Care      Points  0-3  Level 1 []     Points  4-6  Level 2 []     Points  7-8  Level 3 [x]     Points  9-10  Level 4 []     Points  11-12  Level 5 []       Electronically signed by Tyshawn Spicer RN on 10/17/2022 at 12:38 PM

## 2022-10-17 NOTE — WOUND CARE
Discharge Instructions/Wound Orders  [facility]  [address] [address], [address]  Telephone: [phone]    NAME:  Eileen Palmer OF BIRTH:  1965  MEDICAL RECORD NUMBER:  [id number]  DATE:  10/17/2022  Ostomy Care Orders:  1) Remove old bag and clean stoma and peristomal skin with tap water and paper towels. 2) Cut ostomy bag [id number] to size using template, remove plastic backing and apply a thin bead paste. 3) Fold like a taco and place over stoma site, remove paper backing and smooth tape into place. 4) Attach ostomy belt. 5) Change 2 x week and as needed for leaking. Dietary:  [x] Diet as tolerated: [] Calorie Diabetic Diet:Low carb and no Sugar [] No Added Salt:[x] Increase Protein: [] Other:Limit the amount of liquid you are drinking and avoid drinking in between meals   Activity:  [x] Activity as tolerated:  [] Patient has no activity restrictions     [] Strict Bedrest: [] Remain off Work:     [] May return to full duty work:                                   [] Return to work with restrictions:             Return Appointment:  [] Return Appointment: With Jackie Best  in  2 Week(s)  [] Ordered tests:    Electronically signed Jayla Muñiz RN on 10/17/2022 at [address]: Should you experience any significant changes in your wound(s) or have questions about your wound care, please contact the [address] [place] [address] at [address] [place] [address] 8:00 am - 4:30. If you need help with your wound outside these hours and cannot wait until we are again available, contact your PCP or go to the hospital emergency room. PLEASE NOTE: IF YOU ARE UNABLE TO OBTAIN WOUND SUPPLIES, CONTINUE TO USE THE SUPPLIES YOU HAVE AVAILABLE UNTIL YOU ARE ABLE TO REACH US. IT IS MOST IMPORTANT TO KEEP THE WOUND COVERED AT ALL TIMES.      YURI Signature:_______________________    Date: ___________ Time: ____________

## 2022-10-19 ENCOUNTER — APPOINTMENT (OUTPATIENT)
Dept: GENERAL RADIOLOGY | Age: 57
DRG: 231 | End: 2022-10-19
Attending: NURSE PRACTITIONER
Payer: MEDICAID

## 2022-10-19 ENCOUNTER — APPOINTMENT (OUTPATIENT)
Dept: CT IMAGING | Age: 57
DRG: 231 | End: 2022-10-19
Attending: NURSE PRACTITIONER
Payer: MEDICAID

## 2022-10-19 ENCOUNTER — HOSPITAL ENCOUNTER (INPATIENT)
Age: 57
LOS: 22 days | Discharge: HOME HEALTH | DRG: 231 | End: 2022-11-11
Attending: STUDENT IN AN ORGANIZED HEALTH CARE EDUCATION/TRAINING PROGRAM | Admitting: INTERNAL MEDICINE
Payer: MEDICAID

## 2022-10-19 DIAGNOSIS — Z85.51 HISTORY OF BLADDER CANCER: ICD-10-CM

## 2022-10-19 DIAGNOSIS — D62 ACUTE BLOOD LOSS ANEMIA: Primary | ICD-10-CM

## 2022-10-19 DIAGNOSIS — I82.4Y3 ACUTE DEEP VEIN THROMBOSIS (DVT) OF PROXIMAL VEIN OF BOTH LOWER EXTREMITIES (HCC): ICD-10-CM

## 2022-10-19 DIAGNOSIS — N39.0 COMPLICATED UTI (URINARY TRACT INFECTION): ICD-10-CM

## 2022-10-19 DIAGNOSIS — N76.0 VAGINAL CUFF CELLULITIS: ICD-10-CM

## 2022-10-19 DIAGNOSIS — Z98.890 STATUS POST ROBOT-ASSISTED SURGICAL PROCEDURE: ICD-10-CM

## 2022-10-19 DIAGNOSIS — K56.609 SMALL BOWEL OBSTRUCTION (HCC): ICD-10-CM

## 2022-10-19 DIAGNOSIS — N82.4 COLOVAGINAL FISTULA: ICD-10-CM

## 2022-10-19 DIAGNOSIS — K56.609 SBO (SMALL BOWEL OBSTRUCTION) (HCC): ICD-10-CM

## 2022-10-19 DIAGNOSIS — N82.3 RECTOVAGINAL FISTULA: ICD-10-CM

## 2022-10-19 LAB
ALBUMIN SERPL-MCNC: 1.8 G/DL (ref 3.5–5)
ALBUMIN/GLOB SERPL: 0.2 {RATIO} (ref 1.1–2.2)
ALP SERPL-CCNC: 120 U/L (ref 45–117)
ALT SERPL-CCNC: 30 U/L (ref 12–78)
ANION GAP SERPL CALC-SCNC: 10 MMOL/L (ref 5–15)
AST SERPL W P-5'-P-CCNC: 67 U/L (ref 15–37)
BASOPHILS # BLD: 0 K/UL (ref 0–0.1)
BASOPHILS NFR BLD: 0 % (ref 0–1)
BILIRUB SERPL-MCNC: 1 MG/DL (ref 0.2–1)
BUN SERPL-MCNC: 9 MG/DL (ref 6–20)
BUN/CREAT SERPL: 13 (ref 12–20)
CA-I BLD-MCNC: 9 MG/DL (ref 8.5–10.1)
CHLORIDE SERPL-SCNC: 102 MMOL/L (ref 97–108)
CO2 SERPL-SCNC: 17 MMOL/L (ref 21–32)
CREAT SERPL-MCNC: 0.7 MG/DL (ref 0.55–1.02)
D DIMER PPP FEU-MCNC: >20 UG/ML(FEU)
DIFFERENTIAL METHOD BLD: ABNORMAL
EOSINOPHIL # BLD: 0 K/UL (ref 0–0.4)
EOSINOPHIL NFR BLD: 0 % (ref 0–7)
ERYTHROCYTE [DISTWIDTH] IN BLOOD BY AUTOMATED COUNT: 15.9 % (ref 11.5–14.5)
GLOBULIN SER CALC-MCNC: 7.4 G/DL (ref 2–4)
GLUCOSE SERPL-MCNC: 98 MG/DL (ref 65–100)
HCT VFR BLD AUTO: 24.3 % (ref 35–47)
HGB BLD-MCNC: 7.7 G/DL (ref 11.5–16)
IMM GRANULOCYTES # BLD AUTO: 0 K/UL
IMM GRANULOCYTES NFR BLD AUTO: 0 %
LYMPHOCYTES # BLD: 1.1 K/UL (ref 0.8–3.5)
LYMPHOCYTES NFR BLD: 11 % (ref 12–49)
MCH RBC QN AUTO: 27.1 PG (ref 26–34)
MCHC RBC AUTO-ENTMCNC: 31.7 G/DL (ref 30–36.5)
MCV RBC AUTO: 85.6 FL (ref 80–99)
MONOCYTES # BLD: 0.7 K/UL (ref 0–1)
MONOCYTES NFR BLD: 7 % (ref 5–13)
NEUTS SEG # BLD: 8 K/UL (ref 1.8–8)
NEUTS SEG NFR BLD: 82 % (ref 32–75)
NRBC # BLD: 0 K/UL (ref 0–0.01)
NRBC BLD-RTO: 0 PER 100 WBC
PLATELET # BLD AUTO: 275 K/UL (ref 150–400)
PMV BLD AUTO: 9.2 FL (ref 8.9–12.9)
POTASSIUM SERPL-SCNC: 3.9 MMOL/L (ref 3.5–5.1)
PROT SERPL-MCNC: 9.2 G/DL (ref 6.4–8.2)
RBC # BLD AUTO: 2.84 M/UL (ref 3.8–5.2)
RBC MORPH BLD: ABNORMAL
SODIUM SERPL-SCNC: 129 MMOL/L (ref 136–145)
TROPONIN-HIGH SENSITIVITY: 4 NG/L (ref 0–51)
WBC # BLD AUTO: 9.8 K/UL (ref 3.6–11)

## 2022-10-19 PROCEDURE — 74011250636 HC RX REV CODE- 250/636: Performed by: NURSE PRACTITIONER

## 2022-10-19 PROCEDURE — 36415 COLL VENOUS BLD VENIPUNCTURE: CPT

## 2022-10-19 PROCEDURE — 73630 X-RAY EXAM OF FOOT: CPT

## 2022-10-19 PROCEDURE — 80076 HEPATIC FUNCTION PANEL: CPT

## 2022-10-19 PROCEDURE — 96374 THER/PROPH/DIAG INJ IV PUSH: CPT

## 2022-10-19 PROCEDURE — 85025 COMPLETE CBC W/AUTO DIFF WBC: CPT

## 2022-10-19 PROCEDURE — 85379 FIBRIN DEGRADATION QUANT: CPT

## 2022-10-19 PROCEDURE — 84484 ASSAY OF TROPONIN QUANT: CPT

## 2022-10-19 PROCEDURE — 83880 ASSAY OF NATRIURETIC PEPTIDE: CPT

## 2022-10-19 PROCEDURE — 93005 ELECTROCARDIOGRAM TRACING: CPT

## 2022-10-19 PROCEDURE — 71045 X-RAY EXAM CHEST 1 VIEW: CPT

## 2022-10-19 PROCEDURE — 99285 EMERGENCY DEPT VISIT HI MDM: CPT

## 2022-10-19 PROCEDURE — 84132 ASSAY OF SERUM POTASSIUM: CPT

## 2022-10-19 RX ORDER — KETOROLAC TROMETHAMINE 30 MG/ML
15 INJECTION, SOLUTION INTRAMUSCULAR; INTRAVENOUS
Status: COMPLETED | OUTPATIENT
Start: 2022-10-19 | End: 2022-10-19

## 2022-10-19 RX ADMIN — SODIUM CHLORIDE 1000 ML: 9 INJECTION, SOLUTION INTRAVENOUS at 22:48

## 2022-10-19 RX ADMIN — KETOROLAC TROMETHAMINE 15 MG: 30 INJECTION, SOLUTION INTRAMUSCULAR at 22:57

## 2022-10-19 NOTE — Clinical Note
Bilateral DVT thrombectomy performed. Patient tolerated procedure well. Sheath insertion sites closed with sutures and manual pressure held for 5 minutes. 4x4 and tegaderm applied to both sites. Dressings clean, dry, and intact. Flat bedrest ordered for 4 hours. Heparin gtt can be restarted at 1400 per Dr. Lolis Blakely. Primary nurse, Kwame Jin, notified.

## 2022-10-20 ENCOUNTER — APPOINTMENT (OUTPATIENT)
Dept: NON INVASIVE DIAGNOSTICS | Age: 57
DRG: 231 | End: 2022-10-20
Attending: INTERNAL MEDICINE
Payer: MEDICAID

## 2022-10-20 ENCOUNTER — APPOINTMENT (OUTPATIENT)
Dept: CT IMAGING | Age: 57
DRG: 231 | End: 2022-10-20
Attending: NURSE PRACTITIONER
Payer: MEDICAID

## 2022-10-20 PROBLEM — N76.0 VAGINAL CUFF CELLULITIS: Status: ACTIVE | Noted: 2022-10-20

## 2022-10-20 PROBLEM — K56.609 SMALL BOWEL OBSTRUCTION (HCC): Status: ACTIVE | Noted: 2022-10-20

## 2022-10-20 PROBLEM — D62 ACUTE BLOOD LOSS ANEMIA: Status: ACTIVE | Noted: 2022-10-20

## 2022-10-20 PROBLEM — Z98.890 STATUS POST ROBOT-ASSISTED SURGICAL PROCEDURE: Status: ACTIVE | Noted: 2022-10-20

## 2022-10-20 LAB
ALBUMIN SERPL-MCNC: 1.5 G/DL (ref 3.5–5)
ALBUMIN/GLOB SERPL: 0.2 {RATIO} (ref 1.1–2.2)
ALP SERPL-CCNC: 99 U/L (ref 45–117)
ALT SERPL-CCNC: 26 U/L (ref 12–78)
APPEARANCE UR: ABNORMAL
AST SERPL W P-5'-P-CCNC: 54 U/L (ref 15–37)
ATRIAL RATE: 107 BPM
BACTERIA URNS QL MICRO: NEGATIVE /HPF
BILIRUB DIRECT SERPL-MCNC: 0.2 MG/DL (ref 0–0.2)
BILIRUB SERPL-MCNC: 0.8 MG/DL (ref 0.2–1)
BILIRUB UR QL: NEGATIVE
BNP SERPL-MCNC: 594 PG/ML
CALCULATED P AXIS, ECG09: 13 DEGREES
CALCULATED R AXIS, ECG10: 30 DEGREES
CALCULATED T AXIS, ECG11: 50 DEGREES
COLLECT DATE STL: NORMAL
COLOR UR: YELLOW
COVID-19 RAPID TEST, COVR: NOT DETECTED
DIAGNOSIS, 93000: NORMAL
GLOBULIN SER CALC-MCNC: 6.5 G/DL (ref 2–4)
GLUCOSE UR STRIP.AUTO-MCNC: NEGATIVE MG/DL
HEMOCCULT SP1 STL QL: NEGATIVE
HGB UR QL STRIP: ABNORMAL
KETONES UR QL STRIP.AUTO: 5 MG/DL
LEUKOCYTE ESTERASE UR QL STRIP.AUTO: ABNORMAL
MRSA DNA SPEC QL NAA+PROBE: NOT DETECTED
MUCOUS THREADS URNS QL MICRO: ABNORMAL /LPF
NITRITE UR QL STRIP.AUTO: NEGATIVE
P-R INTERVAL, ECG05: 100 MS
PH UR STRIP: 6 [PH] (ref 5–8)
POTASSIUM SERPL-SCNC: 3.8 MMOL/L (ref 3.5–5.1)
PROT SERPL-MCNC: 8 G/DL (ref 6.4–8.2)
PROT UR STRIP-MCNC: 100 MG/DL
Q-T INTERVAL, ECG07: 342 MS
QRS DURATION, ECG06: 78 MS
QTC CALCULATION (BEZET), ECG08: 456 MS
RBC #/AREA URNS HPF: ABNORMAL /HPF (ref 0–5)
SP GR UR REFRACTOMETRY: 1.02 (ref 1–1.03)
UA: UC IF INDICATED,UAUC: ABNORMAL
UROBILINOGEN UR QL STRIP.AUTO: 0.1 EU/DL (ref 0.1–1)
VENTRICULAR RATE, ECG03: 107 BPM
WBC CASTS URNS QL MICRO: PRESENT
WBC URNS QL MICRO: >100 /HPF (ref 0–4)

## 2022-10-20 PROCEDURE — 74011250637 HC RX REV CODE- 250/637: Performed by: INTERNAL MEDICINE

## 2022-10-20 PROCEDURE — 36415 COLL VENOUS BLD VENIPUNCTURE: CPT

## 2022-10-20 PROCEDURE — 81001 URINALYSIS AUTO W/SCOPE: CPT

## 2022-10-20 PROCEDURE — 87106 FUNGI IDENTIFICATION YEAST: CPT

## 2022-10-20 PROCEDURE — 65270000029 HC RM PRIVATE

## 2022-10-20 PROCEDURE — 86923 COMPATIBILITY TEST ELECTRIC: CPT

## 2022-10-20 PROCEDURE — P9016 RBC LEUKOCYTES REDUCED: HCPCS

## 2022-10-20 PROCEDURE — 93970 EXTREMITY STUDY: CPT

## 2022-10-20 PROCEDURE — 74011250636 HC RX REV CODE- 250/636: Performed by: INTERNAL MEDICINE

## 2022-10-20 PROCEDURE — 87205 SMEAR GRAM STAIN: CPT

## 2022-10-20 PROCEDURE — 30233N1 TRANSFUSION OF NONAUTOLOGOUS RED BLOOD CELLS INTO PERIPHERAL VEIN, PERCUTANEOUS APPROACH: ICD-10-PCS | Performed by: OBSTETRICS & GYNECOLOGY

## 2022-10-20 PROCEDURE — 87635 SARS-COV-2 COVID-19 AMP PRB: CPT

## 2022-10-20 PROCEDURE — 86900 BLOOD TYPING SEROLOGIC ABO: CPT

## 2022-10-20 PROCEDURE — 87641 MR-STAPH DNA AMP PROBE: CPT

## 2022-10-20 PROCEDURE — 71275 CT ANGIOGRAPHY CHEST: CPT

## 2022-10-20 PROCEDURE — 74011000250 HC RX REV CODE- 250: Performed by: INTERNAL MEDICINE

## 2022-10-20 PROCEDURE — 36430 TRANSFUSION BLD/BLD COMPNT: CPT

## 2022-10-20 PROCEDURE — 99221 1ST HOSP IP/OBS SF/LOW 40: CPT | Performed by: OBSTETRICS & GYNECOLOGY

## 2022-10-20 PROCEDURE — 74011250636 HC RX REV CODE- 250/636: Performed by: SURGERY

## 2022-10-20 PROCEDURE — 74011000636 HC RX REV CODE- 636: Performed by: STUDENT IN AN ORGANIZED HEALTH CARE EDUCATION/TRAINING PROGRAM

## 2022-10-20 PROCEDURE — 74176 CT ABD & PELVIS W/O CONTRAST: CPT

## 2022-10-20 PROCEDURE — 87086 URINE CULTURE/COLONY COUNT: CPT

## 2022-10-20 PROCEDURE — 82272 OCCULT BLD FECES 1-3 TESTS: CPT

## 2022-10-20 RX ORDER — SODIUM CHLORIDE 0.9 % (FLUSH) 0.9 %
5-40 SYRINGE (ML) INJECTION EVERY 8 HOURS
Status: DISCONTINUED | OUTPATIENT
Start: 2022-10-20 | End: 2022-11-02

## 2022-10-20 RX ORDER — MORPHINE SULFATE 2 MG/ML
2 INJECTION, SOLUTION INTRAMUSCULAR; INTRAVENOUS
Status: DISPENSED | OUTPATIENT
Start: 2022-10-20 | End: 2022-10-22

## 2022-10-20 RX ORDER — SODIUM CHLORIDE 9 MG/ML
100 INJECTION, SOLUTION INTRAVENOUS CONTINUOUS
Status: DISPENSED | OUTPATIENT
Start: 2022-10-20 | End: 2022-10-21

## 2022-10-20 RX ORDER — ACETAMINOPHEN 325 MG/1
650 TABLET ORAL
Status: DISCONTINUED | OUTPATIENT
Start: 2022-10-20 | End: 2022-11-11 | Stop reason: HOSPADM

## 2022-10-20 RX ORDER — ONDANSETRON 4 MG/1
4 TABLET, ORALLY DISINTEGRATING ORAL
Status: DISCONTINUED | OUTPATIENT
Start: 2022-10-20 | End: 2022-11-11 | Stop reason: HOSPADM

## 2022-10-20 RX ORDER — DICLOFENAC SODIUM 10 MG/G
2 GEL TOPICAL 2 TIMES DAILY
COMMUNITY

## 2022-10-20 RX ORDER — SODIUM CHLORIDE 9 MG/ML
250 INJECTION, SOLUTION INTRAVENOUS AS NEEDED
Status: DISCONTINUED | OUTPATIENT
Start: 2022-10-20 | End: 2022-11-02

## 2022-10-20 RX ORDER — ONDANSETRON 2 MG/ML
4 INJECTION INTRAMUSCULAR; INTRAVENOUS
Status: DISCONTINUED | OUTPATIENT
Start: 2022-10-20 | End: 2022-11-11 | Stop reason: HOSPADM

## 2022-10-20 RX ORDER — FAMOTIDINE 20 MG/1
20 TABLET, FILM COATED ORAL
COMMUNITY

## 2022-10-20 RX ORDER — FLUCONAZOLE 2 MG/ML
200 INJECTION, SOLUTION INTRAVENOUS DAILY
Status: DISCONTINUED | OUTPATIENT
Start: 2022-10-20 | End: 2022-10-23

## 2022-10-20 RX ORDER — SODIUM CHLORIDE 9 MG/ML
100 INJECTION, SOLUTION INTRAVENOUS CONTINUOUS
Status: DISPENSED | OUTPATIENT
Start: 2022-10-20 | End: 2022-10-20

## 2022-10-20 RX ORDER — POLYETHYLENE GLYCOL 3350 17 G/17G
17 POWDER, FOR SOLUTION ORAL DAILY PRN
Status: DISCONTINUED | OUTPATIENT
Start: 2022-10-20 | End: 2022-11-11 | Stop reason: HOSPADM

## 2022-10-20 RX ORDER — MORPHINE SULFATE 2 MG/ML
2 INJECTION, SOLUTION INTRAMUSCULAR; INTRAVENOUS
Status: DISCONTINUED | OUTPATIENT
Start: 2022-10-20 | End: 2022-10-20

## 2022-10-20 RX ORDER — ACETAMINOPHEN 650 MG/1
650 SUPPOSITORY RECTAL
Status: DISCONTINUED | OUTPATIENT
Start: 2022-10-20 | End: 2022-11-11 | Stop reason: HOSPADM

## 2022-10-20 RX ORDER — SODIUM CHLORIDE 0.9 % (FLUSH) 0.9 %
5-40 SYRINGE (ML) INJECTION AS NEEDED
Status: DISCONTINUED | OUTPATIENT
Start: 2022-10-20 | End: 2022-11-02

## 2022-10-20 RX ORDER — SODIUM CHLORIDE 9 MG/ML
100 INJECTION, SOLUTION INTRAVENOUS CONTINUOUS
Status: DISCONTINUED | OUTPATIENT
Start: 2022-10-20 | End: 2022-10-20

## 2022-10-20 RX ORDER — DEXTROMETHORPHAN HYDROBROMIDE, GUAIFENESIN 5; 100 MG/5ML; MG/5ML
650 LIQUID ORAL
COMMUNITY

## 2022-10-20 RX ORDER — CROMOLYN SODIUM 40 MG/ML
1 SOLUTION/ DROPS OPHTHALMIC DAILY
Status: DISCONTINUED | OUTPATIENT
Start: 2022-10-20 | End: 2022-11-11 | Stop reason: HOSPADM

## 2022-10-20 RX ADMIN — SODIUM CHLORIDE, PRESERVATIVE FREE 10 ML: 5 INJECTION INTRAVENOUS at 23:17

## 2022-10-20 RX ADMIN — CROMOLYN SODIUM 1 DROP: 40 SOLUTION/ DROPS OPHTHALMIC at 08:37

## 2022-10-20 RX ADMIN — SODIUM CHLORIDE, PRESERVATIVE FREE 10 ML: 5 INJECTION INTRAVENOUS at 06:34

## 2022-10-20 RX ADMIN — IOPAMIDOL 100 ML: 755 INJECTION, SOLUTION INTRAVENOUS at 01:46

## 2022-10-20 RX ADMIN — SODIUM CHLORIDE 100 ML/HR: 9 INJECTION, SOLUTION INTRAVENOUS at 19:56

## 2022-10-20 RX ADMIN — MORPHINE SULFATE 2 MG: 2 INJECTION, SOLUTION INTRAMUSCULAR; INTRAVENOUS at 18:45

## 2022-10-20 RX ADMIN — FLUCONAZOLE 200 MG: 200 INJECTION, SOLUTION INTRAVENOUS at 08:38

## 2022-10-20 RX ADMIN — SODIUM CHLORIDE 100 ML/HR: 9 INJECTION, SOLUTION INTRAVENOUS at 06:33

## 2022-10-20 RX ADMIN — SODIUM CHLORIDE, PRESERVATIVE FREE 10 ML: 5 INJECTION INTRAVENOUS at 14:21

## 2022-10-20 NOTE — PROGRESS NOTES
Reason for Readmission:    Anemia          RUR Score/Risk Level:   20%      PCP: First and Last name:  Dez Teixeira   Name of Practice:    Are you a current patient: Yes/No: Yes   Approximate date of last visit: Seen 3 mos ago. Can you participate in a virtual visit with your PCP: Yes/Call/Has cell phone. Is a Care Conference indicated: No      Did you attend your follow up appointment (s): If not, why not:  No appt made yet. Resources/supports as identified by patient/family:   Family        Top Challenges facing patient (as identified by patient/family and CM): Finances/Medication cost?  No  Transportation     No   Support system or lack thereof? No     Living arrangements? No       Self-care/ADLs/Cognition? Ay need some assist with ADLs. Current Advanced Directive/Advance Care Plan:  Full Code           Plan for utilizing home health:  Pt signed Choice Letter for walker/commode chair/w/c. Referral sent. No HH @ this time. Transition of Care Plan:    Based on readmission, the patient's previous Plan of Care   has been evaluated and/or modified. The current Transition of Care Plan is:   D/C Plan is home alone & niece to transport. Send Rxs into [employer] [facility] [employer] [facility] upon discharge.

## 2022-10-20 NOTE — PROGRESS NOTES
Admission Medication Reconciliation:    Information obtained from:  Patient    Comments/Recommendations: Reviewed PTA medications and patient's allergies. Removed bisacodyl 10 mg supp  Removed cromolyn 4% opth soln  Removed docusate 100 mg cap  Removed cipro 500 mg    Pharmacy: [facility] ([address]      Allergies:  Bactrim [sulfamethoprim] and Penicillin v potassium    Significant PMH/Disease States:   Past Medical History:   Diagnosis Date    Anemia     pt states had recent blood transfusion sept 2022    Back pain     Cancer (HCC)     bladder    DVT (deep venous thrombosis) (HCC)     and PE, pt denies lung PE , only leg several years ago    Hepatitis C     pt states dx 1 month ago    Liver disease     Presence of IVC filter     pt states several years ago    Rheumatoid arthritis ([facility] [address]     Sciatic leg pain     pt states both legs    Uterine fibroid      Chief Complaint for this Admission:    Chief Complaint   Patient presents with    Fatigue     Prior to Admission Medications:   Prior to Admission Medications   Prescriptions Last Dose Informant Patient Reported? Taking?   acetaminophen (TYLENOL) 650 mg TbER  Self Yes Yes   Sig: Take 650 mg by mouth every six to eight (6-8) hours as needed for Pain. diclofenac (VOLTAREN) 1 % gel  Self Yes Yes   Sig: Apply 2 g to affected area two (2) times a day. famotidine (PEPCID) 20 mg tablet  Self Yes Yes   Sig: Take 20 mg by mouth daily as needed for Heartburn. ferrous sulfate 325 mg (65 mg iron) tablet  Self Yes Yes   Sig: Take 325 mg by mouth daily. multivitamin with folic acid (One Daily Essential) 400 mcg tab tablet  Self No Yes   Sig: Take 1 Tablet by mouth daily.       Facility-Administered Medications: None       Eleanor Hernandez

## 2022-10-20 NOTE — PROGRESS NOTES
Problem: Pain  Goal: *Control of Pain  Outcome: Progressing Towards Goal  Goal: *PALLIATIVE CARE:  Alleviation of Pain  Outcome: Progressing Towards Goal     Problem: Patient Education: Go to Patient Education Activity  Goal: Patient/Family Education  Outcome: Progressing Towards Goal     Problem: Falls - Risk of  Goal: *Absence of Falls  Description: Document Nisha Marin Fall Risk and appropriate interventions in the flowsheet.   Outcome: Progressing Towards Goal  Note: Fall Risk Interventions:                                Problem: Patient Education: Go to Patient Education Activity  Goal: Patient/Family Education  Outcome: Progressing Towards Goal     Problem: Patient Education: Go to Patient Education Activity  Goal: Patient/Family Education  Outcome: Progressing Towards Goal

## 2022-10-20 NOTE — PROGRESS NOTES
10/20/22 Pt & liz Guillory @ [phone]) that per [facility] will not cover walker & w/c. Pt requested w/c. DME company informed via [place].

## 2022-10-20 NOTE — ED NOTES
TRANSFER - OUT REPORT:    Verbal report given to Ming May on [facility]  being transferred to [facility] for routine progression of care       Report consisted of patients Situation, Background, Assessment and   Recommendations(SBAR). Information from the following report(s) SBAR and ED Summary was reviewed with the receiving nurse. Lines:   Peripheral IV 10/19/22 Right Forearm (Active)   Site Assessment Clean, dry, & intact 10/19/22 2350   Phlebitis Assessment 0 10/19/22 2350   Infiltration Assessment 0 10/19/22 2350   Dressing Status Clean, dry, & intact 10/19/22 2350   Hub Color/Line Status Blue 10/19/22 2350   Action Taken Blood drawn 10/19/22 2350        Opportunity for questions and clarification was provided.       Patient transported with:   [facility]

## 2022-10-20 NOTE — H&P
History and Physical    Patient: Annabella Camacho MRN: [id number]  SSN: [id number]    YOB: 1965  Age: 61 y.o. Sex: female      Subjective: Annabella Camacho is a 61 y.o. female with PMH of bladder cancer s/p cystectomy, hysterectomy, BSO and ileal conduit diversion, anemia, hep C, and left leg DVT s/p IVC filter years ago. She presented to the ED with chief complaint of weakness and nausea for the past week. She reports unable to tolerate PO intake, due to profound nausea, however abdominal pain, fever, chills, vomiting or diarrhea. In addition, she also reports unable to walk due to weakness and left inner thigh and left foot pain. Denies trauma. She was seen here on 10/13/2022 with urostomy complications and discharged home on Cipro which she completed today. In addition, she also reports having vaginal bleed since surgery 2 weeks ago. In the ED, initial BP soft. Lab work revealed anemia, s/p 1u PRBC transfusion. CT scan concerning for SBO and mild hydroureteronephrosis and questionable right common femoral vein thrombosis.  Urinalysis suggestive of UTI with budding yeast.    Past Medical History:   Diagnosis Date    Anemia     pt states had recent blood transfusion 2022    Back pain     Cancer (HCC)     bladder    DVT (deep venous thrombosis) (HCC)     and PE, pt denies lung PE , only leg several years ago    Hepatitis C     pt states dx 1 month ago    Liver disease     Presence of IVC filter     pt states several years ago    Rheumatoid arthritis ([address]     Sciatic leg pain     pt states both legs    Uterine fibroid      Past Surgical History:   Procedure Laterality Date    HX APPENDECTOMY  10/03/2022    HX  SECTION      HX GYN  10/03/2022    ROBOT ANTERIOR PELVIC EXENTERATION    HX HYSTERECTOMY  10/03/2022    HX SALPINGO-OOPHORECTOMY Bilateral 10/03/2022    HX UROLOGICAL  09/15/2022    CYSTOSCOPY    HX UROLOGICAL  09/15/2022    URETHRAL DILATION    HX UROLOGICAL 09/15/2022    URETERAL STENT PLACEMENT    HX UROLOGICAL  09/15/2022    TRANSURETHRAL RESECTION OF BLADDER TUMOR >2CM    HX UROLOGICAL Bilateral 09/15/2022    RETROGRADE PYELOGRAM    HX UROLOGICAL  10/03/2022    PELVIC LYMPH NODE DISSECTION    HX UROLOGICAL  10/03/2022    ILEAL CONDUIT URINARY DIVERSION    ND CARDIAC SURG PROCEDURE UNLIST      stent placement      Family History   Problem Relation Age of Onset    Cancer Mother     Lung Cancer Mother     Heart Disease Father     Hypertension Sister     Cancer Maternal Aunt     Breast Cancer Maternal Aunt      Social History     Tobacco Use    Smoking status: Former     Years: 10.00     Types: Cigarettes    Smokeless tobacco: Never    Tobacco comments:     Quit 15 years ago   Substance Use Topics    Alcohol use: Not Currently      Prior to Admission medications    Medication Sig Start Date End Date Taking? Authorizing Provider   acetaminophen (Arthritis Pain Relief) 650 mg TbER Take 1 Tablet by mouth every eight (8) hours for 10 days. 10/12/22 10/22/22  Cecily Conley NP   docusate sodium (COLACE) 100 mg capsule Take 1 Capsule by mouth two (2) times a day for 10 days. 10/12/22 10/22/22  Cecily Conley NP   famotidine (PEPCID) 20 mg tablet Take 1 Tablet by mouth two (2) times a day for 30 days. Patient not taking: Reported on 10/17/2022 10/12/22 11/11/22  Cecily Conley NP   bisacodyL (DULCOLAX) 10 mg supp Insert 10 mg into rectum as needed for Constipation. Provider, Historical   ferrous sulfate 325 mg (65 mg iron) tablet Take 325 mg by mouth daily. 9/11/22   Provider, Historical   multivitamin with folic acid (One Daily Essential) 400 mcg tab tablet Take 1 Tablet by mouth daily. 3/4/22   Fidelina Byers MD   cromolyn (OPTICROM) 4 % ophthalmic solution Administer 1 Drop to both eyes daily.  6/7/21   Provider, Historical        Allergies   Allergen Reactions    Bactrim [Sulfamethoprim] Swelling     Swelling of the lilps    Penicillin V Potassium Swelling Review of Systems:   Constitutional: No fevers, No chills, positive fatigue and weakness  Eyes: No visual disturbance  Ears, Nose, Mouth, Throat, and Face: No nasal congestion, No sore throat  Respiratory: No cough, No sputum, No wheezing, No SOB  Cardiovascular: No chest pain, No lower extremity edema, No Palpitations   Gastrointestinal: positive nausea, No vomiting, No diarrhea, No constipation, No abdominal pain  Genitourinary: S/p ileal conduit. No frequency, No dysuria, No hematuria  Integument/Breast: No rash, No skin lesion(s), No dryness  Musculoskeletal: No arthralgias, No neck pain, No back pain  Neurological: No headaches, No dizziness, No confusion,  No seizures  Behavioral/Psychiatric: No anxiety, No depression      Objective:     Vitals:    10/20/22 0522 10/20/22 0527 10/20/22 0532 10/20/22 0547   BP: 91/66 (!) 94/37 101/67 101/64   Pulse: 78 78 79 93   Resp: 17 18 12 20   Temp:       SpO2: 100% 100%  100%   Weight:       Height:            Physical Exam:   General: alert, cooperative, no distress  Eye: conjunctivae/corneas pale. PERRL, EOM's intact. Throat and Neck: normal and no erythema or exudates noted. No mass   Lung: clear to auscultation bilaterally  Heart: regular rate and rhythm,   Abdomen: soft, non-tender. ileal conduit noted, no surrounding erythema. Bowel sounds hypoactive. No masses,  Extremities:  able to move all extremities normal, atraumatic  Skin: Normal.  Neurologic: AOx3. Motor function and sensation grossly intact.   Psychiatric: non focal    Recent Results (from the past 24 hour(s))   CBC WITH AUTOMATED DIFF    Collection Time: 10/19/22 10:35 PM   Result Value Ref Range    WBC 9.8 3.6 - 11.0 K/uL    RBC 2.84 (L) 3.80 - 5.20 M/uL    HGB 7.7 (L) 11.5 - 16.0 g/dL    HCT 24.3 (L) 35.0 - 47.0 %    MCV 85.6 80.0 - 99.0 FL    MCH 27.1 26.0 - 34.0 PG    MCHC 31.7 30.0 - 36.5 g/dL    RDW 15.9 (H) 11.5 - 14.5 %    PLATELET 019 811 - 363 K/uL    MPV 9.2 8.9 - 12.9 FL    NRBC 0.0 0.0  WBC    ABSOLUTE NRBC 0.00 0.00 - 0.01 K/uL    NEUTROPHILS 82 (H) 32 - 75 %    LYMPHOCYTES 11 (L) 12 - 49 %    MONOCYTES 7 5 - 13 %    EOSINOPHILS 0 0 - 7 %    BASOPHILS 0 0 - 1 %    IMMATURE GRANULOCYTES 0 %    ABS. NEUTROPHILS 8.0 1.8 - 8.0 K/UL    ABS. LYMPHOCYTES 1.1 0.8 - 3.5 K/UL    ABS. MONOCYTES 0.7 0.0 - 1.0 K/UL    ABS. EOSINOPHILS 0.0 0.0 - 0.4 K/UL    ABS. BASOPHILS 0.0 0.0 - 0.1 K/UL    ABS. IMM. GRANS. 0.0 K/UL    DF Manual      RBC COMMENTS Anisocytosis  1+       METABOLIC PANEL, COMPREHENSIVE    Collection Time: 10/19/22 10:35 PM   Result Value Ref Range    Sodium 129 (L) 136 - 145 mmol/L    Potassium 3.9 3.5 - 5.1 mmol/L    Chloride 102 97 - 108 mmol/L    CO2 17 (L) 21 - 32 mmol/L    Anion gap 10 5 - 15 mmol/L    Glucose 98 65 - 100 mg/dL    BUN 9 6 - 20 mg/dL    Creatinine 0.70 0.55 - 1.02 mg/dL    BUN/Creatinine ratio 13 12 - 20      eGFR >60 >60 ml/min/1.73m2    Calcium 9.0 8.5 - 10.1 mg/dL    Bilirubin, total 1.0 0.2 - 1.0 mg/dL    AST (SGOT) 67 (H) 15 - 37 U/L    ALT (SGPT) 30 12 - 78 U/L    Alk. phosphatase 120 (H) 45 - 117 U/L    Protein, total 9.2 (H) 6.4 - 8.2 g/dL    Albumin 1.8 (L) 3.5 - 5.0 g/dL    Globulin 7.4 (H) 2.0 - 4.0 g/dL    A-G Ratio 0.2 (L) 1.1 - 2.2     D DIMER    Collection Time: 10/19/22 10:35 PM   Result Value Ref Range    D DIMER >20.00 (H) <0.50 ug/ml(FEU)   TROPONIN-HIGH SENSITIVITY    Collection Time: 10/19/22 10:35 PM   Result Value Ref Range    Troponin-High Sensitivity 4 0 - 51 ng/L   POTASSIUM    Collection Time: 10/19/22 11:48 PM   Result Value Ref Range    Potassium 3.8 3.5 - 5.1 mmol/L   HEPATIC FUNCTION PANEL    Collection Time: 10/19/22 11:48 PM   Result Value Ref Range    Protein, total 8.0 6.4 - 8.2 g/dL    Albumin 1.5 (L) 3.5 - 5.0 g/dL    Globulin 6.5 (H) 2.0 - 4.0 g/dL    A-G Ratio 0.2 (L) 1.1 - 2.2      Bilirubin, total 0.8 0.2 - 1.0 mg/dL    Bilirubin, direct 0.2 0.0 - 0.2 mg/dL    Alk.  phosphatase 99 45 - 117 U/L    AST (SGOT) 54 (H) 15 - 37 U/L ALT (SGPT) 26 12 - 78 U/L   NT-PRO BNP    Collection Time: 10/19/22 11:48 PM   Result Value Ref Range    NT pro- (H) <125 pg/mL   TYPE & SCREEN    Collection Time: 10/20/22 12:52 AM   Result Value Ref Range    Crossmatch Expiration 10/23/2022,2359     ABO/Rh(D) A Positive     Antibody screen Negative     Unit number [id number]     Blood component type RC LR     Unit division 00     Status of unit [address] to transfuse     Crossmatch result Compatible    OCCULT BLOOD, STOOL    Collection Time: 10/20/22  2:48 AM   Result Value Ref Range    Occult Blood,day 1 Negative Negative      Day 1 date: 10,202,022     URINALYSIS W/ REFLEX CULTURE    Collection Time: 10/20/22  2:49 AM    Specimen: Urine   Result Value Ref Range    Color Yellow      Appearance Turbid (A) Clear      Specific gravity 1.023 1.003 - 1.030      pH (UA) 6.0 5.0 - 8.0      Protein 100 (A) Negative mg/dL    Glucose Negative Negative mg/dL    Ketone 5 (A) Negative mg/dL    Bilirubin Negative Negative      Blood Moderate (A) Negative      Urobilinogen 0.1 0.1 - 1.0 EU/dL    Nitrites Negative Negative      Leukocyte Esterase Large (A) Negative      WBC >100 (H) 0 - 4 /hpf    RBC 20-50 0 - 5 /hpf    Bacteria Negative Negative /hpf    UA:UC IF INDICATED Urine Culture Ordered (A) Culture not indicated by UA result      Mucus 2+ (A) Negative /lpf    Budding yeast Present (A) Negative         XR Results (maximum last 3): Results from [place] encounter on 10/19/22    XR FOOT LT MIN 3 V    Narrative  EXAM: XR FOOT LT MIN 3 V    INDICATION: Left foot pain. COMPARISON: None. FINDINGS: Three views of the left foot demonstrate no fracture or other acute  osseous or articular abnormality. The soft tissues are within normal limits. Impression  Normal study. XR CHEST PORT    Narrative  EXAM: Portable CXR. 2236 hrs. INDICATION: sob    FINDINGS:  The lungs appear clear. Heart is normal in size.  There is no pulmonary edema. There is no evident pneumothorax or pleural effusion. Impression  No Acute Disease. Results from [place] encounter on 09/30/22    XR CHEST PA LAT    Narrative  Exam:  2 view chest    Indication: Bladder carcinoma. COMPARISON: 9/13/2022    PA and lateral views demonstrate normal heart size. There is no acute process in  the lung fields. No adenopathy. No pleural effusions. Eventration of the right  hemidiaphragm is noted. The osseous structures are unremarkable. Partially  imaged is a right-sided renal stent. IVC filter is noted. Impression  No acute process. CT Results (maximum last 3): Results from [place] encounter on 10/19/22    CT ABD PELV WO CONT    Narrative  EXAM: CT ABD PELV WO CONT    INDICATION: s/p ureteral stents, hydronephrosis    COMPARISON: 10/6/2022    IV CONTRAST: None. ORAL CONTRAST: None    TECHNIQUE:  Thin axial images were obtained through the abdomen and pelvis. Coronal and  sagittal reformats were generated. CT dose reduction was achieved through use of  a standardized protocol tailored for this examination and automatic exposure  control for dose modulation. The absence of intravenous contrast material reduces the sensitivity for  evaluation of the vasculature and solid organs. FINDINGS:  LOWER THORAX: No significant abnormality in the incidentally imaged lower chest.  LIVER: No mass. BILIARY TREE: Innumerable gallstones. CBD is not dilated. SPLEEN: within normal limits. PANCREAS: No focal abnormality. ADRENALS: Unremarkable. KIDNEYS/URETERS: Contrast is noted in the collecting systems and ureters from  previously performed CT examination. Mild bilateral hydroureteronephrosis is  noted to the level of the ileal conduit. STOMACH: Unremarkable. SMALL BOWEL: Small bowel distention is noted in the left abdomen with  decompressed loops distally compatible with obstruction likely related to  effusion formation.   COLON: No dilatation or wall thickening. APPENDIX: Surgically absent. PERITONEUM: No ascites or pneumoperitoneum. RETROPERITONEUM: IVC filter is seen within the infrarenal vena cava. REPRODUCTIVE ORGANS: The prostate is surgically absent. URINARY BLADDER: The bladder is surgically absent. BONES: Presacral soft tissue edema has not changed. ABDOMINAL WALL: Drainage catheter in the deep pelvis has been removed. Right  lower quadrant ostomy is present. While this study was done without the use of  intravenous contrast there is suggestion of right common femoral venous  thrombosis with associated proximal thigh soft tissue swelling. ADDITIONAL COMMENTS: N/A    Impression  Status post cystectomy and hysterectomy with right lower quadrant ileal conduit. Mild bilateral hydroureteronephrosis, evaluation is somewhat limited by the  presence of contrast from previously performed examination. Small bowel  distention with decompressed loops distally concerning for obstruction likely  related to adhesion formation. While this study was done without the use of  intravenous contrast there is questionable right common femoral vein thrombus  with soft tissue edema. CTA CHEST W OR W WO CONT    Narrative  INDICATION:   Dyspnea    COMPARISON: None. TECHNIQUE:  Precontrast  images were obtained to localize the volume for acquisition. Multislice helical CT arteriography was performed from the diaphragm to the  thoracic inlet during uneventful rapid bolus of 100 cc Isovue-370. Lung and soft  tissue windows were generated. Coronal and sagittal images were generated and  3D post processing consisting of coronal maximum intensity images was performed. CT dose reduction was achieved through use of a standardized protocol tailored  for this examination and automatic exposure control for dose modulation. FINDINGS:  CHEST:  THYROID: No nodule. MEDIASTINUM: No mass or lymphadenopathy.   DEONNA: No mass or lymphadenopathy. THORACIC AORTA: No dissection or aneurysm. MAIN PULMONARY ARTERY: There is no evidence of pulmonary embolism. TRACHEA/BRONCHI: Patent. ESOPHAGUS: No wall thickening or dilatation. HEART: Normal in size. PLEURA: No effusion or pneumothorax. LUNGS: Emphysematous changes are noted. No acute abnormality. INCIDENTALLY IMAGED UPPER ABDOMEN: Indeterminate 10 mm hypodense lesion right  hepatic lobe. Gallstones. Bilateral hydronephrosis incompletely evaluated by  chest CT. BONES: No destructive bone lesion. Impression  Emphysematous changes. No evidence of pulmonary embolism or acute  abnormality. Cholelithiasis. Bilateral hydronephrosis. 1 cm hypodense lesion  right hepatic lobe cannot be characterized with certainty on the basis of this  examination. Follow-up is recommended to ensure stability. Results from [place] encounter on 10/03/22    CT ABD PELV WO CONT    Narrative  EXAM: CT ABD PELV WO CONT    INDICATION: rectal injury    COMPARISON: CT abdomen pelvis from 9/12/2022    IV CONTRAST: None. ORAL CONTRAST: Oral contrast was administered to better evaluate the bowel. TECHNIQUE:  Thin axial images were obtained through the abdomen and pelvis. Coronal and  sagittal reformats were generated. CT dose reduction was achieved through use of  a standardized protocol tailored for this examination and automatic exposure  control for dose modulation. The absence of intravenous contrast material reduces the sensitivity for  evaluation of the vasculature and solid organs. FINDINGS:  LOWER THORAX: No significant abnormality in the incidentally imaged lower chest.  LIVER: No mass. BILIARY TREE: Cholelithiasis. CBD is not dilated. SPLEEN: Unremarkable. PANCREAS: No focal abnormality. ADRENALS: Unremarkable. KIDNEYS/URETERS: Bilateral double-J stents in position. Resolution of right  hydronephrosis. Mild left pelvocaliectasis. STOMACH: Unremarkable.   SMALL BOWEL: Distended loops of small bowel with air-fluid levels with relative  decompression of distal jejunum/ileum. COLON: No dilatation or wall thickening. APPENDIX: Nonvisualized, possibly surgically absent. PERITONEUM: Retroperitoneal thickening and right periureteral stranding is  noted. Mild simple pelvic ascites. Mild pneumoperitoneum. RETROPERITONEUM: No lymphadenopathy or aortic aneurysm. Right gonadal vein  thrombosis nonvisualized. IVC filter in place with filter struts extending of  the IVC. REPRODUCTIVE ORGANS: Status post hysterectomy. No adnexal masses. URINARY BLADDER: Cystectomy. BONES: No destructive bone lesion. ABDOMINAL WALL: No mass or hernia. ADDITIONAL COMMENTS: Right lower quadrant ostomy. Impression  1. No evidence of extraluminal oral contrast to signify leak in the pelvis. 2.  Status post cystectomy and hysterectomy with right lower quadrant ileal  conduit. 3.  Bilateral double-J stents in appropriate position. Resolved right  hydronephrosis. Mild left pelvocaliectasis. 4.  Mild pneumoperitoneum and subcutaneous emphysema likely postsurgical.  Retroperitoneal and right periureteral stranding may be postsurgical.  5.  Dilated fluid-filled loops of small bowel with transition point in the  downstream jejunum/ileum. Findings may represent ileus versus moderate partial  small bowel obstruction. Radiographic follow-up may be of benefit. 6.  IVC filter in place with struts extending of the IVC. If the patient has no  contraindications to anticoagulation. Recommend filter retrieval with  interventional radiology. MRI Results (maximum last 3): No results found for this or any previous visit. Nuclear Medicine Results (maximum last 3): No results found for this or any previous visit. US Results (maximum last 3):   Results from [place] encounter on 09/12/22    US ABD LTD    Narrative  ULTRASOUND OF THE RIGHT UPPER QUADRANT    INDICATION: RUQ pain    COMPARISON: Prior day    TECHNIQUE:  Sonography of the right upper quadrant was performed. FINDINGS:    LIVER: Normal echogenicity. No focal hepatic mass or intrahepatic biliary  dilatation is shown. Portal vein is patent with appropriate direction of flow  GALLBLADDER: Numerous gallstones. No wall thickening or pericholecystic fluid  COMMON DUCT: 0.6 cm in diameter. The duct is normal caliber. PANCREAS: The visualized portions of the pancreas are normal.  RIGHT KIDNEY: 10.9 cm in length. Severe hydronephrosis    Impression  1. Gallstones. No ductal dilatation  2. Severe right-sided hydronephrosis      Assessment and plan:   # Acute blood loss anemia  - S/p blood transfusion, will continue to monitor hemoglobin     # Vaginal bleed  - Suspect complications from recent surgery. Consult Gyn     # SBO  - NPO  - IVF  - Consult surgery   - Morphine for pain. # UTI  - Fluconazole for fungal UTI. - Urine culture sent. # Bladder cancer  - Complicated all area of care. Not on chemotherapy currently. - Consulted urology to evaluate for possible post-op complications. # History of DVT  - Given abnormal CT findings suggestive right femoral vein thrombosis, and left thigh and foot pain, will order LE duplex to rule out DVT. # Emphysema  - not in respiratory distress, continue to monitor     # Full code by default, need further clarification    # Medication list reviewed on Epic and/or outside documentation. Not reviewed with patient.         Signed By: Frieda Peralta MD     October 20, 2022

## 2022-10-20 NOTE — CONSULTS
Marissa Mata is a 61 y.o. female who presents today for the following:  Chief Complaint   Patient presents with    Fatigue        Allergies   Allergen Reactions    Bactrim [Sulfamethoprim] Swelling     Swelling of the lilps    Penicillin V Potassium Swelling       Current Facility-Administered Medications   Medication    0.9% sodium chloride infusion 250 mL    0.9% sodium chloride infusion    cromolyn (OPTICROM) 4 % ophthalmic solution 1 Drop    sodium chloride (NS) flush 5-40 mL    sodium chloride (NS) flush 5-40 mL    acetaminophen (TYLENOL) tablet 650 mg    Or    acetaminophen (TYLENOL) suppository 650 mg    polyethylene glycol (MIRALAX) packet 17 g    ondansetron (ZOFRAN ODT) tablet 4 mg    Or    ondansetron (ZOFRAN) injection 4 mg    fluconazole (DIFLUCAN) 200mg/100 mL IVPB (premix)    morphine injection 2 mg     Current Outpatient Medications   Medication Sig    diclofenac (VOLTAREN) 1 % gel Apply 2 g to affected area two (2) times a day. famotidine (PEPCID) 20 mg tablet Take 20 mg by mouth daily as needed for Heartburn. acetaminophen (TYLENOL) 650 mg TbER Take 650 mg by mouth every six to eight (6-8) hours as needed for Pain. ferrous sulfate 325 mg (65 mg iron) tablet Take 325 mg by mouth daily. multivitamin with folic acid (One Daily Essential) 400 mcg tab tablet Take 1 Tablet by mouth daily.        Past Medical History:   Diagnosis Date    Anemia     pt states had recent blood transfusion 2022    Back pain     Cancer (HCC)     bladder    DVT (deep venous thrombosis) (HCC)     and PE, pt denies lung PE , only leg several years ago    Hepatitis C     pt states dx 1 month ago    Liver disease     Presence of IVC filter     pt states several years ago    Rheumatoid arthritis ([facility] [address]     Sciatic leg pain     pt states both legs    Uterine fibroid        Past Surgical History:   Procedure Laterality Date    HX APPENDECTOMY  10/03/2022    HX  SECTION      HX GYN  10/03/2022    ROBOT ANTERIOR PELVIC EXENTERATION    HX HYSTERECTOMY  10/03/2022    HX SALPINGO-OOPHORECTOMY Bilateral 10/03/2022    HX UROLOGICAL  09/15/2022    CYSTOSCOPY    HX UROLOGICAL  09/15/2022    URETHRAL DILATION    HX UROLOGICAL  09/15/2022    URETERAL STENT PLACEMENT    HX UROLOGICAL  09/15/2022    TRANSURETHRAL RESECTION OF BLADDER TUMOR >2CM    HX UROLOGICAL Bilateral 09/15/2022    RETROGRADE PYELOGRAM    HX UROLOGICAL  10/03/2022    PELVIC LYMPH NODE DISSECTION    HX UROLOGICAL  10/03/2022    ILEAL CONDUIT URINARY DIVERSION    SD CARDIAC SURG PROCEDURE UNLIST      stent placement       Family History   Problem Relation Age of Onset    Cancer Mother     Lung Cancer Mother     Heart Disease Father     Hypertension Sister     Cancer Maternal Aunt     Breast Cancer Maternal Aunt        Social History     Socioeconomic History    Marital status: SINGLE     Spouse name: Not on file    Number of children: Not on file    Years of education: Not on file    Highest education level: Some college, no degree   Occupational History    Occupation: disability   Tobacco Use    Smoking status: Former     Years: 10.00     Types: Cigarettes    Smokeless tobacco: Never    Tobacco comments:     Quit 15 years ago   Vaping Use    Vaping Use: Never used   Substance and Sexual Activity    Alcohol use: Not Currently    Drug use: Never    Sexual activity: Yes     Partners: Male   Other Topics Concern    Not on file   Social History Narrative    Lives at home alone. Social Determinants of Health     Financial Resource Strain: Not on file   Food Insecurity: Not on file   Transportation Needs: Not on file   Physical Activity: Not on file   Stress: Not on file   Social Connections: Not on file   Intimate Partner Violence: Not on file   Housing Stability: Not on file         HPI  This is a case of a 61years old female patient who presented to emergency department with complaints of weakness and unable to eat or walk.   She has past medical history of bladder cancer, anemia, DVT/PE, hepatitis C, arthritis and liver disease. Patient underwent a robotic assisted anterior pelvic exenteration with ileal conduit urinary diversion on October 3, 2022 (postoperative day #17). She complains of foul-smelling bloody vaginal discharge of several days of evolution. ROS   Review of Systems   Constitutional: Negative. HENT: Negative. Eyes: Negative. Respiratory: Negative. Cardiovascular: Negative. Gastrointestinal: Positive for decreased appetite  Genitourinary: Positive for vaginal discharge  Musculoskeletal: Positive for unable to walk  Skin: Negative. Neurological: Negative. Endo/Heme/Allergies: Negative. Psychiatric/Behavioral: Negative. /76 (BP 1 Location: Left upper arm, BP Patient Position: At rest)   Pulse 84   Temp 98 °F (36.7 °C)   Resp 17   Ht 5' 2\" (1.575 m)   Wt 51.7 kg (114 lb)   LMP  (LMP Unknown)   SpO2 100%   BMI 20.85 kg/m²    OBGyn Exam   Constitutional  Appearance: alert, in no acute distress    HENT  Head and Face: appears normal    Chest  Respiratory Effort: Even and unlabored  Auscultation: normal breath sounds    Cardiovascular  Heart: Auscultation: regular rate and rhythm without murmur    Gastrointestinal  Abdominal Examination: abdomen non-tender to palpation, normal bowel sounds, no masses present. Ostomy present with adequate urinary output. Genitourinary  External Genitalia: normal appearance for age, no discharge present, no tenderness present, no inflammatory lesions present, no masses present, atrophy present  Vagina: normal vaginal vault without central or paravaginal defects, no discharge present, no inflammatory lesions present, no masses present  Cervix: Absent. Speculum exam shows no evidence of active bleeding. Fetid, purulent discharge noted. Cultures taken.    Uterus: Absent    Skin  General Inspection: no rash, no lesions identified    Neurologic/Psychiatric  Mental Status:  Orientation: grossly oriented to person, place and time  Mood and Affect: mood normal, affect appropriate    Results for orders placed or performed during the hospital encounter of 10/19/22   COVID-19 RAPID TEST   Result Value Ref Range    COVID-19 rapid test Not Detected Not Detected     XR CHEST PORT    Narrative    EXAM: Portable CXR. 2236 hrs. INDICATION: sob    FINDINGS:  The lungs appear clear. Heart is normal in size. There is no pulmonary edema. There is no evident pneumothorax or pleural effusion. Impression    No Acute Disease. XR FOOT LT MIN 3 V    Narrative    EXAM: XR FOOT LT MIN 3 V    INDICATION: Left foot pain. COMPARISON: None. FINDINGS: Three views of the left foot demonstrate no fracture or other acute  osseous or articular abnormality. The soft tissues are within normal limits. Impression    Normal study. CTA CHEST W OR W WO CONT    Narrative    INDICATION:   Dyspnea    COMPARISON: None. TECHNIQUE:   Precontrast  images were obtained to localize the volume for acquisition. Multislice helical CT arteriography was performed from the diaphragm to the  thoracic inlet during uneventful rapid bolus of 100 cc Isovue-370. Lung and soft  tissue windows were generated. Coronal and sagittal images were generated and  3D post processing consisting of coronal maximum intensity images was performed. CT dose reduction was achieved through use of a standardized protocol tailored  for this examination and automatic exposure control for dose modulation. FINDINGS:  CHEST:  THYROID: No nodule. MEDIASTINUM: No mass or lymphadenopathy. DEONNA: No mass or lymphadenopathy. THORACIC AORTA: No dissection or aneurysm. MAIN PULMONARY ARTERY: There is no evidence of pulmonary embolism. TRACHEA/BRONCHI: Patent. ESOPHAGUS: No wall thickening or dilatation. HEART: Normal in size. PLEURA: No effusion or pneumothorax. LUNGS: Emphysematous changes are noted.  No acute abnormality. INCIDENTALLY IMAGED UPPER ABDOMEN: Indeterminate 10 mm hypodense lesion right  hepatic lobe. Gallstones. Bilateral hydronephrosis incompletely evaluated by  chest CT. BONES: No destructive bone lesion. Impression    Emphysematous changes. No evidence of pulmonary embolism or acute  abnormality. Cholelithiasis. Bilateral hydronephrosis. 1 cm hypodense lesion  right hepatic lobe cannot be characterized with certainty on the basis of this  examination. Follow-up is recommended to ensure stability. CT ABD PELV WO CONT    Narrative    EXAM: CT ABD PELV WO CONT    INDICATION: s/p ureteral stents, hydronephrosis    COMPARISON: 10/6/2022    IV CONTRAST: None. ORAL CONTRAST: None    TECHNIQUE:   Thin axial images were obtained through the abdomen and pelvis. Coronal and  sagittal reformats were generated. CT dose reduction was achieved through use of  a standardized protocol tailored for this examination and automatic exposure  control for dose modulation. The absence of intravenous contrast material reduces the sensitivity for  evaluation of the vasculature and solid organs. FINDINGS:   LOWER THORAX: No significant abnormality in the incidentally imaged lower chest.  LIVER: No mass. BILIARY TREE: Innumerable gallstones. CBD is not dilated. SPLEEN: within normal limits. PANCREAS: No focal abnormality. ADRENALS: Unremarkable. KIDNEYS/URETERS: Contrast is noted in the collecting systems and ureters from  previously performed CT examination. Mild bilateral hydroureteronephrosis is  noted to the level of the ileal conduit. STOMACH: Unremarkable. SMALL BOWEL: Small bowel distention is noted in the left abdomen with  decompressed loops distally compatible with obstruction likely related to  effusion formation. COLON: No dilatation or wall thickening. APPENDIX: Surgically absent. PERITONEUM: No ascites or pneumoperitoneum.   RETROPERITONEUM: IVC filter is seen within the infrarenal vena cava.  REPRODUCTIVE ORGANS: The prostate is surgically absent. URINARY BLADDER: The bladder is surgically absent. BONES: Presacral soft tissue edema has not changed. ABDOMINAL WALL: Drainage catheter in the deep pelvis has been removed. Right  lower quadrant ostomy is present. While this study was done without the use of  intravenous contrast there is suggestion of right common femoral venous  thrombosis with associated proximal thigh soft tissue swelling. ADDITIONAL COMMENTS: N/A      Impression    Status post cystectomy and hysterectomy with right lower quadrant ileal conduit. Mild bilateral hydroureteronephrosis, evaluation is somewhat limited by the  presence of contrast from previously performed examination. Small bowel  distention with decompressed loops distally concerning for obstruction likely  related to adhesion formation. While this study was done without the use of  intravenous contrast there is questionable right common femoral vein thrombus  with soft tissue edema. CBC WITH AUTOMATED DIFF   Result Value Ref Range    WBC 9.8 3.6 - 11.0 K/uL    RBC 2.84 (L) 3.80 - 5.20 M/uL    HGB 7.7 (L) 11.5 - 16.0 g/dL    HCT 24.3 (L) 35.0 - 47.0 %    MCV 85.6 80.0 - 99.0 FL    MCH 27.1 26.0 - 34.0 PG    MCHC 31.7 30.0 - 36.5 g/dL    RDW 15.9 (H) 11.5 - 14.5 %    PLATELET 059 442 - 774 K/uL    MPV 9.2 8.9 - 12.9 FL    NRBC 0.0 0.0  WBC    ABSOLUTE NRBC 0.00 0.00 - 0.01 K/uL    NEUTROPHILS 82 (H) 32 - 75 %    LYMPHOCYTES 11 (L) 12 - 49 %    MONOCYTES 7 5 - 13 %    EOSINOPHILS 0 0 - 7 %    BASOPHILS 0 0 - 1 %    IMMATURE GRANULOCYTES 0 %    ABS. NEUTROPHILS 8.0 1.8 - 8.0 K/UL    ABS. LYMPHOCYTES 1.1 0.8 - 3.5 K/UL    ABS. MONOCYTES 0.7 0.0 - 1.0 K/UL    ABS. EOSINOPHILS 0.0 0.0 - 0.4 K/UL    ABS. BASOPHILS 0.0 0.0 - 0.1 K/UL    ABS. IMM.  GRANS. 0.0 K/UL    DF Manual      RBC COMMENTS Anisocytosis  1+       METABOLIC PANEL, COMPREHENSIVE   Result Value Ref Range    Sodium 129 (L) 136 - 145 mmol/L Potassium 3.9 3.5 - 5.1 mmol/L    Chloride 102 97 - 108 mmol/L    CO2 17 (L) 21 - 32 mmol/L    Anion gap 10 5 - 15 mmol/L    Glucose 98 65 - 100 mg/dL    BUN 9 6 - 20 mg/dL    Creatinine 0.70 0.55 - 1.02 mg/dL    BUN/Creatinine ratio 13 12 - 20      eGFR >60 >60 ml/min/1.73m2    Calcium 9.0 8.5 - 10.1 mg/dL    Bilirubin, total 1.0 0.2 - 1.0 mg/dL    AST (SGOT) 67 (H) 15 - 37 U/L    ALT (SGPT) 30 12 - 78 U/L    Alk. phosphatase 120 (H) 45 - 117 U/L    Protein, total 9.2 (H) 6.4 - 8.2 g/dL    Albumin 1.8 (L) 3.5 - 5.0 g/dL    Globulin 7.4 (H) 2.0 - 4.0 g/dL    A-G Ratio 0.2 (L) 1.1 - 2.2     URINALYSIS W/ REFLEX CULTURE    Specimen: Urine   Result Value Ref Range    Color Yellow      Appearance Turbid (A) Clear      Specific gravity 1.023 1.003 - 1.030      pH (UA) 6.0 5.0 - 8.0      Protein 100 (A) Negative mg/dL    Glucose Negative Negative mg/dL    Ketone 5 (A) Negative mg/dL    Bilirubin Negative Negative      Blood Moderate (A) Negative      Urobilinogen 0.1 0.1 - 1.0 EU/dL    Nitrites Negative Negative      Leukocyte Esterase Large (A) Negative      WBC >100 (H) 0 - 4 /hpf    RBC 20-50 0 - 5 /hpf    Bacteria Negative Negative /hpf    UA:UC IF INDICATED Urine Culture Ordered (A) Culture not indicated by UA result      Mucus 2+ (A) Negative /lpf    Budding yeast Present (A) Negative     D DIMER   Result Value Ref Range    D DIMER >20.00 (H) <0.50 ug/ml(FEU)   TROPONIN-HIGH SENSITIVITY   Result Value Ref Range    Troponin-High Sensitivity 4 0 - 51 ng/L   POTASSIUM   Result Value Ref Range    Potassium 3.8 3.5 - 5.1 mmol/L   HEPATIC FUNCTION PANEL   Result Value Ref Range    Protein, total 8.0 6.4 - 8.2 g/dL    Albumin 1.5 (L) 3.5 - 5.0 g/dL    Globulin 6.5 (H) 2.0 - 4.0 g/dL    A-G Ratio 0.2 (L) 1.1 - 2.2      Bilirubin, total 0.8 0.2 - 1.0 mg/dL    Bilirubin, direct 0.2 0.0 - 0.2 mg/dL    Alk.  phosphatase 99 45 - 117 U/L    AST (SGOT) 54 (H) 15 - 37 U/L    ALT (SGPT) 26 12 - 78 U/L   NT-PRO BNP   Result Value Ref Range    NT pro- (H) <125 pg/mL   OCCULT BLOOD, STOOL   Result Value Ref Range    Occult Blood,day 1 Negative Negative      Day 1 date: 50,014,541     EKG, 12 LEAD, INITIAL   Result Value Ref Range    Ventricular Rate 107 BPM    Atrial Rate 107 BPM    P-R Interval 100 ms    QRS Duration 78 ms    Q-T Interval 342 ms    QTC Calculation (Bezet) 456 ms    Calculated P Axis 13 degrees    Calculated R Axis 30 degrees    Calculated T Axis 50 degrees    Diagnosis       Sinus tachycardia with short DE  Otherwise normal ECG  When compared with ECG of 13-SEP-2022 00:27,  No significant change was found  Confirmed by NILSA ELIZABETH, Chuy Cuenca ([id number]) on 10/20/2022 11:31:02 AM     TYPE & SCREEN   Result Value Ref Range    Crossmatch Expiration 10/23/2022,2359     ABO/Rh(D) A Positive     Antibody screen Negative     Unit number [id number]     Blood component type RC LR     Unit division 00     Status of unit [address] to transfuse     Crossmatch result Compatible         Orders Placed This Encounter    ANTICOAGULANT THERAPY IS CONTRAINDICATED Patient is at risk of or experiencing active bleeding     Standing Status:   Standing     Number of Occurrences:   1     Order Specific Question:   Please Indicate Reason     Answer:   Patient is at risk of or experiencing active bleeding    MECHANICAL PROPHYLAXIS IS CONTRAINDICATED Patient has a deep vein thrombosis Need to rule out DVT     Need to rule out DVT     Standing Status:   Standing     Number of Occurrences:   1     Order Specific Question:   Please Indicate Reason     Answer:   Patient has a deep vein thrombosis    CULTURE, URINE     Standing Status:   Standing     Number of Occurrences:   1    COVID-19 RAPID TEST     Standing Status:   Standing     Number of Occurrences:   1     Order Specific Question:   Is this test for diagnosis or screening? Answer:   Screening     Order Specific Question:   Symptomatic for COVID-19 as defined by CDC?      Answer: No     Order Specific Question:   Hospitalized for COVID-19? Answer:   No     Order Specific Question:   Admitted to ICU for COVID-19? Answer:   No     Order Specific Question:   Employed in healthcare setting? Answer:   Unknown     Order Specific Question:   Resident in a congregate (group) care setting? Answer:   Unknown     Order Specific Question:   Pregnant? Answer:   No     Order Specific Question:   Previously tested for COVID-19? Answer:   Unknown    CULTURE, WOUND W GRAM STAIN     Vaginal     Standing Status:   Standing     Number of Occurrences:   1    MRSA SCREEN - PCR (NASAL)     Standing Status:   Standing     Number of Occurrences:   1    XR CHEST PORT     Standing Status:   Standing     Number of Occurrences:   1     Order Specific Question:   Reason for Exam     Answer:   sob    XR FOOT LT MIN 3 V     Standing Status:   Standing     Number of Occurrences:   1     Order Specific Question:   Transport     Answer:   No Transport [3]     Order Specific Question:   Reason for Exam     Answer:   pain    CTA CHEST W OR W WO CONT     Standing Status:   Standing     Number of Occurrences:   1     Order Specific Question:   Transport     Answer:   Stretcher [5]     Order Specific Question:   Reason for Exam     Answer:   dyspnea     Order Specific Question:   Does patient have history of Renal Disease? Answer:   No     Order Specific Question:   Decision Support Exception     Answer:   Emergency Medical Condition (MA) [1]    CT ABD PELV WO CONT     Standing Status:   Standing     Number of Occurrences:   1     Order Specific Question:   Transport     Answer:   Stretcher [5]     Order Specific Question:   Reason for Exam     Answer:   s/p ureteral stents, hydronephrosis     Order Specific Question:   Type of contrast.  PLEASE NOTE: IV contrast is NOT utilized with this order.      Answer:   None    CBC WITH AUTOMATED DIFF     Standing Status:   Standing     Number of Occurrences: 1    METABOLIC PANEL, COMPREHENSIVE     Standing Status:   Standing     Number of Occurrences:   1    URINALYSIS W/ REFLEX CULTURE     Standing Status:   Standing     Number of Occurrences:   1    D DIMER     Standing Status:   Standing     Number of Occurrences:   1    TROPONIN-HIGH SENSITIVITY     Standing Status:   Standing     Number of Occurrences:   1    POTASSIUM     Standing Status:   Standing     Number of Occurrences:   1    HEPATIC FUNCTION PANEL     Standing Status:   Standing     Number of Occurrences:   1    BNP (NT-PRO)     Standing Status:   Standing     Number of Occurrences:   1    OCCULT BLOOD, STOOL     Standing Status:   Standing     Number of Occurrences:   1    METABOLIC PANEL, BASIC     Standing Status:   Standing     Number of Occurrences:   1    CBC W/O DIFF     Standing Status:   Standing     Number of Occurrences:   1    HGB & HCT     Standing Status:   Standing     Number of Occurrences:   1    ADULT DIET Regular; No Salt Added (3-4 gm)     Standing Status:   Standing     Number of Occurrences:   1     Order Specific Question:   Primary Diet:     Answer:   Regular     Order Specific Question:   Sodium Restriction:     Answer:   No Salt Added (3-4 gm)    TRANSFUSE PACKED RBC'S, 1 Units     Standing Status:   Standing     Number of Occurrences:   1     Order Specific Question:   Reason for transfusion     Answer: Other (specify in comment field)     Order Specific Question:   Specify other reason for transfusion     Answer:   symptomatic anemia    VITAL SIGNS     Per unit routine     Standing Status:   Standing     Number of Occurrences:   1    CARDIAC MONITORING     Standing Status:   Standing     Number of Occurrences:   1     Order Specific Question:   Type:      Answer:   Remote Telemetry     Order Specific Question:   Patient may go off unit without monitor     Answer:   No    ACTIVITY AS TOLERATED W/ASSIST     Standing Status:   Standing     Number of Occurrences:   1    FULL CODE Standing Status:   Standing     Number of Occurrences:   1    IP CONSULT TO UROLOGY     Standing Status:   Standing     Number of Occurrences:   1     Order Specific Question:   Reason for Consult: Answer:   Patient known to you, vaginal bleed     Order Specific Question:   Did you call or speak to the consulting provider? Answer:   No     Order Specific Question:   Consult To     Answer:   Dodie    IP CONSULT TO INFECTIOUS DISEASES     Standing Status:   Standing     Number of Occurrences:   1     Order Specific Question:   Reason for Consult: Answer:   Abdominal infection     Order Specific Question:   Did you call or speak to the consulting provider? Answer:   No     Order Specific Question:   Consult To     Answer:   Gita Kidney     Order Specific Question:   Schedule When? Answer:   TODAY    EKG, 12 LEAD, INITIAL     Standing Status:   Standing     Number of Occurrences:   1     Order Specific Question:   Reason for Exam:     Answer:   sob    TYPE & SCREEN     ENTER SURGERY DATE IF FOR PRE-OP TESTING. Standing Status:   Standing     Number of Occurrences:   1    RBC, ALLOCATE, 1 Units Date needed for transfusion: 10/20/2022     Standing Status:   Standing     Number of Occurrences:   1     Order Specific Question:   Date needed for transfusion     Answer:   10/20/2022    DUPLEX LOWER EXT VENOUS BILAT     Standing Status:   Standing     Number of Occurrences:   1     Order Specific Question:   Transport     Answer:   BED [2]    sodium chloride 0.9 % bolus infusion 1,000 mL    ketorolac (TORADOL) injection 15 mg    iopamidoL (ISOVUE-370) 76 % injection 100 mL    0.9% sodium chloride infusion 250 mL    0.9% sodium chloride infusion    cromolyn (OPTICROM) 4 % ophthalmic solution 1 Drop     OP SIG:Administer 1 Drop to both eyes daily.       sodium chloride (NS) flush 5-40 mL    sodium chloride (NS) flush 5-40 mL    OR Linked Order Group     acetaminophen (TYLENOL) tablet 650 mg     acetaminophen (TYLENOL) suppository 650 mg    polyethylene glycol (MIRALAX) packet 17 g    OR Linked Order Group     ondansetron (ZOFRAN ODT) tablet 4 mg     ondansetron (ZOFRAN) injection 4 mg    fluconazole (DIFLUCAN) 200mg/100 mL IVPB (premix)     Order Specific Question:   Antibiotic Indications     Answer:   Urinary Tract Infection     Order Specific Question:   UTI duration of therapy     Answer: Other     Comments:   14    DISCONTD: morphine injection 2 mg    DISCONTD: 0.9% sodium chloride infusion    morphine injection 2 mg    diclofenac (VOLTAREN) 1 % gel     Sig: Apply 2 g to affected area two (2) times a day. famotidine (PEPCID) 20 mg tablet     Sig: Take 20 mg by mouth daily as needed for Heartburn. acetaminophen (TYLENOL) 650 mg TbER     Sig: Take 650 mg by mouth every six to eight (6-8) hours as needed for Pain. IP CONSULT TO GENERAL SURGERY     Standing Status:   Standing     Number of Occurrences:   1     Order Specific Question:   Reason for Consult: Answer:   SBO     Order Specific Question:   Did you call or speak to the consulting provider? Answer:   Yes    IP CONSULT TO OB GYN     Standing Status:   Standing     Number of Occurrences:   1     Order Specific Question:   Reason for Consult: Answer:   Vaginal bleed     Order Specific Question:   Did you call or speak to the consulting provider? Answer:   No     Order Specific Question:   Consult To     Answer:   Fay Lam    INITIAL PHYSICIAN ORDER: INPATIENT Remote Telemetry; Yes; 3. Patient receiving treatment that can only be provided in an inpatient setting (further clarification in H&P documentation)     Standing Status:   Standing     Number of Occurrences:   1     Order Specific Question:   Status: Answer:   INPATIENT [101]     Order Specific Question:   Type of Bed     Answer:   Remote Telemetry [29]     Order Specific Question:   Cardiac Monitoring Required?      Answer:   Yes     Order Specific Question:   Inpatient Hospitalization Certified Necessary for the Following Reasons     Answer:   3. Patient receiving treatment that can only be provided in an inpatient setting (further clarification in H&P documentation)     Order Specific Question:   Admitting Diagnosis     Answer:   Acute blood loss anemia [029950]     Order Specific Question:   Admitting Diagnosis     Answer:   Small bowel obstruction [facility] [[id number]]     Order Specific Question:   Admitting Physician     Answer:   Nai Rivas [[id number]]     Order Specific Question:   Attending Physician     Answer:   Nai Rvias [[id number]]     Order Specific Question:   Estimated Length of Stay     Answer:   2 Midnights     Order Specific Question:   Discharge Plan:     Answer:   Home with Office Follow-up    IP CONSULT TO WOUND CARE     Vaginal     Standing Status:   Standing     Number of Occurrences:   1     Order Specific Question:   Reason for Consult: Answer:   Wound     ASSESSMENT:    Vaginal cuff cellulitis    Status post robot-assisted surgical procedure, pelvic exenteration      PLAN:  Wound culture, taken  Consult ID for antibiotic therapy recommendations  We will follow.

## 2022-10-20 NOTE — ED NOTES
Assumed care of patient. Bedside shift report received from Alice Rivers, [facility]. Pt in bed resting quietly with no complaints at this time.

## 2022-10-20 NOTE — ED TRIAGE NOTES
Patient reports generalized weakness over the last two days and bilateral leg pain. Patient also reports decreased appetite over the last few days and that she has only been taking sips of Pedialyte, patient states she recently had an abdominal infection and her last BM that she can remember was Monday. Hx of bladder cancer and has a urostomy bag. EMS gave 4 mg zofran ODT zofran en route.

## 2022-10-20 NOTE — ED NOTES
Care assumed and bedside SBAR report endorsed on Cori. Pt cardiac monitoring continued , spO2 Monitoring continued, IV reassed, call bell within reach, side rails up x2, resting comfortable but easily arousable, no signs of acute distress. , bed in lowest position, MAR reviewed, Labs reviewed, will continue to monitor

## 2022-10-20 NOTE — ED PROVIDER NOTES
EMERGENCY DEPARTMENT HISTORY AND PHYSICAL EXAM      Date: 10/19/2022  Patient Name: Arcelia Lin    History of Presenting Illness     Chief Complaint   Patient presents with    Fatigue       History Provided By: Patient    HPI: Arcelia Lin, 61 y.o. female with a past medical history significant for invasive bladder cancer, anemia, DVT/PE, hepatitis C, liver disease and arthritis presents to the emergency department with cc of weakness. Patient states \"I cannot eat or walk \". She reports feeling generally weak and states she is not able to eat much due to nausea. She denies any associated fever/chills, abdominal pain, vomiting or diarrhea. She has a urostomy and states she has been putting out a good amount of urine. She states she has been unable to walk mostly due to weakness, some left hip pain and left foot pain. She denies any falls or other trauma. She is noted to be tachypneic, she denies any shortness of breath, chest pain, palpitations, cough or other URI symptoms. She states she is not currently undergoing any cancer treatments. Records reviewed noting patient admitted from 10/3 - 10/10/2022 and underwent anterior pelvic sideration including cystectomy, hysterectomy, BSO, ileal conduit diversion, pelvic lymph node dissection, incidental appendectomy on 10/3/22. Surgery was complicated by rectal injury. She was seen here on 10/13/2022 with urostomy complications and discharged home on Cipro which she completed today. There are no other complaints, changes, or physical findings at this time.     PCP: Julita Shelton MD    Current Facility-Administered Medications   Medication Dose Route Frequency Provider Last Rate Last Admin    0.9% sodium chloride infusion 250 mL  250 mL IntraVENous PRN Lavelle Osuna MD        cromolyn (OPTICROM) 4 % ophthalmic solution 1 Drop  1 Drop Both Eyes DAILY Lavelle Osuna MD   1 Drop at 10/20/22 0837    sodium chloride (NS) flush 5-40 mL 5-40 mL IntraVENous Q8H Calli Posadas MD   10 mL at 10/20/22 1421    sodium chloride (NS) flush 5-40 mL  5-40 mL IntraVENous PRN Calli Posadas MD        acetaminophen (TYLENOL) tablet 650 mg  650 mg Oral Q6H PRN Calli Posadas MD        Or    acetaminophen (TYLENOL) suppository 650 mg  650 mg Rectal Q6H PRN Calli Posadas MD        polyethylene glycol (MIRALAX) packet 17 g  17 g Oral DAILY PRN Calli Posadas MD        ondansetron (ZOFRAN ODT) tablet 4 mg  4 mg Oral Q8H PRN Calli Posadas MD        Or    ondansetron (ZOFRAN) injection 4 mg  4 mg IntraVENous Q6H PRN Calli Posadas MD        fluconazole (DIFLUCAN) 200mg/100 mL IVPB (premix)  200 mg IntraVENous DAILY Ashley Brown  mL/hr at 10/20/22 0838 200 mg at 10/20/22 4076    morphine injection 2 mg  2 mg IntraVENous Q4H PRN Ashley Brown MD   2 mg at 10/20/22 1845    0.9% sodium chloride infusion  100 mL/hr IntraVENous CONTINUOUS BasElpidio baumann  mL/hr at 10/20/22 1956 100 mL/hr at 10/20/22 1956       Past History   Past Medical History:  Past Medical History:   Diagnosis Date    Anemia     pt states had recent blood transfusion 2022    Back pain     Cancer (HCC)     bladder    DVT (deep venous thrombosis) (HCC)     and PE, pt denies lung PE , only leg several years ago    Hepatitis C     pt states dx 1 month ago    Liver disease     Presence of IVC filter     pt states several years ago    Rheumatoid arthritis ([address]     Sciatic leg pain     pt states both legs    Uterine fibroid        Past Surgical History:  Past Surgical History:   Procedure Laterality Date    HX APPENDECTOMY  10/03/2022    HX  SECTION      HX GYN  10/03/2022    ROBOT ANTERIOR PELVIC EXENTERATION    HX HYSTERECTOMY  10/03/2022    HX SALPINGO-OOPHORECTOMY Bilateral 10/03/2022    HX UROLOGICAL  09/15/2022    CYSTOSCOPY    HX UROLOGICAL  09/15/2022    URETHRAL DILATION    HX UROLOGICAL  09/15/2022    URETERAL STENT PLACEMENT    HX UROLOGICAL  09/15/2022 TRANSURETHRAL RESECTION OF BLADDER TUMOR >2CM    HX UROLOGICAL Bilateral 09/15/2022    RETROGRADE PYELOGRAM    HX UROLOGICAL  10/03/2022    PELVIC LYMPH NODE DISSECTION    HX UROLOGICAL  10/03/2022    ILEAL CONDUIT URINARY DIVERSION    MS CARDIAC SURG PROCEDURE UNLIST      stent placement       Family History:  Family History   Problem Relation Age of Onset    Cancer Mother     Lung Cancer Mother     Heart Disease Father     Hypertension Sister     Cancer Maternal Aunt     Breast Cancer Maternal Aunt        Social History:  Social History     Tobacco Use    Smoking status: Former     Years: 10.00     Types: Cigarettes    Smokeless tobacco: Never    Tobacco comments:     Quit 15 years ago   Vaping Use    Vaping Use: Never used   Substance Use Topics    Alcohol use: Not Currently    Drug use: Never       Allergies: Allergies   Allergen Reactions    Bactrim [Sulfamethoprim] Swelling     Swelling of the lilps    Penicillin V Potassium Swelling     Review of Systems   Review of Systems   Constitutional:  Positive for fatigue. Negative for chills and fever. HENT: Negative. Respiratory: Negative. Negative for cough and shortness of breath. Cardiovascular: Negative. Negative for chest pain and palpitations. Gastrointestinal:  Positive for nausea. Negative for abdominal distention, abdominal pain, diarrhea and vomiting. Genitourinary: Negative. Negative for decreased urine volume, dysuria and hematuria. Musculoskeletal:  Positive for arthralgias and back pain. Neurological: Negative. Negative for dizziness. All other systems reviewed and are negative. Physical Exam   Physical Exam  Vitals and nursing note reviewed. Constitutional:       General: She is not in acute distress. Appearance: She is underweight. She is ill-appearing. HENT:      Head: Normocephalic and atraumatic. Eyes:      Extraocular Movements: Extraocular movements intact.       Conjunctiva/sclera: Conjunctivae normal. Cardiovascular:      Rate and Rhythm: Regular rhythm. Tachycardia present. Heart sounds: Normal heart sounds. Pulmonary:      Effort: Tachypnea and accessory muscle usage present. Breath sounds: Decreased breath sounds present. No wheezing or rales. Chest:      Chest wall: No tenderness or crepitus. Abdominal:      General: Bowel sounds are normal.      Palpations: Abdomen is soft. Tenderness: There is no abdominal tenderness. There is no guarding or rebound. Comments: Urostomy - moisés urine with sediment   Genitourinary:     Vagina: Vaginal discharge and tenderness present. No erythema or bleeding. Musculoskeletal:         General: Normal range of motion. Cervical back: Normal range of motion and neck supple. Right lower leg: No edema. Left lower leg: No edema. Left foot: Normal capillary refill. Tenderness and bony tenderness present. No swelling. Normal pulse. Comments: Tenderness along fifth metatarsal, no swelling or obvious deformity appreciated, palpable pulses with good sensation   Skin:     General: Skin is warm and dry. Neurological:      General: No focal deficit present. Mental Status: She is alert. Psychiatric:         Mood and Affect: Mood normal.         Behavior: Behavior normal. Behavior is cooperative. Lab and Diagnostic Study Results   Labs -     Recent Results (from the past 12 hour(s))   COVID-19 RAPID TEST    Collection Time: 10/20/22  2:35 PM   Result Value Ref Range    COVID-19 rapid test Not Detected Not Detected     MRSA SCREEN - PCR (NASAL)    Collection Time: 10/20/22  2:35 PM   Result Value Ref Range    MRSA by PCR, Nasal Not Detected Not Detected         Radiologic Studies -   [unfilled]  CT Results  (Last 48 hours)                 10/20/22 0416  CT ABD PELV WO CONT Final result    Impression:  Status post cystectomy and hysterectomy with right lower quadrant ileal conduit.    Mild bilateral hydroureteronephrosis, evaluation is somewhat limited by the   presence of contrast from previously performed examination. Small bowel   distention with decompressed loops distally concerning for obstruction likely   related to adhesion formation. While this study was done without the use of   intravenous contrast there is questionable right common femoral vein thrombus   with soft tissue edema. Narrative:  EXAM: CT ABD PELV WO CONT       INDICATION: s/p ureteral stents, hydronephrosis       COMPARISON: 10/6/2022       IV CONTRAST: None. ORAL CONTRAST: None       TECHNIQUE:    Thin axial images were obtained through the abdomen and pelvis. Coronal and   sagittal reformats were generated. CT dose reduction was achieved through use of   a standardized protocol tailored for this examination and automatic exposure   control for dose modulation. The absence of intravenous contrast material reduces the sensitivity for   evaluation of the vasculature and solid organs. FINDINGS:    LOWER THORAX: No significant abnormality in the incidentally imaged lower chest.   LIVER: No mass. BILIARY TREE: Innumerable gallstones. CBD is not dilated. SPLEEN: within normal limits. PANCREAS: No focal abnormality. ADRENALS: Unremarkable. KIDNEYS/URETERS: Contrast is noted in the collecting systems and ureters from   previously performed CT examination. Mild bilateral hydroureteronephrosis is   noted to the level of the ileal conduit. STOMACH: Unremarkable. SMALL BOWEL: Small bowel distention is noted in the left abdomen with   decompressed loops distally compatible with obstruction likely related to   effusion formation. COLON: No dilatation or wall thickening. APPENDIX: Surgically absent. PERITONEUM: No ascites or pneumoperitoneum. RETROPERITONEUM: IVC filter is seen within the infrarenal vena cava. REPRODUCTIVE ORGANS: The prostate is surgically absent.    URINARY BLADDER: The bladder is surgically absent. BONES: Presacral soft tissue edema has not changed. ABDOMINAL WALL: Drainage catheter in the deep pelvis has been removed. Right   lower quadrant ostomy is present. While this study was done without the use of   intravenous contrast there is suggestion of right common femoral venous   thrombosis with associated proximal thigh soft tissue swelling. ADDITIONAL COMMENTS: N/A           10/20/22 0146  CTA CHEST W OR W WO CONT Final result    Impression:  Emphysematous changes. No evidence of pulmonary embolism or acute   abnormality. Cholelithiasis. Bilateral hydronephrosis. 1 cm hypodense lesion   right hepatic lobe cannot be characterized with certainty on the basis of this   examination. Follow-up is recommended to ensure stability. Narrative:  INDICATION:   Dyspnea       COMPARISON: None. TECHNIQUE:    Precontrast  images were obtained to localize the volume for acquisition. Multislice helical CT arteriography was performed from the diaphragm to the   thoracic inlet during uneventful rapid bolus of 100 cc Isovue-370. Lung and soft   tissue windows were generated. Coronal and sagittal images were generated and   3D post processing consisting of coronal maximum intensity images was performed. CT dose reduction was achieved through use of a standardized protocol tailored   for this examination and automatic exposure control for dose modulation. FINDINGS:   CHEST:   THYROID: No nodule. MEDIASTINUM: No mass or lymphadenopathy. DEONNA: No mass or lymphadenopathy. THORACIC AORTA: No dissection or aneurysm. MAIN PULMONARY ARTERY: There is no evidence of pulmonary embolism. TRACHEA/BRONCHI: Patent. ESOPHAGUS: No wall thickening or dilatation. HEART: Normal in size. PLEURA: No effusion or pneumothorax. LUNGS: Emphysematous changes are noted. No acute abnormality.    INCIDENTALLY IMAGED UPPER ABDOMEN: Indeterminate 10 mm hypodense lesion right   hepatic lobe. Gallstones. Bilateral hydronephrosis incompletely evaluated by   chest CT. BONES: No destructive bone lesion. CXR Results  (Last 48 hours)                 10/19/22 2244  XR CHEST PORT Final result    Impression:  No Acute Disease. Narrative:  EXAM: Portable CXR. 2236 hrs. INDICATION: sob       FINDINGS:   The lungs appear clear. Heart is normal in size. There is no pulmonary edema. There is no evident pneumothorax or pleural effusion. Medical Decision Making and ED Course   Differential Diagnosis & Medical Decision Making Provider Note:   Patient presenting with generalized fatigue. DDx: infection, anemia, electrolyte anomoly (hypo or hyperkalemia, hypomagnesemia), hypothyroid, dehydration, depression, CA, ACS. Will obtain EKG, UA, labwork for any urgent/emergent pathology. - I am the first and primary provider for this patient. I reviewed the vital signs, available nursing notes, past medical history, past surgical history, family history and social history. The patient's presenting problems have been discussed, and the staff are in agreement with the care plan formulated and outlined with them. I have encouraged them to ask questions as they arise throughout their visit. Vital Signs-Reviewed the patient's vital signs. Patient Vitals for the past 12 hrs:   Temp Pulse Resp BP SpO2   10/20/22 1727 97.4 °F (36.3 °C) 93 14 116/73 100 %   10/20/22 1454 -- 84 17 103/76 100 %   10/20/22 1402 -- 82 18 -- 99 %   10/20/22 1332 -- 87 20 -- 100 %   10/20/22 1302 -- 83 15 -- 100 %   10/20/22 1232 -- 81 20 -- 99 %   10/20/22 1202 -- 79 20 -- 100 %   10/20/22 1132 -- 83 20 -- --   10/20/22 1102 -- 86 17 -- 100 %   10/20/22 1032 -- 74 (!) 34 -- 100 %       EKG interpretation: (Preliminary): EKG Interpreted by me. Shows   Rhythm: normal sinus tachycardia; and regular .  Rate (approx.): 107; Axis: normal; P wave: normal; QRS interval: normal ; ST/T wave: normal; , QTc 456            ED Course:   ED Course as of 10/20/22 2039   Wed Oct 19, 2022   2251 CXR negative for acute findings; x-ray left foot negative for fracture or other acute findings [LP]   2325 Hgb 7.7, down from 8.2 six days ago, no leukocytosis, platelets normal; troponin normal, , CO2 17, renal function normal, potassium and hepatic function hemolyzed, will redraw; D-dimer >20, will obtain CTA chest to rule out PE [LP]   Thu Oct 20, 2022   0000 Discussed results and plan of care with patient. She states pain has subsided since receiving medication. She reports vaginal spotting x2-3 days. She denies any associated pain. We will plan for pelvic exam and type and screen [LP]   0151 CTA chest findings of emphysematous changes. No evidence of pulmonary embolism or acute abnormality. Cholelithiasis. Bilateral hydronephrosis. 1 cm hypodense lesion right hepatic lobe cannot be characterized with certainty on the basis of this examination; will obtain CT abdomen [LP]   0227 Patient unable to tolerate pelvic exam with speculum, patient oozing purulent discharge, no obvious vaginal bleeding appreciated, she denies any STD concerns and states she has not been sexually active [LP]   0400 Hemoccult negative, UA +1, large leukocyte Estrace and WBCs, no nitrates or bacteria, urine culture pending [LP]   0419 BEDSIDE SIGN OUT:  4:19 AM  I have discussed pt's hx, disposition, and available diagnostic and imaging results with Dr Cecilia West. Reviewed care plans. Both providers and patient are in agreement with care plan. ARIANA Latham is transferring care of the pt to Dr Cecilia West at this time. Please refer to his/her chart for the patients remaining Emergency Department course and final disposition.  [LP]      ED Course User Index  [LP] Rayleen Cogan, NP         Procedures and Critical Care     CRITICAL CARE NOTE :    11:35 PM    IMPENDING DETERIORATION -Respiratory and Cardiovascular  ASSOCIATED RISK FACTORS - Bleeding, Metabolic changes, Dehydration, and Vascular Compromise  MANAGEMENT- Bedside Assessment  INTERPRETATION -  CT Scan, ECG, and Blood Pressure  INTERVENTIONS - hemodynamic mngmt and blood transfusion  CASE REVIEW - Hospitalist/Intensivist and Family  TREATMENT RESPONSE -Stable  PERFORMED BY - Self    NOTES   :  I have spent 90 minutes of critical care time involved in lab review, consultations with specialist, family decision- making, bedside attention and documentation. This time excludes time spent in any separate billed procedures. During this entire length of time I was immediately available to the patient . Peggy Buenrostro NP    Disposition   Disposition:     Diagnosis/Clinical Impression     Clinical Impression: No diagnosis found. Attestations: Ron Hidalgo NP, am the primary clinician of record. Please note that this dictation was completed with [employer], the computer voice recognition software. Quite often unanticipated grammatical, syntax, homophones, and other interpretive errors are inadvertently transcribed by the computer software. Please disregard these errors. Please excuse any errors that have escaped final proofreading. Thank you.

## 2022-10-20 NOTE — CONSULTS
[id number] [facility] SURGERY CONSULT          Chief Complaint: _nausea    History of Present Illness:    Ms. Keya Nunez is a 61y.o. year old * female presents to ER with 2 day history of nausea and poor PO intake. No vomiting. Passing some flatus. She had undergone robotic assisted anterior exenteration by Dr. Serna Ek Dr. Fredy Bradshaw. She was recently discharged. However she has not been feeling well. Has been able to take very little due to nausea. In ER CT scan without PO contrast showed some mildly dilated small bowel suggestive of possible bowel obstruction versus ileus. Patient denies any fever or chills or abdominal pain.       Past Medical History:   Past Medical History:   Diagnosis Date    Anemia     pt states had recent blood transfusion 2022    Back pain     Cancer (HCC)     bladder    DVT (deep venous thrombosis) (HCC)     and PE, pt denies lung PE , only leg several years ago    Hepatitis C     pt states dx 1 month ago    Liver disease     Presence of IVC filter     pt states several years ago    Rheumatoid arthritis ([address]     Sciatic leg pain     pt states both legs    Uterine fibroid        Past Surgical History:   Past Surgical History:   Procedure Laterality Date    HX APPENDECTOMY  10/03/2022    HX  SECTION      HX GYN  10/03/2022    ROBOT ANTERIOR PELVIC EXENTERATION    HX HYSTERECTOMY  10/03/2022    HX SALPINGO-OOPHORECTOMY Bilateral 10/03/2022    HX UROLOGICAL  09/15/2022    CYSTOSCOPY    HX UROLOGICAL  09/15/2022    URETHRAL DILATION    HX UROLOGICAL  09/15/2022    URETERAL STENT PLACEMENT    HX UROLOGICAL  09/15/2022    TRANSURETHRAL RESECTION OF BLADDER TUMOR >2CM    HX UROLOGICAL Bilateral 09/15/2022    RETROGRADE PYELOGRAM    HX UROLOGICAL  10/03/2022    PELVIC LYMPH NODE DISSECTION    HX UROLOGICAL  10/03/2022    ILEAL CONDUIT URINARY DIVERSION    [place] CARDIAC SURG PROCEDURE UNLIST      stent placement        Allergy:  Allergies   Allergen Reactions    Bactrim [Sulfamethoprim] Swelling Swelling of the lilps    Penicillin V Potassium Swelling       Social History:  reports that she has quit smoking. Her smoking use included cigarettes. She has never used smokeless tobacco. She reports that she does not currently use alcohol. She reports that she does not use drugs.      Family History:  Family History   Problem Relation Age of Onset    Cancer Mother     Lung Cancer Mother     Heart Disease Father     Hypertension Sister     Cancer Maternal Aunt     Breast Cancer Maternal Aunt         Current Medications:  Current Facility-Administered Medications:     0.9% sodium chloride infusion 250 mL, 250 mL, IntraVENous, PRN, Gabo Posadas MD    cromolyn (OPTICROM) 4 % ophthalmic solution 1 Drop, 1 Drop, Both Eyes, DAILY, Gabo Posadas MD, 1 Drop at 10/20/22 0837    sodium chloride (NS) flush 5-40 mL, 5-40 mL, IntraVENous, Q8H, Mack Posadas MD, 10 mL at 10/20/22 1421    sodium chloride (NS) flush 5-40 mL, 5-40 mL, IntraVENous, PRN, Gabo Posadas MD    acetaminophen (TYLENOL) tablet 650 mg, 650 mg, Oral, Q6H PRN **OR** acetaminophen (TYLENOL) suppository 650 mg, 650 mg, Rectal, Q6H PRN, Gabo Posadas MD    polyethylene glycol (MIRALAX) packet 17 g, 17 g, Oral, DAILY PRN, Gabo Posadas MD    ondansetron (ZOFRAN ODT) tablet 4 mg, 4 mg, Oral, Q8H PRN **OR** ondansetron (ZOFRAN) injection 4 mg, 4 mg, IntraVENous, Q6H PRN, Gabo Posadas MD    fluconazole (DIFLUCAN) 200mg/100 mL IVPB (premix), 200 mg, IntraVENous, DAILY, Gabo Posadas MD, Last Rate: 100 mL/hr at 10/20/22 0838, 200 mg at 10/20/22 0838    morphine injection 2 mg, 2 mg, IntraVENous, Q4H PRN, Gabo Posadas MD    Current Outpatient Medications:     diclofenac (VOLTAREN) 1 % gel, Apply 2 g to affected area two (2) times a day., Disp: , Rfl:     famotidine (PEPCID) 20 mg tablet, Take 20 mg by mouth daily as needed for Heartburn., Disp: , Rfl:     acetaminophen (TYLENOL) 650 mg TbER, Take 650 mg by mouth every six to eight (6-8) hours as needed for Pain., Disp: , Rfl:     ferrous sulfate 325 mg (65 mg iron) tablet, Take 325 mg by mouth daily. , Disp: , Rfl:     multivitamin with folic acid (One Daily Essential) 400 mcg tab tablet, Take 1 Tablet by mouth daily. , Disp: 90 Tablet, Rfl: 0     Immunization History: There is no immunization history on file for this patient. Complete    Review of Systems:     Constitutional:  no fever,  no chills,  no sweats, No weakness, No fatigue, No decreased activity. Eye: No recent visual problem, No icterus, No discharge, No double vision. Ear/Nose/Mouth/Throat: No decreased hearing, No ear pain, No nasal congestion, No sore throat. Respiratory: No shortness of breath, No cough, No sputum production, No hemoptysis, No wheezing, No cyanosis. Cardiovascular: No chest pain, No palpitations, No bradycardia, No tachycardia, No peripheral edema, No syncope. Gastrointestinal:  nausea,  No vomiting, No diarrhea, No constipation, No heartburn,  No abdominal pain. Genitourinary: No dysuria, No hematuria, No change in urine stream, No urethral discharge, No lesions. Hematology/Lymphatics: No bruising tendency, No bleeding tendency, No petechiae, No swollen lymph glands. Endocrine: No excessive thirst, No polyuria, No cold intolerance, No heat intolerance, No excessive hunger. Immunologic: Not immunocompromised, No recurrent fevers, No recurrent infections. Musculoskeletal: No back pain, No neck pain, No joint pain, No muscle pain, No claudication, No decreased range of motion, No trauma. Integumentary: No rash, No pruritus, No abrasions. Neurologic: Alert and oriented X4, No abnormal balance, No headache, No confusion, No numbness, No tingling. Psychiatric: No anxiety, No depression, No nate. Physical Exam:     Vitals & Measurements:     Wt Readings from Last 3 Encounters:   10/19/22 51.7 kg (114 lb)   10/13/22 51.3 kg (113 lb)   10/09/22 59.6 kg (131 lb 6.3 oz)     Temp Readings from Last 3 Encounters:   10/20/22 98 °F (36.7 °C)   10/17/22 98.7 °F (37.1 °C)   10/13/22 98.3 °F (36.8 °C)     BP Readings from Last 3 Encounters:   10/20/22 103/76   10/17/22 (!) 103/58   10/13/22 116/67     Pulse Readings from Last 3 Encounters:   10/20/22 84   10/17/22 94   10/13/22 100      Ht Readings from Last 3 Encounters:   10/19/22 5' 2\" (1.575 m)   10/13/22 5' 2\" (1.575 m)   10/05/22 5' 2.01\" (1.575 m)          General: well appearing, no acute distress  Head: Normal  Face: Nornal  HEENT: atraumatic, PERRLA, moist mucosa, normal pharynx, normal tonsils and adenoids, normal tongue, no fluid in sinuses  Neck: Trachea midline, no carotid bruit, no masses  Chest: Normal.  Respiratory: Normal chest wall expansion, CTA B, no r/r/w, no rubs  Cardiovascular: RRR, no m/r/g, Normal S1 and S2  Abdomen: Soft, non tender, non-distended, normal bowel sounds in all quadrants, no hepatosplenomegaly, no tympany. Incision scar: well healed, right lower quadrant urostomy+  Genitourinary: No inguinal hernia, normal external gentalia, no renal angle tenderness  Rectal: deferred  Musculoskeletal: normal ROM in upper and lower extremities, No joint swelling.   Integumentary: Warm, dry, and pink, with no rash, purpura, or petechia  Heme/Lymph: No lymphadenopathy, no bruises  Neurological:Cranial Nerves II-XII grossly intact, no gross motor or sensory deficit  Psychiatric: Cooperative with normal mood, affect, and cognition      Laboratory Values:   Recent Results (from the past 24 hour(s))   CBC WITH AUTOMATED DIFF    Collection Time: 10/19/22 10:35 PM   Result Value Ref Range    WBC 9.8 3.6 - 11.0 K/uL    RBC 2.84 (L) 3.80 - 5.20 M/uL    HGB 7.7 (L) 11.5 - 16.0 g/dL    HCT 24.3 (L) 35.0 - 47.0 %    MCV 85.6 80.0 - 99.0 FL    MCH 27.1 26.0 - 34.0 PG    MCHC 31.7 30.0 - 36.5 g/dL    RDW 15.9 (H) 11.5 - 14.5 %    PLATELET 026 241 - 468 K/uL    MPV 9.2 8.9 - 12.9 FL    NRBC 0.0 0.0  WBC    ABSOLUTE NRBC 0.00 0.00 - 0.01 K/uL    NEUTROPHILS 82 (H) 32 - 75 % LYMPHOCYTES 11 (L) 12 - 49 %    MONOCYTES 7 5 - 13 %    EOSINOPHILS 0 0 - 7 %    BASOPHILS 0 0 - 1 %    IMMATURE GRANULOCYTES 0 %    ABS. NEUTROPHILS 8.0 1.8 - 8.0 K/UL    ABS. LYMPHOCYTES 1.1 0.8 - 3.5 K/UL    ABS. MONOCYTES 0.7 0.0 - 1.0 K/UL    ABS. EOSINOPHILS 0.0 0.0 - 0.4 K/UL    ABS. BASOPHILS 0.0 0.0 - 0.1 K/UL    ABS. IMM. GRANS. 0.0 K/UL    DF Manual      RBC COMMENTS Anisocytosis  1+       METABOLIC PANEL, COMPREHENSIVE    Collection Time: 10/19/22 10:35 PM   Result Value Ref Range    Sodium 129 (L) 136 - 145 mmol/L    Potassium 3.9 3.5 - 5.1 mmol/L    Chloride 102 97 - 108 mmol/L    CO2 17 (L) 21 - 32 mmol/L    Anion gap 10 5 - 15 mmol/L    Glucose 98 65 - 100 mg/dL    BUN 9 6 - 20 mg/dL    Creatinine 0.70 0.55 - 1.02 mg/dL    BUN/Creatinine ratio 13 12 - 20      eGFR >60 >60 ml/min/1.73m2    Calcium 9.0 8.5 - 10.1 mg/dL    Bilirubin, total 1.0 0.2 - 1.0 mg/dL    AST (SGOT) 67 (H) 15 - 37 U/L    ALT (SGPT) 30 12 - 78 U/L    Alk.  phosphatase 120 (H) 45 - 117 U/L    Protein, total 9.2 (H) 6.4 - 8.2 g/dL    Albumin 1.8 (L) 3.5 - 5.0 g/dL    Globulin 7.4 (H) 2.0 - 4.0 g/dL    A-G Ratio 0.2 (L) 1.1 - 2.2     D DIMER    Collection Time: 10/19/22 10:35 PM   Result Value Ref Range    D DIMER >20.00 (H) <0.50 ug/ml(FEU)   TROPONIN-HIGH SENSITIVITY    Collection Time: 10/19/22 10:35 PM   Result Value Ref Range    Troponin-High Sensitivity 4 0 - 51 ng/L   EKG, 12 LEAD, INITIAL    Collection Time: 10/19/22 11:05 PM   Result Value Ref Range    Ventricular Rate 107 BPM    Atrial Rate 107 BPM    P-R Interval 100 ms    QRS Duration 78 ms    Q-T Interval 342 ms    QTC Calculation (Bezet) 456 ms    Calculated P Axis 13 degrees    Calculated R Axis 30 degrees    Calculated T Axis 50 degrees    Diagnosis       Sinus tachycardia with short MI  Otherwise normal ECG  When compared with ECG of 13-SEP-2022 00:27,  No significant change was found  Confirmed by NILSA ELIZABETH, Brendon Sue ([id number]) on 10/20/2022 11:31:02 AM     POTASSIUM Collection Time: 10/19/22 11:48 PM   Result Value Ref Range    Potassium 3.8 3.5 - 5.1 mmol/L   HEPATIC FUNCTION PANEL    Collection Time: 10/19/22 11:48 PM   Result Value Ref Range    Protein, total 8.0 6.4 - 8.2 g/dL    Albumin 1.5 (L) 3.5 - 5.0 g/dL    Globulin 6.5 (H) 2.0 - 4.0 g/dL    A-G Ratio 0.2 (L) 1.1 - 2.2      Bilirubin, total 0.8 0.2 - 1.0 mg/dL    Bilirubin, direct 0.2 0.0 - 0.2 mg/dL    Alk.  phosphatase 99 45 - 117 U/L    AST (SGOT) 54 (H) 15 - 37 U/L    ALT (SGPT) 26 12 - 78 U/L   NT-PRO BNP    Collection Time: 10/19/22 11:48 PM   Result Value Ref Range    NT pro- (H) <125 pg/mL   TYPE & SCREEN    Collection Time: 10/20/22 12:52 AM   Result Value Ref Range    Crossmatch Expiration 10/23/2022,2359     ABO/Rh(D) A Positive     Antibody screen Negative     Unit number [id number]     Blood component type RC LR     Unit division 00     Status of unit Issued     [address] to transfuse     Crossmatch result Compatible    OCCULT BLOOD, STOOL    Collection Time: 10/20/22  2:48 AM   Result Value Ref Range    Occult Blood,day 1 Negative Negative      Day 1 date: 10,202,022     URINALYSIS W/ REFLEX CULTURE    Collection Time: 10/20/22  2:49 AM    Specimen: Urine   Result Value Ref Range    Color Yellow      Appearance Turbid (A) Clear      Specific gravity 1.023 1.003 - 1.030      pH (UA) 6.0 5.0 - 8.0      Protein 100 (A) Negative mg/dL    Glucose Negative Negative mg/dL    Ketone 5 (A) Negative mg/dL    Bilirubin Negative Negative      Blood Moderate (A) Negative      Urobilinogen 0.1 0.1 - 1.0 EU/dL    Nitrites Negative Negative      Leukocyte Esterase Large (A) Negative      WBC >100 (H) 0 - 4 /hpf    RBC 20-50 0 - 5 /hpf    Bacteria Negative Negative /hpf    UA:UC IF INDICATED Urine Culture Ordered (A) Culture not indicated by UA result      Mucus 2+ (A) Negative /lpf    Budding yeast Present (A) Negative     COVID-19 RAPID TEST    Collection Time: 10/20/22  2:35 PM   Result Value Ref Range    COVID-19 rapid test Not Detected Not Detected     MRSA SCREEN - PCR (NASAL)    Collection Time: 10/20/22  2:35 PM   Result Value Ref Range    MRSA by PCR, Nasal Not Detected Not Detected           CT ABD PELV WO CONT   Final Result   Status post cystectomy and hysterectomy with right lower quadrant ileal conduit. Mild bilateral hydroureteronephrosis, evaluation is somewhat limited by the   presence of contrast from previously performed examination. Small bowel   distention with decompressed loops distally concerning for obstruction likely   related to adhesion formation. While this study was done without the use of   intravenous contrast there is questionable right common femoral vein thrombus   with soft tissue edema. CTA CHEST W OR W WO CONT   Final Result   Emphysematous changes. No evidence of pulmonary embolism or acute   abnormality. Cholelithiasis. Bilateral hydronephrosis. 1 cm hypodense lesion   right hepatic lobe cannot be characterized with certainty on the basis of this   examination. Follow-up is recommended to ensure stability. XR CHEST PORT   Final Result   No Acute Disease. XR FOOT LT MIN 3 V   Final Result   Normal study. DUPLEX LOWER EXT VENOUS BILAT    (Results Pending)       Assessment:  Ileus    Nausea     Constipation    Plan:    Admission  Diet: sips of clears  IV fluids  SCD  IS  Nausea medication  Labs in am  Abdominal Xray in am  Consult: Dr. Kenzie Umanzor     Thank you for the consultation & allowing me to participate in the care of this patient.

## 2022-10-21 ENCOUNTER — APPOINTMENT (OUTPATIENT)
Dept: INTERVENTIONAL RADIOLOGY/VASCULAR | Age: 57
DRG: 231 | End: 2022-10-21
Attending: NURSE PRACTITIONER
Payer: MEDICAID

## 2022-10-21 ENCOUNTER — APPOINTMENT (OUTPATIENT)
Dept: GENERAL RADIOLOGY | Age: 57
DRG: 231 | End: 2022-10-21
Attending: SURGERY
Payer: MEDICAID

## 2022-10-21 LAB
ABO + RH BLD: NORMAL
ANION GAP SERPL CALC-SCNC: 9 MMOL/L (ref 5–15)
BLD PROD TYP BPU: NORMAL
BLOOD GROUP ANTIBODIES SERPL: NEGATIVE
BPU ID: NORMAL
BUN SERPL-MCNC: 8 MG/DL (ref 6–20)
BUN/CREAT SERPL: 16 (ref 12–20)
CA-I BLD-MCNC: 8 MG/DL (ref 8.5–10.1)
CHLORIDE SERPL-SCNC: 112 MMOL/L (ref 97–108)
CO2 SERPL-SCNC: 17 MMOL/L (ref 21–32)
CREAT SERPL-MCNC: 0.51 MG/DL (ref 0.55–1.02)
CROSSMATCH RESULT,%XM: NORMAL
ERYTHROCYTE [DISTWIDTH] IN BLOOD BY AUTOMATED COUNT: 16.8 % (ref 11.5–14.5)
GLUCOSE SERPL-MCNC: 70 MG/DL (ref 65–100)
HCT VFR BLD AUTO: 23.7 % (ref 35–47)
HGB BLD-MCNC: 7.5 G/DL (ref 11.5–16)
MCH RBC QN AUTO: 27.2 PG (ref 26–34)
MCHC RBC AUTO-ENTMCNC: 31.6 G/DL (ref 30–36.5)
MCV RBC AUTO: 85.9 FL (ref 80–99)
NRBC # BLD: 0 K/UL (ref 0–0.01)
NRBC BLD-RTO: 0 PER 100 WBC
PLATELET # BLD AUTO: 258 K/UL (ref 150–400)
PMV BLD AUTO: 9.7 FL (ref 8.9–12.9)
POTASSIUM SERPL-SCNC: 3.6 MMOL/L (ref 3.5–5.1)
RBC # BLD AUTO: 2.76 M/UL (ref 3.8–5.2)
SODIUM SERPL-SCNC: 138 MMOL/L (ref 136–145)
SPECIMEN EXP DATE BLD: NORMAL
STATUS OF UNIT,%ST: NORMAL
TRANSFUSION STATUS PATIENT QL: NORMAL
UNIT DIVISION, %UDIV: 0
WBC # BLD AUTO: 7.2 K/UL (ref 3.6–11)

## 2022-10-21 PROCEDURE — 80048 BASIC METABOLIC PNL TOTAL CA: CPT

## 2022-10-21 PROCEDURE — 74018 RADEX ABDOMEN 1 VIEW: CPT

## 2022-10-21 PROCEDURE — 74011250636 HC RX REV CODE- 250/636: Performed by: INTERNAL MEDICINE

## 2022-10-21 PROCEDURE — 74011000250 HC RX REV CODE- 250: Performed by: INTERNAL MEDICINE

## 2022-10-21 PROCEDURE — 85027 COMPLETE CBC AUTOMATED: CPT

## 2022-10-21 PROCEDURE — 74011250637 HC RX REV CODE- 250/637: Performed by: INTERNAL MEDICINE

## 2022-10-21 PROCEDURE — 65270000029 HC RM PRIVATE

## 2022-10-21 PROCEDURE — 36415 COLL VENOUS BLD VENIPUNCTURE: CPT

## 2022-10-21 PROCEDURE — 74011250637 HC RX REV CODE- 250/637: Performed by: NURSE PRACTITIONER

## 2022-10-21 PROCEDURE — 93970 EXTREMITY STUDY: CPT | Performed by: SURGERY

## 2022-10-21 PROCEDURE — 74011250636 HC RX REV CODE- 250/636: Performed by: NURSE PRACTITIONER

## 2022-10-21 RX ORDER — SODIUM CHLORIDE 9 MG/ML
100 INJECTION, SOLUTION INTRAVENOUS CONTINUOUS
Status: DISPENSED | OUTPATIENT
Start: 2022-10-21 | End: 2022-10-23

## 2022-10-21 RX ORDER — OXYCODONE HYDROCHLORIDE 5 MG/1
5 TABLET ORAL
Status: DISCONTINUED | OUTPATIENT
Start: 2022-10-21 | End: 2022-11-07

## 2022-10-21 RX ADMIN — SODIUM CHLORIDE, PRESERVATIVE FREE 10 ML: 5 INJECTION INTRAVENOUS at 23:09

## 2022-10-21 RX ADMIN — FLUCONAZOLE 200 MG: 200 INJECTION, SOLUTION INTRAVENOUS at 09:35

## 2022-10-21 RX ADMIN — OXYCODONE 5 MG: 5 TABLET ORAL at 16:00

## 2022-10-21 RX ADMIN — SODIUM CHLORIDE 100 ML/HR: 9 INJECTION, SOLUTION INTRAVENOUS at 19:56

## 2022-10-21 RX ADMIN — SODIUM CHLORIDE 100 ML/HR: 9 INJECTION, SOLUTION INTRAVENOUS at 10:32

## 2022-10-21 RX ADMIN — ACETAMINOPHEN 650 MG: 325 TABLET, FILM COATED ORAL at 06:33

## 2022-10-21 RX ADMIN — CROMOLYN SODIUM 1 DROP: 40 SOLUTION/ DROPS OPHTHALMIC at 09:36

## 2022-10-21 RX ADMIN — SODIUM CHLORIDE, PRESERVATIVE FREE 10 ML: 5 INJECTION INTRAVENOUS at 05:36

## 2022-10-21 NOTE — CONSULTS
Hematology and Oncology Inpatient Consult Note     Patient: Deidra Mishra MRN: [id number]  SSN: [id number]    YOB: 1965  Age: 61 y.o. Sex: female    Chief Complaint: Patient was admitted with nausea and poor p.o. intake    Reason for consult: Evaluation and management of patient with deep vein thrombosis    Subjective: Deidra Mishra is a 61 y.o. -American female who had anterior exenteration surgery by Dr. Selvin Garcia about 3 weeks ago. Patient was admitted with decreased p.o. intake and nausea. Patient was diagnosed with partial small bowel obstruction. During this hospital stay patient was found to have leg swelling and had Doppler study which showed extensive deep vein thrombosis on both side. Patient also has vaginal bleeding. Anticoagulation has not been started I was asked to see her for hematologic evaluation. Patient has history of DVT in the past and had IVC filter placed several years ago. Patient has somewhat vaginal spotting. This started after her surgery. Patient's niece anticipated in this interview and discussion via cell phone.     Past Medical History:   Diagnosis Date    Anemia     pt states had recent blood transfusion 2022    Back pain     Cancer (HCC)     bladder    DVT (deep venous thrombosis) (HCC)     and PE, pt denies lung PE , only leg several years ago    Hepatitis C     pt states dx 1 month ago    Liver disease     Presence of IVC filter     pt states several years ago    Rheumatoid arthritis ([facility] [address]     Sciatic leg pain     pt states both legs    Uterine fibroid      Past Surgical History:   Procedure Laterality Date    HX APPENDECTOMY  10/03/2022    HX  SECTION      HX GYN  10/03/2022    ROBOT ANTERIOR PELVIC EXENTERATION    HX HYSTERECTOMY  10/03/2022    HX SALPINGO-OOPHORECTOMY Bilateral 10/03/2022    HX UROLOGICAL  09/15/2022    CYSTOSCOPY    HX UROLOGICAL  09/15/2022    URETHRAL DILATION    HX UROLOGICAL  09/15/2022    URETERAL STENT PLACEMENT    HX UROLOGICAL  09/15/2022    TRANSURETHRAL RESECTION OF BLADDER TUMOR >2CM    HX UROLOGICAL Bilateral 09/15/2022    RETROGRADE PYELOGRAM    HX UROLOGICAL  10/03/2022    PELVIC LYMPH NODE DISSECTION    HX UROLOGICAL  10/03/2022    ILEAL CONDUIT URINARY DIVERSION    MI CARDIAC SURG PROCEDURE UNLIST      stent placement      Family History   Problem Relation Age of Onset    Cancer Mother     Lung Cancer Mother     Heart Disease Father     Hypertension Sister     Cancer Maternal Aunt     Breast Cancer Maternal Aunt      Social History     Tobacco Use    Smoking status: Former     Years: 10.00     Types: Cigarettes    Smokeless tobacco: Never    Tobacco comments:     Quit 15 years ago   Substance Use Topics    Alcohol use: Not Currently      Current Facility-Administered Medications   Medication Dose Route Frequency Provider Last Rate Last Admin    oxyCODONE IR (ROXICODONE) tablet 5 mg  5 mg Oral Q6H PRN Omelia Canal, NP        0.9% sodium chloride infusion  100 mL/hr IntraVENous CONTINUOUS Omelia Canal,  mL/hr at 10/21/22 1032 100 mL/hr at 10/21/22 1032    0.9% sodium chloride infusion 250 mL  250 mL IntraVENous PRN Blair Posadas MD        cromolyn (OPTICROM) 4 % ophthalmic solution 1 Drop  1 Drop Both Eyes DAILY Blair Posadas MD   1 Drop at 10/21/22 0936    sodium chloride (NS) flush 5-40 mL  5-40 mL IntraVENous Q8H Blair Posadas MD   10 mL at 10/21/22 0536    sodium chloride (NS) flush 5-40 mL  5-40 mL IntraVENous PRN Tato Mittal MD        acetaminophen (TYLENOL) tablet 650 mg  650 mg Oral Q6H PRN Tato Mittal MD   650 mg at 10/21/22 1240    Or    acetaminophen (TYLENOL) suppository 650 mg  650 mg Rectal Q6H PRN Blair Posadas MD        polyethylene glycol (MIRALAX) packet 17 g  17 g Oral DAILY PRN Blair Posadas MD        ondansetron (ZOFRAN ODT) tablet 4 mg  4 mg Oral Q8H PRN Blair Posadas MD        Or    ondansetron (ZOFRAN) injection 4 mg  4 mg IntraVENous Q6H PRN Aury Ya MD Adiel        fluconazole (DIFLUCAN) 200mg/100 mL IVPB (premix)  200 mg IntraVENous DAILY Karina Posadas  mL/hr at 10/21/22 0935 200 mg at 10/21/22 0935    morphine injection 2 mg  2 mg IntraVENous Q4H PRN Ang Baxter MD   2 mg at 10/20/22 1845        Allergies   Allergen Reactions    Bactrim [Sulfamethoprim] Swelling     Swelling of the lilps    Penicillin V Potassium Swelling       Review of Systems:  CONSTITUTIONAL: No fever, no chills. No repeated infections. No night sweats. Patient is feeling tired and weak  HEENT: No mouth sores. No Epistaxis. No hearing impairment. No change in taste or smell sensations. CARDIOVASCULAR: No  palpitations or chest pain. She does have leg swelling bilaterally. No syncope. RESPIRATORY: No cough. No dyspnea on exertion. No Hemoptysis. No wheezing. No hoarseness of voice. GI: Patient had some nausea and vomiting which is better. She has some abdominal discomfort. No diarrhea, constipation, no bright red blood per rectum. No hematemesis or melena. No weight loss. No dysphagia. : No dysuria, no hematuria. No frequency of urination. She has vaginal bleeding  INTEGUMENTARY: No skin rash or palpable lumps or bumps. HEMATOLOGIC: No history of easy bruisability. No gingival bleeding  NEURO: No focal weakness, No paresthesia. No headache or seizures. MUSCULOSKELETAL: No back pain. Objective:     Vitals:    10/21/22 0400 10/21/22 0625 10/21/22 0800 10/21/22 0838   BP:  98/64  (!) 93/59   Pulse: 89 90 72 83   Resp:  18  18   Temp:  98.2 °F (36.8 °C)  97.7 °F (36.5 °C)   SpO2:  100%  100%   Weight:       Height:            Physical Exam:  Constitutional: Middle-aged -American female looks chronically ill. Not in any acute distress or pain. Eyes: Sclerae anicteric. Conjunctivae shows pallor. ENMT: Oral mucosa is moist, no thrush, mucositis, or petechiae. Neck: No adenopathy, JVD or thyromegaly.    Hematologic/Lymphatic: Bilateral axillary/inguinal regions showed no adenopathy. Respiratory: Lungs are clear bilaterally. Cardiovascular: Normal sinus rhythm; no gallop or murmur. Abdomen: Patient has colostomy. Soft nontender. Incision scar scar well-healed. Back/Spine: No spinal tenderness; no costovertebral angle tenderness. Extremities: Does have leg swelling. Skin: No petechiae; no skin rash. Neurologic: Alert/oriented x 3; no focal neurological deficits. Recent Results (from the past 24 hour(s))   COVID-19 RAPID TEST    Collection Time: 10/20/22  2:35 PM   Result Value Ref Range    COVID-19 rapid test Not Detected Not Detected     MRSA SCREEN - PCR (NASAL)    Collection Time: 10/20/22  2:35 PM   Result Value Ref Range    MRSA by PCR, Nasal Not Detected Not Detected     METABOLIC PANEL, BASIC    Collection Time: 10/21/22  4:51 AM   Result Value Ref Range    Sodium 138 136 - 145 mmol/L    Potassium 3.6 3.5 - 5.1 mmol/L    Chloride 112 (H) 97 - 108 mmol/L    CO2 17 (L) 21 - 32 mmol/L    Anion gap 9 5 - 15 mmol/L    Glucose 70 65 - 100 mg/dL    BUN 8 6 - 20 mg/dL    Creatinine 0.51 (L) 0.55 - 1.02 mg/dL    BUN/Creatinine ratio 16 12 - 20      eGFR >60 >60 ml/min/1.73m2    Calcium 8.0 (L) 8.5 - 10.1 mg/dL   CBC W/O DIFF    Collection Time: 10/21/22  4:51 AM   Result Value Ref Range    WBC 7.2 3.6 - 11.0 K/uL    RBC 2.76 (L) 3.80 - 5.20 M/uL    HGB 7.5 (L) 11.5 - 16.0 g/dL    HCT 23.7 (L) 35.0 - 47.0 %    MCV 85.9 80.0 - 99.0 FL    MCH 27.2 26.0 - 34.0 PG    MCHC 31.6 30.0 - 36.5 g/dL    RDW 16.8 (H) 11.5 - 14.5 %    PLATELET 466 649 - 717 K/uL    MPV 9.7 8.9 - 12.9 FL    NRBC 0.0 0.0  WBC    ABSOLUTE NRBC 0.00 0.00 - 0.01 K/uL        XR ABD (KUB)   Final Result   Dilated small bowel within the central abdomen is similar to CT from one day   prior. DUPLEX LOWER EXT VENOUS BILAT   Final Result   Addendum (preliminary) 1 of 1      · Thrombus present in the right external iliac vein. · Thrombus present in the right common femoral vein.    · Thrombus present in the right femoral vein. · Thrombus present in the right proximal femoral vein. · Thrombus present in the right mid femoral vein. · Thrombus present in the right distal femoral vein. · Thrombus present in the right popliteal vein. · Thrombus present in the right gastrocnemius vein. · Thrombus present in the right peroneal vein. · Thrombus present in the right saphenofemoral junction. · Thrombus present in the left external iliac vein. · Thrombus present in the left common femoral vein. · Thrombus present in the left saphenofemoral junction. · Thrombus present in the left femoral vein. · Thrombus present in the left proximal femoral vein. · Thrombus present in the left mid femoral vein. · Thrombus present in the left popliteal vein. · Thrombus present in the left distal femoral vein. · Thrombus present in the left gastrocnemius vein. · Thrombus present in the left peroneal vein. This is vascular lab report on Hollie Epperson, patient's YOB: 1965   Date of examination: October 20, 2022   Examination: Duplex lower extremity venous bilateral   Technician: Ms. Marlene Jones   Clinical history: This is  61years old woman with suspected renal vein    thrombosis   Indication: femoral vein thrombosis. Technique: Bilateral leg venous structures examined using venous duplex    ultrasound with 2D grayscale, color-flow and spectral Doppler analysis. Findings:   Right side:   common femoral vein and saphenofemoral junction is not compressible and    there is hypoechoic filling defect noted.    common femoral vein shows no venous flow   Distal external iliac vein also not compressible and that there is no    venous flow   Proximal femoral vein is noncompressible and there is no venous flow   Mid femoral vein is not compressible and there is no venous flow   Distal femoral vein is noncompressible and there is no venous flow   Proximal, mid, distal greater saphenous vein is compressible, there is no    filling defect   Distal popliteal vein is not compressible. Proximal popliteal vein is noncompressible and there is no venous flow   Proximal gastrocnemius vein is not compressible   Proximal small saphenous vein is not compressible   Distal, mid, proximal  peroneal vein is not compressible      Left side:   common femoral vein not compressible and there is no venous flow   Saphenofemoral junction is not compressible and there is no venous flow. External iliac vein also not compressible there is no venous flow   Proximal femoral vein is noncompressible and there is no venous flow   Mid femoral vein is noncompressible and there is no venous flow   Distal femoral vein is not compressible and there is no venous flow   Proximal, mid, distal greater saphenous vein is noncompressible and there    is hypoechoic filling defect noted. Distal popliteal vein is noncompressible   Proximal popliteal vein is noncompressible and there is no venous flow   Proximal gastrocnemius vein is not compressible and there is no venous    flow   Proximal small saphenous vein is noncompressible and there is no venous    flow   Distal, mid, proximal peroneal vein is not compressible. Impression:   1. Right external iliac vein, common femoral vein, femoral vein,    popliteal vein, gastrocnemius vein, small saphenous vein, and peroneal    vein shows acute venous thrombosis   2. Left external iliac vein, common femoral vein, femoral vein, popliteal    vein, gastrocnemius vein, small saphenous vein, peroneal vein, and    saphenofemoral junction and greater saphenous vein shows acute venous    thrombosis. These  extensive bilateral lower extremity deep vein thrombosis notified    to Dr. Pito Sher on 10/20/2022 1201 PM.            CT ABD PELV WO CONT   Final Result   Status post cystectomy and hysterectomy with right lower quadrant ileal conduit.    Mild bilateral hydroureteronephrosis, evaluation is somewhat limited by the   presence of contrast from previously performed examination. Small bowel   distention with decompressed loops distally concerning for obstruction likely   related to adhesion formation. While this study was done without the use of   intravenous contrast there is questionable right common femoral vein thrombus   with soft tissue edema. CTA CHEST W OR W WO CONT   Final Result   Emphysematous changes. No evidence of pulmonary embolism or acute   abnormality. Cholelithiasis. Bilateral hydronephrosis. 1 cm hypodense lesion   right hepatic lobe cannot be characterized with certainty on the basis of this   examination. Follow-up is recommended to ensure stability. XR CHEST PORT   Final Result   No Acute Disease. XR FOOT LT MIN 3 V   Final Result   Normal study. Assessment:     Hospital Problems  Date Reviewed: 9/27/2022            Codes Class Noted POA    Acute blood loss anemia ICD-10-CM: D62  ICD-9-CM: 285.1  10/20/2022 Unknown        * (Principal) Small bowel obstruction ([address] ICD-10-CM: R76.834  ICD-9-CM: 560.9  10/20/2022 Unknown        Status post robot-assisted surgical procedure, pelvic exenteration ICD-10-CM: Z98.890  ICD-9-CM: V45.89  10/20/2022 Unknown        Vaginal cuff cellulitis ICD-10-CM: N76.0  ICD-9-CM: 616.10  10/20/2022 Unknown           Assessment & Plan:     58-year-old -American female with multiple medical problems who had anterior exenteration surgery for her bladder cancer. Patient was not much moving postoperatively and was admitted with partial small bowel obstruction. Patient is now diagnosed with DVT. 1) deep vein thrombosis: Obviously patient's recent surgery and immobilization was the reason for her DVT. I have reviewed her Doppler study which shows extensive DVT of both leg.   Patient has IVC filter but that filter is old and there are very good chances that she has collateral blood vessels bypassing filter. So she does need anticoagulation prevent pulmonary embolism and also to prevent long-term complication of lower extremity edema and post phlebitis syndrome in leg. Patient does have some vaginal bleeding but it does not look like clinically significant. I can understand patient's and primary teams concern for worsening of bleeding. I have explained to patient that I would recommend low-dose IV heparin with close monitoring. Of course if she starts having profuse bleeding or if her H&H starts going down then we can always stop the anticoagulation. If she able to tolerate it then we can treat her incrementally higher dose of heparin with close supervision. Patient's niece also participated in this discussion at this point patient does not want us to start IV heparin after discussing risk and benefit. She is telling me that she is going to think about it and discuss with her family members and let me know in the morning. I would like to start her IV heparin as soon as she makes up her mind. 2) bladder cancer: We will review her surgical pathology and op report and make recommendations accordingly. 3) anemia: From her recent surgery as well as vaginal bleeding. We will monitor her H&H and give as needed packed RBC transfusion to keep hemoglobin above 7. I will send iron studies to see if patient need any iron supplements or infusion.    -Case was discussed with attending physician Nereida Zapata. This dictation was done by dragon, computer voice recognition software. Often unanticipated grammatical, syntax, phones and other interpretive errors are inadvertently transcribed. Please excuse errors that have escaped final proofreading.      Signed By: Marquetta Kanner, MD     October 21, 2022

## 2022-10-21 NOTE — PROGRESS NOTES
Problem: Pain  Goal: *Control of Pain  10/20/2022 2345 by Henry Bansal RN  Outcome: Progressing Towards Goal  10/20/2022 2344 by Henry Bansal RN  Outcome: Progressing Towards Goal  Goal: *PALLIATIVE CARE:  Alleviation of Pain  10/20/2022 2345 by Henry Bansal RN  Outcome: Progressing Towards Goal  10/20/2022 2344 by Henry Bansal RN  Outcome: Progressing Towards Goal     Problem: Patient Education: Go to Patient Education Activity  Goal: Patient/Family Education  10/20/2022 2345 by Henry Bansal RN  Outcome: Progressing Towards Goal  10/20/2022 2344 by Henry Bansal RN  Outcome: Progressing Towards Goal     Problem: Falls - Risk of  Goal: *Absence of Falls  Description: Document Saeed Sr Fall Risk and appropriate interventions in the flowsheet.   10/20/2022 2345 by Henry Bansal RN  Outcome: Progressing Towards Goal  Note: Fall Risk Interventions:  Mobility Interventions: Bed/chair exit alarm         Medication Interventions: Bed/chair exit alarm    Elimination Interventions: Bed/chair exit alarm, Call light in reach, Patient to call for help with toileting needs    History of Falls Interventions: Bed/chair exit alarm      10/20/2022 2344 by Henry Bansal RN  Outcome: Progressing Towards Goal  Note: Fall Risk Interventions:  Mobility Interventions: Bed/chair exit alarm         Medication Interventions: Bed/chair exit alarm    Elimination Interventions: Bed/chair exit alarm, Call light in reach, Patient to call for help with toileting needs    History of Falls Interventions: Bed/chair exit alarm         Problem: Patient Education: Go to Patient Education Activity  Goal: Patient/Family Education  10/20/2022 2345 by Henry Bansal RN  Outcome: Progressing Towards Goal  10/20/2022 2344 by Henry Bansal RN  Outcome: Progressing Towards Goal     Problem: Patient Education: Go to Patient Education Activity  Goal: Patient/Family Education  10/20/2022 2345 by Henry Bansal RN  Outcome: Progressing Towards Goal  10/20/2022 2344 by Manuel Mattson RN  Outcome: Progressing Towards Goal

## 2022-10-21 NOTE — PROGRESS NOTES
Problem: Pain  Goal: *Control of Pain  Outcome: Progressing Towards Goal     Problem: Falls - Risk of  Goal: *Absence of Falls  Description: Document Iris Fall Risk and appropriate interventions in the flowsheet.   Outcome: Progressing Towards Goal  Note: Fall Risk Interventions:  Mobility Interventions: Bed/chair exit alarm, Patient to call before getting OOB         Medication Interventions: Bed/chair exit alarm, Patient to call before getting OOB    Elimination Interventions: Call light in reach, Bed/chair exit alarm, Patient to call for help with toileting needs    History of Falls Interventions: Bed/chair exit alarm, Door open when patient unattended

## 2022-10-21 NOTE — WOUND CARE
Met with patient for urostomy pouching needs. Appliance was well-fitting and without leaks. However, patient requested for appliance to be changed due to mucus build-up clogging drainage port. Peristomal skin intact. Stoma is flush with skin and measures approximately 18-20 mm. Sutures still intact. Applied convex 1-piece appliance with belt tabs and ostomy belt. Patient confirmed she is independent with emptying her pouch and her niece performs appliance changes when needed. Patient able to move independently in bed. No other wound/skin care needs noted at this time. Re-consult [facility] if skin condition changes.

## 2022-10-21 NOTE — PROGRESS NOTES
Problem: Pain  Goal: *Control of Pain  Outcome: Progressing Towards Goal  Goal: *PALLIATIVE CARE:  Alleviation of Pain  Outcome: Progressing Towards Goal     Problem: Patient Education: Go to Patient Education Activity  Goal: Patient/Family Education  Outcome: Progressing Towards Goal     Problem: Falls - Risk of  Goal: *Absence of Falls  Description: Document Brittany Litter Fall Risk and appropriate interventions in the flowsheet.   Outcome: Progressing Towards Goal  Note: Fall Risk Interventions:  Mobility Interventions: Bed/chair exit alarm         Medication Interventions: Bed/chair exit alarm    Elimination Interventions: Bed/chair exit alarm, Call light in reach, Patient to call for help with toileting needs    History of Falls Interventions: Bed/chair exit alarm         Problem: Patient Education: Go to Patient Education Activity  Goal: Patient/Family Education  Outcome: Progressing Towards Goal     Problem: Patient Education: Go to Patient Education Activity  Goal: Patient/Family Education  Outcome: Progressing Towards Goal

## 2022-10-21 NOTE — PROGRESS NOTES
Hospitalist Progress Note         HARMAN August, FNP-C    Daily Progress Note: 10/21/2022      Subjective:   Subjective   Patient alert and oriented lying in bed. She continues to report bilateral leg pain and generalized weakness. Endorses no sob on examination. Denies abdominal pain on examination. Review of Systems:   Review of Systems   Constitutional:  Negative for chills and fever. Respiratory:  Negative for cough. Cardiovascular:  Positive for leg swelling. Negative for chest pain. Gastrointestinal:  Negative for abdominal pain, nausea and vomiting. Genitourinary:  Negative for dysuria. Musculoskeletal:  Positive for myalgias. Negative for falls and neck pain. Neurological:  Positive for weakness. Objective:   Objective      Vitals:  Patient Vitals for the past 12 hrs:   Temp Pulse Resp BP SpO2   10/21/22 0838 97.7 °F (36.5 °C) 83 18 (!) 93/59 100 %   10/21/22 0800 -- 72 -- -- --   10/21/22 0625 98.2 °F (36.8 °C) 90 18 98/64 100 %   10/21/22 0400 -- 89 -- -- --   10/21/22 0000 -- 88 -- -- --        Physical Exam:  Physical Exam  Vitals and nursing note reviewed. Constitutional:       Appearance: Normal appearance. Eyes:      Extraocular Movements: Extraocular movements intact. Cardiovascular:      Rate and Rhythm: Normal rate. Abdominal:      General: Bowel sounds are normal.   Genitourinary:     Comments: Right urostomy  Musculoskeletal:      Comments: Bilateral leg tenderness with palpation   Neurological:      Mental Status: She is alert and oriented to person, place, and time. Motor: Weakness present.         Lab Results:  Recent Results (from the past 24 hour(s))   COVID-19 RAPID TEST    Collection Time: 10/20/22  2:35 PM   Result Value Ref Range    COVID-19 rapid test Not Detected Not Detected     MRSA SCREEN - PCR (NASAL)    Collection Time: 10/20/22  2:35 PM   Result Value Ref Range    MRSA by PCR, Nasal Not Detected Not Detected     METABOLIC PANEL, BASIC Collection Time: 10/21/22  4:51 AM   Result Value Ref Range    Sodium 138 136 - 145 mmol/L    Potassium 3.6 3.5 - 5.1 mmol/L    Chloride 112 (H) 97 - 108 mmol/L    CO2 17 (L) 21 - 32 mmol/L    Anion gap 9 5 - 15 mmol/L    Glucose 70 65 - 100 mg/dL    BUN 8 6 - 20 mg/dL    Creatinine 0.51 (L) 0.55 - 1.02 mg/dL    BUN/Creatinine ratio 16 12 - 20      eGFR >60 >60 ml/min/1.73m2    Calcium 8.0 (L) 8.5 - 10.1 mg/dL   CBC W/O DIFF    Collection Time: 10/21/22  4:51 AM   Result Value Ref Range    WBC 7.2 3.6 - 11.0 K/uL    RBC 2.76 (L) 3.80 - 5.20 M/uL    HGB 7.5 (L) 11.5 - 16.0 g/dL    HCT 23.7 (L) 35.0 - 47.0 %    MCV 85.9 80.0 - 99.0 FL    MCH 27.2 26.0 - 34.0 PG    MCHC 31.6 30.0 - 36.5 g/dL    RDW 16.8 (H) 11.5 - 14.5 %    PLATELET 854 266 - 642 K/uL    MPV 9.7 8.9 - 12.9 FL    NRBC 0.0 0.0  WBC    ABSOLUTE NRBC 0.00 0.00 - 0.01 K/uL      Results       Procedure Component Value Units Date/Time    COVID-19 RAPID TEST [[id number]] Collected: 10/20/22 1435    Order Status: Completed Specimen: Nasopharyngeal Updated: 10/20/22 1503     COVID-19 rapid test Not Detected        Comment: Rapid Abbott ID Now   Rapid NAAT:  The specimen is NEGATIVE for SARS-CoV-2, the novel coronavirus associated with COVID-19. Negative results should be treated as presumptive and, if inconsistent with clinical signs and symptoms or necessary for patient management, should be tested with an alternative molecular assay. Negative results do not preclude SARS-CoV-2 infection and should not be used as the sole basis for patient management decisions. This test has been authorized by the FDA under   an Emergency Use Authorization (EUA) for use by authorized laboratories.  Fact sheet for Healthcare Providers: ConventionUpdate.co.nz Fact sheet for Patients: ConventionUpdate.co.nz   Methodology: Isothermal Nucleic Acid Amplification         MRSA SCREEN - PCR (NASAL) [[id number]] Collected: 10/20/22 9828 Order Status: Completed Specimen: Swab Updated: 10/20/22 1636     MRSA by PCR, Nasal Not Detected       MRSA SCREEN - PCR (NASAL) [[id number]] Collected: 10/20/22 1340    Order Status: Canceled Specimen: Swab     CULTURE, Marleni Arn STAIN [[id number]] Collected: 10/20/22 1340    Order Status: Sent Specimen: Wound from [facility]ia Updated: 10/20/22 1430    CULTURE, BODY FLUID A[place]onito Vargas STAIN [[id number]]     Order Status: Canceled Specimen: Body Fluid from Uterine [place]ac Fluid     CULTURE, URINE [[id number]] Collected: 10/20/22 0249    Order Status: No result Specimen: Urine Updated: 10/20/22 0349             Diagnostic Images:  CT Results  (Last 48 hours)                 10/20/22 0416  CT ABD PELV WO CONT Final result    Impression:  Status post cystectomy and hysterectomy with right lower quadrant ileal conduit. Mild bilateral hydroureteronephrosis, evaluation is somewhat limited by the   presence of contrast from previously performed examination. Small bowel   distention with decompressed loops distally concerning for obstruction likely   related to adhesion formation. While this study was done without the use of   intravenous contrast there is questionable right common femoral vein thrombus   with soft tissue edema. Narrative:  EXAM: CT ABD PELV WO CONT       INDICATION: s/p ureteral stents, hydronephrosis       COMPARISON: 10/6/2022       IV CONTRAST: None. ORAL CONTRAST: None       TECHNIQUE:    Thin axial images were obtained through the abdomen and pelvis. Coronal and   sagittal reformats were generated. CT dose reduction was achieved through use of   a standardized protocol tailored for this examination and automatic exposure   control for dose modulation. The absence of intravenous contrast material reduces the sensitivity for   evaluation of the vasculature and solid organs. FINDINGS:    LOWER THORAX: No significant abnormality in the incidentally imaged lower chest.   LIVER: No mass. BILIARY TREE: Innumerable gallstones. CBD is not dilated. SPLEEN: within normal limits. PANCREAS: No focal abnormality. ADRENALS: Unremarkable. KIDNEYS/URETERS: Contrast is noted in the collecting systems and ureters from   previously performed CT examination. Mild bilateral hydroureteronephrosis is   noted to the level of the ileal conduit. STOMACH: Unremarkable. SMALL BOWEL: Small bowel distention is noted in the left abdomen with   decompressed loops distally compatible with obstruction likely related to   effusion formation. COLON: No dilatation or wall thickening. APPENDIX: Surgically absent. PERITONEUM: No ascites or pneumoperitoneum. RETROPERITONEUM: IVC filter is seen within the infrarenal vena cava. REPRODUCTIVE ORGANS: The prostate is surgically absent. URINARY BLADDER: The bladder is surgically absent. BONES: Presacral soft tissue edema has not changed. ABDOMINAL WALL: Drainage catheter in the deep pelvis has been removed. Right   lower quadrant ostomy is present. While this study was done without the use of   intravenous contrast there is suggestion of right common femoral venous   thrombosis with associated proximal thigh soft tissue swelling. ADDITIONAL COMMENTS: N/A           10/20/22 0146  CTA CHEST W OR W WO CONT Final result    Impression:  Emphysematous changes. No evidence of pulmonary embolism or acute   abnormality. Cholelithiasis. Bilateral hydronephrosis. 1 cm hypodense lesion   right hepatic lobe cannot be characterized with certainty on the basis of this   examination. Follow-up is recommended to ensure stability. Narrative:  INDICATION:   Dyspnea       COMPARISON: None. TECHNIQUE:    Precontrast  images were obtained to localize the volume for acquisition. Multislice helical CT arteriography was performed from the diaphragm to the   thoracic inlet during uneventful rapid bolus of 100 cc Isovue-370.  Lung and soft   tissue windows were generated. Coronal and sagittal images were generated and   3D post processing consisting of coronal maximum intensity images was performed. CT dose reduction was achieved through use of a standardized protocol tailored   for this examination and automatic exposure control for dose modulation. FINDINGS:   CHEST:   THYROID: No nodule. MEDIASTINUM: No mass or lymphadenopathy. DEONNA: No mass or lymphadenopathy. THORACIC AORTA: No dissection or aneurysm. MAIN PULMONARY ARTERY: There is no evidence of pulmonary embolism. TRACHEA/BRONCHI: Patent. ESOPHAGUS: No wall thickening or dilatation. HEART: Normal in size. PLEURA: No effusion or pneumothorax. LUNGS: Emphysematous changes are noted. No acute abnormality. INCIDENTALLY IMAGED UPPER ABDOMEN: Indeterminate 10 mm hypodense lesion right   hepatic lobe. Gallstones. Bilateral hydronephrosis incompletely evaluated by   chest CT. BONES: No destructive bone lesion. XR ABD (KUB)   Final Result   Dilated small bowel within the central abdomen is similar to CT from one day   prior. DUPLEX LOWER EXT VENOUS BILAT   Final Result   Addendum (preliminary) 1 of 1      · Thrombus present in the right external iliac vein. · Thrombus present in the right common femoral vein. · Thrombus present in the right femoral vein. · Thrombus present in the right proximal femoral vein. · Thrombus present in the right mid femoral vein. · Thrombus present in the right distal femoral vein. · Thrombus present in the right popliteal vein. · Thrombus present in the right gastrocnemius vein. · Thrombus present in the right peroneal vein. · Thrombus present in the right saphenofemoral junction. · Thrombus present in the left external iliac vein. · Thrombus present in the left common femoral vein. · Thrombus present in the left saphenofemoral junction. · Thrombus present in the left femoral vein.    · Thrombus present in the left proximal femoral vein. · Thrombus present in the left mid femoral vein. · Thrombus present in the left popliteal vein. · Thrombus present in the left distal femoral vein. · Thrombus present in the left gastrocnemius vein. · Thrombus present in the left peroneal vein. This is vascular lab report on Pepper Peters, patient's YOB: 1965   Date of examination: October 20, 2022   Examination: Duplex lower extremity venous bilateral   Technician: Ms. Lakia Sebastian   Clinical history: This is  61years old woman with suspected renal vein    thrombosis   Indication: femoral vein thrombosis. Technique: Bilateral leg venous structures examined using venous duplex    ultrasound with 2D grayscale, color-flow and spectral Doppler analysis. Findings:   Right side:   common femoral vein and saphenofemoral junction is not compressible and    there is hypoechoic filling defect noted. common femoral vein shows no venous flow   Distal external iliac vein also not compressible and that there is no    venous flow   Proximal femoral vein is noncompressible and there is no venous flow   Mid femoral vein is not compressible and there is no venous flow   Distal femoral vein is noncompressible and there is no venous flow   Proximal, mid, distal greater saphenous vein is compressible, there is no    filling defect   Distal popliteal vein is not compressible. Proximal popliteal vein is noncompressible and there is no venous flow   Proximal gastrocnemius vein is not compressible   Proximal small saphenous vein is not compressible   Distal, mid, proximal  peroneal vein is not compressible      Left side:   common femoral vein not compressible and there is no venous flow   Saphenofemoral junction is not compressible and there is no venous flow.    External iliac vein also not compressible there is no venous flow   Proximal femoral vein is noncompressible and there is no venous flow   Mid femoral vein is noncompressible and there is no venous flow   Distal femoral vein is not compressible and there is no venous flow   Proximal, mid, distal greater saphenous vein is noncompressible and there    is hypoechoic filling defect noted. Distal popliteal vein is noncompressible   Proximal popliteal vein is noncompressible and there is no venous flow   Proximal gastrocnemius vein is not compressible and there is no venous    flow   Proximal small saphenous vein is noncompressible and there is no venous    flow   Distal, mid, proximal peroneal vein is not compressible. Impression:   1. Right external iliac vein, common femoral vein, femoral vein,    popliteal vein, gastrocnemius vein, small saphenous vein, and peroneal    vein shows acute venous thrombosis   2. Left external iliac vein, common femoral vein, femoral vein, popliteal    vein, gastrocnemius vein, small saphenous vein, peroneal vein, and    saphenofemoral junction and greater saphenous vein shows acute venous    thrombosis. These  extensive bilateral lower extremity deep vein thrombosis notified    to Dr. Jeremiah العلي on 10/20/2022 1201 PM.            CT ABD PELV WO CONT   Final Result   Status post cystectomy and hysterectomy with right lower quadrant ileal conduit. Mild bilateral hydroureteronephrosis, evaluation is somewhat limited by the   presence of contrast from previously performed examination. Small bowel   distention with decompressed loops distally concerning for obstruction likely   related to adhesion formation. While this study was done without the use of   intravenous contrast there is questionable right common femoral vein thrombus   with soft tissue edema. CTA CHEST W OR W WO CONT   Final Result   Emphysematous changes. No evidence of pulmonary embolism or acute   abnormality. Cholelithiasis. Bilateral hydronephrosis.  1 cm hypodense lesion   right hepatic lobe cannot be characterized with certainty on the basis of this   examination. Follow-up is recommended to ensure stability. XR CHEST PORT   Final Result   No Acute Disease. XR FOOT LT MIN 3 V   Final Result   Normal study. Current Medications:    Current Facility-Administered Medications:     oxyCODONE IR (ROXICODONE) tablet 5 mg, 5 mg, Oral, Q6H PRN, Jt Gutierrez, NP    0.9% sodium chloride infusion, 100 mL/hr, IntraVENous, CONTINUOUS, Juan C Price NP    0.9% sodium chloride infusion 250 mL, 250 mL, IntraVENous, PRN, Marc Posadas MD    cromolyn (OPTICROM) 4 % ophthalmic solution 1 Drop, 1 Drop, Both Eyes, DAILY, Marc Posadas MD, 1 Drop at 10/21/22 0936    sodium chloride (NS) flush 5-40 mL, 5-40 mL, IntraVENous, Q8H, Marc Posadas MD, 10 mL at 10/21/22 0536    sodium chloride (NS) flush 5-40 mL, 5-40 mL, IntraVENous, PRN, Marc Posadas MD    acetaminophen (TYLENOL) tablet 650 mg, 650 mg, Oral, Q6H PRN, 650 mg at 10/21/22 4767 **OR** acetaminophen (TYLENOL) suppository 650 mg, 650 mg, Rectal, Q6H PRN, Marc Posadas MD    polyethylene glycol (MIRALAX) packet 17 g, 17 g, Oral, DAILY PRN, Marc Posadas MD    ondansetron (ZOFRAN ODT) tablet 4 mg, 4 mg, Oral, Q8H PRN **OR** ondansetron (ZOFRAN) injection 4 mg, 4 mg, IntraVENous, Q6H PRN, Mack Posadas MD    fluconazole (DIFLUCAN) 200mg/100 mL IVPB (premix), 200 mg, IntraVENous, DAILY, Marc Posadas MD, Last Rate: 100 mL/hr at 10/21/22 0935, 200 mg at 10/21/22 0935    morphine injection 2 mg, 2 mg, IntraVENous, Q4H PRN, Chaz Camp MD, 2 mg at 10/20/22 1843       ASSESSMENT:      # 1 Acute blood loss anemia  - S/p blood transfusion, will continue to monitor hemoglobin   - hematology consulted     # 2 Vaginal bleed  - Suspect complications from recent surgery  - GYN consulted     # 3 SBO  - maintain NPO  - continue gentle IVF  - Gen surgery: Barrettker   - Morphine for pain.       # 4 Extensive dvt bilateral lower legs  - recently on anticoagulants that was stopped prior to surgery  - duplex shows extensive clot burden  - consult hematology for further workup  - IR eval for ? IVC filter placement    # 5 UTI  - Fluconazole for fungal UTI.  - Continue to follow ucx report     # 6 Bladder cancer  - Complicated all area of care. Not on chemotherapy currently. - Consulted urology to evaluate for possible post-op complications. # 7 History of DVT  - Given abnormal CT findings suggestive right femoral vein thrombosis, and left thigh and foot pain, will order LE duplex to rule out DVT. # 8 Emphysema  - not in respiratory distress, continue to monitor       Full Code  Dvt Prophylaxis none  GI Prophylaxis none      Above treatment plan reviewed and discussed with patient in detail at bedside, all questions answered. Sandip Davalos [phone] updated    Care Plan discussed with: Patient/Family    Total time spent with patient: 30 minutes.     Jayashree Sales, NP

## 2022-10-21 NOTE — PROGRESS NOTES
Interventional Radiology      Patient already has an IVC filter. IR signing off. Mar Gordon M.D  Interventional Radiology  [facility]  [phone]

## 2022-10-22 LAB
APTT PPP: 31.2 SEC (ref 21.2–34.1)
BASOPHILS # BLD: 0 K/UL (ref 0–0.1)
BASOPHILS NFR BLD: 0 % (ref 0–1)
DIFFERENTIAL METHOD BLD: ABNORMAL
EOSINOPHIL # BLD: 0 K/UL (ref 0–0.4)
EOSINOPHIL NFR BLD: 0 % (ref 0–7)
ERYTHROCYTE [DISTWIDTH] IN BLOOD BY AUTOMATED COUNT: 16.8 % (ref 11.5–14.5)
HCT VFR BLD AUTO: 24.3 % (ref 35–47)
HGB BLD-MCNC: 7.7 G/DL (ref 11.5–16)
IMM GRANULOCYTES # BLD AUTO: 0 K/UL
IMM GRANULOCYTES NFR BLD AUTO: 0 %
LYMPHOCYTES # BLD: 1.2 K/UL (ref 0.8–3.5)
LYMPHOCYTES NFR BLD: 20 % (ref 12–49)
MCH RBC QN AUTO: 27.3 PG (ref 26–34)
MCHC RBC AUTO-ENTMCNC: 31.7 G/DL (ref 30–36.5)
MCV RBC AUTO: 86.2 FL (ref 80–99)
MONOCYTES # BLD: 0.3 K/UL (ref 0–1)
MONOCYTES NFR BLD: 5 % (ref 5–13)
NEUTS SEG # BLD: 4.6 K/UL (ref 1.8–8)
NEUTS SEG NFR BLD: 75 % (ref 32–75)
NRBC # BLD: 0 K/UL (ref 0–0.01)
NRBC BLD-RTO: 0 PER 100 WBC
PLATELET # BLD AUTO: 261 K/UL (ref 150–400)
PMV BLD AUTO: 9 FL (ref 8.9–12.9)
RBC # BLD AUTO: 2.82 M/UL (ref 3.8–5.2)
RBC MORPH BLD: ABNORMAL
THERAPEUTIC RANGE,PTTT: NORMAL SEC (ref 82–109)
WBC # BLD AUTO: 6.1 K/UL (ref 3.6–11)

## 2022-10-22 PROCEDURE — 85730 THROMBOPLASTIN TIME PARTIAL: CPT

## 2022-10-22 PROCEDURE — 65270000029 HC RM PRIVATE

## 2022-10-22 PROCEDURE — 74011250636 HC RX REV CODE- 250/636: Performed by: NURSE PRACTITIONER

## 2022-10-22 PROCEDURE — 74011250636 HC RX REV CODE- 250/636: Performed by: INTERNAL MEDICINE

## 2022-10-22 PROCEDURE — 36415 COLL VENOUS BLD VENIPUNCTURE: CPT

## 2022-10-22 PROCEDURE — 74011000250 HC RX REV CODE- 250: Performed by: INTERNAL MEDICINE

## 2022-10-22 PROCEDURE — 85025 COMPLETE CBC W/AUTO DIFF WBC: CPT

## 2022-10-22 PROCEDURE — 74011250637 HC RX REV CODE- 250/637: Performed by: INTERNAL MEDICINE

## 2022-10-22 PROCEDURE — 74011250637 HC RX REV CODE- 250/637: Performed by: NURSE PRACTITIONER

## 2022-10-22 PROCEDURE — 99222 1ST HOSP IP/OBS MODERATE 55: CPT | Performed by: INTERNAL MEDICINE

## 2022-10-22 RX ORDER — HEPARIN SODIUM 10000 [USP'U]/100ML
500 INJECTION, SOLUTION INTRAVENOUS
Status: DISCONTINUED | OUTPATIENT
Start: 2022-10-22 | End: 2022-11-07

## 2022-10-22 RX ORDER — LEVOFLOXACIN 250 MG/1
500 TABLET ORAL EVERY 24 HOURS
Status: DISCONTINUED | OUTPATIENT
Start: 2022-10-22 | End: 2022-10-24

## 2022-10-22 RX ORDER — HEPARIN SODIUM 10000 [USP'U]/100ML
18-36 INJECTION, SOLUTION INTRAVENOUS
Status: DISCONTINUED | OUTPATIENT
Start: 2022-10-22 | End: 2022-10-22

## 2022-10-22 RX ADMIN — SODIUM CHLORIDE, PRESERVATIVE FREE 5 ML: 5 INJECTION INTRAVENOUS at 22:00

## 2022-10-22 RX ADMIN — SODIUM CHLORIDE 100 ML/HR: 9 INJECTION, SOLUTION INTRAVENOUS at 05:54

## 2022-10-22 RX ADMIN — SODIUM CHLORIDE, PRESERVATIVE FREE 10 ML: 5 INJECTION INTRAVENOUS at 05:49

## 2022-10-22 RX ADMIN — SODIUM CHLORIDE, PRESERVATIVE FREE 10 ML: 5 INJECTION INTRAVENOUS at 13:48

## 2022-10-22 RX ADMIN — LEVOFLOXACIN 500 MG: 250 TABLET, FILM COATED ORAL at 19:38

## 2022-10-22 RX ADMIN — OXYCODONE 5 MG: 5 TABLET ORAL at 13:44

## 2022-10-22 RX ADMIN — CROMOLYN SODIUM 1 DROP: 40 SOLUTION/ DROPS OPHTHALMIC at 09:30

## 2022-10-22 RX ADMIN — FLUCONAZOLE 200 MG: 200 INJECTION, SOLUTION INTRAVENOUS at 09:30

## 2022-10-22 RX ADMIN — HEPARIN SODIUM 500 UNITS/HR: 10000 INJECTION, SOLUTION INTRAVENOUS at 19:32

## 2022-10-22 NOTE — PROGRESS NOTES
Received phone call order from Hematology service Dr Cindy Onofre. Patient agreed to proceed with anticoagulant therapy. To start heparin at the lowest dose and with no loading dose; 500 units/hr. Initiated protocol for starting heparin drip.

## 2022-10-22 NOTE — CONSULTS
Infectious Disease Consult Note    Reason for Consult: UTI, abdominal wound infection   Date of Consultation: October 22, 2022  Date of Admission: 10/19/2022  Referring Physician: Hospitalist     HPI:57-y.o BF who presented to the ED on 10/19 w a 2 day h/o generalized weakness and b/l leg swelling/pain as well as decreased oral intake. Her medical history is significant for bladder Ca s/p recent robotic cystectomy, hysterectomy and b/l salpingo-oophorectomy on 10/03/22, discharged on 10/10/22. Her medical history is otherwise significant for positive hep C serology w an undetectable HCV RNA VL (09/2021), anemia, rheumatoid arthritis, uterine fibroids, and DVT s/p IVC filter placement. She has been afebrile for the past couple days, hemodynamically stable and maintaining O2 sats on RA. She has a normal WBC on routine labs. UA on 10/20 showed pyuria, bacteriuria and funguria, Yeast was solated from urine Cx (10/20), and mixed heriberto from vaginal discharge Cx. MRSA screen and COVID Ag were both neg on 10/20. CT abdo/pel revealed postoperative changes, mild b/l hydroureteronephrosis, and small bowel distention concerning for ileus. Venous doppler on 10/20 revealed extensive b/l leg DVTs. She is on Fluconazole monotherapy, has a documented PCN and sulfa allergy. ID consulted for abdominal for suspected intra-abdominal infection.      Review of Systems:     Gen: generalized weakness, no fever, poor appetite    CV:  Negative for chest pain, dyspnea on exertion, leg edema   Lungs: Negative for shortness of breath, cough, wheezing   Abdomen: Negative for abdominal pain, nausea, vomiting, diarrhea, constipation   Genitourinary: Negative for genital pain or genital discharge     Neuro: Negative for headache, numbness, tingling, extremity weakness   Skin: Negative for rash, sores/open wounds   Musculoskeletal: leg swelling    Psych: Negative for manic behavior       Past Medical History:  Past Medical History:   Diagnosis Date    Anemia     pt states had recent blood transfusion 2022    Back pain     Cancer (HCC)     bladder    DVT (deep venous thrombosis) (HCC)     and PE, pt denies lung PE , only leg several years ago    Hepatitis C     pt states dx 1 month ago    Liver disease     Presence of IVC filter     pt states several years ago    Rheumatoid arthritis ([address]     Sciatic leg pain     pt states both legs    Uterine fibroid        Past surgical history   Past Surgical History:   Procedure Laterality Date    HX APPENDECTOMY  10/03/2022    HX  SECTION      HX GYN  10/03/2022    ROBOT ANTERIOR PELVIC EXENTERATION    HX HYSTERECTOMY  10/03/2022    HX SALPINGO-OOPHORECTOMY Bilateral 10/03/2022    HX UROLOGICAL  09/15/2022    CYSTOSCOPY    HX UROLOGICAL  09/15/2022    URETHRAL DILATION    HX UROLOGICAL  09/15/2022    URETERAL STENT PLACEMENT    HX UROLOGICAL  09/15/2022    TRANSURETHRAL RESECTION OF BLADDER TUMOR >2CM    HX UROLOGICAL Bilateral 09/15/2022    RETROGRADE PYELOGRAM    HX UROLOGICAL  10/03/2022    PELVIC LYMPH NODE DISSECTION    HX UROLOGICAL  10/03/2022    ILEAL CONDUIT URINARY DIVERSION    VT CARDIAC SURG PROCEDURE UNLIST      stent placement        Social History   Social History     Tobacco Use    Smoking status: Former     Years: 10.00     Types: Cigarettes    Smokeless tobacco: Never    Tobacco comments:     Quit 15 years ago   Vaping Use    Vaping Use: Never used   Substance Use Topics    Alcohol use: Not Currently    Drug use: Never        Family history   Family History   Problem Relation Age of Onset    Cancer Mother     Lung Cancer Mother     Heart Disease Father     Hypertension Sister     Cancer Maternal Aunt     Breast Cancer Maternal Aunt           Allergies: Allergies   Allergen Reactions    Bactrim [Sulfamethoprim] Swelling     Swelling of the lilps    Penicillin V Potassium Swelling         Medications:  No current facility-administered medications on file prior to encounter.      Current Outpatient Medications on File Prior to Encounter   Medication Sig Dispense Refill    diclofenac (VOLTAREN) 1 % gel Apply 2 g to affected area two (2) times a day. famotidine (PEPCID) 20 mg tablet Take 20 mg by mouth daily as needed for Heartburn. acetaminophen (TYLENOL) 650 mg TbER Take 650 mg by mouth every six to eight (6-8) hours as needed for Pain. ferrous sulfate 325 mg (65 mg iron) tablet Take 325 mg by mouth daily. multivitamin with folic acid (One Daily Essential) 400 mcg tab tablet Take 1 Tablet by mouth daily. 90 Tablet 0       Physical Exam:    Vitals: Patient Vitals for the past 24 hrs:   Temp Pulse Resp BP SpO2   10/22/22 0800 -- 84 -- -- --   10/22/22 0729 98.6 °F (37 °C) 91 18 93/60 100 %   10/22/22 0400 -- 81 -- -- --   10/22/22 0226 99.1 °F (37.3 °C) 68 18 93/60 99 %   10/22/22 0000 -- 83 -- -- --   10/21/22 2004 97.8 °F (36.6 °C) 74 20 93/62 100 %   10/21/22 2000 -- 78 -- -- --     GEN: NAD, AAO x 4  HEENT: NCAT, PERRLA  HEART: S1, S2+, RRR, No murmur   Lungs: CTA B/l, decreased at the bases, bases   Abdomen: soft, healing incisional wound wounds, NT, + ileal conduit, clear urine in bag   Genitourinary: no genital discharge, no abarca  Extremities: trace b/l leg edema  Skin: healing abdominal incisional wounds, no open draining wounds or ulcers     Labs:   Recent Labs     10/21/22  0451 10/19/22  2235   WBC 7.2 9.8   HGB 7.5* 7.7*   HCT 23.7* 24.3*    275     Recent Labs     10/21/22  0451 10/19/22  2235   BUN 8 9   CREA 0.51* 0.70       Lab Results   Component Value Date/Time    C-Reactive protein 10.20 (H) 09/13/2022 11:02 AM       Microbiology Data:   - Urine Cx 10/20: Yeast               - Abdominal wound Cx 10/20: Mixed heriberto, GNR on Gram stain     Imaging:   CT abdo/pel 10/20: Status post cystectomy and hysterectomy with right lower quadrant ileal conduit.   Mild bilateral hydroureteronephrosis, evaluation is somewhat limited by the presence of contrast from previously performed examination. Small bowel distention with decompressed loops distally concerning for obstruction likely related to adhesion formation. While this study was done without the use of intravenous contrast there is questionable right common femoral vein thrombus with soft tissue edema. Assessment / Plan:     Abdominal infection, reason for consult, unclear if intra/extra, vaginal cuff cellulitis per Gyn assessment       Healing incisional wounds w/o evidence of infection, on evidence of intra-abdominal abscesses on CT       Mixed heriberto isolated from vaginal swab, GNR on Gram stain       Pt notes bloody vaginal discharge on presentation, denies worsening       Start on empiric levaquin for empiric GNR coverage, routine labs in the morning     2.  UTI, complicated by ileal conduit. Abnormal UA, yeast isolated from Cx       Afebrile a normal WBC on routine labs        Agree w Fluconazole day # 3/14    3. Bladder Ca w local invasion: S/p resection on 10/03 w creation of ileal conduit     4. SBO on admission, recent h/o abdominal surgery       Being managed conservatively      Tolerating oral intake, benign abdominal exam     5. Hep C positive serology w neg HCV RNA VL 09/2021      Denies prior to tx, suspect self limited infection     6. B/L LE DVT extensive, s/p IVC filter placement. Started on heparin     7.  Constipation on admission, no CM since admit, now w loose stools     Johanna Ponce MD     10/22/2022

## 2022-10-22 NOTE — PROGRESS NOTES
Hospitalist Progress Note         HARMAN Beltran, FNP-C    Daily Progress Note: 10/22/2022      Subjective:   Subjective   Patient alert and oriented lying in bed on phone with niece. She continues to report bilateral leg pain and generalized weakness. Review of Systems:   Review of Systems   Constitutional:  Negative for chills and fever. Respiratory:  Negative for cough. Cardiovascular:  Positive for leg swelling. Negative for chest pain. Gastrointestinal:  Negative for abdominal pain, nausea and vomiting. Genitourinary:  Negative for dysuria. Musculoskeletal:  Positive for myalgias. Negative for falls and neck pain. Neurological:  Positive for weakness. Objective:   Objective      Vitals:  Patient Vitals for the past 12 hrs:   Temp Pulse Resp BP SpO2   10/22/22 0800 -- 84 -- -- --   10/22/22 0729 98.6 °F (37 °C) 91 18 93/60 100 %   10/22/22 0400 -- 81 -- -- --   10/22/22 0226 99.1 °F (37.3 °C) 68 18 93/60 99 %   10/22/22 0000 -- 83 -- -- --          Physical Exam:  Physical Exam  Vitals and nursing note reviewed. Constitutional:       Appearance: Normal appearance. Eyes:      Extraocular Movements: Extraocular movements intact. Cardiovascular:      Rate and Rhythm: Normal rate. Abdominal:      General: Bowel sounds are normal.   Genitourinary:     Comments: Right urostomy  Musculoskeletal:      Comments: Bilateral leg tenderness with palpation   Neurological:      Mental Status: She is alert and oriented to person, place, and time. Motor: Weakness present. Lab Results:  No results found for this or any previous visit (from the past 24 hour(s)).      Results       Procedure Component Value Units Date/Time    COVID-19 RAPID TEST [[id number]] Collected: 10/20/22 1435    Order Status: Completed Specimen: Nasopharyngeal Updated: 10/20/22 1503     COVID-19 rapid test Not Detected        Comment: Rapid Abbott ID Now   Rapid NAAT:  The specimen is NEGATIVE for SARS-CoV-2, the novel coronavirus associated with COVID-19. Negative results should be treated as presumptive and, if inconsistent with clinical signs and symptoms or necessary for patient management, should be tested with an alternative molecular assay. Negative results do not preclude SARS-CoV-2 infection and should not be used as the sole basis for patient management decisions. This test has been authorized by the FDA under   an Emergency Use Authorization (EUA) for use by authorized laboratories. Fact sheet for Healthcare Providers: ConventionUpdate.co.nz Fact sheet for Patients: ConventionUpdate.co.nz   Methodology: Isothermal Nucleic Acid Amplification         MRSA SCREEN - PCR (NASAL) [[id number]] Collected: 10/20/22 1435    Order Status: Completed Specimen: Swab Updated: 10/20/22 1636     MRSA by PCR, Nasal Not Detected       MRSA SCREEN - PCR (NASAL) [[id number]] Collected: 10/20/22 1340    Order Status: Canceled Specimen: Swab     CULTURE, Ardell Racer STAIN [[id number]] Collected: 10/20/22 1340    Order Status: Completed Specimen: Wound from Cervical/vaginal swab Updated: 10/22/22 1110     Special Requests: No Special Requests        GRAM STAIN Rare WBCs seen               1+ apparent Gram Positive Cocci in pairs                  Few apparent Gram Negative Rods           Culture result: Light  Mixed skin heriberto isolated       CULTURE, BODY FLUID Marshal Carte STAIN [[id number]]     Order Status: Canceled Specimen: Body Fluid from Uterine [place]/Sac Fluid     CULTURE, URINE [[id number]] Collected: 10/20/22 0249    Order Status: Completed Specimen: Urine Updated: 10/21/22 1637     Special Requests: --        No Special Requests  Reflexed from [id number]       [place] Count --        >100,000  colonies/ml       Culture result:       Yeast Identification to follow.                    Diagnostic Images:  CT Results  (Last 48 hours)      None             XR ABD (KUB)   Final Result Dilated small bowel within the central abdomen is similar to CT from one day   prior. DUPLEX LOWER EXT VENOUS BILAT   Final Result   Addendum (preliminary) 1 of 1      · Thrombus present in the right external iliac vein. · Thrombus present in the right common femoral vein. · Thrombus present in the right femoral vein. · Thrombus present in the right proximal femoral vein. · Thrombus present in the right mid femoral vein. · Thrombus present in the right distal femoral vein. · Thrombus present in the right popliteal vein. · Thrombus present in the right gastrocnemius vein. · Thrombus present in the right peroneal vein. · Thrombus present in the right saphenofemoral junction. · Thrombus present in the left external iliac vein. · Thrombus present in the left common femoral vein. · Thrombus present in the left saphenofemoral junction. · Thrombus present in the left femoral vein. · Thrombus present in the left proximal femoral vein. · Thrombus present in the left mid femoral vein. · Thrombus present in the left popliteal vein. · Thrombus present in the left distal femoral vein. · Thrombus present in the left gastrocnemius vein. · Thrombus present in the left peroneal vein. This is vascular lab report on Varsha Marin, patient's YOB: 1965   Date of examination: October 20, 2022   Examination: Duplex lower extremity venous bilateral   Technician: Ms. Lucy Jennings   Clinical history: This is  61years old woman with suspected renal vein    thrombosis   Indication: femoral vein thrombosis. Technique: Bilateral leg venous structures examined using venous duplex    ultrasound with 2D grayscale, color-flow and spectral Doppler analysis. Findings:   Right side:   common femoral vein and saphenofemoral junction is not compressible and    there is hypoechoic filling defect noted.    common femoral vein shows no venous flow   Distal external iliac vein also not compressible and that there is no    venous flow   Proximal femoral vein is noncompressible and there is no venous flow   Mid femoral vein is not compressible and there is no venous flow   Distal femoral vein is noncompressible and there is no venous flow   Proximal, mid, distal greater saphenous vein is compressible, there is no    filling defect   Distal popliteal vein is not compressible. Proximal popliteal vein is noncompressible and there is no venous flow   Proximal gastrocnemius vein is not compressible   Proximal small saphenous vein is not compressible   Distal, mid, proximal  peroneal vein is not compressible      Left side:   common femoral vein not compressible and there is no venous flow   Saphenofemoral junction is not compressible and there is no venous flow. External iliac vein also not compressible there is no venous flow   Proximal femoral vein is noncompressible and there is no venous flow   Mid femoral vein is noncompressible and there is no venous flow   Distal femoral vein is not compressible and there is no venous flow   Proximal, mid, distal greater saphenous vein is noncompressible and there    is hypoechoic filling defect noted. Distal popliteal vein is noncompressible   Proximal popliteal vein is noncompressible and there is no venous flow   Proximal gastrocnemius vein is not compressible and there is no venous    flow   Proximal small saphenous vein is noncompressible and there is no venous    flow   Distal, mid, proximal peroneal vein is not compressible. Impression:   1. Right external iliac vein, common femoral vein, femoral vein,    popliteal vein, gastrocnemius vein, small saphenous vein, and peroneal    vein shows acute venous thrombosis   2.   Left external iliac vein, common femoral vein, femoral vein, popliteal    vein, gastrocnemius vein, small saphenous vein, peroneal vein, and    saphenofemoral junction and greater saphenous vein shows acute venous thrombosis. These  extensive bilateral lower extremity deep vein thrombosis notified    to Dr. Juve Millard on 10/20/2022 1201 PM.            CT ABD PELV WO CONT   Final Result   Status post cystectomy and hysterectomy with right lower quadrant ileal conduit. Mild bilateral hydroureteronephrosis, evaluation is somewhat limited by the   presence of contrast from previously performed examination. Small bowel   distention with decompressed loops distally concerning for obstruction likely   related to adhesion formation. While this study was done without the use of   intravenous contrast there is questionable right common femoral vein thrombus   with soft tissue edema. CTA CHEST W OR W WO CONT   Final Result   Emphysematous changes. No evidence of pulmonary embolism or acute   abnormality. Cholelithiasis. Bilateral hydronephrosis. 1 cm hypodense lesion   right hepatic lobe cannot be characterized with certainty on the basis of this   examination. Follow-up is recommended to ensure stability. XR CHEST PORT   Final Result   No Acute Disease. XR FOOT LT MIN 3 V   Final Result   Normal study.                 Current Medications:    Current Facility-Administered Medications:     oxyCODONE IR (ROXICODONE) tablet 5 mg, 5 mg, Oral, Q6H PRN, Flavio Hutson NP, 5 mg at 10/21/22 1600    0.9% sodium chloride infusion, 100 mL/hr, IntraVENous, CONTINUOUS, Flavio Hutson NP, Last Rate: 100 mL/hr at 10/22/22 0554, 100 mL/hr at 10/22/22 0554    0.9% sodium chloride infusion 250 mL, 250 mL, IntraVENous, PRN, Catalina Posadas MD    cromolyn (OPTICROM) 4 % ophthalmic solution 1 Drop, 1 Drop, Both Eyes, DAILY, Catalina Posadas MD, 1 Drop at 10/22/22 0930    sodium chloride (NS) flush 5-40 mL, 5-40 mL, IntraVENous, Q8H, Mack Posadas MD, 10 mL at 10/22/22 0549    sodium chloride (NS) flush 5-40 mL, 5-40 mL, IntraVENous, PRN, Catalina Posadas MD    acetaminophen (TYLENOL) tablet 650 mg, 650 mg, Oral, Q6H PRN, 650 mg at 10/21/22 1640 **OR** acetaminophen (TYLENOL) suppository 650 mg, 650 mg, Rectal, Q6H PRN, Madalyn Posadas MD    polyethylene glycol (MIRALAX) packet 17 g, 17 g, Oral, DAILY PRN, Madalyn Posadas MD    ondansetron (ZOFRAN ODT) tablet 4 mg, 4 mg, Oral, Q8H PRN **OR** ondansetron (ZOFRAN) injection 4 mg, 4 mg, IntraVENous, Q6H PRN, Madalyn Posadas MD    fluconazole (DIFLUCAN) 200mg/100 mL IVPB (premix), 200 mg, IntraVENous, DAILY, Madalyn Posadas MD, Last Rate: 100 mL/hr at 10/22/22 0930, 200 mg at 10/22/22 0930    morphine injection 2 mg, 2 mg, IntraVENous, Q4H PRN, Juan Willoughby MD, 2 mg at 10/20/22 1845       ASSESSMENT:  Marisas Mata is a 61 y.o. female with PMH of bladder cancer s/p cystectomy, hysterectomy, BSO and ileal conduit diversion, anemia, hep C, and left leg DVT s/p IVC filter years ago. She presented to the ED with chief complaint of weakness and nausea for the past week. She reports unable to tolerate PO intake, due to profound nausea, however abdominal pain, fever, chills, vomiting or diarrhea. In addition, she also reports unable to walk due to weakness and left inner thigh and left foot pain. Denies trauma. She was seen here on 10/13/2022 with urostomy complications and discharged home on Cipro which she completed today. In addition, she also reports having vaginal bleed since surgery 2 weeks ago. In the ED, initial BP soft. Lab work revealed anemia, s/p 1u PRBC transfusion. CT scan concerning for SBO and mild hydroureteronephrosis and questionable right common femoral vein thrombosis. Urinalysis suggestive of UTI with budding yeast.    duplex shows extensive clot burden bilateral legs. Patient already have IVC filter. Obtain therapy eval. 10/21 kub continues to show SBO.         # 1 Acute blood loss anemia  - S/p blood transfusion, will continue to monitor hemoglobin   - hematology following-->reccs low dose anticoagulants     # 2 Vaginal bleed  - Suspect complications from recent surgery  - GYN consulted     # 3 SBO  - currently tolerating full liquid diet  - s/p gentle IVF  - Gen surgery: Basker   - Morphine for pain. # 4 Extensive dvt bilateral lower legs  - recently on anticoagulants that was stopped prior to surgery  - duplex shows extensive clot burden  - hematology following   - already have IVC filter placed    # 5 UTI  - Fluconazole for fungal UTI.  - Continue to follow ucx report     # 6 Bladder cancer  - Complicated all area of care. Not on chemotherapy currently. - Consulted urology to evaluate for possible post-op complications. - Continue supportive care  - Hematology following     # 7 Emphysema  - not in respiratory distress, continue to monitor     # 8 Ambulatory dysfunction  - secondary to dvt's  - obtain pt/ot eval        Full Code  Dvt Prophylaxis none  GI Prophylaxis none      Above treatment plan reviewed and discussed with patient in detail at bedside, all questions answered. Sandip Newby [phone] updated via telephone at patient's bedside. Patient deciding on if she wants to start low dose anticoagulations. Care Plan discussed with: Patient/Family      Total time spent with patient: 30 minutes.       Jack Soriano NP

## 2022-10-22 NOTE — PROGRESS NOTES
Received a call from pharmacy staff Tarah Baum about questions with Heparin drip order specifically titration or instructions for therapeutic APTT. Called and left a message for Dr Cindy Onofre regarding clarification of this matter.

## 2022-10-22 NOTE — PROGRESS NOTES
[facility] SURGERY PROGRESS NOTES        Chief Complaint: _nausea    History of Present Illness:    Ms. Mehdi Queen is a 61y.o. year old * female presents to ER with 2 day history of nausea and poor PO intake. No vomiting. Passing some flatus. She had undergone robotic assisted anterior exenteration by Dr. Ashley Reyna. She was recently discharged. However she has not been feeling well. Has been able to take very little due to nausea. In ER CT scan without PO contrast showed some mildly dilated small bowel suggestive of possible bowel obstruction versus ileus. Patient denies any fever or chills or abdominal pain. Already has IVC filter.       Past Medical History:   Past Medical History:   Diagnosis Date    Anemia     pt states had recent blood transfusion 2022    Back pain     Cancer (HCC)     bladder    DVT (deep venous thrombosis) (HCC)     and PE, pt denies lung PE , only leg several years ago    Hepatitis C     pt states dx 1 month ago    Liver disease     Presence of IVC filter     pt states several years ago    Rheumatoid arthritis ([address]     Sciatic leg pain     pt states both legs    Uterine fibroid        Past Surgical History:   Past Surgical History:   Procedure Laterality Date    HX APPENDECTOMY  10/03/2022    HX  SECTION      HX GYN  10/03/2022    ROBOT ANTERIOR PELVIC EXENTERATION    HX HYSTERECTOMY  10/03/2022    HX SALPINGO-OOPHORECTOMY Bilateral 10/03/2022    HX UROLOGICAL  09/15/2022    CYSTOSCOPY    HX UROLOGICAL  09/15/2022    URETHRAL DILATION    HX UROLOGICAL  09/15/2022    URETERAL STENT PLACEMENT    HX UROLOGICAL  09/15/2022    TRANSURETHRAL RESECTION OF BLADDER TUMOR >2CM    HX UROLOGICAL Bilateral 09/15/2022    RETROGRADE PYELOGRAM    HX UROLOGICAL  10/03/2022    PELVIC LYMPH NODE DISSECTION    HX UROLOGICAL  10/03/2022    ILEAL CONDUIT URINARY DIVERSION    MI CARDIAC SURG PROCEDURE UNLIST      stent placement        Allergy:  Allergies   Allergen Reactions    Bactrim [Sulfamethoprim] Swelling     Swelling of the lilps    Penicillin V Potassium Swelling       Social History:  reports that she has quit smoking. Her smoking use included cigarettes. She has never used smokeless tobacco. She reports that she does not currently use alcohol. She reports that she does not use drugs.      Family History:  Family History   Problem Relation Age of Onset    Cancer Mother     Lung Cancer Mother     Heart Disease Father     Hypertension Sister     Cancer Maternal Aunt     Breast Cancer Maternal Aunt         Current Medications:  Current Facility-Administered Medications:     oxyCODONE IR (ROXICODONE) tablet 5 mg, 5 mg, Oral, Q6H PRN, Annabelrama Fernandes, NP, 5 mg at 10/21/22 1600    0.9% sodium chloride infusion, 100 mL/hr, IntraVENous, CONTINUOUS, Annabel Fernandes NP, Last Rate: 100 mL/hr at 10/21/22 1956, 100 mL/hr at 10/21/22 1956    0.9% sodium chloride infusion 250 mL, 250 mL, IntraVENous, PRN, Arron Posadas MD    cromolyn (OPTICROM) 4 % ophthalmic solution 1 Drop, 1 Drop, Both Eyes, DAILY, Arron Posadas MD, 1 Drop at 10/21/22 0936    sodium chloride (NS) flush 5-40 mL, 5-40 mL, IntraVENous, Q8H, Mack Posadas MD, 10 mL at 10/21/22 0536    sodium chloride (NS) flush 5-40 mL, 5-40 mL, IntraVENous, PRN, Arron Posadas MD    acetaminophen (TYLENOL) tablet 650 mg, 650 mg, Oral, Q6H PRN, 650 mg at 10/21/22 6480 **OR** acetaminophen (TYLENOL) suppository 650 mg, 650 mg, Rectal, Q6H PRN, Arron Posadas MD    polyethylene glycol (MIRALAX) packet 17 g, 17 g, Oral, DAILY PRN, Arron Posadas MD    ondansetron (ZOFRAN ODT) tablet 4 mg, 4 mg, Oral, Q8H PRN **OR** ondansetron (ZOFRAN) injection 4 mg, 4 mg, IntraVENous, Q6H PRN, Mack Posadas MD    fluconazole (DIFLUCAN) 200mg/100 mL IVPB (premix), 200 mg, IntraVENous, DAILY, Katharina, Arron Newell MD, Last Rate: 100 mL/hr at 10/21/22 0935, 200 mg at 10/21/22 0935    morphine injection 2 mg, 2 mg, IntraVENous, Q4H PRN, Grzegorz Rosales MD, 2 mg at 10/20/22 0009 Immunization History: There is no immunization history on file for this patient. Complete    Review of Systems:     Constitutional:  no fever,  no chills,  no sweats, No weakness, No fatigue, No decreased activity. Eye: No recent visual problem, No icterus, No discharge, No double vision. Ear/Nose/Mouth/Throat: No decreased hearing, No ear pain, No nasal congestion, No sore throat. Respiratory: No shortness of breath, No cough, No sputum production, No hemoptysis, No wheezing, No cyanosis. Cardiovascular: No chest pain, No palpitations, No bradycardia, No tachycardia, No peripheral edema, No syncope. Gastrointestinal:  nausea,  No vomiting, No diarrhea, No constipation, No heartburn,  No abdominal pain. Genitourinary: No dysuria, No hematuria, No change in urine stream, No urethral discharge, No lesions. Hematology/Lymphatics: No bruising tendency, No bleeding tendency, No petechiae, No swollen lymph glands. Endocrine: No excessive thirst, No polyuria, No cold intolerance, No heat intolerance, No excessive hunger. Immunologic: Not immunocompromised, No recurrent fevers, No recurrent infections. Musculoskeletal: No back pain, No neck pain, No joint pain, No muscle pain, No claudication, No decreased range of motion, No trauma. Integumentary: No rash, No pruritus, No abrasions. Neurologic: Alert and oriented X4, No abnormal balance, No headache, No confusion, No numbness, No tingling. Psychiatric: No anxiety, No depression, No nate. Physical Exam:     Vitals & Measurements:     Wt Readings from Last 3 Encounters:   10/19/22 51.7 kg (114 lb)   10/13/22 51.3 kg (113 lb)   10/09/22 59.6 kg (131 lb 6.3 oz)     Temp Readings from Last 3 Encounters:   10/21/22 97.8 °F (36.6 °C)   10/17/22 98.7 °F (37.1 °C)   10/13/22 98.3 °F (36.8 °C)     BP Readings from Last 3 Encounters:   10/21/22 93/62   10/17/22 (!) 103/58   10/13/22 116/67     Pulse Readings from Last 3 Encounters:   10/21/22 74   10/17/22 94 10/13/22 100      Ht Readings from Last 3 Encounters:   10/19/22 5' 2\" (1.575 m)   10/13/22 5' 2\" (1.575 m)   10/05/22 5' 2.01\" (1.575 m)          General: well appearing, no acute distress  Head: Normal  Face: Nornal  HEENT: atraumatic, PERRLA, moist mucosa, normal pharynx, normal tonsils and adenoids, normal tongue, no fluid in sinuses  Neck: Trachea midline, no carotid bruit, no masses  Chest: Normal.  Respiratory: Normal chest wall expansion, CTA B, no r/r/w, no rubs  Cardiovascular: RRR, no m/r/g, Normal S1 and S2  Abdomen: Soft, non tender, non-distended, normal bowel sounds in all quadrants, no hepatosplenomegaly, no tympany. Incision scar: well healed, right lower quadrant urostomy+  Genitourinary: No inguinal hernia, normal external gentalia, no renal angle tenderness  Rectal: deferred  Musculoskeletal: normal ROM in upper and lower extremities, No joint swelling.   Integumentary: Warm, dry, and pink, with no rash, purpura, or petechia  Heme/Lymph: No lymphadenopathy, no bruises  Neurological:Cranial Nerves II-XII grossly intact, no gross motor or sensory deficit  Psychiatric: Cooperative with normal mood, affect, and cognition      Laboratory Values:   Recent Results (from the past 24 hour(s))   METABOLIC PANEL, BASIC    Collection Time: 10/21/22  4:51 AM   Result Value Ref Range    Sodium 138 136 - 145 mmol/L    Potassium 3.6 3.5 - 5.1 mmol/L    Chloride 112 (H) 97 - 108 mmol/L    CO2 17 (L) 21 - 32 mmol/L    Anion gap 9 5 - 15 mmol/L    Glucose 70 65 - 100 mg/dL    BUN 8 6 - 20 mg/dL    Creatinine 0.51 (L) 0.55 - 1.02 mg/dL    BUN/Creatinine ratio 16 12 - 20      eGFR >60 >60 ml/min/1.73m2    Calcium 8.0 (L) 8.5 - 10.1 mg/dL   CBC W/O DIFF    Collection Time: 10/21/22  4:51 AM   Result Value Ref Range    WBC 7.2 3.6 - 11.0 K/uL    RBC 2.76 (L) 3.80 - 5.20 M/uL    HGB 7.5 (L) 11.5 - 16.0 g/dL    HCT 23.7 (L) 35.0 - 47.0 %    MCV 85.9 80.0 - 99.0 FL    MCH 27.2 26.0 - 34.0 PG    MCHC 31.6 30.0 - 36.5 g/dL RDW 16.8 (H) 11.5 - 14.5 %    PLATELET 718 010 - 441 K/uL    MPV 9.7 8.9 - 12.9 FL    NRBC 0.0 0.0  WBC    ABSOLUTE NRBC 0.00 0.00 - 0.01 K/uL         XR ABD (KUB)   Final Result   Dilated small bowel within the central abdomen is similar to CT from one day   prior. DUPLEX LOWER EXT VENOUS BILAT   Final Result   Addendum (preliminary) 1 of 1      · Thrombus present in the right external iliac vein. · Thrombus present in the right common femoral vein. · Thrombus present in the right femoral vein. · Thrombus present in the right proximal femoral vein. · Thrombus present in the right mid femoral vein. · Thrombus present in the right distal femoral vein. · Thrombus present in the right popliteal vein. · Thrombus present in the right gastrocnemius vein. · Thrombus present in the right peroneal vein. · Thrombus present in the right saphenofemoral junction. · Thrombus present in the left external iliac vein. · Thrombus present in the left common femoral vein. · Thrombus present in the left saphenofemoral junction. · Thrombus present in the left femoral vein. · Thrombus present in the left proximal femoral vein. · Thrombus present in the left mid femoral vein. · Thrombus present in the left popliteal vein. · Thrombus present in the left distal femoral vein. · Thrombus present in the left gastrocnemius vein. · Thrombus present in the left peroneal vein. This is vascular lab report on Bassem Bowen, patient's YOB: 1965   Date of examination: October 20, 2022   Examination: Duplex lower extremity venous bilateral   Technician: Ms. Naun Wolf   Clinical history: This is  61years old woman with suspected renal vein    thrombosis   Indication: femoral vein thrombosis. Technique: Bilateral leg venous structures examined using venous duplex    ultrasound with 2D grayscale, color-flow and spectral Doppler analysis.       Findings:   Right side:   common femoral vein and saphenofemoral junction is not compressible and    there is hypoechoic filling defect noted. common femoral vein shows no venous flow   Distal external iliac vein also not compressible and that there is no    venous flow   Proximal femoral vein is noncompressible and there is no venous flow   Mid femoral vein is not compressible and there is no venous flow   Distal femoral vein is noncompressible and there is no venous flow   Proximal, mid, distal greater saphenous vein is compressible, there is no    filling defect   Distal popliteal vein is not compressible. Proximal popliteal vein is noncompressible and there is no venous flow   Proximal gastrocnemius vein is not compressible   Proximal small saphenous vein is not compressible   Distal, mid, proximal  peroneal vein is not compressible      Left side:   common femoral vein not compressible and there is no venous flow   Saphenofemoral junction is not compressible and there is no venous flow. External iliac vein also not compressible there is no venous flow   Proximal femoral vein is noncompressible and there is no venous flow   Mid femoral vein is noncompressible and there is no venous flow   Distal femoral vein is not compressible and there is no venous flow   Proximal, mid, distal greater saphenous vein is noncompressible and there    is hypoechoic filling defect noted. Distal popliteal vein is noncompressible   Proximal popliteal vein is noncompressible and there is no venous flow   Proximal gastrocnemius vein is not compressible and there is no venous    flow   Proximal small saphenous vein is noncompressible and there is no venous    flow   Distal, mid, proximal peroneal vein is not compressible. Impression:   1. Right external iliac vein, common femoral vein, femoral vein,    popliteal vein, gastrocnemius vein, small saphenous vein, and peroneal    vein shows acute venous thrombosis   2.   Left external iliac vein, common femoral vein, femoral vein, popliteal    vein, gastrocnemius vein, small saphenous vein, peroneal vein, and    saphenofemoral junction and greater saphenous vein shows acute venous    thrombosis. These  extensive bilateral lower extremity deep vein thrombosis notified    to Dr. Real Oneill on 10/20/2022 1201 PM.            CT ABD PELV WO CONT   Final Result   Status post cystectomy and hysterectomy with right lower quadrant ileal conduit. Mild bilateral hydroureteronephrosis, evaluation is somewhat limited by the   presence of contrast from previously performed examination. Small bowel   distention with decompressed loops distally concerning for obstruction likely   related to adhesion formation. While this study was done without the use of   intravenous contrast there is questionable right common femoral vein thrombus   with soft tissue edema. CTA CHEST W OR W WO CONT   Final Result   Emphysematous changes. No evidence of pulmonary embolism or acute   abnormality. Cholelithiasis. Bilateral hydronephrosis. 1 cm hypodense lesion   right hepatic lobe cannot be characterized with certainty on the basis of this   examination. Follow-up is recommended to ensure stability. XR CHEST PORT   Final Result   No Acute Disease. XR FOOT LT MIN 3 V   Final Result   Normal study. Assessment:  Ileus, resolving    Nausea, improving     Constipation    DVT    Plan:    Admission  Diet: full liquids  IV fluids  SCD  IS  Nausea medication  Labs in am  Abdominal Xray in am  Consult: Dr. Julian Arias therapy per Hematologist.     Thank you for the consultation & allowing me to participate in the care of this patient.

## 2022-10-22 NOTE — PROGRESS NOTES
Problem: Falls - Risk of  Goal: *Absence of Falls  Description: Document Maria Luisa Sauceda Fall Risk and appropriate interventions in the flowsheet. Outcome: Progressing Towards Goal  Note: Fall Risk Interventions:  Mobility Interventions: Bed/chair exit alarm, Patient to call before getting OOB, PT Consult for mobility concerns, OT consult for ADLs         Medication Interventions: Bed/chair exit alarm, Patient to call before getting OOB    Elimination Interventions: Bed/chair exit alarm, Call light in reach, Patient to call for help with toileting needs    History of Falls Interventions: Bed/chair exit alarm, Door open when patient unattended         Problem: Pressure Injury - Risk of  Goal: *Prevention of pressure injury  Description: Document Alphonso Scale and appropriate interventions in the flowsheet. Outcome: Progressing Towards Goal  Note: Pressure Injury Interventions:             Activity Interventions: PT/OT evaluation, Increase time out of bed    Mobility Interventions: PT/OT evaluation, HOB 30 degrees or less    Nutrition Interventions: Document food/fluid/supplement intake

## 2022-10-22 NOTE — PROGRESS NOTES
Hematology and Oncology Progress Note    Patient: Heidy Waters MRN: [id number]  SSN: [id number]    YOB: 1965  Age: 61 y.o. Sex: female      Admit Date: 10/19/2022    LOS: 2 days     Chief Complaint: Patient was admitted with nausea vomiting and had developed leg swelling    Subjective:     Patient vaginal bleeding is very minimal and it is more like discharge now. She has no blood in urine. Patient has no nosebleed or hematemesis. No dyspnea. Objective:     Vitals:    10/22/22 0226 10/22/22 0400 10/22/22 0729 10/22/22 0800   BP: 93/60  93/60    Pulse: 68 81 91 84   Resp: 18  18    Temp: 99.1 °F (37.3 °C)  98.6 °F (37 °C)    SpO2: 99%  100%    Weight:       Height:            Physical Exam:  Constitutional: Middle-aged -American female looks chronically ill. Not in any acute distress or pain. Eyes: Sclerae anicteric. Conjunctivae shows pallor. ENMT: Oral mucosa is moist, no thrush, mucositis, or petechiae. Neck: No adenopathy. Respiratory: Lungs are clear bilaterally. Cardiovascular: Normal sinus rhythm; no gallop or murmur. Abdomen: Patient has urostomy. Soft nontender. Incision scar scar well-healed. Extremities: Does have leg swelling. Skin: No petechiae; no skin rash. Neurologic: Alert/oriented x 3    Lab/Data Review:    No results found for this or any previous visit (from the past 24 hour(s)). Assessment and plan:     29-year-old -American female with multiple medical problems who had anterior exenteration surgery for her bladder cancer. Patient was not much moving postoperatively and was admitted with partial small bowel obstruction. Patient is now diagnosed with DVT. 1) deep vein thrombosis: Obviously patient's recent surgery and immobilization was the reason for her DVT. I have reviewed her Doppler study which shows extensive DVT of both leg.   Patient has IVC filter but that filter is old and there are very good chances that she has collateral blood vessels bypassing filter. So she does need anticoagulation prevent pulmonary embolism and also to prevent long-term complication of lower extremity edema and post phlebitis syndrome in leg. Patient does have some vaginal bleeding but it does not look like clinically significant. I can understand patient's and primary teams concern for worsening of bleeding. I have explained to patient that I would recommend low-dose IV heparin with close monitoring. Of course if she starts having profuse bleeding or if her H&H starts going down then we can always stop the anticoagulation. If she able to tolerate it then we can treat her incrementally higher dose of heparin with close supervision. Patient's niece also participated in this discussion at this point patient does not want us to start IV heparin after discussing risk and benefit. She is telling me that she is going to think about it and discuss with her family members and let me know in the morning. I would like to start her IV heparin as soon as she makes up her mind. 12/22: This morning around 830 I called patient and talk to her about starting IV heparin. She was still not sure and she wanted me to talk to her niece. Later on I had talked to her niece on the phone and discussed about risk and benefit of putting her on a heparin. She understood and asked appropriate question. Later on when I saw patient in person I had another long discussion about pros and cons about treating her with IV heparin versus not treating it. After long discussion patient did agree for starting on IV heparin. I will be starting heparin at 500 units/h and will monitor her for the next 24 hours to make sure her vaginal discharge/spotting does not increase. If she does not have any clinically significant bleeding then we will go for full dose anticoagulation if patient agrees.   I had very long discussion with patient's nurse as well as pharmacist about my plan and order. 2) bladder cancer: We will review her surgical pathology and op report and make recommendations accordingly. 3) anemia: From her recent surgery as well as vaginal bleeding. We will monitor her H&H and give as needed packed RBC transfusion to keep hemoglobin above 7. I will send iron studies to see if patient need any iron supplements or infusion.  -Patient's hemoglobin is stable at 7.7. This dictation was done by dragon, computer voice recognition software. Often unanticipated grammatical, syntax, phones and other interpretive errors are inadvertently transcribed. Please excuse errors that have escaped final proofreading.        Signed By: Jacquelyn San MD     October 22, 2022

## 2022-10-22 NOTE — PROGRESS NOTES
[facility] SURGERY PROGRESS NOTES        Chief Complaint: _nausea    History of Present Illness:    Ms. Frank Hein is a 61y.o. year old * female presents to ER with 2 day history of nausea and poor PO intake. No vomiting. Passing some flatus. She had undergone robotic assisted anterior exenteration by Dr. Elva Barrera. She was recently discharged. However she has not been feeling well. Has been able to take very little due to nausea. In ER CT scan without PO contrast showed some mildly dilated small bowel suggestive of possible bowel obstruction versus ileus. Patient denies any fever or chills or abdominal pain. Denies any flatus or BM. Already has IVC filter. Agreed to anticoagulation therapy. Tolerating clears.       Past Medical History:   Past Medical History:   Diagnosis Date    Anemia     pt states had recent blood transfusion 2022    Back pain     Cancer (HCC)     bladder    DVT (deep venous thrombosis) (HCC)     and PE, pt denies lung PE , only leg several years ago    Hepatitis C     pt states dx 1 month ago    Liver disease     Presence of IVC filter     pt states several years ago    Rheumatoid arthritis ([facility] [address]     Sciatic leg pain     pt states both legs    Uterine fibroid        Past Surgical History:   Past Surgical History:   Procedure Laterality Date    HX APPENDECTOMY  10/03/2022    HX  SECTION      HX GYN  10/03/2022    ROBOT ANTERIOR PELVIC EXENTERATION    HX HYSTERECTOMY  10/03/2022    HX SALPINGO-OOPHORECTOMY Bilateral 10/03/2022    HX UROLOGICAL  09/15/2022    CYSTOSCOPY    HX UROLOGICAL  09/15/2022    URETHRAL DILATION    HX UROLOGICAL  09/15/2022    URETERAL STENT PLACEMENT    HX UROLOGICAL  09/15/2022    TRANSURETHRAL RESECTION OF BLADDER TUMOR >2CM    HX UROLOGICAL Bilateral 09/15/2022    RETROGRADE PYELOGRAM    HX UROLOGICAL  10/03/2022    PELVIC LYMPH NODE DISSECTION    HX UROLOGICAL  10/03/2022    ILEAL CONDUIT URINARY DIVERSION    OH CARDIAC SURG PROCEDURE UNLIST      stent placement        Allergy:  Allergies   Allergen Reactions    Bactrim [Sulfamethoprim] Swelling     Swelling of the lilps    Penicillin V Potassium Swelling       Social History:  reports that she has quit smoking. Her smoking use included cigarettes. She has never used smokeless tobacco. She reports that she does not currently use alcohol. She reports that she does not use drugs.      Family History:  Family History   Problem Relation Age of Onset    Cancer Mother     Lung Cancer Mother     Heart Disease Father     Hypertension Sister     Cancer Maternal Aunt     Breast Cancer Maternal Aunt         Current Medications:  Current Facility-Administered Medications:     heparin 25,000 units in D5W 250 ml infusion, 18-36 Units/kg/hr, IntraVENous, TITRATE, Michelle Salcedo MD    oxyCODONE IR (ROXICODONE) tablet 5 mg, 5 mg, Oral, Q6H PRN, Brenda Oats, NP, 5 mg at 10/22/22 1344    [Held by provider] 0.9% sodium chloride infusion, 100 mL/hr, IntraVENous, CONTINUOUS, Brenda Oats, NP, Last Rate: 100 mL/hr at 10/22/22 0554, 100 mL/hr at 10/22/22 0554    0.9% sodium chloride infusion 250 mL, 250 mL, IntraVENous, PRN, Hitesh Posadas MD    cromolyn (OPTICROM) 4 % ophthalmic solution 1 Drop, 1 Drop, Both Eyes, DAILY, Hitesh Posadas MD, 1 Drop at 10/22/22 0930    sodium chloride (NS) flush 5-40 mL, 5-40 mL, IntraVENous, Q8H, Mack Posadas MD, 10 mL at 10/22/22 1348    sodium chloride (NS) flush 5-40 mL, 5-40 mL, IntraVENous, PRN, Hitesh Posadas MD    acetaminophen (TYLENOL) tablet 650 mg, 650 mg, Oral, Q6H PRN, 650 mg at 10/21/22 3849 **OR** acetaminophen (TYLENOL) suppository 650 mg, 650 mg, Rectal, Q6H PRN, Hitesh Posadas MD    polyethylene glycol (MIRALAX) packet 17 g, 17 g, Oral, DAILY PRN, Hitesh Posadas MD    ondansetron (ZOFRAN ODT) tablet 4 mg, 4 mg, Oral, Q8H PRN **OR** ondansetron (ZOFRAN) injection 4 mg, 4 mg, IntraVENous, Q6H PRN, Hitesh Posadas MD    fluconazole (DIFLUCAN) 200mg/100 mL IVPB (premix), 200 mg, IntraVENous, DAILY, Karina Posadas MD, Last Rate: 100 mL/hr at 10/22/22 0930, 200 mg at 10/22/22 0930     Immunization History: There is no immunization history on file for this patient. Complete    Review of Systems:     Constitutional:  no fever,  no chills,  no sweats, No weakness, No fatigue, No decreased activity. Eye: No recent visual problem, No icterus, No discharge, No double vision. Ear/Nose/Mouth/Throat: No decreased hearing, No ear pain, No nasal congestion, No sore throat. Respiratory: No shortness of breath, No cough, No sputum production, No hemoptysis, No wheezing, No cyanosis. Cardiovascular: No chest pain, No palpitations, No bradycardia, No tachycardia, No peripheral edema, No syncope. Gastrointestinal:  nausea,  No vomiting, No diarrhea, No constipation, No heartburn,  No abdominal pain. Genitourinary: No dysuria, No hematuria, No change in urine stream, No urethral discharge, No lesions. Hematology/Lymphatics: No bruising tendency, No bleeding tendency, No petechiae, No swollen lymph glands. Endocrine: No excessive thirst, No polyuria, No cold intolerance, No heat intolerance, No excessive hunger. Immunologic: Not immunocompromised, No recurrent fevers, No recurrent infections. Musculoskeletal: No back pain, No neck pain, No joint pain, No muscle pain, No claudication, No decreased range of motion, No trauma. Integumentary: No rash, No pruritus, No abrasions. Neurologic: Alert and oriented X4, No abnormal balance, No headache, No confusion, No numbness, No tingling. Psychiatric: No anxiety, No depression, No nate. Physical Exam:     Vitals & Measurements:     Wt Readings from Last 3 Encounters:   10/19/22 51.7 kg (114 lb)   10/13/22 51.3 kg (113 lb)   10/09/22 59.6 kg (131 lb 6.3 oz)     Temp Readings from Last 3 Encounters:   10/22/22 98.6 °F (37 °C)   10/17/22 98.7 °F (37.1 °C)   10/13/22 98.3 °F (36.8 °C)     BP Readings from Last 3 Encounters:   10/22/22 93/60   10/17/22 (!) 103/58   10/13/22 116/67     Pulse Readings from Last 3 Encounters:   10/22/22 84   10/17/22 94   10/13/22 100      Ht Readings from Last 3 Encounters:   10/19/22 5' 2\" (1.575 m)   10/13/22 5' 2\" (1.575 m)   10/05/22 5' 2.01\" (1.575 m)          General: well appearing, no acute distress  Head: Normal  Face: Nornal  HEENT: atraumatic, PERRLA, moist mucosa, normal pharynx, normal tonsils and adenoids, normal tongue, no fluid in sinuses  Neck: Trachea midline, no carotid bruit, no masses  Chest: Normal.  Respiratory: Normal chest wall expansion, CTA B, no r/r/w, no rubs  Cardiovascular: RRR, no m/r/g, Normal S1 and S2  Abdomen: Soft, non tender, non-distended, normal bowel sounds in all quadrants, no hepatosplenomegaly, no tympany. Incision scar: well healed, right lower quadrant urostomy+  Genitourinary: No inguinal hernia, normal external gentalia, no renal angle tenderness  Rectal: deferred  Musculoskeletal: normal ROM in upper and lower extremities, No joint swelling. Integumentary: Warm, dry, and pink, with no rash, purpura, or petechia  Heme/Lymph: No lymphadenopathy, no bruises  Neurological:Cranial Nerves II-XII grossly intact, no gross motor or sensory deficit  Psychiatric: Cooperative with normal mood, affect, and cognition      Laboratory Values:   No results found for this or any previous visit (from the past 24 hour(s)). XR ABD (KUB)   Final Result   Dilated small bowel within the central abdomen is similar to CT from one day   prior. DUPLEX LOWER EXT VENOUS BILAT   Final Result   Addendum (preliminary) 1 of 1      · Thrombus present in the right external iliac vein. · Thrombus present in the right common femoral vein. · Thrombus present in the right femoral vein. · Thrombus present in the right proximal femoral vein. · Thrombus present in the right mid femoral vein. · Thrombus present in the right distal femoral vein.    · Thrombus present in the right popliteal vein. · Thrombus present in the right gastrocnemius vein. · Thrombus present in the right peroneal vein. · Thrombus present in the right saphenofemoral junction. · Thrombus present in the left external iliac vein. · Thrombus present in the left common femoral vein. · Thrombus present in the left saphenofemoral junction. · Thrombus present in the left femoral vein. · Thrombus present in the left proximal femoral vein. · Thrombus present in the left mid femoral vein. · Thrombus present in the left popliteal vein. · Thrombus present in the left distal femoral vein. · Thrombus present in the left gastrocnemius vein. · Thrombus present in the left peroneal vein. This is vascular lab report on Guerda Bustillos, patient's YOB: 1965   Date of examination: October 20, 2022   Examination: Duplex lower extremity venous bilateral   Technician: Ms. Jimmy Carr   Clinical history: This is  61years old woman with suspected renal vein    thrombosis   Indication: femoral vein thrombosis. Technique: Bilateral leg venous structures examined using venous duplex    ultrasound with 2D grayscale, color-flow and spectral Doppler analysis. Findings:   Right side:   common femoral vein and saphenofemoral junction is not compressible and    there is hypoechoic filling defect noted. common femoral vein shows no venous flow   Distal external iliac vein also not compressible and that there is no    venous flow   Proximal femoral vein is noncompressible and there is no venous flow   Mid femoral vein is not compressible and there is no venous flow   Distal femoral vein is noncompressible and there is no venous flow   Proximal, mid, distal greater saphenous vein is compressible, there is no    filling defect   Distal popliteal vein is not compressible.    Proximal popliteal vein is noncompressible and there is no venous flow   Proximal gastrocnemius vein is not compressible Proximal small saphenous vein is not compressible   Distal, mid, proximal  peroneal vein is not compressible      Left side:   common femoral vein not compressible and there is no venous flow   Saphenofemoral junction is not compressible and there is no venous flow. External iliac vein also not compressible there is no venous flow   Proximal femoral vein is noncompressible and there is no venous flow   Mid femoral vein is noncompressible and there is no venous flow   Distal femoral vein is not compressible and there is no venous flow   Proximal, mid, distal greater saphenous vein is noncompressible and there    is hypoechoic filling defect noted. Distal popliteal vein is noncompressible   Proximal popliteal vein is noncompressible and there is no venous flow   Proximal gastrocnemius vein is not compressible and there is no venous    flow   Proximal small saphenous vein is noncompressible and there is no venous    flow   Distal, mid, proximal peroneal vein is not compressible. Impression:   1. Right external iliac vein, common femoral vein, femoral vein,    popliteal vein, gastrocnemius vein, small saphenous vein, and peroneal    vein shows acute venous thrombosis   2. Left external iliac vein, common femoral vein, femoral vein, popliteal    vein, gastrocnemius vein, small saphenous vein, peroneal vein, and    saphenofemoral junction and greater saphenous vein shows acute venous    thrombosis. These  extensive bilateral lower extremity deep vein thrombosis notified    to Dr. Lisseth Day on 10/20/2022 1201 PM.            CT ABD PELV WO CONT   Final Result   Status post cystectomy and hysterectomy with right lower quadrant ileal conduit. Mild bilateral hydroureteronephrosis, evaluation is somewhat limited by the   presence of contrast from previously performed examination. Small bowel   distention with decompressed loops distally concerning for obstruction likely   related to adhesion formation.  While this study was done without the use of   intravenous contrast there is questionable right common femoral vein thrombus   with soft tissue edema. CTA CHEST W OR W WO CONT   Final Result   Emphysematous changes. No evidence of pulmonary embolism or acute   abnormality. Cholelithiasis. Bilateral hydronephrosis. 1 cm hypodense lesion   right hepatic lobe cannot be characterized with certainty on the basis of this   examination. Follow-up is recommended to ensure stability. XR CHEST PORT   Final Result   No Acute Disease. XR FOOT LT MIN 3 V   Final Result   Normal study. Assessment:  Ileus, resolving    Nausea, improving     Constipation    DVT    Plan:    Admission  Diet: full liquids  IV fluids  SCD  IS  Nausea medication  Labs in am  Abdominal Xray in am  Consult: Dr. Bia Soto therapy per Hematologist.     Thank you for the consultation & allowing me to participate in the care of this patient.

## 2022-10-22 NOTE — PROGRESS NOTES
Physician Progress Note      PATIENTFrancis Carmona  CSN #:                  [id number]  :                       1965  ADMIT DATE:       10/19/2022 9:52 PM  [address] [place] DATE:  RESPONDING  PROVIDER #:        Adiel Bass NP          QUERY TEXT:    Dear Attending,    Pt admitted with UTI. Pt noted to have urostomy. If possible, please document in the progress notes and discharge summary if you are evaluating and/or treating any of the following: The medical record reflects the following:  Risk Factors: 61year old female, invasive bladder cancer, weakness  Clinical Indicators: 10/19 ED provider note - Records reviewed noting patient admitted from 10/3 - 10/10/2022 and underwent anterior pelvic sideration including cystectomy, hysterectomy, BSO, ileal conduit diversion, pelvic lymph node dissection, incidental appendectomy on 10/3/22. Surgery was complicated by rectal injury. She was seen here on 10/13/2022 with urostomy complications and discharged home on Cipro which she completed today. 10/20 H&P - UTI  - Fluconazole for fungal UTI. Treatment: IVF NS, IV Fluconazole    Please email [email or web address] with any questions  Options provided:  -- UTI due to urostomy tube  -- UTI not due to urostomy tube  -- Other - I will add my own diagnosis  -- Disagree - Not applicable / Not valid  -- Disagree - Clinically unable to determine / Unknown  -- Refer to Clinical Documentation Reviewer    PROVIDER RESPONSE TEXT:    Provider is clinically unable to determine a response to this query. uti secondary to yeast    Query created by:  Peter Lafleur on 10/21/2022 8:32 AM      Electronically signed by:  Adiel Bass NP 10/22/2022 11:25 AM

## 2022-10-23 ENCOUNTER — APPOINTMENT (OUTPATIENT)
Dept: CT IMAGING | Age: 57
DRG: 231 | End: 2022-10-23
Attending: SURGERY
Payer: MEDICAID

## 2022-10-23 ENCOUNTER — APPOINTMENT (OUTPATIENT)
Dept: GENERAL RADIOLOGY | Age: 57
DRG: 231 | End: 2022-10-23
Attending: SURGERY
Payer: MEDICAID

## 2022-10-23 LAB
ANION GAP SERPL CALC-SCNC: 6 MMOL/L (ref 5–15)
APTT PPP: 28.8 SEC (ref 21.2–34.1)
APTT PPP: 28.9 SEC (ref 21.2–34.1)
APTT PPP: 31.2 SEC (ref 21.2–34.1)
APTT PPP: 33.3 SEC (ref 21.2–34.1)
APTT PPP: 39.3 SEC (ref 21.2–34.1)
BACTERIA SPEC CULT: ABNORMAL
BACTERIA SPEC CULT: NORMAL
BASOPHILS # BLD: 0 K/UL (ref 0–0.1)
BASOPHILS NFR BLD: 0 % (ref 0–1)
BUN SERPL-MCNC: 5 MG/DL (ref 6–20)
BUN/CREAT SERPL: 9 (ref 12–20)
CA-I BLD-MCNC: 8.3 MG/DL (ref 8.5–10.1)
CHLORIDE SERPL-SCNC: 114 MMOL/L (ref 97–108)
CO2 SERPL-SCNC: 18 MMOL/L (ref 21–32)
COLONY COUNT,CNT: ABNORMAL
CREAT SERPL-MCNC: 0.55 MG/DL (ref 0.55–1.02)
DIFFERENTIAL METHOD BLD: ABNORMAL
EOSINOPHIL # BLD: 0 K/UL (ref 0–0.4)
EOSINOPHIL NFR BLD: 0 % (ref 0–7)
ERYTHROCYTE [DISTWIDTH] IN BLOOD BY AUTOMATED COUNT: 16.7 % (ref 11.5–14.5)
GLUCOSE SERPL-MCNC: 92 MG/DL (ref 65–100)
GRAM STN SPEC: NORMAL
HCT VFR BLD AUTO: 23.5 % (ref 35–47)
HGB BLD-MCNC: 7.5 G/DL (ref 11.5–16)
IMM GRANULOCYTES # BLD AUTO: 0 K/UL
IMM GRANULOCYTES NFR BLD AUTO: 0 %
LYMPHOCYTES # BLD: 0.6 K/UL (ref 0.8–3.5)
LYMPHOCYTES NFR BLD: 9 % (ref 12–49)
MCH RBC QN AUTO: 27.5 PG (ref 26–34)
MCHC RBC AUTO-ENTMCNC: 31.9 G/DL (ref 30–36.5)
MCV RBC AUTO: 86.1 FL (ref 80–99)
MONOCYTES # BLD: 0.3 K/UL (ref 0–1)
MONOCYTES NFR BLD: 5 % (ref 5–13)
NEUTS SEG # BLD: 5.1 K/UL (ref 1.8–8)
NEUTS SEG NFR BLD: 83 % (ref 32–75)
NRBC # BLD: 0 K/UL (ref 0–0.01)
NRBC BLD-RTO: 0 PER 100 WBC
OTHER CELLS NFR BLD MANUAL: 3 %
PLATELET # BLD AUTO: 249 K/UL (ref 150–400)
PMV BLD AUTO: 9.1 FL (ref 8.9–12.9)
POTASSIUM SERPL-SCNC: 3.2 MMOL/L (ref 3.5–5.1)
RBC # BLD AUTO: 2.73 M/UL (ref 3.8–5.2)
RBC MORPH BLD: ABNORMAL
SODIUM SERPL-SCNC: 138 MMOL/L (ref 136–145)
SPECIAL REQUESTS,SREQ: ABNORMAL
SPECIAL REQUESTS,SREQ: NORMAL
THERAPEUTIC RANGE,PTTT: ABNORMAL SEC (ref 82–109)
THERAPEUTIC RANGE,PTTT: NORMAL SEC (ref 82–109)
WBC # BLD AUTO: 6.2 K/UL (ref 3.6–11)

## 2022-10-23 PROCEDURE — 99232 SBSQ HOSP IP/OBS MODERATE 35: CPT | Performed by: INTERNAL MEDICINE

## 2022-10-23 PROCEDURE — 36573 INSJ PICC RS&I 5 YR+: CPT | Performed by: NURSE PRACTITIONER

## 2022-10-23 PROCEDURE — 74011250637 HC RX REV CODE- 250/637: Performed by: NURSE PRACTITIONER

## 2022-10-23 PROCEDURE — 74011250637 HC RX REV CODE- 250/637: Performed by: INTERNAL MEDICINE

## 2022-10-23 PROCEDURE — 85730 THROMBOPLASTIN TIME PARTIAL: CPT

## 2022-10-23 PROCEDURE — 74177 CT ABD & PELVIS W/CONTRAST: CPT

## 2022-10-23 PROCEDURE — 02HV33Z INSERTION OF INFUSION DEVICE INTO SUPERIOR VENA CAVA, PERCUTANEOUS APPROACH: ICD-10-PCS

## 2022-10-23 PROCEDURE — 74011000636 HC RX REV CODE- 636: Performed by: HOSPITALIST

## 2022-10-23 PROCEDURE — 65270000029 HC RM PRIVATE

## 2022-10-23 PROCEDURE — 85025 COMPLETE CBC W/AUTO DIFF WBC: CPT

## 2022-10-23 PROCEDURE — 74011000250 HC RX REV CODE- 250: Performed by: INTERNAL MEDICINE

## 2022-10-23 PROCEDURE — 74011250636 HC RX REV CODE- 250/636: Performed by: NURSE PRACTITIONER

## 2022-10-23 PROCEDURE — 80048 BASIC METABOLIC PNL TOTAL CA: CPT

## 2022-10-23 PROCEDURE — 74011250636 HC RX REV CODE- 250/636: Performed by: INTERNAL MEDICINE

## 2022-10-23 PROCEDURE — 36569 INSJ PICC 5 YR+ W/O IMAGING: CPT

## 2022-10-23 PROCEDURE — 74018 RADEX ABDOMEN 1 VIEW: CPT

## 2022-10-23 PROCEDURE — 36415 COLL VENOUS BLD VENIPUNCTURE: CPT

## 2022-10-23 RX ORDER — SODIUM CHLORIDE 9 MG/ML
50 INJECTION, SOLUTION INTRAVENOUS CONTINUOUS
Status: DISPENSED | OUTPATIENT
Start: 2022-10-23 | End: 2022-10-25

## 2022-10-23 RX ORDER — FLUCONAZOLE 100 MG/1
200 TABLET ORAL DAILY
Status: COMPLETED | OUTPATIENT
Start: 2022-10-24 | End: 2022-11-02

## 2022-10-23 RX ORDER — MIDODRINE HYDROCHLORIDE 5 MG/1
5 TABLET ORAL
Status: DISCONTINUED | OUTPATIENT
Start: 2022-10-23 | End: 2022-11-11 | Stop reason: HOSPADM

## 2022-10-23 RX ADMIN — LEVOFLOXACIN 500 MG: 250 TABLET, FILM COATED ORAL at 19:16

## 2022-10-23 RX ADMIN — OXYCODONE 5 MG: 5 TABLET ORAL at 13:37

## 2022-10-23 RX ADMIN — IOPAMIDOL 100 ML: 755 INJECTION, SOLUTION INTRAVENOUS at 23:59

## 2022-10-23 RX ADMIN — CROMOLYN SODIUM 1 DROP: 40 SOLUTION/ DROPS OPHTHALMIC at 10:54

## 2022-10-23 RX ADMIN — DIATRIZOATE MEGLUMINE AND DIATRIZOATE SODIUM 30 ML: 660; 100 LIQUID ORAL; RECTAL at 19:20

## 2022-10-23 RX ADMIN — HEPARIN SODIUM 500 UNITS/HR: 10000 INJECTION, SOLUTION INTRAVENOUS at 19:24

## 2022-10-23 RX ADMIN — SODIUM CHLORIDE, PRESERVATIVE FREE 10 ML: 5 INJECTION INTRAVENOUS at 21:40

## 2022-10-23 NOTE — PROGRESS NOTES
Problem: Pain  Goal: *Control of Pain  Outcome: Progressing Towards Goal  Goal: *PALLIATIVE CARE:  Alleviation of Pain  Outcome: Progressing Towards Goal     Problem: Patient Education: Go to Patient Education Activity  Goal: Patient/Family Education  Outcome: Progressing Towards Goal     Problem: Falls - Risk of  Goal: *Absence of Falls  Description: Document Dennie [place] Fall Risk and appropriate interventions in the flowsheet. Outcome: Progressing Towards Goal  Note: Fall Risk Interventions:  Mobility Interventions: OT consult for ADLs, PT Consult for mobility concerns, PT Consult for assist device competence, Strengthening exercises (ROM-active/passive), Utilize walker, cane, or other assistive device, Utilize gait belt for transfers/ambulation         Medication Interventions: Teach patient to arise slowly, Patient to call before getting OOB, Utilize gait belt for transfers/ambulation    Elimination Interventions: Call light in reach    History of Falls Interventions: Bed/chair exit alarm, Door open when patient unattended         Problem: Patient Education: Go to Patient Education Activity  Goal: Patient/Family Education  Outcome: Progressing Towards Goal     Problem: Patient Education: Go to Patient Education Activity  Goal: Patient/Family Education  Outcome: Progressing Towards Goal     Problem: Pressure Injury - Risk of  Goal: *Prevention of pressure injury  Description: Document Alphonso Scale and appropriate interventions in the flowsheet. Outcome: Progressing Towards Goal  Note: Pressure Injury Interventions:             Activity Interventions: PT/OT evaluation    Mobility Interventions: HOB 30 degrees or less, PT/OT evaluation    Nutrition Interventions: Document food/fluid/supplement intake, Offer support with meals,snacks and hydration                     Problem: Patient Education: Go to Patient Education Activity  Goal: Patient/Family Education  Outcome: Progressing Towards Goal     Patient alert and oriented, complaint of mild abdominal pain during shift. Patient pain relieved with PRN oxycodone. Patient heparin on held until PICC line placed and repeat on PTT was completed. MD notified of patient status. Bedside assessment completed with ID, and surgeon on two separate occurrences. Patient with discharge from vaginal area that appears same in color of loose stool passing from rectum. Patient ordered KUB and CT of abdomen and pelvis.

## 2022-10-23 NOTE — PROGRESS NOTES
PT eval received, Patient has DVT bharati les and heparin just started 10/22/2022 at 1900 . We will wait for 24 hrs to get patient up after the heparin start time.

## 2022-10-23 NOTE — CONSULTS
Infectious Disease Progress Note           Subjective:   Pt seen and examined at bedside. Stable, denies new complaints. Has what appears to fcal matter coming out of her Vaginal area, denies abdominal pain, fever/chills  Objective:   Physical Exam:     Visit Vitals  BP 95/64 (BP 1 Location: Left upper arm, BP Patient Position: At rest)   Pulse 87   Temp 98.3 °F (36.8 °C)   Resp 18   Ht 5' 2\" (1.575 m)   Wt 114 lb (51.7 kg)   LMP  (LMP Unknown)   SpO2 100%   BMI 20.85 kg/m²      O2 Device: None (Room air)    Temp (24hrs), Av.6 °F (37 °C), Min:98.3 °F (36.8 °C), Max:99.1 °F (37.3 °C)    No intake/output data recorded. 10/21 190 - 10/23 0700  In: 750 [P.O.:350; I.V.:400]  Out: 1720 [Urine:1720]    General: NAD, AAO x 4  HEENT: SIMBA, Moist mucosa   Lungs: CTA b/l, decreased at the bases   Heart: S1S2+, RRR, no murmur  Abdo: Soft, NT, ND, +BS, healed incisional wounds, + urostomy   : Fecal matter coming out of vaginal area  Exts: No edema, + pulses b/l   Skin: No wounds, No rashes or lesions    Data Review:       Recent Days:  Recent Labs     10/23/22  0221 10/22/22  1705 10/21/22  0451   WBC 6.2 6.1 7.2   HGB 7.5* 7.7* 7.5*   HCT 23.5* 24.3* 23.7*    261 258     Recent Labs     10/23/22  0221 10/21/22  0451   BUN 5* 8   CREA 0.55 0.51*       Lab Results   Component Value Date/Time    C-Reactive protein 10.20 (H) 2022 11:02 AM          Microbiology     Results       Procedure Component Value Units Date/Time    COVID-19 RAPID TEST [[id number]] Collected: 10/20/22 1435    Order Status: Completed Specimen: Nasopharyngeal Updated: 10/20/22 1503     COVID-19 rapid test Not Detected        Comment: Rapid Abbott ID Now   Rapid NAAT:  The specimen is NEGATIVE for SARS-CoV-2, the novel coronavirus associated with COVID-19.    Negative results should be treated as presumptive and, if inconsistent with clinical signs and symptoms or necessary for patient management, should be tested with an alternative molecular assay. Negative results do not preclude SARS-CoV-2 infection and should not be used as the sole basis for patient management decisions. This test has been authorized by the FDA under   an Emergency Use Authorization (EUA) for use by authorized laboratories. Fact sheet for Healthcare Providers: ConventionUpdate.co.nz Fact sheet for Patients: ConventionUpdate.co.nz   Methodology: Isothermal Nucleic Acid Amplification         MRSA SCREEN - PCR (NASAL) [[id number]] Collected: 10/20/22 1435    Order Status: Completed Specimen: Swab Updated: 10/20/22 1636     MRSA by PCR, Nasal Not Detected       MRSA SCREEN - PCR (NASAL) [[id number]] Collected: 10/20/22 1340    Order Status: Canceled Specimen: Swab     CULTURE, Conda Nailer STAIN [[id number]] Collected: 10/20/22 1340    Order Status: Completed Specimen: Wound from Cervical/vaginal swab Updated: 10/23/22 0741     Special Requests: No Special Requests        GRAM STAIN Rare WBCs seen               1+ apparent Gram Positive Cocci in pairs                  Few apparent Gram Negative Rods           Culture result: Light  Mixed skin heriberto isolated       CULTURE, BODY FLUID Jeri Feast STAIN [[id number]]     Order Status: Canceled Specimen: Body Fluid from Uterine Cul/De/Sac Fluid     CULTURE, URINE [[id number]]  (Abnormal) Collected: 10/20/22 0249    Order Status: Completed Specimen: Urine Updated: 10/23/22 1001     Special Requests: --        No Special Requests  Reflexed from [id number]       [place] Count --        >100,000  colonies/ml       Culture result: Candida ciferrii                  Diagnostics   CXR Results  (Last 48 hours)      None               Assessment/Plan       UTI, complicated by ileal conduit.  Abnormal UA, Candida Ciferrii isolated from Cx         Remains afebrile w a normal WBC on routine labs         Continue Fluconazole day # 4/14, changed to oral given limited IV access         Routine labs in the morning     2. 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 Vaginal cuff cellulitis, per Gyn on day # 2/7 of levaquin      4. Bladder Ca w local invasion: S/p resection on 10/03 w creation of ileal conduit      5. SBO on admission, due to recent /Gyn surgery       Being managed conservatively, Abdominal exam remains benign       6. Hep C positive serology w neg HCV RNA VL 09/2021      Denies prior to tx, suspect self limited infection      7. B/L LE DVT extensive, s/p IVC filter placement.  Anticoagulated on Heparin      Les Recio MD    10/23/2022

## 2022-10-23 NOTE — PROCEDURES
PICC Placement Note - inclusive with additional documentation included in the patient Avatar device information, site/line assessment, and timeout:    PRE-PROCEDURE VERIFICATION  Consent obtained prior and placed on chart: yes  Correct Procedure: yes  Correct Site:  yes  Timeout Completed: yes; charted in electronic record. Temperature: Temp: 98.3 °F (36.8 °C), Temperature Source: Temp Source: Oral    Recent Labs     10/23/22  0221   BUN 5*   CREA 0.55   HGB 7.5*   HCT 23.5*      WBC 6.2     Allergies: Bactrim [sulfamethoprim] and Penicillin v potassium    Education materials for [facility] Care given to patient and/or family: yes. See Patient Education activity for further details. PICC Booklet placed on chart: yes    PROCEDURE DETAIL:   A double lumen PICC line was started for vascular access antibiotics therapy, Heparin therapy, MD order. The following documentation is in addition to the PICC properties in the lines/airways flowsheet:  Lot #: [id number]  Exp: 08/31/2023  xylocaine used: yes  Mid-Arm Circumference: 27 (cm)  External Catheter Length: 1 (cm)  Internal Catheter Length: 34 (cm)  Total Cut Catheter Length: 35 (cm)  Location/laterality: right upper arm  Vein Selection for PICC:right brachial  Central Line Bundle followed yes  Complication Related to Insertion: none. Estimated Blood Loss: Less than 5 mL    The placement was verified by [address] [place] [address] Location Technology: yes. The 3CG ECG indicates the tip location is on the right side and the tip overlies the lower superior vena cava. PICC line dressed per policy with BD PICC Statlock securement device, Biopatch CHG antimicrobial disk, and sterile Tegaderm dressing. Needless connectors changed all ports per policy after blood return checks. Alcohol swab end-caps utilized per policy. Client tolerated well. Client expressed anxiety about dressing changes and PICC care.  Reinforced PICC management education with client and that nurses are trained to care for line. Discussed with client that PICC team can change dressing when due in 7 days or PRN as needed. Please re-enter PICC team consult to change PICC line dressing when due. Line is okay to use: yes; primary care nurse, Glenys Levin, notified line okay to use and of client preferences above.      Sonya Wagoner RN charting for Bean Patterson RN [facility]

## 2022-10-23 NOTE — PROGRESS NOTES
Hospitalist Progress Note         HARMAN Lux, FNP-C    Daily Progress Note: 10/23/2022      Subjective:   Subjective   Patient alert and oriented sitting in bed. She denies vaginal bleeding. She is requesting marinol for appetite stimulant. Review of Systems:   Review of Systems   Constitutional:  Negative for chills and fever. Respiratory:  Negative for cough. Cardiovascular:  Positive for leg swelling. Negative for chest pain. Gastrointestinal:  Negative for abdominal pain, nausea and vomiting. Genitourinary:  Negative for dysuria. Musculoskeletal:  Positive for myalgias. Negative for falls and neck pain. Neurological:  Positive for weakness. Objective:   Objective      Vitals:  Patient Vitals for the past 12 hrs:   Temp Pulse Resp BP SpO2   10/23/22 0734 98.3 °F (36.8 °C) 87 18 95/64 100 %   10/23/22 0311 99.1 °F (37.3 °C) 93 18 (!) 97/58 99 %          Physical Exam:  Physical Exam  Vitals and nursing note reviewed. Constitutional:       Appearance: Normal appearance. Eyes:      Extraocular Movements: Extraocular movements intact. Cardiovascular:      Rate and Rhythm: Normal rate. Abdominal:      General: Bowel sounds are normal.   Genitourinary:     Comments: Right urostomy yeast noted in collection bag. Dark red/brownish vaginal blood noted on examination  Musculoskeletal:      Comments: Bilateral leg tenderness with palpation   Neurological:      Mental Status: She is alert and oriented to person, place, and time. Motor: Weakness present.         Lab Results:  Recent Results (from the past 24 hour(s))   PTT    Collection Time: 10/22/22  5:05 PM   Result Value Ref Range    aPTT 31.2 21.2 - 34.1 sec    aPTT, therapeutic range   82 - 109 sec   CBC WITH AUTOMATED DIFF    Collection Time: 10/22/22  5:05 PM   Result Value Ref Range    WBC 6.1 3.6 - 11.0 K/uL    RBC 2.82 (L) 3.80 - 5.20 M/uL    HGB 7.7 (L) 11.5 - 16.0 g/dL    HCT 24.3 (L) 35.0 - 47.0 %    MCV 86.2 80.0 - 99.0 FL    MCH 27.3 26.0 - 34.0 PG    MCHC 31.7 30.0 - 36.5 g/dL    RDW 16.8 (H) 11.5 - 14.5 %    PLATELET 794 214 - 053 K/uL    MPV 9.0 8.9 - 12.9 FL    NRBC 0.0 0.0  WBC    ABSOLUTE NRBC 0.00 0.00 - 0.01 K/uL    NEUTROPHILS 75 32 - 75 %    LYMPHOCYTES 20 12 - 49 %    MONOCYTES 5 5 - 13 %    EOSINOPHILS 0 0 - 7 %    BASOPHILS 0 0 - 1 %    IMMATURE GRANULOCYTES 0 %    ABS. NEUTROPHILS 4.6 1.8 - 8.0 K/UL    ABS. LYMPHOCYTES 1.2 0.8 - 3.5 K/UL    ABS. MONOCYTES 0.3 0.0 - 1.0 K/UL    ABS. EOSINOPHILS 0.0 0.0 - 0.4 K/UL    ABS. BASOPHILS 0.0 0.0 - 0.1 K/UL    ABS. IMM. GRANS. 0.0 K/UL    DF Manual      RBC COMMENTS Microcytosis  1+       CBC WITH AUTOMATED DIFF    Collection Time: 10/23/22  2:21 AM   Result Value Ref Range    WBC 6.2 3.6 - 11.0 K/uL    RBC 2.73 (L) 3.80 - 5.20 M/uL    HGB 7.5 (L) 11.5 - 16.0 g/dL    HCT 23.5 (L) 35.0 - 47.0 %    MCV 86.1 80.0 - 99.0 FL    MCH 27.5 26.0 - 34.0 PG    MCHC 31.9 30.0 - 36.5 g/dL    RDW 16.7 (H) 11.5 - 14.5 %    PLATELET 623 951 - 418 K/uL    MPV 9.1 8.9 - 12.9 FL    NRBC 0.0 0.0  WBC    ABSOLUTE NRBC 0.00 0.00 - 0.01 K/uL    NEUTROPHILS 83 (H) 32 - 75 %    LYMPHOCYTES 9 (L) 12 - 49 %    MONOCYTES 5 5 - 13 %    EOSINOPHILS 0 0 - 7 %    BASOPHILS 0 0 - 1 %    OTHER CELL 3 %    IMMATURE GRANULOCYTES 0 %    ABS. NEUTROPHILS 5.1 1.8 - 8.0 K/UL    ABS. LYMPHOCYTES 0.6 (L) 0.8 - 3.5 K/UL    ABS. MONOCYTES 0.3 0.0 - 1.0 K/UL    ABS. EOSINOPHILS 0.0 0.0 - 0.4 K/UL    ABS. BASOPHILS 0.0 0.0 - 0.1 K/UL    ABS. IMM.  GRANS. 0.0 K/UL    DF Manual      RBC COMMENTS Anisocytosis  1+       METABOLIC PANEL, BASIC    Collection Time: 10/23/22  2:21 AM   Result Value Ref Range    Sodium 138 136 - 145 mmol/L    Potassium 3.2 (L) 3.5 - 5.1 mmol/L    Chloride 114 (H) 97 - 108 mmol/L    CO2 18 (L) 21 - 32 mmol/L    Anion gap 6 5 - 15 mmol/L    Glucose 92 65 - 100 mg/dL    BUN 5 (L) 6 - 20 mg/dL    Creatinine 0.55 0.55 - 1.02 mg/dL    BUN/Creatinine ratio 9 (L) 12 - 20      eGFR >60 >60 ml/min/1.73m2    Calcium 8.3 (L) 8.5 - 10.1 mg/dL   PTT    Collection Time: 10/23/22  2:21 AM   Result Value Ref Range    aPTT 28.8 21.2 - 34.1 sec    aPTT, therapeutic range   82 - 109 sec        Results       Procedure Component Value Units Date/Time    COVID-19 RAPID TEST [[id number]] Collected: 10/20/22 1435    Order Status: Completed Specimen: Nasopharyngeal Updated: 10/20/22 1503     COVID-19 rapid test Not Detected        Comment: Rapid Abbott ID Now   Rapid NAAT:  The specimen is NEGATIVE for SARS-CoV-2, the novel coronavirus associated with COVID-19. Negative results should be treated as presumptive and, if inconsistent with clinical signs and symptoms or necessary for patient management, should be tested with an alternative molecular assay. Negative results do not preclude SARS-CoV-2 infection and should not be used as the sole basis for patient management decisions. This test has been authorized by the FDA under   an Emergency Use Authorization (EUA) for use by authorized laboratories.  Fact sheet for Healthcare Providers: ConventionUpdate.co.nz Fact sheet for Patients: ConventionUpdate.co.nz   Methodology: Isothermal Nucleic Acid Amplification         MRSA SCREEN - PCR (NASAL) [[id number]] Collected: 10/20/22 1435    Order Status: Completed Specimen: Swab Updated: 10/20/22 1636     MRSA by PCR, Nasal Not Detected       MRSA SCREEN - PCR (NASAL) [[id number]] Collected: 10/20/22 1340    Order Status: Canceled Specimen: Swab     CULTURE, Charlott Flash STAIN [[id number]] Collected: 10/20/22 1340    Order Status: Completed Specimen: Wound from Cervical/vaginal swab Updated: 10/23/22 0741     Special Requests: No Special Requests        GRAM STAIN Rare WBCs seen               1+ apparent Gram Positive Cocci in pairs                  Few apparent Gram Negative Rods           Culture result: Light  Mixed skin heriberto isolated       CULTURE, BODY FLUID W GRAM STAIN [[id number]]     Order Status: Canceled Specimen: Body Fluid from Uterine Cul/De/Sac Fluid     CULTURE, URINE [[id number]]  (Abnormal) Collected: 10/20/22 0249    Order Status: Completed Specimen: Urine Updated: 10/23/22 1001     Special Requests: --        No Special Requests  Reflexed from [id number]       [place] Count --        >100,000  colonies/ml       Culture result: Candida ciferrii                Diagnostic Images:  CT Results  (Last 48 hours)      None             XR ABD (KUB)   Final Result   Dilated small bowel within the central abdomen is similar to CT from one day   prior. DUPLEX LOWER EXT VENOUS BILAT   Final Result   Addendum (preliminary) 1 of 1      · Thrombus present in the right external iliac vein. · Thrombus present in the right common femoral vein. · Thrombus present in the right femoral vein. · Thrombus present in the right proximal femoral vein. · Thrombus present in the right mid femoral vein. · Thrombus present in the right distal femoral vein. · Thrombus present in the right popliteal vein. · Thrombus present in the right gastrocnemius vein. · Thrombus present in the right peroneal vein. · Thrombus present in the right saphenofemoral junction. · Thrombus present in the left external iliac vein. · Thrombus present in the left common femoral vein. · Thrombus present in the left saphenofemoral junction. · Thrombus present in the left femoral vein. · Thrombus present in the left proximal femoral vein. · Thrombus present in the left mid femoral vein. · Thrombus present in the left popliteal vein. · Thrombus present in the left distal femoral vein. · Thrombus present in the left gastrocnemius vein. · Thrombus present in the left peroneal vein.        This is vascular lab report on Varsha Marin, patient's YOB: 1965   Date of examination: October 20, 2022   Examination: Duplex lower extremity venous bilateral   Technician: Ms. Jimmy Carr   Clinical history: This is  61years old woman with suspected renal vein    thrombosis   Indication: femoral vein thrombosis. Technique: Bilateral leg venous structures examined using venous duplex    ultrasound with 2D grayscale, color-flow and spectral Doppler analysis. Findings:   Right side:   common femoral vein and saphenofemoral junction is not compressible and    there is hypoechoic filling defect noted. common femoral vein shows no venous flow   Distal external iliac vein also not compressible and that there is no    venous flow   Proximal femoral vein is noncompressible and there is no venous flow   Mid femoral vein is not compressible and there is no venous flow   Distal femoral vein is noncompressible and there is no venous flow   Proximal, mid, distal greater saphenous vein is compressible, there is no    filling defect   Distal popliteal vein is not compressible. Proximal popliteal vein is noncompressible and there is no venous flow   Proximal gastrocnemius vein is not compressible   Proximal small saphenous vein is not compressible   Distal, mid, proximal  peroneal vein is not compressible      Left side:   common femoral vein not compressible and there is no venous flow   Saphenofemoral junction is not compressible and there is no venous flow. External iliac vein also not compressible there is no venous flow   Proximal femoral vein is noncompressible and there is no venous flow   Mid femoral vein is noncompressible and there is no venous flow   Distal femoral vein is not compressible and there is no venous flow   Proximal, mid, distal greater saphenous vein is noncompressible and there    is hypoechoic filling defect noted.    Distal popliteal vein is noncompressible   Proximal popliteal vein is noncompressible and there is no venous flow   Proximal gastrocnemius vein is not compressible and there is no venous    flow   Proximal small saphenous vein is noncompressible and there is no venous    flow   Distal, mid, proximal peroneal vein is not compressible. Impression:   1. Right external iliac vein, common femoral vein, femoral vein,    popliteal vein, gastrocnemius vein, small saphenous vein, and peroneal    vein shows acute venous thrombosis   2. Left external iliac vein, common femoral vein, femoral vein, popliteal    vein, gastrocnemius vein, small saphenous vein, peroneal vein, and    saphenofemoral junction and greater saphenous vein shows acute venous    thrombosis. These  extensive bilateral lower extremity deep vein thrombosis notified    to Dr. Martin Chester on 10/20/2022 1201 PM.            CT ABD PELV WO CONT   Final Result   Status post cystectomy and hysterectomy with right lower quadrant ileal conduit. Mild bilateral hydroureteronephrosis, evaluation is somewhat limited by the   presence of contrast from previously performed examination. Small bowel   distention with decompressed loops distally concerning for obstruction likely   related to adhesion formation. While this study was done without the use of   intravenous contrast there is questionable right common femoral vein thrombus   with soft tissue edema. CTA CHEST W OR W WO CONT   Final Result   Emphysematous changes. No evidence of pulmonary embolism or acute   abnormality. Cholelithiasis. Bilateral hydronephrosis. 1 cm hypodense lesion   right hepatic lobe cannot be characterized with certainty on the basis of this   examination. Follow-up is recommended to ensure stability. XR CHEST PORT   Final Result   No Acute Disease. XR FOOT LT MIN 3 V   Final Result   Normal study.                 Current Medications:    Current Facility-Administered Medications:     levoFLOXacin (LEVAQUIN) tablet 500 mg, 500 mg, Oral, Q24H, Son Cabral MD, 500 mg at 10/22/22 1938    heparin 25,000 units in D5W 250 ml infusion, 500 Units/hr, IntraVENous, TITRATE, Cynthia Lake MD, Last Rate: 5 mL/hr at 10/23/22 0756, 500 Units/hr at 10/23/22 0756    oxyCODONE IR (ROXICODONE) tablet 5 mg, 5 mg, Oral, Q6H PRN, Priscille Flash, NP, 5 mg at 10/22/22 1344    0.9% sodium chloride infusion 250 mL, 250 mL, IntraVENous, PRN, Mary Posadas MD    cromolyn (OPTICROM) 4 % ophthalmic solution 1 Drop, 1 Drop, Both Eyes, DAILY, Mary Posadas MD, 1 Drop at 10/22/22 0930    sodium chloride (NS) flush 5-40 mL, 5-40 mL, IntraVENous, Q8H, Mack Posadas MD, 5 mL at 10/22/22 2200    sodium chloride (NS) flush 5-40 mL, 5-40 mL, IntraVENous, PRN, Mary Posadas MD    acetaminophen (TYLENOL) tablet 650 mg, 650 mg, Oral, Q6H PRN, 650 mg at 10/21/22 0377 **OR** acetaminophen (TYLENOL) suppository 650 mg, 650 mg, Rectal, Q6H PRN, Mary Posadas MD    polyethylene glycol (MIRALAX) packet 17 g, 17 g, Oral, DAILY PRN, Mary Posadas MD    ondansetron (ZOFRAN ODT) tablet 4 mg, 4 mg, Oral, Q8H PRN **OR** ondansetron (ZOFRAN) injection 4 mg, 4 mg, IntraVENous, Q6H PRN, Mary Posadas MD    fluconazole (DIFLUCAN) 200mg/100 mL IVPB (premix), 200 mg, IntraVENous, DAILY, Mary Posadas MD, Last Rate: 100 mL/hr at 10/22/22 0930, 200 mg at 10/22/22 0930       ASSESSMENT:  Wilbern Essex is a 61 y.o. female with PMH of bladder cancer s/p cystectomy, hysterectomy, BSO and ileal conduit diversion, anemia, hep C, and left leg DVT s/p IVC filter years ago. She presented to the ED with chief complaint of weakness and nausea for the past week. She reports unable to tolerate PO intake, due to profound nausea, however abdominal pain, fever, chills, vomiting or diarrhea. In addition, she also reports unable to walk due to weakness and left inner thigh and left foot pain. Denies trauma. She was seen here on 10/13/2022 with urostomy complications and discharged home on Cipro which she completed today. In addition, she also reports having vaginal bleed since surgery 2 weeks ago. In the ED, initial BP soft. Lab work revealed anemia, s/p 1u PRBC transfusion. CT scan concerning for SBO and mild hydroureteronephrosis and questionable right common femoral vein thrombosis. Urinalysis suggestive of UTI with budding yeast.    duplex shows extensive clot burden bilateral legs. Patient already have IVC filter. Obtain therapy eval. 10/21 kub continues to show SBO. 10/22 patient started on low dose IV heparin, benefits outweighs risk. UCX grew candida ciferrii, continue fluconazole. ID following. Continue full liquid diet pending surgeon advancement. # 1 Acute blood loss anemia  - S/p blood transfusion, will continue to monitor hemoglobin   - hematology following-->reccs low dose anticoagulants  - hgb remains stable  - currently on low dose IV hep gtt     # 2 Vaginal bleed  - Suspect complications from recent surgery  - improving     # 3 SBO  - currently tolerating full liquid diet  - s/p gentle IVF  - Gen surgery: Basker   - Morphine for pain. # 4 Extensive dvt bilateral lower legs  - recently on anticoagulants that was stopped prior to surgery  - duplex shows extensive clot burden  - hematology following   - already have IVC filter placed  - started on IV hep due to concern of old IVC and chances that she has collateral blood vessels bypassing filter which will increase her risk for PE.  - Hematology managing low dose IV heparin gtt    # 5 UTI  - Fluconazole for fungal UTI.  - UCX grew candida ciferrii  - ID following     # 6 Bladder cancer  - Complicated all aspects of care. Not on chemotherapy currently. - Consulted urology to evaluate for possible post-op complications.    - Continue supportive care  - Hematology following     # 7 Emphysema  - not in respiratory distress, continue to monitor     # 8 Ambulatory dysfunction  - secondary to dvt's  - pt/ot as tolerated    # 9 hypotension  - bp remains soft  - prn midodrine  - start gentle IV hydration      Full Code  Dvt Prophylaxis none  GI Prophylaxis none      Above treatment plan reviewed and discussed with patient in detail at bedside, all questions answered. Sandip Akers [phone]     Care Plan discussed with: Patient/Family      Total time spent with patient: 30 minutes.       Mary Palmer NP

## 2022-10-23 NOTE — PROGRESS NOTES
10/23/22 1616   PICC Double Lumen 10/23/22 Right;Other(comment)   Placement Date/Time: 10/23/22 1615   Description (optional): (c) PowerPICC Provena - Right Brachial - 5 Fr Double Lumen PICC Catheter  Special Properties: CT injectable  Number of Attempts: 1  Inserted By: Dannie Escobar RN [facility]  Present on Admiss. .. Criteria for Appropriate Use Limited/no vessel suitable for conventional peripheral access   Site Assessment Clean, dry, & intact   Phlebitis Assessment 0   Infiltration Assessment 0   Arm Circumference (cm) 27 cm   Date of Last Dressing Change 10/23/22   Dressing Status Clean, dry, & intact; New   Action Taken Open ports on tubing capped; Other (comment)  (Primary & Secondary IV bags/tubing discarded for primary care nurse to hang new with next dose due.)   External Catheter Length (cm) 1 centimeters   Dressing Type Disk with Chlorhexadine gluconate (CHG); Stabilization/securement device; Topical skin adhesive;Transparent   Hub Color/Line Status Purple;Flushed;Patent;Cap end changed; Capped; Tubing changed   Positive Blood Return (Site #1) Yes   Hub Color/Line Status Red;Flushed;Patent;Cap end changed; Capped; Tubing changed   Positive Blood Return (Site #2) Yes   Alcohol Cap Used Yes

## 2022-10-23 NOTE — PROGRESS NOTES
Hematology and Oncology Progress Note    Patient: Negrito Montes MRN: [id number]  SSN: [id number]    YOB: 1965  Age: 61 y.o. Sex: female      Admit Date: 10/19/2022    LOS: 3 days     Chief Complaint: Patient was admitted with nausea vomiting and had developed leg swelling    Subjective:     Patient was started on low-dose IV heparin drip yesterday. Her vaginal bleeding is stopped. She does have some vaginal discharge. She has no other mucosal bleeding. She had right-sided PICC line placement today. Patient has no dizziness. No shortness of breath. She does have leg swelling. Objective:     Vitals:    10/23/22 0311 10/23/22 0734 10/23/22 1325 10/23/22 1727   BP: (!) 97/58 95/64 113/71 119/70   Pulse: 93 87  89   Resp: 18 18 18 18   Temp: 99.1 °F (37.3 °C) 98.3 °F (36.8 °C)  98.6 °F (37 °C)   SpO2: 99% 100% 100% 100%   Weight:       Height:            Physical Exam:  Constitutional: Middle-aged -American female looks chronically ill. Not in any acute distress or pain. Eyes: Sclerae anicteric. Conjunctivae shows pallor. ENMT: Oral mucosa is moist, no thrush, mucositis, or petechiae. Neck: No adenopathy. Respiratory: Lungs are clear bilaterally. Cardiovascular: Normal sinus rhythm; no gallop or murmur. Abdomen: Patient has urostomy. Soft nontender. Incision scar scar well-healed. Extremities: Does have leg swelling. Skin: No petechiae; no skin rash.   Neurologic: Alert/oriented x 3    Lab/Data Review:    Recent Results (from the past 24 hour(s))   CBC WITH AUTOMATED DIFF    Collection Time: 10/23/22  2:21 AM   Result Value Ref Range    WBC 6.2 3.6 - 11.0 K/uL    RBC 2.73 (L) 3.80 - 5.20 M/uL    HGB 7.5 (L) 11.5 - 16.0 g/dL    HCT 23.5 (L) 35.0 - 47.0 %    MCV 86.1 80.0 - 99.0 FL    MCH 27.5 26.0 - 34.0 PG    MCHC 31.9 30.0 - 36.5 g/dL    RDW 16.7 (H) 11.5 - 14.5 %    PLATELET 583 073 - 000 K/uL    MPV 9.1 8.9 - 12.9 FL    NRBC 0.0 0.0  WBC    ABSOLUTE NRBC 0. 00 0.00 - 0.01 K/uL    NEUTROPHILS 83 (H) 32 - 75 %    LYMPHOCYTES 9 (L) 12 - 49 %    MONOCYTES 5 5 - 13 %    EOSINOPHILS 0 0 - 7 %    BASOPHILS 0 0 - 1 %    OTHER CELL 3 %    IMMATURE GRANULOCYTES 0 %    ABS. NEUTROPHILS 5.1 1.8 - 8.0 K/UL    ABS. LYMPHOCYTES 0.6 (L) 0.8 - 3.5 K/UL    ABS. MONOCYTES 0.3 0.0 - 1.0 K/UL    ABS. EOSINOPHILS 0.0 0.0 - 0.4 K/UL    ABS. BASOPHILS 0.0 0.0 - 0.1 K/UL    ABS. IMM. GRANS. 0.0 K/UL    DF Manual      RBC COMMENTS Anisocytosis  1+       METABOLIC PANEL, BASIC    Collection Time: 10/23/22  2:21 AM   Result Value Ref Range    Sodium 138 136 - 145 mmol/L    Potassium 3.2 (L) 3.5 - 5.1 mmol/L    Chloride 114 (H) 97 - 108 mmol/L    CO2 18 (L) 21 - 32 mmol/L    Anion gap 6 5 - 15 mmol/L    Glucose 92 65 - 100 mg/dL    BUN 5 (L) 6 - 20 mg/dL    Creatinine 0.55 0.55 - 1.02 mg/dL    BUN/Creatinine ratio 9 (L) 12 - 20      eGFR >60 >60 ml/min/1.73m2    Calcium 8.3 (L) 8.5 - 10.1 mg/dL   PTT    Collection Time: 10/23/22  2:21 AM   Result Value Ref Range    aPTT 28.8 21.2 - 34.1 sec    aPTT, therapeutic range   82 - 109 sec   PTT    Collection Time: 10/23/22 10:25 AM   Result Value Ref Range    aPTT 28.9 21.2 - 34.1 sec    aPTT, therapeutic range   82 - 109 sec   PTT    Collection Time: 10/23/22  1:30 PM   Result Value Ref Range    aPTT 33.3 21.2 - 34.1 sec    aPTT, therapeutic range   82 - 109 sec   PTT    Collection Time: 10/23/22  5:05 PM   Result Value Ref Range    aPTT 31.2 21.2 - 34.1 sec    aPTT, therapeutic range   82 - 109 sec           Assessment and plan:     23-year-old -American female with multiple medical problems who had anterior exenteration surgery for her bladder cancer. Patient was not much moving postoperatively and was admitted with partial small bowel obstruction. Patient is now diagnosed with DVT. 1) deep vein thrombosis: Obviously patient's recent surgery and immobilization was the reason for her DVT.   I have reviewed her Doppler study which shows extensive DVT of both leg. Patient has IVC filter but that filter is old and there are very good chances that she has collateral blood vessels bypassing filter. So she does need anticoagulation prevent pulmonary embolism and also to prevent long-term complication of lower extremity edema and post phlebitis syndrome in leg. Patient does have some vaginal bleeding but it does not look like clinically significant. I can understand patient's and primary teams concern for worsening of bleeding. I have explained to patient that I would recommend low-dose IV heparin with close monitoring. Of course if she starts having profuse bleeding or if her H&H starts going down then we can always stop the anticoagulation. If she able to tolerate it then we can treat her incrementally higher dose of heparin with close supervision. Patient's niece also participated in this discussion at this point patient does not want us to start IV heparin after discussing risk and benefit. She is telling me that she is going to think about it and discuss with her family members and let me know in the morning. I would like to start her IV heparin as soon as she makes up her mind. 10/22: This morning around 830 I called patient and talk to her about starting IV heparin. She was still not sure and she wanted me to talk to her niece. Later on I had talked to her niece on the phone and discussed about risk and benefit of putting her on a heparin. She understood and asked appropriate question. Later on when I saw patient in person I had another long discussion about pros and cons about treating her with IV heparin versus not treating it. After long discussion patient did agree for starting on IV heparin. I will be starting heparin at 500 units/h and will monitor her for the next 24 hours to make sure her vaginal discharge/spotting does not increase.   If she does not have any clinically significant bleeding then we will go for full dose anticoagulation if patient agrees. I had very long discussion with patient's nurse as well as pharmacist about my plan and order. 10/23: Patient was started on IV heparin on 22 October. She was just started on low-dose heparin at 500 units/h. Her hemoglobin is stable at 7.5. She has no vaginal bleeding. So I will continue her heparin at least for 1 more day and then may consider to go with full therapeutic heparin if patient is agreeable. Discussed with patient about this plan. 2) bladder cancer: We will review her surgical pathology and op report and make recommendations accordingly. 3) anemia: From her recent surgery as well as vaginal bleeding. We will monitor her H&H and give as needed packed RBC transfusion to keep hemoglobin above 7. I will send iron studies to see if patient need any iron supplements or infusion.  -Patient's hemoglobin is stable at 7.7. This dictation was done by dragon, computer voice recognition software. Often unanticipated grammatical, syntax, phones and other interpretive errors are inadvertently transcribed. Please excuse errors that have escaped final proofreading.        Signed By: Galileo Noonan MD     October 23, 2022

## 2022-10-23 NOTE — PROGRESS NOTES
Problem: Pain  Goal: *Control of Pain  Outcome: Progressing Towards Goal     Problem: Falls - Risk of  Goal: *Absence of Falls  Description: Document Iris Fall Risk and appropriate interventions in the flowsheet.   Outcome: Progressing Towards Goal  Note: Fall Risk Interventions:  Mobility Interventions: Bed/chair exit alarm, Patient to call before getting OOB, PT Consult for mobility concerns, OT consult for ADLs         Medication Interventions: Bed/chair exit alarm, Patient to call before getting OOB    Elimination Interventions: Bed/chair exit alarm, Call light in reach, Patient to call for help with toileting needs    History of Falls Interventions: Bed/chair exit alarm, Door open when patient unattended         Problem: Patient Education: Go to Patient Education Activity  Goal: Patient/Family Education  Outcome: Progressing Towards Goal     Problem: Patient Education: Go to Patient Education Activity  Goal: Patient/Family Education  Outcome: Progressing Towards Goal

## 2022-10-23 NOTE — PROGRESS NOTES
[facility] SURGERY PROGRESS NOTES        Chief Complaint: _nausea    History of Present Illness:    Ms. Deepika Alicea is a 61y.o. year old * female presents to ER with 2 day history of nausea and poor PO intake. No vomiting. Passing some flatus. She had undergone robotic assisted anterior exenteration by Dr. Chuyita Fabian. She was recently discharged. However she has not been feeling well. Has been able to take very little due to nausea. In ER CT scan without PO contrast showed some mildly dilated small bowel suggestive of possible bowel obstruction versus ileus. Patient denies any fever or chills or abdominal pain. Denies any flatus or BM. Already has IVC filter. Agreed to anticoagulation therapy. Tolerating full liquids. She has been experiencing some vaginal drainage that looks greenish & stool like for the past few days. No fever or chills.       Past Medical History:   Past Medical History:   Diagnosis Date    Anemia     pt states had recent blood transfusion 2022    Back pain     Cancer (HCC)     bladder    DVT (deep venous thrombosis) (HCC)     and PE, pt denies lung PE , only leg several years ago    Hepatitis C     pt states dx 1 month ago    Liver disease     Presence of IVC filter     pt states several years ago    Rheumatoid arthritis ([facility], [place] [address]     Sciatic leg pain     pt states both legs    Uterine fibroid        Past Surgical History:   Past Surgical History:   Procedure Laterality Date    HX APPENDECTOMY  10/03/2022    HX  SECTION      HX GYN  10/03/2022    ROBOT ANTERIOR PELVIC EXENTERATION    HX HYSTERECTOMY  10/03/2022    HX SALPINGO-OOPHORECTOMY Bilateral 10/03/2022    HX UROLOGICAL  09/15/2022    CYSTOSCOPY    HX UROLOGICAL  09/15/2022    URETHRAL DILATION    HX UROLOGICAL  09/15/2022    URETERAL STENT PLACEMENT    HX UROLOGICAL  09/15/2022    TRANSURETHRAL RESECTION OF BLADDER TUMOR >2CM    HX UROLOGICAL Bilateral 09/15/2022    RETROGRADE PYELOGRAM    HX UROLOGICAL  10/03/2022 PELVIC LYMPH NODE DISSECTION    HX UROLOGICAL  10/03/2022    ILEAL CONDUIT URINARY DIVERSION    IL CARDIAC SURG PROCEDURE UNLIST      stent placement        Allergy:  Allergies   Allergen Reactions    Bactrim [Sulfamethoprim] Swelling     Swelling of the lilps    Penicillin V Potassium Swelling       Social History:  reports that she has quit smoking. Her smoking use included cigarettes. She has never used smokeless tobacco. She reports that she does not currently use alcohol. She reports that she does not use drugs.      Family History:  Family History   Problem Relation Age of Onset    Cancer Mother     Lung Cancer Mother     Heart Disease Father     Hypertension Sister     Cancer Maternal Aunt     Breast Cancer Maternal Aunt         Current Medications:  Current Facility-Administered Medications:     midodrine (PROAMATINE) tablet 5 mg, 5 mg, Oral, TID PRN, Rivera Estimable, NP    0.9% sodium chloride infusion, 50 mL/hr, IntraVENous, CONTINUOUS, Indigo Price NP, Paused at 10/23/22 1104    [START ON 10/24/2022] fluconazole (DIFLUCAN) tablet 200 mg, 200 mg, Oral, DAILY, Son Cabral MD    levoFLOXacin (LEVAQUIN) tablet 500 mg, 500 mg, Oral, Q24H, Son Cabral MD, 500 mg at 10/22/22 1938    heparin 25,000 units in D5W 250 ml infusion, 500 Units/hr, IntraVENous, TITRATE, Yolanda Delgado MD, Held at 10/23/22 1146    oxyCODONE IR (ROXICODONE) tablet 5 mg, 5 mg, Oral, Q6H PRN, Rivera Estimable, NP, 5 mg at 10/23/22 1337    0.9% sodium chloride infusion 250 mL, 250 mL, IntraVENous, PRN, Boston Jung MD    cromolyn (OPTICROM) 4 % ophthalmic solution 1 Drop, 1 Drop, Both Eyes, DAILY, Mal Posadas MD, 1 Drop at 10/23/22 1054    sodium chloride (NS) flush 5-40 mL, 5-40 mL, IntraVENous, Q8H, Mack Posadas MD, 5 mL at 10/22/22 2200    sodium chloride (NS) flush 5-40 mL, 5-40 mL, IntraVENous, PRN, Mal Posadas MD    acetaminophen (TYLENOL) tablet 650 mg, 650 mg, Oral, Q6H PRN, 650 mg at 10/21/22 0633 **OR** acetaminophen (TYLENOL) suppository 650 mg, 650 mg, Rectal, Q6H PRN, Mal Posadas MD    polyethylene glycol (MIRALAX) packet 17 g, 17 g, Oral, DAILY PRN, Mal Posadas MD    ondansetron (ZOFRAN ODT) tablet 4 mg, 4 mg, Oral, Q8H PRN **OR** ondansetron (ZOFRAN) injection 4 mg, 4 mg, IntraVENous, Q6H PRN, Mal Posadas MD     Immunization History: There is no immunization history on file for this patient. Complete    Review of Systems:     Constitutional:  no fever,  no chills,  no sweats, No weakness, No fatigue, No decreased activity. Eye: No recent visual problem, No icterus, No discharge, No double vision. Ear/Nose/Mouth/Throat: No decreased hearing, No ear pain, No nasal congestion, No sore throat. Respiratory: No shortness of breath, No cough, No sputum production, No hemoptysis, No wheezing, No cyanosis. Cardiovascular: No chest pain, No palpitations, No bradycardia, No tachycardia, No peripheral edema, No syncope. Gastrointestinal:  nausea,  No vomiting, No diarrhea, No constipation, No heartburn,  No abdominal pain. Genitourinary: No dysuria, No hematuria, No change in urine stream, No urethral discharge, No lesions. Hematology/Lymphatics: No bruising tendency, No bleeding tendency, No petechiae, No swollen lymph glands. Endocrine: No excessive thirst, No polyuria, No cold intolerance, No heat intolerance, No excessive hunger. Immunologic: Not immunocompromised, No recurrent fevers, No recurrent infections. Musculoskeletal: No back pain, No neck pain, No joint pain, No muscle pain, No claudication, No decreased range of motion, No trauma. Integumentary: No rash, No pruritus, No abrasions. Neurologic: Alert and oriented X4, No abnormal balance, No headache, No confusion, No numbness, No tingling. Psychiatric: No anxiety, No depression, No nate. Physical Exam:     Vitals & Measurements:     Wt Readings from Last 3 Encounters:   10/19/22 51.7 kg (114 lb)   10/13/22 51.3 kg (113 lb) 10/09/22 59.6 kg (131 lb 6.3 oz)     Temp Readings from Last 3 Encounters:   10/23/22 98.3 °F (36.8 °C)   10/17/22 98.7 °F (37.1 °C)   10/13/22 98.3 °F (36.8 °C)     BP Readings from Last 3 Encounters:   10/23/22 113/71   10/17/22 (!) 103/58   10/13/22 116/67     Pulse Readings from Last 3 Encounters:   10/23/22 87   10/17/22 94   10/13/22 100      Ht Readings from Last 3 Encounters:   10/19/22 5' 2\" (1.575 m)   10/13/22 5' 2\" (1.575 m)   10/05/22 5' 2.01\" (1.575 m)          General: well appearing, no acute distress  Head: Normal  Face: Nornal  HEENT: atraumatic, PERRLA, moist mucosa, normal pharynx, normal tonsils and adenoids, normal tongue, no fluid in sinuses  Neck: Trachea midline, no carotid bruit, no masses  Chest: Normal.  Respiratory: Normal chest wall expansion, CTA B, no r/r/w, no rubs  Cardiovascular: RRR, no m/r/g, Normal S1 and S2  Abdomen: Soft, non tender, non-distended, normal bowel sounds in all quadrants, no hepatosplenomegaly, no tympany. Incision scar: well healed, right lower quadrant urostomy+  Genitourinary: No inguinal hernia, normal external gentalia, no renal angle tenderness, greenish yellow discharge from both vagina & rectum+  Rectal: deferred  Musculoskeletal: normal ROM in upper and lower extremities, No joint swelling.   Integumentary: Warm, dry, and pink, with no rash, purpura, or petechia  Heme/Lymph: No lymphadenopathy, no bruises  Neurological:Cranial Nerves II-XII grossly intact, no gross motor or sensory deficit  Psychiatric: Cooperative with normal mood, affect, and cognition      Laboratory Values:   Recent Results (from the past 24 hour(s))   PTT    Collection Time: 10/22/22  5:05 PM   Result Value Ref Range    aPTT 31.2 21.2 - 34.1 sec    aPTT, therapeutic range   82 - 109 sec   CBC WITH AUTOMATED DIFF    Collection Time: 10/22/22  5:05 PM   Result Value Ref Range    WBC 6.1 3.6 - 11.0 K/uL    RBC 2.82 (L) 3.80 - 5.20 M/uL    HGB 7.7 (L) 11.5 - 16.0 g/dL    HCT 24.3 (L) 35.0 - 47.0 %    MCV 86.2 80.0 - 99.0 FL    MCH 27.3 26.0 - 34.0 PG    MCHC 31.7 30.0 - 36.5 g/dL    RDW 16.8 (H) 11.5 - 14.5 %    PLATELET 927 372 - 769 K/uL    MPV 9.0 8.9 - 12.9 FL    NRBC 0.0 0.0  WBC    ABSOLUTE NRBC 0.00 0.00 - 0.01 K/uL    NEUTROPHILS 75 32 - 75 %    LYMPHOCYTES 20 12 - 49 %    MONOCYTES 5 5 - 13 %    EOSINOPHILS 0 0 - 7 %    BASOPHILS 0 0 - 1 %    IMMATURE GRANULOCYTES 0 %    ABS. NEUTROPHILS 4.6 1.8 - 8.0 K/UL    ABS. LYMPHOCYTES 1.2 0.8 - 3.5 K/UL    ABS. MONOCYTES 0.3 0.0 - 1.0 K/UL    ABS. EOSINOPHILS 0.0 0.0 - 0.4 K/UL    ABS. BASOPHILS 0.0 0.0 - 0.1 K/UL    ABS. IMM. GRANS. 0.0 K/UL    DF Manual      RBC COMMENTS Microcytosis  1+       CBC WITH AUTOMATED DIFF    Collection Time: 10/23/22  2:21 AM   Result Value Ref Range    WBC 6.2 3.6 - 11.0 K/uL    RBC 2.73 (L) 3.80 - 5.20 M/uL    HGB 7.5 (L) 11.5 - 16.0 g/dL    HCT 23.5 (L) 35.0 - 47.0 %    MCV 86.1 80.0 - 99.0 FL    MCH 27.5 26.0 - 34.0 PG    MCHC 31.9 30.0 - 36.5 g/dL    RDW 16.7 (H) 11.5 - 14.5 %    PLATELET 089 639 - 820 K/uL    MPV 9.1 8.9 - 12.9 FL    NRBC 0.0 0.0  WBC    ABSOLUTE NRBC 0.00 0.00 - 0.01 K/uL    NEUTROPHILS 83 (H) 32 - 75 %    LYMPHOCYTES 9 (L) 12 - 49 %    MONOCYTES 5 5 - 13 %    EOSINOPHILS 0 0 - 7 %    BASOPHILS 0 0 - 1 %    OTHER CELL 3 %    IMMATURE GRANULOCYTES 0 %    ABS. NEUTROPHILS 5.1 1.8 - 8.0 K/UL    ABS. LYMPHOCYTES 0.6 (L) 0.8 - 3.5 K/UL    ABS. MONOCYTES 0.3 0.0 - 1.0 K/UL    ABS. EOSINOPHILS 0.0 0.0 - 0.4 K/UL    ABS. BASOPHILS 0.0 0.0 - 0.1 K/UL    ABS. IMM.  GRANS. 0.0 K/UL    DF Manual      RBC COMMENTS Anisocytosis  1+       METABOLIC PANEL, BASIC    Collection Time: 10/23/22  2:21 AM   Result Value Ref Range    Sodium 138 136 - 145 mmol/L    Potassium 3.2 (L) 3.5 - 5.1 mmol/L    Chloride 114 (H) 97 - 108 mmol/L    CO2 18 (L) 21 - 32 mmol/L    Anion gap 6 5 - 15 mmol/L    Glucose 92 65 - 100 mg/dL    BUN 5 (L) 6 - 20 mg/dL    Creatinine 0.55 0.55 - 1.02 mg/dL    BUN/Creatinine ratio 9 (L) 12 - 20      eGFR >60 >60 ml/min/1.73m2    Calcium 8.3 (L) 8.5 - 10.1 mg/dL   PTT    Collection Time: 10/23/22  2:21 AM   Result Value Ref Range    aPTT 28.8 21.2 - 34.1 sec    aPTT, therapeutic range   82 - 109 sec   PTT    Collection Time: 10/23/22 10:25 AM   Result Value Ref Range    aPTT 28.9 21.2 - 34.1 sec    aPTT, therapeutic range   82 - 109 sec   PTT    Collection Time: 10/23/22  1:30 PM   Result Value Ref Range    aPTT 33.3 21.2 - 34.1 sec    aPTT, therapeutic range   82 - 109 sec           XR ABD (KUB)   Final Result   Dilated small bowel within the central abdomen is similar to CT from one day   prior. DUPLEX LOWER EXT VENOUS BILAT   Final Result   Addendum (preliminary) 1 of 1      · Thrombus present in the right external iliac vein. · Thrombus present in the right common femoral vein. · Thrombus present in the right femoral vein. · Thrombus present in the right proximal femoral vein. · Thrombus present in the right mid femoral vein. · Thrombus present in the right distal femoral vein. · Thrombus present in the right popliteal vein. · Thrombus present in the right gastrocnemius vein. · Thrombus present in the right peroneal vein. · Thrombus present in the right saphenofemoral junction. · Thrombus present in the left external iliac vein. · Thrombus present in the left common femoral vein. · Thrombus present in the left saphenofemoral junction. · Thrombus present in the left femoral vein. · Thrombus present in the left proximal femoral vein. · Thrombus present in the left mid femoral vein. · Thrombus present in the left popliteal vein. · Thrombus present in the left distal femoral vein. · Thrombus present in the left gastrocnemius vein. · Thrombus present in the left peroneal vein.        This is vascular lab report on Charisse Busch, patient's YOB: 1965   Date of examination: October 20, 2022   Examination: Duplex lower extremity venous bilateral   Technician: Ms. Pavan Smith   Clinical history: This is  61years old woman with suspected renal vein    thrombosis   Indication: femoral vein thrombosis. Technique: Bilateral leg venous structures examined using venous duplex    ultrasound with 2D grayscale, color-flow and spectral Doppler analysis. Findings:   Right side:   common femoral vein and saphenofemoral junction is not compressible and    there is hypoechoic filling defect noted. common femoral vein shows no venous flow   Distal external iliac vein also not compressible and that there is no    venous flow   Proximal femoral vein is noncompressible and there is no venous flow   Mid femoral vein is not compressible and there is no venous flow   Distal femoral vein is noncompressible and there is no venous flow   Proximal, mid, distal greater saphenous vein is compressible, there is no    filling defect   Distal popliteal vein is not compressible. Proximal popliteal vein is noncompressible and there is no venous flow   Proximal gastrocnemius vein is not compressible   Proximal small saphenous vein is not compressible   Distal, mid, proximal  peroneal vein is not compressible      Left side:   common femoral vein not compressible and there is no venous flow   Saphenofemoral junction is not compressible and there is no venous flow. External iliac vein also not compressible there is no venous flow   Proximal femoral vein is noncompressible and there is no venous flow   Mid femoral vein is noncompressible and there is no venous flow   Distal femoral vein is not compressible and there is no venous flow   Proximal, mid, distal greater saphenous vein is noncompressible and there    is hypoechoic filling defect noted.    Distal popliteal vein is noncompressible   Proximal popliteal vein is noncompressible and there is no venous flow   Proximal gastrocnemius vein is not compressible and there is no venous    flow   Proximal small saphenous vein is noncompressible and there is no venous    flow   Distal, mid, proximal peroneal vein is not compressible. Impression:   1. Right external iliac vein, common femoral vein, femoral vein,    popliteal vein, gastrocnemius vein, small saphenous vein, and peroneal    vein shows acute venous thrombosis   2. Left external iliac vein, common femoral vein, femoral vein, popliteal    vein, gastrocnemius vein, small saphenous vein, peroneal vein, and    saphenofemoral junction and greater saphenous vein shows acute venous    thrombosis. These  extensive bilateral lower extremity deep vein thrombosis notified    to Dr. Joelle Soria on 10/20/2022 1201 PM.            CT ABD PELV WO CONT   Final Result   Status post cystectomy and hysterectomy with right lower quadrant ileal conduit. Mild bilateral hydroureteronephrosis, evaluation is somewhat limited by the   presence of contrast from previously performed examination. Small bowel   distention with decompressed loops distally concerning for obstruction likely   related to adhesion formation. While this study was done without the use of   intravenous contrast there is questionable right common femoral vein thrombus   with soft tissue edema. CTA CHEST W OR W WO CONT   Final Result   Emphysematous changes. No evidence of pulmonary embolism or acute   abnormality. Cholelithiasis. Bilateral hydronephrosis. 1 cm hypodense lesion   right hepatic lobe cannot be characterized with certainty on the basis of this   examination. Follow-up is recommended to ensure stability. XR CHEST PORT   Final Result   No Acute Disease. XR FOOT LT MIN 3 V   Final Result   Normal study.           Assessment:  Ileus, resolving    Nausea, improving     Constipation    DVT    Vaginal drainage is concerning for Rectovaginal fistula versus Enterovaginal fistula    Plan:    Admission  Diet: NPO  IV fluids  SCD  IS  Nausea medication  Labs in am  CT scan of abdomen and pelvis with PO & IV contrast stat  Consult: Dr. Gladys Waldron  Anticoagulation therapy per Hematologist.     Thank you for the consultation & allowing me to participate in the care of this patient.

## 2022-10-24 LAB
APTT PPP: 32.9 SEC (ref 21.2–34.1)
APTT PPP: 41.1 SEC (ref 21.2–34.1)
BASOPHILS # BLD: 0 K/UL (ref 0–0.1)
BASOPHILS NFR BLD: 0 % (ref 0–1)
DIFFERENTIAL METHOD BLD: ABNORMAL
EOSINOPHIL # BLD: 0 K/UL (ref 0–0.4)
EOSINOPHIL NFR BLD: 0 % (ref 0–7)
ERYTHROCYTE [DISTWIDTH] IN BLOOD BY AUTOMATED COUNT: 16.5 % (ref 11.5–14.5)
HCT VFR BLD AUTO: 23.2 % (ref 35–47)
HGB BLD-MCNC: 7.2 G/DL (ref 11.5–16)
IMM GRANULOCYTES # BLD AUTO: 0 K/UL
IMM GRANULOCYTES NFR BLD AUTO: 0 %
LYMPHOCYTES # BLD: 0.6 K/UL (ref 0.8–3.5)
LYMPHOCYTES NFR BLD: 14 % (ref 12–49)
MCH RBC QN AUTO: 26.7 PG (ref 26–34)
MCHC RBC AUTO-ENTMCNC: 31 G/DL (ref 30–36.5)
MCV RBC AUTO: 85.9 FL (ref 80–99)
MONOCYTES # BLD: 0.1 K/UL (ref 0–1)
MONOCYTES NFR BLD: 3 % (ref 5–13)
MYELOCYTES NFR BLD MANUAL: 1 %
NEUTS BAND NFR BLD MANUAL: 4 % (ref 0–6)
NEUTS SEG # BLD: 3.4 K/UL (ref 1.8–8)
NEUTS SEG NFR BLD: 77 % (ref 32–75)
NRBC # BLD: 0 K/UL (ref 0–0.01)
NRBC BLD-RTO: 0 PER 100 WBC
OTHER CELLS NFR BLD MANUAL: 1 %
PLATELET # BLD AUTO: 244 K/UL (ref 150–400)
PMV BLD AUTO: 9.6 FL (ref 8.9–12.9)
RBC # BLD AUTO: 2.7 M/UL (ref 3.8–5.2)
RBC MORPH BLD: ABNORMAL
THERAPEUTIC RANGE,PTTT: ABNORMAL SEC (ref 82–109)
THERAPEUTIC RANGE,PTTT: NORMAL SEC (ref 82–109)
WBC # BLD AUTO: 4.2 K/UL (ref 3.6–11)

## 2022-10-24 PROCEDURE — 99232 SBSQ HOSP IP/OBS MODERATE 35: CPT | Performed by: INTERNAL MEDICINE

## 2022-10-24 PROCEDURE — 36415 COLL VENOUS BLD VENIPUNCTURE: CPT

## 2022-10-24 PROCEDURE — 74011250637 HC RX REV CODE- 250/637: Performed by: NURSE PRACTITIONER

## 2022-10-24 PROCEDURE — 74011250636 HC RX REV CODE- 250/636: Performed by: INTERNAL MEDICINE

## 2022-10-24 PROCEDURE — 74011250636 HC RX REV CODE- 250/636: Performed by: NURSE PRACTITIONER

## 2022-10-24 PROCEDURE — 65270000029 HC RM PRIVATE

## 2022-10-24 PROCEDURE — 74011000250 HC RX REV CODE- 250: Performed by: INTERNAL MEDICINE

## 2022-10-24 PROCEDURE — 74011000258 HC RX REV CODE- 258: Performed by: INTERNAL MEDICINE

## 2022-10-24 PROCEDURE — 85730 THROMBOPLASTIN TIME PARTIAL: CPT

## 2022-10-24 PROCEDURE — 85025 COMPLETE CBC W/AUTO DIFF WBC: CPT

## 2022-10-24 PROCEDURE — 99222 1ST HOSP IP/OBS MODERATE 55: CPT | Performed by: UROLOGY

## 2022-10-24 PROCEDURE — 74011250637 HC RX REV CODE- 250/637: Performed by: INTERNAL MEDICINE

## 2022-10-24 RX ADMIN — ONDANSETRON 4 MG: 4 TABLET, ORALLY DISINTEGRATING ORAL at 10:04

## 2022-10-24 RX ADMIN — SODIUM CHLORIDE, PRESERVATIVE FREE 10 ML: 5 INJECTION INTRAVENOUS at 22:08

## 2022-10-24 RX ADMIN — SODIUM CHLORIDE, PRESERVATIVE FREE 10 ML: 5 INJECTION INTRAVENOUS at 05:06

## 2022-10-24 RX ADMIN — OXYCODONE 5 MG: 5 TABLET ORAL at 10:04

## 2022-10-24 RX ADMIN — MEROPENEM 1 G: 1 INJECTION, POWDER, FOR SOLUTION INTRAVENOUS at 17:33

## 2022-10-24 RX ADMIN — CROMOLYN SODIUM 1 DROP: 40 SOLUTION/ DROPS OPHTHALMIC at 10:01

## 2022-10-24 RX ADMIN — SODIUM CHLORIDE, PRESERVATIVE FREE 10 ML: 5 INJECTION INTRAVENOUS at 17:40

## 2022-10-24 RX ADMIN — SODIUM CHLORIDE 50 ML/HR: 9 INJECTION, SOLUTION INTRAVENOUS at 17:32

## 2022-10-24 RX ADMIN — MEROPENEM 1 G: 1 INJECTION, POWDER, FOR SOLUTION INTRAVENOUS at 23:22

## 2022-10-24 RX ADMIN — FLUCONAZOLE 200 MG: 100 TABLET ORAL at 10:01

## 2022-10-24 NOTE — CONSULTS
UROLOGY CONSULT    [facility], [address] [facility] Office    Patient: Raheem Box MRN: [id number]  SSN: [id number]    YOB: 1965  Age: 61 y.o. Sex: female          Date of Encounter:  October 24, 2022  ADMITTED: 10/19/2022 to Shira Reyna MD by Clifford Lanes, MD for Acute blood loss anemia [D62]; Small bowel obstruction ([facility] [K56.609]  Chief Complaint:  nausea/ vomiting   Reason for consult: postop cystectomy           History of Present Illness:  Patient is a 61 y.o. female admitted 10/19/2022 to the hospital for Acute blood loss anemia [D62]; Small bowel obstruction ([address] [K56.609]. She is a 67-year-old woman with invasive bladder cancer. She is status post anterior pelvic exenteration including cystectomy, hysterectomy, BSO, incidental appendectomy, ileal conduit urinary diversion on 10/30/2022. She did have a rectal injury at the time of surgery which was repaired primarily. She presented to the emergency room on 10/20/2022 with weakness and nausea for 1 week per the chart. She tells me it started the day she came in to the ER. She has been unable to tolerate p.o. intake with nausea, abdominal pain, fevers and chills. She had vomiting and diarrhea. She says she has moved her bowels. She feels better. She denies back pain, fevers or nausea currently. She has some vaginal bleeding since the surgery. She denies vaginal discharge in the past day. She had reported to the ER 10/13/2022 with protruding ureteral stents. These were removed at that time. Was treated with Cipro, completing a course of Cipro on 10/20/2022. She has a history of DVT and IVC filter in the past.  She has not been walking due to left inner thigh and left foot pain. She says she felt she could not walk up and down stairs. She appears to have extensive DVT and is now on anticoagulation. CT 10/23/22 without rectovaginal fistula or abscess. Cholelithiasis.    Bilateral hydronephrosis. Past Medical History: Allergies   Allergen Reactions    Bactrim [Sulfamethoprim] Swelling     Swelling of the lilps    Penicillin V Potassium Swelling      Prior to Admission medications    Medication Sig Start Date End Date Taking? Authorizing Provider   diclofenac (VOLTAREN) 1 % gel Apply 2 g to affected area two (2) times a day. Yes Provider, Historical   famotidine (PEPCID) 20 mg tablet Take 20 mg by mouth daily as needed for Heartburn. Yes Provider, Historical   acetaminophen (TYLENOL) 650 mg TbER Take 650 mg by mouth every six to eight (6-8) hours as needed for Pain. Yes Provider, Historical   ferrous sulfate 325 mg (65 mg iron) tablet Take 325 mg by mouth daily. 22  Yes Provider, Historical   multivitamin with folic acid (One Daily Essential) 400 mcg tab tablet Take 1 Tablet by mouth daily. 3/4/22  Yes Ramila Chen MD      PMHx:  has a past medical history of Anemia, Back pain, Cancer ([address] DVT (deep venous thrombosis) ([address] Hepatitis C, Liver disease, Presence of IVC filter, Rheumatoid arthritis ([address] Sciatic leg pain, and Uterine fibroid. PSurgHx:  has a past surgical history that includes hx  section; hx urological (09/15/2022); hx urological (09/15/2022); hx urological (09/15/2022); hx urological (09/15/2022); hx urological (Bilateral, 09/15/2022); pr cardiac surg procedure unlist; hx hysterectomy (10/03/2022); hx salpingo-oophorectomy (Bilateral, 10/03/2022); hx gyn (10/03/2022); hx appendectomy (10/03/2022); hx urological (10/03/2022); and hx urological (10/03/2022). PSocHx:  reports that she has quit smoking. Her smoking use included cigarettes. She has never used smokeless tobacco. She reports that she does not currently use alcohol. She reports that she does not use drugs.    FamilyHx:   Family History   Problem Relation Age of Onset    Cancer Mother     Lung Cancer Mother     Heart Disease Father     Hypertension Sister     Cancer Maternal Aunt Breast Cancer Maternal Aunt      ROS:  Admission ROS by Ronaldo Martin MD from 10/19/2022 were reviewed with the patient and changes (other than per HPI) include: none. All 12 systems were reviewed and were otherwise negative. Physical Exam:            Vitals[de-identified]    Temp (24hrs), Av.7 °F (37.1 °C), Min:98 °F (36.7 °C), Max:99.7 °F (37.6 °C)   Blood pressure 116/73, pulse 97, temperature 99.7 °F (37.6 °C), resp. rate 16, height 5' 2\" (1.575 m), weight 114 lb (51.7 kg), SpO2 100 %. Estimated body mass index is 20.85 kg/m² as calculated from the following:    Height as of this encounter: 5' 2\" (1.575 m). Weight as of this encounter: 114 lb (51.7 kg).              I&O's:    10/24 07 - 10/24 190  In: -   Out: 350 [Urine:350]   General In NAD   Conjunctiva/Lids Normal without gross defects   Pupil/Iris Pupils equal, round, reactive, anicteric   External Ears/Nose No lesions or deformities   Hearing  Grossly intact   Neck Supple without obvious, masses   Lymphatic No obvious supraclavicular or cervical adenopathy   Respiratory Effort Breathing easily, no audible wheezing, rhonchi, stridor   CV RRR   Abdomen / Flank Soft, non tender without guarding or rebound, without obvious masses, no CVA tenderness   Digits/ Nails No clubbing, cyanosis, petechiae    RLQ urostomy     Skin Inspection Warm and dry, no obvious rashes   Neurologic Grossly normal without focal deficits   Judgement / Insight intact   Mood / Affect normal       Lab Results   Component Value Date/Time    WBC 4.2 10/24/2022 06:45 AM    HCT 23.2 (L) 10/24/2022 06:45 AM    PLATELET 308  06:45 AM    Sodium 138 10/23/2022 02:21 AM    Potassium 3.2 (L) 10/23/2022 02:21 AM    Chloride 114 (H) 10/23/2022 02:21 AM    CO2 18 (L) 10/23/2022 02:21 AM    BUN 5 (L) 10/23/2022 02:21 AM    Creatinine 0.55 10/23/2022 02:21 AM    Glucose 92 10/23/2022 02:21 AM    Calcium 8.3 (L) 10/23/2022 02:21 AM    Magnesium 2.0 10/10/2022 11:55 AM    INR 1.0 2022 09:10 AM       UA:   Lab Results   Component Value Date/Time    Color Yellow 10/20/2022 02:49 AM    Appearance Turbid (A) 10/20/2022 02:49 AM    Specific gravity 1.023 10/20/2022 02:49 AM    Specific gravity 1.010 09/12/2022 10:57 PM    pH (UA) 6.0 10/20/2022 02:49 AM    Protein 100 (A) 10/20/2022 02:49 AM    Glucose Negative 10/20/2022 02:49 AM    Ketone 5 (A) 10/20/2022 02:49 AM    Bilirubin Negative 10/20/2022 02:49 AM    Urobilinogen 0.1 10/20/2022 02:49 AM    Nitrites Negative 10/20/2022 02:49 AM    Leukocyte Esterase Large (A) 10/20/2022 02:49 AM    Bacteria Negative 10/20/2022 02:49 AM    WBC >100 (H) 10/20/2022 02:49 AM    RBC 20-50 10/20/2022 02:49 AM       Xrays:                Hospital Problems  Date Reviewed: 9/27/2022            Codes Class Noted POA    Acute blood loss anemia ICD-10-CM: D62  ICD-9-CM: 285.1  10/20/2022 Unknown        * (Principal) Small bowel obstruction ([facility] [address] ICD-10-CM: O62.375  ICD-9-CM: 560.9  10/20/2022 Unknown        Status post robot-assisted surgical procedure, pelvic exenteration ICD-10-CM: Y38.955  ICD-9-CM: V45.89  10/20/2022 Unknown        Vaginal cuff cellulitis ICD-10-CM: N76.0  ICD-9-CM: 616.10  10/20/2022 Unknown           Assessment/Plan:  Ileus vs SBO. No evidence of pelvic abscess or fistula. Improving. Appetite remains low. Taking sips of water. Hydronephrosis - may be physiologic reflux of the conduit. Obstruction on differential but no flank pain. She is symptomatically improved and does not want ureteral stents replaced. DVT - on anticoagulation. Some intermittent vaginal discharge without continuous bleeding or output. Mild discharge expected after vaginal suture line. Chronic anemia not acute blood loss. Hematology following.         Signed By: Tania Chou MD  - October 24, 2022

## 2022-10-24 NOTE — PROGRESS NOTES
Hematology Oncology Progress Note           Follow up for: DVT and bladder cancer  Chart notes reviewed since last visit. Overall weak  No major bleeding of which she has seen  No falls      Patient Vitals for the past 24 hrs:   BP Temp Pulse Resp SpO2   10/24/22 0718 108/64 98.6 °F (37 °C) 87 16 98 %   10/24/22 0040 95/64 98.5 °F (36.9 °C) 94 18 99 %   10/23/22 1942 117/78 98 °F (36.7 °C) 88 18 100 %   10/23/22 1727 119/70 98.6 °F (37 °C) 89 18 100 %       Review of Systems:    Constitutional No fevers, chills, night sweats, excessive fatigue or weight loss. Allergic/Immunologic No recent allergic reactions   Eyes No significant visual difficulties. No diplopia. ENMT No problems with hearing, no sore throat, no sinus drainage. Endocrine No hot flashes or night sweats. No cold intolerance, polyuria, or polydipsia   Hematologic/Lymphatic No easy bruising or bleeding. The patient denies any tender or palpable lymph nodes   Breasts No abnormal masses of breast, nipple discharge or pain. Respiratory No dyspnea on exertion, orthopnea, chest pain, cough or hemoptysis. Cardiovascular No anginal chest pain, irregular heart beat, tachycardia, palpitations or orthopnea. Gastrointestinal No nausea, vomiting, diarrhea, constipation, cramping, dysphagia, reflux, heartburn, GI bleeding, or early satiety. No change in bowel habits. Genitourinary (M) No hematuria, dysuria, increased frequency, urgency, hesitancy or incontinence. Musculoskeletal No joint pain, swelling or redness. No decreased range of motion. Integumentary No chronic rashes, inflammation, ulcerations, pruritus, petechiae, purpura, ecchymoses, or skin changes. Neurologic No headache, blurred vision, and no areas of focal weakness or numbness. Normal gait. No sensory problems. Psychiatric No insomnia, depression, nate or mood swings.   No psychotropic drugs       Physical Examination:  Constitutional Sedation to maintain respiratory support Head Normocephalic; no scars   Eyes Conjunctivae and sclerae are clear and without icterus. Pupils are reactive and equal.   ENMT Sinuses are nontender. No oral exudates, ulcers, masses, thrush or mucositis. Oropharynx clear. Tongue normal.   Neck Supple without masses or thyromegaly. No jugular venous distension. Hematologic/Lymphatic No petechiae or purpura. No tender or palpable lymph nodes in the cervical, supraclavicular, axillary or inguinal area. Respiratory Lungs are clear to auscultation without rhonchi or wheezing. Cardiovascular Regular rate and rhythm of heart without murmurs, gallops or rubs. Chest / Line Site Chest is symmetric with no chest wall deformities. Abdomen Non-tender, non-distended, no masses, ascites or hepatosplenomegaly. Good bowel sounds. No guarding or rebound tenderness. No pulsatile masses. Musculoskeletal No tenderness or swelling, normal range of motion without obvious weakness. Extremities No visible deformities, no cyanosis, clubbing or edema. Skin No rashes, scars, or lesions suggestive of malignancy. No petechiae, purpura, or ecchymoses. No excoriations. Neurologic No sensory or motor deficits, normal cerebellar function, normal gait, cranial nerves intact. Psychiatric Alert and oriented times three. Coherent speech. Verbalizes understanding of our discussions today.        Labs:  Recent Results (from the past 24 hour(s))   PTT    Collection Time: 10/23/22  5:05 PM   Result Value Ref Range    aPTT 31.2 21.2 - 34.1 sec    aPTT, therapeutic range   82 - 109 sec   PTT    Collection Time: 10/23/22 11:28 PM   Result Value Ref Range    aPTT 39.3 (H) 21.2 - 34.1 sec    aPTT, therapeutic range   82 - 109 sec   CBC WITH AUTOMATED DIFF    Collection Time: 10/24/22  6:45 AM   Result Value Ref Range    WBC 4.2 3.6 - 11.0 K/uL    RBC 2.70 (L) 3.80 - 5.20 M/uL    HGB 7.2 (L) 11.5 - 16.0 g/dL    HCT 23.2 (L) 35.0 - 47.0 %    MCV 85.9 80.0 - 99.0 FL    MCH 26.7 26.0 - 34.0 PG    MCHC 31.0 30.0 - 36.5 g/dL    RDW 16.5 (H) 11.5 - 14.5 %    PLATELET 111 846 - 041 K/uL    MPV 9.6 8.9 - 12.9 FL    NRBC 0.0 0.0  WBC    ABSOLUTE NRBC 0.00 0.00 - 0.01 K/uL    NEUTROPHILS 77 (H) 32 - 75 %    BAND NEUTROPHILS 4 0 - 6 %    LYMPHOCYTES 14 12 - 49 %    MONOCYTES 3 (L) 5 - 13 %    EOSINOPHILS 0 0 - 7 %    BASOPHILS 0 0 - 1 %    MYELOCYTES 1 (H) 0 %    OTHER CELL 1 %    IMMATURE GRANULOCYTES 0 %    ABS. NEUTROPHILS 3.4 1.8 - 8.0 K/UL    ABS. LYMPHOCYTES 0.6 (L) 0.8 - 3.5 K/UL    ABS. MONOCYTES 0.1 0.0 - 1.0 K/UL    ABS. EOSINOPHILS 0.0 0.0 - 0.4 K/UL    ABS. BASOPHILS 0.0 0.0 - 0.1 K/UL    ABS. IMM. GRANS. 0.0 K/UL    DF Manual      RBC COMMENTS Anisocytosis  1+        RBC COMMENTS Microcytosis  1+        RBC COMMENTS Polychromasia  1+        RBC COMMENTS Hypochromia  1+       PTT    Collection Time: 10/24/22  6:45 AM   Result Value Ref Range    aPTT 41.1 (H) 21.2 - 34.1 sec    aPTT, therapeutic range   82 - 109 sec       Imaging:        Assessment and Plan:   1 DVT  -on heparin  -clinical concern was that collateral blood vessels have formed making prior IVC filter less valuable   -if active bleeding; hold heparin    2. Anemia  -transfuse if hgb <7s  -awaiting iron studies    3.  Bladder cancer T3N0=Stage III based on pathology  -will need OP adjuvant chemotherapy

## 2022-10-24 NOTE — PROGRESS NOTES
Bedside shift change report given to [id number] Kyle Velez (oncoming nurse) by Dru Pérez RN (offgoing nurse). Report included the following information SBAR, Intake/Output, MAR, and Cardiac Rhythm NSR .

## 2022-10-24 NOTE — CONSULTS
Infectious Disease Progress Note           Subjective:   Stable, denies new complaints, on abdominal pain, afebrile, WBC WNLs, right arm PICC line placed   Objective:   Physical Exam:     Visit Vitals  /73 (BP 1 Location: Left upper arm, BP Patient Position: Semi fowlers)   Pulse 97   Temp 99.7 °F (37.6 °C)   Resp 16   Ht 5' 2\" (1.575 m)   Wt 114 lb (51.7 kg)   LMP  (LMP Unknown)   SpO2 100%   BMI 20.85 kg/m²      O2 Device: None (Room air)    Temp (24hrs), Av.7 °F (37.1 °C), Min:98 °F (36.7 °C), Max:99.7 °F (37.6 °C)    10/24 0701 - 10/24 1900  In: -   Out: 350 [Urine:350]   10/22 1901 - 10/24 0700  In: 993.3 [P.O.:250; I.V.:743.3]  Out: 1100 [Urine:1100]    General: NAD, AAO x 4  HEENT: SIMBA, Moist mucosa   Lungs: CTA b/l, decreased at the bases   Heart: S1S2+, RRR, no murmur  Abdo: Soft, NT, ND, +BS, healed incisional wounds, + urostomy   : Fecal matter coming out of vaginal area  Exts: No edema, + pulses b/l   Skin: No wounds, No rashes or lesions    Data Review:       Recent Days:  Recent Labs     10/24/22  0645 10/23/22  0221 10/22/22  1705   WBC 4.2 6.2 6.1   HGB 7.2* 7.5* 7.7*   HCT 23.2* 23.5* 24.3*    249 261       Recent Labs     10/23/22  0221   BUN 5*   CREA 0.55         Lab Results   Component Value Date/Time    C-Reactive protein 10.20 (H) 2022 11:02 AM            Microbiology     Results       Procedure Component Value Units Date/Time    COVID-19 RAPID TEST [[id number]] Collected: 10/20/22 1435    Order Status: Completed Specimen: Nasopharyngeal Updated: 10/20/22 1503     COVID-19 rapid test Not Detected        Comment: Rapid Abbott ID Now   Rapid NAAT:  The specimen is NEGATIVE for SARS-CoV-2, the novel coronavirus associated with COVID-19. Negative results should be treated as presumptive and, if inconsistent with clinical signs and symptoms or necessary for patient management, should be tested with an alternative molecular assay.  Negative results do not preclude SARS-CoV-2 infection and should not be used as the sole basis for patient management decisions. This test has been authorized by the FDA under   an Emergency Use Authorization (EUA) for use by authorized laboratories. Fact sheet for Healthcare Providers: ConventionUpdate.co.nz Fact sheet for Patients: ConventionUpdate.co.nz   Methodology: Isothermal Nucleic Acid Amplification         MRSA SCREEN - PCR (NASAL) [[id number]] Collected: 10/20/22 1435    Order Status: Completed Specimen: Swab Updated: 10/20/22 1636     MRSA by PCR, Nasal Not Detected       MRSA SCREEN - PCR (NASAL) [[id number]] Collected: 10/20/22 1340    Order Status: Canceled Specimen: Swab     CULTURE, Melanee Skeens STAIN [[id number]] Collected: 10/20/22 1340    Order Status: Completed Specimen: Wound from Cervical/vaginal swab Updated: 10/23/22 0741     Special Requests: No Special Requests        GRAM STAIN Rare WBCs seen               1+ apparent Gram Positive Cocci in pairs                  Few apparent Gram Negative Rods           Culture result: Light  Mixed skin heriberto isolated       CULTURE, BODY FLUID [place] STAIN [[id number]]     Order Status: Canceled Specimen: Body Fluid from Uterine Cul/De/Sac Fluid     CULTURE, URINE [[id number]]  (Abnormal) Collected: 10/20/22 0249    Order Status: Completed Specimen: Urine Updated: 10/23/22 1001     Special Requests: --        No Special Requests  Reflexed from [id number]       [place] Count --        >100,000  colonies/ml       Culture result: Candida ciferrii                  Diagnostics   CXR Results  (Last 48 hours)      None               Assessment/Plan       UTI, complicated by ileal conduit. Abnormal UA, Candida Ciferrii isolated from Cx         Afebrile w a normal WBC on routine labs         On Fluconazole day # 5/14. CBC w AM labs         Routine labs in the morning     2.  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 Vaginal cuff cellulitis/suspected abscess on CT       GPC in pair and GNR noted on Gram stain Cx       Remains afebrile w a normal WBC on routine labs       Will start Meropenem, d/c levaquin, PCN allergy     4. Bladder Ca w local invasion: S/p resection on 10/03 w creation of ileal conduit      5. SBO on admission, due to recent /Gyn surgery       Benign abdominal exam, tolerating oral intake       6. B/L LE DVT extensive, s/p IVC filter placement.  Anticoagulated on Heparin      Pamela Robles MD    10/24/2022

## 2022-10-24 NOTE — PROGRESS NOTES
0231: Chart reviewed. Per MD note, patient on hep gtt. PT/OT evals pending. Previous DME request from patient noted: wheelchair and BSC. Referral in [place] - awaiting further documentation. : Robin Oropeza [phone]. CM will continue to follow patient and recs of medical team.    1325: DME order sent to José Olivo.     Awaiting documentation for wheelchair from attending to attach with referral.

## 2022-10-24 NOTE — PROGRESS NOTES
Hospitalist Progress Note    Subjective:   Daily Progress Note: 10/24/2022 3:41 PM    Continued lower extremity edema with pelvic pain    Current Facility-Administered Medications   Medication Dose Route Frequency    midodrine (PROAMATINE) tablet 5 mg  5 mg Oral TID PRN    0.9% sodium chloride infusion  50 mL/hr IntraVENous CONTINUOUS    fluconazole (DIFLUCAN) tablet 200 mg  200 mg Oral DAILY    levoFLOXacin (LEVAQUIN) tablet 500 mg  500 mg Oral Q24H    heparin 25,000 units in D5W 250 ml infusion  500 Units/hr IntraVENous TITRATE    oxyCODONE IR (ROXICODONE) tablet 5 mg  5 mg Oral Q6H PRN    0.9% sodium chloride infusion 250 mL  250 mL IntraVENous PRN    cromolyn (OPTICROM) 4 % ophthalmic solution 1 Drop  1 Drop Both Eyes DAILY    sodium chloride (NS) flush 5-40 mL  5-40 mL IntraVENous Q8H    sodium chloride (NS) flush 5-40 mL  5-40 mL IntraVENous PRN    acetaminophen (TYLENOL) tablet 650 mg  650 mg Oral Q6H PRN    Or    acetaminophen (TYLENOL) suppository 650 mg  650 mg Rectal Q6H PRN    polyethylene glycol (MIRALAX) packet 17 g  17 g Oral DAILY PRN    ondansetron (ZOFRAN ODT) tablet 4 mg  4 mg Oral Q8H PRN    Or    ondansetron (ZOFRAN) injection 4 mg  4 mg IntraVENous Q6H PRN        Review of Systems  Review of Systems   Constitutional:  Positive for malaise/fatigue. HENT: Negative. Respiratory:  Negative for cough and shortness of breath. Cardiovascular:  Positive for leg swelling. Negative for chest pain. Gastrointestinal:  Positive for abdominal pain. Negative for nausea and vomiting. Genitourinary: Negative. Musculoskeletal: Negative. Skin: Negative. Neurological: Negative. Psychiatric/Behavioral: Negative.             Objective:     Visit Vitals  /73 (BP 1 Location: Left upper arm, BP Patient Position: Semi fowlers)   Pulse 97   Temp 99.7 °F (37.6 °C)   Resp 16   Ht 5' 2\" (1.575 m)   Wt 51.7 kg (114 lb)   LMP  (LMP Unknown)   SpO2 100%   BMI 20.85 kg/m²      O2 Device: None (Room air)    Temp (24hrs), Av.7 °F (37.1 °C), Min:98 °F (36.7 °C), Max:99.7 °F (37.6 °C)      10/24 0701 - 10/24 1900  In: -   Out: 350 [Urine:350]  10/22 1901 - 10/24 0700  In: 993.3 [P.O.:250; I.V.:743.3]  Out: 1100 [Urine:1100]    Recent Results (from the past 24 hour(s))   PTT    Collection Time: 10/23/22  5:05 PM   Result Value Ref Range    aPTT 31.2 21.2 - 34.1 sec    aPTT, therapeutic range   82 - 109 sec   PTT    Collection Time: 10/23/22 11:28 PM   Result Value Ref Range    aPTT 39.3 (H) 21.2 - 34.1 sec    aPTT, therapeutic range   82 - 109 sec   CBC WITH AUTOMATED DIFF    Collection Time: 10/24/22  6:45 AM   Result Value Ref Range    WBC 4.2 3.6 - 11.0 K/uL    RBC 2.70 (L) 3.80 - 5.20 M/uL    HGB 7.2 (L) 11.5 - 16.0 g/dL    HCT 23.2 (L) 35.0 - 47.0 %    MCV 85.9 80.0 - 99.0 FL    MCH 26.7 26.0 - 34.0 PG    MCHC 31.0 30.0 - 36.5 g/dL    RDW 16.5 (H) 11.5 - 14.5 %    PLATELET 131 218 - 948 K/uL    MPV 9.6 8.9 - 12.9 FL    NRBC 0.0 0.0  WBC    ABSOLUTE NRBC 0.00 0.00 - 0.01 K/uL    NEUTROPHILS 77 (H) 32 - 75 %    BAND NEUTROPHILS 4 0 - 6 %    LYMPHOCYTES 14 12 - 49 %    MONOCYTES 3 (L) 5 - 13 %    EOSINOPHILS 0 0 - 7 %    BASOPHILS 0 0 - 1 %    MYELOCYTES 1 (H) 0 %    OTHER CELL 1 %    IMMATURE GRANULOCYTES 0 %    ABS. NEUTROPHILS 3.4 1.8 - 8.0 K/UL    ABS. LYMPHOCYTES 0.6 (L) 0.8 - 3.5 K/UL    ABS. MONOCYTES 0.1 0.0 - 1.0 K/UL    ABS. EOSINOPHILS 0.0 0.0 - 0.4 K/UL    ABS. BASOPHILS 0.0 0.0 - 0.1 K/UL    ABS. IMM. GRANS. 0.0 K/UL    DF Manual      RBC COMMENTS Anisocytosis  1+        RBC COMMENTS Microcytosis  1+        RBC COMMENTS Polychromasia  1+        RBC COMMENTS Hypochromia  1+       PTT    Collection Time: 10/24/22  6:45 AM   Result Value Ref Range    aPTT 41.1 (H) 21.2 - 34.1 sec    aPTT, therapeutic range   82 - 109 sec        CT ABD PELV W CONT   Final Result   Rectovaginal fistula is not identified   Extensive bilateral iliac venous thrombus to the level of the IVC filter.  Right lower extremity edema. Cholelithiasis with distended gallbladder   Bilateral hydronephrosis and hydroureter in patient with ileal conduit   Probable small bowel obstruction secondary to adhesion      XR ABD (KUB)   Final Result   1. No significant change in small bowel dilation. Large volume of stool in the   rectum. 2. Cholelithiasis. XR ABD (KUB)   Final Result   Dilated small bowel within the central abdomen is similar to CT from one day   prior. DUPLEX LOWER EXT VENOUS BILAT   Final Result   Addendum (preliminary) 1 of 1      · Thrombus present in the right external iliac vein. · Thrombus present in the right common femoral vein. · Thrombus present in the right femoral vein. · Thrombus present in the right proximal femoral vein. · Thrombus present in the right mid femoral vein. · Thrombus present in the right distal femoral vein. · Thrombus present in the right popliteal vein. · Thrombus present in the right gastrocnemius vein. · Thrombus present in the right peroneal vein. · Thrombus present in the right saphenofemoral junction. · Thrombus present in the left external iliac vein. · Thrombus present in the left common femoral vein. · Thrombus present in the left saphenofemoral junction. · Thrombus present in the left femoral vein. · Thrombus present in the left proximal femoral vein. · Thrombus present in the left mid femoral vein. · Thrombus present in the left popliteal vein. · Thrombus present in the left distal femoral vein. · Thrombus present in the left gastrocnemius vein. · Thrombus present in the left peroneal vein. This is vascular lab report on Lori Spicer, patient's YOB: 1965   Date of examination: October 20, 2022   Examination: Duplex lower extremity venous bilateral   Technician: Ms. Joan Anthony   Clinical history:  This is  61years old woman with suspected renal vein    thrombosis   Indication: femoral vein thrombosis. Technique: Bilateral leg venous structures examined using venous duplex    ultrasound with 2D grayscale, color-flow and spectral Doppler analysis. Findings:   Right side:   common femoral vein and saphenofemoral junction is not compressible and    there is hypoechoic filling defect noted. common femoral vein shows no venous flow   Distal external iliac vein also not compressible and that there is no    venous flow   Proximal femoral vein is noncompressible and there is no venous flow   Mid femoral vein is not compressible and there is no venous flow   Distal femoral vein is noncompressible and there is no venous flow   Proximal, mid, distal greater saphenous vein is compressible, there is no    filling defect   Distal popliteal vein is not compressible. Proximal popliteal vein is noncompressible and there is no venous flow   Proximal gastrocnemius vein is not compressible   Proximal small saphenous vein is not compressible   Distal, mid, proximal  peroneal vein is not compressible      Left side:   common femoral vein not compressible and there is no venous flow   Saphenofemoral junction is not compressible and there is no venous flow. External iliac vein also not compressible there is no venous flow   Proximal femoral vein is noncompressible and there is no venous flow   Mid femoral vein is noncompressible and there is no venous flow   Distal femoral vein is not compressible and there is no venous flow   Proximal, mid, distal greater saphenous vein is noncompressible and there    is hypoechoic filling defect noted. Distal popliteal vein is noncompressible   Proximal popliteal vein is noncompressible and there is no venous flow   Proximal gastrocnemius vein is not compressible and there is no venous    flow   Proximal small saphenous vein is noncompressible and there is no venous    flow   Distal, mid, proximal peroneal vein is not compressible. Impression:   1.   Right external iliac vein, common femoral vein, femoral vein,    popliteal vein, gastrocnemius vein, small saphenous vein, and peroneal    vein shows acute venous thrombosis   2. Left external iliac vein, common femoral vein, femoral vein, popliteal    vein, gastrocnemius vein, small saphenous vein, peroneal vein, and    saphenofemoral junction and greater saphenous vein shows acute venous    thrombosis. These  extensive bilateral lower extremity deep vein thrombosis notified    to Dr. Audra Cobb on 10/20/2022 1201 PM.            CT ABD PELV WO CONT   Final Result   Status post cystectomy and hysterectomy with right lower quadrant ileal conduit. Mild bilateral hydroureteronephrosis, evaluation is somewhat limited by the   presence of contrast from previously performed examination. Small bowel   distention with decompressed loops distally concerning for obstruction likely   related to adhesion formation. While this study was done without the use of   intravenous contrast there is questionable right common femoral vein thrombus   with soft tissue edema. CTA CHEST W OR W WO CONT   Final Result   Emphysematous changes. No evidence of pulmonary embolism or acute   abnormality. Cholelithiasis. Bilateral hydronephrosis. 1 cm hypodense lesion   right hepatic lobe cannot be characterized with certainty on the basis of this   examination. Follow-up is recommended to ensure stability. XR CHEST PORT   Final Result   No Acute Disease. XR FOOT LT MIN 3 V   Final Result   Normal study. PHYSICAL EXAM:    Physical Exam  Vitals reviewed. HENT:      Head: Normocephalic and atraumatic. Cardiovascular:      Rate and Rhythm: Normal rate and regular rhythm. Heart sounds: Normal heart sounds. Pulmonary:      Effort: Pulmonary effort is normal.      Breath sounds: Normal breath sounds. Abdominal:      General: Abdomen is flat. Bowel sounds are normal.      Palpations: Abdomen is soft.       Comments: Mild tenderness in the suprapubic region   Musculoskeletal:         General: Normal range of motion. Cervical back: Normal range of motion and neck supple. Right lower leg: Edema present. Left lower leg: Edema present. Skin:     General: Skin is warm and dry. Neurological:      General: No focal deficit present. Mental Status: She is alert and oriented to person, place, and time. Psychiatric:         Mood and Affect: Mood normal.        Data Review    Recent Results (from the past 24 hour(s))   PTT    Collection Time: 10/23/22  5:05 PM   Result Value Ref Range    aPTT 31.2 21.2 - 34.1 sec    aPTT, therapeutic range   82 - 109 sec   PTT    Collection Time: 10/23/22 11:28 PM   Result Value Ref Range    aPTT 39.3 (H) 21.2 - 34.1 sec    aPTT, therapeutic range   82 - 109 sec   CBC WITH AUTOMATED DIFF    Collection Time: 10/24/22  6:45 AM   Result Value Ref Range    WBC 4.2 3.6 - 11.0 K/uL    RBC 2.70 (L) 3.80 - 5.20 M/uL    HGB 7.2 (L) 11.5 - 16.0 g/dL    HCT 23.2 (L) 35.0 - 47.0 %    MCV 85.9 80.0 - 99.0 FL    MCH 26.7 26.0 - 34.0 PG    MCHC 31.0 30.0 - 36.5 g/dL    RDW 16.5 (H) 11.5 - 14.5 %    PLATELET 458 029 - 425 K/uL    MPV 9.6 8.9 - 12.9 FL    NRBC 0.0 0.0  WBC    ABSOLUTE NRBC 0.00 0.00 - 0.01 K/uL    NEUTROPHILS 77 (H) 32 - 75 %    BAND NEUTROPHILS 4 0 - 6 %    LYMPHOCYTES 14 12 - 49 %    MONOCYTES 3 (L) 5 - 13 %    EOSINOPHILS 0 0 - 7 %    BASOPHILS 0 0 - 1 %    MYELOCYTES 1 (H) 0 %    OTHER CELL 1 %    IMMATURE GRANULOCYTES 0 %    ABS. NEUTROPHILS 3.4 1.8 - 8.0 K/UL    ABS. LYMPHOCYTES 0.6 (L) 0.8 - 3.5 K/UL    ABS. MONOCYTES 0.1 0.0 - 1.0 K/UL    ABS. EOSINOPHILS 0.0 0.0 - 0.4 K/UL    ABS. BASOPHILS 0.0 0.0 - 0.1 K/UL    ABS. IMM.  GRANS. 0.0 K/UL    DF Manual      RBC COMMENTS Anisocytosis  1+        RBC COMMENTS Microcytosis  1+        RBC COMMENTS Polychromasia  1+        RBC COMMENTS Hypochromia  1+       PTT    Collection Time: 10/24/22  6:45 AM   Result Value Ref Range aPTT 41.1 (H) 21.2 - 34.1 sec    aPTT, therapeutic range   82 - 109 sec        Assessment/Plan:     Principal Problem:    Small bowel obstruction ([address] (10/20/2022)    Active Problems:    Acute blood loss anemia (10/20/2022)      Status post robot-assisted surgical procedure, pelvic exenteration (10/20/2022)      Vaginal cuff cellulitis (10/20/2022)      Hospital course: Astrid Chung is a 61 y.o. female with PMH of bladder cancer s/p cystectomy, hysterectomy, BSO and ileal conduit diversion, anemia, hep C, and left leg DVT s/p IVC filter years ago. She presented to the ED with chief complaint of weakness and nausea for the past week. She reports unable to tolerate PO intake, due to profound nausea, however abdominal pain, fever, chills, vomiting or diarrhea. In addition, she also reports unable to walk due to weakness and left inner thigh and left foot pain. Denies trauma. She was seen here on 10/13/2022 with urostomy complications and discharged home on Cipro which she completed today. In addition, she also reports having vaginal bleed since surgery 2 weeks ago. In the ED, initial BP soft. Lab work revealed anemia, s/p 1u PRBC transfusion. CT scan concerning for SBO and mild hydroureteronephrosis and questionable right common femoral vein thrombosis. Urinalysis suggestive of UTI with budding yeast.     duplex shows extensive clot burden bilateral legs. Patient already have IVC filter. Obtain therapy eval. 10/21 kub continues to show SBO. 10/22 patient started on low dose IV heparin, benefits outweighs risk. UCX grew candida ciferrii, continue fluconazole. ID following. Continue full liquid diet pending surgeon advancement.     # 1 Acute blood loss anemia  - S/p blood transfusion, will continue to monitor hemoglobin   - hematology following-->reccs low dose anticoagulants  - hgb remains stable  - currently on low dose IV hep gtt     # 2 Vaginal bleed  - Suspect complications from recent surgery  -With associated vaginitis of the CUFF and cellulitis     # 3 SBO  - currently tolerating full liquid diet  - s/p gentle IVF  - Gen surgery: Basker   - Morphine for pain. # 4 Extensive dvt bilateral lower legs  - recently on anticoagulants that was stopped prior to surgery  - duplex shows extensive clot burden  - hematology following   - already have IVC filter placed  - started on IV hep due to concern of old IVC and chances that she has collateral blood vessels bypassing filter which will increase her risk for PE.  - Hematology managing low dose IV heparin gtt     # 5 UTI  - Fluconazole for fungal UTI.  - UCX grew candida ciferrii  - ID following     # 6 Bladder cancer  - Not on chemotherapy currently. - Consulted urology to evaluate for possible post-op complications. - Continue supportive care  - Hematology following     # 7 Emphysema  - not in respiratory distress, continue to monitor      # 8 Ambulatory dysfunction  - secondary to dvt's  - pt/ot as tolerated     # 9 hypotension  - bp remains soft  - prn midodrine  - start gentle IV hydration    Care Plan discussed with: Patient/Family    Total time spent with patient: >35 minutes.

## 2022-10-25 LAB
ALBUMIN SERPL-MCNC: 1.4 G/DL (ref 3.5–5)
ALBUMIN/GLOB SERPL: 0.2 {RATIO} (ref 1.1–2.2)
ALP SERPL-CCNC: 89 U/L (ref 45–117)
ALT SERPL-CCNC: 12 U/L (ref 12–78)
ANION GAP SERPL CALC-SCNC: 3 MMOL/L (ref 5–15)
APTT PPP: 35.8 SEC (ref 21.2–34.1)
APTT PPP: 38.6 SEC (ref 21.2–34.1)
APTT PPP: 38.8 SEC (ref 21.2–34.1)
APTT PPP: 40.7 SEC (ref 21.2–34.1)
AST SERPL W P-5'-P-CCNC: 33 U/L (ref 15–37)
BASOPHILS # BLD: 0 K/UL (ref 0–0.1)
BASOPHILS NFR BLD: 0 % (ref 0–1)
BILIRUB SERPL-MCNC: 0.5 MG/DL (ref 0.2–1)
BUN SERPL-MCNC: 5 MG/DL (ref 6–20)
BUN/CREAT SERPL: 8 (ref 12–20)
CA-I BLD-MCNC: 8.2 MG/DL (ref 8.5–10.1)
CHLORIDE SERPL-SCNC: 114 MMOL/L (ref 97–108)
CO2 SERPL-SCNC: 23 MMOL/L (ref 21–32)
CREAT SERPL-MCNC: 0.61 MG/DL (ref 0.55–1.02)
CRP SERPL-MCNC: 15 MG/DL (ref 0–0.6)
DIFFERENTIAL METHOD BLD: ABNORMAL
EOSINOPHIL # BLD: 0 K/UL (ref 0–0.4)
EOSINOPHIL NFR BLD: 0 % (ref 0–7)
ERYTHROCYTE [DISTWIDTH] IN BLOOD BY AUTOMATED COUNT: 16.7 % (ref 11.5–14.5)
FOLATE SERPL-MCNC: 19 NG/ML (ref 5–21)
GLOBULIN SER CALC-MCNC: 6.2 G/DL (ref 2–4)
GLUCOSE SERPL-MCNC: 82 MG/DL (ref 65–100)
HCT VFR BLD AUTO: 23.6 % (ref 35–47)
HGB BLD-MCNC: 7.1 G/DL (ref 11.5–16)
IMM GRANULOCYTES # BLD AUTO: 0 K/UL
IMM GRANULOCYTES NFR BLD AUTO: 0 %
IRON SATN MFR SERPL: 16 % (ref 20–50)
IRON SERPL-MCNC: 12 UG/DL (ref 35–150)
LYMPHOCYTES # BLD: 0.8 K/UL (ref 0.8–3.5)
LYMPHOCYTES NFR BLD: 21 % (ref 12–49)
MCH RBC QN AUTO: 26.6 PG (ref 26–34)
MCHC RBC AUTO-ENTMCNC: 30.1 G/DL (ref 30–36.5)
MCV RBC AUTO: 88.4 FL (ref 80–99)
MONOCYTES # BLD: 0.2 K/UL (ref 0–1)
MONOCYTES NFR BLD: 6 % (ref 5–13)
NEUTS SEG # BLD: 2.8 K/UL (ref 1.8–8)
NEUTS SEG NFR BLD: 72 % (ref 32–75)
NRBC # BLD: 0 K/UL (ref 0–0.01)
NRBC BLD-RTO: 0 PER 100 WBC
OTHER CELLS NFR BLD MANUAL: 1 %
PLATELET # BLD AUTO: 272 K/UL (ref 150–400)
PLATELET COMMENTS,PCOM: ABNORMAL
PMV BLD AUTO: 9.8 FL (ref 8.9–12.9)
POTASSIUM SERPL-SCNC: 3 MMOL/L (ref 3.5–5.1)
PROCALCITONIN SERPL-MCNC: <0.05 NG/ML
PROT SERPL-MCNC: 7.6 G/DL (ref 6.4–8.2)
RBC # BLD AUTO: 2.67 M/UL (ref 3.8–5.2)
RBC MORPH BLD: ABNORMAL
SODIUM SERPL-SCNC: 140 MMOL/L (ref 136–145)
THERAPEUTIC RANGE,PTTT: ABNORMAL SEC (ref 82–109)
TIBC SERPL-MCNC: 74 UG/DL (ref 250–450)
VIT B12 SERPL-MCNC: 482 PG/ML (ref 193–986)
WBC # BLD AUTO: 3.9 K/UL (ref 3.6–11)

## 2022-10-25 PROCEDURE — 80053 COMPREHEN METABOLIC PANEL: CPT

## 2022-10-25 PROCEDURE — 82607 VITAMIN B-12: CPT

## 2022-10-25 PROCEDURE — 85730 THROMBOPLASTIN TIME PARTIAL: CPT

## 2022-10-25 PROCEDURE — 65270000029 HC RM PRIVATE

## 2022-10-25 PROCEDURE — 74011000250 HC RX REV CODE- 250: Performed by: INTERNAL MEDICINE

## 2022-10-25 PROCEDURE — 74011000258 HC RX REV CODE- 258: Performed by: INTERNAL MEDICINE

## 2022-10-25 PROCEDURE — 86140 C-REACTIVE PROTEIN: CPT

## 2022-10-25 PROCEDURE — 74011250636 HC RX REV CODE- 250/636: Performed by: INTERNAL MEDICINE

## 2022-10-25 PROCEDURE — 83540 ASSAY OF IRON: CPT

## 2022-10-25 PROCEDURE — 74011250637 HC RX REV CODE- 250/637: Performed by: INTERNAL MEDICINE

## 2022-10-25 PROCEDURE — 99232 SBSQ HOSP IP/OBS MODERATE 35: CPT | Performed by: INTERNAL MEDICINE

## 2022-10-25 PROCEDURE — 84145 PROCALCITONIN (PCT): CPT

## 2022-10-25 PROCEDURE — 74011250637 HC RX REV CODE- 250/637: Performed by: NURSE PRACTITIONER

## 2022-10-25 PROCEDURE — 85025 COMPLETE CBC W/AUTO DIFF WBC: CPT

## 2022-10-25 PROCEDURE — 36415 COLL VENOUS BLD VENIPUNCTURE: CPT

## 2022-10-25 RX ADMIN — MIDODRINE HYDROCHLORIDE 5 MG: 5 TABLET ORAL at 06:19

## 2022-10-25 RX ADMIN — MEROPENEM 1 G: 1 INJECTION, POWDER, FOR SOLUTION INTRAVENOUS at 11:10

## 2022-10-25 RX ADMIN — HEPARIN SODIUM 500 UNITS/HR: 10000 INJECTION, SOLUTION INTRAVENOUS at 19:20

## 2022-10-25 RX ADMIN — MEROPENEM 1 G: 1 INJECTION, POWDER, FOR SOLUTION INTRAVENOUS at 17:49

## 2022-10-25 RX ADMIN — SODIUM CHLORIDE, PRESERVATIVE FREE 10 ML: 5 INJECTION INTRAVENOUS at 23:17

## 2022-10-25 RX ADMIN — ACETAMINOPHEN 650 MG: 325 TABLET, FILM COATED ORAL at 11:11

## 2022-10-25 RX ADMIN — CROMOLYN SODIUM 1 DROP: 40 SOLUTION/ DROPS OPHTHALMIC at 11:14

## 2022-10-25 RX ADMIN — MEROPENEM 1 G: 1 INJECTION, POWDER, FOR SOLUTION INTRAVENOUS at 23:17

## 2022-10-25 RX ADMIN — SODIUM CHLORIDE, PRESERVATIVE FREE 10 ML: 5 INJECTION INTRAVENOUS at 06:19

## 2022-10-25 RX ADMIN — FLUCONAZOLE 200 MG: 100 TABLET ORAL at 11:10

## 2022-10-25 RX ADMIN — ONDANSETRON 4 MG: 4 TABLET, ORALLY DISINTEGRATING ORAL at 11:10

## 2022-10-25 RX ADMIN — OXYCODONE 5 MG: 5 TABLET ORAL at 11:10

## 2022-10-25 NOTE — PROGRESS NOTES
Problem: Pain  Goal: *Control of Pain  Outcome: Progressing Towards Goal  Goal: *PALLIATIVE CARE:  Alleviation of Pain  Outcome: Progressing Towards Goal     Problem: Patient Education: Go to Patient Education Activity  Goal: Patient/Family Education  Outcome: Progressing Towards Goal     Problem: Falls - Risk of  Goal: *Absence of Falls  Description: Document Ruma Castelloncarolin Fall Risk and appropriate interventions in the flowsheet. Outcome: Progressing Towards Goal  Note: Fall Risk Interventions:  Mobility Interventions: Bed/chair exit alarm, OT consult for ADLs, Patient to call before getting OOB, PT Consult for mobility concerns, PT Consult for assist device competence, Strengthening exercises (ROM-active/passive)         Medication Interventions: Bed/chair exit alarm, Patient to call before getting OOB, Teach patient to arise slowly    Elimination Interventions: Bed/chair exit alarm, Call light in reach, Patient to call for help with toileting needs    History of Falls Interventions: Bed/chair exit alarm         Problem: Patient Education: Go to Patient Education Activity  Goal: Patient/Family Education  Outcome: Progressing Towards Goal     Problem: Patient Education: Go to Patient Education Activity  Goal: Patient/Family Education  Outcome: Progressing Towards Goal     Problem: Pressure Injury - Risk of  Goal: *Prevention of pressure injury  Description: Document Alphonso Scale and appropriate interventions in the flowsheet.   Outcome: Progressing Towards Goal  Note: Pressure Injury Interventions:       Moisture Interventions: Absorbent underpads, Minimize layers    Activity Interventions: PT/OT evaluation    Mobility Interventions: HOB 30 degrees or less, PT/OT evaluation    Nutrition Interventions: Document food/fluid/supplement intake, Offer support with meals,snacks and hydration    Friction and Shear Interventions: HOB 30 degrees or less, Minimize layers                Problem: Patient Education: Go to Patient Education Activity  Goal: Patient/Family Education  Outcome: Progressing Towards Goal

## 2022-10-25 NOTE — PROGRESS NOTES
[facility] SURGERY PROGRESS NOTES        Chief Complaint: _nausea    History of Present Illness:    Ms. Zahra Allen is a 61y.o. year old * female presents to ER with 2 day history of nausea and poor PO intake. No vomiting. Passing some flatus. She had undergone robotic assisted anterior exenteration by Dr. JIN [facility]. She was recently discharged. However she has not been feeling well. Has been able to take very little due to nausea. In ER CT scan without PO contrast showed some mildly dilated small bowel suggestive of possible bowel obstruction versus ileus. Patient denies any fever or chills or abdominal pain. Denies any flatus or BM. Already has IVC filter. On anticoagulation therapy. Tolerating full liquids. She has been experiencing some vaginal drainage that looks greenish & stool like for the past few days. No fever or chills. CT with PO & IV contrast reviewed with Dr. Adelaide Brittle shows some ileus pattern and small fluid collection with air pocket just superior to vaginal cuff. PO contrast has reached the cecum only. No major abscess or overt bowel obstruction.       Past Medical History:   Past Medical History:   Diagnosis Date    Anemia     pt states had recent blood transfusion sept     Back pain     Cancer (HCC)     bladder    DVT (deep venous thrombosis) (HCC)     and PE, pt denies lung PE , only leg several years ago    Hepatitis C     pt states dx 1 month ago    Liver disease     Presence of IVC filter     pt states several years ago    Rheumatoid arthritis ([address]     Sciatic leg pain     pt states both legs    Uterine fibroid        Past Surgical History:   Past Surgical History:   Procedure Laterality Date    HX APPENDECTOMY  10/03/2022    HX  SECTION      HX GYN  10/03/2022    ROBOT ANTERIOR PELVIC EXENTERATION    HX HYSTERECTOMY  10/03/2022    HX SALPINGO-OOPHORECTOMY Bilateral 10/03/2022    HX UROLOGICAL  09/15/2022    CYSTOSCOPY    HX UROLOGICAL  09/15/2022    URETHRAL DILATION HX UROLOGICAL  09/15/2022    URETERAL STENT PLACEMENT    HX UROLOGICAL  09/15/2022    TRANSURETHRAL RESECTION OF BLADDER TUMOR >2CM    HX UROLOGICAL Bilateral 09/15/2022    RETROGRADE PYELOGRAM    HX UROLOGICAL  10/03/2022    PELVIC LYMPH NODE DISSECTION    HX UROLOGICAL  10/03/2022    ILEAL CONDUIT URINARY DIVERSION    [place] CARDIAC SURG PROCEDURE UNLIST      stent placement        Allergy:  Allergies   Allergen Reactions    Bactrim [Sulfamethoprim] Swelling     Swelling of the lilps    Penicillin V Potassium Swelling       Social History:  reports that she has quit smoking. Her smoking use included cigarettes. She has never used smokeless tobacco. She reports that she does not currently use alcohol. She reports that she does not use drugs.      Family History:  Family History   Problem Relation Age of Onset    Cancer Mother     Lung Cancer Mother     Heart Disease Father     Hypertension Sister     Cancer Maternal Aunt     Breast Cancer Maternal Aunt         Current Medications:  Current Facility-Administered Medications:     meropenem (MERREM) 1 g in 0.9% sodium chloride (MBP/ADV) 50 mL MBP, 1 g, IntraVENous, Q8H, Son Cabral MD, Last Rate: 16.7 mL/hr at 10/24/22 2322, 1 g at 10/24/22 2322    midodrine (PROAMATINE) tablet 5 mg, 5 mg, Oral, TID PRN, Nathan Austin NP    0.9% sodium chloride infusion, 50 mL/hr, IntraVENous, CONTINUOUS, Radha Price NP, Last Rate: 50 mL/hr at 10/24/22 1732, 50 mL/hr at 10/24/22 1732    fluconazole (DIFLUCAN) tablet 200 mg, 200 mg, Oral, DAILY, Son Cabral MD, 200 mg at 10/24/22 1001    heparin 25,000 units in D5W 250 ml infusion, 500 Units/hr, IntraVENous, TITRATE, Brandan Chau MD, Last Rate: 5 mL/hr at 10/24/22 1951, 500 Units/hr at 10/24/22 1951    oxyCODONE IR (ROXICODONE) tablet 5 mg, 5 mg, Oral, Q6H PRN, Nathan Austin NP, 5 mg at 10/24/22 1004    0.9% sodium chloride infusion 250 mL, 250 mL, IntraVENous, PRN, Praveena Posadas MD    cromolyn (OPTICROM) 4 % ophthalmic solution 1 Drop, 1 Drop, Both Eyes, DAILY, Karina Posadas MD, 1 Drop at 10/24/22 1001    sodium chloride (NS) flush 5-40 mL, 5-40 mL, IntraVENous, Q8H, Mack Posadas MD, 10 mL at 10/24/22 2208    sodium chloride (NS) flush 5-40 mL, 5-40 mL, IntraVENous, PRN, Karina Posadas MD    acetaminophen (TYLENOL) tablet 650 mg, 650 mg, Oral, Q6H PRN, 650 mg at 10/21/22 1635 **OR** acetaminophen (TYLENOL) suppository 650 mg, 650 mg, Rectal, Q6H PRN, Karina Posadas MD    polyethylene glycol (MIRALAX) packet 17 g, 17 g, Oral, DAILY PRN, Karina Posadas MD    ondansetron (ZOFRAN ODT) tablet 4 mg, 4 mg, Oral, Q8H PRN, 4 mg at 10/24/22 1004 **OR** ondansetron (ZOFRAN) injection 4 mg, 4 mg, IntraVENous, Q6H PRN, Karina Posadas MD     Immunization History: There is no immunization history on file for this patient. Complete    Review of Systems:     Constitutional:  no fever,  no chills,  no sweats, No weakness, No fatigue, No decreased activity. Eye: No recent visual problem, No icterus, No discharge, No double vision. Ear/Nose/Mouth/Throat: No decreased hearing, No ear pain, No nasal congestion, No sore throat. Respiratory: No shortness of breath, No cough, No sputum production, No hemoptysis, No wheezing, No cyanosis. Cardiovascular: No chest pain, No palpitations, No bradycardia, No tachycardia, No peripheral edema, No syncope. Gastrointestinal:  nausea,  No vomiting, No diarrhea, No constipation, No heartburn,  No abdominal pain. Genitourinary: No dysuria, No hematuria, No change in urine stream, No urethral discharge, No lesions. Hematology/Lymphatics: No bruising tendency, No bleeding tendency, No petechiae, No swollen lymph glands. Endocrine: No excessive thirst, No polyuria, No cold intolerance, No heat intolerance, No excessive hunger. Immunologic: Not immunocompromised, No recurrent fevers, No recurrent infections.   Musculoskeletal: No back pain, No neck pain, No joint pain, No muscle pain, No claudication, No decreased range of motion, No trauma. Integumentary: No rash, No pruritus, No abrasions. Neurologic: Alert and oriented X4, No abnormal balance, No headache, No confusion, No numbness, No tingling. Psychiatric: No anxiety, No depression, No nate. Physical Exam:     Vitals & Measurements: Wt Readings from Last 3 Encounters:   10/19/22 51.7 kg (114 lb)   10/13/22 51.3 kg (113 lb)   10/09/22 59.6 kg (131 lb 6.3 oz)     Temp Readings from Last 3 Encounters:   10/24/22 98.6 °F (37 °C)   10/17/22 98.7 °F (37.1 °C)   10/13/22 98.3 °F (36.8 °C)     BP Readings from Last 3 Encounters:   10/24/22 99/63   10/17/22 (!) 103/58   10/13/22 116/67     Pulse Readings from Last 3 Encounters:   10/24/22 90   10/17/22 94   10/13/22 100      Ht Readings from Last 3 Encounters:   10/19/22 5' 2\" (1.575 m)   10/13/22 5' 2\" (1.575 m)   10/05/22 5' 2.01\" (1.575 m)          General: well appearing, no acute distress  Head: Normal  Face: Nornal  HEENT: atraumatic, PERRLA, moist mucosa, normal pharynx, normal tonsils and adenoids, normal tongue, no fluid in sinuses  Neck: Trachea midline, no carotid bruit, no masses  Chest: Normal.  Respiratory: Normal chest wall expansion, CTA B, no r/r/w, no rubs  Cardiovascular: RRR, no m/r/g, Normal S1 and S2  Abdomen: Soft, non tender, non-distended, normal bowel sounds in all quadrants, no hepatosplenomegaly, no tympany. Incision scar: well healed, right lower quadrant urostomy+  Genitourinary: No inguinal hernia, normal external gentalia, no renal angle tenderness, very minimal greenish yellow discharge from both vagina & rectum+  Rectal: deferred  Musculoskeletal: normal ROM in upper and lower extremities, No joint swelling.   Integumentary: Warm, dry, and pink, with no rash, purpura, or petechia  Heme/Lymph: No lymphadenopathy, no bruises  Neurological:Cranial Nerves II-XII grossly intact, no gross motor or sensory deficit  Psychiatric: Cooperative with normal mood, affect, and cognition      Laboratory Values:   Recent Results (from the past 24 hour(s))   CBC WITH AUTOMATED DIFF    Collection Time: 10/24/22  6:45 AM   Result Value Ref Range    WBC 4.2 3.6 - 11.0 K/uL    RBC 2.70 (L) 3.80 - 5.20 M/uL    HGB 7.2 (L) 11.5 - 16.0 g/dL    HCT 23.2 (L) 35.0 - 47.0 %    MCV 85.9 80.0 - 99.0 FL    MCH 26.7 26.0 - 34.0 PG    MCHC 31.0 30.0 - 36.5 g/dL    RDW 16.5 (H) 11.5 - 14.5 %    PLATELET 824 163 - 854 K/uL    MPV 9.6 8.9 - 12.9 FL    NRBC 0.0 0.0  WBC    ABSOLUTE NRBC 0.00 0.00 - 0.01 K/uL    NEUTROPHILS 77 (H) 32 - 75 %    BAND NEUTROPHILS 4 0 - 6 %    LYMPHOCYTES 14 12 - 49 %    MONOCYTES 3 (L) 5 - 13 %    EOSINOPHILS 0 0 - 7 %    BASOPHILS 0 0 - 1 %    MYELOCYTES 1 (H) 0 %    OTHER CELL 1 %    IMMATURE GRANULOCYTES 0 %    ABS. NEUTROPHILS 3.4 1.8 - 8.0 K/UL    ABS. LYMPHOCYTES 0.6 (L) 0.8 - 3.5 K/UL    ABS. MONOCYTES 0.1 0.0 - 1.0 K/UL    ABS. EOSINOPHILS 0.0 0.0 - 0.4 K/UL    ABS. BASOPHILS 0.0 0.0 - 0.1 K/UL    ABS. IMM. GRANS. 0.0 K/UL    DF Manual      RBC COMMENTS Anisocytosis  1+        RBC COMMENTS Microcytosis  1+        RBC COMMENTS Polychromasia  1+        RBC COMMENTS Hypochromia  1+       PTT    Collection Time: 10/24/22  6:45 AM   Result Value Ref Range    aPTT 41.1 (H) 21.2 - 34.1 sec    aPTT, therapeutic range   82 - 109 sec   PTT    Collection Time: 10/24/22  6:21 PM   Result Value Ref Range    aPTT 32.9 21.2 - 34.1 sec    aPTT, therapeutic range   82 - 109 sec   PTT    Collection Time: 10/24/22 11:30 PM   Result Value Ref Range    aPTT 38.6 (H) 21.2 - 34.1 sec    aPTT, therapeutic range   82 - 109 sec           CT ABD PELV W CONT   Final Result   Rectovaginal fistula is not identified   Extensive bilateral iliac venous thrombus to the level of the IVC filter. Right   lower extremity edema.    Cholelithiasis with distended gallbladder   Bilateral hydronephrosis and hydroureter in patient with ileal conduit   Probable small bowel obstruction secondary to adhesion XR ABD (KUB)   Final Result   1. No significant change in small bowel dilation. Large volume of stool in the   rectum. 2. Cholelithiasis. XR ABD (KUB)   Final Result   Dilated small bowel within the central abdomen is similar to CT from one day   prior. DUPLEX LOWER EXT VENOUS BILAT   Final Result   Addendum (preliminary) 1 of 1      · Thrombus present in the right external iliac vein. · Thrombus present in the right common femoral vein. · Thrombus present in the right femoral vein. · Thrombus present in the right proximal femoral vein. · Thrombus present in the right mid femoral vein. · Thrombus present in the right distal femoral vein. · Thrombus present in the right popliteal vein. · Thrombus present in the right gastrocnemius vein. · Thrombus present in the right peroneal vein. · Thrombus present in the right saphenofemoral junction. · Thrombus present in the left external iliac vein. · Thrombus present in the left common femoral vein. · Thrombus present in the left saphenofemoral junction. · Thrombus present in the left femoral vein. · Thrombus present in the left proximal femoral vein. · Thrombus present in the left mid femoral vein. · Thrombus present in the left popliteal vein. · Thrombus present in the left distal femoral vein. · Thrombus present in the left gastrocnemius vein. · Thrombus present in the left peroneal vein. This is vascular lab report on Varsha Marin, patient's YOB: 1965   Date of examination: October 20, 2022   Examination: Duplex lower extremity venous bilateral   Technician: Ms. Lucy Jennings   Clinical history: This is  61years old woman with suspected renal vein    thrombosis   Indication: femoral vein thrombosis. Technique: Bilateral leg venous structures examined using venous duplex    ultrasound with 2D grayscale, color-flow and spectral Doppler analysis.       Findings:   Right side: common femoral vein and saphenofemoral junction is not compressible and    there is hypoechoic filling defect noted. common femoral vein shows no venous flow   Distal external iliac vein also not compressible and that there is no    venous flow   Proximal femoral vein is noncompressible and there is no venous flow   Mid femoral vein is not compressible and there is no venous flow   Distal femoral vein is noncompressible and there is no venous flow   Proximal, mid, distal greater saphenous vein is compressible, there is no    filling defect   Distal popliteal vein is not compressible. Proximal popliteal vein is noncompressible and there is no venous flow   Proximal gastrocnemius vein is not compressible   Proximal small saphenous vein is not compressible   Distal, mid, proximal  peroneal vein is not compressible      Left side:   common femoral vein not compressible and there is no venous flow   Saphenofemoral junction is not compressible and there is no venous flow. External iliac vein also not compressible there is no venous flow   Proximal femoral vein is noncompressible and there is no venous flow   Mid femoral vein is noncompressible and there is no venous flow   Distal femoral vein is not compressible and there is no venous flow   Proximal, mid, distal greater saphenous vein is noncompressible and there    is hypoechoic filling defect noted. Distal popliteal vein is noncompressible   Proximal popliteal vein is noncompressible and there is no venous flow   Proximal gastrocnemius vein is not compressible and there is no venous    flow   Proximal small saphenous vein is noncompressible and there is no venous    flow   Distal, mid, proximal peroneal vein is not compressible. Impression:   1. Right external iliac vein, common femoral vein, femoral vein,    popliteal vein, gastrocnemius vein, small saphenous vein, and peroneal    vein shows acute venous thrombosis   2.   Left external iliac vein, common femoral vein, femoral vein, popliteal    vein, gastrocnemius vein, small saphenous vein, peroneal vein, and    saphenofemoral junction and greater saphenous vein shows acute venous    thrombosis. These  extensive bilateral lower extremity deep vein thrombosis notified    to Dr. Priyanka Schulte on 10/20/2022 1201 PM.            CT ABD PELV WO CONT   Final Result   Status post cystectomy and hysterectomy with right lower quadrant ileal conduit. Mild bilateral hydroureteronephrosis, evaluation is somewhat limited by the   presence of contrast from previously performed examination. Small bowel   distention with decompressed loops distally concerning for obstruction likely   related to adhesion formation. While this study was done without the use of   intravenous contrast there is questionable right common femoral vein thrombus   with soft tissue edema. CTA CHEST W OR W WO CONT   Final Result   Emphysematous changes. No evidence of pulmonary embolism or acute   abnormality. Cholelithiasis. Bilateral hydronephrosis. 1 cm hypodense lesion   right hepatic lobe cannot be characterized with certainty on the basis of this   examination. Follow-up is recommended to ensure stability. XR CHEST PORT   Final Result   No Acute Disease. XR FOOT LT MIN 3 V   Final Result   Normal study. Assessment:  Ileus, resolving    Nausea, improving     Constipation    DVT    Mild Vaginal drainage may be just some pelvic fluid draining. Plan:    Admission  Diet: soft diet  IV fluids  SCD  IS  Nausea medication  Labs in am  Consult: Dr. Lucio Covert therapy per Hematologist.     Thank you for the consultation & allowing me to participate in the care of this patient.

## 2022-10-25 NOTE — PROGRESS NOTES
Hospitalist Progress Note    Subjective:   Daily Progress Note: 10/25/2022 3:41 PM    Continued lower extremity edema with pelvic pain    Current Facility-Administered Medications   Medication Dose Route Frequency    meropenem (MERREM) 1 g in 0.9% sodium chloride (MBP/ADV) 50 mL MBP  1 g IntraVENous Q8H    midodrine (PROAMATINE) tablet 5 mg  5 mg Oral TID PRN    fluconazole (DIFLUCAN) tablet 200 mg  200 mg Oral DAILY    heparin 25,000 units in D5W 250 ml infusion  500 Units/hr IntraVENous TITRATE    oxyCODONE IR (ROXICODONE) tablet 5 mg  5 mg Oral Q6H PRN    0.9% sodium chloride infusion 250 mL  250 mL IntraVENous PRN    cromolyn (OPTICROM) 4 % ophthalmic solution 1 Drop  1 Drop Both Eyes DAILY    sodium chloride (NS) flush 5-40 mL  5-40 mL IntraVENous Q8H    sodium chloride (NS) flush 5-40 mL  5-40 mL IntraVENous PRN    acetaminophen (TYLENOL) tablet 650 mg  650 mg Oral Q6H PRN    Or    acetaminophen (TYLENOL) suppository 650 mg  650 mg Rectal Q6H PRN    polyethylene glycol (MIRALAX) packet 17 g  17 g Oral DAILY PRN    ondansetron (ZOFRAN ODT) tablet 4 mg  4 mg Oral Q8H PRN    Or    ondansetron (ZOFRAN) injection 4 mg  4 mg IntraVENous Q6H PRN        Review of Systems  Review of Systems   Constitutional:  Positive for malaise/fatigue. HENT: Negative. Respiratory:  Negative for cough and shortness of breath. Cardiovascular:  Positive for leg swelling. Negative for chest pain. Gastrointestinal:  Positive for abdominal pain. Negative for nausea and vomiting. Genitourinary: Negative. Musculoskeletal: Negative. Skin: Negative. Neurological: Negative. Psychiatric/Behavioral: Negative.             Objective:     Visit Vitals  /64 (BP 1 Location: Left upper arm, BP Patient Position: At rest)   Pulse 84   Temp 98.4 °F (36.9 °C)   Resp 20   Ht 5' 2\" (1.575 m)   Wt 51.7 kg (114 lb)   LMP  (LMP Unknown)   SpO2 100%   BMI 20.85 kg/m²      O2 Device: None (Room air)    Temp (24hrs), Av.1 °F (37.3 °C), Min:98.4 °F (36.9 °C), Max:99.7 °F (37.6 °C)      10/25 0701 - 10/25 1900  In: -   Out: 425 [Urine:425]  10/23 1901 - 10/25 0700  In: 2459.1 [P.O.:450; I.V.:2009.1]  Out: 1020 [Urine:1020]    Recent Results (from the past 24 hour(s))   PTT    Collection Time: 10/24/22  6:21 PM   Result Value Ref Range    aPTT 32.9 21.2 - 34.1 sec    aPTT, therapeutic range   82 - 109 sec   PTT    Collection Time: 10/24/22 11:30 PM   Result Value Ref Range    aPTT 38.6 (H) 21.2 - 34.1 sec    aPTT, therapeutic range   82 - 109 sec   CBC WITH AUTOMATED DIFF    Collection Time: 10/25/22  5:16 AM   Result Value Ref Range    WBC 3.9 3.6 - 11.0 K/uL    RBC 2.67 (L) 3.80 - 5.20 M/uL    HGB 7.1 (L) 11.5 - 16.0 g/dL    HCT 23.6 (L) 35.0 - 47.0 %    MCV 88.4 80.0 - 99.0 FL    MCH 26.6 26.0 - 34.0 PG    MCHC 30.1 30.0 - 36.5 g/dL    RDW 16.7 (H) 11.5 - 14.5 %    PLATELET 009 013 - 738 K/uL    MPV 9.8 8.9 - 12.9 FL    NRBC 0.0 0.0  WBC    ABSOLUTE NRBC 0.00 0.00 - 0.01 K/uL    NEUTROPHILS 72 32 - 75 %    LYMPHOCYTES 21 12 - 49 %    MONOCYTES 6 5 - 13 %    EOSINOPHILS 0 0 - 7 %    BASOPHILS 0 0 - 1 %    OTHER CELL 1 %    IMMATURE GRANULOCYTES 0 %    ABS. NEUTROPHILS 2.8 1.8 - 8.0 K/UL    ABS. LYMPHOCYTES 0.8 0.8 - 3.5 K/UL    ABS. MONOCYTES 0.2 0.0 - 1.0 K/UL    ABS. EOSINOPHILS 0.0 0.0 - 0.4 K/UL    ABS. BASOPHILS 0.0 0.0 - 0.1 K/UL    ABS. IMM.  GRANS. 0.0 K/UL    DF Manual      PLATELET COMMENTS Large Platelets      RBC COMMENTS Anisocytosis  1+       METABOLIC PANEL, COMPREHENSIVE    Collection Time: 10/25/22  5:16 AM   Result Value Ref Range    Sodium 140 136 - 145 mmol/L    Potassium 3.0 (L) 3.5 - 5.1 mmol/L    Chloride 114 (H) 97 - 108 mmol/L    CO2 23 21 - 32 mmol/L    Anion gap 3 (L) 5 - 15 mmol/L    Glucose 82 65 - 100 mg/dL    BUN 5 (L) 6 - 20 mg/dL    Creatinine 0.61 0.55 - 1.02 mg/dL    BUN/Creatinine ratio 8 (L) 12 - 20      eGFR >60 >60 ml/min/1.73m2    Calcium 8.2 (L) 8.5 - 10.1 mg/dL    Bilirubin, total 0.5 0.2 - 1.0 mg/dL    AST (SGOT) 33 15 - 37 U/L    ALT (SGPT) 12 12 - 78 U/L    Alk. phosphatase 89 45 - 117 U/L    Protein, total 7.6 6.4 - 8.2 g/dL    Albumin 1.4 (L) 3.5 - 5.0 g/dL    Globulin 6.2 (H) 2.0 - 4.0 g/dL    A-G Ratio 0.2 (L) 1.1 - 2.2     PTT    Collection Time: 10/25/22  5:16 AM   Result Value Ref Range    aPTT 40.7 (H) 21.2 - 34.1 sec    aPTT, therapeutic range   82 - 109 sec   PROCALCITONIN    Collection Time: 10/25/22  5:16 AM   Result Value Ref Range    Procalcitonin <0.05 (H) 0 ng/mL   C REACTIVE PROTEIN, QT    Collection Time: 10/25/22  5:16 AM   Result Value Ref Range    C-Reactive protein 15.00 (H) 0.00 - 0.60 mg/dL        CT ABD PELV W CONT   Final Result   Rectovaginal fistula is not identified   Extensive bilateral iliac venous thrombus to the level of the IVC filter. Right   lower extremity edema. Cholelithiasis with distended gallbladder   Bilateral hydronephrosis and hydroureter in patient with ileal conduit   Probable small bowel obstruction secondary to adhesion      XR ABD (KUB)   Final Result   1. No significant change in small bowel dilation. Large volume of stool in the   rectum. 2. Cholelithiasis. XR ABD (KUB)   Final Result   Dilated small bowel within the central abdomen is similar to CT from one day   prior. DUPLEX LOWER EXT VENOUS BILAT   Final Result   Addendum (preliminary) 1 of 1      · Thrombus present in the right external iliac vein. · Thrombus present in the right common femoral vein. · Thrombus present in the right femoral vein. · Thrombus present in the right proximal femoral vein. · Thrombus present in the right mid femoral vein. · Thrombus present in the right distal femoral vein. · Thrombus present in the right popliteal vein. · Thrombus present in the right gastrocnemius vein. · Thrombus present in the right peroneal vein. · Thrombus present in the right saphenofemoral junction. · Thrombus present in the left external iliac vein. · Thrombus present in the left common femoral vein. · Thrombus present in the left saphenofemoral junction. · Thrombus present in the left femoral vein. · Thrombus present in the left proximal femoral vein. · Thrombus present in the left mid femoral vein. · Thrombus present in the left popliteal vein. · Thrombus present in the left distal femoral vein. · Thrombus present in the left gastrocnemius vein. · Thrombus present in the left peroneal vein. This is vascular lab report on Carol Mortensen, patient's YOB: 1965   Date of examination: October 20, 2022   Examination: Duplex lower extremity venous bilateral   Technician: Ms. Joao Guevara   Clinical history: This is  61years old woman with suspected renal vein    thrombosis   Indication: femoral vein thrombosis. Technique: Bilateral leg venous structures examined using venous duplex    ultrasound with 2D grayscale, color-flow and spectral Doppler analysis. Findings:   Right side:   common femoral vein and saphenofemoral junction is not compressible and    there is hypoechoic filling defect noted. common femoral vein shows no venous flow   Distal external iliac vein also not compressible and that there is no    venous flow   Proximal femoral vein is noncompressible and there is no venous flow   Mid femoral vein is not compressible and there is no venous flow   Distal femoral vein is noncompressible and there is no venous flow   Proximal, mid, distal greater saphenous vein is compressible, there is no    filling defect   Distal popliteal vein is not compressible.    Proximal popliteal vein is noncompressible and there is no venous flow   Proximal gastrocnemius vein is not compressible   Proximal small saphenous vein is not compressible   Distal, mid, proximal  peroneal vein is not compressible      Left side:   common femoral vein not compressible and there is no venous flow   Saphenofemoral junction is not compressible and there is no venous flow. External iliac vein also not compressible there is no venous flow   Proximal femoral vein is noncompressible and there is no venous flow   Mid femoral vein is noncompressible and there is no venous flow   Distal femoral vein is not compressible and there is no venous flow   Proximal, mid, distal greater saphenous vein is noncompressible and there    is hypoechoic filling defect noted. Distal popliteal vein is noncompressible   Proximal popliteal vein is noncompressible and there is no venous flow   Proximal gastrocnemius vein is not compressible and there is no venous    flow   Proximal small saphenous vein is noncompressible and there is no venous    flow   Distal, mid, proximal peroneal vein is not compressible. Impression:   1. Right external iliac vein, common femoral vein, femoral vein,    popliteal vein, gastrocnemius vein, small saphenous vein, and peroneal    vein shows acute venous thrombosis   2. Left external iliac vein, common femoral vein, femoral vein, popliteal    vein, gastrocnemius vein, small saphenous vein, peroneal vein, and    saphenofemoral junction and greater saphenous vein shows acute venous    thrombosis. These  extensive bilateral lower extremity deep vein thrombosis notified    to Dr. Ashley Salamanca on 10/20/2022 1201 PM.            CT ABD PELV WO CONT   Final Result   Status post cystectomy and hysterectomy with right lower quadrant ileal conduit. Mild bilateral hydroureteronephrosis, evaluation is somewhat limited by the   presence of contrast from previously performed examination. Small bowel   distention with decompressed loops distally concerning for obstruction likely   related to adhesion formation. While this study was done without the use of   intravenous contrast there is questionable right common femoral vein thrombus   with soft tissue edema. CTA CHEST W OR W WO CONT   Final Result   Emphysematous changes.  No evidence of pulmonary embolism or acute   abnormality. Cholelithiasis. Bilateral hydronephrosis. 1 cm hypodense lesion   right hepatic lobe cannot be characterized with certainty on the basis of this   examination. Follow-up is recommended to ensure stability. XR CHEST PORT   Final Result   No Acute Disease. XR FOOT LT MIN 3 V   Final Result   Normal study. PHYSICAL EXAM:    Physical Exam  Vitals reviewed. HENT:      Head: Normocephalic and atraumatic. Cardiovascular:      Rate and Rhythm: Normal rate and regular rhythm. Heart sounds: Normal heart sounds. Pulmonary:      Effort: Pulmonary effort is normal.      Breath sounds: Normal breath sounds. Abdominal:      General: Abdomen is flat. Bowel sounds are normal.      Palpations: Abdomen is soft. Comments: Mild tenderness in the suprapubic region   Musculoskeletal:         General: Normal range of motion. Cervical back: Normal range of motion and neck supple. Right lower leg: Edema present. Left lower leg: Edema present. Skin:     General: Skin is warm and dry. Neurological:      General: No focal deficit present. Mental Status: She is alert and oriented to person, place, and time.    Psychiatric:         Mood and Affect: Mood normal.        Data Review    Recent Results (from the past 24 hour(s))   PTT    Collection Time: 10/24/22  6:21 PM   Result Value Ref Range    aPTT 32.9 21.2 - 34.1 sec    aPTT, therapeutic range   82 - 109 sec   PTT    Collection Time: 10/24/22 11:30 PM   Result Value Ref Range    aPTT 38.6 (H) 21.2 - 34.1 sec    aPTT, therapeutic range   82 - 109 sec   CBC WITH AUTOMATED DIFF    Collection Time: 10/25/22  5:16 AM   Result Value Ref Range    WBC 3.9 3.6 - 11.0 K/uL    RBC 2.67 (L) 3.80 - 5.20 M/uL    HGB 7.1 (L) 11.5 - 16.0 g/dL    HCT 23.6 (L) 35.0 - 47.0 %    MCV 88.4 80.0 - 99.0 FL    MCH 26.6 26.0 - 34.0 PG    MCHC 30.1 30.0 - 36.5 g/dL    RDW 16.7 (H) 11.5 - 14.5 %    PLATELET 456 103 - 400 K/uL    MPV 9.8 8.9 - 12.9 FL    NRBC 0.0 0.0  WBC    ABSOLUTE NRBC 0.00 0.00 - 0.01 K/uL    NEUTROPHILS 72 32 - 75 %    LYMPHOCYTES 21 12 - 49 %    MONOCYTES 6 5 - 13 %    EOSINOPHILS 0 0 - 7 %    BASOPHILS 0 0 - 1 %    OTHER CELL 1 %    IMMATURE GRANULOCYTES 0 %    ABS. NEUTROPHILS 2.8 1.8 - 8.0 K/UL    ABS. LYMPHOCYTES 0.8 0.8 - 3.5 K/UL    ABS. MONOCYTES 0.2 0.0 - 1.0 K/UL    ABS. EOSINOPHILS 0.0 0.0 - 0.4 K/UL    ABS. BASOPHILS 0.0 0.0 - 0.1 K/UL    ABS. IMM. GRANS. 0.0 K/UL    DF Manual      PLATELET COMMENTS Large Platelets      RBC COMMENTS Anisocytosis  1+       METABOLIC PANEL, COMPREHENSIVE    Collection Time: 10/25/22  5:16 AM   Result Value Ref Range    Sodium 140 136 - 145 mmol/L    Potassium 3.0 (L) 3.5 - 5.1 mmol/L    Chloride 114 (H) 97 - 108 mmol/L    CO2 23 21 - 32 mmol/L    Anion gap 3 (L) 5 - 15 mmol/L    Glucose 82 65 - 100 mg/dL    BUN 5 (L) 6 - 20 mg/dL    Creatinine 0.61 0.55 - 1.02 mg/dL    BUN/Creatinine ratio 8 (L) 12 - 20      eGFR >60 >60 ml/min/1.73m2    Calcium 8.2 (L) 8.5 - 10.1 mg/dL    Bilirubin, total 0.5 0.2 - 1.0 mg/dL    AST (SGOT) 33 15 - 37 U/L    ALT (SGPT) 12 12 - 78 U/L    Alk.  phosphatase 89 45 - 117 U/L    Protein, total 7.6 6.4 - 8.2 g/dL    Albumin 1.4 (L) 3.5 - 5.0 g/dL    Globulin 6.2 (H) 2.0 - 4.0 g/dL    A-G Ratio 0.2 (L) 1.1 - 2.2     PTT    Collection Time: 10/25/22  5:16 AM   Result Value Ref Range    aPTT 40.7 (H) 21.2 - 34.1 sec    aPTT, therapeutic range   82 - 109 sec   PROCALCITONIN    Collection Time: 10/25/22  5:16 AM   Result Value Ref Range    Procalcitonin <0.05 (H) 0 ng/mL   C REACTIVE PROTEIN, QT    Collection Time: 10/25/22  5:16 AM   Result Value Ref Range    C-Reactive protein 15.00 (H) 0.00 - 0.60 mg/dL        Assessment/Plan:     Principal Problem:    Small bowel obstruction ([address] (10/20/2022)    Active Problems:    Acute blood loss anemia (10/20/2022)      Status post robot-assisted surgical procedure, pelvic exenteration (10/20/2022)      Vaginal cuff cellulitis (10/20/2022)    Hospital course: Marissa Mata is a 61 y.o. female with PMH of bladder cancer s/p cystectomy, hysterectomy, BSO and ileal conduit diversion, anemia, hep C, and left leg DVT s/p IVC filter years ago. She presented to the ED with chief complaint of weakness and nausea for the past week. She reports unable to tolerate PO intake, due to profound nausea, abdominal pain, fever, chills, vomiting or diarrhea. In addition, she also reports unable to walk due to weakness and left inner thigh and left foot pain. Denies trauma. She was seen here on 10/13/2022 with urostomy complications, status post ileal conduit and discharged home on Cipro which she completed. In addition, she also reports having vaginal bleed since surgery 2 weeks ago. Which appears to be more related to a fistula with stool present. CT scan of the abdomen pelvis was performed revealing no obvious rectovaginal fistula although there was air present tracking in that direction without any extravasation of contrast.  There was extensive bilateral iliac venous thrombus to the level of the IVC filter with right leg edema. Cholelithiasis with a distended gallbladder was also noted. Bilateral hydronephrosis with hydroureter most likely from the ileal conduit. Probable small bowel obstruction secondary to adhesions also noted. Patient was found to be anemic and received 1 unit of packed red blood cells. Venous duplex revealed extensive clot burden within the right leg and left leg. Urinalysis was consistent with Candida infection. ID consultation. Surgical consultation, Dr. Demi Lopez. UCX grew candida ciferrii, continue fluconazole. IV heparin infusing.     Impression/plan:     # 1 Acute blood loss anemia    - hematology following-->reccs low dose anticoagulants  - hgb remains 7.1  - currently on low dose IV hep gtt     # 2 Vaginal bleed with stool  - Suspect complications from recent surgery  -With associated vaginitis of the CUFF and cellulitis     # 3 SBO  - currently tolerating full liquid diet  - s/p gentle IVF  - Gen surgery: [facility]   - Morphine for pain. # 4 Extensive dvt bilateral lower legs  - recently on anticoagulants that was stopped prior to surgery  - duplex shows extensive clot burden  - hematology following   - already have IVC filter placed  - started on IV hep due to concern of old IVC and chances that she has collateral blood vessels bypassing filter which will increase her risk for PE.  - Hematology managing low dose IV heparin gtt     # 5 UTI  - Fluconazole for fungal UTI.  - UCX grew candida ciferrii  - ID following     # 6 Bladder cancer  - Not on chemotherapy currently. - Consulted urology to evaluate for possible post-op complications. - Continue supportive care  - Hematology following     # 7 Emphysema  - not in respiratory distress, continue to monitor      # 8 Ambulatory dysfunction  - secondary to dvt's  - pt/ot as tolerated     # 9 hypotension  - bp remains soft  - prn midodrine  - start gentle IV hydration    10. Rectal/vaginal fistula  Surgery following  Questionable abscess  ID consult  Mer[place]    Patient will require wheelchair and at this point patient's overall mobility is limited to participate in toileting feeding and dressing and will require the use of a wheelchair for functional mobility. Of the use of a manual wheelchair the patient can maneuver the chair independently to perform these functional activities of daily living. Care Plan discussed with: Patient/Family    Total time spent with patient: >35 minutes.

## 2022-10-25 NOTE — PROGRESS NOTES
PT attempted, however after speaking to [address] [place] [address] [address], PT to hold on performing mobility with patient at this time. PT to continue to follow for evaluation as available.

## 2022-10-25 NOTE — PROGRESS NOTES
Bedside shift change report given to [address] (oncoming nurse) by Parmjit Victor RN (offgoing nurse). Report included the following information SBAR, Intake/Output, MAR, and Cardiac Rhythm NSR .

## 2022-10-25 NOTE — PROGRESS NOTES
Per PA Raymon patient should not be getting up even with PT due to thrombus at this time. PT aware as he was present for question and response. Notified PA that patient's activity order at the time did not reflect restriction. Patient does want to work with PT.   I have inquired about bed exercises by way of perfect serve message to therapist.

## 2022-10-25 NOTE — PROGRESS NOTES
UROLOGY Progress Note          [phone]      Daily Progress Note: 10/25/2022      Subjective: The patient is seen for UROLOGIC follow  up. She presented 10/20/2022 with nausea and vomiting.     Problem List:  Patient Active Problem List   Diagnosis Code    History of blood clots Z86.718    Bladder cancer ([facility] [address] C67.9    Anemia due to acute blood loss D62    Hypercalcemia E83.52    Hydronephrosis of right kidney N13.30    Retained ureteral stent Z96.0    Severe protein-calorie malnutrition (HCC) E43    Acute blood loss anemia D62    Small bowel obstruction (HCC) K56.609    Status post robot-assisted surgical procedure, pelvic exenteration Z98.890    Vaginal cuff cellulitis N76.0         Medications reviewed  Current Facility-Administered Medications   Medication Dose Route Frequency    meropenem (MERREM) 1 g in 0.9% sodium chloride (MBP/ADV) 50 mL MBP  1 g IntraVENous Q8H    midodrine (PROAMATINE) tablet 5 mg  5 mg Oral TID PRN    0.9% sodium chloride infusion  50 mL/hr IntraVENous CONTINUOUS    fluconazole (DIFLUCAN) tablet 200 mg  200 mg Oral DAILY    heparin 25,000 units in D5W 250 ml infusion  500 Units/hr IntraVENous TITRATE    oxyCODONE IR (ROXICODONE) tablet 5 mg  5 mg Oral Q6H PRN    0.9% sodium chloride infusion 250 mL  250 mL IntraVENous PRN    cromolyn (OPTICROM) 4 % ophthalmic solution 1 Drop  1 Drop Both Eyes DAILY    sodium chloride (NS) flush 5-40 mL  5-40 mL IntraVENous Q8H    sodium chloride (NS) flush 5-40 mL  5-40 mL IntraVENous PRN    acetaminophen (TYLENOL) tablet 650 mg  650 mg Oral Q6H PRN    Or    acetaminophen (TYLENOL) suppository 650 mg  650 mg Rectal Q6H PRN    polyethylene glycol (MIRALAX) packet 17 g  17 g Oral DAILY PRN    ondansetron (ZOFRAN ODT) tablet 4 mg  4 mg Oral Q8H PRN    Or    ondansetron (ZOFRAN) injection 4 mg  4 mg IntraVENous Q6H PRN       Review of Systems:   ROS        Objective:   Physical Exam     Visit Vitals  BP 98/65 (BP 1 Location: Left upper arm, BP Patient Position: At rest)   Pulse 91   Temp 99.5 °F (37.5 °C)   Resp 18   Ht 5' 2\" (1.575 m)   Wt 114 lb (51.7 kg)   SpO2 99%   BMI 20.85 kg/m²         Data Review:       Recent Days:  Recent Labs     10/25/22  0516 10/24/22  0645 10/23/22  0221   WBC 3.9 4.2 6.2   HGB 7.1* 7.2* 7.5*   HCT 23.6* 23.2* 23.5*    244 249     Recent Labs     10/25/22  0516 10/23/22  0221    138   K 3.0* 3.2*   * 114*   CO2 23 18*   GLU 82 92   BUN 5* 5*   CREA 0.61 0.55   CA 8.2* 8.3*   ALB 1.4*  --    TBILI 0.5  --    ALT 12  --                Assessment/     Patient Active Problem List   Diagnosis Code    History of blood clots Z86.718    Bladder cancer (HCC) C67.9    Anemia due to acute blood loss D62    Hypercalcemia E83.52    Hydronephrosis of right kidney N13.30    Retained ureteral stent Z96.0    Severe protein-calorie malnutrition (HCC) E43    Acute blood loss anemia D62    Small bowel obstruction (HCC) K56.609    Status post robot-assisted surgical procedure, pelvic exenteration Z98.890    Vaginal cuff cellulitis N76.0   Ileus versus partial small bowel obstruction on admission. Tolerating some p.o./Full liquids poor appetite. She could use nutritional supplementation. For nutrition seems to be the underlying issue. Candida in the urine culture from the conduit. On fluconazole. ID following. Vaginal discharge, scant. Consistent with postop. No rectovaginal fistula. Bladder cancer status post cystectomy 10/3/2022. pT3 N0 M0. Oncology to discuss adjuvant therapy. Mild hydronephrosis- asymptomatic. May be physiologic after conduit creation. Pt declines intervention. Observe. Chronic anemia. Hematology following. DVT with IVC filter.   On heparin      Valentino Bocanegra MD

## 2022-10-25 NOTE — PROGRESS NOTES
0900: Chart reviewed. Per MD note, patient on hep gtt and IV ABX. PT/OT evals pending. Previous DME request from patient noted: wheelchair and BSC. Referral in [place] - awaiting further documentation. : Rea Oropeza [phone]. CM will continue to follow patient and recs of medical team.    1010: Wheelchair documentation provided to PA to include in note. 1420: Clinical with need for wheelchair documentation attached in [place] [facility]. 1505: CMN for wheelchair signed and attached in [place] [address] for insurance AUTH.

## 2022-10-25 NOTE — PROGRESS NOTES
Infectious Disease Progress Note               Subjective:   Remains stable, denies new complaints, no acute events since last seen, still w fecal matter from the vaginal area   Objective:   Physical Exam:     Visit Vitals  BP 98/65 (BP 1 Location: Left upper arm, BP Patient Position: At rest)   Pulse 68   Temp 99.4 °F (37.4 °C)   Resp 18   Ht 5' 2\" (1.575 m)   Wt 114 lb (51.7 kg)   LMP  (LMP Unknown)   SpO2 100%   BMI 20.85 kg/m²      O2 Device: None (Room air)    Temp (24hrs), Av °F (37.2 °C), Min:98.4 °F (36.9 °C), Max:99.5 °F (37.5 °C)    10/25 0701 - 10/25 1900  In: -   Out: 425 [Urine:425]   10/23 1901 - 10/25 0700  In: 2459.1 [P.O.:450; I.V.:2009.1]  Out: 1020 [Urine:1020]    General: NAD, NAD, AAO x 4  HEENT: SIMBA, Moist mucosa   Lungs: CTA b/l, decreased at the bases   Heart: S1S2+, RRR, no murmur  Abdo:  Soft, NT, ND, +BS, healed incisional wounds, + urostomy   : Fecal matter coming out of vaginal area  Exts: B/l leg edema   Skin: healed abdominal incisional wounds       Data Review:       Recent Days:  Recent Labs     10/25/22  0516 10/24/22  0645 10/23/22  0221   WBC 3.9 4.2 6.2   HGB 7.1* 7.2* 7.5*   HCT 23.6* 23.2* 23.5*    244 249     Recent Labs     10/25/22  0516 10/23/22  0221   BUN 5* 5*   CREA 0.61 0.55       Lab Results   Component Value Date/Time    C-Reactive protein 15.00 (H) 10/25/2022 05:16 AM        Microbiology     Results       Procedure Component Value Units Date/Time    COVID-19 RAPID TEST [[id number]] Collected: 10/20/22 1435    Order Status: Completed Specimen: Nasopharyngeal Updated: 10/20/22 1503     COVID-19 rapid test Not Detected        Comment: Rapid Abbott ID Now   Rapid NAAT:  The specimen is NEGATIVE for SARS-CoV-2, the novel coronavirus associated with COVID-19.    Negative results should be treated as presumptive and, if inconsistent with clinical signs and symptoms or necessary for patient management, should be tested with an alternative molecular assay. Negative results do not preclude SARS-CoV-2 infection and should not be used as the sole basis for patient management decisions. This test has been authorized by the FDA under   an Emergency Use Authorization (EUA) for use by authorized laboratories. Fact sheet for Healthcare Providers: ConventionUpdate.co.nz Fact sheet for Patients: ConventionUpdate.co.nz   Methodology: Isothermal Nucleic Acid Amplification         MRSA SCREEN - PCR (NASAL) [[id number]] Collected: 10/20/22 1435    Order Status: Completed Specimen: Swab Updated: 10/20/22 1636     MRSA by PCR, Nasal Not Detected       MRSA SCREEN - PCR (NASAL) [[id number]] Collected: 10/20/22 1340    Order Status: Canceled Specimen: Swab     CULTURE, Korene Mater STAIN [[id number]] Collected: 10/20/22 1340    Order Status: Completed Specimen: Wound from Cervical/vaginal swab Updated: 10/23/22 0741     Special Requests: No Special Requests        GRAM STAIN Rare WBCs seen               1+ apparent Gram Positive Cocci in pairs                  Few apparent Gram Negative Rods           Culture result: Light  Mixed skin heriberto isolated       CULTURE, BODY FLUID Michelle Goodview STAIN [[id number]]     Order Status: Canceled Specimen: Body Fluid from Uterine Cul/De/Sac Fluid     CULTURE, URINE [[id number]]  (Abnormal) Collected: 10/20/22 0249    Order Status: Completed Specimen: Urine Updated: 10/23/22 1001     Special Requests: --        No Special Requests  Reflexed from [id number]       [place] Count --        >100,000  colonies/ml       Culture result: Candida ciferrii                  Diagnostics   CXR Results  (Last 48 hours)      None            Assessment/Plan   UTI, complicated by ileal conduit. Abnormal UA, Candida Ciferrii isolated from Cx         Afebrile w a normal WBC on routine labs         On Fluconazole day # 6/14. Routine labs in AM      2.  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 Vaginal cuff cellulitis/suspected abscess on CT       GPC in pair and GNR noted on Gram stain of cervical Cx        Remains afebrile w a normal WBC on routine labs            Continue on Meropenem day # 2/14     4. Bladder Ca w local invasion: S/p resection on 10/03     5. SBO on admission, due to recent /Gyn surgery       Abdominal exam remains benign, tolerating oral intake      6. B/L LE DVT extensive, s/p IVC filter placement.  Remains on heparin drip   Adela Diallo MD    10/25/2022

## 2022-10-25 NOTE — PROGRESS NOTES
Hematology Oncology Progress Note           Follow up for:DVT  Chart notes reviewed since last visit. No bleeding thus far  Still weak      Patient Vitals for the past 24 hrs:   BP Temp Pulse Resp SpO2   10/25/22 0844 105/64 98.4 °F (36.9 °C) 84 20 100 %   10/25/22 0149 98/65 99.5 °F (37.5 °C) 91 18 99 %   10/24/22 1949 99/63 98.6 °F (37 °C) 90 18 100 %   10/24/22 1507 116/73 99.7 °F (37.6 °C) 97 16 100 %       Review of Systems:    Constitutional No fevers, chills, night sweats, excessive fatigue or weight loss. Allergic/Immunologic No recent allergic reactions   Eyes No significant visual difficulties. No diplopia. ENMT No problems with hearing, no sore throat, no sinus drainage. Endocrine No hot flashes or night sweats. No cold intolerance, polyuria, or polydipsia   Hematologic/Lymphatic No easy bruising or bleeding. The patient denies any tender or palpable lymph nodes   Breasts No abnormal masses of breast, nipple discharge or pain. Respiratory No dyspnea on exertion, orthopnea, chest pain, cough or hemoptysis. Cardiovascular No anginal chest pain, irregular heart beat, tachycardia, palpitations or orthopnea. Gastrointestinal No nausea, vomiting, diarrhea, constipation, cramping, dysphagia, reflux, heartburn, GI bleeding, or early satiety. No change in bowel habits. Genitourinary (M) No hematuria, dysuria, increased frequency, urgency, hesitancy or incontinence. Musculoskeletal No joint pain, swelling or redness. No decreased range of motion. Integumentary No chronic rashes, inflammation, ulcerations, pruritus, petechiae, purpura, ecchymoses, or skin changes. Neurologic No headache, blurred vision, and no areas of focal weakness or numbness. Normal gait. No sensory problems. Psychiatric No insomnia, depression, nate or mood swings.   No psychotropic drugs       Physical Examination:  Constitutional Sedation to maintain respiratory support   Head Normocephalic; no scars   Eyes Conjunctivae and sclerae are clear and without icterus. Pupils are reactive and equal.   ENMT Sinuses are nontender. No oral exudates, ulcers, masses, thrush or mucositis. Oropharynx clear. Tongue normal.   Neck Supple without masses or thyromegaly. No jugular venous distension. Hematologic/Lymphatic No petechiae or purpura. No tender or palpable lymph nodes in the cervical, supraclavicular, axillary or inguinal area. Respiratory Lungs are clear to auscultation without rhonchi or wheezing. Cardiovascular Regular rate and rhythm of heart without murmurs, gallops or rubs. Chest / Line Site Chest is symmetric with no chest wall deformities. Abdomen Non-tender, non-distended, no masses, ascites or hepatosplenomegaly. Good bowel sounds. No guarding or rebound tenderness. No pulsatile masses. Musculoskeletal No tenderness or swelling, normal range of motion without obvious weakness. Extremities No visible deformities, no cyanosis, clubbing or edema. Skin No rashes, scars, or lesions suggestive of malignancy. No petechiae, purpura, or ecchymoses. No excoriations. Neurologic No sensory or motor deficits, normal cerebellar function, normal gait, cranial nerves intact. Psychiatric Alert and oriented times three. Coherent speech. Verbalizes understanding of our discussions today.        Labs:  Recent Results (from the past 24 hour(s))   PTT    Collection Time: 10/24/22  6:21 PM   Result Value Ref Range    aPTT 32.9 21.2 - 34.1 sec    aPTT, therapeutic range   82 - 109 sec   PTT    Collection Time: 10/24/22 11:30 PM   Result Value Ref Range    aPTT 38.6 (H) 21.2 - 34.1 sec    aPTT, therapeutic range   82 - 109 sec   CBC WITH AUTOMATED DIFF    Collection Time: 10/25/22  5:16 AM   Result Value Ref Range    WBC 3.9 3.6 - 11.0 K/uL    RBC 2.67 (L) 3.80 - 5.20 M/uL    HGB 7.1 (L) 11.5 - 16.0 g/dL    HCT 23.6 (L) 35.0 - 47.0 %    MCV 88.4 80.0 - 99.0 FL    MCH 26.6 26.0 - 34.0 PG    MCHC 30.1 30.0 - 36.5 g/dL RDW 16.7 (H) 11.5 - 14.5 %    PLATELET 468 634 - 971 K/uL    MPV 9.8 8.9 - 12.9 FL    NRBC 0.0 0.0  WBC    ABSOLUTE NRBC 0.00 0.00 - 0.01 K/uL    NEUTROPHILS 72 32 - 75 %    LYMPHOCYTES 21 12 - 49 %    MONOCYTES 6 5 - 13 %    EOSINOPHILS 0 0 - 7 %    BASOPHILS 0 0 - 1 %    OTHER CELL 1 %    IMMATURE GRANULOCYTES 0 %    ABS. NEUTROPHILS 2.8 1.8 - 8.0 K/UL    ABS. LYMPHOCYTES 0.8 0.8 - 3.5 K/UL    ABS. MONOCYTES 0.2 0.0 - 1.0 K/UL    ABS. EOSINOPHILS 0.0 0.0 - 0.4 K/UL    ABS. BASOPHILS 0.0 0.0 - 0.1 K/UL    ABS. IMM. GRANS. 0.0 K/UL    DF Manual      PLATELET COMMENTS Large Platelets      RBC COMMENTS Anisocytosis  1+       METABOLIC PANEL, COMPREHENSIVE    Collection Time: 10/25/22  5:16 AM   Result Value Ref Range    Sodium 140 136 - 145 mmol/L    Potassium 3.0 (L) 3.5 - 5.1 mmol/L    Chloride 114 (H) 97 - 108 mmol/L    CO2 23 21 - 32 mmol/L    Anion gap 3 (L) 5 - 15 mmol/L    Glucose 82 65 - 100 mg/dL    BUN 5 (L) 6 - 20 mg/dL    Creatinine 0.61 0.55 - 1.02 mg/dL    BUN/Creatinine ratio 8 (L) 12 - 20      eGFR >60 >60 ml/min/1.73m2    Calcium 8.2 (L) 8.5 - 10.1 mg/dL    Bilirubin, total 0.5 0.2 - 1.0 mg/dL    AST (SGOT) 33 15 - 37 U/L    ALT (SGPT) 12 12 - 78 U/L    Alk.  phosphatase 89 45 - 117 U/L    Protein, total 7.6 6.4 - 8.2 g/dL    Albumin 1.4 (L) 3.5 - 5.0 g/dL    Globulin 6.2 (H) 2.0 - 4.0 g/dL    A-G Ratio 0.2 (L) 1.1 - 2.2     PTT    Collection Time: 10/25/22  5:16 AM   Result Value Ref Range    aPTT 40.7 (H) 21.2 - 34.1 sec    aPTT, therapeutic range   82 - 109 sec   PROCALCITONIN    Collection Time: 10/25/22  5:16 AM   Result Value Ref Range    Procalcitonin <0.05 (H) 0 ng/mL   C REACTIVE PROTEIN, QT    Collection Time: 10/25/22  5:16 AM   Result Value Ref Range    C-Reactive protein 15.00 (H) 0.00 - 0.60 mg/dL   PTT    Collection Time: 10/25/22 11:22 AM   Result Value Ref Range    aPTT 35.8 (H) 21.2 - 34.1 sec    aPTT, therapeutic range   82 - 109 sec       Imaging:      Assessment and Plan: 1 DVT  -on heparin  -clinical concern was that collateral blood vessels have formed making prior IVC filter less valuable   -if active bleeding; hold heparin  -once has been on heparin a few days ok to start working with PT as clot will have stabilized     2. Anemia  -transfuse if hgb <7s; expect to need this tomorrow  -awaiting iron studies (still pending)     3.  Bladder cancer T3N0=Stage III based on pathology  -will need OP adjuvant chemotherapy

## 2022-10-26 LAB
ALBUMIN SERPL-MCNC: 1.4 G/DL (ref 3.5–5)
ALBUMIN/GLOB SERPL: 0.2 {RATIO} (ref 1.1–2.2)
ALP SERPL-CCNC: 87 U/L (ref 45–117)
ALT SERPL-CCNC: 14 U/L (ref 12–78)
ANION GAP SERPL CALC-SCNC: 6 MMOL/L (ref 5–15)
APTT PPP: 36.9 SEC (ref 21.2–34.1)
APTT PPP: 39.3 SEC (ref 21.2–34.1)
APTT PPP: 48.3 SEC (ref 21.2–34.1)
APTT PPP: 61 SEC (ref 21.2–34.1)
AST SERPL W P-5'-P-CCNC: 33 U/L (ref 15–37)
BASOPHILS # BLD: 0 K/UL (ref 0–0.1)
BASOPHILS NFR BLD: 0 % (ref 0–1)
BILIRUB SERPL-MCNC: 0.5 MG/DL (ref 0.2–1)
BUN SERPL-MCNC: 7 MG/DL (ref 6–20)
BUN/CREAT SERPL: 13 (ref 12–20)
CA-I BLD-MCNC: 8.6 MG/DL (ref 8.5–10.1)
CHLORIDE SERPL-SCNC: 111 MMOL/L (ref 97–108)
CO2 SERPL-SCNC: 22 MMOL/L (ref 21–32)
CREAT SERPL-MCNC: 0.54 MG/DL (ref 0.55–1.02)
DIFFERENTIAL METHOD BLD: ABNORMAL
EOSINOPHIL # BLD: 0 K/UL (ref 0–0.4)
EOSINOPHIL NFR BLD: 0 % (ref 0–7)
ERYTHROCYTE [DISTWIDTH] IN BLOOD BY AUTOMATED COUNT: 16.5 % (ref 11.5–14.5)
GLOBULIN SER CALC-MCNC: 6 G/DL (ref 2–4)
GLUCOSE SERPL-MCNC: 72 MG/DL (ref 65–100)
HCT VFR BLD AUTO: 24.6 % (ref 35–47)
HGB BLD-MCNC: 7.5 G/DL (ref 11.5–16)
IMM GRANULOCYTES # BLD AUTO: 0.1 K/UL (ref 0–0.04)
IMM GRANULOCYTES NFR BLD AUTO: 2 % (ref 0–0.5)
LYMPHOCYTES # BLD: 0.9 K/UL (ref 0.8–3.5)
LYMPHOCYTES NFR BLD: 22 % (ref 12–49)
MCH RBC QN AUTO: 26.5 PG (ref 26–34)
MCHC RBC AUTO-ENTMCNC: 30.5 G/DL (ref 30–36.5)
MCV RBC AUTO: 86.9 FL (ref 80–99)
MONOCYTES # BLD: 0.2 K/UL (ref 0–1)
MONOCYTES NFR BLD: 6 % (ref 5–13)
NEUTS SEG # BLD: 2.8 K/UL (ref 1.8–8)
NEUTS SEG NFR BLD: 70 % (ref 32–75)
NRBC # BLD: 0 K/UL (ref 0–0.01)
NRBC BLD-RTO: 0 PER 100 WBC
PLATELET # BLD AUTO: 281 K/UL (ref 150–400)
PMV BLD AUTO: 9.4 FL (ref 8.9–12.9)
POTASSIUM SERPL-SCNC: 3.1 MMOL/L (ref 3.5–5.1)
PROT SERPL-MCNC: 7.4 G/DL (ref 6.4–8.2)
RBC # BLD AUTO: 2.83 M/UL (ref 3.8–5.2)
SODIUM SERPL-SCNC: 139 MMOL/L (ref 136–145)
THERAPEUTIC RANGE,PTTT: ABNORMAL SEC
THERAPEUTIC RANGE,PTTT: ABNORMAL SEC (ref 82–109)
WBC # BLD AUTO: 4.1 K/UL (ref 3.6–11)

## 2022-10-26 PROCEDURE — 80053 COMPREHEN METABOLIC PANEL: CPT

## 2022-10-26 PROCEDURE — 99232 SBSQ HOSP IP/OBS MODERATE 35: CPT | Performed by: INTERNAL MEDICINE

## 2022-10-26 PROCEDURE — 74011250637 HC RX REV CODE- 250/637: Performed by: INTERNAL MEDICINE

## 2022-10-26 PROCEDURE — 85730 THROMBOPLASTIN TIME PARTIAL: CPT

## 2022-10-26 PROCEDURE — 65270000029 HC RM PRIVATE

## 2022-10-26 PROCEDURE — 36415 COLL VENOUS BLD VENIPUNCTURE: CPT

## 2022-10-26 PROCEDURE — 74011250636 HC RX REV CODE- 250/636: Performed by: INTERNAL MEDICINE

## 2022-10-26 PROCEDURE — 74011000250 HC RX REV CODE- 250: Performed by: INTERNAL MEDICINE

## 2022-10-26 PROCEDURE — 97162 PT EVAL MOD COMPLEX 30 MIN: CPT

## 2022-10-26 PROCEDURE — 85025 COMPLETE CBC W/AUTO DIFF WBC: CPT

## 2022-10-26 PROCEDURE — 74011000258 HC RX REV CODE- 258: Performed by: INTERNAL MEDICINE

## 2022-10-26 PROCEDURE — 74011250637 HC RX REV CODE- 250/637: Performed by: NURSE PRACTITIONER

## 2022-10-26 RX ADMIN — SODIUM CHLORIDE, PRESERVATIVE FREE 10 ML: 5 INJECTION INTRAVENOUS at 05:44

## 2022-10-26 RX ADMIN — OXYCODONE 5 MG: 5 TABLET ORAL at 07:16

## 2022-10-26 RX ADMIN — IRON SUCROSE 200 MG: 20 INJECTION, SOLUTION INTRAVENOUS at 12:21

## 2022-10-26 RX ADMIN — MEROPENEM 1 G: 1 INJECTION, POWDER, FOR SOLUTION INTRAVENOUS at 23:09

## 2022-10-26 RX ADMIN — CROMOLYN SODIUM 1 DROP: 40 SOLUTION/ DROPS OPHTHALMIC at 09:41

## 2022-10-26 RX ADMIN — MEROPENEM 1 G: 1 INJECTION, POWDER, FOR SOLUTION INTRAVENOUS at 16:55

## 2022-10-26 RX ADMIN — SODIUM CHLORIDE, PRESERVATIVE FREE 10 ML: 5 INJECTION INTRAVENOUS at 16:56

## 2022-10-26 RX ADMIN — MEROPENEM 1 G: 1 INJECTION, POWDER, FOR SOLUTION INTRAVENOUS at 09:39

## 2022-10-26 RX ADMIN — SODIUM CHLORIDE, PRESERVATIVE FREE 10 ML: 5 INJECTION INTRAVENOUS at 23:09

## 2022-10-26 RX ADMIN — FLUCONAZOLE 200 MG: 100 TABLET ORAL at 09:39

## 2022-10-26 RX ADMIN — HEPARIN SODIUM 500 UNITS/HR: 10000 INJECTION, SOLUTION INTRAVENOUS at 08:07

## 2022-10-26 RX ADMIN — HEPARIN SODIUM 500 UNITS/HR: 10000 INJECTION, SOLUTION INTRAVENOUS at 16:33

## 2022-10-26 NOTE — PROGRESS NOTES
Comprehensive Nutrition Assessment    Type and Reason for Visit: RD nutrition re-screen/LOS, Positive nutrition screen    Nutrition Recommendations/Plan:   Continue diet as ordered  Continue supplement as ordered  Add magic cup @ dinner  Monitor & record po intake/supplement, Bms in I/O's      Malnutrition Assessment:  Malnutrition Status: Moderate malnutrition (10/26/22 1411)    Context:  Acute illness     Findings of the 6 clinical characteristics of malnutrition:   Energy Intake:  75% or less of est energy req for 7 or more days  Weight Loss:  No significant weight loss (3.5 % loss x 30d)     Body Fat Loss:  Mild body fat loss, Orbital, Buccal region   Muscle Mass Loss:  No significant muscle mass loss,    Fluid Accumulation:  Moderate to severe, Extremities      Nutrition Assessment:    Pt with 2 day hx of Nausea and poor intake. S/p recent anterior exenteration surgery (10/3) for bladder cancer. Path shows stage 3, will need OP adjuvant chemotherapy. Admitted for partial SBO. DVT bilaterally. UTI complicated by ileal conduit. Ileus resolving with advancement of diet. Concern for Rectovaginal fistula but No evidence of pelvic abscess or fistula(10/24). Diet advanced to solids (10/25) Soft with ONS in place. Appetite remains poor, 1-25%. Pt had requested Marinol for appetite. Meds: Diflucan, heparin, Venofer, Merrem, midodrine, oxycodone. Labs: H/H 7.5/24.6, Na 139, K 3.1, Gluc 72, Alb 1.4, CRP 15.00, Bun 7, Cre 0.54. Nutrition Related Findings:    + leg edema/pain. healing incisional wound. Last BM 10/26, loose. No c/s issues. No v/c/d. +N. Wound Type: Surgical incision      Current Nutrition Intake & Therapies:  ADULT ORAL NUTRITION SUPPLEMENT Breakfast, Lunch; Standard High Calorie/High Protein  ADULT DIET Dysphagia - Soft & Bite Sized    Anthropometric Measures:  Height: 5' 2\" (157.5 cm)  Ideal Body Weight (IBW): 110 lbs (50 kg)  Current Body Wt:  51.7 kg (113 lb 15.7 oz), 103.6 % IBW.  Not specified  Current BMI (kg/m2): 20.8  Weight Adjustment: No adjustment  BMI Category: Normal weight (BMI 18.5-24. 9)      Weight Metrics 10/19/2022 10/13/2022 10/9/2022 10/3/2022 9/27/2022 9/16/2022 9/11/2022   Weight 114 lb 113 lb 131 lb 6.3 oz - 113 lb 118 lb 6.2 oz 115 lb   BMI 20.85 kg/m2 20.67 kg/m2 - 24.03 kg/m2 20.02 kg/m2 20.97 kg/m2 20.37 kg/m2      Estimated Daily Nutrient Needs:  Energy Requirements Based On: Kcal/kg  Weight Used for Energy Requirements: Current  Energy (kcal/day): 6070-6370 kcal (30-35 kcal/kg)  Weight Used for Protein Requirements: Current  Protein (g/day): 72-82 g (1.4-1.6 g/kg, cancer)  Method Used for Fluid Requirements: 1 ml/kcal  Fluid (ml/day): 1411-6782 kcal    Nutrition Diagnosis:   Moderate malnutrition, In context of acute illness or injury related to altered GI function, altered GI structure as evidenced by poor intake prior to admission, intake 0-25%    Nutrition Interventions:   Food and/or Nutrient Delivery: Continue current diet, Continue oral nutrition supplement  Nutrition Education/Counseling: No recommendations at this time  Coordination of Nutrition Care: Continue to monitor while inpatient       Goals:  Previous Goal Met: Progressing toward goal(s)  Goals: PO intake 50% or greater, by next RD assessment, other (specify)  Specify Other Goals: weight maintanence +/-0.5 kg within 7 d    Nutrition Monitoring and Evaluation:    Food/Nutrient Intake Outcomes: Food and nutrient intake, Diet advancement/tolerance, Supplement intake  Physical Signs/Symptoms Outcomes: GI status, Nausea/vomiting, Weight    Discharge Planning:    Continue oral nutrition supplement    John Rene RD  Contact: [phone]

## 2022-10-26 NOTE — PROGRESS NOTES
Hospitalist Progress Note    Subjective:   Daily Progress Note: 10/26/2022 3:41 PM    Continued lower extremity edema with pelvic pain    Current Facility-Administered Medications   Medication Dose Route Frequency    meropenem (MERREM) 1 g in 0.9% sodium chloride (MBP/ADV) 50 mL MBP  1 g IntraVENous Q8H    midodrine (PROAMATINE) tablet 5 mg  5 mg Oral TID PRN    fluconazole (DIFLUCAN) tablet 200 mg  200 mg Oral DAILY    heparin 25,000 units in D5W 250 ml infusion  500 Units/hr IntraVENous TITRATE    oxyCODONE IR (ROXICODONE) tablet 5 mg  5 mg Oral Q6H PRN    0.9% sodium chloride infusion 250 mL  250 mL IntraVENous PRN    cromolyn (OPTICROM) 4 % ophthalmic solution 1 Drop  1 Drop Both Eyes DAILY    sodium chloride (NS) flush 5-40 mL  5-40 mL IntraVENous Q8H    sodium chloride (NS) flush 5-40 mL  5-40 mL IntraVENous PRN    acetaminophen (TYLENOL) tablet 650 mg  650 mg Oral Q6H PRN    Or    acetaminophen (TYLENOL) suppository 650 mg  650 mg Rectal Q6H PRN    polyethylene glycol (MIRALAX) packet 17 g  17 g Oral DAILY PRN    ondansetron (ZOFRAN ODT) tablet 4 mg  4 mg Oral Q8H PRN    Or    ondansetron (ZOFRAN) injection 4 mg  4 mg IntraVENous Q6H PRN        Review of Systems  Review of Systems   Constitutional:  Positive for malaise/fatigue. HENT: Negative. Respiratory:  Negative for cough and shortness of breath. Cardiovascular:  Positive for leg swelling. Negative for chest pain. Gastrointestinal:  Positive for abdominal pain. Negative for nausea and vomiting. Genitourinary: Negative. Musculoskeletal: Negative. Skin: Negative. Neurological: Negative. Psychiatric/Behavioral: Negative.             Objective:     Visit Vitals  /65 (BP 1 Location: Left upper arm, BP Patient Position: At rest)   Pulse 83   Temp 98.3 °F (36.8 °C)   Resp 18   Ht 5' 2\" (1.575 m)   Wt 51.7 kg (114 lb)   LMP  (LMP Unknown)   SpO2 100%   BMI 20.85 kg/m²      O2 Device: None (Room air)    Temp (24hrs), Av.5 °F (36.9 °C), Min:97.5 °F (36.4 °C), Max:99.4 °F (37.4 °C)      10/26 0701 - 10/26 1900  In: 200 [P.O.:200]  Out: 350 [Urine:350]  10/24 1901 - 10/26 0700  In: 1565.8 [P.O.:300; I.V.:1265.8]  Out: 1320 [Urine:1320]    Recent Results (from the past 24 hour(s))   PTT    Collection Time: 10/25/22 11:22 AM   Result Value Ref Range    aPTT 35.8 (H) 21.2 - 34.1 sec    aPTT, therapeutic range   82 - 109 sec   PTT    Collection Time: 10/25/22  5:44 PM   Result Value Ref Range    aPTT 38.8 (H) 21.2 - 34.1 sec    aPTT, therapeutic range   82 - 109 sec   CBC WITH AUTOMATED DIFF    Collection Time: 10/26/22 12:13 AM   Result Value Ref Range    WBC 4.1 3.6 - 11.0 K/uL    RBC 2.83 (L) 3.80 - 5.20 M/uL    HGB 7.5 (L) 11.5 - 16.0 g/dL    HCT 24.6 (L) 35.0 - 47.0 %    MCV 86.9 80.0 - 99.0 FL    MCH 26.5 26.0 - 34.0 PG    MCHC 30.5 30.0 - 36.5 g/dL    RDW 16.5 (H) 11.5 - 14.5 %    PLATELET 586 227 - 266 K/uL    MPV 9.4 8.9 - 12.9 FL    NRBC 0.0 0.0  WBC    ABSOLUTE NRBC 0.00 0.00 - 0.01 K/uL    NEUTROPHILS 70 32 - 75 %    LYMPHOCYTES 22 12 - 49 %    MONOCYTES 6 5 - 13 %    EOSINOPHILS 0 0 - 7 %    BASOPHILS 0 0 - 1 %    IMMATURE GRANULOCYTES 2 (H) 0 - 0.5 %    ABS. NEUTROPHILS 2.8 1.8 - 8.0 K/UL    ABS. LYMPHOCYTES 0.9 0.8 - 3.5 K/UL    ABS. MONOCYTES 0.2 0.0 - 1.0 K/UL    ABS. EOSINOPHILS 0.0 0.0 - 0.4 K/UL    ABS. BASOPHILS 0.0 0.0 - 0.1 K/UL    ABS. IMM.  GRANS. 0.1 (H) 0.00 - 0.04 K/UL    DF AUTOMATED     METABOLIC PANEL, COMPREHENSIVE    Collection Time: 10/26/22 12:13 AM   Result Value Ref Range    Sodium 139 136 - 145 mmol/L    Potassium 3.1 (L) 3.5 - 5.1 mmol/L    Chloride 111 (H) 97 - 108 mmol/L    CO2 22 21 - 32 mmol/L    Anion gap 6 5 - 15 mmol/L    Glucose 72 65 - 100 mg/dL    BUN 7 6 - 20 mg/dL    Creatinine 0.54 (L) 0.55 - 1.02 mg/dL    BUN/Creatinine ratio 13 12 - 20      eGFR >60 >60 ml/min/1.73m2    Calcium 8.6 8.5 - 10.1 mg/dL    Bilirubin, total 0.5 0.2 - 1.0 mg/dL    AST (SGOT) 33 15 - 37 U/L    ALT (SGPT) 14 12 - 78 U/L    Alk. phosphatase 87 45 - 117 U/L    Protein, total 7.4 6.4 - 8.2 g/dL    Albumin 1.4 (L) 3.5 - 5.0 g/dL    Globulin 6.0 (H) 2.0 - 4.0 g/dL    A-G Ratio 0.2 (L) 1.1 - 2.2     PTT    Collection Time: 10/26/22 12:14 AM   Result Value Ref Range    aPTT 39.3 (H) 21.2 - 34.1 sec    aPTT, therapeutic range   82 - 109 sec   PTT    Collection Time: 10/26/22  6:29 AM   Result Value Ref Range    aPTT 36.9 (H) 21.2 - 34.1 sec    aPTT, therapeutic range   sec        CT ABD PELV W CONT   Final Result   Rectovaginal fistula is not identified   Extensive bilateral iliac venous thrombus to the level of the IVC filter. Right   lower extremity edema. Cholelithiasis with distended gallbladder   Bilateral hydronephrosis and hydroureter in patient with ileal conduit   Probable small bowel obstruction secondary to adhesion      XR ABD (KUB)   Final Result   1. No significant change in small bowel dilation. Large volume of stool in the   rectum. 2. Cholelithiasis. XR ABD (KUB)   Final Result   Dilated small bowel within the central abdomen is similar to CT from one day   prior. DUPLEX LOWER EXT VENOUS BILAT   Final Result   Addendum (preliminary) 1 of 1      · Thrombus present in the right external iliac vein. · Thrombus present in the right common femoral vein. · Thrombus present in the right femoral vein. · Thrombus present in the right proximal femoral vein. · Thrombus present in the right mid femoral vein. · Thrombus present in the right distal femoral vein. · Thrombus present in the right popliteal vein. · Thrombus present in the right gastrocnemius vein. · Thrombus present in the right peroneal vein. · Thrombus present in the right saphenofemoral junction. · Thrombus present in the left external iliac vein. · Thrombus present in the left common femoral vein. · Thrombus present in the left saphenofemoral junction. · Thrombus present in the left femoral vein.    · Thrombus present in the left proximal femoral vein. · Thrombus present in the left mid femoral vein. · Thrombus present in the left popliteal vein. · Thrombus present in the left distal femoral vein. · Thrombus present in the left gastrocnemius vein. · Thrombus present in the left peroneal vein. This is vascular lab report on Regis Medrano, patient's YOB: 1965   Date of examination: October 20, 2022   Examination: Duplex lower extremity venous bilateral   Technician: Ms. Mandy Johnson   Clinical history: This is  61years old woman with suspected renal vein    thrombosis   Indication: femoral vein thrombosis. Technique: Bilateral leg venous structures examined using venous duplex    ultrasound with 2D grayscale, color-flow and spectral Doppler analysis. Findings:   Right side:   common femoral vein and saphenofemoral junction is not compressible and    there is hypoechoic filling defect noted. common femoral vein shows no venous flow   Distal external iliac vein also not compressible and that there is no    venous flow   Proximal femoral vein is noncompressible and there is no venous flow   Mid femoral vein is not compressible and there is no venous flow   Distal femoral vein is noncompressible and there is no venous flow   Proximal, mid, distal greater saphenous vein is compressible, there is no    filling defect   Distal popliteal vein is not compressible. Proximal popliteal vein is noncompressible and there is no venous flow   Proximal gastrocnemius vein is not compressible   Proximal small saphenous vein is not compressible   Distal, mid, proximal  peroneal vein is not compressible      Left side:   common femoral vein not compressible and there is no venous flow   Saphenofemoral junction is not compressible and there is no venous flow.    External iliac vein also not compressible there is no venous flow   Proximal femoral vein is noncompressible and there is no venous flow   Mid femoral vein is noncompressible and there is no venous flow   Distal femoral vein is not compressible and there is no venous flow   Proximal, mid, distal greater saphenous vein is noncompressible and there    is hypoechoic filling defect noted. Distal popliteal vein is noncompressible   Proximal popliteal vein is noncompressible and there is no venous flow   Proximal gastrocnemius vein is not compressible and there is no venous    flow   Proximal small saphenous vein is noncompressible and there is no venous    flow   Distal, mid, proximal peroneal vein is not compressible. Impression:   1. Right external iliac vein, common femoral vein, femoral vein,    popliteal vein, gastrocnemius vein, small saphenous vein, and peroneal    vein shows acute venous thrombosis   2. Left external iliac vein, common femoral vein, femoral vein, popliteal    vein, gastrocnemius vein, small saphenous vein, peroneal vein, and    saphenofemoral junction and greater saphenous vein shows acute venous    thrombosis. These  extensive bilateral lower extremity deep vein thrombosis notified    to Dr. Jaky Chopra on 10/20/2022 1201 PM.            CT ABD PELV WO CONT   Final Result   Status post cystectomy and hysterectomy with right lower quadrant ileal conduit. Mild bilateral hydroureteronephrosis, evaluation is somewhat limited by the   presence of contrast from previously performed examination. Small bowel   distention with decompressed loops distally concerning for obstruction likely   related to adhesion formation. While this study was done without the use of   intravenous contrast there is questionable right common femoral vein thrombus   with soft tissue edema. CTA CHEST W OR W WO CONT   Final Result   Emphysematous changes. No evidence of pulmonary embolism or acute   abnormality. Cholelithiasis. Bilateral hydronephrosis.  1 cm hypodense lesion   right hepatic lobe cannot be characterized with certainty on the basis of this   examination. Follow-up is recommended to ensure stability. XR CHEST PORT   Final Result   No Acute Disease. XR FOOT LT MIN 3 V   Final Result   Normal study. PHYSICAL EXAM:    Physical Exam  Vitals reviewed. HENT:      Head: Normocephalic and atraumatic. Cardiovascular:      Rate and Rhythm: Normal rate and regular rhythm. Heart sounds: Normal heart sounds. Pulmonary:      Effort: Pulmonary effort is normal.      Breath sounds: Normal breath sounds. Abdominal:      General: Abdomen is flat. Bowel sounds are normal.      Palpations: Abdomen is soft. Comments: Mild tenderness in the suprapubic region   Musculoskeletal:         General: Normal range of motion. Cervical back: Normal range of motion and neck supple. Right lower leg: Edema present. Left lower leg: Edema present. Skin:     General: Skin is warm and dry. Neurological:      General: No focal deficit present. Mental Status: She is alert and oriented to person, place, and time.    Psychiatric:         Mood and Affect: Mood normal.        Data Review    Recent Results (from the past 24 hour(s))   PTT    Collection Time: 10/25/22 11:22 AM   Result Value Ref Range    aPTT 35.8 (H) 21.2 - 34.1 sec    aPTT, therapeutic range   82 - 109 sec   PTT    Collection Time: 10/25/22  5:44 PM   Result Value Ref Range    aPTT 38.8 (H) 21.2 - 34.1 sec    aPTT, therapeutic range   82 - 109 sec   CBC WITH AUTOMATED DIFF    Collection Time: 10/26/22 12:13 AM   Result Value Ref Range    WBC 4.1 3.6 - 11.0 K/uL    RBC 2.83 (L) 3.80 - 5.20 M/uL    HGB 7.5 (L) 11.5 - 16.0 g/dL    HCT 24.6 (L) 35.0 - 47.0 %    MCV 86.9 80.0 - 99.0 FL    MCH 26.5 26.0 - 34.0 PG    MCHC 30.5 30.0 - 36.5 g/dL    RDW 16.5 (H) 11.5 - 14.5 %    PLATELET 885 709 - 587 K/uL    MPV 9.4 8.9 - 12.9 FL    NRBC 0.0 0.0  WBC    ABSOLUTE NRBC 0.00 0.00 - 0.01 K/uL    NEUTROPHILS 70 32 - 75 %    LYMPHOCYTES 22 12 - 49 % MONOCYTES 6 5 - 13 %    EOSINOPHILS 0 0 - 7 %    BASOPHILS 0 0 - 1 %    IMMATURE GRANULOCYTES 2 (H) 0 - 0.5 %    ABS. NEUTROPHILS 2.8 1.8 - 8.0 K/UL    ABS. LYMPHOCYTES 0.9 0.8 - 3.5 K/UL    ABS. MONOCYTES 0.2 0.0 - 1.0 K/UL    ABS. EOSINOPHILS 0.0 0.0 - 0.4 K/UL    ABS. BASOPHILS 0.0 0.0 - 0.1 K/UL    ABS. IMM. GRANS. 0.1 (H) 0.00 - 0.04 K/UL    DF AUTOMATED     METABOLIC PANEL, COMPREHENSIVE    Collection Time: 10/26/22 12:13 AM   Result Value Ref Range    Sodium 139 136 - 145 mmol/L    Potassium 3.1 (L) 3.5 - 5.1 mmol/L    Chloride 111 (H) 97 - 108 mmol/L    CO2 22 21 - 32 mmol/L    Anion gap 6 5 - 15 mmol/L    Glucose 72 65 - 100 mg/dL    BUN 7 6 - 20 mg/dL    Creatinine 0.54 (L) 0.55 - 1.02 mg/dL    BUN/Creatinine ratio 13 12 - 20      eGFR >60 >60 ml/min/1.73m2    Calcium 8.6 8.5 - 10.1 mg/dL    Bilirubin, total 0.5 0.2 - 1.0 mg/dL    AST (SGOT) 33 15 - 37 U/L    ALT (SGPT) 14 12 - 78 U/L    Alk. phosphatase 87 45 - 117 U/L    Protein, total 7.4 6.4 - 8.2 g/dL    Albumin 1.4 (L) 3.5 - 5.0 g/dL    Globulin 6.0 (H) 2.0 - 4.0 g/dL    A-G Ratio 0.2 (L) 1.1 - 2.2     PTT    Collection Time: 10/26/22 12:14 AM   Result Value Ref Range    aPTT 39.3 (H) 21.2 - 34.1 sec    aPTT, therapeutic range   82 - 109 sec   PTT    Collection Time: 10/26/22  6:29 AM   Result Value Ref Range    aPTT 36.9 (H) 21.2 - 34.1 sec    aPTT, therapeutic range   sec        Assessment/Plan:     Principal Problem:    Small bowel obstruction ([address] (10/20/2022)    Active Problems:    Acute blood loss anemia (10/20/2022)      Status post robot-assisted surgical procedure, pelvic exenteration (10/20/2022)      Vaginal cuff cellulitis (10/20/2022)    Hospital course: eDidra Mishra is a 61 y.o. female with PMH of bladder cancer s/p cystectomy, hysterectomy, BSO and ileal conduit diversion, anemia, hep C, and left leg DVT s/p IVC filter years ago. She presented to the ED with chief complaint of weakness and nausea for the past week.  She reports unable to tolerate PO intake, due to profound nausea, abdominal pain, fever, chills, vomiting or diarrhea. In addition, she also reports unable to walk due to weakness and left inner thigh and left foot pain. Denies trauma. She was seen here on 10/13/2022 with urostomy complications, status post ileal conduit and discharged home on Cipro which she completed. In addition, she also reports having vaginal bleed since surgery 2 weeks ago. Which appears to be more related to a fistula with stool present. CT scan of the abdomen pelvis was performed revealing no obvious rectovaginal fistula although there was air present tracking in that direction without any extravasation of contrast.  There was extensive bilateral iliac venous thrombus to the level of the IVC filter with right leg edema. Cholelithiasis with a distended gallbladder was also noted. Bilateral hydronephrosis with hydroureter most likely from the ileal conduit. Probable small bowel obstruction secondary to adhesions also noted. Patient was found to be anemic and received 1 unit of packed red blood cells. Venous duplex revealed extensive clot burden within the right leg and left leg. Urinalysis was consistent with Candida infection. ID consultation. Surgical consultation, Dr. Julio Cesar Liu. UCX grew michell ciferrii, continue fluconazole. IV heparin infusing.   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 # 4 Extensive dvt bilateral lower legs  - recently on anticoagulants that was stopped prior to surgery  - duplex shows extensive clot burden  - hematology following   - already has IVC filter placed  - started on IV hep due to concern of old IVC and chances that she has collateral blood vessels bypassing filter which will increase her risk for PE. IR consultation for possible mechanical thrombectomy       # 5 UTI  - Fluconazole for fungal UTI.  - UCX grew candida ciferrii  - ID following     # 6 Bladder cancer  - Not on chemotherapy currently. - Consulted urology to evaluate for possible post-op complications. - Continue supportive care  - Hematology following     # 7 Emphysema  - not in respiratory distress, continue to monitor      # 8 Ambulatory dysfunction  - secondary to dvt's  - pt/ot as tolerated     # 9 hypotension  - bp remains soft  - prn midodrine  - start gentle IV hydration    10. Rectal/vaginal fistula  Surgery following  Questionable abscess  ID consult  Merrem  IR consult possible recommendation for CT with and without with drains rectal contrast    Patient will require wheelchair and at this point patient's overall mobility is limited to participate in toileting feeding and dressing and will require the use of a wheelchair for functional mobility. Of the use of a manual wheelchair the patient can maneuver the chair independently to perform these functional activities of daily living. Care Plan discussed with: Patient/Family    Total time spent with patient: >35 minutes.

## 2022-10-26 NOTE — PROGRESS NOTES
PHYSICAL THERAPY EVALUATION  Patient: Deidra Mishra (79 y.o. female)  Date: 10/26/2022  Primary Diagnosis: Acute blood loss anemia [D62]  Small bowel obstruction (HCC) [K56.609]  Procedure(s) (LRB):  Endoscopy Of Conduit Retrograde Pyelograms And Bilateral Ureteral Stent Placement (Bilateral) 2 Days Post-Op   Precautions: falls      ASSESSMENT  Pt is a 61 y.o. female admitted on 10/19/2022 for weakness and nausea for past week. Per notes she also reported unable to walk due to weakness and L inner thigh and L foot pain. ; pt currently being treated for acute blood loss anemia, vaginal bleed, SBO, UTI, bladder CA, hx of Dvt, emphysema. Per notes pt with extensive DVT B LE, pt was started on IV heparin 10/22. Cleared for OOB mobility by Martin CORTEZ, and Dr Jose Cuadra. Pt semi-supine upon PT arrival, agreeable to evaluation. Based on the objective data described, the patient presents with generalized weakness, decr functional mobility, decr standing balance, and dizziness with mobility. Pt with >1 personal factors and or comorbidity impacting current POC with evolving clinical presentation 2/2 dizziness/lightheadedness reported with mobility. (See below for objective details and assist levels). Overall performing supine<>sit with CGA-Marcella, Marcella for sit to supine for RLE navigation. At EOB Marcella sit to stand for safety, dizziness with standing noted, with RW able to take lateral steps towards [facility]) with Marcella and cueing on RW navigation. Overall pt tolerated session fair, with dizziness/lightheadedness noted, /73 initially, and 120/70 once back in supine. Pt will benefit from continued skilled PT to address above deficits and return to PLOF. Current PT DC recommendation Inpatient Rehabilitation Facility  once medically appropriate. Other factors to consider for discharge: current mobility, baseline mobility, DME, social support.       PLAN :  Recommendations and Planned Interventions: bed mobility training, transfer training, gait training, therapeutic exercises, neuromuscular re-education, patient and family training/education, and therapeutic activities      Recommend for staff: Encourage HEP in prep for ADLs/mobility    Frequency/Duration: Patient will be followed by physical therapy:  3-5x/week to address goals.     Recommendation for discharge: (in order for the patient to meet his/her long term goals)  [address]     This discharge recommendation:  Has been made in collaboration with the attending provider and/or case management    IF patient discharges home will need the following DME: to be determined (TBD)         SUBJECTIVE:   Patient agreeable to participation in therapy    OBJECTIVE DATA SUMMARY:   HISTORY:    Past Medical History:   Diagnosis Date    Anemia     pt states had recent blood transfusion 2022    Back pain     Cancer ([facility] [address]     bladder    DVT (deep venous thrombosis) (HCC)     and PE, pt denies lung PE , only leg several years ago    Hepatitis C     pt states dx 1 month ago    Liver disease     Presence of IVC filter     pt states several years ago    Rheumatoid arthritis ([address]     Sciatic leg pain     pt states both legs    Uterine fibroid      Past Surgical History:   Procedure Laterality Date    HX APPENDECTOMY  10/03/2022    HX  SECTION      HX GYN  10/03/2022    ROBOT ANTERIOR PELVIC EXENTERATION    HX HYSTERECTOMY  10/03/2022    HX SALPINGO-OOPHORECTOMY Bilateral 10/03/2022    HX UROLOGICAL  09/15/2022    CYSTOSCOPY    HX UROLOGICAL  09/15/2022    URETHRAL DILATION    HX UROLOGICAL  09/15/2022    URETERAL STENT PLACEMENT    HX UROLOGICAL  09/15/2022    TRANSURETHRAL RESECTION OF BLADDER TUMOR >2CM    HX UROLOGICAL Bilateral 09/15/2022    RETROGRADE PYELOGRAM    HX UROLOGICAL  10/03/2022    PELVIC LYMPH NODE DISSECTION    HX UROLOGICAL  10/03/2022    ILEAL CONDUIT URINARY DIVERSION    GA CARDIAC SURG PROCEDURE UNLIST      stent placement Home Situation  Home Environment: Apartment  # Steps to Enter: 12  Rails to Enter: Yes  One/Two Story Residence: One story  Living Alone: Yes  Support Systems: Other Family Member(s)  Patient Expects to be Discharged to[de-identified] Home with home health  Current DME Used/Available at Home: None    EXAMINATION/PRESENTATION/DECISION MAKING:   Critical Behavior:  Neurologic State: Alert  Orientation Level: Oriented to person, Oriented to place, Oriented to time  Cognition: Follows commands  Safety/Judgement: Decreased awareness of environment    Range Of Motion:  AROM: Generally decreased, functional                       Strength:    Strength: Generally decreased, functional (4+/5 DF, able to SLR B)                    Tone & Sensation:                  Sensation: Intact (BLE)               Coordination:     Vision:      Functional Mobility:  Bed Mobility:     Supine to Sit: Contact guard assistance  Sit to Supine: Minimum assistance  Scooting: Contact guard assistance  Transfers:  Sit to Stand: Minimum assistance  Stand to Sit: Minimum assistance                       Balance:   Sitting: Intact; With support  Standing: Impaired; With support  Standing - Static: Fair;Constant support  Standing - Dynamic : Fair;Constant support  Ambulation/Gait Training:                                                        Stairs: Therapeutic Exercises:   Ed on ankle pumps and quad sets in bed    Functional Measure:  [facility] Mobility Inpatient Short Form  How much difficulty does the patient currently have. .. Unable A Lot A Little None   1. Turning over in bed (including adjusting bedclothes, sheets and blankets)? [] 1   [] 2   [x] 3   [] 4   2. Sitting down on and standing up from a chair with arms ( e.g., wheelchair, bedside commode, etc.)   [] 1   [] 2   [x] 3   [] 4   3. Moving from lying on back to sitting on the side of the bed?    [] 1   [] 2   [x] 3   [] 4          How much help from another person does the patient currently need. .. Total A Lot A Little None   4. Moving to and from a bed to a chair (including a wheelchair)? [] 1   [] 2   [x] 3   [] 4   5. Need to walk in hospital room? [] 1   [] 2   [x] 3   [] 4   6. Climbing 3-5 steps with a railing? [] 1   [x] 2   [] 3   [] 4   © 2007, Trustees of [facility] [employer], under license to [employer]. All rights reserved     Score:  Initial: 17/24 Most Recent: X (Date: 10/26/22 )   Interpretation of Tool:  Represents activities that are increasingly more difficult (i.e. Bed mobility, Transfers, Gait). Score 24 23 22-20 19-15 14-10 9-7 6   Modifier CH CI CJ CK CL CM CN         Physical Therapy Evaluation Charge Determination   History Examination Presentation Decision-Making   MEDIUM  Complexity : 1-2 comorbidities / personal factors will impact the outcome/ POC  MEDIUM Complexity : 3 Standardized tests and measures addressing body structure, function, activity limitation and / or participation in recreation  MEDIUM Complexity : Evolving with changing characteristics  Other outcome measures [facility] 6  17/24      Based on the above components, the patient evaluation is determined to be of the following complexity level: MEDIUM    Pain Rating:  No pain reported    Activity Tolerance:   Fair and requires rest breaks    After treatment patient left in no apparent distress:   Bed locked and in lowest position Supine in bed and Call bell within reach and RN updated. GOALS:    Problem: Mobility Impaired (Adult and Pediatric)  Goal: *Acute Goals and Plan of Care (Insert Text)  Description: FUNCTIONAL STATUS PRIOR TO ADMISSION: Patient was independent without use of DME.    HOME SUPPORT PRIOR TO ADMISSION: The patient lived alone with neice to provide assistance. Physical Therapy Goals  Initiated 10/26/2022  1. Patient will move from supine to sit and sit to supine  in bed with modified independence within 7 day(s).     2.  Patient will transfer from bed to chair and chair to bed with modified independence using the least restrictive device within 7 day(s). 3.  Patient will perform sit to stand with modified independence within 7 day(s). 4.  Patient will ambulate with modified independence for 100-150 feet with the least restrictive device within 7 day(s). 5.  Patient will ascend/descend 12 stairs with 1 handrail(s) with modified independence within 7 day(s). Outcome: Not Met       COMMUNICATION/EDUCATION:   The patients plan of care was discussed with: Registered nurse. Patient/family have participated as able in goal setting and plan of care.        Thank you for this referral.  Bridgette Ambrosio, PT, PT   Time Calculation: 18 mins

## 2022-10-26 NOTE — PROGRESS NOTES
[facility] SURGERY PROGRESS NOTES        Chief Complaint: _nausea    History of Present Illness:    Ms. Lottie Inman is a 61y.o. year old * female presents to ER with 2 day history of nausea and poor PO intake. No vomiting. Passing some flatus. She had undergone robotic assisted anterior exenteration by Dr. Dax Jordan. She was recently discharged. However she has not been feeling well. Has been able to take very little due to nausea. In ER CT scan without PO contrast showed some mildly dilated small bowel suggestive of possible bowel obstruction versus ileus. Patient denies any fever or chills or abdominal pain. Passed flatus. Already has IVC filter. On anticoagulation therapy. Tolerating full liquids. She has been experiencing some vaginal drainage that looks greenish & stool like for the past few days which has stopped. No fever or chills. CT with PO & IV contrast reviewed with Dr. Apurva Kc shows some ileus pattern and small fluid collection with air pocket just superior to vaginal cuff. PO contrast has reached the cecum only. No major abscess or overt bowel obstruction.       Past Medical History:   Past Medical History:   Diagnosis Date    Anemia     pt states had recent blood transfusion sept     Back pain     Cancer (HCC)     bladder    DVT (deep venous thrombosis) (HCC)     and PE, pt denies lung PE , only leg several years ago    Hepatitis C     pt states dx 1 month ago    Liver disease     Presence of IVC filter     pt states several years ago    Rheumatoid arthritis ([facility] [address]     Sciatic leg pain     pt states both legs    Uterine fibroid        Past Surgical History:   Past Surgical History:   Procedure Laterality Date    HX APPENDECTOMY  10/03/2022    HX  SECTION      HX GYN  10/03/2022    ROBOT ANTERIOR PELVIC EXENTERATION    HX HYSTERECTOMY  10/03/2022    HX SALPINGO-OOPHORECTOMY Bilateral 10/03/2022    HX UROLOGICAL  09/15/2022    CYSTOSCOPY    HX UROLOGICAL  09/15/2022    URETHRAL DILATION    HX UROLOGICAL  09/15/2022    URETERAL STENT PLACEMENT    HX UROLOGICAL  09/15/2022    TRANSURETHRAL RESECTION OF BLADDER TUMOR >2CM    HX UROLOGICAL Bilateral 09/15/2022    RETROGRADE PYELOGRAM    HX UROLOGICAL  10/03/2022    PELVIC LYMPH NODE DISSECTION    HX UROLOGICAL  10/03/2022    ILEAL CONDUIT URINARY DIVERSION    ID CARDIAC SURG PROCEDURE UNLIST      stent placement        Allergy:  Allergies   Allergen Reactions    Bactrim [Sulfamethoprim] Swelling     Swelling of the lilps    Penicillin V Potassium Swelling       Social History:  reports that she has quit smoking. Her smoking use included cigarettes. She has never used smokeless tobacco. She reports that she does not currently use alcohol. She reports that she does not use drugs.      Family History:  Family History   Problem Relation Age of Onset    Cancer Mother     Lung Cancer Mother     Heart Disease Father     Hypertension Sister     Cancer Maternal Aunt     Breast Cancer Maternal Aunt         Current Medications:  Current Facility-Administered Medications:     meropenem (MERREM) 1 g in 0.9% sodium chloride (MBP/ADV) 50 mL MBP, 1 g, IntraVENous, Q8H, Son Cabral MD, Last Rate: 16.7 mL/hr at 10/25/22 1749, 1 g at 10/25/22 1749    midodrine (PROAMATINE) tablet 5 mg, 5 mg, Oral, TID PRN, Ellis Butler NP, 5 mg at 10/25/22 9938    fluconazole (DIFLUCAN) tablet 200 mg, 200 mg, Oral, DAILY, Son Cabral MD, 200 mg at 10/25/22 1110    heparin 25,000 units in D5W 250 ml infusion, 500 Units/hr, IntraVENous, TITRATE, Teodora Manjarrez MD, Last Rate: 5 mL/hr at 10/25/22 1920, 500 Units/hr at 10/25/22 1920    oxyCODONE IR (ROXICODONE) tablet 5 mg, 5 mg, Oral, Q6H PRN, Ellis Butler NP, 5 mg at 10/25/22 1110    0.9% sodium chloride infusion 250 mL, 250 mL, IntraVENous, PRN, Jazmine Posadas MD    cromolyn (OPTICROM) 4 % ophthalmic solution 1 Drop, 1 Drop, Both Eyes, DAILY, Jazmine Posadas MD, 1 Drop at 10/25/22 1114    sodium chloride (NS) flush 5-40 mL, 5-40 mL, IntraVENous, Q8H, Alissa Posadas MD, 10 mL at 10/25/22 5166    sodium chloride (NS) flush 5-40 mL, 5-40 mL, IntraVENous, PRN, Alissa Posadas MD    acetaminophen (TYLENOL) tablet 650 mg, 650 mg, Oral, Q6H PRN, 650 mg at 10/25/22 1111 **OR** acetaminophen (TYLENOL) suppository 650 mg, 650 mg, Rectal, Q6H PRN, Shari Ramsey MD    polyethylene glycol (MIRALAX) packet 17 g, 17 g, Oral, DAILY PRN, Alissa Posadas MD    ondansetron (ZOFRAN ODT) tablet 4 mg, 4 mg, Oral, Q8H PRN, 4 mg at 10/25/22 1110 **OR** ondansetron (ZOFRAN) injection 4 mg, 4 mg, IntraVENous, Q6H PRN, Alissa Posadas MD     Immunization History: There is no immunization history on file for this patient. Complete    Review of Systems:     Constitutional:  no fever,  no chills,  no sweats, No weakness, No fatigue, No decreased activity. Eye: No recent visual problem, No icterus, No discharge, No double vision. Ear/Nose/Mouth/Throat: No decreased hearing, No ear pain, No nasal congestion, No sore throat. Respiratory: No shortness of breath, No cough, No sputum production, No hemoptysis, No wheezing, No cyanosis. Cardiovascular: No chest pain, No palpitations, No bradycardia, No tachycardia, No peripheral edema, No syncope. Gastrointestinal:  nausea,  No vomiting, No diarrhea, No constipation, No heartburn,  No abdominal pain. Genitourinary: No dysuria, No hematuria, No change in urine stream, No urethral discharge, No lesions. Hematology/Lymphatics: No bruising tendency, No bleeding tendency, No petechiae, No swollen lymph glands. Endocrine: No excessive thirst, No polyuria, No cold intolerance, No heat intolerance, No excessive hunger. Immunologic: Not immunocompromised, No recurrent fevers, No recurrent infections. Musculoskeletal: No back pain, No neck pain, No joint pain, No muscle pain, No claudication, No decreased range of motion, No trauma. Integumentary: No rash, No pruritus, No abrasions.   Neurologic: Alert and oriented X4, No abnormal balance, No headache, No confusion, No numbness, No tingling. Psychiatric: No anxiety, No depression, No nate. Physical Exam:     Vitals & Measurements: Wt Readings from Last 3 Encounters:   10/19/22 51.7 kg (114 lb)   10/13/22 51.3 kg (113 lb)   10/09/22 59.6 kg (131 lb 6.3 oz)     Temp Readings from Last 3 Encounters:   10/25/22 97.5 °F (36.4 °C)   10/17/22 98.7 °F (37.1 °C)   10/13/22 98.3 °F (36.8 °C)     BP Readings from Last 3 Encounters:   10/25/22 107/66   10/17/22 (!) 103/58   10/13/22 116/67     Pulse Readings from Last 3 Encounters:   10/25/22 74   10/17/22 94   10/13/22 100      Ht Readings from Last 3 Encounters:   10/19/22 5' 2\" (1.575 m)   10/13/22 5' 2\" (1.575 m)   10/05/22 5' 2.01\" (1.575 m)          General: well appearing, no acute distress  Head: Normal  Face: Nornal  HEENT: atraumatic, PERRLA, moist mucosa, normal pharynx, normal tonsils and adenoids, normal tongue, no fluid in sinuses  Neck: Trachea midline, no carotid bruit, no masses  Chest: Normal.  Respiratory: Normal chest wall expansion, CTA B, no r/r/w, no rubs  Cardiovascular: RRR, no m/r/g, Normal S1 and S2  Abdomen: Soft, non tender, non-distended, normal bowel sounds in all quadrants, no hepatosplenomegaly, no tympany. Incision scar: well healed, right lower quadrant urostomy+  Genitourinary: No inguinal hernia, normal external gentalia, no renal angle tenderness, no greenish yellow discharge from both vagina & rectum+  Rectal: deferred  Musculoskeletal: normal ROM in upper and lower extremities, No joint swelling.   Integumentary: Warm, dry, and pink, with no rash, purpura, or petechia  Heme/Lymph: No lymphadenopathy, no bruises  Neurological:Cranial Nerves II-XII grossly intact, no gross motor or sensory deficit  Psychiatric: Cooperative with normal mood, affect, and cognition      Laboratory Values:   Recent Results (from the past 24 hour(s))   PTT    Collection Time: 10/24/22 11:30 PM   Result Value Ref Range    aPTT 38.6 (H) 21.2 - 34.1 sec    aPTT, therapeutic range   82 - 109 sec   IRON PROFILE    Collection Time: 10/25/22  5:16 AM   Result Value Ref Range    Iron 12 (L) 35 - 150 ug/dL    TIBC 74 (L) 250 - 450 ug/dL    Iron % saturation 16 (L) 20 - 50 %   VITAMIN B12 & FOLATE    Collection Time: 10/25/22  5:16 AM   Result Value Ref Range    Vitamin B12 482 193 - 986 pg/mL    Folate 19.0 5.0 - 21.0 ng/mL   CBC WITH AUTOMATED DIFF    Collection Time: 10/25/22  5:16 AM   Result Value Ref Range    WBC 3.9 3.6 - 11.0 K/uL    RBC 2.67 (L) 3.80 - 5.20 M/uL    HGB 7.1 (L) 11.5 - 16.0 g/dL    HCT 23.6 (L) 35.0 - 47.0 %    MCV 88.4 80.0 - 99.0 FL    MCH 26.6 26.0 - 34.0 PG    MCHC 30.1 30.0 - 36.5 g/dL    RDW 16.7 (H) 11.5 - 14.5 %    PLATELET 875 197 - 300 K/uL    MPV 9.8 8.9 - 12.9 FL    NRBC 0.0 0.0  WBC    ABSOLUTE NRBC 0.00 0.00 - 0.01 K/uL    NEUTROPHILS 72 32 - 75 %    LYMPHOCYTES 21 12 - 49 %    MONOCYTES 6 5 - 13 %    EOSINOPHILS 0 0 - 7 %    BASOPHILS 0 0 - 1 %    OTHER CELL 1 %    IMMATURE GRANULOCYTES 0 %    ABS. NEUTROPHILS 2.8 1.8 - 8.0 K/UL    ABS. LYMPHOCYTES 0.8 0.8 - 3.5 K/UL    ABS. MONOCYTES 0.2 0.0 - 1.0 K/UL    ABS. EOSINOPHILS 0.0 0.0 - 0.4 K/UL    ABS. BASOPHILS 0.0 0.0 - 0.1 K/UL    ABS. IMM. GRANS. 0.0 K/UL    DF Manual      PLATELET COMMENTS Large Platelets      RBC COMMENTS Anisocytosis  1+       METABOLIC PANEL, COMPREHENSIVE    Collection Time: 10/25/22  5:16 AM   Result Value Ref Range    Sodium 140 136 - 145 mmol/L    Potassium 3.0 (L) 3.5 - 5.1 mmol/L    Chloride 114 (H) 97 - 108 mmol/L    CO2 23 21 - 32 mmol/L    Anion gap 3 (L) 5 - 15 mmol/L    Glucose 82 65 - 100 mg/dL    BUN 5 (L) 6 - 20 mg/dL    Creatinine 0.61 0.55 - 1.02 mg/dL    BUN/Creatinine ratio 8 (L) 12 - 20      eGFR >60 >60 ml/min/1.73m2    Calcium 8.2 (L) 8.5 - 10.1 mg/dL    Bilirubin, total 0.5 0.2 - 1.0 mg/dL    AST (SGOT) 33 15 - 37 U/L    ALT (SGPT) 12 12 - 78 U/L    Alk.  phosphatase 89 45 - 117 U/L Protein, total 7.6 6.4 - 8.2 g/dL    Albumin 1.4 (L) 3.5 - 5.0 g/dL    Globulin 6.2 (H) 2.0 - 4.0 g/dL    A-G Ratio 0.2 (L) 1.1 - 2.2     PTT    Collection Time: 10/25/22  5:16 AM   Result Value Ref Range    aPTT 40.7 (H) 21.2 - 34.1 sec    aPTT, therapeutic range   82 - 109 sec   PROCALCITONIN    Collection Time: 10/25/22  5:16 AM   Result Value Ref Range    Procalcitonin <0.05 (H) 0 ng/mL   C REACTIVE PROTEIN, QT    Collection Time: 10/25/22  5:16 AM   Result Value Ref Range    C-Reactive protein 15.00 (H) 0.00 - 0.60 mg/dL   PTT    Collection Time: 10/25/22 11:22 AM   Result Value Ref Range    aPTT 35.8 (H) 21.2 - 34.1 sec    aPTT, therapeutic range   82 - 109 sec   PTT    Collection Time: 10/25/22  5:44 PM   Result Value Ref Range    aPTT 38.8 (H) 21.2 - 34.1 sec    aPTT, therapeutic range   82 - 109 sec           CT ABD PELV W CONT   Final Result   Rectovaginal fistula is not identified   Extensive bilateral iliac venous thrombus to the level of the IVC filter. Right   lower extremity edema. Cholelithiasis with distended gallbladder   Bilateral hydronephrosis and hydroureter in patient with ileal conduit   Probable small bowel obstruction secondary to adhesion      XR ABD (KUB)   Final Result   1. No significant change in small bowel dilation. Large volume of stool in the   rectum. 2. Cholelithiasis. XR ABD (KUB)   Final Result   Dilated small bowel within the central abdomen is similar to CT from one day   prior. DUPLEX LOWER EXT VENOUS BILAT   Final Result   Addendum (preliminary) 1 of 1      · Thrombus present in the right external iliac vein. · Thrombus present in the right common femoral vein. · Thrombus present in the right femoral vein. · Thrombus present in the right proximal femoral vein. · Thrombus present in the right mid femoral vein. · Thrombus present in the right distal femoral vein. · Thrombus present in the right popliteal vein.    · Thrombus present in the right gastrocnemius vein. · Thrombus present in the right peroneal vein. · Thrombus present in the right saphenofemoral junction. · Thrombus present in the left external iliac vein. · Thrombus present in the left common femoral vein. · Thrombus present in the left saphenofemoral junction. · Thrombus present in the left femoral vein. · Thrombus present in the left proximal femoral vein. · Thrombus present in the left mid femoral vein. · Thrombus present in the left popliteal vein. · Thrombus present in the left distal femoral vein. · Thrombus present in the left gastrocnemius vein. · Thrombus present in the left peroneal vein. This is vascular lab report on [facility], patient's YOB: 1965   Date of examination: October 20, 2022   Examination: Duplex lower extremity venous bilateral   Technician: Ms. Neeru Arzola   Clinical history: This is  61years old woman with suspected renal vein    thrombosis   Indication: femoral vein thrombosis. Technique: Bilateral leg venous structures examined using venous duplex    ultrasound with 2D grayscale, color-flow and spectral Doppler analysis. Findings:   Right side:   common femoral vein and saphenofemoral junction is not compressible and    there is hypoechoic filling defect noted. common femoral vein shows no venous flow   Distal external iliac vein also not compressible and that there is no    venous flow   Proximal femoral vein is noncompressible and there is no venous flow   Mid femoral vein is not compressible and there is no venous flow   Distal femoral vein is noncompressible and there is no venous flow   Proximal, mid, distal greater saphenous vein is compressible, there is no    filling defect   Distal popliteal vein is not compressible.    Proximal popliteal vein is noncompressible and there is no venous flow   Proximal gastrocnemius vein is not compressible   Proximal small saphenous vein is not compressible Distal, mid, proximal  peroneal vein is not compressible      Left side:   common femoral vein not compressible and there is no venous flow   Saphenofemoral junction is not compressible and there is no venous flow. External iliac vein also not compressible there is no venous flow   Proximal femoral vein is noncompressible and there is no venous flow   Mid femoral vein is noncompressible and there is no venous flow   Distal femoral vein is not compressible and there is no venous flow   Proximal, mid, distal greater saphenous vein is noncompressible and there    is hypoechoic filling defect noted. Distal popliteal vein is noncompressible   Proximal popliteal vein is noncompressible and there is no venous flow   Proximal gastrocnemius vein is not compressible and there is no venous    flow   Proximal small saphenous vein is noncompressible and there is no venous    flow   Distal, mid, proximal peroneal vein is not compressible. Impression:   1. Right external iliac vein, common femoral vein, femoral vein,    popliteal vein, gastrocnemius vein, small saphenous vein, and peroneal    vein shows acute venous thrombosis   2. Left external iliac vein, common femoral vein, femoral vein, popliteal    vein, gastrocnemius vein, small saphenous vein, peroneal vein, and    saphenofemoral junction and greater saphenous vein shows acute venous    thrombosis. These  extensive bilateral lower extremity deep vein thrombosis notified    to Dr. Pito Sher on 10/20/2022 1201 PM.            CT ABD PELV WO CONT   Final Result   Status post cystectomy and hysterectomy with right lower quadrant ileal conduit. Mild bilateral hydroureteronephrosis, evaluation is somewhat limited by the   presence of contrast from previously performed examination. Small bowel   distention with decompressed loops distally concerning for obstruction likely   related to adhesion formation.  While this study was done without the use of   intravenous contrast there is questionable right common femoral vein thrombus   with soft tissue edema. CTA CHEST W OR W WO CONT   Final Result   Emphysematous changes. No evidence of pulmonary embolism or acute   abnormality. Cholelithiasis. Bilateral hydronephrosis. 1 cm hypodense lesion   right hepatic lobe cannot be characterized with certainty on the basis of this   examination. Follow-up is recommended to ensure stability. XR CHEST PORT   Final Result   No Acute Disease. XR FOOT LT MIN 3 V   Final Result   Normal study. Assessment:  Ileus, resolving    Nausea, improving     Constipation    DVT    Mild Vaginal drainage may be just some pelvic fluid draining. Plan:    Admission  Diet: soft diet  IV fluids  SCD  IS  Nausea medication  Labs in am  Consult: Dr. Rebekah Glass therapy per Hematologist.     Thank you for the consultation & allowing me to participate in the care of this patient.

## 2022-10-26 NOTE — PROGRESS NOTES
CM reviewed Pt medicals,  [facility] has accepted pt for Wheelchair,  and  BSC. CM following Pt for D/C needs. Per IDR Pt may be here another 48 hours. 3:44    PT recommends a IRF. Met f/f with Pt, discussed D/C planning with her. Pt asked CM to send a referral to [facility]. Pt signed choice letter for [facility], will send a referral, will place on Pt chart.

## 2022-10-26 NOTE — PROGRESS NOTES
Infectious Disease Progress Note           Subjective:   Pt seen and examined at bedside. Doing well, denies new complaints, no acute events since last seen   Objective:   Physical Exam:     Visit Vitals  /69 (BP 1 Location: Left upper arm, BP Patient Position: At rest)   Pulse 87   Temp 100.2 °F (37.9 °C)   Resp 18   Ht 5' 2\" (1.575 m)   Wt 114 lb (51.7 kg)   LMP  (LMP Unknown)   SpO2 99%   BMI 20.85 kg/m²      O2 Device: None (Room air)    Temp (24hrs), Av.7 °F (37.1 °C), Min:97.5 °F (36.4 °C), Max:100.2 °F (37.9 °C)    10/26 0701 - 10/26 1900  In: 200 [P.O.:200]  Out: 350 [Urine:350]   10/24 1901 - 10/26 0700  In: 1565.8 [P.O.:300; I.V.:1265.8]  Out: 1320 [Urine:1320]    General: NAD, NAD, AAO x 4  HEENT: SIMBA, Moist mucosa   Lungs: CTA b/l, decreased at the bases   Heart: S1S2+, RRR, no murmur  Abdo:  Soft, NT, ND, +BS, healed incisional wounds, + urostomy   : Fecal matter coming out of vaginal area  Exts: B/l leg edema   Skin: healed abdominal incisional wounds       Data Review:       Recent Days:  Recent Labs     10/26/22  0013 10/25/22  0516 10/24/22  0645   WBC 4.1 3.9 4.2   HGB 7.5* 7.1* 7.2*   HCT 24.6* 23.6* 23.2*    272 244       Recent Labs     10/26/22  0013 10/25/22  0516   BUN 7 5*   CREA 0.54* 0.61         Lab Results   Component Value Date/Time    C-Reactive protein 15.00 (H) 10/25/2022 05:16 AM       Microbiology     Results       Procedure Component Value Units Date/Time    COVID-19 RAPID TEST [[id number]] Collected: 10/20/22 1435    Order Status: Completed Specimen: Nasopharyngeal Updated: 10/20/22 1503     COVID-19 rapid test Not Detected        Comment: Rapid Abbott ID Now   Rapid NAAT:  The specimen is NEGATIVE for SARS-CoV-2, the novel coronavirus associated with COVID-19.    Negative results should be treated as presumptive and, if inconsistent with clinical signs and symptoms or necessary for patient management, should be tested with an alternative molecular assay. Negative results do not preclude SARS-CoV-2 infection and should not be used as the sole basis for patient management decisions. This test has been authorized by the FDA under   an Emergency Use Authorization (EUA) for use by authorized laboratories. Fact sheet for Healthcare Providers: ConventionUpdate.co.nz Fact sheet for Patients: ConventionUpdate.co.nz   Methodology: Isothermal Nucleic Acid Amplification         MRSA SCREEN - PCR (NASAL) [[id number]] Collected: 10/20/22 1435    Order Status: Completed Specimen: Swab Updated: 10/20/22 1636     MRSA by PCR, Nasal Not Detected       MRSA SCREEN - PCR (NASAL) [[id number]] Collected: 10/20/22 1340    Order Status: Canceled Specimen: Swab     CULTURE, [employer] STAIN [[id number]] Collected: 10/20/22 1340    Order Status: Completed Specimen: Wound from Cervical/vaginal swab Updated: 10/23/22 0741     Special Requests: No Special Requests        GRAM STAIN Rare WBCs seen               1+ apparent Gram Positive Cocci in pairs                  Few apparent Gram Negative Rods           Culture result: Light  Mixed skin heriberto isolated       CULTURE, BODY FLUID Deronda Dubonnet STAIN [[id number]]     Order Status: Canceled Specimen: Body Fluid from Uterine Cul/De/Sac Fluid     CULTURE, URINE [[id number]]  (Abnormal) Collected: 10/20/22 0249    Order Status: Completed Specimen: Urine Updated: 10/23/22 1001     Special Requests: --        No Special Requests  Reflexed from [id number]       [place] Count --        >100,000  colonies/ml       Culture result: Candida ciferrii              Diagnostics   CXR Results  (Last 48 hours)      None            Assessment/Plan   UTI, complicated by ileal conduit. Abnormal UA, Candida Ciferrii isolated from Cx         Remains afebrile w a normal on routine labs         On Fluconazole day # 7/14. Routine labs in the morning     2.  Suspected recto-vaginal fistula, no fistula on CT      Ongoing stool drainage from vaginal area     3. Vaginal cuff cellulitis/suspected abscess on CT       GPC in pair and GNR noted on Gram stain of cervical Cx       Continue on Meropenem day # 3/14     4. Bladder Ca w local invasion: S/p resection on 10/03     5. SBO on admission, exam remains benign, tolerating oral intake      6. B/L LE DVT extensive, s/p IVC filter placement.  Remains on heparin drip       IR consulted for thrombectomy but pt declined procedure     Gunner Sandoval MD    10/26/2022

## 2022-10-26 NOTE — PROGRESS NOTES
UROLOGY Progress Note         [phone]      Daily Progress Note: 10/26/2022      Subjective: The patient is seen for UROLOGIC follow up for  nausea, abdominal pain, and not being able tolerate po intake  She is s/p from post anterior pelvic exenteration including cystectomy, hysterectomy, BSO, incidental appendectomy, ileal conduit urinary diversion on 10/30/2022. She had a rectal injury at the time of surgery which was repaired primarily. Today, she is seen at the bedside alert and oriented, reports feeling better  She still has nausea,able tolerate clear liquids. She still has vaginal brown liquidly discharge, no blood. Ct scan reveled possible bowel obstruction versus ileus.   Problem List:  Patient Active Problem List   Diagnosis Code    History of blood clots Z86.718    Bladder cancer ([facility] [address] C67.9    Anemia due to acute blood loss D62    Hypercalcemia E83.52    Hydronephrosis of right kidney N13.30    Retained ureteral stent Z96.0    Severe protein-calorie malnutrition (HCC) E43    Acute blood loss anemia D62    Small bowel obstruction (HCC) K56.609    Status post robot-assisted surgical procedure, pelvic exenteration Z98.890    Vaginal cuff cellulitis N76.0         Medications reviewed  Current Facility-Administered Medications   Medication Dose Route Frequency    iron sucrose (VENOFER) injection 200 mg  200 mg IntraVENous Q24H    meropenem (MERREM) 1 g in 0.9% sodium chloride (MBP/ADV) 50 mL MBP  1 g IntraVENous Q8H    midodrine (PROAMATINE) tablet 5 mg  5 mg Oral TID PRN    fluconazole (DIFLUCAN) tablet 200 mg  200 mg Oral DAILY    heparin 25,000 units in D5W 250 ml infusion  500 Units/hr IntraVENous TITRATE    oxyCODONE IR (ROXICODONE) tablet 5 mg  5 mg Oral Q6H PRN    0.9% sodium chloride infusion 250 mL  250 mL IntraVENous PRN    cromolyn (OPTICROM) 4 % ophthalmic solution 1 Drop  1 Drop Both Eyes DAILY    sodium chloride (NS) flush 5-40 mL  5-40 mL IntraVENous Q8H    sodium chloride (NS) flush 5-40 mL  5-40 mL IntraVENous PRN    acetaminophen (TYLENOL) tablet 650 mg  650 mg Oral Q6H PRN    Or    acetaminophen (TYLENOL) suppository 650 mg  650 mg Rectal Q6H PRN    polyethylene glycol (MIRALAX) packet 17 g  17 g Oral DAILY PRN    ondansetron (ZOFRAN ODT) tablet 4 mg  4 mg Oral Q8H PRN    Or    ondansetron (ZOFRAN) injection 4 mg  4 mg IntraVENous Q6H PRN       Review of Systems:   Review of Systems   Constitutional: Negative. HENT: Negative. Eyes: Negative. Respiratory: Negative. Cardiovascular:  Positive for leg swelling. Gastrointestinal:  Positive for abdominal pain. Musculoskeletal: Negative. Skin: Negative. Neurological: Negative. Endo/Heme/Allergies: Negative. Psychiatric/Behavioral: Negative. Objective:   Physical Exam  HENT:      Head: Normocephalic. Nose: Nose normal.      Mouth/Throat:      Mouth: Mucous membranes are moist.   Eyes:      Extraocular Movements: Extraocular movements intact. Cardiovascular:      Rate and Rhythm: Normal rate. Pulmonary:      Effort: Pulmonary effort is normal.   Abdominal:      Tenderness: There is abdominal tenderness. Genitourinary:     Comments: Right urostomy, draining clear yellow urine  Vaginal brown liquidly discharge, no blood  Musculoskeletal:      Cervical back: Normal range of motion. Right lower leg: Edema present. Left lower leg: Edema present. Skin:     General: Skin is warm. Neurological:      Motor: Weakness present.    Psychiatric:         Mood and Affect: Mood normal.        Visit Vitals  /69 (BP 1 Location: Left upper arm, BP Patient Position: At rest)   Pulse 87   Temp 100.2 °F (37.9 °C)   Resp 18   Ht 5' 2\" (1.575 m)   Wt 114 lb (51.7 kg)   SpO2 99%   BMI 20.85 kg/m²         Data Review:       Recent Days:  Recent Labs     10/26/22  0013 10/25/22  0516 10/24/22  0645   WBC 4.1 3.9 4.2   HGB 7.5* 7.1* 7.2*   HCT 24.6* 23.6* 23.2*    272 244     Recent Labs 10/26/22  0013 10/25/22  0516    140   K 3.1* 3.0*   * 114*   CO2 22 23   GLU 72 82   BUN 7 5*   CREA 0.54* 0.61   CA 8.6 8.2*   ALB 1.4* 1.4*   TBILI 0.5 0.5   ALT 14 12       24 Hour Results:  Recent Results (from the past 24 hour(s))   CBC WITH AUTOMATED DIFF    Collection Time: 10/26/22 12:13 AM   Result Value Ref Range    WBC 4.1 3.6 - 11.0 K/uL    RBC 2.83 (L) 3.80 - 5.20 M/uL    HGB 7.5 (L) 11.5 - 16.0 g/dL    HCT 24.6 (L) 35.0 - 47.0 %    MCV 86.9 80.0 - 99.0 FL    MCH 26.5 26.0 - 34.0 PG    MCHC 30.5 30.0 - 36.5 g/dL    RDW 16.5 (H) 11.5 - 14.5 %    PLATELET 167 665 - 852 K/uL    MPV 9.4 8.9 - 12.9 FL    NRBC 0.0 0.0  WBC    ABSOLUTE NRBC 0.00 0.00 - 0.01 K/uL    NEUTROPHILS 70 32 - 75 %    LYMPHOCYTES 22 12 - 49 %    MONOCYTES 6 5 - 13 %    EOSINOPHILS 0 0 - 7 %    BASOPHILS 0 0 - 1 %    IMMATURE GRANULOCYTES 2 (H) 0 - 0.5 %    ABS. NEUTROPHILS 2.8 1.8 - 8.0 K/UL    ABS. LYMPHOCYTES 0.9 0.8 - 3.5 K/UL    ABS. MONOCYTES 0.2 0.0 - 1.0 K/UL    ABS. EOSINOPHILS 0.0 0.0 - 0.4 K/UL    ABS. BASOPHILS 0.0 0.0 - 0.1 K/UL    ABS. IMM. GRANS. 0.1 (H) 0.00 - 0.04 K/UL    DF AUTOMATED     METABOLIC PANEL, COMPREHENSIVE    Collection Time: 10/26/22 12:13 AM   Result Value Ref Range    Sodium 139 136 - 145 mmol/L    Potassium 3.1 (L) 3.5 - 5.1 mmol/L    Chloride 111 (H) 97 - 108 mmol/L    CO2 22 21 - 32 mmol/L    Anion gap 6 5 - 15 mmol/L    Glucose 72 65 - 100 mg/dL    BUN 7 6 - 20 mg/dL    Creatinine 0.54 (L) 0.55 - 1.02 mg/dL    BUN/Creatinine ratio 13 12 - 20      eGFR >60 >60 ml/min/1.73m2    Calcium 8.6 8.5 - 10.1 mg/dL    Bilirubin, total 0.5 0.2 - 1.0 mg/dL    AST (SGOT) 33 15 - 37 U/L    ALT (SGPT) 14 12 - 78 U/L    Alk.  phosphatase 87 45 - 117 U/L    Protein, total 7.4 6.4 - 8.2 g/dL    Albumin 1.4 (L) 3.5 - 5.0 g/dL    Globulin 6.0 (H) 2.0 - 4.0 g/dL    A-G Ratio 0.2 (L) 1.1 - 2.2     PTT    Collection Time: 10/26/22 12:14 AM   Result Value Ref Range    aPTT 39.3 (H) 21.2 - 34.1 sec    aPTT, therapeutic range   82 - 109 sec   PTT    Collection Time: 10/26/22  6:29 AM   Result Value Ref Range    aPTT 36.9 (H) 21.2 - 34.1 sec    aPTT, therapeutic range   sec   PTT    Collection Time: 10/26/22  3:23 PM   Result Value Ref Range    aPTT 48.3 (H) 21.2 - 34.1 sec    aPTT, therapeutic range   82 - 109 sec           Assessment/     Patient Active Problem List   Diagnosis Code    History of blood clots Z86.718    Bladder cancer (HCC) C67.9    Anemia due to acute blood loss D62    Hypercalcemia E83.52    Hydronephrosis of right kidney N13.30    Retained ureteral stent Z96.0    Severe protein-calorie malnutrition (HCC) E43    Acute blood loss anemia D62    Small bowel obstruction (HCC) K56.609    Status post robot-assisted surgical procedure, pelvic exenteration Z98.890    Vaginal cuff cellulitis N76.0       Plan: 1. Bladder cancer status post cystectomy 10/3/2022. Oncology on board to discuss chemotherapy    Bilateral hydronephrosis- asymptomatic, no intervention  2. UTI  Urine culture positive for Candida Ciferrii   Po fluconazole and IV meropenum    3. Ileus vs partial small bowel obstruction   Poor appetite  Followed by general surgery    4 Vaginal discharge   No rectovaginal fistula  Patient still has vaginal brown liquidly discharge, no blood.     5.DVT  IVC filter, on heparin gtt      Care Plan discussed with: Dr. Tanya Booth, Attending Physician      Woo Bailey NP

## 2022-10-26 NOTE — PROGRESS NOTES
Educated to use call bell for assistance to prevent falls and for pain management. Patient verbalized understanding.

## 2022-10-26 NOTE — PROGRESS NOTES
Patient returned from IR reported to writer that patient refused procedure and that the MD would contact the niece to explain procedure per patient's request.  Patient to be NPO after midnight tonight, patient was educated and verbalized understanding. Writer was contacted by lab at approx 240 pm stating patient's APTT had 2 ordered times for 2pm and 5pm.  Writer explained that APTT was to be drawn at 2 pm today.

## 2022-10-27 ENCOUNTER — APPOINTMENT (OUTPATIENT)
Dept: INTERVENTIONAL RADIOLOGY/VASCULAR | Age: 57
DRG: 231 | End: 2022-10-27
Attending: PHYSICIAN ASSISTANT
Payer: MEDICAID

## 2022-10-27 LAB
ALBUMIN SERPL-MCNC: 1.4 G/DL (ref 3.5–5)
ALBUMIN/GLOB SERPL: 0.2 {RATIO} (ref 1.1–2.2)
ALP SERPL-CCNC: 93 U/L (ref 45–117)
ALT SERPL-CCNC: 8 U/L (ref 12–78)
ANION GAP SERPL CALC-SCNC: 7 MMOL/L (ref 5–15)
APTT PPP: 25.3 SEC (ref 21.2–34.1)
APTT PPP: 50 SEC (ref 21.2–34.1)
APTT PPP: 86.4 SEC (ref 21.2–34.1)
AST SERPL W P-5'-P-CCNC: 29 U/L (ref 15–37)
BASOPHILS # BLD: 0 K/UL (ref 0–0.1)
BASOPHILS NFR BLD: 0 % (ref 0–1)
BILIRUB SERPL-MCNC: 0.6 MG/DL (ref 0.2–1)
BUN SERPL-MCNC: 5 MG/DL (ref 6–20)
BUN/CREAT SERPL: 10 (ref 12–20)
CA-I BLD-MCNC: 8.7 MG/DL (ref 8.5–10.1)
CHLORIDE SERPL-SCNC: 112 MMOL/L (ref 97–108)
CO2 SERPL-SCNC: 23 MMOL/L (ref 21–32)
CREAT SERPL-MCNC: 0.52 MG/DL (ref 0.55–1.02)
DIFFERENTIAL METHOD BLD: ABNORMAL
EOSINOPHIL # BLD: 0 K/UL (ref 0–0.4)
EOSINOPHIL NFR BLD: 0 % (ref 0–7)
ERYTHROCYTE [DISTWIDTH] IN BLOOD BY AUTOMATED COUNT: 16.8 % (ref 11.5–14.5)
GLOBULIN SER CALC-MCNC: 6.3 G/DL (ref 2–4)
GLUCOSE SERPL-MCNC: 77 MG/DL (ref 65–100)
HCT VFR BLD AUTO: 24.9 % (ref 35–47)
HGB BLD-MCNC: 7.7 G/DL (ref 11.5–16)
IMM GRANULOCYTES # BLD AUTO: 0 K/UL
IMM GRANULOCYTES NFR BLD AUTO: 0 %
LYMPHOCYTES # BLD: 0.5 K/UL (ref 0.8–3.5)
LYMPHOCYTES NFR BLD: 12 % (ref 12–49)
MCH RBC QN AUTO: 27 PG (ref 26–34)
MCHC RBC AUTO-ENTMCNC: 30.9 G/DL (ref 30–36.5)
MCV RBC AUTO: 87.4 FL (ref 80–99)
METAMYELOCYTES NFR BLD MANUAL: 3 %
MONOCYTES # BLD: 0.4 K/UL (ref 0–1)
MONOCYTES NFR BLD: 11 % (ref 5–13)
MYELOCYTES NFR BLD MANUAL: 1 %
NEUTS BAND NFR BLD MANUAL: 6 % (ref 0–6)
NEUTS SEG # BLD: 2.9 K/UL (ref 1.8–8)
NEUTS SEG NFR BLD: 66 % (ref 32–75)
NRBC # BLD: 0 K/UL (ref 0–0.01)
NRBC BLD-RTO: 0 PER 100 WBC
OTHER CELLS NFR BLD MANUAL: 1 %
PLATELET # BLD AUTO: 282 K/UL (ref 150–400)
PMV BLD AUTO: 9.5 FL (ref 8.9–12.9)
POTASSIUM SERPL-SCNC: 3.4 MMOL/L (ref 3.5–5.1)
PROT SERPL-MCNC: 7.7 G/DL (ref 6.4–8.2)
RBC # BLD AUTO: 2.85 M/UL (ref 3.8–5.2)
RBC MORPH BLD: ABNORMAL
SODIUM SERPL-SCNC: 142 MMOL/L (ref 136–145)
THERAPEUTIC RANGE,PTTT: ABNORMAL SEC (ref 82–109)
THERAPEUTIC RANGE,PTTT: ABNORMAL SEC (ref 82–109)
THERAPEUTIC RANGE,PTTT: NORMAL SEC (ref 82–109)
WBC # BLD AUTO: 4 K/UL (ref 3.6–11)
WBC MORPH BLD: ABNORMAL

## 2022-10-27 PROCEDURE — 74011000258 HC RX REV CODE- 258: Performed by: INTERNAL MEDICINE

## 2022-10-27 PROCEDURE — 65270000029 HC RM PRIVATE

## 2022-10-27 PROCEDURE — 37187 VENOUS MECH THROMBECTOMY: CPT

## 2022-10-27 PROCEDURE — 99153 MOD SED SAME PHYS/QHP EA: CPT

## 2022-10-27 PROCEDURE — 74011250636 HC RX REV CODE- 250/636: Performed by: STUDENT IN AN ORGANIZED HEALTH CARE EDUCATION/TRAINING PROGRAM

## 2022-10-27 PROCEDURE — 99232 SBSQ HOSP IP/OBS MODERATE 35: CPT | Performed by: INTERNAL MEDICINE

## 2022-10-27 PROCEDURE — 74011250637 HC RX REV CODE- 250/637: Performed by: NURSE PRACTITIONER

## 2022-10-27 PROCEDURE — 74011250636 HC RX REV CODE- 250/636: Performed by: INTERNAL MEDICINE

## 2022-10-27 PROCEDURE — 74011250637 HC RX REV CODE- 250/637: Performed by: INTERNAL MEDICINE

## 2022-10-27 PROCEDURE — 74011000250 HC RX REV CODE- 250: Performed by: INTERNAL MEDICINE

## 2022-10-27 PROCEDURE — C1887 CATHETER, GUIDING: HCPCS

## 2022-10-27 PROCEDURE — 74011000636 HC RX REV CODE- 636: Performed by: INTERNAL MEDICINE

## 2022-10-27 PROCEDURE — 80053 COMPREHEN METABOLIC PANEL: CPT

## 2022-10-27 PROCEDURE — 85730 THROMBOPLASTIN TIME PARTIAL: CPT

## 2022-10-27 PROCEDURE — 99152 MOD SED SAME PHYS/QHP 5/>YRS: CPT

## 2022-10-27 PROCEDURE — 36415 COLL VENOUS BLD VENIPUNCTURE: CPT

## 2022-10-27 PROCEDURE — 85025 COMPLETE CBC W/AUTO DIFF WBC: CPT

## 2022-10-27 RX ORDER — FENTANYL CITRATE 50 UG/ML
25-100 INJECTION, SOLUTION INTRAMUSCULAR; INTRAVENOUS
Status: DISCONTINUED | OUTPATIENT
Start: 2022-10-27 | End: 2022-10-30

## 2022-10-27 RX ORDER — MIDAZOLAM HYDROCHLORIDE 1 MG/ML
.5-2 INJECTION, SOLUTION INTRAMUSCULAR; INTRAVENOUS
Status: DISCONTINUED | OUTPATIENT
Start: 2022-10-27 | End: 2022-10-30

## 2022-10-27 RX ORDER — HEPARIN SODIUM 1000 [USP'U]/ML
2000 INJECTION, SOLUTION INTRAVENOUS; SUBCUTANEOUS
Status: DISCONTINUED | OUTPATIENT
Start: 2022-10-27 | End: 2022-11-07

## 2022-10-27 RX ADMIN — SODIUM CHLORIDE, PRESERVATIVE FREE 10 ML: 5 INJECTION INTRAVENOUS at 14:06

## 2022-10-27 RX ADMIN — MIDAZOLAM 1 MG: 1 INJECTION INTRAMUSCULAR; INTRAVENOUS at 12:13

## 2022-10-27 RX ADMIN — MEROPENEM 1 G: 1 INJECTION, POWDER, FOR SOLUTION INTRAVENOUS at 16:30

## 2022-10-27 RX ADMIN — FENTANYL CITRATE 50 MCG: 50 INJECTION, SOLUTION INTRAMUSCULAR; INTRAVENOUS at 11:50

## 2022-10-27 RX ADMIN — FENTANYL CITRATE 50 MCG: 50 INJECTION, SOLUTION INTRAMUSCULAR; INTRAVENOUS at 11:53

## 2022-10-27 RX ADMIN — MIDAZOLAM 1 MG: 1 INJECTION INTRAMUSCULAR; INTRAVENOUS at 11:53

## 2022-10-27 RX ADMIN — OXYCODONE 5 MG: 5 TABLET ORAL at 05:46

## 2022-10-27 RX ADMIN — CROMOLYN SODIUM 1 DROP: 40 SOLUTION/ DROPS OPHTHALMIC at 14:07

## 2022-10-27 RX ADMIN — SODIUM CHLORIDE, PRESERVATIVE FREE 10 ML: 5 INJECTION INTRAVENOUS at 16:30

## 2022-10-27 RX ADMIN — MEROPENEM 1 G: 1 INJECTION, POWDER, FOR SOLUTION INTRAVENOUS at 09:15

## 2022-10-27 RX ADMIN — IRON SUCROSE 200 MG: 20 INJECTION, SOLUTION INTRAVENOUS at 14:06

## 2022-10-27 RX ADMIN — HEPARIN SODIUM 500 UNITS/HR: 10000 INJECTION, SOLUTION INTRAVENOUS at 09:06

## 2022-10-27 RX ADMIN — SODIUM CHLORIDE, PRESERVATIVE FREE 10 ML: 5 INJECTION INTRAVENOUS at 06:39

## 2022-10-27 RX ADMIN — MIDAZOLAM 1 MG: 1 INJECTION INTRAMUSCULAR; INTRAVENOUS at 11:50

## 2022-10-27 RX ADMIN — OXYCODONE 5 MG: 5 TABLET ORAL at 14:06

## 2022-10-27 RX ADMIN — FLUCONAZOLE 200 MG: 100 TABLET ORAL at 09:15

## 2022-10-27 RX ADMIN — HEPARIN SODIUM 2000 UNITS: 1000 INJECTION, SOLUTION INTRAVENOUS; SUBCUTANEOUS at 12:23

## 2022-10-27 RX ADMIN — IOPAMIDOL 220 ML: 755 INJECTION, SOLUTION INTRAVENOUS at 13:06

## 2022-10-27 RX ADMIN — HEPARIN SODIUM 500 UNITS/HR: 10000 INJECTION, SOLUTION INTRAVENOUS at 14:55

## 2022-10-27 RX ADMIN — FENTANYL CITRATE 50 MCG: 50 INJECTION, SOLUTION INTRAMUSCULAR; INTRAVENOUS at 12:13

## 2022-10-27 NOTE — PROGRESS NOTES
Physician Progress Note      [place]  CSN #:                  [id number]  :                       1965  ADMIT DATE:       10/19/2022 9:52 PM  DISCH DATE:  RESPONDING  PROVIDER #:        NICOL MICHAEL PA-C          QUERY TEXT:    Pt admitted SBO . Noted documentation of Moderate malnutrition in 10/26 Dietician consult note. If possible, please document in progress notes and discharge summary:      The medical record reflects the following:  Risk Factors: 61year old female, poor appetite, bladder cancer, BMI 20.8, Albumin 1.4  Clinical Indicators:  Dietician note - Moderate Malnutrition, Acute illness  Energy Intake:  75% or less of est energy req for 7 or more days  Weight Loss:  No significant weight loss (3.5 % loss x 30d)  Body Fat Loss:  Mild body fat loss, Orbital, Buccal region  Muscle Mass Loss:  No significant muscle mass loss,  Fluid Accumulation:  Moderate to severe, Extremities  -Moderate malnutrition, In context of acute illness or injury related to altered GI function, altered GI structure as evidenced by poor intake prior to admission, intake 0-25%  Treatment: 1. Continue diet as ordered  2. Continue supplement as ordered  3. Add magic cup @ dinner  4. Monitor & record po intake/supplement, Bms in I/O's      Please email [email or web address] with any questions  Options provided:  -- moderate malnutrition confirmed present on admission  -- moderate malnutrition ruled out  -- Other - I will add my own diagnosis  -- Disagree - Not applicable / Not valid  -- Disagree - Clinically unable to determine / Unknown  -- Refer to Clinical Documentation Reviewer    PROVIDER RESPONSE TEXT:    The diagnosis of moderate malnutrition was confirmed as present on admission. Query created by:  Ilda Moy on 10/27/2022 6:48 AM      Electronically signed by:  Omar Nelson PA-C 10/27/2022 10:40 AM

## 2022-10-27 NOTE — PROGRESS NOTES
OT evaluation order received and acknowledged. OT evaluation attempted at [phone] however per medical chart, pt has recently returned from procedure and is required to lay flat x4 hours. Will hold OT at this time, continue to follow and re-attempt OT eval at a later time as appropriate. Thank you.

## 2022-10-27 NOTE — PROGRESS NOTES
Infectious Disease Progress Note           Subjective:   Assessed pt at bedside, denies new complaints, no acute events since last seen, remains stable   Objective:   Physical Exam:     Visit Vitals  /75 (BP 1 Location: Left upper arm, BP Patient Position: At rest;Supine)   Pulse 100   Temp 97.7 °F (36.5 °C)   Resp 18   Ht 5' 2\" (1.575 m)   Wt 114 lb (51.7 kg)   LMP  (LMP Unknown)   SpO2 96%   BMI 20.85 kg/m²      O2 Device: None (Room air)    Temp (24hrs), Av.4 °F (36.9 °C), Min:97.7 °F (36.5 °C), Max:99.6 °F (37.6 °C)    No intake/output data recorded. 10/25 190 - 10/27 0700  In: 300 [P.O.:300]  Out: 675 [Urine:675]    General: NAD, NAD, AAO x 4  HEENT: SIMBA, Moist mucosa   Lungs: CTA b/l, decreased at the bases   Heart: S1S2+, RRR, no murmur  Abdo:  Soft, NT, ND, +BS, healed incisional wounds, + urostomy   : Fecal matter coming out of vaginal area  Exts: B/l leg edema   Skin: healed abdominal incisional wounds     Data Review:       Recent Days:  Recent Labs     10/27/22  0621 10/26/22  0013 10/25/22  0516   WBC 4.0 4.1 3.9   HGB 7.7* 7.5* 7.1*   HCT 24.9* 24.6* 23.6*    281 272       Recent Labs     10/27/22  0621 10/26/22  0013 10/25/22  0516   BUN 5* 7 5*   CREA 0.52* 0.54* 0.61         Lab Results   Component Value Date/Time    C-Reactive protein 15.00 (H) 10/25/2022 05:16 AM       Microbiology     Results       Procedure Component Value Units Date/Time    COVID-19 RAPID TEST [[id number]] Collected: 10/20/22 4919    Order Status: Completed Specimen: Nasopharyngeal Updated: 10/20/22 1503     COVID-19 rapid test Not Detected        Comment: Rapid Abbott ID Now   Rapid NAAT:  The specimen is NEGATIVE for SARS-CoV-2, the novel coronavirus associated with COVID-19. Negative results should be treated as presumptive and, if inconsistent with clinical signs and symptoms or necessary for patient management, should be tested with an alternative molecular assay.  Negative results do not preclude SARS-CoV-2 infection and should not be used as the sole basis for patient management decisions. This test has been authorized by the FDA under   an Emergency Use Authorization (EUA) for use by authorized laboratories. Fact sheet for Healthcare Providers: ConventionUpdate.co.nz Fact sheet for Patients: ConventionUpdate.co.nz   Methodology: Isothermal Nucleic Acid Amplification         MRSA SCREEN - PCR (NASAL) [[id number]] Collected: 10/20/22 1435    Order Status: Completed Specimen: Swab Updated: 10/20/22 1636     MRSA by PCR, Nasal Not Detected       MRSA SCREEN - PCR (NASAL) [[id number]] Collected: 10/20/22 1340    Order Status: Canceled Specimen: Swab     CULTURE, Agustín Reas STAIN [[id number]] Collected: 10/20/22 1340    Order Status: Completed Specimen: Wound from Cervical/vaginal swab Updated: 10/23/22 0741     Special Requests: No Special Requests        GRAM STAIN Rare WBCs seen               1+ apparent Gram Positive Cocci in pairs                  Few apparent Gram Negative Rods           Culture result: Light  Mixed skin heriberto isolated       CULTURE, BODY FLUID Lisa Michael STAIN [[id number]]     Order Status: Canceled Specimen: Body Fluid from Uterine Cul/De/Sac Fluid     CULTURE, URINE [[id number]]  (Abnormal) Collected: 10/20/22 0249    Order Status: Completed Specimen: Urine Updated: 10/23/22 1001     Special Requests: --        No Special Requests  Reflexed from [id number]       [place] Count --        >100,000  colonies/ml       Culture result: Candida ciferrii              Diagnostics   CXR Results  (Last 48 hours)      None            Assessment/Plan   UTI, complicated by ileal conduit. Abnormal UA, Candida Ciferrii isolated from Cx         Remains afebrile w a normal on routine labs         On Fluconazole day # 8/14. Routine labs in the morning     2. Suspected recto-vaginal fistula, no fistula on CT     3.  Vaginal cuff cellulitis/suspected abscess on CT       Denies pelvic pain or discomfort       GPC in pair and GNR noted on Gram stain of cervical Cx       Continue on Meropenem day # 4/14     4. Bladder Ca w local invasion: S/p resection on 10/03     5. SBO on admission, exam remains benign, tolerating oral intake      6. B/L LE DVT extensive, IVC filter in situ, On Heparin drip       Underwent thrombectomy by IR today (10/27).      Marcos Leach MD    10/27/2022

## 2022-10-27 NOTE — PROGRESS NOTES
Heparin was held for procedure this am restarted at 1455 at current rate restarted at 1455. Repeat APPT in 6 hours at 2100.

## 2022-10-27 NOTE — PROGRESS NOTES
Attempted PT at 1100, pt currently transferring off the floor. Will continue to monitor and attempt at a later. Thank you.

## 2022-10-27 NOTE — PROGRESS NOTES
UROLOGY Progress Note         [phone]      Daily Progress Note: 10/27/2022      Subjective: The patient is seen for UROLOGIC follow up for follow up for  nausea, abdominal pain, and not being able tolerate po intake    She is s/p from post anterior pelvic exenteration including cystectomy, hysterectomy, BSO, incidental appendectomy, ileal conduit urinary diversion on 10/30/2022. She had a rectal injury at the time of surgery which was repaired primarily  She still has nausea,able tolerate clear liquids, reports was able to have only 1/2 of bottle of ensure yesterday. She has no appetite.   Reports less vaginal discharge  She still has vaginal brown liquidly discharge    Problem List:  Patient Active Problem List   Diagnosis Code    History of blood clots Z86.718    Bladder cancer ([facility] [address] C67.9    Anemia due to acute blood loss D62    Hypercalcemia E83.52    Hydronephrosis of right kidney N13.30    Retained ureteral stent Z96.0    Severe protein-calorie malnutrition (HCC) E43    Acute blood loss anemia D62    Small bowel obstruction (HCC) K56.609    Status post robot-assisted surgical procedure, pelvic exenteration Z98.890    Vaginal cuff cellulitis N76.0         Medications reviewed  Current Facility-Administered Medications   Medication Dose Route Frequency    fentaNYL citrate (PF) injection  mcg   mcg IntraVENous Rad Multiple    midazolam (VERSED) injection 0.5-2 mg  0.5-2 mg IntraVENous Rad Multiple    heparin (porcine) 1,000 unit/mL injection 2,000 Units  2,000 Units IntraVENous Rad Multiple    iron sucrose (VENOFER) injection 200 mg  200 mg IntraVENous Q24H    meropenem (MERREM) 1 g in 0.9% sodium chloride (MBP/ADV) 50 mL MBP  1 g IntraVENous Q8H    midodrine (PROAMATINE) tablet 5 mg  5 mg Oral TID PRN    fluconazole (DIFLUCAN) tablet 200 mg  200 mg Oral DAILY    [Held by provider] heparin 25,000 units in D5W 250 ml infusion  500 Units/hr IntraVENous TITRATE    oxyCODONE IR (ROXICODONE) tablet 5 mg  5 mg Oral Q6H PRN    0.9% sodium chloride infusion 250 mL  250 mL IntraVENous PRN    cromolyn (OPTICROM) 4 % ophthalmic solution 1 Drop  1 Drop Both Eyes DAILY    sodium chloride (NS) flush 5-40 mL  5-40 mL IntraVENous Q8H    sodium chloride (NS) flush 5-40 mL  5-40 mL IntraVENous PRN    acetaminophen (TYLENOL) tablet 650 mg  650 mg Oral Q6H PRN    Or    acetaminophen (TYLENOL) suppository 650 mg  650 mg Rectal Q6H PRN    polyethylene glycol (MIRALAX) packet 17 g  17 g Oral DAILY PRN    ondansetron (ZOFRAN ODT) tablet 4 mg  4 mg Oral Q8H PRN    Or    ondansetron (ZOFRAN) injection 4 mg  4 mg IntraVENous Q6H PRN       Review of Systems:   Review of Systems   Constitutional: Negative. HENT: Negative. Eyes: Negative. Respiratory: Negative. Cardiovascular:  Positive for leg swelling. Gastrointestinal:  Positive for abdominal pain. Musculoskeletal: Negative. Skin: Negative. Neurological: Negative. Endo/Heme/Allergies: Negative. Psychiatric/Behavioral: Negative. Objective:   HENT:      Head: Normocephalic. Nose: Nose normal.      Mouth/Throat:      Mouth: Mucous membranes are moist.   Eyes:      Extraocular Movements: Extraocular movements intact. Cardiovascular:      Rate and Rhythm: Normal rate. Pulmonary:      Effort: Pulmonary effort is normal.   Abdominal:      Tenderness: There is abdominal tenderness. Genitourinary:     Comments: Right urostomy, draining clear yellow urine  Vaginal brown liquidly discharge, no blood  Musculoskeletal:      Cervical back: Normal range of motion. Right lower leg: Edema present. Left lower leg: Edema present. Skin:     General: Skin is warm. Neurological:      Motor: Weakness present.    Psychiatric:         Mood and Affect: Mood normal.        Visit Vitals  /75 (BP 1 Location: Left upper arm, BP Patient Position: At rest;Supine)   Pulse 100   Temp 97.7 °F (36.5 °C)   Resp 18   Ht 5' 2\" (1.575 m)   Wt 114 lb (51.7 kg)   SpO2 96%   BMI 20.85 kg/m²         Data Review:       Recent Days:  Recent Labs     10/27/22  0621 10/26/22  0013 10/25/22  0516   WBC 4.0 4.1 3.9   HGB 7.7* 7.5* 7.1*   HCT 24.9* 24.6* 23.6*    281 272     Recent Labs     10/27/22  0621 10/26/22  0013 10/25/22  0516    139 140   K 3.4* 3.1* 3.0*   * 111* 114*   CO2 23 22 23   GLU 77 72 82   BUN 5* 7 5*   CREA 0.52* 0.54* 0.61   CA 8.7 8.6 8.2*   ALB 1.4* 1.4* 1.4*   TBILI 0.6 0.5 0.5   ALT 8* 14 12       24 Hour Results:  Recent Results (from the past 24 hour(s))   PTT    Collection Time: 10/26/22 10:36 PM   Result Value Ref Range    aPTT 61.0 (H) 21.2 - 34.1 sec    aPTT, therapeutic range   82 - 109 sec   CBC WITH AUTOMATED DIFF    Collection Time: 10/27/22  6:21 AM   Result Value Ref Range    WBC 4.0 3.6 - 11.0 K/uL    RBC 2.85 (L) 3.80 - 5.20 M/uL    HGB 7.7 (L) 11.5 - 16.0 g/dL    HCT 24.9 (L) 35.0 - 47.0 %    MCV 87.4 80.0 - 99.0 FL    MCH 27.0 26.0 - 34.0 PG    MCHC 30.9 30.0 - 36.5 g/dL    RDW 16.8 (H) 11.5 - 14.5 %    PLATELET 057 607 - 376 K/uL    MPV 9.5 8.9 - 12.9 FL    NRBC 0.0 0.0  WBC    ABSOLUTE NRBC 0.00 0.00 - 0.01 K/uL    NEUTROPHILS 66 32 - 75 %    BAND NEUTROPHILS 6 0 - 6 %    LYMPHOCYTES 12 12 - 49 %    MONOCYTES 11 5 - 13 %    EOSINOPHILS 0 0 - 7 %    BASOPHILS 0 0 - 1 %    METAMYELOCYTES 3 (H) 0 %    MYELOCYTES 1 (H) 0 %    OTHER CELL 1 %    IMMATURE GRANULOCYTES 0 %    ABS. NEUTROPHILS 2.9 1.8 - 8.0 K/UL    ABS. LYMPHOCYTES 0.5 (L) 0.8 - 3.5 K/UL    ABS. MONOCYTES 0.4 0.0 - 1.0 K/UL    ABS. EOSINOPHILS 0.0 0.0 - 0.4 K/UL    ABS. BASOPHILS 0.0 0.0 - 0.1 K/UL    ABS. IMM.  GRANS. 0.0 K/UL    DF Manual      RBC COMMENTS Anisocytosis  1+        WBC COMMENTS Toxic Granulation     METABOLIC PANEL, COMPREHENSIVE    Collection Time: 10/27/22  6:21 AM   Result Value Ref Range    Sodium 142 136 - 145 mmol/L    Potassium 3.4 (L) 3.5 - 5.1 mmol/L    Chloride 112 (H) 97 - 108 mmol/L    CO2 23 21 - 32 mmol/L Anion gap 7 5 - 15 mmol/L    Glucose 77 65 - 100 mg/dL    BUN 5 (L) 6 - 20 mg/dL    Creatinine 0.52 (L) 0.55 - 1.02 mg/dL    BUN/Creatinine ratio 10 (L) 12 - 20      eGFR >60 >60 ml/min/1.73m2    Calcium 8.7 8.5 - 10.1 mg/dL    Bilirubin, total 0.6 0.2 - 1.0 mg/dL    AST (SGOT) 29 15 - 37 U/L    ALT (SGPT) 8 (L) 12 - 78 U/L    Alk. phosphatase 93 45 - 117 U/L    Protein, total 7.7 6.4 - 8.2 g/dL    Albumin 1.4 (L) 3.5 - 5.0 g/dL    Globulin 6.3 (H) 2.0 - 4.0 g/dL    A-G Ratio 0.2 (L) 1.1 - 2.2     PTT    Collection Time: 10/27/22  6:21 AM   Result Value Ref Range    aPTT 50.0 (H) 21.2 - 34.1 sec    aPTT, therapeutic range   82 - 109 sec   PTT    Collection Time: 10/27/22 10:48 AM   Result Value Ref Range    aPTT 25.3 21.2 - 34.1 sec    aPTT, therapeutic range   82 - 109 sec           Assessment/     Patient Active Problem List   Diagnosis Code    History of blood clots Z86.718    Bladder cancer (HCC) C67.9    Anemia due to acute blood loss D62    Hypercalcemia E83.52    Hydronephrosis of right kidney N13.30    Retained ureteral stent Z96.0    Severe protein-calorie malnutrition (HCC) E43    Acute blood loss anemia D62    Small bowel obstruction (HCC) K56.609    Status post robot-assisted surgical procedure, pelvic exenteration Z98.890    Vaginal cuff cellulitis N76.0       Plan: 1. Bladder cancer status post cystectomy 10/3/2022. Oncology on board to discuss chemotherapy     Bilateral hydronephrosis- asymptomatic, no intervention  2. UTI  Urine culture positive for Candida Ciferrii   Po fluconazole and IV meropenum     3. Ileus vs partial small bowel obstruction   Poor appetite, still has mild nausea  Spoke to RD today about patients dietary needs  Patient was npo today  Re-assess nutritional status  Followed by general surgery     4 Vaginal discharge   No rectovaginal fistula  Patient still has vaginal brown liquidly discharge   5. DVT  She is post bilateral DVT thrombectomy today   IVC filter, on heparin gtt        Care Plan discussed with: [facility], Attending Physician      Sandra Nam NP

## 2022-10-27 NOTE — PROGRESS NOTES
Hematology Oncology Progress Note           Follow up for: DVT and bladder cancer   Chart notes reviewed since last visit. No issues with iron  No bleeding of which she has seen    Patient Vitals for the past 24 hrs:   BP Temp Pulse Resp SpO2 Height   10/27/22 0735 104/64 98.5 °F (36.9 °C) 80 20 100 % --   10/27/22 0331 105/67 98.1 °F (36.7 °C) 81 18 100 % --   10/26/22 2000 99/61 99.6 °F (37.6 °C) 93 18 98 % --   10/26/22 1513 112/69 100.2 °F (37.9 °C) 87 18 99 % --   10/26/22 1315 -- -- -- -- -- 5' 2\" (1.575 m)       Review of Systems:    Constitutional No fevers, chills, night sweats, excessive fatigue or weight loss. Allergic/Immunologic No recent allergic reactions   Eyes No significant visual difficulties. No diplopia. ENMT No problems with hearing, no sore throat, no sinus drainage. Endocrine No hot flashes or night sweats. No cold intolerance, polyuria, or polydipsia   Hematologic/Lymphatic No easy bruising or bleeding. The patient denies any tender or palpable lymph nodes   Breasts No abnormal masses of breast, nipple discharge or pain. Respiratory No dyspnea on exertion, orthopnea, chest pain, cough or hemoptysis. Cardiovascular No anginal chest pain, irregular heart beat, tachycardia, palpitations or orthopnea. Gastrointestinal No nausea, vomiting, diarrhea, constipation, cramping, dysphagia, reflux, heartburn, GI bleeding, or early satiety. No change in bowel habits. Genitourinary (M) No hematuria, dysuria, increased frequency, urgency, hesitancy or incontinence. Musculoskeletal No joint pain, swelling or redness. No decreased range of motion. Integumentary No chronic rashes, inflammation, ulcerations, pruritus, petechiae, purpura, ecchymoses, or skin changes. Neurologic No headache, blurred vision, and no areas of focal weakness or numbness. Normal gait. No sensory problems. Psychiatric No insomnia, depression, nate or mood swings.   No psychotropic drugs       Physical Examination:  Constitutional Sedation to maintain respiratory support   Head Normocephalic; no scars   Eyes Conjunctivae and sclerae are clear and without icterus. Pupils are reactive and equal.   ENMT Sinuses are nontender. No oral exudates, ulcers, masses, thrush or mucositis. Oropharynx clear. Tongue normal.   Neck Supple without masses or thyromegaly. No jugular venous distension. Hematologic/Lymphatic No petechiae or purpura. No tender or palpable lymph nodes in the cervical, supraclavicular, axillary or inguinal area. Respiratory Lungs are clear to auscultation without rhonchi or wheezing. Cardiovascular Regular rate and rhythm of heart without murmurs, gallops or rubs. Chest / Line Site Chest is symmetric with no chest wall deformities. Abdomen Non-tender, non-distended, no masses, ascites or hepatosplenomegaly. Good bowel sounds. No guarding or rebound tenderness. No pulsatile masses. Musculoskeletal No tenderness or swelling, normal range of motion without obvious weakness. Extremities No visible deformities, no cyanosis, clubbing or edema. Skin No rashes, scars, or lesions suggestive of malignancy. No petechiae, purpura, or ecchymoses. No excoriations. Neurologic No sensory or motor deficits, normal cerebellar function, normal gait, cranial nerves intact. Psychiatric Alert and oriented times three. Coherent speech. Verbalizes understanding of our discussions today.        Labs:  Recent Results (from the past 24 hour(s))   PTT    Collection Time: 10/26/22  3:23 PM   Result Value Ref Range    aPTT 48.3 (H) 21.2 - 34.1 sec    aPTT, therapeutic range   82 - 109 sec   PTT    Collection Time: 10/26/22 10:36 PM   Result Value Ref Range    aPTT 61.0 (H) 21.2 - 34.1 sec    aPTT, therapeutic range   82 - 109 sec   CBC WITH AUTOMATED DIFF    Collection Time: 10/27/22  6:21 AM   Result Value Ref Range    WBC 4.0 3.6 - 11.0 K/uL    RBC 2.85 (L) 3.80 - 5.20 M/uL    HGB 7.7 (L) 11.5 - 16.0 g/dL HCT 24.9 (L) 35.0 - 47.0 %    MCV 87.4 80.0 - 99.0 FL    MCH 27.0 26.0 - 34.0 PG    MCHC 30.9 30.0 - 36.5 g/dL    RDW 16.8 (H) 11.5 - 14.5 %    PLATELET 676 121 - 724 K/uL    MPV 9.5 8.9 - 12.9 FL    NRBC 0.0 0.0  WBC    ABSOLUTE NRBC 0.00 0.00 - 0.01 K/uL    NEUTROPHILS 66 32 - 75 %    BAND NEUTROPHILS 6 0 - 6 %    LYMPHOCYTES 12 12 - 49 %    MONOCYTES 11 5 - 13 %    EOSINOPHILS 0 0 - 7 %    BASOPHILS 0 0 - 1 %    METAMYELOCYTES 3 (H) 0 %    MYELOCYTES 1 (H) 0 %    OTHER CELL 1 %    IMMATURE GRANULOCYTES 0 %    ABS. NEUTROPHILS 2.9 1.8 - 8.0 K/UL    ABS. LYMPHOCYTES 0.5 (L) 0.8 - 3.5 K/UL    ABS. MONOCYTES 0.4 0.0 - 1.0 K/UL    ABS. EOSINOPHILS 0.0 0.0 - 0.4 K/UL    ABS. BASOPHILS 0.0 0.0 - 0.1 K/UL    ABS. IMM. GRANS. 0.0 K/UL    DF Manual      RBC COMMENTS Anisocytosis  1+        WBC COMMENTS Toxic Granulation     METABOLIC PANEL, COMPREHENSIVE    Collection Time: 10/27/22  6:21 AM   Result Value Ref Range    Sodium 142 136 - 145 mmol/L    Potassium 3.4 (L) 3.5 - 5.1 mmol/L    Chloride 112 (H) 97 - 108 mmol/L    CO2 23 21 - 32 mmol/L    Anion gap 7 5 - 15 mmol/L    Glucose 77 65 - 100 mg/dL    BUN 5 (L) 6 - 20 mg/dL    Creatinine 0.52 (L) 0.55 - 1.02 mg/dL    BUN/Creatinine ratio 10 (L) 12 - 20      eGFR >60 >60 ml/min/1.73m2    Calcium 8.7 8.5 - 10.1 mg/dL    Bilirubin, total 0.6 0.2 - 1.0 mg/dL    AST (SGOT) 29 15 - 37 U/L    ALT (SGPT) 8 (L) 12 - 78 U/L    Alk.  phosphatase 93 45 - 117 U/L    Protein, total 7.7 6.4 - 8.2 g/dL    Albumin 1.4 (L) 3.5 - 5.0 g/dL    Globulin 6.3 (H) 2.0 - 4.0 g/dL    A-G Ratio 0.2 (L) 1.1 - 2.2     PTT    Collection Time: 10/27/22  6:21 AM   Result Value Ref Range    aPTT 50.0 (H) 21.2 - 34.1 sec    aPTT, therapeutic range   82 - 109 sec       Imaging:        Assessment and Plan:   1 DVT  -on heparin  -clinical concern was that collateral blood vessels have formed making prior IVC filter less valuable   -if active bleeding; hold heparin  -once has been on heparin a few days ok to start working with PT as clot will have stabilized     2. Anemia  -transfuse if hgb <7s; mild uptrend today  -on  IV iron today as well     3.  Bladder cancer T3N0=Stage III based on pathology  -will need OP adjuvant chemotherapy

## 2022-10-27 NOTE — PROGRESS NOTES
CM reviewed chart. Patient pending consult, off floor in IR. Current discharge plan is [facility]. Awaiting OT note for insurance write up so they can submit for auth.

## 2022-10-27 NOTE — PROGRESS NOTES
Bilateral DVT thrombectomy performed. Patient tolerated procedure well. Sheath insertion sites closed with sutures and manual pressure held for 5 minutes. 4x4 and tegaderm applied to both sites. Dressings clean, dry, and intact. Flat bedrest ordered for 4 hours. Heparin gtt can be restarted at 1400 per Dr. Steve Rees. Primary nurse, Jami Galindo, notified.

## 2022-10-27 NOTE — PROGRESS NOTES
Patient returned to the unit approx 1330. Will resume Heparin as ordered.   Educated to lay flat x 4 hours and patient verbalized understanding

## 2022-10-27 NOTE — PROGRESS NOTES
[facility] SURGERY PROGRESS NOTES        Chief Complaint: _nausea    History of Present Illness:    Ms. Maricel Castaneda is a 61y.o. year old * female presents to ER with 2 day history of nausea and poor PO intake. No vomiting. Passing some flatus. She had undergone robotic assisted anterior exenteration by Dr. Johnna Nair. She was recently discharged. However she has not been feeling well. Has been able to take very little due to nausea. In ER CT scan without PO contrast showed some mildly dilated small bowel suggestive of possible bowel obstruction versus ileus. Patient denies any fever or chills or abdominal pain. Passed flatus. Already has IVC filter. On anticoagulation therapy. Tolerating soft diet    She has been experiencing some vaginal drainage that looks greenish & stool like for the past few days which has very minimal  No fever or chills. CT with PO & IV contrast reviewed with Dr. Jocelyn Sosa shows some ileus pattern and small fluid collection with air pocket just superior to vaginal cuff. PO contrast has reached the cecum only. No major abscess or overt bowel obstruction.       Past Medical History:   Past Medical History:   Diagnosis Date    Anemia     pt states had recent blood transfusion sept     Back pain     Cancer (HCC)     bladder    DVT (deep venous thrombosis) (HCC)     and PE, pt denies lung PE , only leg several years ago    Hepatitis C     pt states dx 1 month ago    Liver disease     Presence of IVC filter     pt states several years ago    Rheumatoid arthritis ([facility] [address]     Sciatic leg pain     pt states both legs    Uterine fibroid        Past Surgical History:   Past Surgical History:   Procedure Laterality Date    HX APPENDECTOMY  10/03/2022    HX  SECTION      HX GYN  10/03/2022    ROBOT ANTERIOR PELVIC EXENTERATION    HX HYSTERECTOMY  10/03/2022    HX SALPINGO-OOPHORECTOMY Bilateral 10/03/2022    HX UROLOGICAL  09/15/2022    CYSTOSCOPY    HX UROLOGICAL  09/15/2022    URETHRAL DILATION    HX UROLOGICAL  09/15/2022    URETERAL STENT PLACEMENT    HX UROLOGICAL  09/15/2022    TRANSURETHRAL RESECTION OF BLADDER TUMOR >2CM    HX UROLOGICAL Bilateral 09/15/2022    RETROGRADE PYELOGRAM    HX UROLOGICAL  10/03/2022    PELVIC LYMPH NODE DISSECTION    HX UROLOGICAL  10/03/2022    ILEAL CONDUIT URINARY DIVERSION    FL CARDIAC SURG PROCEDURE UNLIST      stent placement        Allergy:  Allergies   Allergen Reactions    Bactrim [Sulfamethoprim] Swelling     Swelling of the lilps    Penicillin V Potassium Swelling       Social History:  reports that she has quit smoking. Her smoking use included cigarettes. She has never used smokeless tobacco. She reports that she does not currently use alcohol. She reports that she does not use drugs.      Family History:  Family History   Problem Relation Age of Onset    Cancer Mother     Lung Cancer Mother     Heart Disease Father     Hypertension Sister     Cancer Maternal Aunt     Breast Cancer Maternal Aunt         Current Medications:  Current Facility-Administered Medications:     iron sucrose (VENOFER) injection 200 mg, 200 mg, IntraVENous, Q24H, Nancy Quesada MD, 200 mg at 10/26/22 1221    meropenem (MERREM) 1 g in 0.9% sodium chloride (MBP/ADV) 50 mL MBP, 1 g, IntraVENous, Q8H, Son Cabral MD, Last Rate: 16.7 mL/hr at 10/26/22 1655, 1 g at 10/26/22 1655    midodrine (PROAMATINE) tablet 5 mg, 5 mg, Oral, TID PRN, Orly Walters NP, 5 mg at 10/25/22 0619    fluconazole (DIFLUCAN) tablet 200 mg, 200 mg, Oral, DAILY, Son Cabral MD, 200 mg at 10/26/22 0939    heparin 25,000 units in D5W 250 ml infusion, 500 Units/hr, IntraVENous, TITRATE, Simon Encarnacion MD, Last Rate: 5 mL/hr at 10/26/22 1633, 500 Units/hr at 10/26/22 1633    oxyCODONE IR (ROXICODONE) tablet 5 mg, 5 mg, Oral, Q6H PRN, Orly Walters NP, 5 mg at 10/26/22 0716    0.9% sodium chloride infusion 250 mL, 250 mL, IntraVENous, PRN, Madalyn Posadas MD    cromolyn (OPTICROM) 4 % ophthalmic solution 1 Drop, 1 Drop, Both Eyes, DAILY, Reuben Posadas MD, 1 Drop at 10/26/22 0941    sodium chloride (NS) flush 5-40 mL, 5-40 mL, IntraVENous, Q8H, Reuben Posadas MD, 10 mL at 10/26/22 1656    sodium chloride (NS) flush 5-40 mL, 5-40 mL, IntraVENous, PRN, Reuben Posadas MD    acetaminophen (TYLENOL) tablet 650 mg, 650 mg, Oral, Q6H PRN, 650 mg at 10/25/22 1111 **OR** acetaminophen (TYLENOL) suppository 650 mg, 650 mg, Rectal, Q6H PRN, Reuben Posadas MD    polyethylene glycol (MIRALAX) packet 17 g, 17 g, Oral, DAILY PRN, Reuben Posadas MD    ondansetron (ZOFRAN ODT) tablet 4 mg, 4 mg, Oral, Q8H PRN, 4 mg at 10/25/22 1110 **OR** ondansetron (ZOFRAN) injection 4 mg, 4 mg, IntraVENous, Q6H PRN, Reuben Posadas MD     Immunization History: There is no immunization history on file for this patient. Complete    Review of Systems:     Constitutional:  no fever,  no chills,  no sweats, No weakness, No fatigue, No decreased activity. Eye: No recent visual problem, No icterus, No discharge, No double vision. Ear/Nose/Mouth/Throat: No decreased hearing, No ear pain, No nasal congestion, No sore throat. Respiratory: No shortness of breath, No cough, No sputum production, No hemoptysis, No wheezing, No cyanosis. Cardiovascular: No chest pain, No palpitations, No bradycardia, No tachycardia, No peripheral edema, No syncope. Gastrointestinal:  nausea,  No vomiting, No diarrhea, No constipation, No heartburn,  No abdominal pain. Genitourinary: No dysuria, No hematuria, No change in urine stream, No urethral discharge, No lesions. Hematology/Lymphatics: No bruising tendency, No bleeding tendency, No petechiae, No swollen lymph glands. Endocrine: No excessive thirst, No polyuria, No cold intolerance, No heat intolerance, No excessive hunger. Immunologic: Not immunocompromised, No recurrent fevers, No recurrent infections.   Musculoskeletal: No back pain, No neck pain, No joint pain, No muscle pain, No claudication, No decreased range of motion, No trauma. Integumentary: No rash, No pruritus, No abrasions. Neurologic: Alert and oriented X4, No abnormal balance, No headache, No confusion, No numbness, No tingling. Psychiatric: No anxiety, No depression, No nate. Physical Exam:     Vitals & Measurements: Wt Readings from Last 3 Encounters:   10/19/22 51.7 kg (114 lb)   10/13/22 51.3 kg (113 lb)   10/09/22 59.6 kg (131 lb 6.3 oz)     Temp Readings from Last 3 Encounters:   10/26/22 99.6 °F (37.6 °C)   10/17/22 98.7 °F (37.1 °C)   10/13/22 98.3 °F (36.8 °C)     BP Readings from Last 3 Encounters:   10/26/22 99/61   10/17/22 (!) 103/58   10/13/22 116/67     Pulse Readings from Last 3 Encounters:   10/26/22 93   10/17/22 94   10/13/22 100      Ht Readings from Last 3 Encounters:   10/26/22 5' 2\" (1.575 m)   10/13/22 5' 2\" (1.575 m)   10/05/22 5' 2.01\" (1.575 m)          General: well appearing, no acute distress  Head: Normal  Face: Nornal  HEENT: atraumatic, PERRLA, moist mucosa, normal pharynx, normal tonsils and adenoids, normal tongue, no fluid in sinuses  Neck: Trachea midline, no carotid bruit, no masses  Chest: Normal.  Respiratory: Normal chest wall expansion, CTA B, no r/r/w, no rubs  Cardiovascular: RRR, no m/r/g, Normal S1 and S2  Abdomen: Soft, non tender, non-distended, normal bowel sounds in all quadrants, no hepatosplenomegaly, no tympany. Incision scar: well healed, right lower quadrant urostomy+  Genitourinary: No inguinal hernia, normal external gentalia, no renal angle tenderness, minimal greenish yellow discharge from both vagina & rectum+  Rectal: deferred  Musculoskeletal: normal ROM in upper and lower extremities, No joint swelling.   Integumentary: Warm, dry, and pink, with no rash, purpura, or petechia  Heme/Lymph: No lymphadenopathy, no bruises  Neurological:Cranial Nerves II-XII grossly intact, no gross motor or sensory deficit  Psychiatric: Cooperative with normal mood, affect, and cognition      Laboratory Values:   Recent Results (from the past 24 hour(s))   CBC WITH AUTOMATED DIFF    Collection Time: 10/26/22 12:13 AM   Result Value Ref Range    WBC 4.1 3.6 - 11.0 K/uL    RBC 2.83 (L) 3.80 - 5.20 M/uL    HGB 7.5 (L) 11.5 - 16.0 g/dL    HCT 24.6 (L) 35.0 - 47.0 %    MCV 86.9 80.0 - 99.0 FL    MCH 26.5 26.0 - 34.0 PG    MCHC 30.5 30.0 - 36.5 g/dL    RDW 16.5 (H) 11.5 - 14.5 %    PLATELET 237 641 - 408 K/uL    MPV 9.4 8.9 - 12.9 FL    NRBC 0.0 0.0  WBC    ABSOLUTE NRBC 0.00 0.00 - 0.01 K/uL    NEUTROPHILS 70 32 - 75 %    LYMPHOCYTES 22 12 - 49 %    MONOCYTES 6 5 - 13 %    EOSINOPHILS 0 0 - 7 %    BASOPHILS 0 0 - 1 %    IMMATURE GRANULOCYTES 2 (H) 0 - 0.5 %    ABS. NEUTROPHILS 2.8 1.8 - 8.0 K/UL    ABS. LYMPHOCYTES 0.9 0.8 - 3.5 K/UL    ABS. MONOCYTES 0.2 0.0 - 1.0 K/UL    ABS. EOSINOPHILS 0.0 0.0 - 0.4 K/UL    ABS. BASOPHILS 0.0 0.0 - 0.1 K/UL    ABS. IMM. GRANS. 0.1 (H) 0.00 - 0.04 K/UL    DF AUTOMATED     METABOLIC PANEL, COMPREHENSIVE    Collection Time: 10/26/22 12:13 AM   Result Value Ref Range    Sodium 139 136 - 145 mmol/L    Potassium 3.1 (L) 3.5 - 5.1 mmol/L    Chloride 111 (H) 97 - 108 mmol/L    CO2 22 21 - 32 mmol/L    Anion gap 6 5 - 15 mmol/L    Glucose 72 65 - 100 mg/dL    BUN 7 6 - 20 mg/dL    Creatinine 0.54 (L) 0.55 - 1.02 mg/dL    BUN/Creatinine ratio 13 12 - 20      eGFR >60 >60 ml/min/1.73m2    Calcium 8.6 8.5 - 10.1 mg/dL    Bilirubin, total 0.5 0.2 - 1.0 mg/dL    AST (SGOT) 33 15 - 37 U/L    ALT (SGPT) 14 12 - 78 U/L    Alk.  phosphatase 87 45 - 117 U/L    Protein, total 7.4 6.4 - 8.2 g/dL    Albumin 1.4 (L) 3.5 - 5.0 g/dL    Globulin 6.0 (H) 2.0 - 4.0 g/dL    A-G Ratio 0.2 (L) 1.1 - 2.2     PTT    Collection Time: 10/26/22 12:14 AM   Result Value Ref Range    aPTT 39.3 (H) 21.2 - 34.1 sec    aPTT, therapeutic range   82 - 109 sec   PTT    Collection Time: 10/26/22  6:29 AM   Result Value Ref Range    aPTT 36.9 (H) 21.2 - 34.1 sec    aPTT, therapeutic range   sec PTT    Collection Time: 10/26/22  3:23 PM   Result Value Ref Range    aPTT 48.3 (H) 21.2 - 34.1 sec    aPTT, therapeutic range   82 - 109 sec           CT ABD PELV W CONT   Final Result   Rectovaginal fistula is not identified   Extensive bilateral iliac venous thrombus to the level of the IVC filter. Right   lower extremity edema. Cholelithiasis with distended gallbladder   Bilateral hydronephrosis and hydroureter in patient with ileal conduit   Probable small bowel obstruction secondary to adhesion      XR ABD (KUB)   Final Result   1. No significant change in small bowel dilation. Large volume of stool in the   rectum. 2. Cholelithiasis. XR ABD (KUB)   Final Result   Dilated small bowel within the central abdomen is similar to CT from one day   prior. DUPLEX LOWER EXT VENOUS BILAT   Final Result   Addendum (preliminary) 1 of 1      · Thrombus present in the right external iliac vein. · Thrombus present in the right common femoral vein. · Thrombus present in the right femoral vein. · Thrombus present in the right proximal femoral vein. · Thrombus present in the right mid femoral vein. · Thrombus present in the right distal femoral vein. · Thrombus present in the right popliteal vein. · Thrombus present in the right gastrocnemius vein. · Thrombus present in the right peroneal vein. · Thrombus present in the right saphenofemoral junction. · Thrombus present in the left external iliac vein. · Thrombus present in the left common femoral vein. · Thrombus present in the left saphenofemoral junction. · Thrombus present in the left femoral vein. · Thrombus present in the left proximal femoral vein. · Thrombus present in the left mid femoral vein. · Thrombus present in the left popliteal vein. · Thrombus present in the left distal femoral vein. · Thrombus present in the left gastrocnemius vein. · Thrombus present in the left peroneal vein.        This is vascular lab report on Khalif Duran, patient's YOB: 1965   Date of examination: October 20, 2022   Examination: Duplex lower extremity venous bilateral   Technician: Ms. Kaye Veloz   Clinical history: This is  61years old woman with suspected renal vein    thrombosis   Indication: femoral vein thrombosis. Technique: Bilateral leg venous structures examined using venous duplex    ultrasound with 2D grayscale, color-flow and spectral Doppler analysis. Findings:   Right side:   common femoral vein and saphenofemoral junction is not compressible and    there is hypoechoic filling defect noted. common femoral vein shows no venous flow   Distal external iliac vein also not compressible and that there is no    venous flow   Proximal femoral vein is noncompressible and there is no venous flow   Mid femoral vein is not compressible and there is no venous flow   Distal femoral vein is noncompressible and there is no venous flow   Proximal, mid, distal greater saphenous vein is compressible, there is no    filling defect   Distal popliteal vein is not compressible. Proximal popliteal vein is noncompressible and there is no venous flow   Proximal gastrocnemius vein is not compressible   Proximal small saphenous vein is not compressible   Distal, mid, proximal  peroneal vein is not compressible      Left side:   common femoral vein not compressible and there is no venous flow   Saphenofemoral junction is not compressible and there is no venous flow. External iliac vein also not compressible there is no venous flow   Proximal femoral vein is noncompressible and there is no venous flow   Mid femoral vein is noncompressible and there is no venous flow   Distal femoral vein is not compressible and there is no venous flow   Proximal, mid, distal greater saphenous vein is noncompressible and there    is hypoechoic filling defect noted.    Distal popliteal vein is noncompressible   Proximal popliteal vein is noncompressible and there is no venous flow   Proximal gastrocnemius vein is not compressible and there is no venous    flow   Proximal small saphenous vein is noncompressible and there is no venous    flow   Distal, mid, proximal peroneal vein is not compressible. Impression:   1. Right external iliac vein, common femoral vein, femoral vein,    popliteal vein, gastrocnemius vein, small saphenous vein, and peroneal    vein shows acute venous thrombosis   2. Left external iliac vein, common femoral vein, femoral vein, popliteal    vein, gastrocnemius vein, small saphenous vein, peroneal vein, and    saphenofemoral junction and greater saphenous vein shows acute venous    thrombosis. These  extensive bilateral lower extremity deep vein thrombosis notified    to Dr. Chico Sheriff on 10/20/2022 1201 PM.            CT ABD PELV WO CONT   Final Result   Status post cystectomy and hysterectomy with right lower quadrant ileal conduit. Mild bilateral hydroureteronephrosis, evaluation is somewhat limited by the   presence of contrast from previously performed examination. Small bowel   distention with decompressed loops distally concerning for obstruction likely   related to adhesion formation. While this study was done without the use of   intravenous contrast there is questionable right common femoral vein thrombus   with soft tissue edema. CTA CHEST W OR W WO CONT   Final Result   Emphysematous changes. No evidence of pulmonary embolism or acute   abnormality. Cholelithiasis. Bilateral hydronephrosis. 1 cm hypodense lesion   right hepatic lobe cannot be characterized with certainty on the basis of this   examination. Follow-up is recommended to ensure stability. XR CHEST PORT   Final Result   No Acute Disease. XR FOOT LT MIN 3 V   Final Result   Normal study.           Assessment:  Ileus, resolving    Nausea, improving     Constipation    DVT    Mild Vaginal drainage may be just some pelvic fluid draining. Plan:    Admission  Diet: soft diet  IV fluids  SCD  IS  Nausea medication  Labs in am  Consult: Dr. Richar Gonzalez therapy per Hematologist.     Thank you for the consultation & allowing me to participate in the care of this patient.

## 2022-10-27 NOTE — PROGRESS NOTES
Spiritual Care Assessment/Progress Note  [facility]      NAME: Isidro Beckett      MRN: [id number]  AGE: 61 y.o. SEX: female  Mu-ism Affiliation: No preference   Language: English     10/27/2022     Total Time (in minutes): 10     Spiritual Assessment begun in [place] [facility] through conversation with:         []Patient        [] Family    [] Friend(s)        Reason for Consult: Initial/Spiritual assessment, patient floor     Spiritual beliefs: (Please include comment if needed)     [] Identifies with a drew tradition:         [] Supported by a drew community:            [] Claims no spiritual orientation:           [] Seeking spiritual identity:                [] Adheres to an individual form of spirituality:           [x] Not able to assess:                           Identified resources for coping:      [] Prayer                               [] Music                  [] Guided Imagery     [] Family/friends                 [] Pet visits     [] Devotional reading                         [x] Unknown     [] Other:                                                Interventions offered during this visit: (See comments for more details)    Patient Interventions: Other (comment) (Attempt)           Plan of Care:     [] Support spiritual and/or cultural needs    [] Support AMD and/or advance care planning process      [] Support grieving process   [] Coordinate Rites and/or Rituals    [] Coordination with community clergy   [] No spiritual needs identified at this time   [] Detailed Plan of Care below (See Comments)  [] Make referral to Music Therapy  [] Make referral to Pet Therapy     [] Make referral to Addiction services  [] Make referral to [facility]  [] Make referral to Spiritual Care Partner  [] No future visits requested        [x] Contact Spiritual Care for further referrals     Comments:  Visit attempted for patient in the [address] [facility] for initial assessment.   Patient was  not present in her room and no visitors present. Spiritual care is available for support upon referrals.       Visited by: Cleopatra Earl  To michelle : [email or web address]  ([id number])

## 2022-10-27 NOTE — PROGRESS NOTES
Hospitalist Progress Note    Subjective:   Daily Progress Note: 10/27/2022 3:41 PM    Continued lower extremity edema with pelvic pain    Current Facility-Administered Medications   Medication Dose Route Frequency    fentaNYL citrate (PF) injection  mcg   mcg IntraVENous Rad Multiple    midazolam (VERSED) injection 0.5-2 mg  0.5-2 mg IntraVENous Rad Multiple    heparin (porcine) 1,000 unit/mL injection 2,000 Units  2,000 Units IntraVENous Rad Multiple    iron sucrose (VENOFER) injection 200 mg  200 mg IntraVENous Q24H    meropenem (MERREM) 1 g in 0.9% sodium chloride (MBP/ADV) 50 mL MBP  1 g IntraVENous Q8H    midodrine (PROAMATINE) tablet 5 mg  5 mg Oral TID PRN    fluconazole (DIFLUCAN) tablet 200 mg  200 mg Oral DAILY    heparin 25,000 units in D5W 250 ml infusion  500 Units/hr IntraVENous TITRATE    oxyCODONE IR (ROXICODONE) tablet 5 mg  5 mg Oral Q6H PRN    0.9% sodium chloride infusion 250 mL  250 mL IntraVENous PRN    cromolyn (OPTICROM) 4 % ophthalmic solution 1 Drop  1 Drop Both Eyes DAILY    sodium chloride (NS) flush 5-40 mL  5-40 mL IntraVENous Q8H    sodium chloride (NS) flush 5-40 mL  5-40 mL IntraVENous PRN    acetaminophen (TYLENOL) tablet 650 mg  650 mg Oral Q6H PRN    Or    acetaminophen (TYLENOL) suppository 650 mg  650 mg Rectal Q6H PRN    polyethylene glycol (MIRALAX) packet 17 g  17 g Oral DAILY PRN    ondansetron (ZOFRAN ODT) tablet 4 mg  4 mg Oral Q8H PRN    Or    ondansetron (ZOFRAN) injection 4 mg  4 mg IntraVENous Q6H PRN        Review of Systems  Review of Systems   Constitutional:  Positive for malaise/fatigue. HENT: Negative. Respiratory:  Negative for cough and shortness of breath. Cardiovascular:  Positive for leg swelling. Negative for chest pain. Gastrointestinal:  Positive for abdominal pain. Negative for nausea and vomiting. Genitourinary: Negative. Musculoskeletal: Negative. Skin: Negative. Neurological: Negative.     Psychiatric/Behavioral: Negative. Objective:     Visit Vitals  /75 (BP 1 Location: Left upper arm, BP Patient Position: At rest;Supine)   Pulse 100   Temp 97.7 °F (36.5 °C)   Resp 18   Ht 5' 2\" (1.575 m)   Wt 51.7 kg (114 lb)   LMP  (LMP Unknown)   SpO2 96%   BMI 20.85 kg/m²      O2 Device: None (Room air)    Temp (24hrs), Av.4 °F (36.9 °C), Min:97.7 °F (36.5 °C), Max:99.6 °F (37.6 °C)      No intake/output data recorded. 10/25 1901 - 10/27 0700  In: 300 [P.O.:300]  Out: 675 [Urine:675]    Recent Results (from the past 24 hour(s))   PTT    Collection Time: 10/26/22 10:36 PM   Result Value Ref Range    aPTT 61.0 (H) 21.2 - 34.1 sec    aPTT, therapeutic range   82 - 109 sec   CBC WITH AUTOMATED DIFF    Collection Time: 10/27/22  6:21 AM   Result Value Ref Range    WBC 4.0 3.6 - 11.0 K/uL    RBC 2.85 (L) 3.80 - 5.20 M/uL    HGB 7.7 (L) 11.5 - 16.0 g/dL    HCT 24.9 (L) 35.0 - 47.0 %    MCV 87.4 80.0 - 99.0 FL    MCH 27.0 26.0 - 34.0 PG    MCHC 30.9 30.0 - 36.5 g/dL    RDW 16.8 (H) 11.5 - 14.5 %    PLATELET 888 201 - 106 K/uL    MPV 9.5 8.9 - 12.9 FL    NRBC 0.0 0.0  WBC    ABSOLUTE NRBC 0.00 0.00 - 0.01 K/uL    NEUTROPHILS 66 32 - 75 %    BAND NEUTROPHILS 6 0 - 6 %    LYMPHOCYTES 12 12 - 49 %    MONOCYTES 11 5 - 13 %    EOSINOPHILS 0 0 - 7 %    BASOPHILS 0 0 - 1 %    METAMYELOCYTES 3 (H) 0 %    MYELOCYTES 1 (H) 0 %    OTHER CELL 1 %    IMMATURE GRANULOCYTES 0 %    ABS. NEUTROPHILS 2.9 1.8 - 8.0 K/UL    ABS. LYMPHOCYTES 0.5 (L) 0.8 - 3.5 K/UL    ABS. MONOCYTES 0.4 0.0 - 1.0 K/UL    ABS. EOSINOPHILS 0.0 0.0 - 0.4 K/UL    ABS. BASOPHILS 0.0 0.0 - 0.1 K/UL    ABS. IMM.  GRANS. 0.0 K/UL    DF Manual      RBC COMMENTS Anisocytosis  1+        WBC COMMENTS Toxic Granulation     METABOLIC PANEL, COMPREHENSIVE    Collection Time: 10/27/22  6:21 AM   Result Value Ref Range    Sodium 142 136 - 145 mmol/L    Potassium 3.4 (L) 3.5 - 5.1 mmol/L    Chloride 112 (H) 97 - 108 mmol/L    CO2 23 21 - 32 mmol/L    Anion gap 7 5 - 15 mmol/L    Glucose 77 65 - 100 mg/dL    BUN 5 (L) 6 - 20 mg/dL    Creatinine 0.52 (L) 0.55 - 1.02 mg/dL    BUN/Creatinine ratio 10 (L) 12 - 20      eGFR >60 >60 ml/min/1.73m2    Calcium 8.7 8.5 - 10.1 mg/dL    Bilirubin, total 0.6 0.2 - 1.0 mg/dL    AST (SGOT) 29 15 - 37 U/L    ALT (SGPT) 8 (L) 12 - 78 U/L    Alk. phosphatase 93 45 - 117 U/L    Protein, total 7.7 6.4 - 8.2 g/dL    Albumin 1.4 (L) 3.5 - 5.0 g/dL    Globulin 6.3 (H) 2.0 - 4.0 g/dL    A-G Ratio 0.2 (L) 1.1 - 2.2     PTT    Collection Time: 10/27/22  6:21 AM   Result Value Ref Range    aPTT 50.0 (H) 21.2 - 34.1 sec    aPTT, therapeutic range   82 - 109 sec   PTT    Collection Time: 10/27/22 10:48 AM   Result Value Ref Range    aPTT 25.3 21.2 - 34.1 sec    aPTT, therapeutic range   82 - 109 sec        CT ABD PELV W CONT   Final Result   Rectovaginal fistula is not identified   Extensive bilateral iliac venous thrombus to the level of the IVC filter. Right   lower extremity edema. Cholelithiasis with distended gallbladder   Bilateral hydronephrosis and hydroureter in patient with ileal conduit   Probable small bowel obstruction secondary to adhesion      XR ABD (KUB)   Final Result   1. No significant change in small bowel dilation. Large volume of stool in the   rectum. 2. Cholelithiasis. XR ABD (KUB)   Final Result   Dilated small bowel within the central abdomen is similar to CT from one day   prior. DUPLEX LOWER EXT VENOUS BILAT   Final Result   Addendum (preliminary) 1 of 1      · Thrombus present in the right external iliac vein. · Thrombus present in the right common femoral vein. · Thrombus present in the right femoral vein. · Thrombus present in the right proximal femoral vein. · Thrombus present in the right mid femoral vein. · Thrombus present in the right distal femoral vein. · Thrombus present in the right popliteal vein. · Thrombus present in the right gastrocnemius vein.    · Thrombus present in the right peroneal vein. · Thrombus present in the right saphenofemoral junction. · Thrombus present in the left external iliac vein. · Thrombus present in the left common femoral vein. · Thrombus present in the left saphenofemoral junction. · Thrombus present in the left femoral vein. · Thrombus present in the left proximal femoral vein. · Thrombus present in the left mid femoral vein. · Thrombus present in the left popliteal vein. · Thrombus present in the left distal femoral vein. · Thrombus present in the left gastrocnemius vein. · Thrombus present in the left peroneal vein. This is vascular lab report on Carol Hernandez, patient's YOB: 1965   Date of examination: October 20, 2022   Examination: Duplex lower extremity venous bilateral   Technician: Ms. Jered Regan   Clinical history: This is  61years old woman with suspected renal vein    thrombosis   Indication: femoral vein thrombosis. Technique: Bilateral leg venous structures examined using venous duplex    ultrasound with 2D grayscale, color-flow and spectral Doppler analysis. Findings:   Right side:   common femoral vein and saphenofemoral junction is not compressible and    there is hypoechoic filling defect noted. common femoral vein shows no venous flow   Distal external iliac vein also not compressible and that there is no    venous flow   Proximal femoral vein is noncompressible and there is no venous flow   Mid femoral vein is not compressible and there is no venous flow   Distal femoral vein is noncompressible and there is no venous flow   Proximal, mid, distal greater saphenous vein is compressible, there is no    filling defect   Distal popliteal vein is not compressible.    Proximal popliteal vein is noncompressible and there is no venous flow   Proximal gastrocnemius vein is not compressible   Proximal small saphenous vein is not compressible   Distal, mid, proximal  peroneal vein is not compressible      Left side:   common femoral vein not compressible and there is no venous flow   Saphenofemoral junction is not compressible and there is no venous flow. External iliac vein also not compressible there is no venous flow   Proximal femoral vein is noncompressible and there is no venous flow   Mid femoral vein is noncompressible and there is no venous flow   Distal femoral vein is not compressible and there is no venous flow   Proximal, mid, distal greater saphenous vein is noncompressible and there    is hypoechoic filling defect noted. Distal popliteal vein is noncompressible   Proximal popliteal vein is noncompressible and there is no venous flow   Proximal gastrocnemius vein is not compressible and there is no venous    flow   Proximal small saphenous vein is noncompressible and there is no venous    flow   Distal, mid, proximal peroneal vein is not compressible. Impression:   1. Right external iliac vein, common femoral vein, femoral vein,    popliteal vein, gastrocnemius vein, small saphenous vein, and peroneal    vein shows acute venous thrombosis   2. Left external iliac vein, common femoral vein, femoral vein, popliteal    vein, gastrocnemius vein, small saphenous vein, peroneal vein, and    saphenofemoral junction and greater saphenous vein shows acute venous    thrombosis. These  extensive bilateral lower extremity deep vein thrombosis notified    to Dr. Dallas Nur on 10/20/2022 1201 PM.            CT ABD PELV WO CONT   Final Result   Status post cystectomy and hysterectomy with right lower quadrant ileal conduit. Mild bilateral hydroureteronephrosis, evaluation is somewhat limited by the   presence of contrast from previously performed examination. Small bowel   distention with decompressed loops distally concerning for obstruction likely   related to adhesion formation.  While this study was done without the use of   intravenous contrast there is questionable right common femoral vein thrombus   with soft tissue edema. CTA CHEST W OR W WO CONT   Final Result   Emphysematous changes. No evidence of pulmonary embolism or acute   abnormality. Cholelithiasis. Bilateral hydronephrosis. 1 cm hypodense lesion   right hepatic lobe cannot be characterized with certainty on the basis of this   examination. Follow-up is recommended to ensure stability. XR CHEST PORT   Final Result   No Acute Disease. XR FOOT LT MIN 3 V   Final Result   Normal study. IR THROMBECTOMY MECH VEIN W PHARM    (Results Pending)   IR THROMBECTOMY MECH VEIN W PHARM    (Results Pending)        PHYSICAL EXAM:    Physical Exam  Vitals reviewed. HENT:      Head: Normocephalic and atraumatic. Cardiovascular:      Rate and Rhythm: Normal rate and regular rhythm. Heart sounds: Normal heart sounds. Pulmonary:      Effort: Pulmonary effort is normal.      Breath sounds: Normal breath sounds. Abdominal:      General: Abdomen is flat. Bowel sounds are normal.      Palpations: Abdomen is soft. Comments: Mild tenderness in the suprapubic region   Musculoskeletal:         General: Normal range of motion. Cervical back: Normal range of motion and neck supple. Right lower leg: Edema present. Left lower leg: Edema present. Skin:     General: Skin is warm and dry. Neurological:      General: No focal deficit present. Mental Status: She is alert and oriented to person, place, and time.    Psychiatric:         Mood and Affect: Mood normal.        Data Review    Recent Results (from the past 24 hour(s))   PTT    Collection Time: 10/26/22 10:36 PM   Result Value Ref Range    aPTT 61.0 (H) 21.2 - 34.1 sec    aPTT, therapeutic range   82 - 109 sec   CBC WITH AUTOMATED DIFF    Collection Time: 10/27/22  6:21 AM   Result Value Ref Range    WBC 4.0 3.6 - 11.0 K/uL    RBC 2.85 (L) 3.80 - 5.20 M/uL    HGB 7.7 (L) 11.5 - 16.0 g/dL    HCT 24.9 (L) 35.0 - 47.0 %    MCV 87.4 80.0 - 99.0 FL MCH 27.0 26.0 - 34.0 PG    MCHC 30.9 30.0 - 36.5 g/dL    RDW 16.8 (H) 11.5 - 14.5 %    PLATELET 496 772 - 324 K/uL    MPV 9.5 8.9 - 12.9 FL    NRBC 0.0 0.0  WBC    ABSOLUTE NRBC 0.00 0.00 - 0.01 K/uL    NEUTROPHILS 66 32 - 75 %    BAND NEUTROPHILS 6 0 - 6 %    LYMPHOCYTES 12 12 - 49 %    MONOCYTES 11 5 - 13 %    EOSINOPHILS 0 0 - 7 %    BASOPHILS 0 0 - 1 %    METAMYELOCYTES 3 (H) 0 %    MYELOCYTES 1 (H) 0 %    OTHER CELL 1 %    IMMATURE GRANULOCYTES 0 %    ABS. NEUTROPHILS 2.9 1.8 - 8.0 K/UL    ABS. LYMPHOCYTES 0.5 (L) 0.8 - 3.5 K/UL    ABS. MONOCYTES 0.4 0.0 - 1.0 K/UL    ABS. EOSINOPHILS 0.0 0.0 - 0.4 K/UL    ABS. BASOPHILS 0.0 0.0 - 0.1 K/UL    ABS. IMM. GRANS. 0.0 K/UL    DF Manual      RBC COMMENTS Anisocytosis  1+        WBC COMMENTS Toxic Granulation     METABOLIC PANEL, COMPREHENSIVE    Collection Time: 10/27/22  6:21 AM   Result Value Ref Range    Sodium 142 136 - 145 mmol/L    Potassium 3.4 (L) 3.5 - 5.1 mmol/L    Chloride 112 (H) 97 - 108 mmol/L    CO2 23 21 - 32 mmol/L    Anion gap 7 5 - 15 mmol/L    Glucose 77 65 - 100 mg/dL    BUN 5 (L) 6 - 20 mg/dL    Creatinine 0.52 (L) 0.55 - 1.02 mg/dL    BUN/Creatinine ratio 10 (L) 12 - 20      eGFR >60 >60 ml/min/1.73m2    Calcium 8.7 8.5 - 10.1 mg/dL    Bilirubin, total 0.6 0.2 - 1.0 mg/dL    AST (SGOT) 29 15 - 37 U/L    ALT (SGPT) 8 (L) 12 - 78 U/L    Alk.  phosphatase 93 45 - 117 U/L    Protein, total 7.7 6.4 - 8.2 g/dL    Albumin 1.4 (L) 3.5 - 5.0 g/dL    Globulin 6.3 (H) 2.0 - 4.0 g/dL    A-G Ratio 0.2 (L) 1.1 - 2.2     PTT    Collection Time: 10/27/22  6:21 AM   Result Value Ref Range    aPTT 50.0 (H) 21.2 - 34.1 sec    aPTT, therapeutic range   82 - 109 sec   PTT    Collection Time: 10/27/22 10:48 AM   Result Value Ref Range    aPTT 25.3 21.2 - 34.1 sec    aPTT, therapeutic range   82 - 109 sec        Assessment/Plan:     Principal Problem:    Small bowel obstruction ([facility] [address] (10/20/2022)    Active Problems:    Acute blood loss anemia (10/20/2022)      Status post robot-assisted surgical procedure, pelvic exenteration (10/20/2022)      Vaginal cuff cellulitis (10/20/2022)    Hospital course: Vahe Bello is a 61 y.o. female with PMH of bladder cancer s/p cystectomy, hysterectomy, BSO and ileal conduit diversion, anemia, hep C, and left leg DVT s/p IVC filter years ago. She presented to the ED with chief complaint of weakness and nausea for the past week. She reports unable to tolerate PO intake, due to profound nausea, abdominal pain, fever, chills, vomiting or diarrhea. In addition, she also reports unable to walk due to weakness and left inner thigh and left foot pain. Denies trauma. She was seen here on 10/13/2022 with urostomy complications, status post ileal conduit and discharged home on Cipro which she completed. In addition, she also reports having vaginal bleed since surgery 2 weeks ago. Which appears to be more related to a fistula with stool present. CT scan of the abdomen pelvis was performed revealing no obvious rectovaginal fistula although there was air present tracking in that direction without any extravasation of contrast.  There was extensive bilateral iliac venous thrombus to the level of the IVC filter with right leg edema. Cholelithiasis with a distended gallbladder was also noted. Bilateral hydronephrosis with hydroureter most likely from the ileal conduit. Probable small bowel obstruction secondary to adhesions also noted. Patient was found to be anemic and received 1 unit of packed red blood cells. Venous duplex revealed extensive clot burden within the right leg and left leg. Urinalysis was consistent with Candida infection. ID consultation. Surgical consultation, Dr. Jaky Chopra. UCX grew candida ciferrii, continue fluconazole. IV heparin infusing.   Discussed case with interventional radiology who will further investigate the fistula with recommendations and possibly need to do mechanical thrombectomy for the extensive DVT    Impression/plan:     # 1 Acute blood loss anemia  - hematology following-->reccs low dose anticoagulants  - hgb stable- currently on low dose IV hep gtt     # 2 Vaginal bleed with stool  - Suspect complications from recent surgery  -With associated vaginitis of the CUFF and cellulitis  Most likely rectovaginal fistula  GYN for pelvic evaluation     # 3 SBO  - currently tolerating full liquid diet  - s/p gentle IVF  - Gen surgery: Basker   - Morphine for pain. # 4 Extensive dvt bilateral lower legs  - recently on anticoagulants that was stopped prior to surgery  - duplex shows extensive clot burden  - hematology following   - already has IVC filter placed  - started on IV hep due to concern of old IVC and chances that she has collateral blood vessels bypassing filter which will increase her risk for PE. IR consultation for possible mechanical thrombectomy, report pending       # 5 UTI  - Fluconazole for fungal UTI.  - UCX grew candida ciferrii  - ID following     # 6 Bladder cancer  - Not on chemotherapy currently. - Consulted urology to evaluate for possible post-op complications. - Continue supportive care  - Hematology following     # 7 Emphysema  - not in respiratory distress, continue to monitor      # 8 Ambulatory dysfunction  - secondary to dvt's  - pt/ot as tolerated     # 9 hypotension  - bp remains soft  - prn midodrine  - start gentle IV hydration    10. Rectal/vaginal fistula  Surgery following  Questionable abscess  ID consult  Merrem  IR consult possible recommendation for CT with and without with drains rectal contrast  GYN consult    Patient will require wheelchair and at this point patient's overall mobility is limited to participate in toileting feeding and dressing and will require the use of a wheelchair for functional mobility.   Of the use of a manual wheelchair the patient can maneuver the chair independently to perform these functional activities of daily living. Care Plan discussed with: Patient/Family    Total time spent with patient: >35 minutes.

## 2022-10-28 LAB
ALBUMIN SERPL-MCNC: 1.3 G/DL (ref 3.5–5)
ALBUMIN/GLOB SERPL: 0.2 {RATIO} (ref 1.1–2.2)
ALP SERPL-CCNC: 82 U/L (ref 45–117)
ALT SERPL-CCNC: 11 U/L (ref 12–78)
ANION GAP SERPL CALC-SCNC: 8 MMOL/L (ref 5–15)
APTT PPP: 112.3 SEC (ref 21.2–34.1)
APTT PPP: 39.8 SEC (ref 21.2–34.1)
APTT PPP: 75.8 SEC (ref 21.2–34.1)
AST SERPL W P-5'-P-CCNC: 31 U/L (ref 15–37)
BASOPHILS # BLD: 0 K/UL (ref 0–0.1)
BASOPHILS NFR BLD: 0 % (ref 0–1)
BILIRUB SERPL-MCNC: 0.4 MG/DL (ref 0.2–1)
BUN SERPL-MCNC: 7 MG/DL (ref 6–20)
BUN/CREAT SERPL: 13 (ref 12–20)
CA-I BLD-MCNC: 8.4 MG/DL (ref 8.5–10.1)
CHLORIDE SERPL-SCNC: 111 MMOL/L (ref 97–108)
CO2 SERPL-SCNC: 23 MMOL/L (ref 21–32)
COLLECT DATE STL: ABNORMAL
CREAT SERPL-MCNC: 0.53 MG/DL (ref 0.55–1.02)
DIFFERENTIAL METHOD BLD: ABNORMAL
EOSINOPHIL # BLD: 0 K/UL (ref 0–0.4)
EOSINOPHIL NFR BLD: 0 % (ref 0–7)
ERYTHROCYTE [DISTWIDTH] IN BLOOD BY AUTOMATED COUNT: 17 % (ref 11.5–14.5)
GLOBULIN SER CALC-MCNC: 6.1 G/DL (ref 2–4)
GLUCOSE SERPL-MCNC: 88 MG/DL (ref 65–100)
HCT VFR BLD AUTO: 21 % (ref 35–47)
HEMOCCULT SP1 STL QL: POSITIVE
HGB BLD-MCNC: 6.4 G/DL (ref 11.5–16)
IMM GRANULOCYTES # BLD AUTO: 0 K/UL
IMM GRANULOCYTES NFR BLD AUTO: 0 %
LYMPHOCYTES # BLD: 1.5 K/UL (ref 0.8–3.5)
LYMPHOCYTES NFR BLD: 27 % (ref 12–49)
MCH RBC QN AUTO: 26.9 PG (ref 26–34)
MCHC RBC AUTO-ENTMCNC: 30.5 G/DL (ref 30–36.5)
MCV RBC AUTO: 88.2 FL (ref 80–99)
MONOCYTES # BLD: 0.3 K/UL (ref 0–1)
MONOCYTES NFR BLD: 5 % (ref 5–13)
NEUTS SEG # BLD: 3.6 K/UL (ref 1.8–8)
NEUTS SEG NFR BLD: 68 % (ref 32–75)
NRBC # BLD: 0 K/UL (ref 0–0.01)
NRBC BLD-RTO: 0 PER 100 WBC
PLATELET # BLD AUTO: 247 K/UL (ref 150–400)
PLATELET COMMENTS,PCOM: ABNORMAL
PMV BLD AUTO: 9.7 FL (ref 8.9–12.9)
POTASSIUM SERPL-SCNC: 3.3 MMOL/L (ref 3.5–5.1)
PROT SERPL-MCNC: 7.4 G/DL (ref 6.4–8.2)
RBC # BLD AUTO: 2.38 M/UL (ref 3.8–5.2)
RBC MORPH BLD: ABNORMAL
RBC MORPH BLD: ABNORMAL
SODIUM SERPL-SCNC: 142 MMOL/L (ref 136–145)
THERAPEUTIC RANGE,PTTT: ABNORMAL SEC (ref 82–109)
WBC # BLD AUTO: 5.4 K/UL (ref 3.6–11)

## 2022-10-28 PROCEDURE — 97166 OT EVAL MOD COMPLEX 45 MIN: CPT

## 2022-10-28 PROCEDURE — 97530 THERAPEUTIC ACTIVITIES: CPT

## 2022-10-28 PROCEDURE — 06CN3ZZ EXTIRPATION OF MATTER FROM LEFT FEMORAL VEIN, PERCUTANEOUS APPROACH: ICD-10-PCS | Performed by: STUDENT IN AN ORGANIZED HEALTH CARE EDUCATION/TRAINING PROGRAM

## 2022-10-28 PROCEDURE — 86900 BLOOD TYPING SEROLOGIC ABO: CPT

## 2022-10-28 PROCEDURE — 80053 COMPREHEN METABOLIC PANEL: CPT

## 2022-10-28 PROCEDURE — 74011250636 HC RX REV CODE- 250/636: Performed by: INTERNAL MEDICINE

## 2022-10-28 PROCEDURE — 06CG3ZZ EXTIRPATION OF MATTER FROM LEFT EXTERNAL ILIAC VEIN, PERCUTANEOUS APPROACH: ICD-10-PCS | Performed by: STUDENT IN AN ORGANIZED HEALTH CARE EDUCATION/TRAINING PROGRAM

## 2022-10-28 PROCEDURE — 85730 THROMBOPLASTIN TIME PARTIAL: CPT

## 2022-10-28 PROCEDURE — 06CY3ZZ EXTIRPATION OF MATTER FROM LOWER VEIN, PERCUTANEOUS APPROACH: ICD-10-PCS | Performed by: STUDENT IN AN ORGANIZED HEALTH CARE EDUCATION/TRAINING PROGRAM

## 2022-10-28 PROCEDURE — 36415 COLL VENOUS BLD VENIPUNCTURE: CPT

## 2022-10-28 PROCEDURE — 74011000250 HC RX REV CODE- 250: Performed by: INTERNAL MEDICINE

## 2022-10-28 PROCEDURE — 74011250637 HC RX REV CODE- 250/637: Performed by: PHYSICIAN ASSISTANT

## 2022-10-28 PROCEDURE — 04CM3ZZ EXTIRPATION OF MATTER FROM RIGHT POPLITEAL ARTERY, PERCUTANEOUS APPROACH: ICD-10-PCS | Performed by: STUDENT IN AN ORGANIZED HEALTH CARE EDUCATION/TRAINING PROGRAM

## 2022-10-28 PROCEDURE — 30233N1 TRANSFUSION OF NONAUTOLOGOUS RED BLOOD CELLS INTO PERIPHERAL VEIN, PERCUTANEOUS APPROACH: ICD-10-PCS | Performed by: SURGERY

## 2022-10-28 PROCEDURE — 36430 TRANSFUSION BLD/BLD COMPNT: CPT

## 2022-10-28 PROCEDURE — 04CH3ZZ EXTIRPATION OF MATTER FROM RIGHT EXTERNAL ILIAC ARTERY, PERCUTANEOUS APPROACH: ICD-10-PCS | Performed by: STUDENT IN AN ORGANIZED HEALTH CARE EDUCATION/TRAINING PROGRAM

## 2022-10-28 PROCEDURE — 74011250637 HC RX REV CODE- 250/637: Performed by: NURSE PRACTITIONER

## 2022-10-28 PROCEDURE — 74011000258 HC RX REV CODE- 258: Performed by: INTERNAL MEDICINE

## 2022-10-28 PROCEDURE — 82272 OCCULT BLD FECES 1-3 TESTS: CPT

## 2022-10-28 PROCEDURE — 85025 COMPLETE CBC W/AUTO DIFF WBC: CPT

## 2022-10-28 PROCEDURE — 74011250637 HC RX REV CODE- 250/637: Performed by: INTERNAL MEDICINE

## 2022-10-28 PROCEDURE — 65270000029 HC RM PRIVATE

## 2022-10-28 PROCEDURE — 86923 COMPATIBILITY TEST ELECTRIC: CPT

## 2022-10-28 PROCEDURE — 06CD3ZZ EXTIRPATION OF MATTER FROM LEFT COMMON ILIAC VEIN, PERCUTANEOUS APPROACH: ICD-10-PCS | Performed by: STUDENT IN AN ORGANIZED HEALTH CARE EDUCATION/TRAINING PROGRAM

## 2022-10-28 PROCEDURE — 99232 SBSQ HOSP IP/OBS MODERATE 35: CPT | Performed by: INTERNAL MEDICINE

## 2022-10-28 PROCEDURE — 04CK3ZZ EXTIRPATION OF MATTER FROM RIGHT FEMORAL ARTERY, PERCUTANEOUS APPROACH: ICD-10-PCS | Performed by: STUDENT IN AN ORGANIZED HEALTH CARE EDUCATION/TRAINING PROGRAM

## 2022-10-28 PROCEDURE — 97110 THERAPEUTIC EXERCISES: CPT

## 2022-10-28 PROCEDURE — P9016 RBC LEUKOCYTES REDUCED: HCPCS

## 2022-10-28 RX ORDER — POTASSIUM CHLORIDE 20 MEQ/1
40 TABLET, EXTENDED RELEASE ORAL 2 TIMES DAILY
Status: COMPLETED | OUTPATIENT
Start: 2022-10-28 | End: 2022-10-29

## 2022-10-28 RX ADMIN — HEPARIN SODIUM 500 UNITS/HR: 10000 INJECTION, SOLUTION INTRAVENOUS at 10:32

## 2022-10-28 RX ADMIN — SODIUM CHLORIDE, PRESERVATIVE FREE 10 ML: 5 INJECTION INTRAVENOUS at 05:10

## 2022-10-28 RX ADMIN — SODIUM CHLORIDE, PRESERVATIVE FREE 10 ML: 5 INJECTION INTRAVENOUS at 17:20

## 2022-10-28 RX ADMIN — FLUCONAZOLE 200 MG: 100 TABLET ORAL at 09:19

## 2022-10-28 RX ADMIN — CROMOLYN SODIUM 1 DROP: 40 SOLUTION/ DROPS OPHTHALMIC at 09:19

## 2022-10-28 RX ADMIN — OXYCODONE 5 MG: 5 TABLET ORAL at 09:17

## 2022-10-28 RX ADMIN — IRON SUCROSE 200 MG: 20 INJECTION, SOLUTION INTRAVENOUS at 12:40

## 2022-10-28 RX ADMIN — SODIUM CHLORIDE, PRESERVATIVE FREE 10 ML: 5 INJECTION INTRAVENOUS at 00:13

## 2022-10-28 RX ADMIN — POTASSIUM CHLORIDE 40 MEQ: 1500 TABLET, EXTENDED RELEASE ORAL at 20:44

## 2022-10-28 RX ADMIN — HEPARIN SODIUM 500 UNITS/HR: 10000 INJECTION, SOLUTION INTRAVENOUS at 04:55

## 2022-10-28 RX ADMIN — SODIUM CHLORIDE, PRESERVATIVE FREE 10 ML: 5 INJECTION INTRAVENOUS at 23:57

## 2022-10-28 RX ADMIN — MEROPENEM 1 G: 1 INJECTION, POWDER, FOR SOLUTION INTRAVENOUS at 00:13

## 2022-10-28 RX ADMIN — MEROPENEM 1 G: 1 INJECTION, POWDER, FOR SOLUTION INTRAVENOUS at 17:19

## 2022-10-28 RX ADMIN — MEROPENEM 1 G: 1 INJECTION, POWDER, FOR SOLUTION INTRAVENOUS at 09:17

## 2022-10-28 NOTE — PROGRESS NOTES
UROLOGY Progress Note          [phone]      Daily Progress Note: 10/28/2022      Subjective: The patient is seen for UROLOGIC follow  up. She presented 10/20/2022 with nausea and vomiting. Not taking po well    Pt c/o stool per vagina.   Brownish discharge per primary team.       Problem List:  Patient Active Problem List   Diagnosis Code    History of blood clots Z86.718    Bladder cancer ([facility] [address] C67.9    Anemia due to acute blood loss D62    Hypercalcemia E83.52    Hydronephrosis of right kidney N13.30    Retained ureteral stent Z96.0    Severe protein-calorie malnutrition (HCC) E43    Acute blood loss anemia D62    Small bowel obstruction (HCC) K56.609    Status post robot-assisted surgical procedure, pelvic exenteration Z98.890    Vaginal cuff cellulitis N76.0         Medications reviewed  Current Facility-Administered Medications   Medication Dose Route Frequency    fentaNYL citrate (PF) injection  mcg   mcg IntraVENous Rad Multiple    midazolam (VERSED) injection 0.5-2 mg  0.5-2 mg IntraVENous Rad Multiple    heparin (porcine) 1,000 unit/mL injection 2,000 Units  2,000 Units IntraVENous Rad Multiple    iron sucrose (VENOFER) injection 200 mg  200 mg IntraVENous Q24H    meropenem (MERREM) 1 g in 0.9% sodium chloride (MBP/ADV) 50 mL MBP  1 g IntraVENous Q8H    midodrine (PROAMATINE) tablet 5 mg  5 mg Oral TID PRN    fluconazole (DIFLUCAN) tablet 200 mg  200 mg Oral DAILY    heparin 25,000 units in D5W 250 ml infusion  500 Units/hr IntraVENous TITRATE    oxyCODONE IR (ROXICODONE) tablet 5 mg  5 mg Oral Q6H PRN    0.9% sodium chloride infusion 250 mL  250 mL IntraVENous PRN    cromolyn (OPTICROM) 4 % ophthalmic solution 1 Drop  1 Drop Both Eyes DAILY    sodium chloride (NS) flush 5-40 mL  5-40 mL IntraVENous Q8H    sodium chloride (NS) flush 5-40 mL  5-40 mL IntraVENous PRN    acetaminophen (TYLENOL) tablet 650 mg  650 mg Oral Q6H PRN    Or    acetaminophen (TYLENOL) suppository 650 mg 650 mg Rectal Q6H PRN    polyethylene glycol (MIRALAX) packet 17 g  17 g Oral DAILY PRN    ondansetron (ZOFRAN ODT) tablet 4 mg  4 mg Oral Q8H PRN    Or    ondansetron (ZOFRAN) injection 4 mg  4 mg IntraVENous Q6H PRN       Review of Systems:   Review of Systems   Constitutional:  Positive for malaise/fatigue. Objective:   Physical Exam  Constitutional:       Appearance: Normal appearance. HENT:      Head: Normocephalic and atraumatic. Eyes:      Extraocular Movements: Extraocular movements intact. Pupils: Pupils are equal, round, and reactive to light. Pulmonary:      Effort: Pulmonary effort is normal. No respiratory distress. Breath sounds: No wheezing. Genitourinary:     Comments: RLQ ileal conduit  Musculoskeletal:      Cervical back: Normal range of motion. No rigidity. Skin:     General: Skin is warm and dry. Neurological:      Mental Status: She is alert and oriented to person, place, and time. Mental status is at baseline.    Psychiatric:         Mood and Affect: Mood normal.         Behavior: Behavior normal.        Visit Vitals  BP 95/61 (BP 1 Location: Left upper arm)   Pulse 90   Temp 97.7 °F (36.5 °C)   Resp 20   Ht 5' 2\" (1.575 m)   Wt 114 lb (51.7 kg)   SpO2 100%   BMI 20.85 kg/m²         Data Review:       Recent Days:  Recent Labs     10/28/22  0304 10/27/22  0621 10/26/22  0013   WBC 5.4 4.0 4.1   HGB 6.4* 7.7* 7.5*   HCT 21.0* 24.9* 24.6*    282 281       Recent Labs     10/28/22  0304 10/27/22  0621 10/26/22  0013    142 139   K 3.3* 3.4* 3.1*   * 112* 111*   CO2 23 23 22   GLU 88 77 72   BUN 7 5* 7   CREA 0.53* 0.52* 0.54*   CA 8.4* 8.7 8.6   ALB 1.3* 1.4* 1.4*   TBILI 0.4 0.6 0.5   ALT 11* 8* 14                 Assessment/     Patient Active Problem List   Diagnosis Code    History of blood clots Z86.718    Bladder cancer (HCC) C67.9    Anemia due to acute blood loss D62    Hypercalcemia E83.52    Hydronephrosis of right kidney N13.30    Retained ureteral stent Z96.0    Severe protein-calorie malnutrition (HCC) E43    Acute blood loss anemia D62    Small bowel obstruction ([facility]) K56.609    Status post robot-assisted surgical procedure, pelvic exenteration Z98.890    Vaginal cuff cellulitis N76.0   Ileus versus partial small bowel obstruction on admission. Tolerating some p.o./Full liquids poor appetite. Nutrition seems to be the underlying issue. Albumin levels are decreasing. I am concerned she will not heal without adequate protein and calories. Candida in the urine culture from the conduit. On fluconazole. ID following. Vaginal discharge. No rectovaginal fistula on CT or clinically. Gen Surgery assessment was in agreement. Probable some vaginal wound dehiscence and drainage. On abx. If continues may need washout and closure. Bladder cancer status post cystectomy 10/3/2022. pT3 N0 M0. Oncology to discuss adjuvant therapy. Mild hydronephrosis- asymptomatic. May be physiologic after conduit creation. Renal function ok. Pt declines intervention. Observe. Chronic anemia. Hematology following. DVT with IVC filter.          Homar Mcbride MD

## 2022-10-28 NOTE — PROGRESS NOTES
Problem: Mobility Impaired (Adult and Pediatric)  Goal: *Acute Goals and Plan of Care (Insert Text)  Description: FUNCTIONAL STATUS PRIOR TO ADMISSION: Patient was independent without use of DME.    HOME SUPPORT PRIOR TO ADMISSION: The patient lived alone with neice to provide assistance. Physical Therapy Goals  Initiated 10/26/2022  1. Patient will move from supine to sit and sit to supine  in bed with modified independence within 7 day(s). 2.  Patient will transfer from bed to chair and chair to bed with modified independence using the least restrictive device within 7 day(s). 3.  Patient will perform sit to stand with modified independence within 7 day(s). 4.  Patient will ambulate with modified independence for 100-150 feet with the least restrictive device within 7 day(s). 5.  Patient will ascend/descend 12 stairs with 1 handrail(s) with modified independence within 7 day(s). Outcome: Progressing Towards Goal   PHYSICAL THERAPY TREATMENT  Patient: Luann Gonzalez (51 y.o. female)  Date: 10/28/2022  Diagnosis: Acute blood loss anemia [D62]  Small bowel obstruction (HCC) [K56.609] Small bowel obstruction (HCC)  Procedure(s) (LRB):  Endoscopy Of Conduit Retrograde Pyelograms And Bilateral Ureteral Stent Placement (Bilateral) 4 Days Post-Op  Precautions:    Chart, physical therapy assessment, plan of care and goals were reviewed. ASSESSMENT  Patient continues with skilled PT services and is progressing towards goals. Pt found supine in bed upon PT arrival, agreeable to session. (See below for objective details and assist levels). Overall pt tolerated session fair today with therex, limited session to bed due to pt reporting fatigue and low hgb. Will continue to benefit from skilled PT services, and will continue to progress as tolerated.      Current Level of Function Impacting Discharge (mobility/balance): functional mobility     Other factors to consider for discharge: Amount of A needed for mobility          PLAN :  Patient continues to benefit from skilled intervention to address the above impairments. Continue treatment per established plan of care to address goals. Recommend with staff: Out of bed to chair for meals, Encourage HEP in prep for ADLs/mobility, and Frequent repositioning to prevent skin breakdown    Recommendation for discharge: (in order for the patient to meet his/her long term goals)  [address]     This discharge recommendation:  Has been made in collaboration with the attending provider and/or case management    IF patient discharges home will need the following DME: to be determined (TBD)       SUBJECTIVE:   Patient stated I've been through alot.     OBJECTIVE DATA SUMMARY:   Critical Behavior:  Neurologic State: Alert  Orientation Level: Oriented X4  Cognition: Follows commands, Appropriate decision making  Safety/Judgement: Decreased awareness of environment  Therapeutic Exercises:       EXERCISE   Sets   Reps   Active Active Assist   Passive Self ROM   Comments   Ankle Pumps 1 10 [x] [] [] []    Quad Sets/Glut Sets 1 10 [x] [] [] []    Hamstring Sets   [] [] [] []    Short Arc Quads   [] [] [] []    Heel Slides   [] [] [] []    Straight Leg Raises 1 10 [x] [] [] []    Hip abd/add 1 10 [x] [] [] []    Long Arc Quads 1 10 [x] [] [] []    Marching   [] [] [] []       [] [] [] []       Pain Rating:  Pt denies pain, reports fatigue     Activity Tolerance:   Fair    After treatment patient left in no apparent distress:   Bed locked and returned to lowest position, Supine in bed, Call bell within reach, Bed / chair alarm activated, and Caregiver / family present    COMMUNICATION/COLLABORATION:   The patients plan of care was discussed with: Registered nurse.      Tasha Harris PTA, PT   Time Calculation: 15 mins

## 2022-10-28 NOTE — PROGRESS NOTES
[facility] SURGERY PROGRESS NOTES        Chief Complaint: _nausea    History of Present Illness:    Ms. Mehdi Queen is a 61y.o. year old * female presents to ER with 2 day history of nausea and poor PO intake. No vomiting. Passing some flatus. She had undergone robotic assisted anterior exenteration by Dr. Jann Arevalo. She was recently discharged. However she has not been feeling well. Has been able to take very little due to nausea. In ER CT scan without PO contrast showed some mildly dilated small bowel suggestive of possible bowel obstruction versus ileus. Patient denies any fever or chills or abdominal pain. Passed flatus. Already has IVC filter. On anticoagulation therapy. Tolerating soft diet    She has been experiencing some vaginal drainage that looks greenish & stool like for the past few days which has very minimal  No fever or chills. CT with PO & IV contrast reviewed with Dr. Joaquin Bonner shows some ileus pattern and small fluid collection with air pocket just superior to vaginal cuff. PO contrast has reached the cecum only. No major abscess or overt bowel obstruction.       Past Medical History:   Past Medical History:   Diagnosis Date    Anemia     pt states had recent blood transfusion sept     Back pain     Cancer (HCC)     bladder    DVT (deep venous thrombosis) (HCC)     and PE, pt denies lung PE , only leg several years ago    Hepatitis C     pt states dx 1 month ago    Liver disease     Presence of IVC filter     pt states several years ago    Rheumatoid arthritis ([facility] [address]     Sciatic leg pain     pt states both legs    Uterine fibroid        Past Surgical History:   Past Surgical History:   Procedure Laterality Date    HX APPENDECTOMY  10/03/2022    HX  SECTION      HX GYN  10/03/2022    ROBOT ANTERIOR PELVIC EXENTERATION    HX HYSTERECTOMY  10/03/2022    HX SALPINGO-OOPHORECTOMY Bilateral 10/03/2022    HX UROLOGICAL  09/15/2022    CYSTOSCOPY    HX UROLOGICAL  09/15/2022    URETHRAL DILATION    HX UROLOGICAL  09/15/2022    URETERAL STENT PLACEMENT    HX UROLOGICAL  09/15/2022    TRANSURETHRAL RESECTION OF BLADDER TUMOR >2CM    HX UROLOGICAL Bilateral 09/15/2022    RETROGRADE PYELOGRAM    HX UROLOGICAL  10/03/2022    PELVIC LYMPH NODE DISSECTION    HX UROLOGICAL  10/03/2022    ILEAL CONDUIT URINARY DIVERSION    IR THROMBECTOMY [facility] VE[facility] PHARM  10/27/2022    [facility] PROCEDURE UNLIST      stent placement        Allergy:  Allergies   Allergen Reactions    Bactrim [Sulfamethoprim] Swelling     Swelling of the lilps    Penicillin V Potassium Swelling       Social History:  reports that she has quit smoking. Her smoking use included cigarettes. She has never used smokeless tobacco. She reports that she does not currently use alcohol. She reports that she does not use drugs.      Family History:  Family History   Problem Relation Age of Onset    Cancer Mother     Lung Cancer Mother     Heart Disease Father     Hypertension Sister     Cancer Maternal Aunt     Breast Cancer Maternal Aunt         Current Medications:  Current Facility-Administered Medications:     potassium chloride (K-DUR, KLOR-CON M20) SR tablet 40 mEq, 40 mEq, Oral, BID, Isac Sanz PA-C    fentaNYL citrate (PF) injection  mcg,  mcg, IntraVENous, Oseas Bingham Jawad S, MD, 50 mcg at 10/27/22 1213    midazolam (VERSED) injection 0.5-2 mg, 0.5-2 mg, IntraVENous, Oseas Bingham Jawad S, MD, 1 mg at 10/27/22 1213    heparin (porcine) 1,000 unit/mL injection 2,000 Units, 2,000 Units, IntraVENous, Oseas Bingham Jawad S, MD, 2,000 Units at 10/27/22 1223    iron sucrose (VENOFER) injection 200 mg, 200 mg, IntraVENous, Q24H, Fior Dillon MD, 200 mg at 10/28/22 1240    meropenem (MERREM) 1 g in 0.9% sodium chloride (MBP/ADV) 50 mL MBP, 1 g, IntraVENous, Q8H, Son Cabral MD, Last Rate: 16.7 mL/hr at 10/28/22 1719, 1 g at 10/28/22 1719    midodrine (PROAMATINE) tablet 5 mg, 5 mg, Oral, TID PRN, Manuel Modi, HARINI, 5 mg at 10/25/22 8415    fluconazole (DIFLUCAN) tablet 200 mg, 200 mg, Oral, DAILY, Son Cabral MD, 200 mg at 10/28/22 0919    [Held by provider] heparin 25,000 units in D5W 250 ml infusion, 500 Units/hr, IntraVENous, TITRATE, Laila Willingham MD, Stopped at 10/28/22 1658    oxyCODONE IR (ROXICODONE) tablet 5 mg, 5 mg, Oral, Q6H PRN, Manuel Modi, NP, 5 mg at 10/28/22 0917    0.9% sodium chloride infusion 250 mL, 250 mL, IntraVENous, PRN, Alissa Posadas MD    cromolyn (OPTICROM) 4 % ophthalmic solution 1 Drop, 1 Drop, Both Eyes, DAILY, Alissa Posadas MD, 1 Drop at 10/28/22 0919    sodium chloride (NS) flush 5-40 mL, 5-40 mL, IntraVENous, Q8H, Alissa Posadas MD, 10 mL at 10/28/22 1720    sodium chloride (NS) flush 5-40 mL, 5-40 mL, IntraVENous, PRN, Shari Ramsey MD, 10 mL at 10/27/22 1406    acetaminophen (TYLENOL) tablet 650 mg, 650 mg, Oral, Q6H PRN, 650 mg at 10/25/22 1111 **OR** acetaminophen (TYLENOL) suppository 650 mg, 650 mg, Rectal, Q6H PRN, Alissa Posadas MD    polyethylene glycol (MIRALAX) packet 17 g, 17 g, Oral, DAILY PRN, Alissa Posadas MD    ondansetron (ZOFRAN ODT) tablet 4 mg, 4 mg, Oral, Q8H PRN, 4 mg at 10/25/22 1110 **OR** ondansetron (ZOFRAN) injection 4 mg, 4 mg, IntraVENous, Q6H PRN, Alissa Posadas MD     Immunization History: There is no immunization history on file for this patient. Complete    Review of Systems:     Constitutional:  no fever,  no chills,  no sweats, No weakness, No fatigue, No decreased activity. Eye: No recent visual problem, No icterus, No discharge, No double vision. Ear/Nose/Mouth/Throat: No decreased hearing, No ear pain, No nasal congestion, No sore throat. Respiratory: No shortness of breath, No cough, No sputum production, No hemoptysis, No wheezing, No cyanosis. Cardiovascular: No chest pain, No palpitations, No bradycardia, No tachycardia, No peripheral edema, No syncope.   Gastrointestinal:  nausea,  No vomiting, No diarrhea, No constipation, No heartburn,  No abdominal pain. Genitourinary: No dysuria, No hematuria, No change in urine stream, No urethral discharge, No lesions. Hematology/Lymphatics: No bruising tendency, No bleeding tendency, No petechiae, No swollen lymph glands. Endocrine: No excessive thirst, No polyuria, No cold intolerance, No heat intolerance, No excessive hunger. Immunologic: Not immunocompromised, No recurrent fevers, No recurrent infections. Musculoskeletal: No back pain, No neck pain, No joint pain, No muscle pain, No claudication, No decreased range of motion, No trauma. Integumentary: No rash, No pruritus, No abrasions. Neurologic: Alert and oriented X4, No abnormal balance, No headache, No confusion, No numbness, No tingling. Psychiatric: No anxiety, No depression, No nate. Physical Exam:     Vitals & Measurements: Wt Readings from Last 3 Encounters:   10/19/22 51.7 kg (114 lb)   10/13/22 51.3 kg (113 lb)   10/09/22 59.6 kg (131 lb 6.3 oz)     Temp Readings from Last 3 Encounters:   10/28/22 98.3 °F (36.8 °C)   10/17/22 98.7 °F (37.1 °C)   10/13/22 98.3 °F (36.8 °C)     BP Readings from Last 3 Encounters:   10/28/22 106/70   10/17/22 (!) 103/58   10/13/22 116/67     Pulse Readings from Last 3 Encounters:   10/28/22 95   10/17/22 94   10/13/22 100      Ht Readings from Last 3 Encounters:   10/26/22 5' 2\" (1.575 m)   10/13/22 5' 2\" (1.575 m)   10/05/22 5' 2.01\" (1.575 m)          General: well appearing, no acute distress  Head: Normal  Face: Nornal  HEENT: atraumatic, PERRLA, moist mucosa, normal pharynx, normal tonsils and adenoids, normal tongue, no fluid in sinuses  Neck: Trachea midline, no carotid bruit, no masses  Chest: Normal.  Respiratory: Normal chest wall expansion, CTA B, no r/r/w, no rubs  Cardiovascular: RRR, no m/r/g, Normal S1 and S2  Abdomen: Soft, non tender, non-distended, normal bowel sounds in all quadrants, no hepatosplenomegaly, no tympany.  Incision scar: well healed, right lower quadrant urostomy+  Genitourinary: No inguinal hernia, normal external gentalia, no renal angle tenderness, minimal greenish yellow discharge from both vagina & rectum+  Rectal: deferred  Musculoskeletal: normal ROM in upper and lower extremities, No joint swelling. Integumentary: Warm, dry, and pink, with no rash, purpura, or petechia  Heme/Lymph: No lymphadenopathy, no bruises  Neurological:Cranial Nerves II-XII grossly intact, no gross motor or sensory deficit  Psychiatric: Cooperative with normal mood, affect, and cognition      Laboratory Values:   Recent Results (from the past 24 hour(s))   PTT    Collection Time: 10/27/22  8:59 PM   Result Value Ref Range    aPTT 86.4 (H) 21.2 - 34.1 sec    aPTT, therapeutic range   82 - 109 sec   CBC WITH AUTOMATED DIFF    Collection Time: 10/28/22  3:04 AM   Result Value Ref Range    WBC 5.4 3.6 - 11.0 K/uL    RBC 2.38 (L) 3.80 - 5.20 M/uL    HGB 6.4 (L) 11.5 - 16.0 g/dL    HCT 21.0 (L) 35.0 - 47.0 %    MCV 88.2 80.0 - 99.0 FL    MCH 26.9 26.0 - 34.0 PG    MCHC 30.5 30.0 - 36.5 g/dL    RDW 17.0 (H) 11.5 - 14.5 %    PLATELET 756 939 - 742 K/uL    MPV 9.7 8.9 - 12.9 FL    NRBC 0.0 0.0  WBC    ABSOLUTE NRBC 0.00 0.00 - 0.01 K/uL    NEUTROPHILS 68 32 - 75 %    LYMPHOCYTES 27 12 - 49 %    MONOCYTES 5 5 - 13 %    EOSINOPHILS 0 0 - 7 %    BASOPHILS 0 0 - 1 %    IMMATURE GRANULOCYTES 0 %    ABS. NEUTROPHILS 3.6 1.8 - 8.0 K/UL    ABS. LYMPHOCYTES 1.5 0.8 - 3.5 K/UL    ABS. MONOCYTES 0.3 0.0 - 1.0 K/UL    ABS. EOSINOPHILS 0.0 0.0 - 0.4 K/UL    ABS. BASOPHILS 0.0 0.0 - 0.1 K/UL    ABS. IMM.  GRANS. 0.0 K/UL    DF Manual      PLATELET COMMENTS Large Platelets      RBC COMMENTS Polychromasia  1+        RBC COMMENTS Anisocytosis  1+       METABOLIC PANEL, COMPREHENSIVE    Collection Time: 10/28/22  3:04 AM   Result Value Ref Range    Sodium 142 136 - 145 mmol/L    Potassium 3.3 (L) 3.5 - 5.1 mmol/L    Chloride 111 (H) 97 - 108 mmol/L    CO2 23 21 - 32 mmol/L    Anion gap 8 5 - 15 mmol/L    Glucose 88 65 - 100 mg/dL    BUN 7 6 - 20 mg/dL    Creatinine 0.53 (L) 0.55 - 1.02 mg/dL    BUN/Creatinine ratio 13 12 - 20      eGFR >60 >60 ml/min/1.73m2    Calcium 8.4 (L) 8.5 - 10.1 mg/dL    Bilirubin, total 0.4 0.2 - 1.0 mg/dL    AST (SGOT) 31 15 - 37 U/L    ALT (SGPT) 11 (L) 12 - 78 U/L    Alk. phosphatase 82 45 - 117 U/L    Protein, total 7.4 6.4 - 8.2 g/dL    Albumin 1.3 (L) 3.5 - 5.0 g/dL    Globulin 6.1 (H) 2.0 - 4.0 g/dL    A-G Ratio 0.2 (L) 1.1 - 2.2     PTT    Collection Time: 10/28/22  3:04 AM   Result Value Ref Range    aPTT 75.8 (H) 21.2 - 34.1 sec    aPTT, therapeutic range   82 - 109 sec   PTT    Collection Time: 10/28/22  9:56 AM   Result Value Ref Range    aPTT 39.8 (H) 21.2 - 34.1 sec    aPTT, therapeutic range   82 - 109 sec   TYPE & SCREEN    Collection Time: 10/28/22 12:38 PM   Result Value Ref Range    Crossmatch Expiration 10/31/2022,2359     ABO/Rh(D) A Positive     Antibody screen Negative     Unit number [id number]     Blood component type [facility]     Unit division 00     Status of unit [address] to transfuse     Crossmatch result Compatible    OCCULT BLOOD, STOOL    Collection Time: 10/28/22  1:00 PM   Result Value Ref Range    Occult Blood,day 1 Positive (A) Negative      Day 1 date: 10,282,022     PTT    Collection Time: 10/28/22  4:36 PM   Result Value Ref Range    aPTT 112.3 (HH) 21.2 - 34.1 sec    aPTT, therapeutic range   82 - 109 sec           IR THROMBECTOMY [facility] VEIN W PHARM   Final Result   Bilateral iliofemoral and IVC aspiration thrombectomy with removal of a large   volume of thrombus from both lower extremities and IVC. CT ABD PELV W CONT   Final Result   Rectovaginal fistula is not identified   Extensive bilateral iliac venous thrombus to the level of the IVC filter. Right   lower extremity edema.    Cholelithiasis with distended gallbladder   Bilateral hydronephrosis and hydroureter in patient with ileal conduit   Probable small bowel obstruction secondary to adhesion      XR ABD (KUB)   Final Result   1. No significant change in small bowel dilation. Large volume of stool in the   rectum. 2. Cholelithiasis. XR ABD (KUB)   Final Result   Dilated small bowel within the central abdomen is similar to CT from one day   prior. DUPLEX LOWER EXT VENOUS BILAT   Final Result   Addendum (preliminary) 1 of 1      · Thrombus present in the right external iliac vein. · Thrombus present in the right common femoral vein. · Thrombus present in the right femoral vein. · Thrombus present in the right proximal femoral vein. · Thrombus present in the right mid femoral vein. · Thrombus present in the right distal femoral vein. · Thrombus present in the right popliteal vein. · Thrombus present in the right gastrocnemius vein. · Thrombus present in the right peroneal vein. · Thrombus present in the right saphenofemoral junction. · Thrombus present in the left external iliac vein. · Thrombus present in the left common femoral vein. · Thrombus present in the left saphenofemoral junction. · Thrombus present in the left femoral vein. · Thrombus present in the left proximal femoral vein. · Thrombus present in the left mid femoral vein. · Thrombus present in the left popliteal vein. · Thrombus present in the left distal femoral vein. · Thrombus present in the left gastrocnemius vein. · Thrombus present in the left peroneal vein. This is vascular lab report on Guerda Bustillos, patient's YOB: 1965   Date of examination: October 20, 2022   Examination: Duplex lower extremity venous bilateral   Technician: Ms. Jimmy Carr   Clinical history: This is  61years old woman with suspected renal vein    thrombosis   Indication: femoral vein thrombosis.    Technique: Bilateral leg venous structures examined using venous duplex    ultrasound with 2D grayscale, color-flow and spectral Doppler analysis. Findings:   Right side:   common femoral vein and saphenofemoral junction is not compressible and    there is hypoechoic filling defect noted. common femoral vein shows no venous flow   Distal external iliac vein also not compressible and that there is no    venous flow   Proximal femoral vein is noncompressible and there is no venous flow   Mid femoral vein is not compressible and there is no venous flow   Distal femoral vein is noncompressible and there is no venous flow   Proximal, mid, distal greater saphenous vein is compressible, there is no    filling defect   Distal popliteal vein is not compressible. Proximal popliteal vein is noncompressible and there is no venous flow   Proximal gastrocnemius vein is not compressible   Proximal small saphenous vein is not compressible   Distal, mid, proximal  peroneal vein is not compressible      Left side:   common femoral vein not compressible and there is no venous flow   Saphenofemoral junction is not compressible and there is no venous flow. External iliac vein also not compressible there is no venous flow   Proximal femoral vein is noncompressible and there is no venous flow   Mid femoral vein is noncompressible and there is no venous flow   Distal femoral vein is not compressible and there is no venous flow   Proximal, mid, distal greater saphenous vein is noncompressible and there    is hypoechoic filling defect noted. Distal popliteal vein is noncompressible   Proximal popliteal vein is noncompressible and there is no venous flow   Proximal gastrocnemius vein is not compressible and there is no venous    flow   Proximal small saphenous vein is noncompressible and there is no venous    flow   Distal, mid, proximal peroneal vein is not compressible. Impression:   1.   Right external iliac vein, common femoral vein, femoral vein,    popliteal vein, gastrocnemius vein, small saphenous vein, and peroneal vein shows acute venous thrombosis   2. Left external iliac vein, common femoral vein, femoral vein, popliteal    vein, gastrocnemius vein, small saphenous vein, peroneal vein, and    saphenofemoral junction and greater saphenous vein shows acute venous    thrombosis. These  extensive bilateral lower extremity deep vein thrombosis notified    to Dr. James Hdz on 10/20/2022 1201 PM.            CT ABD PELV WO CONT   Final Result   Status post cystectomy and hysterectomy with right lower quadrant ileal conduit. Mild bilateral hydroureteronephrosis, evaluation is somewhat limited by the   presence of contrast from previously performed examination. Small bowel   distention with decompressed loops distally concerning for obstruction likely   related to adhesion formation. While this study was done without the use of   intravenous contrast there is questionable right common femoral vein thrombus   with soft tissue edema. CTA CHEST W OR W WO CONT   Final Result   Emphysematous changes. No evidence of pulmonary embolism or acute   abnormality. Cholelithiasis. Bilateral hydronephrosis. 1 cm hypodense lesion   right hepatic lobe cannot be characterized with certainty on the basis of this   examination. Follow-up is recommended to ensure stability. XR CHEST PORT   Final Result   No Acute Disease. XR FOOT LT MIN 3 V   Final Result   Normal study. IR THROMBECTOMY [facility] VEIN W PHARM    (Results Pending)       Assessment:  Ileus, resolving    Nausea resolved    DVT    Mild Vaginal drainage may be just some pelvic fluid draining unable to confirm that it could be a RV fistula. Anemia    Plan:    Admission  Diet: soft diet  IV fluids  SCD  IS  Nausea medication  Labs in am  Anticoagulation therapy per Hematologist.  Transfused pRBC     Thank you for the consultation & allowing me to participate in the care of this patient.

## 2022-10-28 NOTE — PROGRESS NOTES
Interventional Radiology Progress Note    10/28/2022    Subjective:    History: 60 yo female with hx of invasive bladder cancer and resective surgery, with extensive bilateral lower extremity DVTs is 1 day s/p catheter-directed DVT thrombectomy. Significant intra-procedure reduction in clot burden. Oh heparin drip. Today: Today pt reports improvement in her leg swelling and pain. Reports no bleeding overnight. Denies nausea, vomiting, leg pain. Male family member at bedside. Objective:    Laboratory:      Recent Labs     10/28/22  0304   HGB 6.4*   HCT 21.0*   WBC 5.4      BUN 7   CREA 0.53*   K 3.3*       Imaging:  XR FOOT LT MIN 3 V    Result Date: 10/19/2022  Normal study. XR ABD (KUB)    Result Date: 10/23/2022  1. No significant change in small bowel dilation. Large volume of stool in the rectum. 2. Cholelithiasis. XR ABD (KUB)    Result Date: 10/21/2022  Dilated small bowel within the central abdomen is similar to CT from one day prior. CTA CHEST W OR W WO CONT    Result Date: 10/20/2022  Emphysematous changes. No evidence of pulmonary embolism or acute abnormality. Cholelithiasis. Bilateral hydronephrosis. 1 cm hypodense lesion right hepatic lobe cannot be characterized with certainty on the basis of this examination. Follow-up is recommended to ensure stability. CT ABD PELV WO CONT    Result Date: 10/20/2022  Status post cystectomy and hysterectomy with right lower quadrant ileal conduit. Mild bilateral hydroureteronephrosis, evaluation is somewhat limited by the presence of contrast from previously performed examination. Small bowel distention with decompressed loops distally concerning for obstruction likely related to adhesion formation. While this study was done without the use of intravenous contrast there is questionable right common femoral vein thrombus with soft tissue edema.     CT ABD PELV W CONT    Result Date: 10/24/2022  Rectovaginal fistula is not identified Extensive bilateral iliac venous thrombus to the level of the IVC filter. Right lower extremity edema. Cholelithiasis with distended gallbladder Bilateral hydronephrosis and hydroureter in patient with ileal conduit Probable small bowel obstruction secondary to adhesion    IR THROMBECTOMY [facility] VEIN W PHARM    Result Date: 10/28/2022  Bilateral iliofemoral and IVC aspiration thrombectomy with removal of a large volume of thrombus from both lower extremities and IVC. XR CHEST PORT    Result Date: 10/19/2022  No Acute Disease. DUPLEX LOWER EXT VENOUS BILAT    Addendum Date: 10/21/2022    · Thrombus present in the right external iliac vein. · Thrombus present in the right common femoral vein. · Thrombus present in the right femoral vein. · Thrombus present in the right proximal femoral vein. · Thrombus present in the right mid femoral vein. · Thrombus present in the right distal femoral vein. · Thrombus present in the right popliteal vein. · Thrombus present in the right gastrocnemius vein. · Thrombus present in the right peroneal vein. · Thrombus present in the right saphenofemoral junction. · Thrombus present in the left external iliac vein. · Thrombus present in the left common femoral vein. · Thrombus present in the left saphenofemoral junction. · Thrombus present in the left femoral vein. · Thrombus present in the left proximal femoral vein. · Thrombus present in the left mid femoral vein. · Thrombus present in the left popliteal vein. · Thrombus present in the left distal femoral vein. · Thrombus present in the left gastrocnemius vein. · Thrombus present in the left peroneal vein. This is vascular lab report on Lori Spicer, patient's YOB: 1965 Date of examination: October 20, 2022 Examination: Duplex lower extremity venous bilateral Technician: Ms. Joan Anthony Clinical history: This is  61years old woman with suspected renal vein thrombosis Indication: femoral vein thrombosis.  Technique: Bilateral leg venous structures examined using venous duplex ultrasound with 2D grayscale, color-flow and spectral Doppler analysis. Findings: Right side: common femoral vein and saphenofemoral junction is not compressible and there is hypoechoic filling defect noted. common femoral vein shows no venous flow Distal external iliac vein also not compressible and that there is no venous flow Proximal femoral vein is noncompressible and there is no venous flow Mid femoral vein is not compressible and there is no venous flow Distal femoral vein is noncompressible and there is no venous flow Proximal, mid, distal greater saphenous vein is compressible, there is no filling defect Distal popliteal vein is not compressible. Proximal popliteal vein is noncompressible and there is no venous flow Proximal gastrocnemius vein is not compressible Proximal small saphenous vein is not compressible Distal, mid, proximal  peroneal vein is not compressible Left side: common femoral vein not compressible and there is no venous flow Saphenofemoral junction is not compressible and there is no venous flow. External iliac vein also not compressible there is no venous flow Proximal femoral vein is noncompressible and there is no venous flow Mid femoral vein is noncompressible and there is no venous flow Distal femoral vein is not compressible and there is no venous flow Proximal, mid, distal greater saphenous vein is noncompressible and there is hypoechoic filling defect noted. Distal popliteal vein is noncompressible Proximal popliteal vein is noncompressible and there is no venous flow Proximal gastrocnemius vein is not compressible and there is no venous flow Proximal small saphenous vein is noncompressible and there is no venous flow Distal, mid, proximal peroneal vein is not compressible. Impression: 1.   Right external iliac vein, common femoral vein, femoral vein, popliteal vein, gastrocnemius vein, small saphenous vein, and peroneal vein shows acute venous thrombosis 2. Left external iliac vein, common femoral vein, femoral vein, popliteal vein, gastrocnemius vein, small saphenous vein, peroneal vein, and saphenofemoral junction and greater saphenous vein shows acute venous thrombosis. These  extensive bilateral lower extremity deep vein thrombosis notified to Dr. Jaky Chopra on 10/20/2022 1201 PM.      Physical Exam:  Vital Signs   Blood pressure 119/70, pulse 99, temperature 98.1 °F (36.7 °C), resp. rate 16, height 5' 2\" (1.575 m), weight 51.7 kg (114 lb), SpO2 98 %. GENERAL: alert, cooperative, no distress, appears older than stated age,   LUNG: normal work of breathing,   HEART: regular rate and rhythm,   EXTREMITIES:  extremities normal, atraumatic, no cyanosis, reduced/scant edema; bilateral behind knee dressing are clean/dry/intact. Distal pulses 2+. NEUROLOGIC: AOx3. Cranial nerves 2-12 and sensation grossly intact. Assessment/Plan:   62 yo female with hx of invasive bladder cancer s/p resective surgery, is 1 day s/p bilateral lower extremity DVT thrombectomy. Pt doing very well. Hgb drop not unexpected- continue to trend. If hgb does not recover in a couple days consider transfusion (can be outpatient). Continue systemic anticoagulation to prevent recurrence. Dressings behind the knees can be removed today or tomorrow. Can be replaced with smaller bandaids if needed. Please note that Misha Whalen participated in the provision of these services.       Margaret Lerner PA-C  Interventional Radiology  [facility], [place].  [facility]  [phone]  [facility] [phone]

## 2022-10-28 NOTE — PROGRESS NOTES
Infectious Disease Progress Note           Subjective:   Pt seen and examined at bedside. Denies new complaints, no acute events since last seen   Objective:   Physical Exam:     Visit Vitals  BP 93/66 (BP 1 Location: Left upper arm, BP Patient Position: Sitting)   Pulse 90   Temp 97.8 °F (36.6 °C)   Resp 18   Ht 5' 2\" (1.575 m)   Wt 114 lb (51.7 kg)   LMP  (LMP Unknown)   SpO2 100%   BMI 20.85 kg/m²      O2 Device: None (Room air)    Temp (24hrs), Av.9 °F (36.6 °C), Min:97.7 °F (36.5 °C), Max:98.1 °F (36.7 °C)    10/28 0701 - 10/28 1900  In: -   Out: 100 [Urine:100]   10/26 1901 - 10/28 0700  In: 480 [P.O.:480]  Out: 800 [Urine:800]    General: NAD, NAD, AAO x 4  HEENT: SIMBA, Moist mucosa   Lungs: CTA b/l, decreased at the bases   Heart: S1S2+, RRR, no murmur  Abdo:  Soft, NT, ND, +BS, healed incisional wounds, + urostomy   : Fecal matter coming out of vaginal area  Exts: B/l leg edema   Skin: healed abdominal incisional wounds     Data Review:       Recent Days:  Recent Labs     10/28/22  0304 10/27/22  0621 10/26/22  0013   WBC 5.4 4.0 4.1   HGB 6.4* 7.7* 7.5*   HCT 21.0* 24.9* 24.6*    282 281       Recent Labs     10/28/22  0304 10/27/22  0621 10/26/22  0013   BUN 7 5* 7   CREA 0.53* 0.52* 0.54*         Lab Results   Component Value Date/Time    C-Reactive protein 15.00 (H) 10/25/2022 05:16 AM       Microbiology     Results       Procedure Component Value Units Date/Time    COVID-19 RAPID TEST [[id number]] Collected: 10/20/22 9815    Order Status: Completed Specimen: Nasopharyngeal Updated: 10/20/22 1503     COVID-19 rapid test Not Detected        Comment: Rapid Abbott ID Now   Rapid NAAT:  The specimen is NEGATIVE for SARS-CoV-2, the novel coronavirus associated with COVID-19. Negative results should be treated as presumptive and, if inconsistent with clinical signs and symptoms or necessary for patient management, should be tested with an alternative molecular assay. Negative results do not preclude SARS-CoV-2 infection and should not be used as the sole basis for patient management decisions. This test has been authorized by the FDA under   an Emergency Use Authorization (EUA) for use by authorized laboratories. Fact sheet for Healthcare Providers: ConventionUpdate.co.nz Fact sheet for Patients: ConventionUpdate.co.nz   Methodology: Isothermal Nucleic Acid Amplification         MRSA SCREEN - PCR (NASAL) [[id number]] Collected: 10/20/22 1435    Order Status: Completed Specimen: Swab Updated: 10/20/22 1636     MRSA by PCR, Nasal Not Detected       MRSA SCREEN - PCR (NASAL) [[id number]] Collected: 10/20/22 1340    Order Status: Canceled Specimen: Swab     CULTURE, Agapito Salguero STAIN [[id number]] Collected: 10/20/22 1340    Order Status: Completed Specimen: Wound from Cervical/vaginal swab Updated: 10/23/22 0741     Special Requests: No Special Requests        GRAM STAIN Rare WBCs seen               1+ apparent Gram Positive Cocci in pairs                  Few apparent Gram Negative Rods           Culture result: Light  Mixed skin heriberto isolated       CULTURE, BODY FLUID Michelle Clack STAIN [[id number]]     Order Status: Canceled Specimen: Body Fluid from Uterine Cul/De/Sac Fluid     CULTURE, URINE [[id number]]  (Abnormal) Collected: 10/20/22 0249    Order Status: Completed Specimen: Urine Updated: 10/23/22 1001     Special Requests: --        No Special Requests  Reflexed from [id number]       Thornton Count --        >100,000  colonies/ml       Culture result: Candida ciferrii              Diagnostics   CXR Results  (Last 48 hours)      None          Assessment/Plan   UTI, complicated by ileal conduit. Candida Ciferrii isolated from Cx         WBC WNLs, remains afebrile and hemodynamically stable         On Fluconazole day # 9/14. Routine labs in the morning     2.  Vaginal cuff cellulitis/suspected abscess on CT       GPC in pair and GNR noted on Gram stain of cervical specimen, Cx neg       Continue on Meropenem day # 5/14     3. Bladder Ca w local invasion: S/p resection on 10/03     4. SBO on admission, improved w conservative mgt      5.  B/L LE DVT extensive, IVC filter in situ, On Heparin drip       S/p thrombectomy by IR on 10/27    Lauren Cartwright MD    10/28/2022

## 2022-10-28 NOTE — PROGRESS NOTES
OCCUPATIONAL THERAPY EVALUATION  Patient: Frank Hein (30 y.o. female)  Date: 10/28/2022  Primary Diagnosis: Acute blood loss anemia [D62]  Small bowel obstruction (HCC) [K56.609]  Procedure(s) (LRB):  Endoscopy Of Conduit Retrograde Pyelograms And Bilateral Ureteral Stent Placement (Bilateral) 4 Days Post-Op   Precautions: fall risk       ASSESSMENT  Pt is a 61 y.o. female with PMH of bladder cancer s/p cystectomy, hysterectomy, BSO and ileal conduit diversion, anemia, hep C, and left leg DVT s/p IVC filter years ago presenting to [facility] with cc  weakness and nausea for the past week. She reports unable to tolerate PO intake, due to profound nausea, abdominal pain, fever, chills, vomiting or diarrhea and inability to walk s/t weakness and L inner thigh and L foot pain. Of note, pt was admitted 10/13/2022 with urostomy complications, status post ileal conduit. Pt currently admitted 10/19/22 and being treated for acute blood loss anemia s/p 1 unit PRBC, vaginal bleed with stool, SBO, extensive bharati LE DVTs found on venous duplex (already has IVC filter placed) s/p bharati iliofemoral and IVC aspiration thrombectomy with removal of a large volume of thrombus from both lower extremities and IVC by IR on 10/27/22, UTI, bladder CA, emphysema, ambulatory dysfunction, hypotension, rectal/vaginal fistula. Hgb currently 6.4, however RN approved for activity. Pt received semi-supine in bed upon arrival, AXO x4, and agreeable to OT evaluation. Spouse at bedside w/pt permission. Based on current observations, pt presents with deficits in generalized strength/AROM, dynamic sitting balance, static/dynamic standing balance (see PT note for gait details), functional activity tolerance, and c/o stomach pain currently impacting overall performance of ADLs and functional transfers/mobility (see below for objective details and assist levels).  Overall, pt tolerates session fair with additional time for mobility, able to manage socks and gown min A in sitting and sit><stand with gt belt and RW to take a few steps up to [facility] in prep for toileting task. Pt managed toileting routine at bed level IND, c/o mild dizziness upon sitting that resolved with rest. Pt would benefit from continued skilled OT services to address current impairments and improve IND and safety with self cares and functional transfers/mobility. Current OT d/c recommendation Inpatient Rehabilitation Facility  once medically appropriate. Other factors to consider for discharge: family/social support, DME, time since onset, severity of deficits, functional baseline     Patient will benefit from skilled therapy intervention to address the above noted impairments. PLAN :  Recommendations and Planned Interventions: self care training, functional mobility training, therapeutic exercise, balance training, therapeutic activities, endurance activities, patient education, and family training/education    Recommend with staff: Encourage HEP in prep for ADLs/mobility and LE elevation for management of edema    Recommend next session: Seated grooming    Frequency/Duration: Patient will be followed by occupational therapy:  3-5x/week to address goals. Recommendation for discharge: (in order for the patient to meet his/her long term goals)  [address] [facility]     This discharge recommendation:  Has been made in collaboration with the attending provider and/or case management       SUBJECTIVE:   Patient stated Is that all we're going to do today? [place]    OBJECTIVE DATA SUMMARY:   HISTORY:   Past Medical History:   Diagnosis Date    Anemia     pt states had recent blood transfusion sept 2022    Back pain     Cancer (HCC)     bladder    DVT (deep venous thrombosis) (HCC)     and PE, pt denies lung PE , only leg several years ago    Hepatitis C     pt states dx 1 month ago    Liver disease     Presence of IVC filter     pt states several years ago    Rheumatoid arthritis ([facility] [address]     Sciatic leg pain     pt states both legs    Uterine fibroid      Past Surgical History:   Procedure Laterality Date    HX APPENDECTOMY  10/03/2022    HX  SECTION      HX GYN  10/03/2022    ROBOT ANTERIOR PELVIC EXENTERATION    HX HYSTERECTOMY  10/03/2022    HX SALPINGO-OOPHORECTOMY Bilateral 10/03/2022    HX UROLOGICAL  09/15/2022    CYSTOSCOPY    HX UROLOGICAL  09/15/2022    URETHRAL DILATION    HX UROLOGICAL  09/15/2022    URETERAL STENT PLACEMENT    HX UROLOGICAL  09/15/2022    TRANSURETHRAL RESECTION OF BLADDER TUMOR >2CM    HX UROLOGICAL Bilateral 09/15/2022    RETROGRADE PYELOGRAM    HX UROLOGICAL  10/03/2022    PELVIC LYMPH NODE DISSECTION    HX UROLOGICAL  10/03/2022    ILEAL CONDUIT URINARY DIVERSION    [facility]  10/27/2022    CO CARDIAC SURG PROCEDURE UNLIST      stent placement       Per pt report:   Home Situation  Home Environment: [place] (2nd floor)  # Steps to Enter: 10  Rails to Enter: Yes  Hand Rails : Bilateral  One/Two Story Residence: One story  Living Alone: Yes  Support Systems: Friend/Neighbor  Patient Expects to be Discharged to[de-identified] Rehab hospital/unit acute  Current DME Used/Available at Home: None (pt states she just a got a w/c and BSC)      EXAMINATION OF PERFORMANCE DEFICITS:  Cognitive/Behavioral Status:  Neurologic State: Alert  Orientation Level: Oriented X4  Cognition: Follows commands             Hearing: Auditory  Auditory Impairment: None    Vision/Perceptual:                                Corrective Lenses: Glasses    Range of Motion:  AROM: Generally decreased, functional                         Strength:  Strength: Generally decreased, functional                Coordination:  Coordination: Generally decreased, functional  Fine Motor Skills-Upper: Left Intact; Right Intact    Gross Motor Skills-Upper: Left Intact; Right Intact    Tone & Sensation:     Sensation: Intact                      Balance:  Sitting: Intact  Standing: Impaired; With support  Standing - Static: Good;Constant support  Standing - Dynamic : Fair;Constant support    Functional Mobility and Transfers for ADLs:  Bed Mobility:  Rolling: Stand-by assistance  Supine to Sit: Stand-by assistance; Additional time  Sit to Supine: Contact guard assistance  Scooting: Stand-by assistance    Transfers:  Sit to Stand: Minimum assistance  Stand to Sit: Contact guard assistance      ADL Intervention and task modifications:                      Upper Body [address]: Minimum  assistance    Lower Body Dressing Assistance  Socks: Minimum Assistance   Leg Crossed Method Used: Yes  Position Performed: Seated edge of bed    Toileting  Toileting Assistance: Independent         Therapeutic Exercise:  Pt would benefit from UE HEP to improve overall UE AROM/strength and can be further educated in next treatment session. Functional Measure:    [employer] -PAC \"6 Clicks\"                                                       Daily Activity Inpatient Short Form  How much help from another person does the patient currently need. .. Total; A Lot A Little None   1. Putting on and taking off regular lower body clothing? []  1 []  2 [x]  3 []  4   2. Bathing (including washing, rinsing, drying)? []  1 []  2 [x]  3 []  4   3. Toileting, which includes using toilet, bedpan or urinal? [] 1 []  2 [x]  3 []  4   4. Putting on and taking off regular upper body clothing? []  1 []  2 [x]  3 []  4   5. Taking care of personal grooming such as brushing teeth? []  1 []  2 [x]  3 []  4   6. Eating meals? []  1 []  2 []  3 [x]  4   © 2007, Trustees of [employer], under license to [employer]. All rights reserved     Score: 19/24     Interpretation of Tool:  Represents clinically-significant functional categories (i.e. Activities of daily living).   Percentage of Impairment CH    0%   CI    1-19% CJ    20-39% CK    40-59% CL    60-79% CM    80-99% CN     100% [facility]  Score 6-24 24 23 20-22 15-19 10-14 7-9 6     Occupational Therapy Evaluation Charge Determination   History Examination Decision-Making   MEDIUM Complexity : Expanded review of history including physical, cognitive and psychosocial  history  MEDIUM Complexity : 3-5 performance deficits relating to physical, cognitive , or psychosocial skils that result in activity limitations and / or participation restrictions MEDIUM Complexity : Patient may present with comorbidities that affect occupational performnce. Miniml to moderate modification of tasks or assistance (eg, physical or verbal ) with assesment(s) is necessary to enable patient to complete evaluation       Based on the above components, the patient evaluation is determined to be of the following complexity level: MEDIUM  Pain Ratin/10 stomach    Activity Tolerance:   Fair    After treatment patient left in no apparent distress:    Supine in bed, Heels elevated for pressure relief, Call bell within reach, Caregiver / family present, and Side rails x 3, bed locked and in lowest position    COMMUNICATION/EDUCATION:   The patients plan of care was discussed with: Physical therapist and Registered nurse. Patient/family have participated as able in goal setting and plan of care. and Patient/family agree to work toward stated goals and plan of care. This patients plan of care is appropriate for delegation to [facility]. Thank you for this referral.  Rj Do  Time Calculation: 27 mins   Problem: Self Care Deficits Care Plan (Adult)  Goal: *Acute Goals and Plan of Care (Insert Text)  Description: FUNCTIONAL STATUS PRIOR TO ADMISSION: Patient was independent and active without use of DME. Patient was independent for basic and instrumental ADLs. HOME SUPPORT: The patient lived alone; support from [facility].      Occupational Therapy Goals  Initiated 10/28/2022    Pt stated goal \"to get stronger\"  Pt will be IND sup <> sit in prep for EOB ADLs  Pt will be MI grooming standing sink side LRAD  Pt will be IND UB dressing sitting EOB/long sit   Pt will be MI LE dressing sitting EOB/long sit  Pt will be IND sit <>  prep for toileting LRAD  Pt will be IND toileting/toilet transfer/cloth mgmt LRAD  Pt will be IND following UE HEP in prep for self care tasks      Outcome: Not Met

## 2022-10-28 NOTE — PROGRESS NOTES
Educated to use call bell for assistance to prevent falls and for pain management. Verbalized understanding.

## 2022-10-28 NOTE — PROGRESS NOTES
CM reviewed chart. Current discharge plan once medically stable will be to go to IRF at [facility]. Patient insurance requires authorization. [facility] cannot start auth until OT note is in. Once OT note is in they can submit for auth. Patient DME has also been delivered to patient room as well.

## 2022-10-28 NOTE — PROGRESS NOTES
Hospitalist Progress Note    Subjective:   Daily Progress Note: 10/28/2022 3:41 PM    Status post mechanical thrombectomy and hemoglobin of 6.61 did require 1 unit of packed red blood cells. Stool occult pending    Current Facility-Administered Medications   Medication Dose Route Frequency    fentaNYL citrate (PF) injection  mcg   mcg IntraVENous Rad Multiple    midazolam (VERSED) injection 0.5-2 mg  0.5-2 mg IntraVENous Rad Multiple    heparin (porcine) 1,000 unit/mL injection 2,000 Units  2,000 Units IntraVENous Rad Multiple    iron sucrose (VENOFER) injection 200 mg  200 mg IntraVENous Q24H    meropenem (MERREM) 1 g in 0.9% sodium chloride (MBP/ADV) 50 mL MBP  1 g IntraVENous Q8H    midodrine (PROAMATINE) tablet 5 mg  5 mg Oral TID PRN    fluconazole (DIFLUCAN) tablet 200 mg  200 mg Oral DAILY    heparin 25,000 units in D5W 250 ml infusion  500 Units/hr IntraVENous TITRATE    oxyCODONE IR (ROXICODONE) tablet 5 mg  5 mg Oral Q6H PRN    0.9% sodium chloride infusion 250 mL  250 mL IntraVENous PRN    cromolyn (OPTICROM) 4 % ophthalmic solution 1 Drop  1 Drop Both Eyes DAILY    sodium chloride (NS) flush 5-40 mL  5-40 mL IntraVENous Q8H    sodium chloride (NS) flush 5-40 mL  5-40 mL IntraVENous PRN    acetaminophen (TYLENOL) tablet 650 mg  650 mg Oral Q6H PRN    Or    acetaminophen (TYLENOL) suppository 650 mg  650 mg Rectal Q6H PRN    polyethylene glycol (MIRALAX) packet 17 g  17 g Oral DAILY PRN    ondansetron (ZOFRAN ODT) tablet 4 mg  4 mg Oral Q8H PRN    Or    ondansetron (ZOFRAN) injection 4 mg  4 mg IntraVENous Q6H PRN        Review of Systems  Review of Systems   Constitutional:  Positive for malaise/fatigue. HENT: Negative. Respiratory:  Negative for cough and shortness of breath. Cardiovascular:  Positive for leg swelling. Negative for chest pain. Gastrointestinal:  Positive for abdominal pain. Negative for nausea and vomiting. Genitourinary: Negative. Musculoskeletal: Negative. Skin: Negative. Neurological: Negative. Psychiatric/Behavioral: Negative. Objective:     Visit Vitals  /70 (BP 1 Location: Left upper arm, BP Patient Position: At rest)   Pulse 99   Temp 98.1 °F (36.7 °C)   Resp 16   Ht 5' 2\" (1.575 m)   Wt 51.7 kg (114 lb)   LMP  (LMP Unknown)   SpO2 98%   BMI 20.85 kg/m²      O2 Device: None (Room air)    Temp (24hrs), Av.9 °F (36.6 °C), Min:97.7 °F (36.5 °C), Max:98.2 °F (36.8 °C)      No intake/output data recorded. 10/26 1901 - 10/28 0700  In: 480 [P.O.:480]  Out: 800 [Urine:800]    Recent Results (from the past 24 hour(s))   PTT    Collection Time: 10/27/22  8:59 PM   Result Value Ref Range    aPTT 86.4 (H) 21.2 - 34.1 sec    aPTT, therapeutic range   82 - 109 sec   CBC WITH AUTOMATED DIFF    Collection Time: 10/28/22  3:04 AM   Result Value Ref Range    WBC 5.4 3.6 - 11.0 K/uL    RBC 2.38 (L) 3.80 - 5.20 M/uL    HGB 6.4 (L) 11.5 - 16.0 g/dL    HCT 21.0 (L) 35.0 - 47.0 %    MCV 88.2 80.0 - 99.0 FL    MCH 26.9 26.0 - 34.0 PG    MCHC 30.5 30.0 - 36.5 g/dL    RDW 17.0 (H) 11.5 - 14.5 %    PLATELET 844 182 - 453 K/uL    MPV 9.7 8.9 - 12.9 FL    NRBC 0.0 0.0  WBC    ABSOLUTE NRBC 0.00 0.00 - 0.01 K/uL    NEUTROPHILS 68 32 - 75 %    LYMPHOCYTES 27 12 - 49 %    MONOCYTES 5 5 - 13 %    EOSINOPHILS 0 0 - 7 %    BASOPHILS 0 0 - 1 %    IMMATURE GRANULOCYTES 0 %    ABS. NEUTROPHILS 3.6 1.8 - 8.0 K/UL    ABS. LYMPHOCYTES 1.5 0.8 - 3.5 K/UL    ABS. MONOCYTES 0.3 0.0 - 1.0 K/UL    ABS. EOSINOPHILS 0.0 0.0 - 0.4 K/UL    ABS. BASOPHILS 0.0 0.0 - 0.1 K/UL    ABS. IMM.  GRANS. 0.0 K/UL    DF Manual      PLATELET COMMENTS Large Platelets      RBC COMMENTS Polychromasia  1+        RBC COMMENTS Anisocytosis  1+       METABOLIC PANEL, COMPREHENSIVE    Collection Time: 10/28/22  3:04 AM   Result Value Ref Range    Sodium 142 136 - 145 mmol/L    Potassium 3.3 (L) 3.5 - 5.1 mmol/L    Chloride 111 (H) 97 - 108 mmol/L    CO2 23 21 - 32 mmol/L    Anion gap 8 5 - 15 mmol/L    Glucose 88 65 - 100 mg/dL    BUN 7 6 - 20 mg/dL    Creatinine 0.53 (L) 0.55 - 1.02 mg/dL    BUN/Creatinine ratio 13 12 - 20      eGFR >60 >60 ml/min/1.73m2    Calcium 8.4 (L) 8.5 - 10.1 mg/dL    Bilirubin, total 0.4 0.2 - 1.0 mg/dL    AST (SGOT) 31 15 - 37 U/L    ALT (SGPT) 11 (L) 12 - 78 U/L    Alk. phosphatase 82 45 - 117 U/L    Protein, total 7.4 6.4 - 8.2 g/dL    Albumin 1.3 (L) 3.5 - 5.0 g/dL    Globulin 6.1 (H) 2.0 - 4.0 g/dL    A-G Ratio 0.2 (L) 1.1 - 2.2     PTT    Collection Time: 10/28/22  3:04 AM   Result Value Ref Range    aPTT 75.8 (H) 21.2 - 34.1 sec    aPTT, therapeutic range   82 - 109 sec   PTT    Collection Time: 10/28/22  9:56 AM   Result Value Ref Range    aPTT 39.8 (H) 21.2 - 34.1 sec    aPTT, therapeutic range   82 - 109 sec        IR THROMBECTOMY [facility] VEIN W PHARM   Final Result   Bilateral iliofemoral and IVC aspiration thrombectomy with removal of a large   volume of thrombus from both lower extremities and IVC. CT ABD PELV W CONT   Final Result   Rectovaginal fistula is not identified   Extensive bilateral iliac venous thrombus to the level of the IVC filter. Right   lower extremity edema. Cholelithiasis with distended gallbladder   Bilateral hydronephrosis and hydroureter in patient with ileal conduit   Probable small bowel obstruction secondary to adhesion      XR ABD (KUB)   Final Result   1. No significant change in small bowel dilation. Large volume of stool in the   rectum. 2. Cholelithiasis. XR ABD (KUB)   Final Result   Dilated small bowel within the central abdomen is similar to CT from one day   prior. DUPLEX LOWER EXT VENOUS BILAT   Final Result   Addendum (preliminary) 1 of 1      · Thrombus present in the right external iliac vein. · Thrombus present in the right common femoral vein. · Thrombus present in the right femoral vein. · Thrombus present in the right proximal femoral vein. · Thrombus present in the right mid femoral vein.    · Thrombus present in the right distal femoral vein. · Thrombus present in the right popliteal vein. · Thrombus present in the right gastrocnemius vein. · Thrombus present in the right peroneal vein. · Thrombus present in the right saphenofemoral junction. · Thrombus present in the left external iliac vein. · Thrombus present in the left common femoral vein. · Thrombus present in the left saphenofemoral junction. · Thrombus present in the left femoral vein. · Thrombus present in the left proximal femoral vein. · Thrombus present in the left mid femoral vein. · Thrombus present in the left popliteal vein. · Thrombus present in the left distal femoral vein. · Thrombus present in the left gastrocnemius vein. · Thrombus present in the left peroneal vein. This is vascular lab report on Navneet Negro, patient's YOB: 1965   Date of examination: October 20, 2022   Examination: Duplex lower extremity venous bilateral   Technician: Ms. Costa Her   Clinical history: This is  61years old woman with suspected renal vein    thrombosis   Indication: femoral vein thrombosis. Technique: Bilateral leg venous structures examined using venous duplex    ultrasound with 2D grayscale, color-flow and spectral Doppler analysis. Findings:   Right side:   common femoral vein and saphenofemoral junction is not compressible and    there is hypoechoic filling defect noted. common femoral vein shows no venous flow   Distal external iliac vein also not compressible and that there is no    venous flow   Proximal femoral vein is noncompressible and there is no venous flow   Mid femoral vein is not compressible and there is no venous flow   Distal femoral vein is noncompressible and there is no venous flow   Proximal, mid, distal greater saphenous vein is compressible, there is no    filling defect   Distal popliteal vein is not compressible.    Proximal popliteal vein is noncompressible and there is no venous flow   Proximal gastrocnemius vein is not compressible   Proximal small saphenous vein is not compressible   Distal, mid, proximal  peroneal vein is not compressible      Left side:   common femoral vein not compressible and there is no venous flow   Saphenofemoral junction is not compressible and there is no venous flow. External iliac vein also not compressible there is no venous flow   Proximal femoral vein is noncompressible and there is no venous flow   Mid femoral vein is noncompressible and there is no venous flow   Distal femoral vein is not compressible and there is no venous flow   Proximal, mid, distal greater saphenous vein is noncompressible and there    is hypoechoic filling defect noted. Distal popliteal vein is noncompressible   Proximal popliteal vein is noncompressible and there is no venous flow   Proximal gastrocnemius vein is not compressible and there is no venous    flow   Proximal small saphenous vein is noncompressible and there is no venous    flow   Distal, mid, proximal peroneal vein is not compressible. Impression:   1. Right external iliac vein, common femoral vein, femoral vein,    popliteal vein, gastrocnemius vein, small saphenous vein, and peroneal    vein shows acute venous thrombosis   2. Left external iliac vein, common femoral vein, femoral vein, popliteal    vein, gastrocnemius vein, small saphenous vein, peroneal vein, and    saphenofemoral junction and greater saphenous vein shows acute venous    thrombosis. These  extensive bilateral lower extremity deep vein thrombosis notified    to Dr. Priyanka Schulte on 10/20/2022 1201 PM.            CT ABD PELV WO CONT   Final Result   Status post cystectomy and hysterectomy with right lower quadrant ileal conduit. Mild bilateral hydroureteronephrosis, evaluation is somewhat limited by the   presence of contrast from previously performed examination.  Small bowel   distention with decompressed loops distally concerning for obstruction likely   related to adhesion formation. While this study was done without the use of   intravenous contrast there is questionable right common femoral vein thrombus   with soft tissue edema. CTA CHEST W OR W WO CONT   Final Result   Emphysematous changes. No evidence of pulmonary embolism or acute   abnormality. Cholelithiasis. Bilateral hydronephrosis. 1 cm hypodense lesion   right hepatic lobe cannot be characterized with certainty on the basis of this   examination. Follow-up is recommended to ensure stability. XR CHEST PORT   Final Result   No Acute Disease. XR FOOT LT MIN 3 V   Final Result   Normal study. IR THROMBECTOMY MECH VEIN W PHARM    (Results Pending)        PHYSICAL EXAM:    Physical Exam  Vitals reviewed. HENT:      Head: Normocephalic and atraumatic. Cardiovascular:      Rate and Rhythm: Normal rate and regular rhythm. Heart sounds: Normal heart sounds. Pulmonary:      Effort: Pulmonary effort is normal.      Breath sounds: Normal breath sounds. Abdominal:      General: Abdomen is flat. Bowel sounds are normal.      Palpations: Abdomen is soft. Comments: Mild tenderness in the suprapubic region   Musculoskeletal:         General: Normal range of motion. Cervical back: Normal range of motion and neck supple. Right lower leg: Edema present. Left lower leg: Edema present. Skin:     General: Skin is warm and dry. Neurological:      General: No focal deficit present. Mental Status: She is alert and oriented to person, place, and time.    Psychiatric:         Mood and Affect: Mood normal.        Data Review    Recent Results (from the past 24 hour(s))   PTT    Collection Time: 10/27/22  8:59 PM   Result Value Ref Range    aPTT 86.4 (H) 21.2 - 34.1 sec    aPTT, therapeutic range   82 - 109 sec   CBC WITH AUTOMATED DIFF    Collection Time: 10/28/22  3:04 AM   Result Value Ref Range    WBC 5.4 3.6 - 11.0 K/uL    RBC 2.38 (L) 3.80 - 5.20 M/uL    HGB 6.4 (L) 11.5 - 16.0 g/dL    HCT 21.0 (L) 35.0 - 47.0 %    MCV 88.2 80.0 - 99.0 FL    MCH 26.9 26.0 - 34.0 PG    MCHC 30.5 30.0 - 36.5 g/dL    RDW 17.0 (H) 11.5 - 14.5 %    PLATELET 123 256 - 478 K/uL    MPV 9.7 8.9 - 12.9 FL    NRBC 0.0 0.0  WBC    ABSOLUTE NRBC 0.00 0.00 - 0.01 K/uL    NEUTROPHILS 68 32 - 75 %    LYMPHOCYTES 27 12 - 49 %    MONOCYTES 5 5 - 13 %    EOSINOPHILS 0 0 - 7 %    BASOPHILS 0 0 - 1 %    IMMATURE GRANULOCYTES 0 %    ABS. NEUTROPHILS 3.6 1.8 - 8.0 K/UL    ABS. LYMPHOCYTES 1.5 0.8 - 3.5 K/UL    ABS. MONOCYTES 0.3 0.0 - 1.0 K/UL    ABS. EOSINOPHILS 0.0 0.0 - 0.4 K/UL    ABS. BASOPHILS 0.0 0.0 - 0.1 K/UL    ABS. IMM. GRANS. 0.0 K/UL    DF Manual      PLATELET COMMENTS Large Platelets      RBC COMMENTS Polychromasia  1+        RBC COMMENTS Anisocytosis  1+       METABOLIC PANEL, COMPREHENSIVE    Collection Time: 10/28/22  3:04 AM   Result Value Ref Range    Sodium 142 136 - 145 mmol/L    Potassium 3.3 (L) 3.5 - 5.1 mmol/L    Chloride 111 (H) 97 - 108 mmol/L    CO2 23 21 - 32 mmol/L    Anion gap 8 5 - 15 mmol/L    Glucose 88 65 - 100 mg/dL    BUN 7 6 - 20 mg/dL    Creatinine 0.53 (L) 0.55 - 1.02 mg/dL    BUN/Creatinine ratio 13 12 - 20      eGFR >60 >60 ml/min/1.73m2    Calcium 8.4 (L) 8.5 - 10.1 mg/dL    Bilirubin, total 0.4 0.2 - 1.0 mg/dL    AST (SGOT) 31 15 - 37 U/L    ALT (SGPT) 11 (L) 12 - 78 U/L    Alk.  phosphatase 82 45 - 117 U/L    Protein, total 7.4 6.4 - 8.2 g/dL    Albumin 1.3 (L) 3.5 - 5.0 g/dL    Globulin 6.1 (H) 2.0 - 4.0 g/dL    A-G Ratio 0.2 (L) 1.1 - 2.2     PTT    Collection Time: 10/28/22  3:04 AM   Result Value Ref Range    aPTT 75.8 (H) 21.2 - 34.1 sec    aPTT, therapeutic range   82 - 109 sec   PTT    Collection Time: 10/28/22  9:56 AM   Result Value Ref Range    aPTT 39.8 (H) 21.2 - 34.1 sec    aPTT, therapeutic range   82 - 109 sec        Assessment/Plan:     Principal Problem:    Small bowel obstruction ([facility] [address] (10/20/2022)    Active Problems:    Acute blood loss anemia (10/20/2022)      Status post robot-assisted surgical procedure, pelvic exenteration (10/20/2022)      Vaginal cuff cellulitis (10/20/2022)    Hospital course: 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 female with PMH of bladder cancer s/p cystectomy, hysterectomy, BSO and ileal conduit diversion, anemia, hep C, and left leg DVT s/p IVC filter years ago. She presented to the ED with chief complaint of weakness and nausea for the past week. She reports unable to tolerate PO intake, due to profound nausea, abdominal pain, fever, chills, vomiting or diarrhea. In addition, she also reports unable to walk due to weakness and left inner thigh and left foot pain. Denies trauma. She was seen here on 10/13/2022 with urostomy complications, status post ileal conduit and discharged home on Cipro which she completed. In addition, she also reports having vaginal bleed since surgery 2 weeks ago. Which appears to be more related to a fistula with stool present. CT scan of the abdomen pelvis was performed revealing no obvious rectovaginal fistula although there was air present tracking in that direction without any extravasation of contrast.  There was extensive bilateral iliac venous thrombus to the level of the IVC filter with right leg edema. Cholelithiasis with a distended gallbladder was also noted. Bilateral hydronephrosis with hydroureter most likely from the ileal conduit. Probable small bowel obstruction secondary to adhesions also noted. Patient was found to be anemic and received 1 unit of packed red blood cells. Venous duplex revealed extensive clot burden within the right leg and left leg. Urinalysis was consistent with Candida infection. ID consultation. Surgical consultation, Dr. Dagoberto Godinez. UCX grew candida ciferrii, continue fluconazole. IV heparin infusing.   Discussed case with interventional radiology who will further investigate the fistula with recommendations and mechanical thrombectomy performed of the bilateral upper extremities with a large volume clot removed. Fortune patient's hemoglobin has dropped and this may be related to bleeding stool occult pending. Source may be related to postprocedural complications from the thrombectomy. Impression/plan:     # 1 Acute blood loss anemia  - hematology following-->reccs low dose anticoagulants  - hgb stable- currently on low dose IV hep gtt  Requiring 1 unit of PRBC, Stool occult positive may need to discontinue Heparin drip     # 2 Vaginal bleed with stool  - Suspect complications from recent surgery  -With associated vaginitis of the CUFF and cellulitis  Most likely rectovaginal fistula  GYN for pelvic evaluation     # 3 SBO  - currently tolerating full liquid diet  - s/p gentle IVF  - Gen surgery: Basker   - Morphine for pain. # 4 Extensive dvt bilateral lower legs  - recently on anticoagulants that was stopped prior to surgery  - duplex shows extensive clot burden  - hematology following   - already has IVC filter placed  - started on IV hep due to concern of old IVC and chances that she has collateral blood vessels bypassing filter which will increase her risk for PE.  IR Bilateral iliofemoral and IVC aspiration thrombectomy with removal of a large  volume of thrombus from both lower extremities and IVC    # 5 UTI  - Fluconazole for fungal UTI.  - UCX grew candida ciferrii  - ID following     # 6 Bladder cancer  - Not on chemotherapy currently. - Consulted urology to evaluate for possible post-op complications. - Continue supportive care  - Hematology following     # 7 Emphysema  - not in respiratory distress, continue to monitor      # 8 Ambulatory dysfunction  - secondary to dvt's  - pt/ot as tolerated     # 9 hypotension  - bp remains soft  - prn midodrine  - start gentle IV hydration    10.  Rectal/vaginal fistula  Surgery following  Questionable abscess  ID consult  Merrem  IR consult possible recommendation for CT with and without with rectal contrast  GYN consult    Care Plan discussed with: Patient/Family    Total time spent with patient: >35 minutes.

## 2022-10-28 NOTE — PROGRESS NOTES
Hematology Oncology Progress Note           Follow up for: DVT/bladder cancer  Chart notes reviewed since last visit. Tolerated transfusion well  No issues with this    Patient Vitals for the past 24 hrs:   BP Temp Pulse Resp SpO2   10/28/22 0955 119/70 98.1 °F (36.7 °C) 99 16 98 %   10/28/22 0253 95/61 97.7 °F (36.5 °C) 90 -- 100 %   10/27/22 1936 93/64 97.8 °F (36.6 °C) (!) 104 20 100 %   10/27/22 1522 118/75 97.7 °F (36.5 °C) 100 18 96 %       Review of Systems:    Constitutional No fevers, chills, night sweats, excessive fatigue or weight loss. Allergic/Immunologic No recent allergic reactions   Eyes No significant visual difficulties. No diplopia. ENMT No problems with hearing, no sore throat, no sinus drainage. Endocrine No hot flashes or night sweats. No cold intolerance, polyuria, or polydipsia   Hematologic/Lymphatic No easy bruising or bleeding. The patient denies any tender or palpable lymph nodes   Breasts No abnormal masses of breast, nipple discharge or pain. Respiratory No dyspnea on exertion, orthopnea, chest pain, cough or hemoptysis. Cardiovascular No anginal chest pain, irregular heart beat, tachycardia, palpitations or orthopnea. Gastrointestinal No nausea, vomiting, diarrhea, constipation, cramping, dysphagia, reflux, heartburn, GI bleeding, or early satiety. No change in bowel habits. Genitourinary (M) No hematuria, dysuria, increased frequency, urgency, hesitancy or incontinence. Musculoskeletal No joint pain, swelling or redness. No decreased range of motion. Integumentary No chronic rashes, inflammation, ulcerations, pruritus, petechiae, purpura, ecchymoses, or skin changes. Neurologic No headache, blurred vision, and no areas of focal weakness or numbness. Normal gait. No sensory problems. Psychiatric No insomnia, depression, nate or mood swings.   No psychotropic drugs       Physical Examination:  Constitutional Sedation to maintain respiratory support   Head Normocephalic; no scars   Eyes Conjunctivae and sclerae are clear and without icterus. Pupils are reactive and equal.   ENMT Sinuses are nontender. No oral exudates, ulcers, masses, thrush or mucositis. Oropharynx clear. Tongue normal.   Neck Supple without masses or thyromegaly. No jugular venous distension. Hematologic/Lymphatic No petechiae or purpura. No tender or palpable lymph nodes in the cervical, supraclavicular, axillary or inguinal area. Respiratory Lungs are clear to auscultation without rhonchi or wheezing. Cardiovascular Regular rate and rhythm of heart without murmurs, gallops or rubs. Chest / Line Site Chest is symmetric with no chest wall deformities. Abdomen Non-tender, non-distended, no masses, ascites or hepatosplenomegaly. Good bowel sounds. No guarding or rebound tenderness. No pulsatile masses. Musculoskeletal No tenderness or swelling, normal range of motion without obvious weakness. Extremities No visible deformities, no cyanosis, clubbing or edema. Skin No rashes, scars, or lesions suggestive of malignancy. No petechiae, purpura, or ecchymoses. No excoriations. Neurologic No sensory or motor deficits, normal cerebellar function, normal gait, cranial nerves intact. Psychiatric Alert and oriented times three. Coherent speech. Verbalizes understanding of our discussions today.        Labs:  Recent Results (from the past 24 hour(s))   PTT    Collection Time: 10/27/22  8:59 PM   Result Value Ref Range    aPTT 86.4 (H) 21.2 - 34.1 sec    aPTT, therapeutic range   82 - 109 sec   CBC WITH AUTOMATED DIFF    Collection Time: 10/28/22  3:04 AM   Result Value Ref Range    WBC 5.4 3.6 - 11.0 K/uL    RBC 2.38 (L) 3.80 - 5.20 M/uL    HGB 6.4 (L) 11.5 - 16.0 g/dL    HCT 21.0 (L) 35.0 - 47.0 %    MCV 88.2 80.0 - 99.0 FL    MCH 26.9 26.0 - 34.0 PG    MCHC 30.5 30.0 - 36.5 g/dL    RDW 17.0 (H) 11.5 - 14.5 %    PLATELET 527 020 - 165 K/uL    MPV 9.7 8.9 - 12.9 FL    NRBC 0.0 0.0  WBC    ABSOLUTE NRBC 0.00 0.00 - 0.01 K/uL    NEUTROPHILS 68 32 - 75 %    LYMPHOCYTES 27 12 - 49 %    MONOCYTES 5 5 - 13 %    EOSINOPHILS 0 0 - 7 %    BASOPHILS 0 0 - 1 %    IMMATURE GRANULOCYTES 0 %    ABS. NEUTROPHILS 3.6 1.8 - 8.0 K/UL    ABS. LYMPHOCYTES 1.5 0.8 - 3.5 K/UL    ABS. MONOCYTES 0.3 0.0 - 1.0 K/UL    ABS. EOSINOPHILS 0.0 0.0 - 0.4 K/UL    ABS. BASOPHILS 0.0 0.0 - 0.1 K/UL    ABS. IMM. GRANS. 0.0 K/UL    DF Manual      PLATELET COMMENTS Large Platelets      RBC COMMENTS Polychromasia  1+        RBC COMMENTS Anisocytosis  1+       METABOLIC PANEL, COMPREHENSIVE    Collection Time: 10/28/22  3:04 AM   Result Value Ref Range    Sodium 142 136 - 145 mmol/L    Potassium 3.3 (L) 3.5 - 5.1 mmol/L    Chloride 111 (H) 97 - 108 mmol/L    CO2 23 21 - 32 mmol/L    Anion gap 8 5 - 15 mmol/L    Glucose 88 65 - 100 mg/dL    BUN 7 6 - 20 mg/dL    Creatinine 0.53 (L) 0.55 - 1.02 mg/dL    BUN/Creatinine ratio 13 12 - 20      eGFR >60 >60 ml/min/1.73m2    Calcium 8.4 (L) 8.5 - 10.1 mg/dL    Bilirubin, total 0.4 0.2 - 1.0 mg/dL    AST (SGOT) 31 15 - 37 U/L    ALT (SGPT) 11 (L) 12 - 78 U/L    Alk. phosphatase 82 45 - 117 U/L    Protein, total 7.4 6.4 - 8.2 g/dL    Albumin 1.3 (L) 3.5 - 5.0 g/dL    Globulin 6.1 (H) 2.0 - 4.0 g/dL    A-G Ratio 0.2 (L) 1.1 - 2.2     PTT    Collection Time: 10/28/22  3:04 AM   Result Value Ref Range    aPTT 75.8 (H) 21.2 - 34.1 sec    aPTT, therapeutic range   82 - 109 sec   PTT    Collection Time: 10/28/22  9:56 AM   Result Value Ref Range    aPTT 39.8 (H) 21.2 - 34.1 sec    aPTT, therapeutic range   82 - 109 sec       Imaging:      Assessment and Plan:   1 DVT  -on heparin  -clinical concern was that collateral blood vessels have formed making prior IVC filter less valuable   -if active bleeding; hold heparin  -once has been on heparin a few days ok to start working with PT as clot will have stabilized  -completed thrombectomy; hgb with decline after this, now transfused     2. Anemia  -transfuse if hgb <7s; mild uptrend today  -on  IV iron today as well     3.  Bladder cancer T3N0=Stage III based on pathology  -will need OP adjuvant chemotherapy

## 2022-10-28 NOTE — PROGRESS NOTES
1 unit of blood was administered  patient had no adverse reaction. APTT results were 112.3 N. O to hold Heparin therapy until the am

## 2022-10-29 LAB
ABO + RH BLD: NORMAL
ALBUMIN SERPL-MCNC: 1.4 G/DL (ref 3.5–5)
ALBUMIN/GLOB SERPL: 0.2 {RATIO} (ref 1.1–2.2)
ALP SERPL-CCNC: 84 U/L (ref 45–117)
ALT SERPL-CCNC: 7 U/L (ref 12–78)
ANION GAP SERPL CALC-SCNC: 4 MMOL/L (ref 5–15)
APTT PPP: 44.2 SEC (ref 21.2–34.1)
AST SERPL W P-5'-P-CCNC: 25 U/L (ref 15–37)
BASOPHILS # BLD: 0 K/UL (ref 0–0.1)
BASOPHILS NFR BLD: 0 % (ref 0–1)
BILIRUB SERPL-MCNC: 0.4 MG/DL (ref 0.2–1)
BLD PROD TYP BPU: NORMAL
BLOOD GROUP ANTIBODIES SERPL: NEGATIVE
BPU ID: NORMAL
BUN SERPL-MCNC: 7 MG/DL (ref 6–20)
BUN/CREAT SERPL: 15 (ref 12–20)
CA-I BLD-MCNC: 8.7 MG/DL (ref 8.5–10.1)
CHLORIDE SERPL-SCNC: 113 MMOL/L (ref 97–108)
CO2 SERPL-SCNC: 25 MMOL/L (ref 21–32)
CREAT SERPL-MCNC: 0.48 MG/DL (ref 0.55–1.02)
CROSSMATCH RESULT,%XM: NORMAL
DIFFERENTIAL METHOD BLD: ABNORMAL
EOSINOPHIL # BLD: 0 K/UL (ref 0–0.4)
EOSINOPHIL NFR BLD: 0 % (ref 0–7)
ERYTHROCYTE [DISTWIDTH] IN BLOOD BY AUTOMATED COUNT: 17.3 % (ref 11.5–14.5)
GLOBULIN SER CALC-MCNC: 6.4 G/DL (ref 2–4)
GLUCOSE SERPL-MCNC: 80 MG/DL (ref 65–100)
HCT VFR BLD AUTO: 23.8 % (ref 35–47)
HGB BLD-MCNC: 7.3 G/DL (ref 11.5–16)
IMM GRANULOCYTES # BLD AUTO: 0 K/UL
IMM GRANULOCYTES NFR BLD AUTO: 0 %
LYMPHOCYTES # BLD: 0.7 K/UL (ref 0.8–3.5)
LYMPHOCYTES NFR BLD: 14 % (ref 12–49)
MCH RBC QN AUTO: 26.6 PG (ref 26–34)
MCHC RBC AUTO-ENTMCNC: 30.7 G/DL (ref 30–36.5)
MCV RBC AUTO: 86.9 FL (ref 80–99)
METAMYELOCYTES NFR BLD MANUAL: 1 %
MONOCYTES # BLD: 0.5 K/UL (ref 0–1)
MONOCYTES NFR BLD: 10 % (ref 5–13)
MYELOCYTES NFR BLD MANUAL: 1 %
NEUTS BAND NFR BLD MANUAL: 1 % (ref 0–6)
NEUTS SEG # BLD: 3.7 K/UL (ref 1.8–8)
NEUTS SEG NFR BLD: 73 % (ref 32–75)
NRBC # BLD: 0.02 K/UL (ref 0–0.01)
NRBC BLD-RTO: 0.4 PER 100 WBC
PLATELET # BLD AUTO: 208 K/UL (ref 150–400)
PMV BLD AUTO: 9.1 FL (ref 8.9–12.9)
POTASSIUM SERPL-SCNC: 3.9 MMOL/L (ref 3.5–5.1)
PROT SERPL-MCNC: 7.8 G/DL (ref 6.4–8.2)
RBC # BLD AUTO: 2.74 M/UL (ref 3.8–5.2)
RBC MORPH BLD: ABNORMAL
SODIUM SERPL-SCNC: 142 MMOL/L (ref 136–145)
SPECIMEN EXP DATE BLD: NORMAL
STATUS OF UNIT,%ST: NORMAL
THERAPEUTIC RANGE,PTTT: ABNORMAL SEC
TRANSFUSION STATUS PATIENT QL: NORMAL
UNIT DIVISION, %UDIV: 0
WBC # BLD AUTO: 5 K/UL (ref 3.6–11)

## 2022-10-29 PROCEDURE — 80053 COMPREHEN METABOLIC PANEL: CPT

## 2022-10-29 PROCEDURE — 74011250637 HC RX REV CODE- 250/637: Performed by: INTERNAL MEDICINE

## 2022-10-29 PROCEDURE — 36415 COLL VENOUS BLD VENIPUNCTURE: CPT

## 2022-10-29 PROCEDURE — 74011000250 HC RX REV CODE- 250: Performed by: INTERNAL MEDICINE

## 2022-10-29 PROCEDURE — 74011000258 HC RX REV CODE- 258: Performed by: INTERNAL MEDICINE

## 2022-10-29 PROCEDURE — 74011250636 HC RX REV CODE- 250/636: Performed by: INTERNAL MEDICINE

## 2022-10-29 PROCEDURE — 85025 COMPLETE CBC W/AUTO DIFF WBC: CPT

## 2022-10-29 PROCEDURE — 65270000029 HC RM PRIVATE

## 2022-10-29 PROCEDURE — 99232 SBSQ HOSP IP/OBS MODERATE 35: CPT | Performed by: UROLOGY

## 2022-10-29 PROCEDURE — 74011250637 HC RX REV CODE- 250/637: Performed by: PHYSICIAN ASSISTANT

## 2022-10-29 PROCEDURE — 74011250637 HC RX REV CODE- 250/637: Performed by: NURSE PRACTITIONER

## 2022-10-29 RX ORDER — MEGESTROL ACETATE 20 MG/1
20 TABLET ORAL
Status: DISCONTINUED | OUTPATIENT
Start: 2022-10-29 | End: 2022-11-11 | Stop reason: HOSPADM

## 2022-10-29 RX ADMIN — MEROPENEM 1 G: 1 INJECTION, POWDER, FOR SOLUTION INTRAVENOUS at 16:14

## 2022-10-29 RX ADMIN — SODIUM CHLORIDE, PRESERVATIVE FREE 10 ML: 5 INJECTION INTRAVENOUS at 22:08

## 2022-10-29 RX ADMIN — IRON SUCROSE 200 MG: 20 INJECTION, SOLUTION INTRAVENOUS at 11:34

## 2022-10-29 RX ADMIN — POTASSIUM CHLORIDE 40 MEQ: 1500 TABLET, EXTENDED RELEASE ORAL at 09:58

## 2022-10-29 RX ADMIN — OXYCODONE 5 MG: 5 TABLET ORAL at 09:58

## 2022-10-29 RX ADMIN — FLUCONAZOLE 200 MG: 100 TABLET ORAL at 09:58

## 2022-10-29 RX ADMIN — CROMOLYN SODIUM 1 DROP: 40 SOLUTION/ DROPS OPHTHALMIC at 10:10

## 2022-10-29 RX ADMIN — MEROPENEM 1 G: 1 INJECTION, POWDER, FOR SOLUTION INTRAVENOUS at 09:59

## 2022-10-29 RX ADMIN — SODIUM CHLORIDE, PRESERVATIVE FREE 10 ML: 5 INJECTION INTRAVENOUS at 05:52

## 2022-10-29 RX ADMIN — SODIUM CHLORIDE, PRESERVATIVE FREE 10 ML: 5 INJECTION INTRAVENOUS at 13:27

## 2022-10-29 RX ADMIN — MEROPENEM 1 G: 1 INJECTION, POWDER, FOR SOLUTION INTRAVENOUS at 00:02

## 2022-10-29 NOTE — PROGRESS NOTES
Problem: Pain  Goal: *Control of Pain  Outcome: Progressing Towards Goal  Goal: *PALLIATIVE CARE:  Alleviation of Pain  Outcome: Progressing Towards Goal     Problem: Patient Education: Go to Patient Education Activity  Goal: Patient/Family Education  Outcome: Progressing Towards Goal     Problem: Falls - Risk of  Goal: *Absence of Falls  Description: Document Norma Marking Fall Risk and appropriate interventions in the flowsheet. Outcome: Progressing Towards Goal  Note: Fall Risk Interventions:  Mobility Interventions: Bed/chair exit alarm, OT consult for ADLs, PT Consult for mobility concerns         Medication Interventions: Bed/chair exit alarm, Teach patient to arise slowly, Patient to call before getting OOB    Elimination Interventions: Bed/chair exit alarm, Call light in reach, Patient to call for help with toileting needs    History of Falls Interventions: Bed/chair exit alarm         Problem: Patient Education: Go to Patient Education Activity  Goal: Patient/Family Education  Outcome: Progressing Towards Goal     Problem: Patient Education: Go to Patient Education Activity  Goal: Patient/Family Education  Outcome: Progressing Towards Goal     Problem: Pressure Injury - Risk of  Goal: *Prevention of pressure injury  Description: Document Alphonso Scale and appropriate interventions in the flowsheet.   Outcome: Progressing Towards Goal  Note: Pressure Injury Interventions:  Sensory Interventions: Assess changes in LOC, Assess need for specialty bed, Float heels, Minimize linen layers    Moisture Interventions: Absorbent underpads, Minimize layers    Activity Interventions: Increase time out of bed, PT/OT evaluation    Mobility Interventions: Float heels, HOB 30 degrees or less    Nutrition Interventions: Document food/fluid/supplement intake, Offer support with meals,snacks and hydration    Friction and Shear Interventions: Apply protective barrier, creams and emollients, HOB 30 degrees or less, Minimize layers Problem: Patient Education: Go to Patient Education Activity  Goal: Patient/Family Education  Outcome: Progressing Towards Goal     Problem: Patient Education: Go to Patient Education Activity  Goal: Patient/Family Education  Outcome: Progressing Towards Goal     Problem: Patient Education: Go to Patient Education Activity  Goal: Patient/Family Education  Outcome: Progressing Towards Goal

## 2022-10-29 NOTE — PROGRESS NOTES
Hospitalist Progress Note    Subjective:   Daily Progress Note: 10/29/2022 3:41 PM    No complaints    Current Facility-Administered Medications   Medication Dose Route Frequency    fentaNYL citrate (PF) injection  mcg   mcg IntraVENous Rad Multiple    midazolam (VERSED) injection 0.5-2 mg  0.5-2 mg IntraVENous Rad Multiple    heparin (porcine) 1,000 unit/mL injection 2,000 Units  2,000 Units IntraVENous Rad Multiple    iron sucrose (VENOFER) injection 200 mg  200 mg IntraVENous Q24H    meropenem (MERREM) 1 g in 0.9% sodium chloride (MBP/ADV) 50 mL MBP  1 g IntraVENous Q8H    midodrine (PROAMATINE) tablet 5 mg  5 mg Oral TID PRN    fluconazole (DIFLUCAN) tablet 200 mg  200 mg Oral DAILY    [Held by provider] heparin 25,000 units in D5W 250 ml infusion  500 Units/hr IntraVENous TITRATE    oxyCODONE IR (ROXICODONE) tablet 5 mg  5 mg Oral Q6H PRN    0.9% sodium chloride infusion 250 mL  250 mL IntraVENous PRN    cromolyn (OPTICROM) 4 % ophthalmic solution 1 Drop  1 Drop Both Eyes DAILY    sodium chloride (NS) flush 5-40 mL  5-40 mL IntraVENous Q8H    sodium chloride (NS) flush 5-40 mL  5-40 mL IntraVENous PRN    acetaminophen (TYLENOL) tablet 650 mg  650 mg Oral Q6H PRN    Or    acetaminophen (TYLENOL) suppository 650 mg  650 mg Rectal Q6H PRN    polyethylene glycol (MIRALAX) packet 17 g  17 g Oral DAILY PRN    ondansetron (ZOFRAN ODT) tablet 4 mg  4 mg Oral Q8H PRN    Or    ondansetron (ZOFRAN) injection 4 mg  4 mg IntraVENous Q6H PRN        Review of Systems  Review of Systems   Constitutional:  Positive for malaise/fatigue. HENT: Negative. Respiratory:  Negative for cough and shortness of breath. Cardiovascular:  Positive for leg swelling. Negative for chest pain. Gastrointestinal:  Positive for abdominal pain. Negative for nausea and vomiting. Genitourinary: Negative. Musculoskeletal: Negative. Skin: Negative. Neurological: Negative. Psychiatric/Behavioral: Negative. Objective:     Visit Vitals  /73 (BP 1 Location: Left upper arm, BP Patient Position: Lying)   Pulse 91   Temp 97.7 °F (36.5 °C)   Resp 18   Ht 5' 2\" (1.575 m)   Wt 51.7 kg (114 lb)   LMP  (LMP Unknown)   SpO2 100%   BMI 20.85 kg/m²      O2 Device: None (Room air)    Temp (24hrs), Av.1 °F (36.7 °C), Min:97.7 °F (36.5 °C), Max:98.3 °F (36.8 °C)      No intake/output data recorded. 10/27 1901 - 10/29 0700  In: 2437.5 [P.O.:930;  I.V.:1134.6]  Out: 1351 [Urine:1350]    Recent Results (from the past 24 hour(s))   TYPE & SCREEN    Collection Time: 10/28/22 12:38 PM   Result Value Ref Range    Crossmatch Expiration 10/31/2022,2359     ABO/Rh(D) A Positive     Antibody screen Negative     Unit number [id number]     Blood component type [facility]     Unit division 00     Status of unit Issued,final     TRANSFUSION STATUS Ok to transfuse     Crossmatch result Compatible    OCCULT BLOOD, STOOL    Collection Time: 10/28/22  1:00 PM   Result Value Ref Range    Occult Blood,day 1 Positive (A) Negative      Day 1 date: 10,282,022     PTT    Collection Time: 10/28/22  4:36 PM   Result Value Ref Range    aPTT 112.3 (HH) 21.2 - 34.1 sec    aPTT, therapeutic range   82 - 109 sec   PTT    Collection Time: 10/28/22 11:53 PM   Result Value Ref Range    aPTT 44.2 (H) 21.2 - 34.1 sec    aPTT, therapeutic range   sec   CBC WITH AUTOMATED DIFF    Collection Time: 10/29/22  6:38 AM   Result Value Ref Range    WBC 5.0 3.6 - 11.0 K/uL    RBC 2.74 (L) 3.80 - 5.20 M/uL    HGB 7.3 (L) 11.5 - 16.0 g/dL    HCT 23.8 (L) 35.0 - 47.0 %    MCV 86.9 80.0 - 99.0 FL    MCH 26.6 26.0 - 34.0 PG    MCHC 30.7 30.0 - 36.5 g/dL    RDW 17.3 (H) 11.5 - 14.5 %    PLATELET 572 705 - 658 K/uL    MPV 9.1 8.9 - 12.9 FL    NRBC 0.4 (H) 0.0  WBC    ABSOLUTE NRBC 0.02 (H) 0.00 - 0.01 K/uL    NEUTROPHILS 73 32 - 75 %    BAND NEUTROPHILS 1 0 - 6 %    LYMPHOCYTES 14 12 - 49 %    MONOCYTES 10 5 - 13 %    EOSINOPHILS 0 0 - 7 %    BASOPHILS 0 0 - 1 % METAMYELOCYTES 1 (H) 0 %    MYELOCYTES 1 (H) 0 %    IMMATURE GRANULOCYTES 0 %    ABS. NEUTROPHILS 3.7 1.8 - 8.0 K/UL    ABS. LYMPHOCYTES 0.7 (L) 0.8 - 3.5 K/UL    ABS. MONOCYTES 0.5 0.0 - 1.0 K/UL    ABS. EOSINOPHILS 0.0 0.0 - 0.4 K/UL    ABS. BASOPHILS 0.0 0.0 - 0.1 K/UL    ABS. IMM. GRANS. 0.0 K/UL    DF Manual      RBC COMMENTS Anisocytosis  1+        RBC COMMENTS Spherocytes  1+        RBC COMMENTS Polychromasia  1+       METABOLIC PANEL, COMPREHENSIVE    Collection Time: 10/29/22  6:38 AM   Result Value Ref Range    Sodium 142 136 - 145 mmol/L    Potassium 3.9 3.5 - 5.1 mmol/L    Chloride 113 (H) 97 - 108 mmol/L    CO2 25 21 - 32 mmol/L    Anion gap 4 (L) 5 - 15 mmol/L    Glucose 80 65 - 100 mg/dL    BUN 7 6 - 20 mg/dL    Creatinine 0.48 (L) 0.55 - 1.02 mg/dL    BUN/Creatinine ratio 15 12 - 20      eGFR >60 >60 ml/min/1.73m2    Calcium 8.7 8.5 - 10.1 mg/dL    Bilirubin, total 0.4 0.2 - 1.0 mg/dL    AST (SGOT) 25 15 - 37 U/L    ALT (SGPT) 7 (L) 12 - 78 U/L    Alk. phosphatase 84 45 - 117 U/L    Protein, total 7.8 6.4 - 8.2 g/dL    Albumin 1.4 (L) 3.5 - 5.0 g/dL    Globulin 6.4 (H) 2.0 - 4.0 g/dL    A-G Ratio 0.2 (L) 1.1 - 2.2          IR THROMBECTOMY [facility] VEIN W PHARM   Final Result   Bilateral iliofemoral and IVC aspiration thrombectomy with removal of a large   volume of thrombus from both lower extremities and IVC. CT ABD PELV W CONT   Final Result   Rectovaginal fistula is not identified   Extensive bilateral iliac venous thrombus to the level of the IVC filter. Right   lower extremity edema. Cholelithiasis with distended gallbladder   Bilateral hydronephrosis and hydroureter in patient with ileal conduit   Probable small bowel obstruction secondary to adhesion      XR ABD (KUB)   Final Result   1. No significant change in small bowel dilation. Large volume of stool in the   rectum. 2. Cholelithiasis.          XR ABD (KUB)   Final Result   Dilated small bowel within the central abdomen is similar to CT from one day   prior. DUPLEX LOWER EXT VENOUS BILAT   Final Result   Addendum (preliminary) 1 of 1      · Thrombus present in the right external iliac vein. · Thrombus present in the right common femoral vein. · Thrombus present in the right femoral vein. · Thrombus present in the right proximal femoral vein. · Thrombus present in the right mid femoral vein. · Thrombus present in the right distal femoral vein. · Thrombus present in the right popliteal vein. · Thrombus present in the right gastrocnemius vein. · Thrombus present in the right peroneal vein. · Thrombus present in the right saphenofemoral junction. · Thrombus present in the left external iliac vein. · Thrombus present in the left common femoral vein. · Thrombus present in the left saphenofemoral junction. · Thrombus present in the left femoral vein. · Thrombus present in the left proximal femoral vein. · Thrombus present in the left mid femoral vein. · Thrombus present in the left popliteal vein. · Thrombus present in the left distal femoral vein. · Thrombus present in the left gastrocnemius vein. · Thrombus present in the left peroneal vein. This is vascular lab report on Carol Hernandez, patient's YOB: 1965   Date of examination: October 20, 2022   Examination: Duplex lower extremity venous bilateral   Technician: Ms. Jered Regan   Clinical history: This is  61years old woman with suspected renal vein    thrombosis   Indication: femoral vein thrombosis. Technique: Bilateral leg venous structures examined using venous duplex    ultrasound with 2D grayscale, color-flow and spectral Doppler analysis. Findings:   Right side:   common femoral vein and saphenofemoral junction is not compressible and    there is hypoechoic filling defect noted.    common femoral vein shows no venous flow   Distal external iliac vein also not compressible and that there is no    venous flow Proximal femoral vein is noncompressible and there is no venous flow   Mid femoral vein is not compressible and there is no venous flow   Distal femoral vein is noncompressible and there is no venous flow   Proximal, mid, distal greater saphenous vein is compressible, there is no    filling defect   Distal popliteal vein is not compressible. Proximal popliteal vein is noncompressible and there is no venous flow   Proximal gastrocnemius vein is not compressible   Proximal small saphenous vein is not compressible   Distal, mid, proximal  peroneal vein is not compressible      Left side:   common femoral vein not compressible and there is no venous flow   Saphenofemoral junction is not compressible and there is no venous flow. External iliac vein also not compressible there is no venous flow   Proximal femoral vein is noncompressible and there is no venous flow   Mid femoral vein is noncompressible and there is no venous flow   Distal femoral vein is not compressible and there is no venous flow   Proximal, mid, distal greater saphenous vein is noncompressible and there    is hypoechoic filling defect noted. Distal popliteal vein is noncompressible   Proximal popliteal vein is noncompressible and there is no venous flow   Proximal gastrocnemius vein is not compressible and there is no venous    flow   Proximal small saphenous vein is noncompressible and there is no venous    flow   Distal, mid, proximal peroneal vein is not compressible. Impression:   1. Right external iliac vein, common femoral vein, femoral vein,    popliteal vein, gastrocnemius vein, small saphenous vein, and peroneal    vein shows acute venous thrombosis   2. Left external iliac vein, common femoral vein, femoral vein, popliteal    vein, gastrocnemius vein, small saphenous vein, peroneal vein, and    saphenofemoral junction and greater saphenous vein shows acute venous    thrombosis.       These  extensive bilateral lower extremity deep vein thrombosis notified    to Dr. Karine Samuels on 10/20/2022 1201 PM.            CT ABD PELV WO CONT   Final Result   Status post cystectomy and hysterectomy with right lower quadrant ileal conduit. Mild bilateral hydroureteronephrosis, evaluation is somewhat limited by the   presence of contrast from previously performed examination. Small bowel   distention with decompressed loops distally concerning for obstruction likely   related to adhesion formation. While this study was done without the use of   intravenous contrast there is questionable right common femoral vein thrombus   with soft tissue edema. CTA CHEST W OR W WO CONT   Final Result   Emphysematous changes. No evidence of pulmonary embolism or acute   abnormality. Cholelithiasis. Bilateral hydronephrosis. 1 cm hypodense lesion   right hepatic lobe cannot be characterized with certainty on the basis of this   examination. Follow-up is recommended to ensure stability. XR CHEST PORT   Final Result   No Acute Disease. XR FOOT LT MIN 3 V   Final Result   Normal study. IR THROMBECTOMY MECH VEIN W PHARM    (Results Pending)        PHYSICAL EXAM:    Physical Exam  Vitals reviewed. HENT:      Head: Normocephalic and atraumatic. Cardiovascular:      Rate and Rhythm: Normal rate and regular rhythm. Heart sounds: Normal heart sounds. Pulmonary:      Effort: Pulmonary effort is normal.      Breath sounds: Normal breath sounds. Abdominal:      General: Abdomen is flat. Bowel sounds are normal.      Palpations: Abdomen is soft. Comments: Mild tenderness in the suprapubic region   Musculoskeletal:         General: Normal range of motion. Cervical back: Normal range of motion and neck supple. Right lower leg: Edema present. Left lower leg: Edema present. Skin:     General: Skin is warm and dry. Neurological:      General: No focal deficit present.       Mental Status: She is alert and oriented to person, place, and time. Psychiatric:         Mood and Affect: Mood normal.        Data Review    Recent Results (from the past 24 hour(s))   TYPE & SCREEN    Collection Time: 10/28/22 12:38 PM   Result Value Ref Range    Crossmatch Expiration 10/31/2022,2359     ABO/Rh(D) A Positive     Antibody screen Negative     Unit number [id number]     Blood component type RC LR     Unit division 00     Status of unit Issued,final     TRANSFUSION STATUS Ok to transfuse     Crossmatch result Compatible    OCCULT BLOOD, STOOL    Collection Time: 10/28/22  1:00 PM   Result Value Ref Range    Occult Blood,day 1 Positive (A) Negative      Day 1 date: 10,282,022     PTT    Collection Time: 10/28/22  4:36 PM   Result Value Ref Range    aPTT 112.3 (HH) 21.2 - 34.1 sec    aPTT, therapeutic range   82 - 109 sec   PTT    Collection Time: 10/28/22 11:53 PM   Result Value Ref Range    aPTT 44.2 (H) 21.2 - 34.1 sec    aPTT, therapeutic range   sec   CBC WITH AUTOMATED DIFF    Collection Time: 10/29/22  6:38 AM   Result Value Ref Range    WBC 5.0 3.6 - 11.0 K/uL    RBC 2.74 (L) 3.80 - 5.20 M/uL    HGB 7.3 (L) 11.5 - 16.0 g/dL    HCT 23.8 (L) 35.0 - 47.0 %    MCV 86.9 80.0 - 99.0 FL    MCH 26.6 26.0 - 34.0 PG    MCHC 30.7 30.0 - 36.5 g/dL    RDW 17.3 (H) 11.5 - 14.5 %    PLATELET 026 327 - 351 K/uL    MPV 9.1 8.9 - 12.9 FL    NRBC 0.4 (H) 0.0  WBC    ABSOLUTE NRBC 0.02 (H) 0.00 - 0.01 K/uL    NEUTROPHILS 73 32 - 75 %    BAND NEUTROPHILS 1 0 - 6 %    LYMPHOCYTES 14 12 - 49 %    MONOCYTES 10 5 - 13 %    EOSINOPHILS 0 0 - 7 %    BASOPHILS 0 0 - 1 %    METAMYELOCYTES 1 (H) 0 %    MYELOCYTES 1 (H) 0 %    IMMATURE GRANULOCYTES 0 %    ABS. NEUTROPHILS 3.7 1.8 - 8.0 K/UL    ABS. LYMPHOCYTES 0.7 (L) 0.8 - 3.5 K/UL    ABS. MONOCYTES 0.5 0.0 - 1.0 K/UL    ABS. EOSINOPHILS 0.0 0.0 - 0.4 K/UL    ABS. BASOPHILS 0.0 0.0 - 0.1 K/UL    ABS. IMM.  GRANS. 0.0 K/UL    DF Manual      RBC COMMENTS Anisocytosis  1+        RBC COMMENTS Spherocytes  1+        RBC COMMENTS Polychromasia  1+       METABOLIC PANEL, COMPREHENSIVE    Collection Time: 10/29/22  6:38 AM   Result Value Ref Range    Sodium 142 136 - 145 mmol/L    Potassium 3.9 3.5 - 5.1 mmol/L    Chloride 113 (H) 97 - 108 mmol/L    CO2 25 21 - 32 mmol/L    Anion gap 4 (L) 5 - 15 mmol/L    Glucose 80 65 - 100 mg/dL    BUN 7 6 - 20 mg/dL    Creatinine 0.48 (L) 0.55 - 1.02 mg/dL    BUN/Creatinine ratio 15 12 - 20      eGFR >60 >60 ml/min/1.73m2    Calcium 8.7 8.5 - 10.1 mg/dL    Bilirubin, total 0.4 0.2 - 1.0 mg/dL    AST (SGOT) 25 15 - 37 U/L    ALT (SGPT) 7 (L) 12 - 78 U/L    Alk. phosphatase 84 45 - 117 U/L    Protein, total 7.8 6.4 - 8.2 g/dL    Albumin 1.4 (L) 3.5 - 5.0 g/dL    Globulin 6.4 (H) 2.0 - 4.0 g/dL    A-G Ratio 0.2 (L) 1.1 - 2.2          Assessment/Plan:     Principal Problem:    Small bowel obstruction (HCC) (10/20/2022)    Active Problems:    Acute blood loss anemia (10/20/2022)      Status post robot-assisted surgical procedure, pelvic exenteration (10/20/2022)      Vaginal cuff cellulitis (10/20/2022)    Hospital course: Wilbern Essex is a 61 y.o. female with PMH of bladder cancer s/p cystectomy, hysterectomy, BSO and ileal conduit diversion, anemia, hep C, and left leg DVT s/p IVC filter years ago. She presented to the ED with chief complaint of weakness and nausea for the past week. She reports unable to tolerate PO intake, due to profound nausea, abdominal pain, fever, chills, vomiting or diarrhea. In addition, she also reports unable to walk due to weakness and left inner thigh and left foot pain. Denies trauma. She was seen here on 10/13/2022 with urostomy complications, status post ileal conduit and discharged home on Cipro which she completed. In addition, she also reports having vaginal bleed since surgery 2 weeks ago. Which appears to be more related to a fistula with stool present.   CT scan of the abdomen pelvis was performed revealing no obvious rectovaginal fistula although there was air present tracking in that direction without any extravasation of contrast.  There was extensive bilateral iliac venous thrombus to the level of the IVC filter with right leg edema. Cholelithiasis with a distended gallbladder was also noted. Bilateral hydronephrosis with hydroureter most likely from the ileal conduit. Probable small bowel obstruction secondary to adhesions also noted. Patient was found to be anemic and received 1 unit of packed red blood cells. Venous duplex revealed extensive clot burden within the right leg and left leg. Urinalysis was consistent with Candida infection. ID consultation. Surgical consultation, Dr. James Hdz. UCX grew candida ciferrii, continue fluconazole. IV heparin infusing. Discussed case with interventional radiology who will further investigate the fistula with recommendations and mechanical thrombectomy performed of the bilateral upper extremities with a large volume clot removed. Fortune patient's hemoglobin has dropped and this may be related to bleeding stool occult pending. Source may be related to postprocedural complications from the thrombectomy. Impression/plan:     # 1 Acute blood loss anemia  - hematology following-->reccs low dose anticoagulants  - hgb stable- currently on low dose IV hep gtt  Requiring 1 unit of PRBC, Stool occult positive Holding heparin drip     # 2 Vaginal bleed with stool  - Suspect complications from recent surgery  -With associated vaginitis of the CUFF and cellulitis  Most likely rectovaginal fistula  GYN for pelvic evaluation, pending     # 3 SBO  - currently tolerating full liquid diet  - s/p gentle IVF  - Gen surgery: Basker   - Morphine for pain.       # 4 Extensive dvt bilateral lower legs  - recently on anticoagulants that was stopped prior to surgery  - duplex shows extensive clot burden  - hematology following   - already has IVC filter placed  - started on IV hep due to concern of old IVC and chances that she has collateral blood vessels bypassing filter which will increase her risk for PE.  IR Bilateral iliofemoral and IVC aspiration thrombectomy with removal of a large  volume of thrombus from both lower extremities and IVC  Holding heparin until Hgb is stable    # 5 UTI  - Fluconazole for fungal UTI.  - UCX grew candida ciferrii  - ID following     # 6 Bladder cancer  - Not on chemotherapy currently. - Consulted urology to evaluate for possible post-op complications. - Continue supportive care  - Hematology following     # 7 Emphysema  - not in respiratory distress, continue to monitor      # 8 Ambulatory dysfunction  - secondary to dvt's  - pt/ot as tolerated     # 9 hypotension  - bp remains soft  - prn midodrine  - start gentle IV hydration    10. Rectal/vaginal fistula  Surgery following  Questionable abscess  ID consult  Merrem  IR consult possible recommendation for CT with and without with rectal contrast  GYN consult    11. Moderate to severe protein malnutrition  Have offered tube feeds and TPN patient is refusing  Start Megace    Care Plan discussed with: Patient/Family    Total time spent with patient: >35 minutes.

## 2022-10-29 NOTE — PROGRESS NOTES
Hematology Oncology Progress Note           Follow up for: Bladder cancer  Chart notes reviewed since last visit. No issues with iron  Hgb staying stable  No new complaints      Patient Vitals for the past 24 hrs:   BP Temp Pulse Resp SpO2   10/29/22 0345 116/62 98.2 °F (36.8 °C) 84 18 94 %   10/28/22 2040 106/68 98.2 °F (36.8 °C) 89 16 97 %   10/28/22 1829 106/70 -- 95 19 96 %   10/28/22 1707 101/71 98.3 °F (36.8 °C) 95 -- 96 %   10/28/22 1659 106/68 98.1 °F (36.7 °C) 95 -- 96 %   10/28/22 1503 93/66 97.8 °F (36.6 °C) 90 18 100 %   10/28/22 0955 119/70 98.1 °F (36.7 °C) 99 16 98 %       Review of Systems:    Constitutional No fevers, chills, night sweats, excessive fatigue or weight loss. Allergic/Immunologic No recent allergic reactions   Eyes No significant visual difficulties. No diplopia. ENMT No problems with hearing, no sore throat, no sinus drainage. Endocrine No hot flashes or night sweats. No cold intolerance, polyuria, or polydipsia   Hematologic/Lymphatic No easy bruising or bleeding. The patient denies any tender or palpable lymph nodes   Breasts No abnormal masses of breast, nipple discharge or pain. Respiratory No dyspnea on exertion, orthopnea, chest pain, cough or hemoptysis. Cardiovascular No anginal chest pain, irregular heart beat, tachycardia, palpitations or orthopnea. Gastrointestinal No nausea, vomiting, diarrhea, constipation, cramping, dysphagia, reflux, heartburn, GI bleeding, or early satiety. No change in bowel habits. Genitourinary (M) No hematuria, dysuria, increased frequency, urgency, hesitancy or incontinence. Musculoskeletal No joint pain, swelling or redness. No decreased range of motion. Integumentary No chronic rashes, inflammation, ulcerations, pruritus, petechiae, purpura, ecchymoses, or skin changes. Neurologic No headache, blurred vision, and no areas of focal weakness or numbness. Normal gait. No sensory problems.    Psychiatric No insomnia, depression, nate or mood swings. No psychotropic drugs       Physical Examination:  Constitutional Sedation to maintain respiratory support   Head Normocephalic; no scars   Eyes Conjunctivae and sclerae are clear and without icterus. Pupils are reactive and equal.   ENMT Sinuses are nontender. No oral exudates, ulcers, masses, thrush or mucositis. Oropharynx clear. Tongue normal.   Neck Supple without masses or thyromegaly. No jugular venous distension. Hematologic/Lymphatic No petechiae or purpura. No tender or palpable lymph nodes in the cervical, supraclavicular, axillary or inguinal area. Respiratory Lungs are clear to auscultation without rhonchi or wheezing. Cardiovascular Regular rate and rhythm of heart without murmurs, gallops or rubs. Chest / Line Site Chest is symmetric with no chest wall deformities. Abdomen Non-tender, non-distended, no masses, ascites or hepatosplenomegaly. Good bowel sounds. No guarding or rebound tenderness. No pulsatile masses. Musculoskeletal No tenderness or swelling, normal range of motion without obvious weakness. Extremities No visible deformities, no cyanosis, clubbing or edema. Skin No rashes, scars, or lesions suggestive of malignancy. No petechiae, purpura, or ecchymoses. No excoriations. Neurologic No sensory or motor deficits, normal cerebellar function, normal gait, cranial nerves intact. Psychiatric Alert and oriented times three. Coherent speech. Verbalizes understanding of our discussions today.        Labs:  Recent Results (from the past 24 hour(s))   PTT    Collection Time: 10/28/22  9:56 AM   Result Value Ref Range    aPTT 39.8 (H) 21.2 - 34.1 sec    aPTT, therapeutic range   82 - 109 sec   TYPE & SCREEN    Collection Time: 10/28/22 12:38 PM   Result Value Ref Range    Crossmatch Expiration 10/31/2022,2359     ABO/Rh(D) A Positive     Antibody screen Negative     Unit number [id number]     Blood component type [facility]     Unit division 00 Status of unit Issued,final     TRANSFUSION STATUS Ok to transfuse     Crossmatch result Compatible    OCCULT BLOOD, STOOL    Collection Time: 10/28/22  1:00 PM   Result Value Ref Range    Occult Blood,day 1 Positive (A) Negative      Day 1 date: 10,282,022     PTT    Collection Time: 10/28/22  4:36 PM   Result Value Ref Range    aPTT 112.3 (HH) 21.2 - 34.1 sec    aPTT, therapeutic range   82 - 109 sec   PTT    Collection Time: 10/28/22 11:53 PM   Result Value Ref Range    aPTT 44.2 (H) 21.2 - 34.1 sec    aPTT, therapeutic range   sec   CBC WITH AUTOMATED DIFF    Collection Time: 10/29/22  6:38 AM   Result Value Ref Range    WBC 5.0 3.6 - 11.0 K/uL    RBC 2.74 (L) 3.80 - 5.20 M/uL    HGB 7.3 (L) 11.5 - 16.0 g/dL    HCT 23.8 (L) 35.0 - 47.0 %    MCV 86.9 80.0 - 99.0 FL    MCH 26.6 26.0 - 34.0 PG    MCHC 30.7 30.0 - 36.5 g/dL    RDW 17.3 (H) 11.5 - 14.5 %    PLATELET 638 926 - 950 K/uL    MPV 9.1 8.9 - 12.9 FL    NRBC 0.4 (H) 0.0  WBC    ABSOLUTE NRBC 0.02 (H) 0.00 - 0.01 K/uL    NEUTROPHILS PENDING %    LYMPHOCYTES PENDING %    MONOCYTES PENDING %    EOSINOPHILS PENDING %    BASOPHILS PENDING %    IMMATURE GRANULOCYTES PENDING %    ABS. NEUTROPHILS PENDING K/UL    ABS. LYMPHOCYTES PENDING K/UL    ABS. MONOCYTES PENDING K/UL    ABS. EOSINOPHILS PENDING K/UL    ABS. BASOPHILS PENDING K/UL    ABS. IMM. GRANS. PENDING K/UL    DF PENDING    METABOLIC PANEL, COMPREHENSIVE    Collection Time: 10/29/22  6:38 AM   Result Value Ref Range    Sodium 142 136 - 145 mmol/L    Potassium 3.9 3.5 - 5.1 mmol/L    Chloride 113 (H) 97 - 108 mmol/L    CO2 25 21 - 32 mmol/L    Anion gap 4 (L) 5 - 15 mmol/L    Glucose 80 65 - 100 mg/dL    BUN 7 6 - 20 mg/dL    Creatinine 0.48 (L) 0.55 - 1.02 mg/dL    BUN/Creatinine ratio 15 12 - 20      eGFR >60 >60 ml/min/1.73m2    Calcium 8.7 8.5 - 10.1 mg/dL    Bilirubin, total 0.4 0.2 - 1.0 mg/dL    AST (SGOT) 25 15 - 37 U/L    ALT (SGPT) 7 (L) 12 - 78 U/L    Alk.  phosphatase 84 45 - 117 U/L Protein, total 7.8 6.4 - 8.2 g/dL    Albumin 1.4 (L) 3.5 - 5.0 g/dL    Globulin 6.4 (H) 2.0 - 4.0 g/dL    A-G Ratio 0.2 (L) 1.1 - 2.2         Imaging:    Assessment and Plan:      1 DVT  -on heparin  -clinical concern was that collateral blood vessels have formed making prior IVC filter less valuable   -if active bleeding; hold heparin  -once has been on heparin a few days ok to start working with PT as clot will have stabilized  -completed thrombectomy; hgb with decline after this, now transfused     2. Anemia  -transfuse if hgb <7s; mild uptrend today  -on  IV iron today as well     3.  Bladder cancer T3N0=Stage III based on pathology  -will need OP adjuvant chemotherapy    Will sign off;feel free to call with questions

## 2022-10-29 NOTE — PROGRESS NOTES
UROLOGY Progress Note          [phone]      Daily Progress Note: 10/29/2022      Subjective: The patient is seen for UROLOGIC follow  up. She presented 10/20/2022 with nausea and vomiting. Not taking po well    She is feeling better and wants to eat. Passing flatus and stool. Vaginal discharge is slight per the patient.        Problem List:  Patient Active Problem List   Diagnosis Code    History of blood clots Z86.718    Bladder cancer ([facility] [address] C67.9    Anemia due to acute blood loss D62    Hypercalcemia E83.52    Hydronephrosis of right kidney N13.30    Retained ureteral stent Z96.0    Severe protein-calorie malnutrition (HCC) E43    Acute blood loss anemia D62    Small bowel obstruction (HCC) K56.609    Status post robot-assisted surgical procedure, pelvic exenteration Z98.890    Vaginal cuff cellulitis N76.0         Medications reviewed  Current Facility-Administered Medications   Medication Dose Route Frequency    megestroL (MEGACE) tablet 20 mg  20 mg Oral TID WITH MEALS    fentaNYL citrate (PF) injection  mcg   mcg IntraVENous Rad Multiple    midazolam (VERSED) injection 0.5-2 mg  0.5-2 mg IntraVENous Rad Multiple    heparin (porcine) 1,000 unit/mL injection 2,000 Units  2,000 Units IntraVENous Rad Multiple    iron sucrose (VENOFER) injection 200 mg  200 mg IntraVENous Q24H    meropenem (MERREM) 1 g in 0.9% sodium chloride (MBP/ADV) 50 mL MBP  1 g IntraVENous Q8H    midodrine (PROAMATINE) tablet 5 mg  5 mg Oral TID PRN    fluconazole (DIFLUCAN) tablet 200 mg  200 mg Oral DAILY    [Held by provider] heparin 25,000 units in D5W 250 ml infusion  500 Units/hr IntraVENous TITRATE    oxyCODONE IR (ROXICODONE) tablet 5 mg  5 mg Oral Q6H PRN    0.9% sodium chloride infusion 250 mL  250 mL IntraVENous PRN    cromolyn (OPTICROM) 4 % ophthalmic solution 1 Drop  1 Drop Both Eyes DAILY    sodium chloride (NS) flush 5-40 mL  5-40 mL IntraVENous Q8H    sodium chloride (NS) flush 5-40 mL  5-40 mL IntraVENous PRN    acetaminophen (TYLENOL) tablet 650 mg  650 mg Oral Q6H PRN    Or    acetaminophen (TYLENOL) suppository 650 mg  650 mg Rectal Q6H PRN    polyethylene glycol (MIRALAX) packet 17 g  17 g Oral DAILY PRN    ondansetron (ZOFRAN ODT) tablet 4 mg  4 mg Oral Q8H PRN    Or    ondansetron (ZOFRAN) injection 4 mg  4 mg IntraVENous Q6H PRN       Review of Systems:   Review of Systems   Constitutional:  Positive for malaise/fatigue. Objective:   Physical Exam  Constitutional:       Appearance: Normal appearance. HENT:      Head: Normocephalic and atraumatic. Eyes:      Extraocular Movements: Extraocular movements intact. Pupils: Pupils are equal, round, and reactive to light. Pulmonary:      Effort: Pulmonary effort is normal. No respiratory distress. Breath sounds: No wheezing. Genitourinary:     Comments: RLQ ileal conduit  Musculoskeletal:      Cervical back: Normal range of motion. No rigidity. Skin:     General: Skin is warm and dry. Neurological:      Mental Status: She is alert and oriented to person, place, and time. Mental status is at baseline.    Psychiatric:         Mood and Affect: Mood normal.         Behavior: Behavior normal.        Visit Vitals  /73 (BP 1 Location: Left upper arm, BP Patient Position: Lying)   Pulse 91   Temp 97.7 °F (36.5 °C)   Resp 18   Ht 5' 2\" (1.575 m)   Wt 114 lb (51.7 kg)   SpO2 100%   BMI 20.85 kg/m²         Data Review:       Recent Days:  Recent Labs     10/29/22  0638 10/28/22  0304 10/27/22  0621   WBC 5.0 5.4 4.0   HGB 7.3* 6.4* 7.7*   HCT 23.8* 21.0* 24.9*    247 282       Recent Labs     10/29/22  0638 10/28/22  0304 10/27/22  0621    142 142   K 3.9 3.3* 3.4*   * 111* 112*   CO2 25 23 23   GLU 80 88 77   BUN 7 7 5*   CREA 0.48* 0.53* 0.52*   CA 8.7 8.4* 8.7   ALB 1.4* 1.3* 1.4*   TBILI 0.4 0.4 0.6   ALT 7* 11* 8*                 Assessment/     Patient Active Problem List   Diagnosis Code    History of blood clots Z86.718    Bladder cancer (HCC) C67.9    Anemia due to acute blood loss D62    Hypercalcemia E83.52    Hydronephrosis of right kidney N13.30    Retained ureteral stent Z96.0    Severe protein-calorie malnutrition (HCC) E43    Acute blood loss anemia D62    Small bowel obstruction (HCC) K56.609    Status post robot-assisted surgical procedure, pelvic exenteration Z98.890    Vaginal cuff cellulitis N76.0   Ileus versus partial small bowel obstruction on admission. Tolerating some p.o./Full liquids poor appetite. Nutrition seems to be the underlying issue. Albumin levels are decreasing. I am concerned she will not heal without adequate protein and calories. Candida in the urine culture from the conduit. On fluconazole. ID following. Vaginal discharge. No rectovaginal fistula on CT or clinically. Gen Surgery assessment was in agreement. Probable some vaginal wound dehiscence and drainage. On abx. If continues may need washout and closure. She wants to avoid this. She needs to take more PO nutrition. Bladder cancer status post cystectomy 10/3/2022. pT3 N0 M0. Oncology to discuss adjuvant therapy as outpt. Mild hydronephrosis- asymptomatic. May be physiologic after conduit creation. Renal function ok. Pt declines intervention. Observe. Chronic anemia. Hematology following. DVT with IVC filter.    S/p thrombectomy      Shyam Meredith MD

## 2022-10-29 NOTE — PROGRESS NOTES
Bedside shift change report given to [address] [place] [address],[address] (oncoming nurse) by Alvaro Faulkner (offgoing nurse). Report included the following information SBAR, Intake/Output, MAR, and Cardiac Rhythm   .

## 2022-10-29 NOTE — PROGRESS NOTES
[facility] SURGERY PROGRESS NOTES        Chief Complaint: _nausea    History of Present Illness:    Ms. Astrid Chung is a 61y.o. year old * female presents to ER with 2 day history of nausea and poor PO intake. No vomiting. Passing some flatus. She had undergone robotic assisted anterior exenteration by Dr. Jayjay Nam. She was recently discharged. However she has not been feeling well. Has been able to take very little due to nausea. In ER CT scan without PO contrast showed some mildly dilated small bowel suggestive of possible bowel obstruction versus ileus. Already has IVC filter. On anticoagulation therapy. CT with PO & IV contrast reviewed with Dr. Haley Oro shows some ileus pattern and small fluid collection with air pocket just superior to vaginal cuff. PO contrast has reached the cecum only. No major abscess or overt bowel obstruction. Had Thrombectomy performed by IR. Patient denies any fever or chills or abdominal pain. Passed flatus. Tolerating diet. Eating a sandwich today. Very minimal vaginal discharge.         Past Medical History:   Past Medical History:   Diagnosis Date    Anemia     pt states had recent blood transfusion sept     Back pain     Cancer (HCC)     bladder    DVT (deep venous thrombosis) (HCC)     and PE, pt denies lung PE , only leg several years ago    Hepatitis C     pt states dx 1 month ago    Liver disease     Presence of IVC filter     pt states several years ago    Rheumatoid arthritis ([address]     Sciatic leg pain     pt states both legs    Uterine fibroid        Past Surgical History:   Past Surgical History:   Procedure Laterality Date    HX APPENDECTOMY  10/03/2022    HX  SECTION      HX GYN  10/03/2022    ROBOT ANTERIOR PELVIC EXENTERATION    HX HYSTERECTOMY  10/03/2022    HX SALPINGO-OOPHORECTOMY Bilateral 10/03/2022    HX UROLOGICAL  09/15/2022    CYSTOSCOPY    HX UROLOGICAL  09/15/2022    URETHRAL DILATION    HX UROLOGICAL  09/15/2022    URETERAL STENT PLACEMENT    HX UROLOGICAL  09/15/2022    TRANSURETHRAL RESECTION OF BLADDER TUMOR >2CM    HX UROLOGICAL Bilateral 09/15/2022    RETROGRADE PYELOGRAM    HX UROLOGICAL  10/03/2022    PELVIC LYMPH NODE DISSECTION    HX UROLOGICAL  10/03/2022    ILEAL CONDUIT URINARY DIVERSION    IR THROMBECTOMY [facility] VEIN W PHARM  10/27/2022    [place] CARDIAC SURG PROCEDURE UNLIST      stent placement        Allergy:  Allergies   Allergen Reactions    Bactrim [Sulfamethoprim] Swelling     Swelling of the lilps    Penicillin V Potassium Swelling       Social History:  reports that she has quit smoking. Her smoking use included cigarettes. She has never used smokeless tobacco. She reports that she does not currently use alcohol. She reports that she does not use drugs.      Family History:  Family History   Problem Relation Age of Onset    Cancer Mother     Lung Cancer Mother     Heart Disease Father     Hypertension Sister     Cancer Maternal Aunt     Breast Cancer Maternal Aunt         Current Medications:  Current Facility-Administered Medications:     megestroL (MEGACE) tablet 20 mg, 20 mg, Oral, TID WITH MEALS, Isac Sanz PA-C    fentaNYL citrate (PF) injection  mcg,  mcg, IntraVENous, Oseas Bingham Jawad S, MD, 50 mcg at 10/27/22 1213    midazolam (VERSED) injection 0.5-2 mg, 0.5-2 mg, IntraVENous, Oseas Bingham Jawad S, MD, 1 mg at 10/27/22 1213    heparin (porcine) 1,000 unit/mL injection 2,000 Units, 2,000 Units, IntraVENous, Oseas Bingham Jawad S, MD, 2,000 Units at 10/27/22 1223    iron sucrose (VENOFER) injection 200 mg, 200 mg, IntraVENous, Q24H, Fior Dillon MD, 200 mg at 10/29/22 1134    meropenem (MERREM) 1 g in 0.9% sodium chloride (MBP/ADV) 50 mL MBP, 1 g, IntraVENous, Q8H, Son Cabral MD, Last Rate: 16.7 mL/hr at 10/29/22 1614, 1 g at 10/29/22 1614    midodrine (PROAMATINE) tablet 5 mg, 5 mg, Oral, TID PRN, Lina Acosta NP, 5 mg at 10/25/22 0619    fluconazole (DIFLUCAN) tablet 200 mg, 200 mg, Oral, DAILY, Son Cabral MD, 200 mg at 10/29/22 0958    [Held by provider] heparin 25,000 units in D5W 250 ml infusion, 500 Units/hr, IntraVENous, TITRATE, Kendra Poe MD, Stopped at 10/28/22 1658    oxyCODONE IR (ROXICODONE) tablet 5 mg, 5 mg, Oral, Q6H PRN, Anne Marie Arteaga NP, 5 mg at 10/29/22 0958    0.9% sodium chloride infusion 250 mL, 250 mL, IntraVENous, PRN, Karina Posadas MD    cromolyn (OPTICROM) 4 % ophthalmic solution 1 Drop, 1 Drop, Both Eyes, DAILY, Karina Posadas MD, 1 Drop at 10/29/22 1010    sodium chloride (NS) flush 5-40 mL, 5-40 mL, IntraVENous, Q8H, Mack Posadas MD, 10 mL at 10/29/22 1327    sodium chloride (NS) flush 5-40 mL, 5-40 mL, IntraVENous, PRN, Ang Baxter MD, 10 mL at 10/27/22 1406    acetaminophen (TYLENOL) tablet 650 mg, 650 mg, Oral, Q6H PRN, 650 mg at 10/25/22 1111 **OR** acetaminophen (TYLENOL) suppository 650 mg, 650 mg, Rectal, Q6H PRN, Karina Posadas MD    polyethylene glycol (MIRALAX) packet 17 g, 17 g, Oral, DAILY PRN, Karina Posadas MD    ondansetron (ZOFRAN ODT) tablet 4 mg, 4 mg, Oral, Q8H PRN, 4 mg at 10/25/22 1110 **OR** ondansetron (ZOFRAN) injection 4 mg, 4 mg, IntraVENous, Q6H PRN, Karina Posadas MD     Immunization History: There is no immunization history on file for this patient. Complete    Review of Systems:     Constitutional:  no fever,  no chills,  no sweats, No weakness, No fatigue, No decreased activity. Eye: No recent visual problem, No icterus, No discharge, No double vision. Ear/Nose/Mouth/Throat: No decreased hearing, No ear pain, No nasal congestion, No sore throat. Respiratory: No shortness of breath, No cough, No sputum production, No hemoptysis, No wheezing, No cyanosis. Cardiovascular: No chest pain, No palpitations, No bradycardia, No tachycardia, No peripheral edema, No syncope.   Gastrointestinal:  nausea,  No vomiting, No diarrhea, No constipation, No heartburn,  No abdominal pain.  Genitourinary: No dysuria, No hematuria, No change in urine stream, No urethral discharge, No lesions. Hematology/Lymphatics: No bruising tendency, No bleeding tendency, No petechiae, No swollen lymph glands. Endocrine: No excessive thirst, No polyuria, No cold intolerance, No heat intolerance, No excessive hunger. Immunologic: Not immunocompromised, No recurrent fevers, No recurrent infections. Musculoskeletal: No back pain, No neck pain, No joint pain, No muscle pain, No claudication, No decreased range of motion, No trauma. Integumentary: No rash, No pruritus, No abrasions. Neurologic: Alert and oriented X4, No abnormal balance, No headache, No confusion, No numbness, No tingling. Psychiatric: No anxiety, No depression, No nate. Physical Exam:     Vitals & Measurements: Wt Readings from Last 3 Encounters:   10/19/22 51.7 kg (114 lb)   10/13/22 51.3 kg (113 lb)   10/09/22 59.6 kg (131 lb 6.3 oz)     Temp Readings from Last 3 Encounters:   10/29/22 98.3 °F (36.8 °C)   10/17/22 98.7 °F (37.1 °C)   10/13/22 98.3 °F (36.8 °C)     BP Readings from Last 3 Encounters:   10/29/22 105/71   10/17/22 (!) 103/58   10/13/22 116/67     Pulse Readings from Last 3 Encounters:   10/29/22 (!) 102   10/17/22 94   10/13/22 100      Ht Readings from Last 3 Encounters:   10/26/22 5' 2\" (1.575 m)   10/13/22 5' 2\" (1.575 m)   10/05/22 5' 2.01\" (1.575 m)          General: well appearing, no acute distress  Head: Normal  Face: Nornal  HEENT: atraumatic, PERRLA, moist mucosa, normal pharynx, normal tonsils and adenoids, normal tongue, no fluid in sinuses  Neck: Trachea midline, no carotid bruit, no masses  Chest: Normal.  Respiratory: Normal chest wall expansion, CTA B, no r/r/w, no rubs  Cardiovascular: RRR, no m/r/g, Normal S1 and S2  Abdomen: Soft, non tender, non-distended, normal bowel sounds in all quadrants, no hepatosplenomegaly, no tympany.  Incision scar: well healed, right lower quadrant urostomy+  Genitourinary: No inguinal hernia, normal external gentalia, no renal angle tenderness,   Rectal: deferred  Musculoskeletal: normal ROM in upper and lower extremities, No joint swelling. Integumentary: Warm, dry, and pink, with no rash, purpura, or petechia  Heme/Lymph: No lymphadenopathy, no bruises  Neurological:Cranial Nerves II-XII grossly intact, no gross motor or sensory deficit  Psychiatric: Cooperative with normal mood, affect, and cognition      Laboratory Values:   Recent Results (from the past 24 hour(s))   PTT    Collection Time: 10/28/22 11:53 PM   Result Value Ref Range    aPTT 44.2 (H) 21.2 - 34.1 sec    aPTT, therapeutic range   sec   CBC WITH AUTOMATED DIFF    Collection Time: 10/29/22  6:38 AM   Result Value Ref Range    WBC 5.0 3.6 - 11.0 K/uL    RBC 2.74 (L) 3.80 - 5.20 M/uL    HGB 7.3 (L) 11.5 - 16.0 g/dL    HCT 23.8 (L) 35.0 - 47.0 %    MCV 86.9 80.0 - 99.0 FL    MCH 26.6 26.0 - 34.0 PG    MCHC 30.7 30.0 - 36.5 g/dL    RDW 17.3 (H) 11.5 - 14.5 %    PLATELET 526 503 - 979 K/uL    MPV 9.1 8.9 - 12.9 FL    NRBC 0.4 (H) 0.0  WBC    ABSOLUTE NRBC 0.02 (H) 0.00 - 0.01 K/uL    NEUTROPHILS 73 32 - 75 %    BAND NEUTROPHILS 1 0 - 6 %    LYMPHOCYTES 14 12 - 49 %    MONOCYTES 10 5 - 13 %    EOSINOPHILS 0 0 - 7 %    BASOPHILS 0 0 - 1 %    METAMYELOCYTES 1 (H) 0 %    MYELOCYTES 1 (H) 0 %    IMMATURE GRANULOCYTES 0 %    ABS. NEUTROPHILS 3.7 1.8 - 8.0 K/UL    ABS. LYMPHOCYTES 0.7 (L) 0.8 - 3.5 K/UL    ABS. MONOCYTES 0.5 0.0 - 1.0 K/UL    ABS. EOSINOPHILS 0.0 0.0 - 0.4 K/UL    ABS. BASOPHILS 0.0 0.0 - 0.1 K/UL    ABS. IMM.  GRANS. 0.0 K/UL    DF Manual      RBC COMMENTS Anisocytosis  1+        RBC COMMENTS Spherocytes  1+        RBC COMMENTS Polychromasia  1+       METABOLIC PANEL, COMPREHENSIVE    Collection Time: 10/29/22  6:38 AM   Result Value Ref Range    Sodium 142 136 - 145 mmol/L    Potassium 3.9 3.5 - 5.1 mmol/L    Chloride 113 (H) 97 - 108 mmol/L    CO2 25 21 - 32 mmol/L    Anion gap 4 (L) 5 - 15 mmol/L    Glucose 80 65 - 100 mg/dL    BUN 7 6 - 20 mg/dL    Creatinine 0.48 (L) 0.55 - 1.02 mg/dL    BUN/Creatinine ratio 15 12 - 20      eGFR >60 >60 ml/min/1.73m2    Calcium 8.7 8.5 - 10.1 mg/dL    Bilirubin, total 0.4 0.2 - 1.0 mg/dL    AST (SGOT) 25 15 - 37 U/L    ALT (SGPT) 7 (L) 12 - 78 U/L    Alk. phosphatase 84 45 - 117 U/L    Protein, total 7.8 6.4 - 8.2 g/dL    Albumin 1.4 (L) 3.5 - 5.0 g/dL    Globulin 6.4 (H) 2.0 - 4.0 g/dL    A-G Ratio 0.2 (L) 1.1 - 2.2             IR THROMBECTOMY [facility] VEIN W PHARM   Final Result   Bilateral iliofemoral and IVC aspiration thrombectomy with removal of a large   volume of thrombus from both lower extremities and IVC. CT ABD PELV W CONT   Final Result   Rectovaginal fistula is not identified   Extensive bilateral iliac venous thrombus to the level of the IVC filter. Right   lower extremity edema. Cholelithiasis with distended gallbladder   Bilateral hydronephrosis and hydroureter in patient with ileal conduit   Probable small bowel obstruction secondary to adhesion      XR ABD (KUB)   Final Result   1. No significant change in small bowel dilation. Large volume of stool in the   rectum. 2. Cholelithiasis. XR ABD (KUB)   Final Result   Dilated small bowel within the central abdomen is similar to CT from one day   prior. DUPLEX LOWER EXT VENOUS BILAT   Final Result   Addendum (preliminary) 1 of 1      · Thrombus present in the right external iliac vein. · Thrombus present in the right common femoral vein. · Thrombus present in the right femoral vein. · Thrombus present in the right proximal femoral vein. · Thrombus present in the right mid femoral vein. · Thrombus present in the right distal femoral vein. · Thrombus present in the right popliteal vein. · Thrombus present in the right gastrocnemius vein. · Thrombus present in the right peroneal vein. · Thrombus present in the right saphenofemoral junction.    · Thrombus present in the left external iliac vein. · Thrombus present in the left common femoral vein. · Thrombus present in the left saphenofemoral junction. · Thrombus present in the left femoral vein. · Thrombus present in the left proximal femoral vein. · Thrombus present in the left mid femoral vein. · Thrombus present in the left popliteal vein. · Thrombus present in the left distal femoral vein. · Thrombus present in the left gastrocnemius vein. · Thrombus present in the left peroneal vein. This is vascular lab report on Char Young, patient's YOB: 1965   Date of examination: October 20, 2022   Examination: Duplex lower extremity venous bilateral   Technician: Ms. Hayden Friday   Clinical history: This is  61years old woman with suspected renal vein    thrombosis   Indication: femoral vein thrombosis. Technique: Bilateral leg venous structures examined using venous duplex    ultrasound with 2D grayscale, color-flow and spectral Doppler analysis. Findings:   Right side:   common femoral vein and saphenofemoral junction is not compressible and    there is hypoechoic filling defect noted. common femoral vein shows no venous flow   Distal external iliac vein also not compressible and that there is no    venous flow   Proximal femoral vein is noncompressible and there is no venous flow   Mid femoral vein is not compressible and there is no venous flow   Distal femoral vein is noncompressible and there is no venous flow   Proximal, mid, distal greater saphenous vein is compressible, there is no    filling defect   Distal popliteal vein is not compressible.    Proximal popliteal vein is noncompressible and there is no venous flow   Proximal gastrocnemius vein is not compressible   Proximal small saphenous vein is not compressible   Distal, mid, proximal  peroneal vein is not compressible      Left side:   common femoral vein not compressible and there is no venous flow   Saphenofemoral junction is not compressible and there is no venous flow. External iliac vein also not compressible there is no venous flow   Proximal femoral vein is noncompressible and there is no venous flow   Mid femoral vein is noncompressible and there is no venous flow   Distal femoral vein is not compressible and there is no venous flow   Proximal, mid, distal greater saphenous vein is noncompressible and there    is hypoechoic filling defect noted. Distal popliteal vein is noncompressible   Proximal popliteal vein is noncompressible and there is no venous flow   Proximal gastrocnemius vein is not compressible and there is no venous    flow   Proximal small saphenous vein is noncompressible and there is no venous    flow   Distal, mid, proximal peroneal vein is not compressible. Impression:   1. Right external iliac vein, common femoral vein, femoral vein,    popliteal vein, gastrocnemius vein, small saphenous vein, and peroneal    vein shows acute venous thrombosis   2. Left external iliac vein, common femoral vein, femoral vein, popliteal    vein, gastrocnemius vein, small saphenous vein, peroneal vein, and    saphenofemoral junction and greater saphenous vein shows acute venous    thrombosis. These  extensive bilateral lower extremity deep vein thrombosis notified    to Dr. Broderick Mason on 10/20/2022 1201 PM.            CT ABD PELV WO CONT   Final Result   Status post cystectomy and hysterectomy with right lower quadrant ileal conduit. Mild bilateral hydroureteronephrosis, evaluation is somewhat limited by the   presence of contrast from previously performed examination. Small bowel   distention with decompressed loops distally concerning for obstruction likely   related to adhesion formation. While this study was done without the use of   intravenous contrast there is questionable right common femoral vein thrombus   with soft tissue edema.       CTA CHEST W OR W WO CONT   Final Result Emphysematous changes. No evidence of pulmonary embolism or acute   abnormality. Cholelithiasis. Bilateral hydronephrosis. 1 cm hypodense lesion   right hepatic lobe cannot be characterized with certainty on the basis of this   examination. Follow-up is recommended to ensure stability. XR CHEST PORT   Final Result   No Acute Disease. XR FOOT LT MIN 3 V   Final Result   Normal study. IR THROMBECTOMY [facility] VEIN W PHARM    (Results Pending)       Assessment:  Ileus, resolved. Nausea resolved    DVT    Minimal Vaginal drainage may be just some pelvic fluid draining unable to confirm that it could be a RV fistula. Anemia    Plan:    Admission  Diet: soft diet along with ensure & protein shake. IV fluids  SCD  IS  Nausea medication  Labs in am  Anticoagulation therapy per Hematologist.  9. PT/OT  10. Awaiting transfer to Rehab     Thank you for the consultation & allowing me to participate in the care of this patient.

## 2022-10-29 NOTE — PROGRESS NOTES
Problem: Pain  Goal: *Control of Pain  Outcome: Progressing Towards Goal     Problem: Falls - Risk of  Goal: *Absence of Falls  Description: Document Iris Fall Risk and appropriate interventions in the flowsheet. Outcome: Progressing Towards Goal  Note: Fall Risk Interventions:  Mobility Interventions: Patient to call before getting OOB, Bed/chair exit alarm         Medication Interventions: Patient to call before getting OOB, Bed/chair exit alarm    Elimination Interventions: Bed/chair exit alarm, Call light in reach    History of Falls Interventions: Bed/chair exit alarm         Problem: Pressure Injury - Risk of  Goal: *Prevention of pressure injury  Description: Document Alphonso Scale and appropriate interventions in the flowsheet.   Outcome: Progressing Towards Goal  Note: Pressure Injury Interventions:  Sensory Interventions: Assess changes in LOC, Assess need for specialty bed, Float heels, Minimize linen layers    Moisture Interventions: Absorbent underpads, Internal/External urinary devices    Activity Interventions: Pressure redistribution bed/mattress(bed type), PT/OT evaluation    Mobility Interventions: PT/OT evaluation, Pressure redistribution bed/mattress (bed type)    Nutrition Interventions: Document food/fluid/supplement intake    Friction and Shear Interventions: Minimize layers

## 2022-10-30 LAB
ALBUMIN SERPL-MCNC: 1.6 G/DL (ref 3.5–5)
ALBUMIN/GLOB SERPL: 0.3 {RATIO} (ref 1.1–2.2)
ALP SERPL-CCNC: 85 U/L (ref 45–117)
ALT SERPL-CCNC: 7 U/L (ref 12–78)
ANION GAP SERPL CALC-SCNC: 5 MMOL/L (ref 5–15)
AST SERPL W P-5'-P-CCNC: 26 U/L (ref 15–37)
BASOPHILS # BLD: 0 K/UL (ref 0–0.1)
BASOPHILS NFR BLD: 0 % (ref 0–1)
BILIRUB SERPL-MCNC: 0.4 MG/DL (ref 0.2–1)
BUN SERPL-MCNC: 6 MG/DL (ref 6–20)
BUN/CREAT SERPL: 12 (ref 12–20)
CA-I BLD-MCNC: 9 MG/DL (ref 8.5–10.1)
CHLORIDE SERPL-SCNC: 110 MMOL/L (ref 97–108)
CO2 SERPL-SCNC: 26 MMOL/L (ref 21–32)
CREAT SERPL-MCNC: 0.5 MG/DL (ref 0.55–1.02)
DIFFERENTIAL METHOD BLD: ABNORMAL
EOSINOPHIL # BLD: 0 K/UL (ref 0–0.4)
EOSINOPHIL NFR BLD: 0 % (ref 0–7)
ERYTHROCYTE [DISTWIDTH] IN BLOOD BY AUTOMATED COUNT: 17.5 % (ref 11.5–14.5)
GLOBULIN SER CALC-MCNC: 6.4 G/DL (ref 2–4)
GLUCOSE SERPL-MCNC: 74 MG/DL (ref 65–100)
HCT VFR BLD AUTO: 23.9 % (ref 35–47)
HGB BLD-MCNC: 7.2 G/DL (ref 11.5–16)
IMM GRANULOCYTES # BLD AUTO: 0 K/UL
IMM GRANULOCYTES NFR BLD AUTO: 0 %
LYMPHOCYTES # BLD: 0.9 K/UL (ref 0.8–3.5)
LYMPHOCYTES NFR BLD: 19 % (ref 12–49)
MCH RBC QN AUTO: 26.8 PG (ref 26–34)
MCHC RBC AUTO-ENTMCNC: 30.1 G/DL (ref 30–36.5)
MCV RBC AUTO: 88.8 FL (ref 80–99)
MONOCYTES # BLD: 0.3 K/UL (ref 0–1)
MONOCYTES NFR BLD: 7 % (ref 5–13)
MYELOCYTES NFR BLD MANUAL: 1 %
NEUTS BAND NFR BLD MANUAL: 3 % (ref 0–6)
NEUTS SEG # BLD: 3.4 K/UL (ref 1.8–8)
NEUTS SEG NFR BLD: 70 % (ref 32–75)
NRBC # BLD: 0 K/UL (ref 0–0.01)
NRBC BLD-RTO: 0 PER 100 WBC
PLATELET # BLD AUTO: 216 K/UL (ref 150–400)
PMV BLD AUTO: 9.8 FL (ref 8.9–12.9)
POTASSIUM SERPL-SCNC: 4 MMOL/L (ref 3.5–5.1)
PROT SERPL-MCNC: 8 G/DL (ref 6.4–8.2)
RBC # BLD AUTO: 2.69 M/UL (ref 3.8–5.2)
RBC MORPH BLD: ABNORMAL
RBC MORPH BLD: ABNORMAL
SODIUM SERPL-SCNC: 141 MMOL/L (ref 136–145)
WBC # BLD AUTO: 4.7 K/UL (ref 3.6–11)

## 2022-10-30 PROCEDURE — 97530 THERAPEUTIC ACTIVITIES: CPT

## 2022-10-30 PROCEDURE — 74011250636 HC RX REV CODE- 250/636: Performed by: INTERNAL MEDICINE

## 2022-10-30 PROCEDURE — 74011250637 HC RX REV CODE- 250/637: Performed by: PHYSICIAN ASSISTANT

## 2022-10-30 PROCEDURE — 36415 COLL VENOUS BLD VENIPUNCTURE: CPT

## 2022-10-30 PROCEDURE — 74011000258 HC RX REV CODE- 258: Performed by: INTERNAL MEDICINE

## 2022-10-30 PROCEDURE — 85025 COMPLETE CBC W/AUTO DIFF WBC: CPT

## 2022-10-30 PROCEDURE — 74011250637 HC RX REV CODE- 250/637: Performed by: NURSE PRACTITIONER

## 2022-10-30 PROCEDURE — 65270000029 HC RM PRIVATE

## 2022-10-30 PROCEDURE — 74011250637 HC RX REV CODE- 250/637: Performed by: INTERNAL MEDICINE

## 2022-10-30 PROCEDURE — 80053 COMPREHEN METABOLIC PANEL: CPT

## 2022-10-30 PROCEDURE — 74011000250 HC RX REV CODE- 250: Performed by: INTERNAL MEDICINE

## 2022-10-30 RX ORDER — LORAZEPAM 1 MG/1
1 TABLET ORAL
Status: DISCONTINUED | OUTPATIENT
Start: 2022-10-30 | End: 2022-11-02

## 2022-10-30 RX ADMIN — SODIUM CHLORIDE, PRESERVATIVE FREE 10 ML: 5 INJECTION INTRAVENOUS at 23:52

## 2022-10-30 RX ADMIN — SODIUM CHLORIDE, PRESERVATIVE FREE 10 ML: 5 INJECTION INTRAVENOUS at 06:17

## 2022-10-30 RX ADMIN — MEROPENEM 1 G: 1 INJECTION, POWDER, FOR SOLUTION INTRAVENOUS at 23:51

## 2022-10-30 RX ADMIN — OXYCODONE 5 MG: 5 TABLET ORAL at 03:42

## 2022-10-30 RX ADMIN — MEGESTROL ACETATE 20 MG: 20 TABLET ORAL at 17:18

## 2022-10-30 RX ADMIN — IRON SUCROSE 200 MG: 20 INJECTION, SOLUTION INTRAVENOUS at 12:38

## 2022-10-30 RX ADMIN — MEGESTROL ACETATE 20 MG: 20 TABLET ORAL at 08:37

## 2022-10-30 RX ADMIN — MEROPENEM 1 G: 1 INJECTION, POWDER, FOR SOLUTION INTRAVENOUS at 00:07

## 2022-10-30 RX ADMIN — SODIUM CHLORIDE, PRESERVATIVE FREE 10 ML: 5 INJECTION INTRAVENOUS at 13:07

## 2022-10-30 RX ADMIN — OXYCODONE 5 MG: 5 TABLET ORAL at 13:07

## 2022-10-30 RX ADMIN — CROMOLYN SODIUM 1 DROP: 40 SOLUTION/ DROPS OPHTHALMIC at 08:41

## 2022-10-30 RX ADMIN — MEGESTROL ACETATE 20 MG: 20 TABLET ORAL at 12:39

## 2022-10-30 RX ADMIN — ONDANSETRON 4 MG: 2 INJECTION INTRAMUSCULAR; INTRAVENOUS at 13:07

## 2022-10-30 RX ADMIN — MEROPENEM 1 G: 1 INJECTION, POWDER, FOR SOLUTION INTRAVENOUS at 17:18

## 2022-10-30 RX ADMIN — FLUCONAZOLE 200 MG: 100 TABLET ORAL at 08:37

## 2022-10-30 RX ADMIN — MEROPENEM 1 G: 1 INJECTION, POWDER, FOR SOLUTION INTRAVENOUS at 08:37

## 2022-10-30 NOTE — PROGRESS NOTES
Problem: Pain  Goal: *Control of Pain  Outcome: Progressing Towards Goal  Goal: *PALLIATIVE CARE:  Alleviation of Pain  Outcome: Progressing Towards Goal     Problem: Patient Education: Go to Patient Education Activity  Goal: Patient/Family Education  Outcome: Progressing Towards Goal     Problem: Falls - Risk of  Goal: *Absence of Falls  Description: Document Eliceo Fail Fall Risk and appropriate interventions in the flowsheet. Outcome: Progressing Towards Goal  Note: Fall Risk Interventions:  Mobility Interventions: Patient to call before getting OOB, Assess mobility with egress test         Medication Interventions: Bed/chair exit alarm, Patient to call before getting OOB    Elimination Interventions: Call light in reach    History of Falls Interventions: Bed/chair exit alarm         Problem: Patient Education: Go to Patient Education Activity  Goal: Patient/Family Education  Outcome: Progressing Towards Goal     Problem: Patient Education: Go to Patient Education Activity  Goal: Patient/Family Education  Outcome: Progressing Towards Goal     Problem: Pressure Injury - Risk of  Goal: *Prevention of pressure injury  Description: Document Alphonso Scale and appropriate interventions in the flowsheet.   Outcome: Progressing Towards Goal  Note: Pressure Injury Interventions:  Sensory Interventions: Minimize linen layers    Moisture Interventions: Contain wound drainage    Activity Interventions: Increase time out of bed    Mobility Interventions: PT/OT evaluation    Nutrition Interventions: Document food/fluid/supplement intake    Friction and Shear Interventions: Lift sheet                Problem: Patient Education: Go to Patient Education Activity  Goal: Patient/Family Education  Outcome: Progressing Towards Goal     Problem: Patient Education: Go to Patient Education Activity  Goal: Patient/Family Education  Outcome: Progressing Towards Goal     Problem: Patient Education: Go to Patient Education Activity  Goal: Patient/Family Education  Outcome: Progressing Towards Goal

## 2022-10-30 NOTE — PROGRESS NOTES
OCCUPATIONAL THERAPY TREATMENT  Patient: Annabella Camacho (68 y.o. female)  Date: 10/30/2022  Diagnosis: Acute blood loss anemia [D62]  Small bowel obstruction (HCC) [K56.609] Small bowel obstruction (HCC)  Procedure(s) (LRB):  Endoscopy Of Conduit Retrograde Pyelograms And Bilateral Ureteral Stent Placement (Bilateral) 6 Days Post-Op  Precautions: FALL  Chart, occupational therapy assessment, plan of care, and goals were reviewed. ASSESSMENT  Pt continues with skilled OT services and is progressing towards goals. Pt received semi-supine in bed upon arrival, AXO x and agreeable to WAGNER tx at this time. Pt cooperative and demonstrated good effort during activities. Overall, pt continues to present with deficits in generalized strength/AROM, pain and functional activity tolerance during performance of ADLs/mobility (see below for objective details and assist levels). Pt c/o of pain in stomach 6/10. Pt declined OOB d/t not feeling well. Pt completed simple grooming with set-up. Pt educated on the benefits of UE exercises to improve overall health. Pt engaged in bharati UE exercises with vc's for proper technique with rest breaks, see grid below. Pt presented good understanding of concepts taught. Will continue to progress. Patient would benefit from continued skilled Occupational services while at [facility] in order to address current impairments and improve IND and safety with self care and functional transfer/mobility. Recommend d/c to IRF once medically appropriate. Other factors to consider for discharge: Time of onset, medical prognosis/diagnosis, severity of deficits, PLOF, functional baseline, home environment, and family support          PLAN :  Patient continues to benefit from skilled intervention to address the above impairments. Continue treatment per established plan of care. to address goals.     Recommend with staff: Out of bed to chair for meals and Encourage HEP in prep for ADLs/mobility    Recommend next session: UB dressing and Standing grooming    Recommendation for discharge: (in order for the patient to meet his/her long term goals)  [address]     This discharge recommendation:  Has been made in collaboration with the attending provider and/or case management       IF patient discharges home will need the following DME: TBD       SUBJECTIVE:   Patient stated Not feeling well today.     OBJECTIVE DATA SUMMARY:   Cognitive/Behavioral Status:  Neurologic State: Alert  Orientation Level: Oriented X4  Cognition: Follows commands             Functional Mobility and Transfers for ADLs:  Bed Mobility:       Transfers:             Balance:       ADL Intervention:       Grooming  Grooming Assistance: Set-up  Position Performed: Long sitting on bed  Washing Face: Set-up  Washing Hands: Set-up            Exercise Sets Reps AROM AAROM PROM Self PROM Comments   Shoulder flex/ext 3 12 [x] [] [] [] Long sitting, vc's to achieve full ROM   Elbow flex/ext 3 12 [x] [] [] []    Chest press 3 12 [x] [] [] []    Ceiling punches 3 12 [x] [] [] []    Arm circles 3 12 X          Pain:  6/10 in stomach area, after session pt reported 4/10 pain    Activity Tolerance:   Fair and requires rest breaks    After treatment patient left in no apparent distress:   Supine in bed, Call bell within reach, Bed / chair alarm activated, and Side rails x 3, bed locked and in lowest position    COMMUNICATION/COLLABORATION:   The patients plan of care was discussed with: Registered nurse. BERNARD [facility]  Time Calculation: 23 mins     Problem: Self Care Deficits Care Plan (Adult)  Goal: *Acute Goals and Plan of Care (Insert Text)  Description: FUNCTIONAL STATUS PRIOR TO ADMISSION: Patient was independent and active without use of DME. Patient was independent for basic and instrumental ADLs. HOME SUPPORT: The patient lived alone; support from [facility].      Occupational Therapy Goals  Initiated 10/28/2022    Pt stated goal \"to get stronger\"  Pt will be IND sup <> sit in prep for EOB ADLs  Pt will be MI grooming standing sink side LRAD  Pt will be IND UB dressing sitting EOB/long sit   Pt will be MI LE dressing sitting EOB/long sit  Pt will be IND sit <>  prep for toileting LRAD  Pt will be IND toileting/toilet transfer/cloth mgmt LRAD  Pt will be IND following UE HEP in prep for self care tasks      Outcome: Progressing Towards Goal

## 2022-10-30 NOTE — PROGRESS NOTES
[facility] SURGERY PROGRESS NOTES        Chief Complaint: _nausea    History of Present Illness:    Ms. Swathi Wetzel is a 61y.o. year old * female presents to ER with 2 day history of nausea and poor PO intake. No vomiting. Passing some flatus. She had undergone robotic assisted anterior exenteration by Dr. Calhoun. She was recently discharged. However she has not been feeling well. Has been able to take very little due to nausea. In ER CT scan without PO contrast showed some mildly dilated small bowel suggestive of possible bowel obstruction versus ileus. Already has IVC filter. On anticoagulation therapy. CT with PO & IV contrast reviewed with Dr. Shawnee Sr shows some ileus pattern and small fluid collection with air pocket just superior to vaginal cuff. PO contrast has reached the cecum only. No major abscess or overt bowel obstruction. Had Thrombectomy performed by IR. Patient denies any fever or chills or abdominal pain. Passed flatus. Tolerating diet. Very minimal vaginal discharge.         Past Medical History:   Past Medical History:   Diagnosis Date    Anemia     pt states had recent blood transfusion sept     Back pain     Cancer (HCC)     bladder    DVT (deep venous thrombosis) (HCC)     and PE, pt denies lung PE , only leg several years ago    Hepatitis C     pt states dx 1 month ago    Liver disease     Presence of IVC filter     pt states several years ago    Rheumatoid arthritis ([address]     Sciatic leg pain     pt states both legs    Uterine fibroid        Past Surgical History:   Past Surgical History:   Procedure Laterality Date    HX APPENDECTOMY  10/03/2022    HX  SECTION      HX GYN  10/03/2022    ROBOT ANTERIOR PELVIC EXENTERATION    HX HYSTERECTOMY  10/03/2022    HX SALPINGO-OOPHORECTOMY Bilateral 10/03/2022    HX UROLOGICAL  09/15/2022    CYSTOSCOPY    HX UROLOGICAL  09/15/2022    URETHRAL DILATION    HX UROLOGICAL  09/15/2022    URETERAL STENT PLACEMENT    HX UROLOGICAL  09/15/2022    TRANSURETHRAL RESECTION OF BLADDER TUMOR >2CM    HX UROLOGICAL Bilateral 09/15/2022    RETROGRADE PYELOGRAM    HX UROLOGICAL  10/03/2022    PELVIC LYMPH NODE DISSECTION    HX UROLOGICAL  10/03/2022    ILEAL CONDUIT URINARY DIVERSION    IR THROMBECTOMY [facility] VEIN W PHARM  10/27/2022    TX CARDIAC SURG PROCEDURE UNLIST      stent placement        Allergy:  Allergies   Allergen Reactions    Bactrim [Sulfamethoprim] Swelling     Swelling of the lilps    Penicillin V Potassium Swelling       Social History:  reports that she has quit smoking. Her smoking use included cigarettes. She has never used smokeless tobacco. She reports that she does not currently use alcohol. She reports that she does not use drugs.      Family History:  Family History   Problem Relation Age of Onset    Cancer Mother     Lung Cancer Mother     Heart Disease Father     Hypertension Sister     Cancer Maternal Aunt     Breast Cancer Maternal Aunt         Current Medications:  Current Facility-Administered Medications:     megestroL (MEGACE) tablet 20 mg, 20 mg, Oral, TID WITH MEALS, Isac Sanz PA-C, 20 mg at 10/30/22 1239    heparin (porcine) 1,000 unit/mL injection 2,000 Units, 2,000 Units, IntraVENous, Rad Multiple, Mar Farooq MD, 2,000 Units at 10/27/22 1223    meropenem (MERREM) 1 g in 0.9% sodium chloride (MBP/ADV) 50 mL MBP, 1 g, IntraVENous, Q8H, Son Cabral MD, Last Rate: 16.7 mL/hr at 10/30/22 0837, 1 g at 10/30/22 0837    midodrine (PROAMATINE) tablet 5 mg, 5 mg, Oral, TID PRN, Lina Acosta NP, 5 mg at 10/25/22 0619    fluconazole (DIFLUCAN) tablet 200 mg, 200 mg, Oral, DAILY, Son Cabral MD, 200 mg at 10/30/22 0837    [Held by provider] heparin 25,000 units in D5W 250 ml infusion, 500 Units/hr, IntraVENous, TITRATE, Belgica Huang MD, Stopped at 10/28/22 1658    oxyCODONE IR (ROXICODONE) tablet 5 mg, 5 mg, Oral, Q6H PRN, Lina Acosta NP, 5 mg at 10/30/22 0342    0.9% sodium chloride infusion 250 mL, 250 mL, IntraVENous, PRN, Jakob Posadas MD    cromolyn (OPTICROM) 4 % ophthalmic solution 1 Drop, 1 Drop, Both Eyes, DAILY, Jakob Posadas MD, 1 Drop at 10/30/22 0841    sodium chloride (NS) flush 5-40 mL, 5-40 mL, IntraVENous, Q8H, Jakob Posadas MD, 10 mL at 10/30/22 0617    sodium chloride (NS) flush 5-40 mL, 5-40 mL, IntraVENous, PRN, Yuniel Griffin MD, 10 mL at 10/27/22 1406    acetaminophen (TYLENOL) tablet 650 mg, 650 mg, Oral, Q6H PRN, 650 mg at 10/25/22 1111 **OR** acetaminophen (TYLENOL) suppository 650 mg, 650 mg, Rectal, Q6H PRN, Yuniel Griffin MD    polyethylene glycol (MIRALAX) packet 17 g, 17 g, Oral, DAILY PRN, Jakob Posadas MD    ondansetron (ZOFRAN ODT) tablet 4 mg, 4 mg, Oral, Q8H PRN, 4 mg at 10/25/22 1110 **OR** ondansetron (ZOFRAN) injection 4 mg, 4 mg, IntraVENous, Q6H PRN, Jakob Posadas MD     Immunization History: There is no immunization history on file for this patient. Complete    Review of Systems:     Constitutional:  no fever,  no chills,  no sweats, No weakness, No fatigue, No decreased activity. Eye: No recent visual problem, No icterus, No discharge, No double vision. Ear/Nose/Mouth/Throat: No decreased hearing, No ear pain, No nasal congestion, No sore throat. Respiratory: No shortness of breath, No cough, No sputum production, No hemoptysis, No wheezing, No cyanosis. Cardiovascular: No chest pain, No palpitations, No bradycardia, No tachycardia, No peripheral edema, No syncope. Gastrointestinal:  nausea,  No vomiting, No diarrhea, No constipation, No heartburn,  No abdominal pain. Genitourinary: No dysuria, No hematuria, No change in urine stream, No urethral discharge, No lesions. Hematology/Lymphatics: No bruising tendency, No bleeding tendency, No petechiae, No swollen lymph glands. Endocrine: No excessive thirst, No polyuria, No cold intolerance, No heat intolerance, No excessive hunger.   Immunologic: Not immunocompromised, No recurrent fevers, No recurrent infections. Musculoskeletal: No back pain, No neck pain, No joint pain, No muscle pain, No claudication, No decreased range of motion, No trauma. Integumentary: No rash, No pruritus, No abrasions. Neurologic: Alert and oriented X4, No abnormal balance, No headache, No confusion, No numbness, No tingling. Psychiatric: No anxiety, No depression, No nate. Physical Exam:     Vitals & Measurements: Wt Readings from Last 3 Encounters:   10/19/22 51.7 kg (114 lb)   10/13/22 51.3 kg (113 lb)   10/09/22 59.6 kg (131 lb 6.3 oz)     Temp Readings from Last 3 Encounters:   10/30/22 97.9 °F (36.6 °C)   10/17/22 98.7 °F (37.1 °C)   10/13/22 98.3 °F (36.8 °C)     BP Readings from Last 3 Encounters:   10/30/22 106/73   10/17/22 (!) 103/58   10/13/22 116/67     Pulse Readings from Last 3 Encounters:   10/30/22 84   10/17/22 94   10/13/22 100      Ht Readings from Last 3 Encounters:   10/26/22 5' 2\" (1.575 m)   10/13/22 5' 2\" (1.575 m)   10/05/22 5' 2.01\" (1.575 m)          General: well appearing, no acute distress  Head: Normal  Face: Nornal  HEENT: atraumatic, PERRLA, moist mucosa, normal pharynx, normal tonsils and adenoids, normal tongue, no fluid in sinuses  Neck: Trachea midline, no carotid bruit, no masses  Chest: Normal.  Respiratory: Normal chest wall expansion, CTA B, no r/r/w, no rubs  Cardiovascular: RRR, no m/r/g, Normal S1 and S2  Abdomen: Soft, non tender, non-distended, normal bowel sounds in all quadrants, no hepatosplenomegaly, no tympany. Incision scar: well healed, right lower quadrant urostomy+  Genitourinary: No inguinal hernia, normal external gentalia, no renal angle tenderness,   Rectal: deferred  Musculoskeletal: normal ROM in upper and lower extremities, No joint swelling.   Integumentary: Warm, dry, and pink, with no rash, purpura, or petechia  Heme/Lymph: No lymphadenopathy, no bruises  Neurological:Cranial Nerves II-XII grossly intact, no gross motor or sensory deficit  Psychiatric: Cooperative with normal mood, affect, and cognition      Laboratory Values:   Recent Results (from the past 24 hour(s))   CBC WITH AUTOMATED DIFF    Collection Time: 10/30/22  8:18 AM   Result Value Ref Range    WBC 4.7 3.6 - 11.0 K/uL    RBC 2.69 (L) 3.80 - 5.20 M/uL    HGB 7.2 (L) 11.5 - 16.0 g/dL    HCT 23.9 (L) 35.0 - 47.0 %    MCV 88.8 80.0 - 99.0 FL    MCH 26.8 26.0 - 34.0 PG    MCHC 30.1 30.0 - 36.5 g/dL    RDW 17.5 (H) 11.5 - 14.5 %    PLATELET 718 009 - 413 K/uL    MPV 9.8 8.9 - 12.9 FL    NRBC 0.0 0.0  WBC    ABSOLUTE NRBC 0.00 0.00 - 0.01 K/uL    NEUTROPHILS 70 32 - 75 %    BAND NEUTROPHILS 3 0 - 6 %    LYMPHOCYTES 19 12 - 49 %    MONOCYTES 7 5 - 13 %    EOSINOPHILS 0 0 - 7 %    BASOPHILS 0 0 - 1 %    MYELOCYTES 1 (H) 0 %    IMMATURE GRANULOCYTES 0 %    ABS. NEUTROPHILS 3.4 1.8 - 8.0 K/UL    ABS. LYMPHOCYTES 0.9 0.8 - 3.5 K/UL    ABS. MONOCYTES 0.3 0.0 - 1.0 K/UL    ABS. EOSINOPHILS 0.0 0.0 - 0.4 K/UL    ABS. BASOPHILS 0.0 0.0 - 0.1 K/UL    ABS. IMM. GRANS. 0.0 K/UL    DF Manual      RBC COMMENTS Anisocytosis  1+        RBC COMMENTS Polychromasia  1+       METABOLIC PANEL, COMPREHENSIVE    Collection Time: 10/30/22  8:18 AM   Result Value Ref Range    Sodium 141 136 - 145 mmol/L    Potassium 4.0 3.5 - 5.1 mmol/L    Chloride 110 (H) 97 - 108 mmol/L    CO2 26 21 - 32 mmol/L    Anion gap 5 5 - 15 mmol/L    Glucose 74 65 - 100 mg/dL    BUN 6 6 - 20 mg/dL    Creatinine 0.50 (L) 0.55 - 1.02 mg/dL    BUN/Creatinine ratio 12 12 - 20      eGFR >60 >60 ml/min/1.73m2    Calcium 9.0 8.5 - 10.1 mg/dL    Bilirubin, total 0.4 0.2 - 1.0 mg/dL    AST (SGOT) 26 15 - 37 U/L    ALT (SGPT) 7 (L) 12 - 78 U/L    Alk.  phosphatase 85 45 - 117 U/L    Protein, total 8.0 6.4 - 8.2 g/dL    Albumin 1.6 (L) 3.5 - 5.0 g/dL    Globulin 6.4 (H) 2.0 - 4.0 g/dL    A-G Ratio 0.3 (L) 1.1 - 2.2             IR THROMBECTOMY [facility] VEIN W PHARM   Final Result   Bilateral iliofemoral and IVC aspiration thrombectomy with removal of a large   volume of thrombus from both lower extremities and IVC. CT ABD PELV W CONT   Final Result   Rectovaginal fistula is not identified   Extensive bilateral iliac venous thrombus to the level of the IVC filter. Right   lower extremity edema. Cholelithiasis with distended gallbladder   Bilateral hydronephrosis and hydroureter in patient with ileal conduit   Probable small bowel obstruction secondary to adhesion      XR ABD (KUB)   Final Result   1. No significant change in small bowel dilation. Large volume of stool in the   rectum. 2. Cholelithiasis. XR ABD (KUB)   Final Result   Dilated small bowel within the central abdomen is similar to CT from one day   prior. DUPLEX LOWER EXT VENOUS BILAT   Final Result   Addendum (preliminary) 1 of 1      · Thrombus present in the right external iliac vein. · Thrombus present in the right common femoral vein. · Thrombus present in the right femoral vein. · Thrombus present in the right proximal femoral vein. · Thrombus present in the right mid femoral vein. · Thrombus present in the right distal femoral vein. · Thrombus present in the right popliteal vein. · Thrombus present in the right gastrocnemius vein. · Thrombus present in the right peroneal vein. · Thrombus present in the right saphenofemoral junction. · Thrombus present in the left external iliac vein. · Thrombus present in the left common femoral vein. · Thrombus present in the left saphenofemoral junction. · Thrombus present in the left femoral vein. · Thrombus present in the left proximal femoral vein. · Thrombus present in the left mid femoral vein. · Thrombus present in the left popliteal vein. · Thrombus present in the left distal femoral vein. · Thrombus present in the left gastrocnemius vein. · Thrombus present in the left peroneal vein.        This is vascular lab report on Charisse Busch, patient's date of birth is    June 30, 1965   Date of examination: October 20, 2022   Examination: Duplex lower extremity venous bilateral   Technician: MsDina Stuart Rodgers   Clinical history: This is  61years old woman with suspected renal vein    thrombosis   Indication: femoral vein thrombosis. Technique: Bilateral leg venous structures examined using venous duplex    ultrasound with 2D grayscale, color-flow and spectral Doppler analysis. Findings:   Right side:   common femoral vein and saphenofemoral junction is not compressible and    there is hypoechoic filling defect noted. common femoral vein shows no venous flow   Distal external iliac vein also not compressible and that there is no    venous flow   Proximal femoral vein is noncompressible and there is no venous flow   Mid femoral vein is not compressible and there is no venous flow   Distal femoral vein is noncompressible and there is no venous flow   Proximal, mid, distal greater saphenous vein is compressible, there is no    filling defect   Distal popliteal vein is not compressible. Proximal popliteal vein is noncompressible and there is no venous flow   Proximal gastrocnemius vein is not compressible   Proximal small saphenous vein is not compressible   Distal, mid, proximal  peroneal vein is not compressible      Left side:   common femoral vein not compressible and there is no venous flow   Saphenofemoral junction is not compressible and there is no venous flow. External iliac vein also not compressible there is no venous flow   Proximal femoral vein is noncompressible and there is no venous flow   Mid femoral vein is noncompressible and there is no venous flow   Distal femoral vein is not compressible and there is no venous flow   Proximal, mid, distal greater saphenous vein is noncompressible and there    is hypoechoic filling defect noted.    Distal popliteal vein is noncompressible   Proximal popliteal vein is noncompressible and there is no venous flow   Proximal gastrocnemius vein is not compressible and there is no venous    flow   Proximal small saphenous vein is noncompressible and there is no venous    flow   Distal, mid, proximal peroneal vein is not compressible. Impression:   1. Right external iliac vein, common femoral vein, femoral vein,    popliteal vein, gastrocnemius vein, small saphenous vein, and peroneal    vein shows acute venous thrombosis   2. Left external iliac vein, common femoral vein, femoral vein, popliteal    vein, gastrocnemius vein, small saphenous vein, peroneal vein, and    saphenofemoral junction and greater saphenous vein shows acute venous    thrombosis. These  extensive bilateral lower extremity deep vein thrombosis notified    to Dr. Martin Chester on 10/20/2022 1201 PM.            CT ABD PELV WO CONT   Final Result   Status post cystectomy and hysterectomy with right lower quadrant ileal conduit. Mild bilateral hydroureteronephrosis, evaluation is somewhat limited by the   presence of contrast from previously performed examination. Small bowel   distention with decompressed loops distally concerning for obstruction likely   related to adhesion formation. While this study was done without the use of   intravenous contrast there is questionable right common femoral vein thrombus   with soft tissue edema. CTA CHEST W OR W WO CONT   Final Result   Emphysematous changes. No evidence of pulmonary embolism or acute   abnormality. Cholelithiasis. Bilateral hydronephrosis. 1 cm hypodense lesion   right hepatic lobe cannot be characterized with certainty on the basis of this   examination. Follow-up is recommended to ensure stability. XR CHEST PORT   Final Result   No Acute Disease. XR FOOT LT MIN 3 V   Final Result   Normal study. IR THROMBECTOMY [facility] VEIN W PHARM    (Results Pending)   MRI PELV W CONT    (Results Pending)       Assessment:  Ileus, resolved.     Nausea resolved    DVT    Minimal Vaginal drainage may be just some pelvic fluid draining unable to confirm that it could be a RV fistula. Anemia    Plan:    Admission  Diet: soft diet along with ensure & protein shake. IV fluids  SCD  IS  Nausea medication  Labs in am  Anticoagulation therapy per Hematologist.  9. PT/OT  10. ? Pelvic examination by Gynecology in  am       Thank you for the consultation & allowing me to participate in the care of this patient.

## 2022-10-30 NOTE — PROGRESS NOTES
Hospitalist Progress Note    Subjective:   Daily Progress Note: 10/30/2022 3:41 PM    No complaints with exception of vaginal discharge    Current Facility-Administered Medications   Medication Dose Route Frequency    megestroL (MEGACE) tablet 20 mg  20 mg Oral TID WITH MEALS    heparin (porcine) 1,000 unit/mL injection 2,000 Units  2,000 Units IntraVENous Rad Multiple    iron sucrose (VENOFER) injection 200 mg  200 mg IntraVENous Q24H    meropenem (MERREM) 1 g in 0.9% sodium chloride (MBP/ADV) 50 mL MBP  1 g IntraVENous Q8H    midodrine (PROAMATINE) tablet 5 mg  5 mg Oral TID PRN    fluconazole (DIFLUCAN) tablet 200 mg  200 mg Oral DAILY    [Held by provider] heparin 25,000 units in D5W 250 ml infusion  500 Units/hr IntraVENous TITRATE    oxyCODONE IR (ROXICODONE) tablet 5 mg  5 mg Oral Q6H PRN    0.9% sodium chloride infusion 250 mL  250 mL IntraVENous PRN    cromolyn (OPTICROM) 4 % ophthalmic solution 1 Drop  1 Drop Both Eyes DAILY    sodium chloride (NS) flush 5-40 mL  5-40 mL IntraVENous Q8H    sodium chloride (NS) flush 5-40 mL  5-40 mL IntraVENous PRN    acetaminophen (TYLENOL) tablet 650 mg  650 mg Oral Q6H PRN    Or    acetaminophen (TYLENOL) suppository 650 mg  650 mg Rectal Q6H PRN    polyethylene glycol (MIRALAX) packet 17 g  17 g Oral DAILY PRN    ondansetron (ZOFRAN ODT) tablet 4 mg  4 mg Oral Q8H PRN    Or    ondansetron (ZOFRAN) injection 4 mg  4 mg IntraVENous Q6H PRN        Review of Systems  Review of Systems   Constitutional:  Positive for malaise/fatigue. HENT: Negative. Respiratory:  Negative for cough and shortness of breath. Cardiovascular:  Positive for leg swelling. Negative for chest pain. Gastrointestinal:  Positive for abdominal pain. Negative for nausea and vomiting. Genitourinary: Negative. Musculoskeletal: Negative. Skin: Negative. Neurological: Negative. Psychiatric/Behavioral: Negative.             Objective:     Visit Vitals  /73 (BP 1 Location: Left upper arm, BP Patient Position: Supine)   Pulse 84   Temp 97.9 °F (36.6 °C)   Resp 18   Ht 5' 2\" (1.575 m)   Wt 51.7 kg (114 lb)   LMP  (LMP Unknown)   SpO2 100%   BMI 20.85 kg/m²      O2 Device: None (Room air)    Temp (24hrs), Av.1 °F (36.7 °C), Min:97.9 °F (36.6 °C), Max:98.3 °F (36.8 °C)      10/30 0701 - 10/30 1900  In: 175 [P.O.:25; I.V.:150]  Out: 450 [Urine:450]  10/28 1901 - 10/30 0700  In: 1584.6 [P.O.:450; I.V.:1134.6]  Out: 1151 [Urine:1150]    Recent Results (from the past 24 hour(s))   CBC WITH AUTOMATED DIFF    Collection Time: 10/30/22  8:18 AM   Result Value Ref Range    WBC 4.7 3.6 - 11.0 K/uL    RBC 2.69 (L) 3.80 - 5.20 M/uL    HGB 7.2 (L) 11.5 - 16.0 g/dL    HCT 23.9 (L) 35.0 - 47.0 %    MCV 88.8 80.0 - 99.0 FL    MCH 26.8 26.0 - 34.0 PG    MCHC 30.1 30.0 - 36.5 g/dL    RDW 17.5 (H) 11.5 - 14.5 %    PLATELET 296 361 - 916 K/uL    MPV 9.8 8.9 - 12.9 FL    NRBC 0.0 0.0  WBC    ABSOLUTE NRBC 0.00 0.00 - 0.01 K/uL    NEUTROPHILS 70 32 - 75 %    BAND NEUTROPHILS 3 0 - 6 %    LYMPHOCYTES 19 12 - 49 %    MONOCYTES 7 5 - 13 %    EOSINOPHILS 0 0 - 7 %    BASOPHILS 0 0 - 1 %    MYELOCYTES 1 (H) 0 %    IMMATURE GRANULOCYTES 0 %    ABS. NEUTROPHILS 3.4 1.8 - 8.0 K/UL    ABS. LYMPHOCYTES 0.9 0.8 - 3.5 K/UL    ABS. MONOCYTES 0.3 0.0 - 1.0 K/UL    ABS. EOSINOPHILS 0.0 0.0 - 0.4 K/UL    ABS. BASOPHILS 0.0 0.0 - 0.1 K/UL    ABS. IMM.  GRANS. 0.0 K/UL    DF Manual      RBC COMMENTS Anisocytosis  1+        RBC COMMENTS Polychromasia  1+       METABOLIC PANEL, COMPREHENSIVE    Collection Time: 10/30/22  8:18 AM   Result Value Ref Range    Sodium 141 136 - 145 mmol/L    Potassium 4.0 3.5 - 5.1 mmol/L    Chloride 110 (H) 97 - 108 mmol/L    CO2 26 21 - 32 mmol/L    Anion gap 5 5 - 15 mmol/L    Glucose 74 65 - 100 mg/dL    BUN 6 6 - 20 mg/dL    Creatinine 0.50 (L) 0.55 - 1.02 mg/dL    BUN/Creatinine ratio 12 12 - 20      eGFR >60 >60 ml/min/1.73m2    Calcium 9.0 8.5 - 10.1 mg/dL    Bilirubin, total 0.4 0.2 - 1.0 mg/dL    AST (SGOT) 26 15 - 37 U/L    ALT (SGPT) 7 (L) 12 - 78 U/L    Alk. phosphatase 85 45 - 117 U/L    Protein, total 8.0 6.4 - 8.2 g/dL    Albumin 1.6 (L) 3.5 - 5.0 g/dL    Globulin 6.4 (H) 2.0 - 4.0 g/dL    A-G Ratio 0.3 (L) 1.1 - 2.2          IR THROMBECTOMY [facility] VEIN W PHARM   Final Result   Bilateral iliofemoral and IVC aspiration thrombectomy with removal of a large   volume of thrombus from both lower extremities and IVC. CT ABD PELV W CONT   Final Result   Rectovaginal fistula is not identified   Extensive bilateral iliac venous thrombus to the level of the IVC filter. Right   lower extremity edema. Cholelithiasis with distended gallbladder   Bilateral hydronephrosis and hydroureter in patient with ileal conduit   Probable small bowel obstruction secondary to adhesion      XR ABD (KUB)   Final Result   1. No significant change in small bowel dilation. Large volume of stool in the   rectum. 2. Cholelithiasis. XR ABD (KUB)   Final Result   Dilated small bowel within the central abdomen is similar to CT from one day   prior. DUPLEX LOWER EXT VENOUS BILAT   Final Result   Addendum (preliminary) 1 of 1      · Thrombus present in the right external iliac vein. · Thrombus present in the right common femoral vein. · Thrombus present in the right femoral vein. · Thrombus present in the right proximal femoral vein. · Thrombus present in the right mid femoral vein. · Thrombus present in the right distal femoral vein. · Thrombus present in the right popliteal vein. · Thrombus present in the right gastrocnemius vein. · Thrombus present in the right peroneal vein. · Thrombus present in the right saphenofemoral junction. · Thrombus present in the left external iliac vein. · Thrombus present in the left common femoral vein. · Thrombus present in the left saphenofemoral junction. · Thrombus present in the left femoral vein.    · Thrombus present in the left proximal femoral vein. · Thrombus present in the left mid femoral vein. · Thrombus present in the left popliteal vein. · Thrombus present in the left distal femoral vein. · Thrombus present in the left gastrocnemius vein. · Thrombus present in the left peroneal vein. This is vascular lab report on Leana Ward, patient's YOB: 1965   Date of examination: October 20, 2022   Examination: Duplex lower extremity venous bilateral   Technician: Ms. Evie Mueller   Clinical history: This is  61years old woman with suspected renal vein    thrombosis   Indication: femoral vein thrombosis. Technique: Bilateral leg venous structures examined using venous duplex    ultrasound with 2D grayscale, color-flow and spectral Doppler analysis. Findings:   Right side:   common femoral vein and saphenofemoral junction is not compressible and    there is hypoechoic filling defect noted. common femoral vein shows no venous flow   Distal external iliac vein also not compressible and that there is no    venous flow   Proximal femoral vein is noncompressible and there is no venous flow   Mid femoral vein is not compressible and there is no venous flow   Distal femoral vein is noncompressible and there is no venous flow   Proximal, mid, distal greater saphenous vein is compressible, there is no    filling defect   Distal popliteal vein is not compressible. Proximal popliteal vein is noncompressible and there is no venous flow   Proximal gastrocnemius vein is not compressible   Proximal small saphenous vein is not compressible   Distal, mid, proximal  peroneal vein is not compressible      Left side:   common femoral vein not compressible and there is no venous flow   Saphenofemoral junction is not compressible and there is no venous flow.    External iliac vein also not compressible there is no venous flow   Proximal femoral vein is noncompressible and there is no venous flow   Mid femoral vein is noncompressible and there is no venous flow   Distal femoral vein is not compressible and there is no venous flow   Proximal, mid, distal greater saphenous vein is noncompressible and there    is hypoechoic filling defect noted. Distal popliteal vein is noncompressible   Proximal popliteal vein is noncompressible and there is no venous flow   Proximal gastrocnemius vein is not compressible and there is no venous    flow   Proximal small saphenous vein is noncompressible and there is no venous    flow   Distal, mid, proximal peroneal vein is not compressible. Impression:   1. Right external iliac vein, common femoral vein, femoral vein,    popliteal vein, gastrocnemius vein, small saphenous vein, and peroneal    vein shows acute venous thrombosis   2. Left external iliac vein, common femoral vein, femoral vein, popliteal    vein, gastrocnemius vein, small saphenous vein, peroneal vein, and    saphenofemoral junction and greater saphenous vein shows acute venous    thrombosis. These  extensive bilateral lower extremity deep vein thrombosis notified    to Dr. Stacy Grant on 10/20/2022 1201 PM.            CT ABD PELV WO CONT   Final Result   Status post cystectomy and hysterectomy with right lower quadrant ileal conduit. Mild bilateral hydroureteronephrosis, evaluation is somewhat limited by the   presence of contrast from previously performed examination. Small bowel   distention with decompressed loops distally concerning for obstruction likely   related to adhesion formation. While this study was done without the use of   intravenous contrast there is questionable right common femoral vein thrombus   with soft tissue edema. CTA CHEST W OR W WO CONT   Final Result   Emphysematous changes. No evidence of pulmonary embolism or acute   abnormality. Cholelithiasis. Bilateral hydronephrosis. 1 cm hypodense lesion   right hepatic lobe cannot be characterized with certainty on the basis of this   examination. Follow-up is recommended to ensure stability. XR CHEST PORT   Final Result   No Acute Disease. XR FOOT LT MIN 3 V   Final Result   Normal study. IR THROMBECTOMY MECH VEIN W PHARM    (Results Pending)        PHYSICAL EXAM:    Physical Exam  Vitals reviewed. HENT:      Head: Normocephalic and atraumatic. Cardiovascular:      Rate and Rhythm: Normal rate and regular rhythm. Heart sounds: Normal heart sounds. Pulmonary:      Effort: Pulmonary effort is normal.      Breath sounds: Normal breath sounds. Abdominal:      General: Abdomen is flat. Bowel sounds are normal.      Palpations: Abdomen is soft. Comments: Mild tenderness in the suprapubic region   Genitourinary:     Comments: Dark stool appearing content from vagina  Musculoskeletal:         General: Normal range of motion. Cervical back: Normal range of motion and neck supple. Right lower leg: Edema present. Left lower leg: Edema present. Skin:     General: Skin is warm and dry. Neurological:      General: No focal deficit present. Mental Status: She is alert and oriented to person, place, and time. Psychiatric:         Mood and Affect: Mood normal.        Data Review    Recent Results (from the past 24 hour(s))   CBC WITH AUTOMATED DIFF    Collection Time: 10/30/22  8:18 AM   Result Value Ref Range    WBC 4.7 3.6 - 11.0 K/uL    RBC 2.69 (L) 3.80 - 5.20 M/uL    HGB 7.2 (L) 11.5 - 16.0 g/dL    HCT 23.9 (L) 35.0 - 47.0 %    MCV 88.8 80.0 - 99.0 FL    MCH 26.8 26.0 - 34.0 PG    MCHC 30.1 30.0 - 36.5 g/dL    RDW 17.5 (H) 11.5 - 14.5 %    PLATELET 291 054 - 087 K/uL    MPV 9.8 8.9 - 12.9 FL    NRBC 0.0 0.0  WBC    ABSOLUTE NRBC 0.00 0.00 - 0.01 K/uL    NEUTROPHILS 70 32 - 75 %    BAND NEUTROPHILS 3 0 - 6 %    LYMPHOCYTES 19 12 - 49 %    MONOCYTES 7 5 - 13 %    EOSINOPHILS 0 0 - 7 %    BASOPHILS 0 0 - 1 %    MYELOCYTES 1 (H) 0 %    IMMATURE GRANULOCYTES 0 %    ABS.  NEUTROPHILS 3.4 1.8 - 8.0 K/UL ABS. LYMPHOCYTES 0.9 0.8 - 3.5 K/UL    ABS. MONOCYTES 0.3 0.0 - 1.0 K/UL    ABS. EOSINOPHILS 0.0 0.0 - 0.4 K/UL    ABS. BASOPHILS 0.0 0.0 - 0.1 K/UL    ABS. IMM. GRANS. 0.0 K/UL    DF Manual      RBC COMMENTS Anisocytosis  1+        RBC COMMENTS Polychromasia  1+       METABOLIC PANEL, COMPREHENSIVE    Collection Time: 10/30/22  8:18 AM   Result Value Ref Range    Sodium 141 136 - 145 mmol/L    Potassium 4.0 3.5 - 5.1 mmol/L    Chloride 110 (H) 97 - 108 mmol/L    CO2 26 21 - 32 mmol/L    Anion gap 5 5 - 15 mmol/L    Glucose 74 65 - 100 mg/dL    BUN 6 6 - 20 mg/dL    Creatinine 0.50 (L) 0.55 - 1.02 mg/dL    BUN/Creatinine ratio 12 12 - 20      eGFR >60 >60 ml/min/1.73m2    Calcium 9.0 8.5 - 10.1 mg/dL    Bilirubin, total 0.4 0.2 - 1.0 mg/dL    AST (SGOT) 26 15 - 37 U/L    ALT (SGPT) 7 (L) 12 - 78 U/L    Alk. phosphatase 85 45 - 117 U/L    Protein, total 8.0 6.4 - 8.2 g/dL    Albumin 1.6 (L) 3.5 - 5.0 g/dL    Globulin 6.4 (H) 2.0 - 4.0 g/dL    A-G Ratio 0.3 (L) 1.1 - 2.2          Assessment/Plan:     Principal Problem:    Small bowel obstruction (HCC) (10/20/2022)    Active Problems:    Acute blood loss anemia (10/20/2022)      Status post robot-assisted surgical procedure, pelvic exenteration (10/20/2022)      Vaginal cuff cellulitis (10/20/2022)    Hospital course: Swathi Wetzel is a 61 y.o. female with PMH of bladder cancer s/p cystectomy, hysterectomy, BSO and ileal conduit diversion, anemia, hep C, and left leg DVT s/p IVC filter years ago. She presented to the ED with chief complaint of weakness and nausea for the past week. She reports unable to tolerate PO intake, due to profound nausea, abdominal pain, fever, chills, vomiting or diarrhea. In addition, she also reports unable to walk due to weakness and left inner thigh and left foot pain. Denies trauma. She was seen here on 10/13/2022 with urostomy complications, status post ileal conduit and discharged home on Cipro which she completed.  In addition, she also reports having vaginal bleed since surgery 2 weeks ago. Which appears to be more related to a fistula with stool present. CT scan of the abdomen pelvis was performed revealing no obvious rectovaginal fistula although there was air present tracking in that direction without any extravasation of contrast.  There was extensive bilateral iliac venous thrombus to the level of the IVC filter with right leg edema. Cholelithiasis with a distended gallbladder was also noted. Bilateral hydronephrosis with hydroureter most likely from the ileal conduit. Probable small bowel obstruction secondary to adhesions also noted. Patient was found to be anemic and received 1 unit of packed red blood cells. Venous duplex revealed extensive clot burden within the right leg and left leg. Urinalysis was consistent with Candida infection. ID consultation. Surgical consultation, Dr. Lisseth Day. UCX grew candida ciferrii, continue fluconazole. IV heparin being held due to blood loss, hem positive stool,. Discussed case with interventional radiology who will further investigate the fistula with recommendations and mechanical thrombectomy performed of the bilateral lower extremities with a large volume clot removed. Discussed case with GYN Dr. Kuhn and will try to proceed with MRI of the pelvis and keep the patient n.p.o. overnight and may require a pelvic exam in the operating room.     Impression/plan:     # 1 Acute blood loss anemia  - hematology following-->reccs low dose anticoagulants  - hgb stable- currently on low dose IV hep gtt  Requiring 1 unit of PRBC, Stool occult positive Holding heparin drip     # 2 Vaginal bleed with stool  - Suspect complications from recent surgery  -With associated vaginitis of the CUFF and cellulitis  Most likely rectovaginal fistula  GYN for pelvic evaluation, recommending MRI pelvis may have diffucuty due to IVC  filter  OR for pelvic exam, npo after midnight  Meropenum     # 3 SBO  - currently tolerating full liquid diet  - s/p gentle IVF  - Gen surgery: Basker   - Morphine for pain. Improving     # 4 Extensive dvt bilateral lower legs  - duplex shows extensive clot burden  - hematology following   - already has IVC filter placed  - started on IV hep due to concern of old IVC and chances that she has collateral blood vessels bypassing filter which will increase her risk for PE.  IR Bilateral iliofemoral and IVC aspiration thrombectomy with removal of a large  volume of thrombus from both lower extremities and IVC  Holding heparin until Hgb is stable    # 5 UTI  - Fluconazole for fungal UTI.  - UCX grew candida ciferrii  - ID following     # 6 Bladder cancer  - Not on chemotherapy currently. - Consulted urology to evaluate for possible post-op complications. - Continue supportive care  - Hematology following     # 7 Emphysema  - not in respiratory distress, continue to monitor      # 8 Ambulatory dysfunction  - secondary to dvt's  - pt/ot as tolerated     # 9 hypotension  - bp remains soft  - prn midodrine  - start gentle IV hydration    10. Rectal/vaginal fistula  Surgery following  Questionable abscess  ID consult  Merrem  GYN consult, possible OR for Pelvic exam  MRI pelvis    11. Moderate to severe protein malnutrition  Have offered tube feeds and TPN patient is refusing  Start Megace    Care Plan discussed with: Patient/Family    Total time spent with patient: >35 minutes.

## 2022-10-30 NOTE — PROGRESS NOTES
Bedside shift change report given to Theresa March (oncoming nurse) by Alysa Cartwright (offgoing nurse). Report included the following information SBAR, Intake/Output, MAR, and Med Rec Status.

## 2022-10-30 NOTE — PROGRESS NOTES
Problem: Pain  Goal: *Control of Pain  Outcome: Progressing Towards Goal  Goal: *PALLIATIVE CARE:  Alleviation of Pain  Outcome: Progressing Towards Goal     Problem: Patient Education: Go to Patient Education Activity  Goal: Patient/Family Education  Outcome: Progressing Towards Goal     Problem: Falls - Risk of  Goal: *Absence of Falls  Description: Document Delucaclara Mcarthur Fall Risk and appropriate interventions in the flowsheet. Outcome: Progressing Towards Goal  Note: Fall Risk Interventions:  Mobility Interventions: Assess mobility with egress test, Bed/chair exit alarm         Medication Interventions: Bed/chair exit alarm, Patient to call before getting OOB, Teach patient to arise slowly    Elimination Interventions: Call light in reach, Bed/chair exit alarm, Toilet paper/wipes in reach    History of Falls Interventions: Bed/chair exit alarm         Problem: Patient Education: Go to Patient Education Activity  Goal: Patient/Family Education  Outcome: Progressing Towards Goal     Problem: Patient Education: Go to Patient Education Activity  Goal: Patient/Family Education  Outcome: Progressing Towards Goal     Problem: Pressure Injury - Risk of  Goal: *Prevention of pressure injury  Description: Document [place] Scale and appropriate interventions in the flowsheet.   Outcome: Progressing Towards Goal  Note: Pressure Injury Interventions:  Sensory Interventions: Assess changes in LOC, Float heels    Moisture Interventions: Absorbent underpads, Minimize layers    Activity Interventions: Increase time out of bed    Mobility Interventions: Float heels, HOB 30 degrees or less    Nutrition Interventions: Document food/fluid/supplement intake, Offer support with meals,snacks and hydration    Friction and Shear Interventions: Minimize layers                Problem: Patient Education: Go to Patient Education Activity  Goal: Patient/Family Education  Outcome: Progressing Towards Goal     Problem: Patient Education: Go to Patient Education Activity  Goal: Patient/Family Education  Outcome: Progressing Towards Goal     Problem: Patient Education: Go to Patient Education Activity  Goal: Patient/Family Education  Outcome: Progressing Towards Goal

## 2022-10-31 ENCOUNTER — ANESTHESIA EVENT (OUTPATIENT)
Dept: SURGERY | Age: 57
DRG: 231 | End: 2022-10-31
Payer: MEDICAID

## 2022-10-31 ENCOUNTER — ANESTHESIA (OUTPATIENT)
Dept: SURGERY | Age: 57
DRG: 231 | End: 2022-10-31
Payer: MEDICAID

## 2022-10-31 LAB
ALBUMIN SERPL-MCNC: 1.6 G/DL (ref 3.5–5)
ALBUMIN/GLOB SERPL: 0.2 {RATIO} (ref 1.1–2.2)
ALP SERPL-CCNC: 89 U/L (ref 45–117)
ALT SERPL-CCNC: 6 U/L (ref 12–78)
ANION GAP SERPL CALC-SCNC: 6 MMOL/L (ref 5–15)
AST SERPL W P-5'-P-CCNC: 26 U/L (ref 15–37)
BASOPHILS # BLD: 0 K/UL (ref 0–0.1)
BASOPHILS NFR BLD: 1 % (ref 0–1)
BILIRUB SERPL-MCNC: 0.4 MG/DL (ref 0.2–1)
BUN SERPL-MCNC: 6 MG/DL (ref 6–20)
BUN/CREAT SERPL: 12 (ref 12–20)
CA-I BLD-MCNC: 9.2 MG/DL (ref 8.5–10.1)
CHLORIDE SERPL-SCNC: 107 MMOL/L (ref 97–108)
CO2 SERPL-SCNC: 27 MMOL/L (ref 21–32)
CREAT SERPL-MCNC: 0.5 MG/DL (ref 0.55–1.02)
DIFFERENTIAL METHOD BLD: ABNORMAL
EOSINOPHIL # BLD: 0 K/UL (ref 0–0.4)
EOSINOPHIL NFR BLD: 0 % (ref 0–7)
ERYTHROCYTE [DISTWIDTH] IN BLOOD BY AUTOMATED COUNT: 17.7 % (ref 11.5–14.5)
GLOBULIN SER CALC-MCNC: 6.8 G/DL (ref 2–4)
GLUCOSE SERPL-MCNC: 67 MG/DL (ref 65–100)
HCT VFR BLD AUTO: 25.7 % (ref 35–47)
HGB BLD-MCNC: 7.7 G/DL (ref 11.5–16)
IMM GRANULOCYTES # BLD AUTO: 0 K/UL
IMM GRANULOCYTES NFR BLD AUTO: 0 %
LYMPHOCYTES # BLD: 1.7 K/UL (ref 0.8–3.5)
LYMPHOCYTES NFR BLD: 38 % (ref 12–49)
MCH RBC QN AUTO: 26.6 PG (ref 26–34)
MCHC RBC AUTO-ENTMCNC: 30 G/DL (ref 30–36.5)
MCV RBC AUTO: 88.6 FL (ref 80–99)
MONOCYTES # BLD: 0.2 K/UL (ref 0–1)
MONOCYTES NFR BLD: 4 % (ref 5–13)
MYELOCYTES NFR BLD MANUAL: 1 %
NEUTS BAND NFR BLD MANUAL: 5 % (ref 0–6)
NEUTS SEG # BLD: 2.6 K/UL (ref 1.8–8)
NEUTS SEG NFR BLD: 51 % (ref 32–75)
NRBC # BLD: 0 K/UL (ref 0–0.01)
NRBC BLD-RTO: 0 PER 100 WBC
PLATELET # BLD AUTO: 219 K/UL (ref 150–400)
PMV BLD AUTO: 9.5 FL (ref 8.9–12.9)
POTASSIUM SERPL-SCNC: 3.8 MMOL/L (ref 3.5–5.1)
PROT SERPL-MCNC: 8.4 G/DL (ref 6.4–8.2)
RBC # BLD AUTO: 2.9 M/UL (ref 3.8–5.2)
RBC MORPH BLD: ABNORMAL
SODIUM SERPL-SCNC: 140 MMOL/L (ref 136–145)
WBC # BLD AUTO: 4.6 K/UL (ref 3.6–11)

## 2022-10-31 PROCEDURE — 74011000258 HC RX REV CODE- 258: Performed by: INTERNAL MEDICINE

## 2022-10-31 PROCEDURE — 2709999900 HC NON-CHARGEABLE SUPPLY: Performed by: UROLOGY

## 2022-10-31 PROCEDURE — 77030002996 HC SUT SLK J&J -A: Performed by: UROLOGY

## 2022-10-31 PROCEDURE — 74011250636 HC RX REV CODE- 250/636: Performed by: NURSE ANESTHETIST, CERTIFIED REGISTERED

## 2022-10-31 PROCEDURE — 80053 COMPREHEN METABOLIC PANEL: CPT

## 2022-10-31 PROCEDURE — 77030032664: Performed by: UROLOGY

## 2022-10-31 PROCEDURE — 76210000016 HC OR PH I REC 1 TO 1.5 HR: Performed by: UROLOGY

## 2022-10-31 PROCEDURE — 36415 COLL VENOUS BLD VENIPUNCTURE: CPT

## 2022-10-31 PROCEDURE — 74011000250 HC RX REV CODE- 250: Performed by: UROLOGY

## 2022-10-31 PROCEDURE — 77030040361 HC SLV COMPR DVT MDII -B: Performed by: UROLOGY

## 2022-10-31 PROCEDURE — 76060000036 HC ANESTHESIA 2.5 TO 3 HR: Performed by: UROLOGY

## 2022-10-31 PROCEDURE — 85025 COMPLETE CBC W/AUTO DIFF WBC: CPT

## 2022-10-31 PROCEDURE — 74011250636 HC RX REV CODE- 250/636: Performed by: INTERNAL MEDICINE

## 2022-10-31 PROCEDURE — 74011250637 HC RX REV CODE- 250/637: Performed by: NURSE PRACTITIONER

## 2022-10-31 PROCEDURE — 74011000250 HC RX REV CODE- 250: Performed by: NURSE ANESTHETIST, CERTIFIED REGISTERED

## 2022-10-31 PROCEDURE — 45999 UNLISTED PROCEDURE RECTUM: CPT | Performed by: SURGERY

## 2022-10-31 PROCEDURE — 77030002888 HC SUT CHRMC J&J -A: Performed by: UROLOGY

## 2022-10-31 PROCEDURE — 0UQG7ZZ REPAIR VAGINA, VIA NATURAL OR ARTIFICIAL OPENING: ICD-10-PCS | Performed by: UROLOGY

## 2022-10-31 PROCEDURE — 99232 SBSQ HOSP IP/OBS MODERATE 35: CPT | Performed by: INTERNAL MEDICINE

## 2022-10-31 PROCEDURE — 0DQP7ZZ REPAIR RECTUM, VIA NATURAL OR ARTIFICIAL OPENING: ICD-10-PCS | Performed by: SURGERY

## 2022-10-31 PROCEDURE — 57300 REPAIR RECTUM-VAGINA FISTULA: CPT | Performed by: UROLOGY

## 2022-10-31 PROCEDURE — 77030012407 HC DRN WND BARD -B: Performed by: UROLOGY

## 2022-10-31 PROCEDURE — 76010000132 HC OR TIME 2.5 TO 3 HR: Performed by: UROLOGY

## 2022-10-31 PROCEDURE — 65270000029 HC RM PRIVATE

## 2022-10-31 PROCEDURE — 77030031139 HC SUT VCRL2 J&J -A: Performed by: UROLOGY

## 2022-10-31 PROCEDURE — 74011000258 HC RX REV CODE- 258: Performed by: NURSE ANESTHETIST, CERTIFIED REGISTERED

## 2022-10-31 PROCEDURE — 74011250637 HC RX REV CODE- 250/637: Performed by: INTERNAL MEDICINE

## 2022-10-31 PROCEDURE — 74011000250 HC RX REV CODE- 250: Performed by: INTERNAL MEDICINE

## 2022-10-31 PROCEDURE — 77030038552 HC DRN WND MDII -A: Performed by: UROLOGY

## 2022-10-31 PROCEDURE — 74011250636 HC RX REV CODE- 250/636: Performed by: ANESTHESIOLOGY

## 2022-10-31 PROCEDURE — 74011250637 HC RX REV CODE- 250/637: Performed by: PHYSICIAN ASSISTANT

## 2022-10-31 RX ORDER — SODIUM CHLORIDE 0.9 % (FLUSH) 0.9 %
5-40 SYRINGE (ML) INJECTION AS NEEDED
Status: DISCONTINUED | OUTPATIENT
Start: 2022-10-31 | End: 2022-10-31 | Stop reason: HOSPADM

## 2022-10-31 RX ORDER — FENTANYL CITRATE 50 UG/ML
50 INJECTION, SOLUTION INTRAMUSCULAR; INTRAVENOUS AS NEEDED
Status: DISCONTINUED | OUTPATIENT
Start: 2022-10-31 | End: 2022-10-31 | Stop reason: HOSPADM

## 2022-10-31 RX ORDER — MIDAZOLAM HYDROCHLORIDE 1 MG/ML
1 INJECTION, SOLUTION INTRAMUSCULAR; INTRAVENOUS AS NEEDED
Status: DISCONTINUED | OUTPATIENT
Start: 2022-10-31 | End: 2022-10-31 | Stop reason: HOSPADM

## 2022-10-31 RX ORDER — SODIUM CHLORIDE, SODIUM LACTATE, POTASSIUM CHLORIDE, CALCIUM CHLORIDE 600; 310; 30; 20 MG/100ML; MG/100ML; MG/100ML; MG/100ML
INJECTION, SOLUTION INTRAVENOUS
Status: DISCONTINUED | OUTPATIENT
Start: 2022-10-31 | End: 2022-10-31 | Stop reason: HOSPADM

## 2022-10-31 RX ORDER — DEXAMETHASONE SODIUM PHOSPHATE 4 MG/ML
INJECTION, SOLUTION INTRA-ARTICULAR; INTRALESIONAL; INTRAMUSCULAR; INTRAVENOUS; SOFT TISSUE AS NEEDED
Status: DISCONTINUED | OUTPATIENT
Start: 2022-10-31 | End: 2022-10-31 | Stop reason: HOSPADM

## 2022-10-31 RX ORDER — DIPHENHYDRAMINE HYDROCHLORIDE 50 MG/ML
12.5 INJECTION, SOLUTION INTRAMUSCULAR; INTRAVENOUS AS NEEDED
Status: DISCONTINUED | OUTPATIENT
Start: 2022-10-31 | End: 2022-10-31 | Stop reason: HOSPADM

## 2022-10-31 RX ORDER — GLYCOPYRROLATE 0.2 MG/ML
INJECTION INTRAMUSCULAR; INTRAVENOUS AS NEEDED
Status: DISCONTINUED | OUTPATIENT
Start: 2022-10-31 | End: 2022-10-31 | Stop reason: HOSPADM

## 2022-10-31 RX ORDER — DEXTROSE MONOHYDRATE AND SODIUM CHLORIDE 5; .45 G/100ML; G/100ML
100 INJECTION, SOLUTION INTRAVENOUS CONTINUOUS
Status: DISCONTINUED | OUTPATIENT
Start: 2022-10-31 | End: 2022-11-02

## 2022-10-31 RX ORDER — NORETHINDRONE AND ETHINYL ESTRADIOL 0.5-0.035
5 KIT ORAL AS NEEDED
Status: DISCONTINUED | OUTPATIENT
Start: 2022-10-31 | End: 2022-10-31 | Stop reason: HOSPADM

## 2022-10-31 RX ORDER — ONDANSETRON 2 MG/ML
4 INJECTION INTRAMUSCULAR; INTRAVENOUS AS NEEDED
Status: DISCONTINUED | OUTPATIENT
Start: 2022-10-31 | End: 2022-10-31 | Stop reason: HOSPADM

## 2022-10-31 RX ORDER — FENTANYL CITRATE 50 UG/ML
50 INJECTION, SOLUTION INTRAMUSCULAR; INTRAVENOUS
Status: DISCONTINUED | OUTPATIENT
Start: 2022-10-31 | End: 2022-10-31 | Stop reason: HOSPADM

## 2022-10-31 RX ORDER — HYDROMORPHONE HYDROCHLORIDE 1 MG/ML
0.5 INJECTION, SOLUTION INTRAMUSCULAR; INTRAVENOUS; SUBCUTANEOUS
Status: DISCONTINUED | OUTPATIENT
Start: 2022-10-31 | End: 2022-10-31 | Stop reason: HOSPADM

## 2022-10-31 RX ORDER — PROPOFOL 10 MG/ML
INJECTION, EMULSION INTRAVENOUS AS NEEDED
Status: DISCONTINUED | OUTPATIENT
Start: 2022-10-31 | End: 2022-10-31 | Stop reason: HOSPADM

## 2022-10-31 RX ORDER — OXYCODONE AND ACETAMINOPHEN 5; 325 MG/1; MG/1
1 TABLET ORAL AS NEEDED
Status: DISCONTINUED | OUTPATIENT
Start: 2022-10-31 | End: 2022-10-31 | Stop reason: HOSPADM

## 2022-10-31 RX ORDER — LIDOCAINE HYDROCHLORIDE 10 MG/ML
0.1 INJECTION, SOLUTION EPIDURAL; INFILTRATION; INTRACAUDAL; PERINEURAL AS NEEDED
Status: DISCONTINUED | OUTPATIENT
Start: 2022-10-31 | End: 2022-10-31 | Stop reason: HOSPADM

## 2022-10-31 RX ORDER — LIDOCAINE HYDROCHLORIDE 20 MG/ML
INJECTION, SOLUTION EPIDURAL; INFILTRATION; INTRACAUDAL; PERINEURAL AS NEEDED
Status: DISCONTINUED | OUTPATIENT
Start: 2022-10-31 | End: 2022-10-31 | Stop reason: HOSPADM

## 2022-10-31 RX ORDER — ROCURONIUM BROMIDE 10 MG/ML
INJECTION, SOLUTION INTRAVENOUS AS NEEDED
Status: DISCONTINUED | OUTPATIENT
Start: 2022-10-31 | End: 2022-10-31 | Stop reason: HOSPADM

## 2022-10-31 RX ORDER — SODIUM CHLORIDE 0.9 % (FLUSH) 0.9 %
5-40 SYRINGE (ML) INJECTION EVERY 8 HOURS
Status: DISCONTINUED | OUTPATIENT
Start: 2022-10-31 | End: 2022-10-31 | Stop reason: HOSPADM

## 2022-10-31 RX ORDER — ONDANSETRON 2 MG/ML
INJECTION INTRAMUSCULAR; INTRAVENOUS AS NEEDED
Status: DISCONTINUED | OUTPATIENT
Start: 2022-10-31 | End: 2022-10-31 | Stop reason: HOSPADM

## 2022-10-31 RX ORDER — MIDAZOLAM HYDROCHLORIDE 1 MG/ML
0.5 INJECTION, SOLUTION INTRAMUSCULAR; INTRAVENOUS
Status: DISCONTINUED | OUTPATIENT
Start: 2022-10-31 | End: 2022-10-31 | Stop reason: HOSPADM

## 2022-10-31 RX ORDER — FENTANYL CITRATE 50 UG/ML
INJECTION, SOLUTION INTRAMUSCULAR; INTRAVENOUS AS NEEDED
Status: DISCONTINUED | OUTPATIENT
Start: 2022-10-31 | End: 2022-10-31 | Stop reason: HOSPADM

## 2022-10-31 RX ORDER — MORPHINE SULFATE 2 MG/ML
2 INJECTION, SOLUTION INTRAMUSCULAR; INTRAVENOUS
Status: DISCONTINUED | OUTPATIENT
Start: 2022-10-31 | End: 2022-10-31 | Stop reason: HOSPADM

## 2022-10-31 RX ORDER — NEOSTIGMINE METHYLSULFATE 5 MG/5 ML
SYRINGE (ML) INTRAVENOUS AS NEEDED
Status: DISCONTINUED | OUTPATIENT
Start: 2022-10-31 | End: 2022-10-31 | Stop reason: HOSPADM

## 2022-10-31 RX ADMIN — Medication 3 MG: at 18:16

## 2022-10-31 RX ADMIN — ROCURONIUM BROMIDE 20 MG: 10 INJECTION, SOLUTION INTRAVENOUS at 16:06

## 2022-10-31 RX ADMIN — PHENYLEPHRINE HYDROCHLORIDE 50 MCG/MIN: 10 INJECTION INTRAVENOUS at 16:57

## 2022-10-31 RX ADMIN — ROCURONIUM BROMIDE 10 MG: 10 INJECTION, SOLUTION INTRAVENOUS at 17:08

## 2022-10-31 RX ADMIN — PHENYLEPHRINE HYDROCHLORIDE 300 MCG: 10 INJECTION INTRAVENOUS at 16:15

## 2022-10-31 RX ADMIN — ONDANSETRON 4 MG: 2 INJECTION INTRAMUSCULAR; INTRAVENOUS at 18:02

## 2022-10-31 RX ADMIN — SODIUM CHLORIDE, PRESERVATIVE FREE 10 ML: 5 INJECTION INTRAVENOUS at 22:06

## 2022-10-31 RX ADMIN — PROPOFOL 100 MG: 10 INJECTION, EMULSION INTRAVENOUS at 16:06

## 2022-10-31 RX ADMIN — FLUCONAZOLE 200 MG: 100 TABLET ORAL at 08:04

## 2022-10-31 RX ADMIN — DEXTROSE AND SODIUM CHLORIDE 100 ML/HR: 5; 450 INJECTION, SOLUTION INTRAVENOUS at 20:03

## 2022-10-31 RX ADMIN — OXYCODONE 5 MG: 5 TABLET ORAL at 23:45

## 2022-10-31 RX ADMIN — FENTANYL CITRATE 50 MCG: 0.05 INJECTION, SOLUTION INTRAMUSCULAR; INTRAVENOUS at 17:35

## 2022-10-31 RX ADMIN — SODIUM CHLORIDE, POTASSIUM CHLORIDE, SODIUM LACTATE AND CALCIUM CHLORIDE: 600; 310; 30; 20 INJECTION, SOLUTION INTRAVENOUS at 15:59

## 2022-10-31 RX ADMIN — FENTANYL CITRATE 100 MCG: 0.05 INJECTION, SOLUTION INTRAMUSCULAR; INTRAVENOUS at 16:03

## 2022-10-31 RX ADMIN — LIDOCAINE HYDROCHLORIDE 60 MG: 20 INJECTION, SOLUTION EPIDURAL; INFILTRATION; INTRACAUDAL; PERINEURAL at 16:06

## 2022-10-31 RX ADMIN — MEROPENEM 1 G: 1 INJECTION, POWDER, FOR SOLUTION INTRAVENOUS at 16:30

## 2022-10-31 RX ADMIN — SODIUM CHLORIDE, PRESERVATIVE FREE 10 ML: 5 INJECTION INTRAVENOUS at 08:11

## 2022-10-31 RX ADMIN — MEROPENEM 1 G: 1 INJECTION, POWDER, FOR SOLUTION INTRAVENOUS at 08:04

## 2022-10-31 RX ADMIN — PHENYLEPHRINE HYDROCHLORIDE 200 MCG: 10 INJECTION INTRAVENOUS at 15:53

## 2022-10-31 RX ADMIN — MEROPENEM 1 G: 1 INJECTION, POWDER, FOR SOLUTION INTRAVENOUS at 23:37

## 2022-10-31 RX ADMIN — SODIUM CHLORIDE, PRESERVATIVE FREE 10 ML: 5 INJECTION INTRAVENOUS at 06:44

## 2022-10-31 RX ADMIN — HYDROMORPHONE HYDROCHLORIDE 0.5 MG: 1 INJECTION, SOLUTION INTRAMUSCULAR; INTRAVENOUS; SUBCUTANEOUS at 19:26

## 2022-10-31 RX ADMIN — DEXAMETHASONE SODIUM PHOSPHATE 4 MG: 4 INJECTION, SOLUTION INTRA-ARTICULAR; INTRALESIONAL; INTRAMUSCULAR; INTRAVENOUS; SOFT TISSUE at 16:06

## 2022-10-31 RX ADMIN — GLYCOPYRROLATE 0.6 MG: 0.2 INJECTION INTRAMUSCULAR; INTRAVENOUS at 18:16

## 2022-10-31 RX ADMIN — CROMOLYN SODIUM 1 DROP: 40 SOLUTION/ DROPS OPHTHALMIC at 08:10

## 2022-10-31 RX ADMIN — MEGESTROL ACETATE 20 MG: 20 TABLET ORAL at 08:04

## 2022-10-31 RX ADMIN — PHENYLEPHRINE HYDROCHLORIDE 200 MCG: 10 INJECTION INTRAVENOUS at 16:37

## 2022-10-31 NOTE — PROGRESS NOTES
Bedside shift change report given to Annette (oncoming nurse) by Jalyn Munoz (offgoing nurse). Report included the following information SBAR, Kardex, MAR, and Recent Results.

## 2022-10-31 NOTE — PROGRESS NOTES
OT tx session attempted at 2:37pm, however pt refusing OT treatment. Will continue to follow pt and attempt tx session at a later time as time allows. Thank you.

## 2022-10-31 NOTE — PROGRESS NOTES
Comprehensive Nutrition Assessment    Type and Reason for Visit: Reassess (Interim)    Nutrition Recommendations/Plan:   Continue diet. Modify ONS to Ensure Clear x2/d. Monitor and document PO and ONS intake in I/Os. Malnutrition Assessment:  Malnutrition Status: Moderate malnutrition (10/26/22 1411)    Context:  Acute illness       Nutrition Assessment:    Admitted for partial SBO. UTI complicated by ileal conduit. Ileus resolving with advancement of diet. Concern for Rectovaginal fistula but no evidence of pelvic abscess or fistula. 2 day hx of Nausea and poor intake. (10/24) Diet advanced to solids (10/25) SBS diet with ONS in place. Appetite remains poor, 1-25%. Pt requested Marinol for appetite. (10/29) Appetite stimulant started. (10/31) NPO today for initially scheduled MRI. Pt endorsed poor ONS acceptance, RD to modify per preferences. Labs: H/H 7.7/25.7, Cl 110, ALT 7. Meds: megace, merrem. Nutrition Related Findings:    No N/V/D/C nor chewing/swallowing difficulties reported per Pt and RN. Last BM 10/29. Edema: +1 LLE, and +1 pitting RLE edema. Wound Type: Surgical incision    Current Nutrition Intake & Therapies:  Average Meal Intake: 26-50%  Average Supplement Intake: Refusing to take  ADULT ORAL NUTRITION SUPPLEMENT Breakfast, Lunch; Standard High Calorie/High Protein  ADULT ORAL NUTRITION SUPPLEMENT Dinner; Frozen Supplement  DIET ONE TIME MESSAGE  DIET NPO    Anthropometric Measures:  Height: 5' 2\" (157.5 cm)  Ideal Body Weight (IBW): 110 lbs (50 kg)  Current Body Wt:  51.7 kg (113 lb 15.7 oz), 103.6 % IBW. Not specified  Current BMI (kg/m2): 20.8  Weight Adjustment: No adjustment  BMI Category: Normal weight (BMI 18.5-24. 9)    Estimated Daily Nutrient Needs:  Energy Requirements Based On: Kcal/kg  Weight Used for Energy Requirements: Current  Energy (kcal/day): 8388-6325 kcal (30-35 kcal/kg)  Weight Used for Protein Requirements: Current  Protein (g/day): 72-82 g (1.4-1.6 g/kg, cancer)  Method Used for Fluid Requirements: 1 ml/kcal  Fluid (ml/day): 6267-8090 kcal    Nutrition Diagnosis:   Moderate malnutrition, In context of acute illness or injury related to altered GI function, altered GI structure as evidenced by poor intake prior to admission, intake 0-25%    Nutrition Interventions:   Food and/or Nutrient Delivery: Continue current diet, Modify oral nutrition supplement  Nutrition Education/Counseling: No recommendations at this time  Coordination of Nutrition Care: Continue to monitor while inpatient  Plan of Care discussed with: RN, Pt    Goals:  Previous Goal Met: Progressing toward goal(s)  Goals: other (specify), Meet at least 75% of estimated needs, PO intake 50% or greater  Specify Other Goals: weight maintanence +/-0.5 kg within 7 d    Nutrition Monitoring and Evaluation:   Behavioral-Environmental Outcomes: None identified  Food/Nutrient Intake Outcomes: Diet advancement/tolerance, Food and nutrient intake, Supplement intake  Physical Signs/Symptoms Outcomes: Biochemical data, Meal time behavior, Weight    Discharge Planning:    Continue current diet, Continue oral nutrition supplement    Brendon Elizondo RD  Contact: ext. [phone].

## 2022-10-31 NOTE — PROGRESS NOTES
[facility] SURGERY PROGRESS NOTES        Chief Complaint: _nausea    History of Present Illness:    Ms. Swathi Wetzel is a 61y.o. year old * female presents to ER with 2 day history of nausea and poor PO intake. No vomiting. Passing some flatus. She had undergone robotic assisted anterior exenteration by Dr. Marline Figueredo. She was recently discharged. However she has not been feeling well. Has been able to take very little due to nausea. In ER CT scan without PO contrast showed some mildly dilated small bowel suggestive of possible bowel obstruction versus ileus. Already has IVC filter. On anticoagulation therapy. CT with PO & IV contrast reviewed with Dr. Shawnee Sr shows some ileus pattern and small fluid collection with air pocket just superior to vaginal cuff. PO contrast has reached the cecum only. No major abscess or overt bowel obstruction. Had Thrombectomy performed by IR. Patient denies any fever or chills or abdominal pain. Passed flatus. Tolerating diet. Still has minimal vaginal discharge & scheduled for an EUA by Dr. Marline Figueredo.         Past Medical History:   Past Medical History:   Diagnosis Date    Anemia     pt states had recent blood transfusion 2022    Back pain     Cancer (HCC)     bladder    DVT (deep venous thrombosis) (HCC)     and PE, pt denies lung PE , only leg several years ago    Hepatitis C     pt states dx 1 month ago    Liver disease     Presence of IVC filter     pt states several years ago    Rheumatoid arthritis ([facility] [address]     Sciatic leg pain     pt states both legs    Uterine fibroid        Past Surgical History:   Past Surgical History:   Procedure Laterality Date    HX APPENDECTOMY  10/03/2022    HX  SECTION      HX GYN  10/03/2022    ROBOT ANTERIOR PELVIC EXENTERATION    HX HYSTERECTOMY  10/03/2022    HX SALPINGO-OOPHORECTOMY Bilateral 10/03/2022    HX UROLOGICAL  09/15/2022    CYSTOSCOPY    HX UROLOGICAL  09/15/2022    URETHRAL DILATION    HX UROLOGICAL  09/15/2022 URETERAL STENT PLACEMENT    HX UROLOGICAL  09/15/2022    TRANSURETHRAL RESECTION OF BLADDER TUMOR >2CM    HX UROLOGICAL Bilateral 09/15/2022    RETROGRADE PYELOGRAM    HX UROLOGICAL  10/03/2022    PELVIC LYMPH NODE DISSECTION    HX UROLOGICAL  10/03/2022    ILEAL CONDUIT URINARY DIVERSION    IR THROMBECTOMY [facility] VEIN W PHARM  10/27/2022    NE CARDIAC SURG PROCEDURE UNLIST      stent placement        Allergy:  Allergies   Allergen Reactions    Bactrim [Sulfamethoprim] Swelling     Swelling of the lilps    Penicillin V Potassium Swelling       Social History:  reports that she has quit smoking. Her smoking use included cigarettes. She has never used smokeless tobacco. She reports that she does not currently use alcohol. She reports that she does not use drugs.      Family History:  Family History   Problem Relation Age of Onset    Cancer Mother     Lung Cancer Mother     Heart Disease Father     Hypertension Sister     Cancer Maternal Aunt     Breast Cancer Maternal Aunt         Current Medications:  Current Facility-Administered Medications:     sodium chloride (NS) flush 5-40 mL, 5-40 mL, IntraVENous, Q8H, Sheldon Soto MD    sodium chloride (NS) flush 5-40 mL, 5-40 mL, IntraVENous, PRN, Isabela Butler MD    lidocaine (PF) (XYLOCAINE) 10 mg/mL (1 %) injection 0.1 mL, 0.1 mL, SubCUTAneous, PRN, Mary ERWIN MD    fentaNYL citrate (PF) injection 50 mcg, 50 mcg, IntraVENous, PRN, Mary ERWIN MD    midazolam (VERSED) injection 1 mg, 1 mg, IntraVENous, PRN, Sheldon Butler MD    LORazepam (ATIVAN) tablet 1 mg, 1 mg, Oral, ON CALL, Isac Sanz PA-C    megestroL (MEGACE) tablet 20 mg, 20 mg, Oral, TID WITH MEALS, Isac Sanz PA-C, 20 mg at 10/31/22 0804    heparin (porcine) 1,000 unit/mL injection 2,000 Units, 2,000 Units, IntraVENous, Rad Multiple, Mar Farooq MD, 2,000 Units at 10/27/22 1223    meropenem (MERREM) 1 g in 0.9% sodium chloride (MBP/ADV) 50 mL MBP, 1 g, IntraVENous, Q8H, Marshall Celestin MD, Last Rate: 16.7 mL/hr at 10/31/22 0804, 1 g at 10/31/22 1630    midodrine (PROAMATINE) tablet 5 mg, 5 mg, Oral, TID PRN, Annabel Fernandes, HARINI, 5 mg at 10/25/22 3705    fluconazole (DIFLUCAN) tablet 200 mg, 200 mg, Oral, DAILY, Son Cabral MD, 200 mg at 10/31/22 0804    [Held by provider] heparin 25,000 units in D5W 250 ml infusion, 500 Units/hr, IntraVENous, TITRATE, Romulo Mena MD, Stopped at 10/28/22 1658    oxyCODONE IR (ROXICODONE) tablet 5 mg, 5 mg, Oral, Q6H PRN, Annabel Fernandes NP, 5 mg at 10/30/22 1307    0.9% sodium chloride infusion 250 mL, 250 mL, IntraVENous, PRN, Arron Posadas MD    cromolyn (OPTICROM) 4 % ophthalmic solution 1 Drop, 1 Drop, Both Eyes, DAILY, Arron Posadas MD, 1 Drop at 10/31/22 0810    sodium chloride (NS) flush 5-40 mL, 5-40 mL, IntraVENous, Q8H, Arron Posadas MD, 10 mL at 10/31/22 2131    sodium chloride (NS) flush 5-40 mL, 5-40 mL, IntraVENous, PRN, Arron Posadas MD, 10 mL at 10/27/22 1406    acetaminophen (TYLENOL) tablet 650 mg, 650 mg, Oral, Q6H PRN, 650 mg at 10/25/22 1111 **OR** acetaminophen (TYLENOL) suppository 650 mg, 650 mg, Rectal, Q6H PRN, Arron Posadas MD    polyethylene glycol (MIRALAX) packet 17 g, 17 g, Oral, DAILY PRN, Arron Posadas MD    ondansetron (ZOFRAN ODT) tablet 4 mg, 4 mg, Oral, Q8H PRN, 4 mg at 10/25/22 1110 **OR** ondansetron (ZOFRAN) injection 4 mg, 4 mg, IntraVENous, Q6H PRN, Arron Posadas MD, 4 mg at 10/30/22 1307    Facility-Administered Medications Ordered in Other Encounters:     PHENYLephrine 100 mcg/mL 10 mL syringe (ONE-STEP), , IntraVENous, CONTINUOUS, Janie Pugh CRNA, 200 mcg at 10/31/22 1637    lidocaine (PF) (XYLOCAINE) 20 mg/mL (2 %) injection, , IntraVENous, PRN, Brandy Pugh CRNA, 60 mg at 10/31/22 1606    fentaNYL citrate (PF) injection, , IntraVENous, PRN, Janie Pugh CRNA, 100 mcg at 10/31/22 1603    propofoL (DIPRIVAN) 10 mg/mL injection, , IntraVENous, PRN, Lexy Orlando Reading, CRNA, 100 mg at 10/31/22 1606    rocuronium injection, , IntraVENous, PRN, Orlando Pugh Reading, CRNA, 10 mg at 10/31/22 1708    dexamethasone (DECADRON) 4 mg/mL injection, , IntraVENous, PRN, Brandy Pugh S, CRNA, 4 mg at 10/31/22 1606    lactated Ringers infusion, , IntraVENous, CONTINUOUS, Orlando Pugh Reading, CRNA, New Bag at 10/31/22 1559    PHENYLephrine (RONAL-SYNEPHRINE) 10 mg in 0.9% sodium chloride 100 mL infusion, , IntraVENous, CONTINUOUS, Lexy, Orlando Reading, CRNA, Last Rate: 30 mL/hr at 10/31/22 1657, 50 mcg/min at 10/31/22 1657     Immunization History: There is no immunization history on file for this patient. Complete    Review of Systems:     Constitutional:  no fever,  no chills,  no sweats, No weakness, No fatigue, No decreased activity. Eye: No recent visual problem, No icterus, No discharge, No double vision. Ear/Nose/Mouth/Throat: No decreased hearing, No ear pain, No nasal congestion, No sore throat. Respiratory: No shortness of breath, No cough, No sputum production, No hemoptysis, No wheezing, No cyanosis. Cardiovascular: No chest pain, No palpitations, No bradycardia, No tachycardia, No peripheral edema, No syncope. Gastrointestinal:  nausea,  No vomiting, No diarrhea, No constipation, No heartburn,  No abdominal pain. Genitourinary: No dysuria, No hematuria, No change in urine stream, No urethral discharge, No lesions. Hematology/Lymphatics: No bruising tendency, No bleeding tendency, No petechiae, No swollen lymph glands. Endocrine: No excessive thirst, No polyuria, No cold intolerance, No heat intolerance, No excessive hunger. Immunologic: Not immunocompromised, No recurrent fevers, No recurrent infections. Musculoskeletal: No back pain, No neck pain, No joint pain, No muscle pain, No claudication, No decreased range of motion, No trauma. Integumentary: No rash, No pruritus, No abrasions.   Neurologic: Alert and oriented X4, No abnormal balance, No headache, No confusion, No numbness, No tingling. Psychiatric: No anxiety, No depression, No nate. Physical Exam:     Vitals & Measurements: Wt Readings from Last 3 Encounters:   10/19/22 51.7 kg (114 lb)   10/13/22 51.3 kg (113 lb)   10/09/22 59.6 kg (131 lb 6.3 oz)     Temp Readings from Last 3 Encounters:   10/31/22 98.2 °F (36.8 °C)   10/17/22 98.7 °F (37.1 °C)   10/13/22 98.3 °F (36.8 °C)     BP Readings from Last 3 Encounters:   10/31/22 (!) 119/96   10/17/22 (!) 103/58   10/13/22 116/67     Pulse Readings from Last 3 Encounters:   10/31/22 95   10/17/22 94   10/13/22 100      Ht Readings from Last 3 Encounters:   10/26/22 5' 2\" (1.575 m)   10/13/22 5' 2\" (1.575 m)   10/05/22 5' 2.01\" (1.575 m)          General: well appearing, no acute distress  Head: Normal  Face: Nornal  HEENT: atraumatic, PERRLA, moist mucosa, normal pharynx, normal tonsils and adenoids, normal tongue, no fluid in sinuses  Neck: Trachea midline, no carotid bruit, no masses  Chest: Normal.  Respiratory: Normal chest wall expansion, CTA B, no r/r/w, no rubs  Cardiovascular: RRR, no m/r/g, Normal S1 and S2  Abdomen: Soft, non tender, non-distended, normal bowel sounds in all quadrants, no hepatosplenomegaly, no tympany. Incision scar: well healed, right lower quadrant urostomy+  Genitourinary: No inguinal hernia, normal external gentalia, no renal angle tenderness. Vagina: Dark stool smear in the pad lining  Rectal: no stool smear. Musculoskeletal: normal ROM in upper and lower extremities, No joint swelling.   Integumentary: Warm, dry, and pink, with no rash, purpura, or petechia  Heme/Lymph: No lymphadenopathy, no bruises  Neurological:Cranial Nerves II-XII grossly intact, no gross motor or sensory deficit  Psychiatric: Cooperative with normal mood, affect, and cognition      Laboratory Values:   Recent Results (from the past 24 hour(s))   CBC WITH AUTOMATED DIFF    Collection Time: 10/31/22  8:04 AM   Result Value Ref Range    WBC 4.6 3.6 - 11.0 K/uL    RBC 2.90 (L) 3.80 - 5.20 M/uL    HGB 7.7 (L) 11.5 - 16.0 g/dL    HCT 25.7 (L) 35.0 - 47.0 %    MCV 88.6 80.0 - 99.0 FL    MCH 26.6 26.0 - 34.0 PG    MCHC 30.0 30.0 - 36.5 g/dL    RDW 17.7 (H) 11.5 - 14.5 %    PLATELET 575 204 - 094 K/uL    MPV 9.5 8.9 - 12.9 FL    NRBC 0.0 0.0  WBC    ABSOLUTE NRBC 0.00 0.00 - 0.01 K/uL    NEUTROPHILS 51 32 - 75 %    BAND NEUTROPHILS 5 0 - 6 %    LYMPHOCYTES 38 12 - 49 %    MONOCYTES 4 (L) 5 - 13 %    EOSINOPHILS 0 0 - 7 %    BASOPHILS 1 0 - 1 %    MYELOCYTES 1 (H) 0 %    IMMATURE GRANULOCYTES 0 %    ABS. NEUTROPHILS 2.6 1.8 - 8.0 K/UL    ABS. LYMPHOCYTES 1.7 0.8 - 3.5 K/UL    ABS. MONOCYTES 0.2 0.0 - 1.0 K/UL    ABS. EOSINOPHILS 0.0 0.0 - 0.4 K/UL    ABS. BASOPHILS 0.0 0.0 - 0.1 K/UL    ABS. IMM. GRANS. 0.0 K/UL    DF Manual      RBC COMMENTS Normocytic, Normochromic     METABOLIC PANEL, COMPREHENSIVE    Collection Time: 10/31/22  8:04 AM   Result Value Ref Range    Sodium 140 136 - 145 mmol/L    Potassium 3.8 3.5 - 5.1 mmol/L    Chloride 107 97 - 108 mmol/L    CO2 27 21 - 32 mmol/L    Anion gap 6 5 - 15 mmol/L    Glucose 67 65 - 100 mg/dL    BUN 6 6 - 20 mg/dL    Creatinine 0.50 (L) 0.55 - 1.02 mg/dL    BUN/Creatinine ratio 12 12 - 20      eGFR >60 >60 ml/min/1.73m2    Calcium 9.2 8.5 - 10.1 mg/dL    Bilirubin, total 0.4 0.2 - 1.0 mg/dL    AST (SGOT) 26 15 - 37 U/L    ALT (SGPT) 6 (L) 12 - 78 U/L    Alk. phosphatase 89 45 - 117 U/L    Protein, total 8.4 (H) 6.4 - 8.2 g/dL    Albumin 1.6 (L) 3.5 - 5.0 g/dL    Globulin 6.8 (H) 2.0 - 4.0 g/dL    A-G Ratio 0.2 (L) 1.1 - 2.2             IR THROMBECTOMY [facility] VEIN W PHARM   Final Result   Bilateral iliofemoral and IVC aspiration thrombectomy with removal of a large   volume of thrombus from both lower extremities and IVC. CT ABD PELV W CONT   Final Result   Rectovaginal fistula is not identified   Extensive bilateral iliac venous thrombus to the level of the IVC filter. Right   lower extremity edema. Cholelithiasis with distended gallbladder   Bilateral hydronephrosis and hydroureter in patient with ileal conduit   Probable small bowel obstruction secondary to adhesion      XR ABD (KUB)   Final Result   1. No significant change in small bowel dilation. Large volume of stool in the   rectum. 2. Cholelithiasis. XR ABD (KUB)   Final Result   Dilated small bowel within the central abdomen is similar to CT from one day   prior. DUPLEX LOWER EXT VENOUS BILAT   Final Result   Addendum (preliminary) 1 of 1      · Thrombus present in the right external iliac vein. · Thrombus present in the right common femoral vein. · Thrombus present in the right femoral vein. · Thrombus present in the right proximal femoral vein. · Thrombus present in the right mid femoral vein. · Thrombus present in the right distal femoral vein. · Thrombus present in the right popliteal vein. · Thrombus present in the right gastrocnemius vein. · Thrombus present in the right peroneal vein. · Thrombus present in the right saphenofemoral junction. · Thrombus present in the left external iliac vein. · Thrombus present in the left common femoral vein. · Thrombus present in the left saphenofemoral junction. · Thrombus present in the left femoral vein. · Thrombus present in the left proximal femoral vein. · Thrombus present in the left mid femoral vein. · Thrombus present in the left popliteal vein. · Thrombus present in the left distal femoral vein. · Thrombus present in the left gastrocnemius vein. · Thrombus present in the left peroneal vein. This is vascular lab report on Errol Covert, patient's YOB: 1965   Date of examination: October 20, 2022   Examination: Duplex lower extremity venous bilateral   Technician: Ms. Mayra Youssef   Clinical history: This is  61years old woman with suspected renal vein    thrombosis   Indication: femoral vein thrombosis. Technique: Bilateral leg venous structures examined using venous duplex    ultrasound with 2D grayscale, color-flow and spectral Doppler analysis. Findings:   Right side:   common femoral vein and saphenofemoral junction is not compressible and    there is hypoechoic filling defect noted. common femoral vein shows no venous flow   Distal external iliac vein also not compressible and that there is no    venous flow   Proximal femoral vein is noncompressible and there is no venous flow   Mid femoral vein is not compressible and there is no venous flow   Distal femoral vein is noncompressible and there is no venous flow   Proximal, mid, distal greater saphenous vein is compressible, there is no    filling defect   Distal popliteal vein is not compressible. Proximal popliteal vein is noncompressible and there is no venous flow   Proximal gastrocnemius vein is not compressible   Proximal small saphenous vein is not compressible   Distal, mid, proximal  peroneal vein is not compressible      Left side:   common femoral vein not compressible and there is no venous flow   Saphenofemoral junction is not compressible and there is no venous flow. External iliac vein also not compressible there is no venous flow   Proximal femoral vein is noncompressible and there is no venous flow   Mid femoral vein is noncompressible and there is no venous flow   Distal femoral vein is not compressible and there is no venous flow   Proximal, mid, distal greater saphenous vein is noncompressible and there    is hypoechoic filling defect noted. Distal popliteal vein is noncompressible   Proximal popliteal vein is noncompressible and there is no venous flow   Proximal gastrocnemius vein is not compressible and there is no venous    flow   Proximal small saphenous vein is noncompressible and there is no venous    flow   Distal, mid, proximal peroneal vein is not compressible. Impression:   1.   Right external iliac vein, common femoral vein, femoral vein,    popliteal vein, gastrocnemius vein, small saphenous vein, and peroneal    vein shows acute venous thrombosis   2. Left external iliac vein, common femoral vein, femoral vein, popliteal    vein, gastrocnemius vein, small saphenous vein, peroneal vein, and    saphenofemoral junction and greater saphenous vein shows acute venous    thrombosis. These  extensive bilateral lower extremity deep vein thrombosis notified    to Dr. Jeremiah العلي on 10/20/2022 1201 PM.            CT ABD PELV WO CONT   Final Result   Status post cystectomy and hysterectomy with right lower quadrant ileal conduit. Mild bilateral hydroureteronephrosis, evaluation is somewhat limited by the   presence of contrast from previously performed examination. Small bowel   distention with decompressed loops distally concerning for obstruction likely   related to adhesion formation. While this study was done without the use of   intravenous contrast there is questionable right common femoral vein thrombus   with soft tissue edema. CTA CHEST W OR W WO CONT   Final Result   Emphysematous changes. No evidence of pulmonary embolism or acute   abnormality. Cholelithiasis. Bilateral hydronephrosis. 1 cm hypodense lesion   right hepatic lobe cannot be characterized with certainty on the basis of this   examination. Follow-up is recommended to ensure stability. XR CHEST PORT   Final Result   No Acute Disease. XR FOOT LT MIN 3 V   Final Result   Normal study. IR THROMBECTOMY [facility] VEIN W PHARM    (Results Pending)       Assessment:  Ileus, resolved. Nausea resolved    DVT    Minimal Vaginal drainage may be just some pelvic fluid draining unable to confirm that it could be a RV fistula. Anemia    Plan:    Admission  Diet: soft diet along with ensure & protein shake. IV fluids  SCD  IS  Nausea medication  Labs in am  Anticoagulation therapy per Hematologist.  9.   PT/OT  10. EUA  by Dr. Natalia Munoz today. Thank you for the consultation & allowing me to participate in the care of this patient.

## 2022-10-31 NOTE — PROGRESS NOTES
Hospitalist Progress Note    Subjective:   Daily Progress Note: 10/31/2022 3:41 PM    No complaints with exception of vaginal discharge which is decreasing    Current Facility-Administered Medications   Medication Dose Route Frequency    LORazepam (ATIVAN) tablet 1 mg  1 mg Oral ON CALL    megestroL (MEGACE) tablet 20 mg  20 mg Oral TID WITH MEALS    heparin (porcine) 1,000 unit/mL injection 2,000 Units  2,000 Units IntraVENous Rad Multiple    meropenem (MERREM) 1 g in 0.9% sodium chloride (MBP/ADV) 50 mL MBP  1 g IntraVENous Q8H    midodrine (PROAMATINE) tablet 5 mg  5 mg Oral TID PRN    fluconazole (DIFLUCAN) tablet 200 mg  200 mg Oral DAILY    [Held by provider] heparin 25,000 units in D5W 250 ml infusion  500 Units/hr IntraVENous TITRATE    oxyCODONE IR (ROXICODONE) tablet 5 mg  5 mg Oral Q6H PRN    0.9% sodium chloride infusion 250 mL  250 mL IntraVENous PRN    cromolyn (OPTICROM) 4 % ophthalmic solution 1 Drop  1 Drop Both Eyes DAILY    sodium chloride (NS) flush 5-40 mL  5-40 mL IntraVENous Q8H    sodium chloride (NS) flush 5-40 mL  5-40 mL IntraVENous PRN    acetaminophen (TYLENOL) tablet 650 mg  650 mg Oral Q6H PRN    Or    acetaminophen (TYLENOL) suppository 650 mg  650 mg Rectal Q6H PRN    polyethylene glycol (MIRALAX) packet 17 g  17 g Oral DAILY PRN    ondansetron (ZOFRAN ODT) tablet 4 mg  4 mg Oral Q8H PRN    Or    ondansetron (ZOFRAN) injection 4 mg  4 mg IntraVENous Q6H PRN        Review of Systems  Review of Systems   Constitutional:  Positive for malaise/fatigue. HENT: Negative. Respiratory:  Negative for cough and shortness of breath. Cardiovascular:  Positive for leg swelling. Negative for chest pain. Gastrointestinal:  Positive for abdominal pain. Negative for nausea and vomiting. Genitourinary: Negative. Musculoskeletal: Negative. Skin: Negative. Neurological: Negative. Psychiatric/Behavioral: Negative.             Objective:     Visit Vitals  /70 (BP 1 Location: Left upper arm, BP Patient Position: At rest;Supine)   Pulse 88   Temp 98.2 °F (36.8 °C)   Resp 18   Ht 5' 2\" (1.575 m)   Wt 51.7 kg (114 lb)   LMP  (LMP Unknown)   SpO2 100%   BMI 20.85 kg/m²      O2 Device: None (Room air)    Temp (24hrs), Av.1 °F (36.7 °C), Min:97.9 °F (36.6 °C), Max:98.2 °F (36.8 °C)      No intake/output data recorded. 10/29 1901 - 10/31 0700  In: 325 [P.O.:125; I.V.:200]  Out: 2600 [Urine:2600]    Recent Results (from the past 24 hour(s))   CBC WITH AUTOMATED DIFF    Collection Time: 10/31/22  8:04 AM   Result Value Ref Range    WBC 4.6 3.6 - 11.0 K/uL    RBC 2.90 (L) 3.80 - 5.20 M/uL    HGB 7.7 (L) 11.5 - 16.0 g/dL    HCT 25.7 (L) 35.0 - 47.0 %    MCV 88.6 80.0 - 99.0 FL    MCH 26.6 26.0 - 34.0 PG    MCHC 30.0 30.0 - 36.5 g/dL    RDW 17.7 (H) 11.5 - 14.5 %    PLATELET 527 440 - 230 K/uL    MPV 9.5 8.9 - 12.9 FL    NRBC 0.0 0.0  WBC    ABSOLUTE NRBC 0.00 0.00 - 0.01 K/uL    NEUTROPHILS 51 32 - 75 %    BAND NEUTROPHILS 5 0 - 6 %    LYMPHOCYTES 38 12 - 49 %    MONOCYTES 4 (L) 5 - 13 %    EOSINOPHILS 0 0 - 7 %    BASOPHILS 1 0 - 1 %    MYELOCYTES 1 (H) 0 %    IMMATURE GRANULOCYTES 0 %    ABS. NEUTROPHILS 2.6 1.8 - 8.0 K/UL    ABS. LYMPHOCYTES 1.7 0.8 - 3.5 K/UL    ABS. MONOCYTES 0.2 0.0 - 1.0 K/UL    ABS. EOSINOPHILS 0.0 0.0 - 0.4 K/UL    ABS. BASOPHILS 0.0 0.0 - 0.1 K/UL    ABS. IMM.  GRANS. 0.0 K/UL    DF Manual      RBC COMMENTS Normocytic, Normochromic     METABOLIC PANEL, COMPREHENSIVE    Collection Time: 10/31/22  8:04 AM   Result Value Ref Range    Sodium 140 136 - 145 mmol/L    Potassium 3.8 3.5 - 5.1 mmol/L    Chloride 107 97 - 108 mmol/L    CO2 27 21 - 32 mmol/L    Anion gap 6 5 - 15 mmol/L    Glucose 67 65 - 100 mg/dL    BUN 6 6 - 20 mg/dL    Creatinine 0.50 (L) 0.55 - 1.02 mg/dL    BUN/Creatinine ratio 12 12 - 20      eGFR >60 >60 ml/min/1.73m2    Calcium 9.2 8.5 - 10.1 mg/dL    Bilirubin, total 0.4 0.2 - 1.0 mg/dL    AST (SGOT) 26 15 - 37 U/L    ALT (SGPT) 6 (L) 12 - 78 U/L    Alk. phosphatase 89 45 - 117 U/L    Protein, total 8.4 (H) 6.4 - 8.2 g/dL    Albumin 1.6 (L) 3.5 - 5.0 g/dL    Globulin 6.8 (H) 2.0 - 4.0 g/dL    A-G Ratio 0.2 (L) 1.1 - 2.2          IR THROMBECTOMY [facility] VEIN W PHARM   Final Result   Bilateral iliofemoral and IVC aspiration thrombectomy with removal of a large   volume of thrombus from both lower extremities and IVC. CT ABD PELV W CONT   Final Result   Rectovaginal fistula is not identified   Extensive bilateral iliac venous thrombus to the level of the IVC filter. Right   lower extremity edema. Cholelithiasis with distended gallbladder   Bilateral hydronephrosis and hydroureter in patient with ileal conduit   Probable small bowel obstruction secondary to adhesion      XR ABD (KUB)   Final Result   1. No significant change in small bowel dilation. Large volume of stool in the   rectum. 2. Cholelithiasis. XR ABD (KUB)   Final Result   Dilated small bowel within the central abdomen is similar to CT from one day   prior. DUPLEX LOWER EXT VENOUS BILAT   Final Result   Addendum (preliminary) 1 of 1      · Thrombus present in the right external iliac vein. · Thrombus present in the right common femoral vein. · Thrombus present in the right femoral vein. · Thrombus present in the right proximal femoral vein. · Thrombus present in the right mid femoral vein. · Thrombus present in the right distal femoral vein. · Thrombus present in the right popliteal vein. · Thrombus present in the right gastrocnemius vein. · Thrombus present in the right peroneal vein. · Thrombus present in the right saphenofemoral junction. · Thrombus present in the left external iliac vein. · Thrombus present in the left common femoral vein. · Thrombus present in the left saphenofemoral junction. · Thrombus present in the left femoral vein. · Thrombus present in the left proximal femoral vein. · Thrombus present in the left mid femoral vein. · Thrombus present in the left popliteal vein. · Thrombus present in the left distal femoral vein. · Thrombus present in the left gastrocnemius vein. · Thrombus present in the left peroneal vein. This is vascular lab report on Navneet Negro, patient's YOB: 1965   Date of examination: October 20, 2022   Examination: Duplex lower extremity venous bilateral   Technician: Ms. Costa Her   Clinical history: This is  61years old woman with suspected renal vein    thrombosis   Indication: femoral vein thrombosis. Technique: Bilateral leg venous structures examined using venous duplex    ultrasound with 2D grayscale, color-flow and spectral Doppler analysis. Findings:   Right side:   common femoral vein and saphenofemoral junction is not compressible and    there is hypoechoic filling defect noted. common femoral vein shows no venous flow   Distal external iliac vein also not compressible and that there is no    venous flow   Proximal femoral vein is noncompressible and there is no venous flow   Mid femoral vein is not compressible and there is no venous flow   Distal femoral vein is noncompressible and there is no venous flow   Proximal, mid, distal greater saphenous vein is compressible, there is no    filling defect   Distal popliteal vein is not compressible. Proximal popliteal vein is noncompressible and there is no venous flow   Proximal gastrocnemius vein is not compressible   Proximal small saphenous vein is not compressible   Distal, mid, proximal  peroneal vein is not compressible      Left side:   common femoral vein not compressible and there is no venous flow   Saphenofemoral junction is not compressible and there is no venous flow.    External iliac vein also not compressible there is no venous flow   Proximal femoral vein is noncompressible and there is no venous flow   Mid femoral vein is noncompressible and there is no venous flow   Distal femoral vein is not compressible and there is no venous flow   Proximal, mid, distal greater saphenous vein is noncompressible and there    is hypoechoic filling defect noted. Distal popliteal vein is noncompressible   Proximal popliteal vein is noncompressible and there is no venous flow   Proximal gastrocnemius vein is not compressible and there is no venous    flow   Proximal small saphenous vein is noncompressible and there is no venous    flow   Distal, mid, proximal peroneal vein is not compressible. Impression:   1. Right external iliac vein, common femoral vein, femoral vein,    popliteal vein, gastrocnemius vein, small saphenous vein, and peroneal    vein shows acute venous thrombosis   2. Left external iliac vein, common femoral vein, femoral vein, popliteal    vein, gastrocnemius vein, small saphenous vein, peroneal vein, and    saphenofemoral junction and greater saphenous vein shows acute venous    thrombosis. These  extensive bilateral lower extremity deep vein thrombosis notified    to Dr. Dagoberto Godinez on 10/20/2022 1201 PM.            CT ABD PELV WO CONT   Final Result   Status post cystectomy and hysterectomy with right lower quadrant ileal conduit. Mild bilateral hydroureteronephrosis, evaluation is somewhat limited by the   presence of contrast from previously performed examination. Small bowel   distention with decompressed loops distally concerning for obstruction likely   related to adhesion formation. While this study was done without the use of   intravenous contrast there is questionable right common femoral vein thrombus   with soft tissue edema. CTA CHEST W OR W WO CONT   Final Result   Emphysematous changes. No evidence of pulmonary embolism or acute   abnormality. Cholelithiasis. Bilateral hydronephrosis. 1 cm hypodense lesion   right hepatic lobe cannot be characterized with certainty on the basis of this   examination. Follow-up is recommended to ensure stability.       XR CHEST PORT Final Result   No Acute Disease. XR FOOT LT MIN 3 V   Final Result   Normal study. IR THROMBECTOMY [facility] VEIN W PHARM    (Results Pending)   MRI PELV W CONT    (Results Pending)        PHYSICAL EXAM:    Physical Exam  Vitals reviewed. HENT:      Head: Normocephalic and atraumatic. Cardiovascular:      Rate and Rhythm: Normal rate and regular rhythm. Heart sounds: Normal heart sounds. Pulmonary:      Effort: Pulmonary effort is normal.      Breath sounds: Normal breath sounds. Abdominal:      General: Abdomen is flat. Bowel sounds are normal.      Palpations: Abdomen is soft. Comments: Mild tenderness in the suprapubic region   Genitourinary:     Comments: Dark stool appearing content from vagina  Musculoskeletal:         General: Normal range of motion. Cervical back: Normal range of motion and neck supple. Right lower leg: Edema present. Left lower leg: Edema present. Skin:     General: Skin is warm and dry. Neurological:      General: No focal deficit present. Mental Status: She is alert and oriented to person, place, and time. Psychiatric:         Mood and Affect: Mood normal.        Data Review    Recent Results (from the past 24 hour(s))   CBC WITH AUTOMATED DIFF    Collection Time: 10/31/22  8:04 AM   Result Value Ref Range    WBC 4.6 3.6 - 11.0 K/uL    RBC 2.90 (L) 3.80 - 5.20 M/uL    HGB 7.7 (L) 11.5 - 16.0 g/dL    HCT 25.7 (L) 35.0 - 47.0 %    MCV 88.6 80.0 - 99.0 FL    MCH 26.6 26.0 - 34.0 PG    MCHC 30.0 30.0 - 36.5 g/dL    RDW 17.7 (H) 11.5 - 14.5 %    PLATELET 742 118 - 220 K/uL    MPV 9.5 8.9 - 12.9 FL    NRBC 0.0 0.0  WBC    ABSOLUTE NRBC 0.00 0.00 - 0.01 K/uL    NEUTROPHILS 51 32 - 75 %    BAND NEUTROPHILS 5 0 - 6 %    LYMPHOCYTES 38 12 - 49 %    MONOCYTES 4 (L) 5 - 13 %    EOSINOPHILS 0 0 - 7 %    BASOPHILS 1 0 - 1 %    MYELOCYTES 1 (H) 0 %    IMMATURE GRANULOCYTES 0 %    ABS. NEUTROPHILS 2.6 1.8 - 8.0 K/UL    ABS.  LYMPHOCYTES 1.7 0.8 - 3.5 K/UL    ABS. MONOCYTES 0.2 0.0 - 1.0 K/UL    ABS. EOSINOPHILS 0.0 0.0 - 0.4 K/UL    ABS. BASOPHILS 0.0 0.0 - 0.1 K/UL    ABS. IMM. GRANS. 0.0 K/UL    DF Manual      RBC COMMENTS Normocytic, Normochromic     METABOLIC PANEL, COMPREHENSIVE    Collection Time: 10/31/22  8:04 AM   Result Value Ref Range    Sodium 140 136 - 145 mmol/L    Potassium 3.8 3.5 - 5.1 mmol/L    Chloride 107 97 - 108 mmol/L    CO2 27 21 - 32 mmol/L    Anion gap 6 5 - 15 mmol/L    Glucose 67 65 - 100 mg/dL    BUN 6 6 - 20 mg/dL    Creatinine 0.50 (L) 0.55 - 1.02 mg/dL    BUN/Creatinine ratio 12 12 - 20      eGFR >60 >60 ml/min/1.73m2    Calcium 9.2 8.5 - 10.1 mg/dL    Bilirubin, total 0.4 0.2 - 1.0 mg/dL    AST (SGOT) 26 15 - 37 U/L    ALT (SGPT) 6 (L) 12 - 78 U/L    Alk. phosphatase 89 45 - 117 U/L    Protein, total 8.4 (H) 6.4 - 8.2 g/dL    Albumin 1.6 (L) 3.5 - 5.0 g/dL    Globulin 6.8 (H) 2.0 - 4.0 g/dL    A-G Ratio 0.2 (L) 1.1 - 2.2          Assessment/Plan:     Principal Problem:    Small bowel obstruction (HCC) (10/20/2022)    Active Problems:    Acute blood loss anemia (10/20/2022)      Status post robot-assisted surgical procedure, pelvic exenteration (10/20/2022)      Vaginal cuff cellulitis (10/20/2022)    Hospital course: Monet Paiz is a 61 y.o. female with PMH of bladder cancer s/p cystectomy, hysterectomy, BSO and ileal conduit diversion, anemia, hep C, and left leg DVT s/p IVC filter years ago. She presented to the ED with chief complaint of weakness and nausea for the past week. She reports unable to tolerate PO intake, due to profound nausea, abdominal pain, fever, chills, vomiting or diarrhea. In addition, she also reports unable to walk due to weakness and left inner thigh and left foot pain. Denies trauma. She was seen here on 10/13/2022 with urostomy complications, status post ileal conduit and discharged home on Cipro which she completed.  In addition, she also reports having vaginal bleed since surgery 2 weeks ago. Which appears to be more related to a fistula with stool present. CT scan of the abdomen pelvis was performed revealing no obvious rectovaginal fistula although there was air present tracking in that direction without any extravasation of contrast.  There was extensive bilateral iliac venous thrombus to the level of the IVC filter with right leg edema. Cholelithiasis with a distended gallbladder was also noted. Bilateral hydronephrosis with hydroureter most likely from the ileal conduit. Probable small bowel obstruction secondary to adhesions also noted. Patient was found to be anemic and received 1 unit of packed red blood cells. Venous duplex revealed extensive clot burden within the right leg and left leg. Urinalysis was consistent with Candida infection. ID consultation. Surgical consultation, Dr. Martin Chester. UCX grew michell ciferrii, continue fluconazole. IV heparin being held due to blood loss, hem positive stool,. Discussed case with interventional radiology who will further investigate the fistula with recommendations and mechanical thrombectomy performed of the bilateral lower extremities with a large volume clot removed. Discussed case with GYN Dr. Jesus Frank and will try to proceed with MRI of the pelvis and keep the patient n.p.o. overnight and may require a pelvic exam in the operating room.     Impression/plan:     # 1 Acute blood loss anemia  - hematology following-->reccs low dose anticoagulants  - hgb stable- currently on low dose IV hep gtt  Requiring 1 unit of PRBC, Stool occult positive Holding heparin drip     # 2 Vaginal bleed with stool  - Suspect complications from recent surgery  -With associated vaginitis of the CUFF and cellulitis  Most likely rectovaginal fistula  GYN for pelvic evaluation, recommending MRI pelvis may have diffucuty due to IVC  filter  OR for pelvic exam, npo  Meropenum   MRI down    # 3 SBO  - currently tolerating full liquid diet  - s/p gentle IVF  - Gen surgery: Basker   - Morphine for pain. Improving     # 4 Extensive dvt bilateral lower legs  - duplex shows extensive clot burden  - hematology following   - already has IVC filter placed  - started on IV hep due to concern of old IVC and chances that she has collateral blood vessels bypassing filter which will increase her risk for PE.  IR Bilateral iliofemoral and IVC aspiration thrombectomy with removal of a large  volume of thrombus from both lower extremities and IVC  Holding heparin until Hgb is stable    # 5 UTI  - Fluconazole for fungal UTI.  - UCX grew candida ciferrii  - ID following     # 6 Bladder cancer  - Not on chemotherapy currently. - Consulted urology to evaluate for possible post-op complications. - Continue supportive care  - Hematology following     # 7 Emphysema  - not in respiratory distress, continue to monitor      # 8 Ambulatory dysfunction  - secondary to dvt's  - pt/ot as tolerated     # 9 hypotension  - bp remains soft  - prn midodrine  - start gentle IV hydration    10. Rectal/vaginal fistula  Surgery following  Questionable abscess  ID consult  Merrem  GYN consult, possible OR for Pelvic exam  MRI pelvis    11. Moderate to severe protein malnutrition  Have offered tube feeds and TPN patient is refusing  Start Megace    Care Plan discussed with: Patient/Family    Total time spent with patient: >35 minutes.

## 2022-10-31 NOTE — PROGRESS NOTES
Problem: Pain  Goal: *Control of Pain  Outcome: Progressing Towards Goal  Goal: *PALLIATIVE CARE:  Alleviation of Pain  Outcome: Progressing Towards Goal     Problem: Patient Education: Go to Patient Education Activity  Goal: Patient/Family Education  Outcome: Progressing Towards Goal     Problem: Falls - Risk of  Goal: *Absence of Falls  Description: Document Mannie Olsons Fall Risk and appropriate interventions in the flowsheet. Outcome: Progressing Towards Goal  Note: Fall Risk Interventions:  Mobility Interventions: Bed/chair exit alarm, OT consult for ADLs, Patient to call before getting OOB, PT Consult for mobility concerns, PT Consult for assist device competence, Utilize walker, cane, or other assistive device         Medication Interventions: Bed/chair exit alarm, Patient to call before getting OOB, Teach patient to arise slowly    Elimination Interventions: Bed/chair exit alarm, Call light in reach, Patient to call for help with toileting needs    History of Falls Interventions: Bed/chair exit alarm, Door open when patient unattended         Problem: Patient Education: Go to Patient Education Activity  Goal: Patient/Family Education  Outcome: Progressing Towards Goal     Problem: Patient Education: Go to Patient Education Activity  Goal: Patient/Family Education  Outcome: Progressing Towards Goal     Problem: Pressure Injury - Risk of  Goal: *Prevention of pressure injury  Description: Document Alphonso Scale and appropriate interventions in the flowsheet.   Outcome: Progressing Towards Goal  Note: Pressure Injury Interventions:  Sensory Interventions: Keep linens dry and wrinkle-free, Maintain/enhance activity level, Minimize linen layers    Moisture Interventions: Absorbent underpads, Minimize layers    Activity Interventions: Assess need for specialty bed, Increase time out of bed, PT/OT evaluation    Mobility Interventions: Assess need for specialty bed, Float heels, HOB 30 degrees or less, PT/OT evaluation    Nutrition Interventions: Document food/fluid/supplement intake    Friction and Shear Interventions: HOB 30 degrees or less, Lift sheet, Minimize layers                Problem: Patient Education: Go to Patient Education Activity  Goal: Patient/Family Education  Outcome: Progressing Towards Goal     Problem: Patient Education: Go to Patient Education Activity  Goal: Patient/Family Education  Outcome: Progressing Towards Goal     Problem: Patient Education: Go to Patient Education Activity  Goal: Patient/Family Education  Outcome: Progressing Towards Goal

## 2022-10-31 NOTE — ANESTHESIA POSTPROCEDURE EVALUATION
Procedure(s):  Examination Under Anesthesia, Vaginal Washout And Closure, Distal rectal repair.     general    Anesthesia Post Evaluation      Multimodal analgesia: multimodal analgesia used between 6 hours prior to anesthesia start to PACU discharge  Patient location during evaluation: PACU  Patient participation: complete - patient participated  Level of consciousness: awake  Pain score: 0  Pain management: adequate  Airway patency: patent  Anesthetic complications: no  Cardiovascular status: acceptable  Respiratory status: acceptable  Hydration status: acceptable  Post anesthesia nausea and vomiting:  controlled  Final Post Anesthesia Temperature Assessment:  Normothermia (36.0-37.5 degrees C)      INITIAL Post-op Vital signs:   Vitals Value Taken Time   /75 10/31/22 1915   Temp 36.8 °C (98.2 °F) 10/31/22 1915   Pulse 82 10/31/22 1915   Resp 19 10/31/22 1915   SpO2 100 % 10/31/22 1915

## 2022-10-31 NOTE — OP NOTES
Patient: Lindy Spicer MRN: [id number]  SSN: [id number]    YOB: 1965  Age: 61 y.o. Sex: female              UROLOGY OPERATIVE NOTE    Pre-operative Diagnosis: vaginal discharge, suspected cuff dehiscence  Post-operative Diagnosis: same, rectovaginal fistula  Procedure: vaginal wound washout, rectovaginal fistula closure with drain placement  Distal rectal repair (Dr. Henry Friend. Jose Antonio)    Surgeon: Yani Marvin MD  Assistant: none  Anesthesia:  Per anesthesia  Findings: vaginal cuff dehiscence, rectal fistula  Estimated Blood Loss:    N/R  Drains: 15F round drain per vagina  Specimens: none  Complications: none           Procedure Details: The patient was seen in the pre-operative area. The risks, benefits, complications, alternative treatment options, and expected outcomes were again discussed with the patient. The possibilities of reaction to medication, pain, infection, bleeding, major cardiovascular event, death, damage to surrounding structures were specifically addressed. Informed consent was then obtained. Upon arrival to the operative suite, the patient, procedure, and side were confirmed via a pre-operative \"time-out\". All were in agreement. The patient was carefully positioned and anesthesia was undertaken. Sterile prep and drape was accomplished. The patient was in the lithotomy position. The perineum vagina were prepped with betadine prep. Inspection of the vagina revealed cuff dehiscence about 3cm across. There was feculent material consistent with a fistula. With washout and gentle probing, the rectal opening went from a finger tip to falling open about 3cm. The wound was copiously irrigated with diluted Betadine and normal saline solution. The rectum, paravaginal and vaginal space was irrigated to clear. Dr. Jose Brady was consulted intraoperatively. He performed rectal closure with anoscopy. For details see his operative note.     The paravaginal space was copiously irrigated. A 15 Maltese round drain was placed the paravaginal space and secured with a 2-0 Vicryl suture. The vaginal cuff was closed with interrupted figure-of-eight 2-0 Vicryl sutures. Drain was placed to bulb suction. The patient tolerated the procedure well was transported in stable condition to recovery. Dr. Samuel Hernandez will discuss a diverting colostomy with the patient and family tomorrow, when awake and alert.     Xavier Mcnally MD

## 2022-10-31 NOTE — PROGRESS NOTES
CM reviewed chart. Patient scheduled for possible procedure. Current discharge plan is IRF at [facility], patient will need insurance auth. Asked for auth to be started last Friday. They did not start auth due to concerns of medical instability and increasing chance for denial by insurance per liaison.

## 2022-10-31 NOTE — PROGRESS NOTES
Infectious Disease Progress Note           Subjective:   Pt seen and examined at bedside. Stable, denies new complaints, still w discharge from vaginal area, not as sig as before. She is afebrile w a normal WBC on routine labs. Objective:   Physical Exam:     Visit Vitals  BP (!) 119/96   Pulse 95   Temp 98.2 °F (36.8 °C)   Resp 18   Ht 5' 2\" (1.575 m)   Wt 114 lb (51.7 kg)   LMP  (LMP Unknown)   SpO2 100%   BMI 20.85 kg/m²      O2 Device: None (Room air)    Temp (24hrs), Av.1 °F (36.7 °C), Min:97.9 °F (36.6 °C), Max:98.2 °F (36.8 °C)    10/31 0701 - 10/31 1900  In: 250 [I.V.:250]  Out: 850 [Urine:650]   10/29 1901 - 10/31 0700  In: 325 [P.O.:125; I.V.:200]  Out: 2600 [Urine:2600]    General: NAD, NAD, AAO x 4  HEENT: SIMBA, Moist mucosa   Lungs: CTA b/l, decreased at the bases   Heart: S1S2+, RRR, no murmur  Abdo:  Soft, NT, ND, +BS, healed incisional wounds, + urostomy   : Fecal matter coming out of vaginal area  Exts: B/l leg edema   Skin: healed abdominal incisional wounds     Data Review:       Recent Days:  Recent Labs     10/31/22  0804 10/30/22  0818 10/29/22  0638   WBC 4.6 4.7 5.0   HGB 7.7* 7.2* 7.3*   HCT 25.7* 23.9* 23.8*    216 208       Recent Labs     10/31/22  0804 10/30/22  0818 10/29/22  0638   BUN 6 6 7   CREA 0.50* 0.50* 0.48*         Lab Results   Component Value Date/Time    C-Reactive protein 15.00 (H) 10/25/2022 05:16 AM       Microbiology     Results       Procedure Component Value Units Date/Time    COVID-19 RAPID TEST [[id number]] Collected: 10/20/22 1435    Order Status: Completed Specimen: Nasopharyngeal Updated: 10/20/22 1503     COVID-19 rapid test Not Detected        Comment: Rapid Abbott ID Now   Rapid NAAT:  The specimen is NEGATIVE for SARS-CoV-2, the novel coronavirus associated with COVID-19.    Negative results should be treated as presumptive and, if inconsistent with clinical signs and symptoms or necessary for patient management, should be tested with an alternative molecular assay. Negative results do not preclude SARS-CoV-2 infection and should not be used as the sole basis for patient management decisions. This test has been authorized by the FDA under   an Emergency Use Authorization (EUA) for use by authorized laboratories. Fact sheet for Healthcare Providers: ConventionUpdate.co.nz Fact sheet for Patients: ConventionUpdate.co.nz   Methodology: Isothermal Nucleic Acid Amplification         MRSA SCREEN - PCR (NASAL) [[id number]] Collected: 10/20/22 1435    Order Status: Completed Specimen: Swab Updated: 10/20/22 1636     MRSA by PCR, Nasal Not Detected       MRSA SCREEN - PCR (NASAL) [[id number]] Collected: 10/20/22 1340    Order Status: Canceled Specimen: Swab     CULTURE, Armstrong Nilo STAIN [[id number]] Collected: 10/20/22 1340    Order Status: Completed Specimen: Wound from Cervical/vaginal swab Updated: 10/23/22 0741     Special Requests: No Special Requests        GRAM STAIN Rare WBCs seen               1+ apparent Gram Positive Cocci in pairs                  Few apparent Gram Negative Rods           Culture result: Light  Mixed skin heriberto isolated       CULTURE, BODY FLUID [place] STAIN [[id number]]     Order Status: Canceled Specimen: Body Fluid from Uterine Cul/De/Sac Fluid     CULTURE, URINE [[id number]]  (Abnormal) Collected: 10/20/22 0249    Order Status: Completed Specimen: Urine Updated: 10/23/22 1001     Special Requests: --        No Special Requests  Reflexed from [id number]       Stanton Count --        >100,000  colonies/ml       Culture result: Candida ciferrii              Diagnostics   CXR Results  (Last 48 hours)      None          Assessment/Plan   UTI, complicated by ileal conduit. Candida Ciferrii isolated from Cx         Remains afebrile w a normal WBC on routine labs         On Fluconazole day # 12/14. Routine labs in the morning         At risk for recurrent UTIs     2.  Vaginal cuff cellulitis/suspected abscess on CT       GPC in pair and GNR noted on Gram stain of cervical specimen, Cx neg       Continue on Meropenem day # 8/14     3. 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 Bladder Ca w local invasion: S/p resection on 10/03     5. SBO on admission,resolved w conservative mgt      6.  B/L LE DVT extensive, IVC filter in situ, S/p thrombectomy by IR on 10/27      Heparin drip currently on hold     Penelope Hilario MD    10/31/2022

## 2022-10-31 NOTE — ANESTHESIA PREPROCEDURE EVALUATION
Relevant Problems   RENAL FAILURE   (+) Hydronephrosis of right kidney      HEMATOLOGY   (+) Acute blood loss anemia   (+) Anemia due to acute blood loss      PERSONAL HX & FAMILY HX OF CANCER   (+) Bladder cancer (HCC)       Anesthetic History   No history of anesthetic complications            Review of Systems / Medical History  Patient summary reviewed, nursing notes reviewed and pertinent labs reviewed    Pulmonary  Within defined limits                 Neuro/Psych   Within defined limits           Cardiovascular  Within defined limits                     GI/Hepatic/Renal           Liver disease     Endo/Other        Arthritis and anemia     Other Findings            Past Medical History:   Diagnosis Date    Anemia     pt states had recent blood transfusion 2022    Back pain     Cancer ([address]     bladder    DVT (deep venous thrombosis) (HCC)     and PE, pt denies lung PE , only leg several years ago    Hepatitis C     pt states dx 1 month ago    Liver disease     Presence of IVC filter     pt states several years ago    Rheumatoid arthritis ([address]     Sciatic leg pain     pt states both legs    Uterine fibroid        Past Surgical History:   Procedure Laterality Date    HX APPENDECTOMY  10/03/2022    HX  SECTION      HX GYN  10/03/2022    ROBOT ANTERIOR PELVIC EXENTERATION    HX HYSTERECTOMY  10/03/2022    HX SALPINGO-OOPHORECTOMY Bilateral 10/03/2022    HX UROLOGICAL  09/15/2022    CYSTOSCOPY    HX UROLOGICAL  09/15/2022    URETHRAL DILATION    HX UROLOGICAL  09/15/2022    URETERAL STENT PLACEMENT    HX UROLOGICAL  09/15/2022    TRANSURETHRAL RESECTION OF BLADDER TUMOR >2CM    HX UROLOGICAL Bilateral 09/15/2022    RETROGRADE PYELOGRAM    HX UROLOGICAL  10/03/2022    PELVIC LYMPH NODE DISSECTION    HX UROLOGICAL  10/03/2022    ILEAL CONDUIT URINARY DIVERSION    IR THROMBECTOMY [facility] VEIN W PHARM  10/27/2022    CT CARDIAC SURG PROCEDURE UNLIST      stent placement       Current Outpatient Medications   Medication Instructions    acetaminophen (TYLENOL) 650 mg, Oral, EVERY 6-8 HOURS PRN    diclofenac (VOLTAREN) 2 g, Topical, 2 TIMES DAILY    famotidine (PEPCID) 20 mg, Oral, DAILY AS NEEDED    ferrous sulfate 325 mg, Oral, DAILY    multivitamin with folic acid (One Daily Essential) 400 mcg tab tablet 1 Tablet, Oral, DAILY       Current Facility-Administered Medications   Medication Dose Route Frequency    LORazepam (ATIVAN) tablet 1 mg  1 mg Oral ON CALL    megestroL (MEGACE) tablet 20 mg  20 mg Oral TID WITH MEALS    heparin (porcine) 1,000 unit/mL injection 2,000 Units  2,000 Units IntraVENous Rad Multiple    meropenem (MERREM) 1 g in 0.9% sodium chloride (MBP/ADV) 50 mL MBP  1 g IntraVENous Q8H    midodrine (PROAMATINE) tablet 5 mg  5 mg Oral TID PRN    fluconazole (DIFLUCAN) tablet 200 mg  200 mg Oral DAILY    [Held by provider] heparin 25,000 units in D5W 250 ml infusion  500 Units/hr IntraVENous TITRATE    oxyCODONE IR (ROXICODONE) tablet 5 mg  5 mg Oral Q6H PRN    0.9% sodium chloride infusion 250 mL  250 mL IntraVENous PRN    cromolyn (OPTICROM) 4 % ophthalmic solution 1 Drop  1 Drop Both Eyes DAILY    sodium chloride (NS) flush 5-40 mL  5-40 mL IntraVENous Q8H    sodium chloride (NS) flush 5-40 mL  5-40 mL IntraVENous PRN    acetaminophen (TYLENOL) tablet 650 mg  650 mg Oral Q6H PRN    Or    acetaminophen (TYLENOL) suppository 650 mg  650 mg Rectal Q6H PRN    polyethylene glycol (MIRALAX) packet 17 g  17 g Oral DAILY PRN    ondansetron (ZOFRAN ODT) tablet 4 mg  4 mg Oral Q8H PRN    Or    ondansetron (ZOFRAN) injection 4 mg  4 mg IntraVENous Q6H PRN       Patient Vitals for the past 24 hrs:   Temp Pulse Resp BP SpO2   10/31/22 1525 -- 95 18 (!) 119/96 100 %   10/31/22 0803 36.8 °C (98.2 °F) 88 18 108/70 100 %   10/31/22 0307 36.8 °C (98.2 °F) 72 18 104/70 100 %   10/30/22 2127 36.6 °C (97.9 °F) 85 18 106/66 100 %   10/30/22 1852 36.7 °C (98.1 °F) 90 18 105/72 -- Lab Results   Component Value Date/Time    WBC 4.6 10/31/2022 08:04 AM    HGB 7.7 (L) 10/31/2022 08:04 AM    HCT 25.7 (L) 10/31/2022 08:04 AM    PLATELET 165 75/07/9763 08:04 AM    MCV 88.6 10/31/2022 08:04 AM     Lab Results   Component Value Date/Time    Sodium 140 10/31/2022 08:04 AM    Potassium 3.8 10/31/2022 08:04 AM    Chloride 107 10/31/2022 08:04 AM    CO2 27 10/31/2022 08:04 AM    Anion gap 6 10/31/2022 08:04 AM    Glucose 67 10/31/2022 08:04 AM    BUN 6 10/31/2022 08:04 AM    Creatinine 0.50 (L) 10/31/2022 08:04 AM    BUN/Creatinine ratio 12 10/31/2022 08:04 AM    GFR est AA >60 09/30/2022 09:10 AM    GFR est non-AA >60 09/30/2022 09:10 AM    Calcium 9.2 10/31/2022 08:04 AM     No results found for: APTT, PTP, INR, INREXT  Lab Results   Component Value Date/Time    Glucose 67 10/31/2022 08:04 AM    Glucose (POC) 105 (H) 10/07/2022 05:05 PM         Physical Exam    Airway  Mallampati: II  TM Distance: 4 - 6 cm  Neck ROM: normal range of motion   Mouth opening: Normal     Cardiovascular    Rhythm: regular  Rate: normal         Dental    Dentition: Edentulous     Pulmonary  Breath sounds clear to auscultation               Abdominal  GI exam deferred       Other Findings            Anesthetic Plan    ASA: 2  Anesthesia type: general    Monitoring Plan: Continuous noninvasive hemodynamic monitoring      Induction: Intravenous  Anesthetic plan and risks discussed with: Patient

## 2022-11-01 PROCEDURE — 74011250637 HC RX REV CODE- 250/637: Performed by: INTERNAL MEDICINE

## 2022-11-01 PROCEDURE — 74011000258 HC RX REV CODE- 258: Performed by: INTERNAL MEDICINE

## 2022-11-01 PROCEDURE — P9047 ALBUMIN (HUMAN), 25%, 50ML: HCPCS | Performed by: PHYSICIAN ASSISTANT

## 2022-11-01 PROCEDURE — 74011000250 HC RX REV CODE- 250: Performed by: INTERNAL MEDICINE

## 2022-11-01 PROCEDURE — 74011250636 HC RX REV CODE- 250/636: Performed by: PHYSICIAN ASSISTANT

## 2022-11-01 PROCEDURE — 74011250636 HC RX REV CODE- 250/636: Performed by: INTERNAL MEDICINE

## 2022-11-01 PROCEDURE — 99024 POSTOP FOLLOW-UP VISIT: CPT | Performed by: SURGERY

## 2022-11-01 PROCEDURE — 65270000029 HC RM PRIVATE

## 2022-11-01 PROCEDURE — 99232 SBSQ HOSP IP/OBS MODERATE 35: CPT | Performed by: INTERNAL MEDICINE

## 2022-11-01 PROCEDURE — 74011000250 HC RX REV CODE- 250: Performed by: UROLOGY

## 2022-11-01 PROCEDURE — 74011250637 HC RX REV CODE- 250/637: Performed by: NURSE PRACTITIONER

## 2022-11-01 PROCEDURE — 99024 POSTOP FOLLOW-UP VISIT: CPT | Performed by: UROLOGY

## 2022-11-01 RX ORDER — ALBUMIN HUMAN 250 G/1000ML
25 SOLUTION INTRAVENOUS EVERY 6 HOURS
Status: COMPLETED | OUTPATIENT
Start: 2022-11-01 | End: 2022-11-02

## 2022-11-01 RX ADMIN — DEXTROSE AND SODIUM CHLORIDE 100 ML/HR: 5; 450 INJECTION, SOLUTION INTRAVENOUS at 17:51

## 2022-11-01 RX ADMIN — FLUCONAZOLE 200 MG: 100 TABLET ORAL at 09:58

## 2022-11-01 RX ADMIN — MEROPENEM 1 G: 1 INJECTION, POWDER, FOR SOLUTION INTRAVENOUS at 23:41

## 2022-11-01 RX ADMIN — MEROPENEM 1 G: 1 INJECTION, POWDER, FOR SOLUTION INTRAVENOUS at 17:33

## 2022-11-01 RX ADMIN — MIDODRINE HYDROCHLORIDE 5 MG: 5 TABLET ORAL at 19:51

## 2022-11-01 RX ADMIN — SODIUM CHLORIDE, PRESERVATIVE FREE 10 ML: 5 INJECTION INTRAVENOUS at 05:32

## 2022-11-01 RX ADMIN — OXYCODONE 5 MG: 5 TABLET ORAL at 17:54

## 2022-11-01 RX ADMIN — SODIUM CHLORIDE, PRESERVATIVE FREE 10 ML: 5 INJECTION INTRAVENOUS at 21:13

## 2022-11-01 RX ADMIN — ALBUMIN (HUMAN) 25 G: 0.25 INJECTION, SOLUTION INTRAVENOUS at 17:36

## 2022-11-01 RX ADMIN — ALBUMIN (HUMAN) 25 G: 0.25 INJECTION, SOLUTION INTRAVENOUS at 23:41

## 2022-11-01 RX ADMIN — CROMOLYN SODIUM 1 DROP: 40 SOLUTION/ DROPS OPHTHALMIC at 10:04

## 2022-11-01 RX ADMIN — ACETAMINOPHEN 650 MG: 325 TABLET, FILM COATED ORAL at 04:38

## 2022-11-01 RX ADMIN — DEXTROSE AND SODIUM CHLORIDE 100 ML/HR: 5; 450 INJECTION, SOLUTION INTRAVENOUS at 05:49

## 2022-11-01 RX ADMIN — ALBUMIN (HUMAN) 25 G: 0.25 INJECTION, SOLUTION INTRAVENOUS at 13:02

## 2022-11-01 RX ADMIN — MEROPENEM 1 G: 1 INJECTION, POWDER, FOR SOLUTION INTRAVENOUS at 09:58

## 2022-11-01 NOTE — PROGRESS NOTES
UROLOGY Progress Note          [phone]      Daily Progress Note: 11/1/2022      Subjective: The patient is seen for UROLOGIC follow  up. She presented 10/20/2022 with nausea and vomiting. S/p vaginal wound closure, rectovaginal fistula repair   She is feeling better and wants to eat. She is not yet comfortable with the idea of a diverting colostomy.        Problem List:  Patient Active Problem List   Diagnosis Code    History of blood clots Z86.718    Bladder cancer ([facility] C67.9    Anemia due to acute blood loss D62    Hypercalcemia E83.52    Hydronephrosis of right kidney N13.30    Retained ureteral stent Z96.0    Severe protein-calorie malnutrition (HCC) E43    Acute blood loss anemia D62    Small bowel obstruction (HCC) K56.609    Status post robot-assisted surgical procedure, pelvic exenteration Z98.890    Vaginal cuff cellulitis N76.0         Medications reviewed  Current Facility-Administered Medications   Medication Dose Route Frequency    dextrose 5 % - 0.45% NaCl infusion  100 mL/hr IntraVENous CONTINUOUS    LORazepam (ATIVAN) tablet 1 mg  1 mg Oral ON CALL    megestroL (MEGACE) tablet 20 mg  20 mg Oral TID WITH MEALS    heparin (porcine) 1,000 unit/mL injection 2,000 Units  2,000 Units IntraVENous Rad Multiple    meropenem (MERREM) 1 g in 0.9% sodium chloride (MBP/ADV) 50 mL MBP  1 g IntraVENous Q8H    midodrine (PROAMATINE) tablet 5 mg  5 mg Oral TID PRN    fluconazole (DIFLUCAN) tablet 200 mg  200 mg Oral DAILY    [Held by provider] heparin 25,000 units in D5W 250 ml infusion  500 Units/hr IntraVENous TITRATE    oxyCODONE IR (ROXICODONE) tablet 5 mg  5 mg Oral Q6H PRN    0.9% sodium chloride infusion 250 mL  250 mL IntraVENous PRN    cromolyn (OPTICROM) 4 % ophthalmic solution 1 Drop  1 Drop Both Eyes DAILY    sodium chloride (NS) flush 5-40 mL  5-40 mL IntraVENous Q8H    sodium chloride (NS) flush 5-40 mL  5-40 mL IntraVENous PRN    acetaminophen (TYLENOL) tablet 650 mg  650 mg Oral Q6H PRN    Or    acetaminophen (TYLENOL) suppository 650 mg  650 mg Rectal Q6H PRN    polyethylene glycol (MIRALAX) packet 17 g  17 g Oral DAILY PRN    ondansetron (ZOFRAN ODT) tablet 4 mg  4 mg Oral Q8H PRN    Or    ondansetron (ZOFRAN) injection 4 mg  4 mg IntraVENous Q6H PRN       Review of Systems:   Review of Systems   All other systems reviewed and are negative. Objective:   Physical Exam  Constitutional:       Appearance: Normal appearance. HENT:      Head: Normocephalic and atraumatic. Eyes:      Extraocular Movements: Extraocular movements intact. Pupils: Pupils are equal, round, and reactive to light. Pulmonary:      Effort: Pulmonary effort is normal. No respiratory distress. Breath sounds: No wheezing. Genitourinary:     Comments: RLQ ileal conduit  Vaginal drain  Musculoskeletal:      Cervical back: Normal range of motion. No rigidity. Skin:     General: Skin is warm and dry. Neurological:      Mental Status: She is alert and oriented to person, place, and time. Mental status is at baseline.    Psychiatric:         Mood and Affect: Mood normal.         Behavior: Behavior normal.        Visit Vitals  /66 (BP 1 Location: Left upper arm, BP Patient Position: At rest)   Pulse 66   Temp 97.6 °F (36.4 °C)   Resp 18   Ht 5' 2\" (1.575 m)   Wt 114 lb (51.7 kg)   SpO2 100%   BMI 20.85 kg/m²         Data Review:       Recent Days:  Recent Labs     10/31/22  0804 10/30/22  0818   WBC 4.6 4.7   HGB 7.7* 7.2*   HCT 25.7* 23.9*    216       Recent Labs     10/31/22  0804 10/30/22  0818    141   K 3.8 4.0    110*   CO2 27 26   GLU 67 74   BUN 6 6   CREA 0.50* 0.50*   CA 9.2 9.0   ALB 1.6* 1.6*   TBILI 0.4 0.4   ALT 6* 7*                 Assessment/     Patient Active Problem List   Diagnosis Code    History of blood clots Z86.718    Bladder cancer (HCC) C67.9    Anemia due to acute blood loss D62    Hypercalcemia E83.52    Hydronephrosis of right kidney N13.30 Retained ureteral stent Z96.0    Severe protein-calorie malnutrition (HCC) E43    Acute blood loss anemia D62    Small bowel obstruction (HCC) K56.609    Status post robot-assisted surgical procedure, pelvic exenteration Z98.890    Vaginal cuff cellulitis N76.0   Nutrition seems to be the underlying issue. Albumin levels are decreasing. I am concerned she will not heal without adequate protein and calories. Candida in the urine culture from the conduit. On fluconazole. On Meropenem. ID following. Small rectovaginal fistula was extending on exploration. S/p rectovaginal fistula repair 10/31/22. General surgery to discuss with her niece a diverting colostomy. Bladder cancer status post cystectomy 10/3/2022. pT3 N0 M0. Oncology to discuss adjuvant therapy as outpt. Chronic anemia. Hematology following. DVT with IVC filter.    S/p thrombectomy      Juan Leon MD

## 2022-11-01 NOTE — OP NOTES
This is operative report on Carol Mortensen, patient's YOB: 1965. Date of surgery: October 31, 2022. Surgeon: Enedelia Hein and [facility]. Diagnosis: Vaginal discharge. Intraoperative diagnosis: Rectovaginal fistula with vaginal cuff dehiscence. Procedure:  Rigid sigmoidoscope examination of the rectum. Distal rectal repair with primary suturing technique. Description of procedure: I was contacted intraoperatively for Intra-Op consultation for rectal repair. Patient was noted to have vaginal cuff dehiscence with distal rectal tube vaginal cuff fistula. I scrubbed in and investigated this area. It appears that the there is a significant defect distal rectal area with opening anteriorly about 3 cm defect, defect appears to be about 3 to 5 cm anal verge. Rigid sigmoidoscopy was advanced proximal to the rectal area. Rigid sigmoidoscope was advanced to proximal rectum about 15 cm without difficulty. Anterior defect of the distal rectum was clearly visualized. And this was repaired with a 3-0 Vicryl sutures interrupted fashion followed by 3-0 silk suture was repaired for serosal repair. After the repair was complete, air was insufflated and confirmed no air leak. Followed by vaginal cuff was repaired per Dr. Efraín Vallejo with a drain in place. Patient is recommended to have diverting loop colostomy within next 24 to 48 hours. I will talk to an family about this after patient is extubated.

## 2022-11-01 NOTE — PROGRESS NOTES
Problem: Pain  Goal: *Control of Pain  Outcome: Progressing Towards Goal     Problem: Falls - Risk of  Goal: *Absence of Falls  Description: Document Iris Fall Risk and appropriate interventions in the flowsheet.   Outcome: Progressing Towards Goal  Note: Fall Risk Interventions:  Mobility Interventions: Bed/chair exit alarm         Medication Interventions: Bed/chair exit alarm    Elimination Interventions: Call light in reach, Patient to call for help with toileting needs    History of Falls Interventions: Bed/chair exit alarm         Problem: Patient Education: Go to Patient Education Activity  Goal: Patient/Family Education  Outcome: Progressing Towards Goal

## 2022-11-01 NOTE — PROGRESS NOTES
PT attempted (note entered late) however pt awaiting imaging and requested to hold PT at this time. PT to continue to follow for evaluation as available.

## 2022-11-01 NOTE — PROGRESS NOTES
Bedside shift change report given to Tali (oncoming nurse) by Jason Mancia (offgoing nurse). Report included the following information SBAR, Kardex, MAR, Accordion, and Recent Results.

## 2022-11-01 NOTE — PROGRESS NOTES
Infectious Disease Progress Note           Subjective:   Pt seen and examined at bedside. Stable, denies new complaints, on clear liquid diet. Rectovaginal fistula confirmed after Rigid sigmoidoscope on 10/31, plans for diverting colostomy by Dr Bill Ayala   Objective:   Physical Exam:     Visit Vitals  BP (!) 113/58 (BP 1 Location: Left upper arm, BP Patient Position: At rest)   Pulse 64   Temp 98.4 °F (36.9 °C)   Resp 16   Ht 5' 2\" (1.575 m)   Wt 114 lb (51.7 kg)   LMP  (LMP Unknown)   SpO2 100%   BMI 20.85 kg/m²      O2 Device: None    Temp (24hrs), Av.7 °F (36.5 °C), Min:97.4 °F (36.3 °C), Max:98.4 °F (36.9 °C)    701 - 1900  In: -   Out: 80 [Urine:80]   10/30 1901 -  0700  In: 1618.3 [P.O.:320; I.V.:1298.3]  Out: 9710 [Urine:3000; Drains:20]    General: NAD, NAD, AAO x 4  HEENT: SIMBA, Moist mucosa   Lungs: CTA b/l, decreased at the bases   Heart: S1S2+, RRR, no murmur  Abdo:  Soft, NT, ND, +BS, healed incisional wounds, + urostomy   : no abarca cath   Exts: B/l leg edema   Skin: healed abdominal incisional wounds     Data Review:       Recent Days:  Recent Labs     10/31/22  0804 10/30/22  0818   WBC 4.6 4.7   HGB 7.7* 7.2*   HCT 25.7* 23.9*    216       Recent Labs     10/31/22  0804 10/30/22  0818   BUN 6 6   CREA 0.50* 0.50*       Lab Results   Component Value Date/Time    C-Reactive protein 15.00 (H) 10/25/2022 05:16 AM       Microbiology     Results       Procedure Component Value Units Date/Time    COVID-19 RAPID TEST [[id number]] Collected: 10/20/22 1435    Order Status: Completed Specimen: Nasopharyngeal Updated: 10/20/22 1503     COVID-19 rapid test Not Detected        Comment: Rapid Abbott ID Now   Rapid NAAT:  The specimen is NEGATIVE for SARS-CoV-2, the novel coronavirus associated with COVID-19.    Negative results should be treated as presumptive and, if inconsistent with clinical signs and symptoms or necessary for patient management, should be tested with an alternative molecular assay. Negative results do not preclude SARS-CoV-2 infection and should not be used as the sole basis for patient management decisions. This test has been authorized by the FDA under   an Emergency Use Authorization (EUA) for use by authorized laboratories. Fact sheet for Healthcare Providers: ConventionUpdate.co.nz Fact sheet for Patients: ConventionUpdate.co.nz   Methodology: Isothermal Nucleic Acid Amplification         MRSA SCREEN - PCR (NASAL) [[id number]] Collected: 10/20/22 1435    Order Status: Completed Specimen: Swab Updated: 10/20/22 1636     MRSA by PCR, Nasal Not Detected       MRSA SCREEN - PCR (NASAL) [[id number]] Collected: 10/20/22 1340    Order Status: Canceled Specimen: Swab     CULTURE, Michelle Pates STAIN [[id number]] Collected: 10/20/22 1340    Order Status: Completed Specimen: Wound from Cervical/vaginal swab Updated: 10/23/22 0741     Special Requests: No Special Requests        GRAM STAIN Rare WBCs seen               1+ apparent Gram Positive Cocci in pairs                  Few apparent Gram Negative Rods           Culture result: Light  Mixed skin heriberto isolated       CULTURE, BODY FLUID Walker Face STAIN [[id number]]     Order Status: Canceled Specimen: Body Fluid from Uterine Cul/De/Sac Fluid     CULTURE, URINE [[id number]]  (Abnormal) Collected: 10/20/22 0249    Order Status: Completed Specimen: Urine Updated: 10/23/22 1001     Special Requests: --        No Special Requests  Reflexed from [id number]       Plessis Count --        >100,000  colonies/ml       Culture result: Candida ciferrii              Diagnostics   CXR Results  (Last 48 hours)      None          Assessment/Plan   UTI, complicated by ileal conduit. Candida Ciferrii isolated from Cx         On Fluconazole day # 13/14 d/c after last dose on 10/31        Routine labs in the morning      2.  Vaginal cuff cellulitis/suspected abscess on CT, Vaginal repaired by Dr Efraín Vallejo on 10/31      GPC in pairs and GNR noted on Gram stain of cervical specimen, Cx neg       Remains afebrile w a normal WBC on routine labs. On Meropenem day # 9/14     3. Rectovaginal fistula, confirmed after Rigid sigmoidoscope on 10/31      Pt is scheduled for a diverting colostomy by Dr Bill Ayala     4. Bladder Ca w local invasion: S/p resection on 10/03     5. SBO on admission,resolved w conservative mgt      6.  B/L LE DVT extensive, IVC filter in situ, S/p thrombectomy by IR on 10/27    Reyes Nottingham, MD    11/1/2022

## 2022-11-01 NOTE — PROGRESS NOTES
PT attempted earlier in morning, however pt deferred mobility at this time, and asked if therapist could come back 11/2/22. PT to continue to follow as available.

## 2022-11-01 NOTE — PROGRESS NOTES
PROGRESS NOTE      Chief Complaints:  Patient appears to be very comfortable. HPI and  Objective: Without distress denies chest pain shortness of breath. Review of Systems:  Rest of review system negative, personally reviewed. EXAM:  Visit Vitals  /66 (BP 1 Location: Left upper arm, BP Patient Position: At rest)   Pulse 66   Temp 97.6 °F (36.4 °C)   Resp 18   Ht 5' 2\" (1.575 m)   Wt 114 lb (51.7 kg)   LMP  (LMP Unknown)   SpO2 100%   BMI 20.85 kg/m²       Patient is awake and alert  Head and neck atraumatic, normocephalic. ENT: No hoarse voice  Cardiac system regular rate rhythm. Pulmonary no audible wheeze  Chest wall excursion normal with respiration cycle  Abdomen is soft not particularly distended. Neurologically nonfocal.  Skin is warm and moist.  Psychosocial: Cooperative. Vascular examination as previously noted no changes. Recent Results (from the past 24 hour(s))   CBC WITH AUTOMATED DIFF    Collection Time: 10/31/22  8:04 AM   Result Value Ref Range    WBC 4.6 3.6 - 11.0 K/uL    RBC 2.90 (L) 3.80 - 5.20 M/uL    HGB 7.7 (L) 11.5 - 16.0 g/dL    HCT 25.7 (L) 35.0 - 47.0 %    MCV 88.6 80.0 - 99.0 FL    MCH 26.6 26.0 - 34.0 PG    MCHC 30.0 30.0 - 36.5 g/dL    RDW 17.7 (H) 11.5 - 14.5 %    PLATELET 318 688 - 262 K/uL    MPV 9.5 8.9 - 12.9 FL    NRBC 0.0 0.0  WBC    ABSOLUTE NRBC 0.00 0.00 - 0.01 K/uL    NEUTROPHILS 51 32 - 75 %    BAND NEUTROPHILS 5 0 - 6 %    LYMPHOCYTES 38 12 - 49 %    MONOCYTES 4 (L) 5 - 13 %    EOSINOPHILS 0 0 - 7 %    BASOPHILS 1 0 - 1 %    MYELOCYTES 1 (H) 0 %    IMMATURE GRANULOCYTES 0 %    ABS. NEUTROPHILS 2.6 1.8 - 8.0 K/UL    ABS. LYMPHOCYTES 1.7 0.8 - 3.5 K/UL    ABS. MONOCYTES 0.2 0.0 - 1.0 K/UL    ABS. EOSINOPHILS 0.0 0.0 - 0.4 K/UL    ABS. BASOPHILS 0.0 0.0 - 0.1 K/UL    ABS. IMM.  GRANS. 0.0 K/UL    DF Manual      RBC COMMENTS Normocytic, Normochromic     METABOLIC PANEL, COMPREHENSIVE    Collection Time: 10/31/22  8:04 AM   Result Value Ref Range Sodium 140 136 - 145 mmol/L    Potassium 3.8 3.5 - 5.1 mmol/L    Chloride 107 97 - 108 mmol/L    CO2 27 21 - 32 mmol/L    Anion gap 6 5 - 15 mmol/L    Glucose 67 65 - 100 mg/dL    BUN 6 6 - 20 mg/dL    Creatinine 0.50 (L) 0.55 - 1.02 mg/dL    BUN/Creatinine ratio 12 12 - 20      eGFR >60 >60 ml/min/1.73m2    Calcium 9.2 8.5 - 10.1 mg/dL    Bilirubin, total 0.4 0.2 - 1.0 mg/dL    AST (SGOT) 26 15 - 37 U/L    ALT (SGPT) 6 (L) 12 - 78 U/L    Alk. phosphatase 89 45 - 117 U/L    Protein, total 8.4 (H) 6.4 - 8.2 g/dL    Albumin 1.6 (L) 3.5 - 5.0 g/dL    Globulin 6.8 (H) 2.0 - 4.0 g/dL    A-G Ratio 0.2 (L) 1.1 - 2.2         ASSESSMENT:   Patient is 61 y.o. with diagnosis of : Principal Problem:    Small bowel obstruction ([address] (10/20/2022)    Active Problems:    Acute blood loss anemia (10/20/2022)      Status post robot-assisted surgical procedure, pelvic exenteration (10/20/2022)      Vaginal cuff cellulitis (10/20/2022)        PLAN:                 Patient is a postop day #1 rectal repair. Patient is recommended diverting loop colostomy. I talked to the patient about laparoscopic sigmoid loop colostomy placement and, rational for the procedures risks benefits. And patient is unsure about this. Patient asked me to talk to her niece. I will contact family and discuss about this recommendation   Please have patient keep NPO.

## 2022-11-01 NOTE — PROGRESS NOTES
Yesterday's OR findings noted. RV fistula diagnosed. Will defer further management per Dr. Bjorn Reed. Will sign off.   Thank you

## 2022-11-02 ENCOUNTER — ANESTHESIA EVENT (OUTPATIENT)
Dept: SURGERY | Age: 57
DRG: 231 | End: 2022-11-02
Payer: MEDICAID

## 2022-11-02 ENCOUNTER — ANESTHESIA (OUTPATIENT)
Dept: SURGERY | Age: 57
DRG: 231 | End: 2022-11-02
Payer: MEDICAID

## 2022-11-02 ENCOUNTER — APPOINTMENT (OUTPATIENT)
Dept: GENERAL RADIOLOGY | Age: 57
DRG: 231 | End: 2022-11-02
Attending: SURGERY
Payer: MEDICAID

## 2022-11-02 LAB
ALBUMIN SERPL-MCNC: 3.1 G/DL (ref 3.5–5)
ALBUMIN SERPL-MCNC: 3.3 G/DL (ref 3.5–5)
ALBUMIN/GLOB SERPL: 0.7 {RATIO} (ref 1.1–2.2)
ALBUMIN/GLOB SERPL: 0.8 {RATIO} (ref 1.1–2.2)
ALP SERPL-CCNC: 52 U/L (ref 45–117)
ALP SERPL-CCNC: 56 U/L (ref 45–117)
ALT SERPL-CCNC: 10 U/L (ref 12–78)
ALT SERPL-CCNC: <6 U/L (ref 12–78)
ANION GAP SERPL CALC-SCNC: 4 MMOL/L (ref 5–15)
ANION GAP SERPL CALC-SCNC: 6 MMOL/L (ref 5–15)
APTT PPP: 27.9 SEC (ref 21.2–34.1)
AST SERPL W P-5'-P-CCNC: 12 U/L (ref 15–37)
AST SERPL W P-5'-P-CCNC: 15 U/L (ref 15–37)
BASOPHILS # BLD: 0 K/UL (ref 0–0.1)
BASOPHILS NFR BLD: 0 % (ref 0–1)
BILIRUB SERPL-MCNC: 0.7 MG/DL (ref 0.2–1)
BILIRUB SERPL-MCNC: 0.8 MG/DL (ref 0.2–1)
BUN SERPL-MCNC: 7 MG/DL (ref 6–20)
BUN SERPL-MCNC: 7 MG/DL (ref 6–20)
BUN/CREAT SERPL: 16 (ref 12–20)
BUN/CREAT SERPL: 17 (ref 12–20)
CA-I BLD-MCNC: 8.8 MG/DL (ref 8.5–10.1)
CA-I BLD-MCNC: 9.3 MG/DL (ref 8.5–10.1)
CHLORIDE SERPL-SCNC: 104 MMOL/L (ref 97–108)
CHLORIDE SERPL-SCNC: 107 MMOL/L (ref 97–108)
CO2 SERPL-SCNC: 27 MMOL/L (ref 21–32)
CO2 SERPL-SCNC: 27 MMOL/L (ref 21–32)
CREAT SERPL-MCNC: 0.41 MG/DL (ref 0.55–1.02)
CREAT SERPL-MCNC: 0.43 MG/DL (ref 0.55–1.02)
DIFFERENTIAL METHOD BLD: ABNORMAL
EOSINOPHIL # BLD: 0 K/UL (ref 0–0.4)
EOSINOPHIL NFR BLD: 0 % (ref 0–7)
ERYTHROCYTE [DISTWIDTH] IN BLOOD BY AUTOMATED COUNT: 17.8 % (ref 11.5–14.5)
GLOBULIN SER CALC-MCNC: 4.3 G/DL (ref 2–4)
GLOBULIN SER CALC-MCNC: 4.4 G/DL (ref 2–4)
GLUCOSE SERPL-MCNC: 80 MG/DL (ref 65–100)
GLUCOSE SERPL-MCNC: 81 MG/DL (ref 65–100)
HCT VFR BLD AUTO: 18.6 % (ref 35–47)
HGB BLD-MCNC: 5.7 G/DL (ref 11.5–16)
IMM GRANULOCYTES # BLD AUTO: 0.5 K/UL (ref 0–0.04)
IMM GRANULOCYTES NFR BLD AUTO: 7 % (ref 0–0.5)
INR PPP: 1.3 (ref 0.9–1.1)
LYMPHOCYTES # BLD: 2.1 K/UL (ref 0.8–3.5)
LYMPHOCYTES NFR BLD: 30 % (ref 12–49)
MCH RBC QN AUTO: 27.3 PG (ref 26–34)
MCHC RBC AUTO-ENTMCNC: 30.6 G/DL (ref 30–36.5)
MCV RBC AUTO: 89 FL (ref 80–99)
MONOCYTES # BLD: 0.3 K/UL (ref 0–1)
MONOCYTES NFR BLD: 4 % (ref 5–13)
NEUTS SEG # BLD: 4.1 K/UL (ref 1.8–8)
NEUTS SEG NFR BLD: 59 % (ref 32–75)
NRBC # BLD: 0 K/UL (ref 0–0.01)
NRBC BLD-RTO: 0 PER 100 WBC
PLATELET # BLD AUTO: 190 K/UL (ref 150–400)
PMV BLD AUTO: 9.6 FL (ref 8.9–12.9)
POTASSIUM SERPL-SCNC: 3.1 MMOL/L (ref 3.5–5.1)
POTASSIUM SERPL-SCNC: 3.1 MMOL/L (ref 3.5–5.1)
PROT SERPL-MCNC: 7.5 G/DL (ref 6.4–8.2)
PROT SERPL-MCNC: 7.6 G/DL (ref 6.4–8.2)
PROTHROMBIN TIME: 15.9 SEC (ref 11.9–14.6)
RBC # BLD AUTO: 2.09 M/UL (ref 3.8–5.2)
SODIUM SERPL-SCNC: 137 MMOL/L (ref 136–145)
SODIUM SERPL-SCNC: 138 MMOL/L (ref 136–145)
THERAPEUTIC RANGE,PTTT: NORMAL SEC (ref 82–109)
WBC # BLD AUTO: 7 K/UL (ref 3.6–11)

## 2022-11-02 PROCEDURE — 30233N1 TRANSFUSION OF NONAUTOLOGOUS RED BLOOD CELLS INTO PERIPHERAL VEIN, PERCUTANEOUS APPROACH: ICD-10-PCS | Performed by: SURGERY

## 2022-11-02 PROCEDURE — 74011000250 HC RX REV CODE- 250: Performed by: INTERNAL MEDICINE

## 2022-11-02 PROCEDURE — 74011250636 HC RX REV CODE- 250/636: Performed by: PHYSICIAN ASSISTANT

## 2022-11-02 PROCEDURE — 74011250636 HC RX REV CODE- 250/636: Performed by: NURSE ANESTHETIST, CERTIFIED REGISTERED

## 2022-11-02 PROCEDURE — 74011250636 HC RX REV CODE- 250/636: Performed by: SURGERY

## 2022-11-02 PROCEDURE — 2709999900 HC NON-CHARGEABLE SUPPLY: Performed by: SURGERY

## 2022-11-02 PROCEDURE — 80053 COMPREHEN METABOLIC PANEL: CPT

## 2022-11-02 PROCEDURE — 74011250637 HC RX REV CODE- 250/637: Performed by: INTERNAL MEDICINE

## 2022-11-02 PROCEDURE — 74011000250 HC RX REV CODE- 250: Performed by: UROLOGY

## 2022-11-02 PROCEDURE — 74011250636 HC RX REV CODE- 250/636: Performed by: INTERNAL MEDICINE

## 2022-11-02 PROCEDURE — 77030008756 HC TU IRR SUC STRY -B: Performed by: SURGERY

## 2022-11-02 PROCEDURE — 99232 SBSQ HOSP IP/OBS MODERATE 35: CPT | Performed by: INTERNAL MEDICINE

## 2022-11-02 PROCEDURE — 36415 COLL VENOUS BLD VENIPUNCTURE: CPT

## 2022-11-02 PROCEDURE — 74011250637 HC RX REV CODE- 250/637: Performed by: PHYSICIAN ASSISTANT

## 2022-11-02 PROCEDURE — 85610 PROTHROMBIN TIME: CPT

## 2022-11-02 PROCEDURE — 77030008606 HC TRCR ENDOSC KII AMR -B: Performed by: SURGERY

## 2022-11-02 PROCEDURE — 77030012799 HC TRCR GELPRT BLN AMR -B: Performed by: SURGERY

## 2022-11-02 PROCEDURE — 76060000034 HC ANESTHESIA 1.5 TO 2 HR: Performed by: SURGERY

## 2022-11-02 PROCEDURE — 74011000250 HC RX REV CODE- 250: Performed by: SURGERY

## 2022-11-02 PROCEDURE — 44188 LAP COLOSTOMY: CPT | Performed by: SURGERY

## 2022-11-02 PROCEDURE — 76010000153 HC OR TIME 1.5 TO 2 HR: Performed by: SURGERY

## 2022-11-02 PROCEDURE — 86923 COMPATIBILITY TEST ELECTRIC: CPT

## 2022-11-02 PROCEDURE — P9016 RBC LEUKOCYTES REDUCED: HCPCS

## 2022-11-02 PROCEDURE — 77030009403 HC ELECTRD ENDO MEGA -B: Performed by: SURGERY

## 2022-11-02 PROCEDURE — 77030002916 HC SUT ETHLN J&J -A: Performed by: SURGERY

## 2022-11-02 PROCEDURE — 77030013079 HC BLNKT BAIR HGGR 3M -A: Performed by: ANESTHESIOLOGY

## 2022-11-02 PROCEDURE — 77030002966 HC SUT PDS J&J -A: Performed by: SURGERY

## 2022-11-02 PROCEDURE — 77030026438 HC STYL ET INTUB CARD -A: Performed by: ANESTHESIOLOGY

## 2022-11-02 PROCEDURE — 77030040503 HC DRN WND PENRS MDII -A: Performed by: SURGERY

## 2022-11-02 PROCEDURE — 85025 COMPLETE CBC W/AUTO DIFF WBC: CPT

## 2022-11-02 PROCEDURE — 71045 X-RAY EXAM CHEST 1 VIEW: CPT

## 2022-11-02 PROCEDURE — 77030018842 HC SOL IRR SOD CL 9% BAXT -A: Performed by: SURGERY

## 2022-11-02 PROCEDURE — 86900 BLOOD TYPING SEROLOGIC ABO: CPT

## 2022-11-02 PROCEDURE — 0DNN4ZZ RELEASE SIGMOID COLON, PERCUTANEOUS ENDOSCOPIC APPROACH: ICD-10-PCS | Performed by: SURGERY

## 2022-11-02 PROCEDURE — 77030040361 HC SLV COMPR DVT MDII -B: Performed by: SURGERY

## 2022-11-02 PROCEDURE — 77030019908 HC STETH ESOPH SIMS -A: Performed by: ANESTHESIOLOGY

## 2022-11-02 PROCEDURE — 77030018813 HC SCIS LAPSCP EPIX DISP AMR -B: Performed by: SURGERY

## 2022-11-02 PROCEDURE — 77030003029 HC SUT VCRL J&J -B: Performed by: SURGERY

## 2022-11-02 PROCEDURE — 99232 SBSQ HOSP IP/OBS MODERATE 35: CPT | Performed by: SURGERY

## 2022-11-02 PROCEDURE — 74011000250 HC RX REV CODE- 250: Performed by: NURSE ANESTHETIST, CERTIFIED REGISTERED

## 2022-11-02 PROCEDURE — 85730 THROMBOPLASTIN TIME PARTIAL: CPT

## 2022-11-02 PROCEDURE — 77030002996 HC SUT SLK J&J -A: Performed by: SURGERY

## 2022-11-02 PROCEDURE — 77030010507 HC ADH SKN DERMBND J&J -B: Performed by: SURGERY

## 2022-11-02 PROCEDURE — 77030031139 HC SUT VCRL2 J&J -A: Performed by: SURGERY

## 2022-11-02 PROCEDURE — 77030005513 HC CATH URETH FOL11 MDII -B: Performed by: SURGERY

## 2022-11-02 PROCEDURE — 77030018684: Performed by: SURGERY

## 2022-11-02 PROCEDURE — 76210000006 HC OR PH I REC 0.5 TO 1 HR: Performed by: SURGERY

## 2022-11-02 PROCEDURE — 74011000258 HC RX REV CODE- 258: Performed by: INTERNAL MEDICINE

## 2022-11-02 PROCEDURE — 77030008684 HC TU ET CUF COVD -B: Performed by: ANESTHESIOLOGY

## 2022-11-02 PROCEDURE — 0D1N474 BYPASS SIGMOID COLON TO CUTANEOUS WITH AUTOLOGOUS TISSUE SUBSTITUTE, PERCUTANEOUS ENDOSCOPIC APPROACH: ICD-10-PCS | Performed by: SURGERY

## 2022-11-02 PROCEDURE — 77030016151 HC PROTCTR LNS DFOG COVD -B: Performed by: SURGERY

## 2022-11-02 PROCEDURE — P9047 ALBUMIN (HUMAN), 25%, 50ML: HCPCS | Performed by: PHYSICIAN ASSISTANT

## 2022-11-02 PROCEDURE — 77030019905 HC CATH URETH INTMIT MDII -A: Performed by: SURGERY

## 2022-11-02 PROCEDURE — 65270000029 HC RM PRIVATE

## 2022-11-02 PROCEDURE — 74011250636 HC RX REV CODE- 250/636: Performed by: ANESTHESIOLOGY

## 2022-11-02 PROCEDURE — 77030002933 HC SUT MCRYL J&J -A: Performed by: SURGERY

## 2022-11-02 RX ORDER — HYDROMORPHONE HYDROCHLORIDE 1 MG/ML
0.5 INJECTION, SOLUTION INTRAMUSCULAR; INTRAVENOUS; SUBCUTANEOUS
Status: DISCONTINUED | OUTPATIENT
Start: 2022-11-02 | End: 2022-11-02 | Stop reason: HOSPADM

## 2022-11-02 RX ORDER — POTASSIUM CHLORIDE 7.45 MG/ML
10 INJECTION INTRAVENOUS ONCE
Status: COMPLETED | OUTPATIENT
Start: 2022-11-02 | End: 2022-11-03

## 2022-11-02 RX ORDER — BUPIVACAINE HYDROCHLORIDE 2.5 MG/ML
INJECTION, SOLUTION EPIDURAL; INFILTRATION; INTRACAUDAL AS NEEDED
Status: DISCONTINUED | OUTPATIENT
Start: 2022-11-02 | End: 2022-11-02 | Stop reason: HOSPADM

## 2022-11-02 RX ORDER — LIDOCAINE HYDROCHLORIDE 20 MG/ML
INJECTION, SOLUTION EPIDURAL; INFILTRATION; INTRACAUDAL; PERINEURAL AS NEEDED
Status: DISCONTINUED | OUTPATIENT
Start: 2022-11-02 | End: 2022-11-02 | Stop reason: HOSPADM

## 2022-11-02 RX ORDER — ONDANSETRON 4 MG/1
4 TABLET, ORALLY DISINTEGRATING ORAL
Status: DISCONTINUED | OUTPATIENT
Start: 2022-11-02 | End: 2022-11-03 | Stop reason: SDUPTHER

## 2022-11-02 RX ORDER — CEFAZOLIN SODIUM 1 G/3ML
INJECTION, POWDER, FOR SOLUTION INTRAMUSCULAR; INTRAVENOUS AS NEEDED
Status: DISCONTINUED | OUTPATIENT
Start: 2022-11-02 | End: 2022-11-02 | Stop reason: HOSPADM

## 2022-11-02 RX ORDER — SODIUM CHLORIDE 9 MG/ML
250 INJECTION, SOLUTION INTRAVENOUS AS NEEDED
Status: DISCONTINUED | OUTPATIENT
Start: 2022-11-02 | End: 2022-11-02

## 2022-11-02 RX ORDER — SODIUM CHLORIDE 0.9 % (FLUSH) 0.9 %
5-40 SYRINGE (ML) INJECTION AS NEEDED
Status: DISCONTINUED | OUTPATIENT
Start: 2022-11-02 | End: 2022-11-11 | Stop reason: HOSPADM

## 2022-11-02 RX ORDER — NEOSTIGMINE METHYLSULFATE 5 MG/5 ML
SYRINGE (ML) INTRAVENOUS AS NEEDED
Status: DISCONTINUED | OUTPATIENT
Start: 2022-11-02 | End: 2022-11-02 | Stop reason: HOSPADM

## 2022-11-02 RX ORDER — HYDROMORPHONE HYDROCHLORIDE 1 MG/ML
1 INJECTION, SOLUTION INTRAMUSCULAR; INTRAVENOUS; SUBCUTANEOUS
Status: DISCONTINUED | OUTPATIENT
Start: 2022-11-02 | End: 2022-11-07

## 2022-11-02 RX ORDER — FENTANYL CITRATE 50 UG/ML
50 INJECTION, SOLUTION INTRAMUSCULAR; INTRAVENOUS
Status: DISCONTINUED | OUTPATIENT
Start: 2022-11-02 | End: 2022-11-02 | Stop reason: HOSPADM

## 2022-11-02 RX ORDER — HYDROMORPHONE HYDROCHLORIDE 1 MG/ML
0.25 INJECTION, SOLUTION INTRAMUSCULAR; INTRAVENOUS; SUBCUTANEOUS
Status: DISCONTINUED | OUTPATIENT
Start: 2022-11-02 | End: 2022-11-07

## 2022-11-02 RX ORDER — DEXAMETHASONE SODIUM PHOSPHATE 4 MG/ML
INJECTION, SOLUTION INTRA-ARTICULAR; INTRALESIONAL; INTRAMUSCULAR; INTRAVENOUS; SOFT TISSUE AS NEEDED
Status: DISCONTINUED | OUTPATIENT
Start: 2022-11-02 | End: 2022-11-02 | Stop reason: HOSPADM

## 2022-11-02 RX ORDER — ONDANSETRON 2 MG/ML
4 INJECTION INTRAMUSCULAR; INTRAVENOUS AS NEEDED
Status: DISCONTINUED | OUTPATIENT
Start: 2022-11-02 | End: 2022-11-02 | Stop reason: HOSPADM

## 2022-11-02 RX ORDER — PROPOFOL 10 MG/ML
INJECTION, EMULSION INTRAVENOUS AS NEEDED
Status: DISCONTINUED | OUTPATIENT
Start: 2022-11-02 | End: 2022-11-02 | Stop reason: HOSPADM

## 2022-11-02 RX ORDER — SODIUM CHLORIDE 0.9 % (FLUSH) 0.9 %
5-40 SYRINGE (ML) INJECTION AS NEEDED
Status: DISCONTINUED | OUTPATIENT
Start: 2022-11-02 | End: 2022-11-02 | Stop reason: HOSPADM

## 2022-11-02 RX ORDER — ROCURONIUM BROMIDE 10 MG/ML
INJECTION, SOLUTION INTRAVENOUS AS NEEDED
Status: DISCONTINUED | OUTPATIENT
Start: 2022-11-02 | End: 2022-11-02 | Stop reason: HOSPADM

## 2022-11-02 RX ORDER — ONDANSETRON 2 MG/ML
INJECTION INTRAMUSCULAR; INTRAVENOUS AS NEEDED
Status: DISCONTINUED | OUTPATIENT
Start: 2022-11-02 | End: 2022-11-02 | Stop reason: HOSPADM

## 2022-11-02 RX ORDER — FENTANYL CITRATE 50 UG/ML
INJECTION, SOLUTION INTRAMUSCULAR; INTRAVENOUS AS NEEDED
Status: DISCONTINUED | OUTPATIENT
Start: 2022-11-02 | End: 2022-11-02 | Stop reason: HOSPADM

## 2022-11-02 RX ORDER — GLYCOPYRROLATE 0.2 MG/ML
INJECTION INTRAMUSCULAR; INTRAVENOUS AS NEEDED
Status: DISCONTINUED | OUTPATIENT
Start: 2022-11-02 | End: 2022-11-02 | Stop reason: HOSPADM

## 2022-11-02 RX ORDER — DIPHENHYDRAMINE HYDROCHLORIDE 50 MG/ML
12.5 INJECTION, SOLUTION INTRAMUSCULAR; INTRAVENOUS AS NEEDED
Status: DISCONTINUED | OUTPATIENT
Start: 2022-11-02 | End: 2022-11-02 | Stop reason: HOSPADM

## 2022-11-02 RX ORDER — SODIUM CHLORIDE 9 MG/ML
125 INJECTION, SOLUTION INTRAVENOUS CONTINUOUS
Status: DISCONTINUED | OUTPATIENT
Start: 2022-11-02 | End: 2022-11-07

## 2022-11-02 RX ORDER — ALBUTEROL SULFATE 0.83 MG/ML
2.5 SOLUTION RESPIRATORY (INHALATION) AS NEEDED
Status: DISCONTINUED | OUTPATIENT
Start: 2022-11-02 | End: 2022-11-02 | Stop reason: HOSPADM

## 2022-11-02 RX ORDER — ENOXAPARIN SODIUM 100 MG/ML
40 INJECTION SUBCUTANEOUS DAILY
Status: DISCONTINUED | OUTPATIENT
Start: 2022-11-03 | End: 2022-11-07

## 2022-11-02 RX ORDER — SODIUM CHLORIDE 0.9 % (FLUSH) 0.9 %
5-40 SYRINGE (ML) INJECTION EVERY 8 HOURS
Status: DISCONTINUED | OUTPATIENT
Start: 2022-11-02 | End: 2022-11-11 | Stop reason: HOSPADM

## 2022-11-02 RX ORDER — SODIUM CHLORIDE 0.9 % (FLUSH) 0.9 %
5-40 SYRINGE (ML) INJECTION EVERY 8 HOURS
Status: DISCONTINUED | OUTPATIENT
Start: 2022-11-02 | End: 2022-11-02 | Stop reason: HOSPADM

## 2022-11-02 RX ORDER — ONDANSETRON 2 MG/ML
4 INJECTION INTRAMUSCULAR; INTRAVENOUS
Status: DISCONTINUED | OUTPATIENT
Start: 2022-11-02 | End: 2022-11-03 | Stop reason: SDUPTHER

## 2022-11-02 RX ORDER — OXYCODONE AND ACETAMINOPHEN 5; 325 MG/1; MG/1
2 TABLET ORAL AS NEEDED
Status: DISCONTINUED | OUTPATIENT
Start: 2022-11-02 | End: 2022-11-02 | Stop reason: HOSPADM

## 2022-11-02 RX ORDER — SODIUM CHLORIDE 9 MG/ML
INJECTION, SOLUTION INTRAVENOUS
Status: DISCONTINUED | OUTPATIENT
Start: 2022-11-02 | End: 2022-11-02 | Stop reason: HOSPADM

## 2022-11-02 RX ORDER — POTASSIUM CHLORIDE 7.45 MG/ML
10 INJECTION INTRAVENOUS
Status: DISPENSED | OUTPATIENT
Start: 2022-11-02 | End: 2022-11-02

## 2022-11-02 RX ADMIN — MEROPENEM 1 G: 1 INJECTION, POWDER, FOR SOLUTION INTRAVENOUS at 08:36

## 2022-11-02 RX ADMIN — MEGESTROL ACETATE 20 MG: 20 TABLET ORAL at 08:33

## 2022-11-02 RX ADMIN — FENTANYL CITRATE 50 MCG: 0.05 INJECTION, SOLUTION INTRAMUSCULAR; INTRAVENOUS at 18:54

## 2022-11-02 RX ADMIN — DEXAMETHASONE SODIUM PHOSPHATE 4 MG: 4 INJECTION, SOLUTION INTRA-ARTICULAR; INTRALESIONAL; INTRAMUSCULAR; INTRAVENOUS; SOFT TISSUE at 16:43

## 2022-11-02 RX ADMIN — HYDROMORPHONE HYDROCHLORIDE 1 MG: 1 INJECTION, SOLUTION INTRAMUSCULAR; INTRAVENOUS; SUBCUTANEOUS at 20:03

## 2022-11-02 RX ADMIN — POTASSIUM CHLORIDE 10 MEQ: 7.46 INJECTION, SOLUTION INTRAVENOUS at 21:25

## 2022-11-02 RX ADMIN — SODIUM CHLORIDE: 9 INJECTION, SOLUTION INTRAVENOUS at 16:25

## 2022-11-02 RX ADMIN — PROPOFOL 20 MG: 10 INJECTION, EMULSION INTRAVENOUS at 17:12

## 2022-11-02 RX ADMIN — GLYCOPYRROLATE 0.4 MG: 0.2 INJECTION INTRAMUSCULAR; INTRAVENOUS at 17:52

## 2022-11-02 RX ADMIN — Medication 3 MG: at 17:52

## 2022-11-02 RX ADMIN — SODIUM CHLORIDE, PRESERVATIVE FREE 10 ML: 5 INJECTION INTRAVENOUS at 05:03

## 2022-11-02 RX ADMIN — ONDANSETRON 4 MG: 2 INJECTION INTRAMUSCULAR; INTRAVENOUS at 18:52

## 2022-11-02 RX ADMIN — ALBUMIN (HUMAN) 25 G: 0.25 INJECTION, SOLUTION INTRAVENOUS at 05:02

## 2022-11-02 RX ADMIN — POTASSIUM CHLORIDE 10 MEQ: 7.46 INJECTION, SOLUTION INTRAVENOUS at 19:58

## 2022-11-02 RX ADMIN — LIDOCAINE HYDROCHLORIDE 80 MG: 20 INJECTION, SOLUTION EPIDURAL; INFILTRATION; INTRACAUDAL; PERINEURAL at 16:43

## 2022-11-02 RX ADMIN — ROCURONIUM BROMIDE 30 MG: 10 INJECTION, SOLUTION INTRAVENOUS at 16:43

## 2022-11-02 RX ADMIN — SODIUM CHLORIDE 125 ML/HR: 9 INJECTION, SOLUTION INTRAVENOUS at 19:57

## 2022-11-02 RX ADMIN — ROCURONIUM BROMIDE 10 MG: 10 INJECTION, SOLUTION INTRAVENOUS at 17:15

## 2022-11-02 RX ADMIN — FLUCONAZOLE 200 MG: 100 TABLET ORAL at 08:32

## 2022-11-02 RX ADMIN — MEROPENEM 1 G: 1 INJECTION, POWDER, FOR SOLUTION INTRAVENOUS at 23:09

## 2022-11-02 RX ADMIN — FENTANYL CITRATE 50 MCG: 0.05 INJECTION, SOLUTION INTRAMUSCULAR; INTRAVENOUS at 18:48

## 2022-11-02 RX ADMIN — CEFAZOLIN SODIUM 1 G: 1 INJECTION, POWDER, FOR SOLUTION INTRAMUSCULAR; INTRAVENOUS at 16:53

## 2022-11-02 RX ADMIN — CROMOLYN SODIUM 1 DROP: 40 SOLUTION/ DROPS OPHTHALMIC at 08:41

## 2022-11-02 RX ADMIN — DEXTROSE AND SODIUM CHLORIDE 100 ML/HR: 5; 450 INJECTION, SOLUTION INTRAVENOUS at 05:02

## 2022-11-02 RX ADMIN — FENTANYL CITRATE 100 MCG: 50 INJECTION, SOLUTION INTRAMUSCULAR; INTRAVENOUS at 16:55

## 2022-11-02 RX ADMIN — ONDANSETRON 4 MG: 2 INJECTION INTRAMUSCULAR; INTRAVENOUS at 20:03

## 2022-11-02 RX ADMIN — SODIUM CHLORIDE, PRESERVATIVE FREE 10 ML: 5 INJECTION INTRAVENOUS at 21:30

## 2022-11-02 RX ADMIN — ONDANSETRON 4 MG: 2 INJECTION INTRAMUSCULAR; INTRAVENOUS at 16:43

## 2022-11-02 NOTE — PROGRESS NOTES
PT attempted, spoke to RN, pt with hemoglobin of 5.7. PT to hold at this time, PT to continue to follow for re-assessment as able.

## 2022-11-02 NOTE — PROGRESS NOTES
PROGRESS NOTE      Chief Complaints:  Patient has no new complaints. HPI and  Objective:    Patient has no fever. ALLI drain output is minimal    Review of Systems:  Rest review of system negative  EXAM:  Visit Vitals  /66 (BP 1 Location: Left upper arm, BP Patient Position: At rest;Sitting)   Pulse 72   Temp 97.9 °F (36.6 °C)   Resp 18   Ht 5' 2\" (1.575 m)   Wt 114 lb (51.7 kg)   LMP  (LMP Unknown)   SpO2 98%   BMI 20.85 kg/m²       Patient is awake and alert  Head and neck atraumatic, normocephalic. ENT: No hoarse voice  Cardiac system regular rate rhythm. Pulmonary no audible wheeze  Chest wall excursion normal with respiration cycle  Abdomen is soft not particularly distended. Neurologically nonfocal.  Skin is warm and moist.  Psychosocial: Cooperative. Vascular examination as previously noted no changes. No results found for this or any previous visit (from the past 24 hour(s)). ASSESSMENT:   Patient is 61 y.o. with diagnosis of : Principal Problem:    Small bowel obstruction ([address] (10/20/2022)    Active Problems:    Acute blood loss anemia (10/20/2022)      Status post robot-assisted surgical procedure, pelvic exenteration (10/20/2022)      Vaginal cuff cellulitis (10/20/2022)        PLAN:                 Patient is now agreeable for laparoscopic loop colostomy placement. I scheduled for this procedure later today. I will remove the ALLI drain after colostomy placement.

## 2022-11-02 NOTE — ANESTHESIA PREPROCEDURE EVALUATION
Relevant Problems   RENAL FAILURE   (+) Hydronephrosis of right kidney      HEMATOLOGY   (+) Acute blood loss anemia   (+) Anemia due to acute blood loss      PERSONAL HX & FAMILY HX OF CANCER   (+) Bladder cancer (HCC)       Anesthetic History   No history of anesthetic complications            Review of Systems / Medical History  Patient summary reviewed, nursing notes reviewed and pertinent labs reviewed    Pulmonary  Within defined limits                 Neuro/Psych   Within defined limits           Cardiovascular  Within defined limits                     GI/Hepatic/Renal       Hepatitis: type C    Liver disease     Endo/Other        Arthritis, cancer and anemia (HGB 5.7 - Four units of pRBC's ordered for transfusion)     Other Findings   Comments: Procedure Information    Case: [id number] Date/Time: 22  Procedure: LAPAROSCOPIC LOOP COLOSTOMY PLACEMENT  Anesthesia type: General  Pre-op diagnosis: RECTO-VAGINAL FISTULA REPAIR  Location: [facility] MAIN OR  / [facility] MAIN OR  Surgeons: Tamera Brady MD            Medical History  Rheumatoid arthritis ([address]  Back pain  DVT (deep venous thrombosis) (HCC)  Uterine fibroid  Anemia  Cancer ([address]  Liver disease  Hepatitis C  Sciatic leg pain  Presence of IVC filter         Past Medical History:   Diagnosis Date    Anemia     pt states had recent blood transfusion 2022    Back pain     Cancer ([address]     bladder    DVT (deep venous thrombosis) (HCC)     and PE, pt denies lung PE , only leg several years ago    Hepatitis C     pt states dx 1 month ago    Liver disease     Presence of IVC filter     pt states several years ago    Rheumatoid arthritis ([address]     Sciatic leg pain     pt states both legs    Uterine fibroid        Past Surgical History:   Procedure Laterality Date    HX APPENDECTOMY  10/03/2022    HX  SECTION      HX GYN  10/03/2022    ROBOT ANTERIOR PELVIC EXENTERATION    HX HYSTERECTOMY  10/03/2022    HX SALPINGO-OOPHORECTOMY Bilateral 10/03/2022    HX UROLOGICAL  09/15/2022    CYSTOSCOPY    HX UROLOGICAL  09/15/2022    URETHRAL DILATION    HX UROLOGICAL  09/15/2022    URETERAL STENT PLACEMENT    HX UROLOGICAL  09/15/2022    TRANSURETHRAL RESECTION OF BLADDER TUMOR >2CM    HX UROLOGICAL Bilateral 09/15/2022    RETROGRADE PYELOGRAM    HX UROLOGICAL  10/03/2022    PELVIC LYMPH NODE DISSECTION    HX UROLOGICAL  10/03/2022    ILEAL CONDUIT URINARY DIVERSION    IR THROMBECTOMY [facility] VEIN W PHARM  10/27/2022    CT CARDIAC SURG PROCEDURE UNLIST      stent placement       Current Outpatient Medications   Medication Instructions    acetaminophen (TYLENOL) 650 mg, Oral, EVERY 6-8 HOURS PRN    diclofenac (VOLTAREN) 2 g, Topical, 2 TIMES DAILY    famotidine (PEPCID) 20 mg, Oral, DAILY AS NEEDED    ferrous sulfate 325 mg, Oral, DAILY    multivitamin with folic acid (One Daily Essential) 400 mcg tab tablet 1 Tablet, Oral, DAILY       No current facility-administered medications for this visit. No current outpatient medications on file.      Facility-Administered Medications Ordered in Other Visits   Medication Dose Route Frequency    0.9% sodium chloride infusion 250 mL  250 mL IntraVENous PRN    0.9% sodium chloride infusion 250 mL  250 mL IntraVENous PRN    potassium chloride 10 mEq in 100 ml IVPB  10 mEq IntraVENous Q1H    dextrose 5 % - 0.45% NaCl infusion  100 mL/hr IntraVENous CONTINUOUS    LORazepam (ATIVAN) tablet 1 mg  1 mg Oral ON CALL    megestroL (MEGACE) tablet 20 mg  20 mg Oral TID WITH MEALS    heparin (porcine) 1,000 unit/mL injection 2,000 Units  2,000 Units IntraVENous Rad Multiple    meropenem (MERREM) 1 g in 0.9% sodium chloride (MBP/ADV) 50 mL MBP  1 g IntraVENous Q8H    midodrine (PROAMATINE) tablet 5 mg  5 mg Oral TID PRN    [Held by provider] heparin 25,000 units in D5W 250 ml infusion  500 Units/hr IntraVENous TITRATE    oxyCODONE IR (ROXICODONE) tablet 5 mg  5 mg Oral Q6H PRN    0.9% sodium chloride infusion 250 mL  250 mL IntraVENous PRN    cromolyn (OPTICROM) 4 % ophthalmic solution 1 Drop  1 Drop Both Eyes DAILY    sodium chloride (NS) flush 5-40 mL  5-40 mL IntraVENous Q8H    sodium chloride (NS) flush 5-40 mL  5-40 mL IntraVENous PRN    acetaminophen (TYLENOL) tablet 650 mg  650 mg Oral Q6H PRN    Or    acetaminophen (TYLENOL) suppository 650 mg  650 mg Rectal Q6H PRN    polyethylene glycol (MIRALAX) packet 17 g  17 g Oral DAILY PRN    ondansetron (ZOFRAN ODT) tablet 4 mg  4 mg Oral Q8H PRN    Or    ondansetron (ZOFRAN) injection 4 mg  4 mg IntraVENous Q6H PRN       No data found.     Lab Results   Component Value Date/Time    WBC 7.0 11/02/2022 11:11 AM    HGB 5.7 (LL) 11/02/2022 11:11 AM    HCT 18.6 (L) 11/02/2022 11:11 AM    PLATELET 320 01/21/5678 11:11 AM    MCV 89.0 11/02/2022 11:11 AM     Lab Results   Component Value Date/Time    Sodium 137 11/02/2022 11:11 AM    Potassium 3.1 (L) 11/02/2022 11:11 AM    Chloride 104 11/02/2022 11:11 AM    CO2 27 11/02/2022 11:11 AM    Anion gap 6 11/02/2022 11:11 AM    Glucose 80 11/02/2022 11:11 AM    BUN 7 11/02/2022 11:11 AM    Creatinine 0.41 (L) 11/02/2022 11:11 AM    BUN/Creatinine ratio 17 11/02/2022 11:11 AM    GFR est AA >60 09/30/2022 09:10 AM    GFR est non-AA >60 09/30/2022 09:10 AM    Calcium 9.3 11/02/2022 11:11 AM     No results found for: APTT, PTP, INR, INREXT, INREXT  Lab Results   Component Value Date/Time    Glucose 80 11/02/2022 11:11 AM    Glucose (POC) 105 (H) 10/07/2022 05:05 PM         Physical Exam    Airway  Mallampati: II  TM Distance: 4 - 6 cm  Neck ROM: normal range of motion   Mouth opening: Normal     Cardiovascular    Rhythm: regular  Rate: normal         Dental    Dentition: Edentulous     Pulmonary  Breath sounds clear to auscultation               Abdominal  GI exam deferred       Other Findings            Anesthetic Plan    ASA: 3  Anesthesia type: general    Monitoring Plan: Continuous noninvasive hemodynamic monitoring      Induction: Intravenous  Anesthetic plan and risks discussed with: Patient

## 2022-11-02 NOTE — PROGRESS NOTES
CM reviewed chart. Plans for surgical procedure today. Awaiting for patient to be medically stable and able to work with PT/OT so they can submit for auth to [facility].

## 2022-11-02 NOTE — PROGRESS NOTES
Comprehensive Nutrition Assessment    Type and Reason for Visit: Reassess (goals)    Nutrition Recommendations/Plan:   Advance diet when medically appropriate  Restart Ensure Clear x2/d.when diet advanced   Monitor and document PO and ONS intake in I/Os. Malnutrition Assessment:  Malnutrition Status: Moderate malnutrition (10/26/22 1411)    Context:  Acute illness         Nutrition Assessment:    Admitted for partial SBO. UTI complicated by ileal conduit. Ileus resolving with advancement of diet. Concern for Rectovaginal fistula but no evidence of pelvic abscess or fistula. 2 day hx of Nausea and poor intake. (10/24) Diet advanced to solids (10/25) SBS diet with ONS in place. Appetite remains poor, 1-25%. Pt requested Marinol for appetite. (10/29) Appetite stimulant started. (10/31) NPO today for initially scheduled MRI. Pt endorsed poor ONS acceptance, RD to modify per preferences. S/p rectovaginal fistula closure with ALLI drain(11/1). Pt Npo today for procedure of loop colostomy. Labs: H/H 5.7/18.6, Cl 104, ALT 6, Na 137, K 3.1, Gluc 80. Meds: D5%-0.45%Nacl 100ml/hr, megace, merrem. Nutrition Related Findings:    No N/V/D/C nor chewing/swallowing difficulties reported. Last BM 10/29. No edema. ALLI drain min OP. Wound Type: Multiple, Surgical incision (abdomen, vaginal)        Current Nutrition Intake & Therapies:  Average Meal Intake: NPO  Average Supplement Intake: NPO  DIET ONE TIME MESSAGE  ADULT ORAL NUTRITION SUPPLEMENT Breakfast, Dinner; Clear Liquid  DIET NPO    Anthropometric Measures:  Height: 5' 2\" (157.5 cm)  Ideal Body Weight (IBW): 110 lbs (50 kg)  Current Body Wt:  51.7 kg (113 lb 15.7 oz), 103.6 % IBW. Not specified  Current BMI (kg/m2): 20.8  Weight Adjustment: No adjustment  BMI Category: Normal weight (BMI 18.5-24. 9)    Estimated Daily Nutrient Needs:  Energy Requirements Based On: Kcal/kg  Weight Used for Energy Requirements: Current  Energy (kcal/day): 1551-1810kcal (30-35 kcal/kg, CA)  Weight Used for Protein Requirements: Current  Protein (g/day): 72-82g (1.4-1.6g/kg)  Method Used for Fluid Requirements: 1 ml/kcal  Fluid (ml/day): ~1800    Nutrition Diagnosis:   Moderate malnutrition, In context of acute illness or injury related to altered GI function, altered GI structure as evidenced by poor intake prior to admission, intake 0-25%    Nutrition Interventions:   Food and/or Nutrient Delivery: Continue current diet, Continue oral nutrition supplement (when diet resumed post procedure)  Nutrition Education/Counseling: No recommendations at this time  Coordination of Nutrition Care: Continue to monitor while inpatient  Plan of Care discussed with: RN, Pt    Goals:  Previous Goal Met: Progressing toward goal(s)  Goals: other (specify), Meet at least 75% of estimated needs, PO intake 50% or greater  Specify Other Goals: weight maintanence +/- 0.5 kg within 7 d    Nutrition Monitoring and Evaluation:   Behavioral-Environmental Outcomes: None identified  Food/Nutrient Intake Outcomes: Diet advancement/tolerance, Supplement intake  Physical Signs/Symptoms Outcomes: Biochemical data, GI status, Weight, Meal time behavior    Discharge Planning:     Too soon to determine    Earlean Fails, RD  Contact:[phone]

## 2022-11-02 NOTE — PROGRESS NOTES
Infectious Disease Progress Note           Subjective:   Pt seen and examined at bedside. Stable, denies new complaints, NPO for scheduled diverting colostomy. Decreased vaginal discharge   Objective:   Physical Exam:     Visit Vitals  /61 (BP 1 Location: Left upper arm, BP Patient Position: At rest)   Pulse 69   Temp 97.7 °F (36.5 °C)   Resp 18   Ht 5' 2\" (1.575 m)   Wt 114 lb (51.7 kg)   LMP  (LMP Unknown)   SpO2 100%   BMI 20.85 kg/m²      O2 Device: None (Room air)    Temp (24hrs), Av.8 °F (36.6 °C), Min:97.7 °F (36.5 °C), Max:97.9 °F (36.6 °C)    701 - 1900  In: -   Out: 780 [Urine:780]   10/31 1901 - 700  In: 2718.3 [P.O.:370; I.V.:2348.3]  Out: 3969 [Urine:2680; Drains:35]    General: NAD, NAD, AAO x 4  HEENT: SIMBA, Moist mucosa   Lungs: CTA b/l, decreased at the bases   Heart: S1S2+, RRR, no murmur  Abdo:  Soft, NT, ND, +BS, healed incisional wounds, + urostomy   : no abarca cath   Exts: B/l leg edema   Skin: healed abdominal incisional wounds     Data Review:       Recent Days:  Recent Labs     22  1111 10/31/22  0804   WBC 7.0 4.6   HGB 5.7* 7.7*   HCT 18.6* 25.7*    219       Recent Labs     22  1111 10/31/22  0804   BUN 7 6   CREA 0.41* 0.50*       Lab Results   Component Value Date/Time    C-Reactive protein 15.00 (H) 10/25/2022 05:16 AM       Microbiology     Results       Procedure Component Value Units Date/Time    COVID-19 RAPID TEST [[id number]] Collected: 10/20/22 5606    Order Status: Completed Specimen: Nasopharyngeal Updated: 10/20/22 1503     COVID-19 rapid test Not Detected        Comment: Rapid Abbott ID Now   Rapid NAAT:  The specimen is NEGATIVE for SARS-CoV-2, the novel coronavirus associated with COVID-19. Negative results should be treated as presumptive and, if inconsistent with clinical signs and symptoms or necessary for patient management, should be tested with an alternative molecular assay.  Negative results do not preclude SARS-CoV-2 infection and should not be used as the sole basis for patient management decisions. This test has been authorized by the FDA under   an Emergency Use Authorization (EUA) for use by authorized laboratories. Fact sheet for Healthcare Providers: ConventionUpdate.co.nz Fact sheet for Patients: ConventionUpdate.co.nz   Methodology: Isothermal Nucleic Acid Amplification         MRSA SCREEN - PCR (NASAL) [[id number]] Collected: 10/20/22 1435    Order Status: Completed Specimen: Swab Updated: 10/20/22 1636     MRSA by PCR, Nasal Not Detected       MRSA SCREEN - PCR (NASAL) [[id number]] Collected: 10/20/22 1340    Order Status: Canceled Specimen: Swab     CULTURE, Mona Min STAIN [[id number]] Collected: 10/20/22 1340    Order Status: Completed Specimen: Wound from Cervical/vaginal swab Updated: 10/23/22 0741     Special Requests: No Special Requests        GRAM STAIN Rare WBCs seen               1+ apparent Gram Positive Cocci in pairs                  Few apparent Gram Negative Rods           Culture result: Light  Mixed skin heriberto isolated       CULTURE, BODY FLUID Harrie Coosa STAIN [[id number]]     Order Status: Canceled Specimen: Body Fluid from Uterine [place]/Sac Fluid     CULTURE, URINE [[id number]]  (Abnormal) Collected: 10/20/22 0249    Order Status: Completed Specimen: Urine Updated: 10/23/22 1001     Special Requests: --        No Special Requests  Reflexed from [id number]       [place] Count --        >100,000  colonies/ml       Culture result: Candida ciferrii              Diagnostics   CXR Results  (Last 48 hours)      None          Assessment/Plan   UTI, complicated by ileal conduit. Candida Ciferrii isolated from Cx         Remains afebrile w a normal WBC on routine labs         Completed 14 days of of Fluconazole, will d/c         Routine labs in the morning      2.  Vaginal cuff cellulitis/suspected abscess on CT, Vaginal repaired by Dr Whitney Hurt on 10/31 GPC in pairs and GNR noted on Gram stain of cervical specimen, Cx neg       Remains afebrile w a normal WBC on routine labs. On Meropenem day # 10/14     3. 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 Bladder Ca w local invasion: S/p resection on 10/03     5. B/L LE DVT extensive, IVC filter in situ      S/p thrombectomy by IR on 10/27    6.  Acute on chronic anemia w hgb of 5.7, PRBC transfusion ordered     Berto Calix MD    11/2/2022

## 2022-11-02 NOTE — PROGRESS NOTES
Problem: Pain  Goal: *Control of Pain  Outcome: Progressing Towards Goal     Problem: Falls - Risk of  Goal: *Absence of Falls  Description: Document Iris Fall Risk and appropriate interventions in the flowsheet. Outcome: Progressing Towards Goal  Note: Fall Risk Interventions:  Mobility Interventions: Bed/chair exit alarm, Patient to call before getting OOB, PT Consult for assist device competence         Medication Interventions: Bed/chair exit alarm, Patient to call before getting OOB, Teach patient to arise slowly    Elimination Interventions: Bed/chair exit alarm, Call light in reach, Patient to call for help with toileting needs    History of Falls Interventions: Bed/chair exit alarm         Problem: Patient Education: Go to Patient Education Activity  Goal: Patient/Family Education  Outcome: Progressing Towards Goal     Problem: Pressure Injury - Risk of  Goal: *Prevention of pressure injury  Description: Document Alphonso Scale and appropriate interventions in the flowsheet.   Outcome: Progressing Towards Goal  Note: Pressure Injury Interventions:  Sensory Interventions: Minimize linen layers, Keep linens dry and wrinkle-free    Moisture Interventions: Minimize layers, Maintain skin hydration (lotion/cream), Absorbent underpads    Activity Interventions: PT/OT evaluation, Pressure redistribution bed/mattress(bed type)    Mobility Interventions: HOB 30 degrees or less, PT/OT evaluation    Nutrition Interventions: Document food/fluid/supplement intake    Friction and Shear Interventions: HOB 30 degrees or less, Minimize layers

## 2022-11-02 NOTE — PROGRESS NOTES
Hospitalist Progress Note    Subjective:   Daily Progress Note: 11/2/2022 9:56 AM    Hospital Course: Patient is a 61 y.o. female with PMH of bladder cancer s/p cystectomy, hysterectomy, BSO and ileal conduit diversion, anemia, hep C, and left leg DVT s/p IVC filter years ago. She presented to the ED with chief complaint of weakness and nausea for the past week. She reports unable to tolerate PO intake, due to profound nausea, abdominal pain, fever, chills, vomiting or diarrhea. In addition, she also reports unable to walk due to weakness and left inner thigh and left foot pain. Denies trauma. She was seen here on 10/13/2022 with urostomy complications, status post ileal conduit and discharged home on Cipro which she completed. In addition, she also reports having vaginal bleed since surgery 2 weeks ago. Which appears to be more related to a fistula with stool present. CT scan of the abdomen pelvis was performed revealing no obvious rectovaginal fistula although there was air present tracking in that direction without any extravasation of contrast.  There was extensive bilateral iliac venous thrombus to the level of the IVC filter with right leg edema. Cholelithiasis with a distended gallbladder was also noted. Bilateral hydronephrosis with hydroureter most likely from the ileal conduit. Probable small bowel obstruction secondary to adhesions also noted. Patient was found to be anemic and received 1 unit of packed red blood cells. Venous duplex revealed extensive clot burden within the right leg and left leg. Urinalysis was consistent with Candida infection. ID consultation. Surgical consultation, Dr. Dallas Nur. UCX grew candida ciferrii, continue fluconazole. IV heparin being held due to blood loss, hem positive stool,.   Discussed case with interventional radiology who will further investigate the fistula with recommendations and mechanical thrombectomy performed of the bilateral lower extremities with a large volume clot removed. Patient went to the OR on 10/31/2022 for vaginal wound washout, rectovaginal fistula closure with drain placement, distal rectal repair. Believe that patient will need diverting colostomy and general surgery is speaking to patient's niece. She is currently NPO. Give IV albumin. On IV fluids. Megace for appetite stimulant. Patient is now agreeable for laparoscopic loop colostomy placement that will be performed on 11/2/2022    Subjective: No acute issues overnight.   She is asking when she can eat    Current Facility-Administered Medications   Medication Dose Route Frequency    dextrose 5 % - 0.45% NaCl infusion  100 mL/hr IntraVENous CONTINUOUS    LORazepam (ATIVAN) tablet 1 mg  1 mg Oral ON CALL    megestroL (MEGACE) tablet 20 mg  20 mg Oral TID WITH MEALS    heparin (porcine) 1,000 unit/mL injection 2,000 Units  2,000 Units IntraVENous Rad Multiple    meropenem (MERREM) 1 g in 0.9% sodium chloride (MBP/ADV) 50 mL MBP  1 g IntraVENous Q8H    midodrine (PROAMATINE) tablet 5 mg  5 mg Oral TID PRN    [Held by provider] heparin 25,000 units in D5W 250 ml infusion  500 Units/hr IntraVENous TITRATE    oxyCODONE IR (ROXICODONE) tablet 5 mg  5 mg Oral Q6H PRN    0.9% sodium chloride infusion 250 mL  250 mL IntraVENous PRN    cromolyn (OPTICROM) 4 % ophthalmic solution 1 Drop  1 Drop Both Eyes DAILY    sodium chloride (NS) flush 5-40 mL  5-40 mL IntraVENous Q8H    sodium chloride (NS) flush 5-40 mL  5-40 mL IntraVENous PRN    acetaminophen (TYLENOL) tablet 650 mg  650 mg Oral Q6H PRN    Or    acetaminophen (TYLENOL) suppository 650 mg  650 mg Rectal Q6H PRN    polyethylene glycol (MIRALAX) packet 17 g  17 g Oral DAILY PRN    ondansetron (ZOFRAN ODT) tablet 4 mg  4 mg Oral Q8H PRN    Or    ondansetron (ZOFRAN) injection 4 mg  4 mg IntraVENous Q6H PRN        Review of Systems  Constitutional: No fevers, No chills, No sweats, ++ fatigue, ++Weakness  Eyes: No redness  Ears, nose, mouth, throat, and face: No nasal congestion, No sore throat, No voice change  Respiratory: No Shortness of Breath, No cough, No wheezing  Cardiovascular: No chest pain, No palpitations, No extremity edema  Gastrointestinal: No nausea, No vomiting, No diarrhea, No abdominal pain  Genitourinary: No frequency, No dysuria, No hematuria  Integument/breast: No skin lesion(s)   Neurological: No Confusion, No headaches, No dizziness      Objective:     Visit Vitals  /66 (BP 1 Location: Left upper arm, BP Patient Position: At rest;Sitting)   Pulse 72   Temp 97.9 °F (36.6 °C)   Resp 18   Ht 5' 2\" (1.575 m)   Wt 51.7 kg (114 lb)   LMP  (LMP Unknown)   SpO2 98%   BMI 20.85 kg/m²      O2 Device: None (Room air)    Temp (24hrs), Av.9 °F (36.6 °C), Min:97.7 °F (36.5 °C), Max:98.4 °F (36.9 °C)      701 - 1900  In: -   Out: 480 [Urine:480]  10/31 1901 -  0700  In: 2718.3 [P.O.:370; I.V.:2348.3]  Out: 4083 [Urine:2680; Drains:35]    PHYSICAL EXAM:  Constitutional: No acute distress, cachectic  Skin: Extremities and face reveal no rashes. HEENT: Sclerae anicteric. Extra-occular muscles are intact. No oral ulcers. The neck is supple and no masses. Cardiovascular: RRR  Respiratory:  Clear breath sounds bilaterally with no wheezes, rales, or rhonchi. GI: Abdomen nondistended, soft, and nontender. Normal active bowel sounds. Musculoskeletal: No pitting edema of the lower legs. Able to move all ext  Neurological:  Patient is alert and oriented. Cranial nerves II-XII grossly intact  Psychiatric: Depressed mood/flat affect      Data Review    No results found for this or any previous visit (from the past 24 hour(s)).     CBC:   Lab Results   Component Value Date/Time    WBC 4.6 10/31/2022 08:04 AM    RBC 2.90 (L) 10/31/2022 08:04 AM    HGB 7.7 (L) 10/31/2022 08:04 AM    HCT 25.7 (L) 10/31/2022 08:04 AM    PLATELET 735  08:04 AM     BMP:   Lab Results   Component Value Date/Time    Glucose 67 10/31/2022 08:04 AM Sodium 140 10/31/2022 08:04 AM    Potassium 3.8 10/31/2022 08:04 AM    Chloride 107 10/31/2022 08:04 AM    CO2 27 10/31/2022 08:04 AM    BUN 6 10/31/2022 08:04 AM    Creatinine 0.50 (L) 10/31/2022 08:04 AM    Calcium 9.2 10/31/2022 08:04 AM       Assessment:   1. Rectal/vaginal fistula status post washout, rectovaginal fistula closure with drain placement and distal rectal repair day #2  2. Bladder cancer  3. Small bowel obstruction  4. Acute on chronic anemia  5. UTI  6. Extensive DVT bilateral lower extremity  7. Emphysema without exacerbation  8. Severe protein malnutrition  9. Hypotension    Plan:   1. Urology, GI, and general surgery consulted. GI also consulted. Patient currently on meropenem. Most likely will require diverting colostomy. This is planned for later today on 11/2/2022  2. Urology consulted. Continue supportive care. Urology and oncology following  3. Patient currently n.p.o. for possible surgery. She was tolerating a full liquid diet. Continue with IV fluids. 4.  Hemoglobin 7.7. Received 1 unit packed red blood cells. Occult blood positive. Holding heparin drip. Blood work from this morning is still pending  5. Continue fluconazole for fungal UTI. Urine culture grew Candida cifrrii. ID consulted. Completed 10 doses. 6.  Venous Doppler showed extensive clot burden. Patient already has an IVC filter placed. Started on a heparin drip however on hold due to bleeding. IR did bilateral iliofemoral and IVC aspiration thrombectomy with removal of a large volume of thrombus from both legs extremities and IVC  7. Not in any respiratory distress. Oxygen saturation within normal limits  8. Albumin is significantly low. Offered tube feedings and TPN but patient declined. On appetite stimulant. Given 1 day of IV albumin  9. Blood pressure remained soft. As needed midodrine. On IV fluids  10. CBC, CMP in the a.m. Dispo: >48 hours.  Barriers include possible surgery for diverting colostomy, tolerating diet, nutritional improvement, PT and OT. Suspect patient will need a minimum of home health    CODE STATUS full     DVT prophylaxis: Hold due to bleeding/anemia  Ulcer prophylaxis: N.p.o.    Care Plan discussed with: Patient/Family, Nurse, and     Total time spent with patient: 33 minutes. HGB this morning dropped to 5.7. Will transfuse 2 units of PRBCs.

## 2022-11-03 LAB
ALBUMIN SERPL-MCNC: 2.7 G/DL (ref 3.5–5)
ALBUMIN/GLOB SERPL: 0.6 {RATIO} (ref 1.1–2.2)
ALP SERPL-CCNC: 60 U/L (ref 45–117)
ALT SERPL-CCNC: <6 U/L (ref 12–78)
ANION GAP SERPL CALC-SCNC: 6 MMOL/L (ref 5–15)
ANION GAP SERPL CALC-SCNC: 6 MMOL/L (ref 5–15)
AST SERPL W P-5'-P-CCNC: 16 U/L (ref 15–37)
BASOPHILS # BLD: 0 K/UL (ref 0–0.1)
BASOPHILS NFR BLD: 0 % (ref 0–1)
BILIRUB SERPL-MCNC: 0.7 MG/DL (ref 0.2–1)
BUN SERPL-MCNC: 4 MG/DL (ref 6–20)
BUN SERPL-MCNC: 6 MG/DL (ref 6–20)
BUN/CREAT SERPL: 13 (ref 12–20)
BUN/CREAT SERPL: 16 (ref 12–20)
CA-I BLD-MCNC: 7.9 MG/DL (ref 8.5–10.1)
CA-I BLD-MCNC: 8.4 MG/DL (ref 8.5–10.1)
CHLORIDE SERPL-SCNC: 107 MMOL/L (ref 97–108)
CHLORIDE SERPL-SCNC: 112 MMOL/L (ref 97–108)
CO2 SERPL-SCNC: 22 MMOL/L (ref 21–32)
CO2 SERPL-SCNC: 23 MMOL/L (ref 21–32)
CREAT SERPL-MCNC: 0.31 MG/DL (ref 0.55–1.02)
CREAT SERPL-MCNC: 0.38 MG/DL (ref 0.55–1.02)
DIFFERENTIAL METHOD BLD: ABNORMAL
EOSINOPHIL # BLD: 0 K/UL (ref 0–0.4)
EOSINOPHIL NFR BLD: 0 % (ref 0–7)
ERYTHROCYTE [DISTWIDTH] IN BLOOD BY AUTOMATED COUNT: 16.1 % (ref 11.5–14.5)
GLOBULIN SER CALC-MCNC: 4.2 G/DL (ref 2–4)
GLUCOSE SERPL-MCNC: 84 MG/DL (ref 65–100)
GLUCOSE SERPL-MCNC: 94 MG/DL (ref 65–100)
HCT VFR BLD AUTO: 26.7 % (ref 35–47)
HGB BLD-MCNC: 8.5 G/DL (ref 11.5–16)
IMM GRANULOCYTES # BLD AUTO: 0.6 K/UL (ref 0–0.04)
IMM GRANULOCYTES NFR BLD AUTO: 9 % (ref 0–0.5)
LYMPHOCYTES # BLD: 1.4 K/UL (ref 0.8–3.5)
LYMPHOCYTES NFR BLD: 20 % (ref 12–49)
MAGNESIUM SERPL-MCNC: 2.8 MG/DL (ref 1.6–2.4)
MCH RBC QN AUTO: 28.3 PG (ref 26–34)
MCHC RBC AUTO-ENTMCNC: 31.8 G/DL (ref 30–36.5)
MCV RBC AUTO: 89 FL (ref 80–99)
MONOCYTES # BLD: 0.1 K/UL (ref 0–1)
MONOCYTES NFR BLD: 2 % (ref 5–13)
NEUTS SEG # BLD: 4.6 K/UL (ref 1.8–8)
NEUTS SEG NFR BLD: 69 % (ref 32–75)
NRBC # BLD: 0 K/UL (ref 0–0.01)
NRBC BLD-RTO: 0 PER 100 WBC
PHOSPHATE SERPL-MCNC: 1 MG/DL (ref 2.6–4.7)
PLATELET # BLD AUTO: 191 K/UL (ref 150–400)
PMV BLD AUTO: 9.9 FL (ref 8.9–12.9)
POTASSIUM SERPL-SCNC: 3.4 MMOL/L (ref 3.5–5.1)
POTASSIUM SERPL-SCNC: 5.5 MMOL/L (ref 3.5–5.1)
PROT SERPL-MCNC: 6.9 G/DL (ref 6.4–8.2)
RBC # BLD AUTO: 3 M/UL (ref 3.8–5.2)
SODIUM SERPL-SCNC: 136 MMOL/L (ref 136–145)
SODIUM SERPL-SCNC: 140 MMOL/L (ref 136–145)
WBC # BLD AUTO: 6.7 K/UL (ref 3.6–11)

## 2022-11-03 PROCEDURE — 74011000250 HC RX REV CODE- 250: Performed by: SURGERY

## 2022-11-03 PROCEDURE — 99024 POSTOP FOLLOW-UP VISIT: CPT | Performed by: SURGERY

## 2022-11-03 PROCEDURE — 80053 COMPREHEN METABOLIC PANEL: CPT

## 2022-11-03 PROCEDURE — 36415 COLL VENOUS BLD VENIPUNCTURE: CPT

## 2022-11-03 PROCEDURE — 94762 N-INVAS EAR/PLS OXIMTRY CONT: CPT

## 2022-11-03 PROCEDURE — 65270000029 HC RM PRIVATE

## 2022-11-03 PROCEDURE — 85025 COMPLETE CBC W/AUTO DIFF WBC: CPT

## 2022-11-03 PROCEDURE — 74011250636 HC RX REV CODE- 250/636: Performed by: SURGERY

## 2022-11-03 PROCEDURE — 80048 BASIC METABOLIC PNL TOTAL CA: CPT

## 2022-11-03 PROCEDURE — 74011250636 HC RX REV CODE- 250/636: Performed by: INTERNAL MEDICINE

## 2022-11-03 PROCEDURE — 99232 SBSQ HOSP IP/OBS MODERATE 35: CPT | Performed by: INTERNAL MEDICINE

## 2022-11-03 PROCEDURE — 84100 ASSAY OF PHOSPHORUS: CPT

## 2022-11-03 PROCEDURE — 83735 ASSAY OF MAGNESIUM: CPT

## 2022-11-03 PROCEDURE — 74011250636 HC RX REV CODE- 250/636: Performed by: HOSPITALIST

## 2022-11-03 PROCEDURE — 74011000258 HC RX REV CODE- 258: Performed by: INTERNAL MEDICINE

## 2022-11-03 RX ORDER — POTASSIUM CHLORIDE 7.45 MG/ML
10 INJECTION INTRAVENOUS
Status: COMPLETED | OUTPATIENT
Start: 2022-11-03 | End: 2022-11-03

## 2022-11-03 RX ORDER — MAGNESIUM SULFATE HEPTAHYDRATE 40 MG/ML
2 INJECTION, SOLUTION INTRAVENOUS ONCE
Status: COMPLETED | OUTPATIENT
Start: 2022-11-03 | End: 2022-11-03

## 2022-11-03 RX ADMIN — SODIUM CHLORIDE, PRESERVATIVE FREE 10 ML: 5 INJECTION INTRAVENOUS at 05:27

## 2022-11-03 RX ADMIN — POTASSIUM CHLORIDE 10 MEQ: 7.46 INJECTION, SOLUTION INTRAVENOUS at 00:17

## 2022-11-03 RX ADMIN — HYDROMORPHONE HYDROCHLORIDE 1 MG: 1 INJECTION, SOLUTION INTRAMUSCULAR; INTRAVENOUS; SUBCUTANEOUS at 17:12

## 2022-11-03 RX ADMIN — HYDROMORPHONE HYDROCHLORIDE 1 MG: 1 INJECTION, SOLUTION INTRAMUSCULAR; INTRAVENOUS; SUBCUTANEOUS at 10:45

## 2022-11-03 RX ADMIN — MAGNESIUM SULFATE HEPTAHYDRATE 2 G: 40 INJECTION, SOLUTION INTRAVENOUS at 12:19

## 2022-11-03 RX ADMIN — POTASSIUM CHLORIDE 10 MEQ: 7.46 INJECTION, SOLUTION INTRAVENOUS at 15:21

## 2022-11-03 RX ADMIN — SODIUM CHLORIDE 125 ML/HR: 9 INJECTION, SOLUTION INTRAVENOUS at 21:18

## 2022-11-03 RX ADMIN — MEROPENEM 1 G: 1 INJECTION, POWDER, FOR SOLUTION INTRAVENOUS at 10:37

## 2022-11-03 RX ADMIN — MEROPENEM 1 G: 1 INJECTION, POWDER, FOR SOLUTION INTRAVENOUS at 23:23

## 2022-11-03 RX ADMIN — CROMOLYN SODIUM 1 DROP: 40 SOLUTION/ DROPS OPHTHALMIC at 12:23

## 2022-11-03 RX ADMIN — SODIUM CHLORIDE 125 ML/HR: 9 INJECTION, SOLUTION INTRAVENOUS at 10:45

## 2022-11-03 RX ADMIN — POTASSIUM CHLORIDE 10 MEQ: 7.46 INJECTION, SOLUTION INTRAVENOUS at 10:39

## 2022-11-03 RX ADMIN — HYDROMORPHONE HYDROCHLORIDE 1 MG: 1 INJECTION, SOLUTION INTRAMUSCULAR; INTRAVENOUS; SUBCUTANEOUS at 05:29

## 2022-11-03 RX ADMIN — MEROPENEM 1 G: 1 INJECTION, POWDER, FOR SOLUTION INTRAVENOUS at 17:05

## 2022-11-03 RX ADMIN — HYDROMORPHONE HYDROCHLORIDE 1 MG: 1 INJECTION, SOLUTION INTRAMUSCULAR; INTRAVENOUS; SUBCUTANEOUS at 00:19

## 2022-11-03 RX ADMIN — HYDROMORPHONE HYDROCHLORIDE 1 MG: 1 INJECTION, SOLUTION INTRAMUSCULAR; INTRAVENOUS; SUBCUTANEOUS at 21:27

## 2022-11-03 RX ADMIN — POTASSIUM CHLORIDE 10 MEQ: 7.46 INJECTION, SOLUTION INTRAVENOUS at 13:32

## 2022-11-03 RX ADMIN — POTASSIUM CHLORIDE 10 MEQ: 7.46 INJECTION, SOLUTION INTRAVENOUS at 12:18

## 2022-11-03 RX ADMIN — SODIUM CHLORIDE, PRESERVATIVE FREE 10 ML: 5 INJECTION INTRAVENOUS at 23:28

## 2022-11-03 NOTE — ANESTHESIA POSTPROCEDURE EVALUATION
Procedure(s):  LAPAROSCOPIC LOOP COLOSTOMY PLACEMENT.     general    Anesthesia Post Evaluation      Multimodal analgesia: multimodal analgesia used between 6 hours prior to anesthesia start to PACU discharge  Patient location during evaluation: PACU  Patient participation: complete - patient participated  Level of consciousness: awake  Pain score: 0  Pain management: adequate  Airway patency: patent  Anesthetic complications: no  Cardiovascular status: acceptable  Respiratory status: acceptable  Hydration status: acceptable  Post anesthesia nausea and vomiting:  controlled  Final Post Anesthesia Temperature Assessment:  Normothermia (36.0-37.5 degrees C)      INITIAL Post-op Vital signs:   Vitals Value Taken Time   /73 11/02/22 1845   Temp 36.7 °C (98 °F) 11/02/22 1845   Pulse 58 11/02/22 1845   Resp 16 11/02/22 1845   SpO2 100 % 11/02/22 1845

## 2022-11-03 NOTE — PROGRESS NOTES
Infectious Disease Progress Note           Subjective:   Assessed pt at bedside. Stable, denies new complaints, s/p diverting colostomy on  by Dr Mariella Gonzalez, NGT in place   Objective:   Physical Exam:     Visit Vitals  /71 (BP 1 Location: Left upper arm, BP Patient Position: Semi fowlers)   Pulse 65   Temp 97.3 °F (36.3 °C)   Resp 16   Ht 5' 2\" (1.575 m)   Wt 114 lb (51.7 kg)   LMP  (LMP Unknown)   SpO2 100%   BMI 20.85 kg/m²      O2 Device: None (Room air)    Temp (24hrs), Av.4 °F (36.3 °C), Min:96.8 °F (36 °C), Max:98.2 °F (36.8 °C)    701 - 1900  In: -   Out: 650 [Urine:650]   1901 - 700  In: 4501 [P.O.:150; I.V.:2875]  Out: 2888 [Urine:2830; Drains:8]    General: NAD, NAD, AAO x 4  HEENT: SIMBA, Moist mucosa, NGT in place   Lungs: CTA b/l, decreased at the bases   Heart: S1S2+, RRR, no murmur  Abdo:  Soft, NT, ND, +BS, + colostomy and urostomy   Exts: decreased b/l leg edema   Skin: no chronic wounds or ulcers     Data Review:       Recent Days:  Recent Labs     22  0548 22  1111   WBC 6.7 7.0   HGB 8.5* 5.7*   HCT 26.7* 18.6*    190       Recent Labs     22  0548 22  1440 22  1111   BUN 4* 7 7   CREA 0.31* 0.43* 0.41*       Lab Results   Component Value Date/Time    C-Reactive protein 15.00 (H) 10/25/2022 05:16 AM       Microbiology     Results       ** No results found for the last 336 hours. **           Diagnostics   CXR Results  (Last 48 hours)                 22 1930  XR CHEST PORT Final result    Impression:  1. Gastric tube terminates in the body of the stomach, but with proximal   sidehole at the GE junction. Recommend advancement. Narrative:  EXAM:  XR CHEST PORT       INDICATION:   ng placement       COMPARISON: Chest radiograph 10/19/2022. FINDINGS: AP radiograph of the chest was obtained.        Gastric tube terminates in the body of the stomach with proximal sidehole at the   [facility] junction. Right upper PICC with tip terminating at the cavoatrial junction. Lungs are clear with no focal consolidation, pleural effusion or pneumothorax. Heart size is normal. No acute osseous abnormality. Assessment/Plan   UTI, complicated by ileal conduit. Candida Ciferrii isolated from Cx         Remains afebrile w a normal WBC on routine labs         S/p 14 days of Fluconazole, remains at high risk for UTI      2. Vaginal cuff cellulitis/suspected abscess on CT, Vaginal repaired by Dr Regina Le on 10/31      [address] [place] in pairs and GNR noted on Gram stain of cervical specimen, Cx neg       On Meropenem day # 11/14 following which will d/c and monitor off antibiotics      3. 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 B/L LE DVT extensive, IVC filter in situ      S/p thrombectomy by IR on 10/27    5.  Acute on chronic anemia: Hgb improved after PRBC transfusion     Marylen Leventhal, MD    11/3/2022

## 2022-11-03 NOTE — BRIEF OP NOTE
Brief Postoperative Note    Patient: Maynor Hutson  YOB: 1965  MRN: [id number]    Date of Procedure: 11/2/2022     Pre-Op Diagnosis: RECTO-VAGINAL FISTULA REPAIR    Post-Op Diagnosis:   Intra-abdominal lesion  Dilated small bowel    Procedure(s):  LAPAROSCOPIC LOOP COLOSTOMY PLACEMENT  Lysis with adhesion    Surgeon(s):  Ike Brady MD    Surgical Assistant: Surg Asst-1: Stephanie Banuelos    Anesthesia: General     Estimated Blood Loss (mL): 900 cc    Complications: None    Specimens: None    Implants: None    Drains:   Eddie-Koroma Drain 10/31/22  (Active)   Site Assessment Clean, dry, & intact 11/02/22 2000   Dressing Status Clean, dry, & intact 11/01/22 1736   Status Patent; Charged 11/02/22 2000   Drainage Color Serosanguinous 11/02/22 2000   Output (ml) 5 ml 11/02/22 0507       Eddie-Koroma Drain 10/31/22 Groin (Active)   Site Assessment Clean, dry, & intact 10/31/22 1840   Dressing Status Clean, dry, & intact 10/31/22 1840   Status Charged 10/31/22 1840   Drainage Color Serosanguinous 10/31/22 1840       Nasogastric Tube 11/02/22 (Active)   Site Assessment Clean, dry, & intact 11/02/22 2000   Securement Device Tape 11/02/22 2000   Action Taken Placement verified (comment) 11/02/22 2000   Drainage Description Green 11/02/22 1827       [REMOVED] Eddie-Koroma Drain 10/03/22 Anterior Abdomen (Removed)       Findings: As above    Electronically Signed by Jessica Robles MD on 11/3/2022 at 3:53 AM

## 2022-11-03 NOTE — PROGRESS NOTES
Hospitalist Progress Note    Subjective:   Daily Progress Note: 11/3/2022 9:56 AM    Hospital Course: Patient is a 61 y.o. female with PMH of bladder cancer s/p cystectomy, hysterectomy, BSO and ileal conduit diversion, anemia, hep C, and left leg DVT s/p IVC filter years ago. She presented to the ED with chief complaint of weakness and nausea for the past week. She reports unable to tolerate PO intake, due to profound nausea, abdominal pain, fever, chills, vomiting or diarrhea. In addition, she also reports unable to walk due to weakness and left inner thigh and left foot pain. Denies trauma. She was seen here on 10/13/2022 with urostomy complications, status post ileal conduit and discharged home on Cipro which she completed. In addition, she also reports having vaginal bleed since surgery 2 weeks ago. Which appears to be more related to a fistula with stool present. CT scan of the abdomen pelvis was performed revealing no obvious rectovaginal fistula although there was air present tracking in that direction without any extravasation of contrast.  There was extensive bilateral iliac venous thrombus to the level of the IVC filter with right leg edema. Cholelithiasis with a distended gallbladder was also noted. Bilateral hydronephrosis with hydroureter most likely from the ileal conduit. Probable small bowel obstruction secondary to adhesions also noted. Patient was found to be anemic and received 1 unit of packed red blood cells. Venous duplex revealed extensive clot burden within the right leg and left leg. Urinalysis was consistent with Candida infection. ID consultation. Surgical consultation, Dr. Lisseth Day. UCX grew candida ciferrii, continue fluconazole. IV heparin being held due to blood loss, hem positive stool,.   Discussed case with interventional radiology who will further investigate the fistula with recommendations and mechanical thrombectomy performed of the bilateral lower extremities with a large volume clot removed. Patient went to the OR on 10/31/2022 for vaginal wound washout, rectovaginal fistula closure with drain placement, distal rectal repair. Believe that patient will need diverting colostomy and general surgery is speaking to patient's niece. She is currently NPO. Give IV albumin. On IV fluids. Megace for appetite stimulant. Patient is now agreeable for laparoscopic loop colostomy placement that will be performed on 11/2/2022. NG tube placed postoperatively. TPN initiated on 11/3/2022. Concern for refeeding syndrome and BMP, mag, Phos every 6 hours    Subjective: Patient is status post colostomy placement day 1 #1. She says her pain is minimal.  Has an NG tube that is uncomfortable. Denies any nausea or vomiting.     Current Facility-Administered Medications   Medication Dose Route Frequency    magnesium sulfate 2 g/50 ml IVPB (premix or compounded)  2 g IntraVENous ONCE    potassium chloride 10 mEq in 100 ml IVPB  10 mEq IntraVENous Q1H    fat emulsion 20% (LIPOSYN, INTRAlipid) infusion 250 mL  250 mL IntraVENous CONTINUOUS    TPN ADULT - CENTRAL AA 5% D20% W/ CA + ELECTROLYTES   IntraVENous CONTINUOUS    sodium chloride (NS) flush 5-40 mL  5-40 mL IntraVENous Q8H    sodium chloride (NS) flush 5-40 mL  5-40 mL IntraVENous PRN    ondansetron (ZOFRAN ODT) tablet 4 mg  4 mg Oral Q8H PRN    Or    ondansetron (ZOFRAN) injection 4 mg  4 mg IntraVENous Q6H PRN    enoxaparin (LOVENOX) injection 40 mg  40 mg SubCUTAneous DAILY    0.9% sodium chloride infusion  125 mL/hr IntraVENous CONTINUOUS    HYDROmorphone (DILAUDID) syringe 0.25 mg  0.25 mg IntraVENous Q3H PRN    Or    HYDROmorphone (DILAUDID) injection 1 mg  1 mg IntraVENous Q4H PRN    megestroL (MEGACE) tablet 20 mg  20 mg Oral TID WITH MEALS    heparin (porcine) 1,000 unit/mL injection 2,000 Units  2,000 Units IntraVENous Rad Multiple    meropenem (MERREM) 1 g in 0.9% sodium chloride (MBP/ADV) 50 mL MBP  1 g IntraVENous Q8H midodrine (PROAMATINE) tablet 5 mg  5 mg Oral TID PRN    [Held by provider] heparin 25,000 units in D5W 250 ml infusion  500 Units/hr IntraVENous TITRATE    oxyCODONE IR (ROXICODONE) tablet 5 mg  5 mg Oral Q6H PRN    cromolyn (OPTICROM) 4 % ophthalmic solution 1 Drop  1 Drop Both Eyes DAILY    acetaminophen (TYLENOL) tablet 650 mg  650 mg Oral Q6H PRN    Or    acetaminophen (TYLENOL) suppository 650 mg  650 mg Rectal Q6H PRN    polyethylene glycol (MIRALAX) packet 17 g  17 g Oral DAILY PRN    ondansetron (ZOFRAN ODT) tablet 4 mg  4 mg Oral Q8H PRN    Or    ondansetron (ZOFRAN) injection 4 mg  4 mg IntraVENous Q6H PRN        Review of Systems  Constitutional: No fevers, No chills, No sweats, ++ fatigue, ++Weakness  Eyes: No redness  Ears, nose, mouth, throat, and face: No nasal congestion, No sore throat, No voice change  Respiratory: No Shortness of Breath, No cough, No wheezing  Cardiovascular: No chest pain, No palpitations, No extremity edema  Gastrointestinal: No nausea, No vomiting, No diarrhea, No abdominal pain  Genitourinary: No frequency, No dysuria, No hematuria  Integument/breast: No skin lesion(s)   Neurological: No Confusion, No headaches, No dizziness      Objective:     Visit Vitals  /68 (BP 1 Location: Left upper arm, BP Patient Position: Semi fowlers)   Pulse 62   Temp 97.3 °F (36.3 °C)   Resp 16   Ht 5' 2\" (1.575 m)   Wt 51.7 kg (114 lb)   LMP  (LMP Unknown)   SpO2 94%   BMI 20.85 kg/m²      O2 Device: None (Room air)    Temp (24hrs), Av.5 °F (36.4 °C), Min:96.8 °F (36 °C), Max:98.2 °F (36.8 °C)      No intake/output data recorded.  1901 -  0700  In: 3973 [P.O.:150; I.V.:2875]  Out: 2888 [Urine:2830; Drains:8]    PHYSICAL EXAM:  Constitutional: No acute distress, cachectic  Skin: Extremities and face reveal no rashes. HEENT: Sclerae anicteric. Extra-occular muscles are intact.  NG tube in   Cardiovascular: RRR  Respiratory:  Clear breath sounds bilaterally with no wheezes, rales, or rhonchi. GI: Abdomen nondistended, soft, and nontender. Normal active bowel sounds. Musculoskeletal: No pitting edema of the lower legs. Able to move all ext  Neurological:  Patient is alert and oriented. Cranial nerves II-XII grossly intact  Psychiatric: Depressed mood/flat affect      Data Review    Recent Results (from the past 24 hour(s))   CBC WITH AUTOMATED DIFF    Collection Time: 11/02/22 11:11 AM   Result Value Ref Range    WBC 7.0 3.6 - 11.0 K/uL    RBC 2.09 (L) 3.80 - 5.20 M/uL    HGB 5.7 (LL) 11.5 - 16.0 g/dL    HCT 18.6 (L) 35.0 - 47.0 %    MCV 89.0 80.0 - 99.0 FL    MCH 27.3 26.0 - 34.0 PG    MCHC 30.6 30.0 - 36.5 g/dL    RDW 17.8 (H) 11.5 - 14.5 %    PLATELET 135 590 - 454 K/uL    MPV 9.6 8.9 - 12.9 FL    NRBC 0.0 0.0  WBC    ABSOLUTE NRBC 0.00 0.00 - 0.01 K/uL    NEUTROPHILS 59 32 - 75 %    LYMPHOCYTES 30 12 - 49 %    MONOCYTES 4 (L) 5 - 13 %    EOSINOPHILS 0 0 - 7 %    BASOPHILS 0 0 - 1 %    IMMATURE GRANULOCYTES 7 (H) 0 - 0.5 %    ABS. NEUTROPHILS 4.1 1.8 - 8.0 K/UL    ABS. LYMPHOCYTES 2.1 0.8 - 3.5 K/UL    ABS. MONOCYTES 0.3 0.0 - 1.0 K/UL    ABS. EOSINOPHILS 0.0 0.0 - 0.4 K/UL    ABS. BASOPHILS 0.0 0.0 - 0.1 K/UL    ABS. IMM. GRANS. 0.5 (H) 0.00 - 0.04 K/UL    DF AUTOMATED     METABOLIC PANEL, COMPREHENSIVE    Collection Time: 11/02/22 11:11 AM   Result Value Ref Range    Sodium 137 136 - 145 mmol/L    Potassium 3.1 (L) 3.5 - 5.1 mmol/L    Chloride 104 97 - 108 mmol/L    CO2 27 21 - 32 mmol/L    Anion gap 6 5 - 15 mmol/L    Glucose 80 65 - 100 mg/dL    BUN 7 6 - 20 mg/dL    Creatinine 0.41 (L) 0.55 - 1.02 mg/dL    BUN/Creatinine ratio 17 12 - 20      eGFR >60 >60 ml/min/1.73m2    Calcium 9.3 8.5 - 10.1 mg/dL    Bilirubin, total 0.8 0.2 - 1.0 mg/dL    AST (SGOT) 12 (L) 15 - 37 U/L    ALT (SGPT) <6 (L) 12 - 78 U/L    Alk.  phosphatase 56 45 - 117 U/L    Protein, total 7.6 6.4 - 8.2 g/dL    Albumin 3.3 (L) 3.5 - 5.0 g/dL    Globulin 4.3 (H) 2.0 - 4.0 g/dL    A-G Ratio 0.8 (L) 1.1 - 2.2     TYPE & SCREEN    Collection Time: 11/02/22  2:36 PM   Result Value Ref Range    Crossmatch Expiration 11/05/2022,2359     ABO/Rh(D) A Positive     Antibody screen Negative     Unit number [id number]     Blood component type  LR     Unit division 00     Status of unit Allocated     TRANSFUSION STATUS Ok to transfuse     Crossmatch result Compatible     Unit number [id number]     Blood component type  LR     Unit division 00     Status of unit Issued,final     TRANSFUSION STATUS Ok to transfuse     Crossmatch result Compatible     Unit number [id number]     Blood component type  LR     Unit division 00     Status of unit Issued,final     TRANSFUSION STATUS Ok to transfuse     Crossmatch result Compatible     Unit number [id number]     Blood component type  LR     Unit division 00     Status of unit [place] to transfuse     Crossmatch result Compatible     Unit number [id number]     Blood component type  LR     Unit division 00     Status of unit Allocated     TRANSFUSION STATUS Ok to transfuse     Crossmatch result Compatible     Unit number [id number]     Blood component type  LR     Unit division 00     Status of unit Allocated     TRANSFUSION STATUS Ok to transfuse     Crossmatch result Compatible    METABOLIC PANEL, COMPREHENSIVE    Collection Time: 11/02/22  2:40 PM   Result Value Ref Range    Sodium 138 136 - 145 mmol/L    Potassium 3.1 (L) 3.5 - 5.1 mmol/L    Chloride 107 97 - 108 mmol/L    CO2 27 21 - 32 mmol/L    Anion gap 4 (L) 5 - 15 mmol/L    Glucose 81 65 - 100 mg/dL    BUN 7 6 - 20 mg/dL    Creatinine 0.43 (L) 0.55 - 1.02 mg/dL    BUN/Creatinine ratio 16 12 - 20      eGFR >60 >60 ml/min/1.73m2    Calcium 8.8 8.5 - 10.1 mg/dL    Bilirubin, total 0.7 0.2 - 1.0 mg/dL    AST (SGOT) 15 15 - 37 U/L    ALT (SGPT) 10 (L) 12 - 78 U/L    Alk.  phosphatase 52 45 - 117 U/L    Protein, total 7.5 6.4 - 8.2 g/dL    Albumin 3.1 (L) 3.5 - 5.0 g/dL Globulin 4.4 (H) 2.0 - 4.0 g/dL    A-G Ratio 0.7 (L) 1.1 - 2.2     PTT    Collection Time: 11/02/22  4:11 PM   Result Value Ref Range    aPTT 27.9 21.2 - 34.1 sec    aPTT, therapeutic range   82 - 109 sec   PROTHROMBIN TIME + INR    Collection Time: 11/02/22  4:11 PM   Result Value Ref Range    Prothrombin time 15.9 (H) 11.9 - 14.6 sec    INR 1.3 (H) 0.9 - 1.1     CBC WITH AUTOMATED DIFF    Collection Time: 11/03/22  5:48 AM   Result Value Ref Range    WBC 6.7 3.6 - 11.0 K/uL    RBC 3.00 (L) 3.80 - 5.20 M/uL    HGB 8.5 (L) 11.5 - 16.0 g/dL    HCT 26.7 (L) 35.0 - 47.0 %    MCV 89.0 80.0 - 99.0 FL    MCH 28.3 26.0 - 34.0 PG    MCHC 31.8 30.0 - 36.5 g/dL    RDW 16.1 (H) 11.5 - 14.5 %    PLATELET 957 662 - 965 K/uL    MPV 9.9 8.9 - 12.9 FL    NRBC 0.0 0.0  WBC    ABSOLUTE NRBC 0.00 0.00 - 0.01 K/uL    NEUTROPHILS 69 32 - 75 %    LYMPHOCYTES 20 12 - 49 %    MONOCYTES 2 (L) 5 - 13 %    EOSINOPHILS 0 0 - 7 %    BASOPHILS 0 0 - 1 %    IMMATURE GRANULOCYTES 9 (H) 0 - 0.5 %    ABS. NEUTROPHILS 4.6 1.8 - 8.0 K/UL    ABS. LYMPHOCYTES 1.4 0.8 - 3.5 K/UL    ABS. MONOCYTES 0.1 0.0 - 1.0 K/UL    ABS. EOSINOPHILS 0.0 0.0 - 0.4 K/UL    ABS. BASOPHILS 0.0 0.0 - 0.1 K/UL    ABS. IMM. GRANS. 0.6 (H) 0.00 - 0.04 K/UL    DF AUTOMATED     METABOLIC PANEL, COMPREHENSIVE    Collection Time: 11/03/22  5:48 AM   Result Value Ref Range    Sodium 140 136 - 145 mmol/L    Potassium 3.4 (L) 3.5 - 5.1 mmol/L    Chloride 112 (H) 97 - 108 mmol/L    CO2 22 21 - 32 mmol/L    Anion gap 6 5 - 15 mmol/L    Glucose 94 65 - 100 mg/dL    BUN 4 (L) 6 - 20 mg/dL    Creatinine 0.31 (L) 0.55 - 1.02 mg/dL    BUN/Creatinine ratio 13 12 - 20      eGFR >60 >60 ml/min/1.73m2    Calcium 7.9 (L) 8.5 - 10.1 mg/dL    Bilirubin, total 0.7 0.2 - 1.0 mg/dL    AST (SGOT) 16 15 - 37 U/L    ALT (SGPT) <6 (L) 12 - 78 U/L    Alk.  phosphatase 60 45 - 117 U/L    Protein, total 6.9 6.4 - 8.2 g/dL    Albumin 2.7 (L) 3.5 - 5.0 g/dL    Globulin 4.2 (H) 2.0 - 4.0 g/dL    A-G Ratio 0.6 (L) 1.1 - 2.2         CBC:   Lab Results   Component Value Date/Time    WBC 6.7 11/03/2022 05:48 AM    RBC 3.00 (L) 11/03/2022 05:48 AM    HGB 8.5 (L) 11/03/2022 05:48 AM    HCT 26.7 (L) 11/03/2022 05:48 AM    PLATELET 728 73/18/1485 05:48 AM     BMP:   Lab Results   Component Value Date/Time    Glucose 94 11/03/2022 05:48 AM    Sodium 140 11/03/2022 05:48 AM    Potassium 3.4 (L) 11/03/2022 05:48 AM    Chloride 112 (H) 11/03/2022 05:48 AM    CO2 22 11/03/2022 05:48 AM    BUN 4 (L) 11/03/2022 05:48 AM    Creatinine 0.31 (L) 11/03/2022 05:48 AM    Calcium 7.9 (L) 11/03/2022 05:48 AM       Assessment:   1. Rectal/vaginal fistula status post washout, rectovaginal fistula closure with drain placement and distal rectal repair day #3 and diverting colostomy day #1  2. Bladder cancer  3. Small bowel obstruction  4. Acute on chronic anemia  5. UTI  6. Extensive DVT bilateral lower extremity  7. Emphysema without exacerbation  8. Severe protein malnutrition  9. Hypotension    Plan:   1. Urology, GI, and general surgery consulted. GI also consulted. Patient currently on meropenem #11/14. Patient is status post diverting colostomy day #1. Pain management per general surgery. NG tube in place. With minimal output. 2.  Urology consulted. Continue supportive care. Urology and oncology following  3. Patient currently NPO. NG tube is in place. Minimal output  4. Hemoglobin yesterday dropped to 5.7. She was transfused 2 units of packed red blood cells. Total count of transfusion 3 units. Occult blood positive. Holding heparin drip. 5.  Continue fluconazole for fungal UTI. Urine culture grew Candida cifrrii. ID consulted. Completed 10 doses. 6.  Venous Doppler showed extensive clot burden. Patient already has an IVC filter placed. Started on a heparin drip however on hold due to bleeding.   IR did bilateral iliofemoral and IVC aspiration thrombectomy with removal of a large volume of thrombus from both legs extremities and IVC  7. Not in any respiratory distress. Oxygen saturation within normal limits  8. Patient agreeable to TPN. We will start. Concern for refeeding syndrome. BMP, Phos, mag every 6 hours. 9.  Blood pressure remained soft. As needed midodrine. On IV fluids  10. CBC, CMP in the a.m. Dispo: >48 hours. Barriers include return of bowel function, tolerating diet, nutritional improvement, PT and OT. Suspect patient will need a minimum of home health    CODE STATUS full     DVT prophylaxis: Hold due to bleeding/anemia  Ulcer prophylaxis: N.p.o.    Care Plan discussed with: Patient/Family, Nurse, and     Total time spent with patient: 34 minutes.

## 2022-11-03 NOTE — PROGRESS NOTES
Patient examined this morning. Vital signs stable  NG tube output minimal    Abdomen soft. Incisions clean dry colostomy has no output. Potassium 3.4      Intraoperatively patient was found to have a extensive small bowel dilatation. So patient has NG tube in place intraoperatively. Replace hypokalemia and give her 2 g of magnesium sulfate. Daily labs  Continue n.p.o. NG tube until colostomy works. We will continue monitor her progress.

## 2022-11-03 NOTE — OP NOTES
This is operative report on Ayad Lindsey, patient's YOB: 1965. Date of surgery: November 2, 2022    Surgeon: Elías Piña    Diagnosis:  Rectovaginal fistula repair. Postop diagnosis:  Extensive early postop adhesion of the sigmoid colon  Dilated small bowel loops. Procedure:  Laparoscopic loop colostomy placement using sigmoid colon  Laparoscopic lysis of adhesion      Anesthesia: General with endotracheal tube  Anesthesiologist: Dr. Thompson  EBL: 100 cc  Assistant: Mr. Mosqueda  Specimen: None  Complication: None  Implants: None  Urine output: Please see anesthesia record    Indication for procedure: Ms. Liam Go recently underwent rectovaginal fistula repair. Patient will need a diverting loop colostomy. Patient was discussed about this procedure, risks benefits and complication. Patient has signed surgical consent    Description procedure: Patient was brought to the operating table comfortable supine position. Followed by after anesthesia was initiated. Patient's abdomen was prepped usual sterile technique using Betadine solution. Diverting urostomy was protected with Tegaderm. Sterile drapes placed. Followed by timeout was called after confirmation was made procedure commenced. Small supraumbilical incision was made and [facility] trochars placed usual fashion. Pneumoperitoneum was insufflated up to 15 jaci. Patient small bowel was very distended showing signs of early small bowel obstruction. Additional 5 mm trochars placed left subcostal area and suprapubic area. Followed by descending colon and sigmoid colon was examined. Sigmoid colon and descending colon was adherent to the left gutter. Lysis of adhesion was performed and the sigmoid colon was mobilized. Followed by colostomy tunnel was made in the left lower quadrant area followed by loop of the sigmoid colon brought out extracorporeally.   Followed by colostomy bridge placed and the colostomy was matured with a 3-0 Vicryl and 3-0 silk sutures. Followed by [place] trocar site was closed with 0 Vicryl sutures and skin was closed with 4 Monocryl sutures. Sterile dressings applied. Colostomy appliance was placed. Patient tolerated procedure well without any complication. We had to place an NG tube during the surgery due to small bowel distention.

## 2022-11-03 NOTE — PROGRESS NOTES
Bedside, Verbal, and Written shift change report given to Debby Zamoar (oncoming nurse) by Carly Mora (offgoing nurse). Report included the following information SBAR, OR Summary, Procedure Summary, and MAR.

## 2022-11-03 NOTE — PROGRESS NOTES
Problem: Pain  Goal: *Control of Pain  Outcome: Progressing Towards Goal     Problem: Falls - Risk of  Goal: *Absence of Falls  Description: Document Iris Fall Risk and appropriate interventions in the flowsheet. Outcome: Progressing Towards Goal  Note: Fall Risk Interventions:  Mobility Interventions: Bed/chair exit alarm, OT consult for ADLs, PT Consult for mobility concerns, Patient to call before getting OOB, Strengthening exercises (ROM-active/passive)         Medication Interventions: Bed/chair exit alarm, Patient to call before getting OOB, Teach patient to arise slowly    Elimination Interventions: Bed/chair exit alarm, Call light in reach, Patient to call for help with toileting needs    History of Falls Interventions: Bed/chair exit alarm, Vital signs minimum Q4HRs X 24 hrs (comment for end date)         Problem: Patient Education: Go to Patient Education Activity  Goal: Patient/Family Education  Outcome: Progressing Towards Goal     Problem: Pressure Injury - Risk of  Goal: *Prevention of pressure injury  Description: Document Alphonso Scale and appropriate interventions in the flowsheet. Outcome: Progressing Towards Goal  Note: Pressure Injury Interventions:  Sensory Interventions: Minimize linen layers, Maintain/enhance activity level, Keep linens dry and wrinkle-free    Moisture Interventions: Minimize layers, Maintain skin hydration (lotion/cream), Absorbent underpads    Activity Interventions: PT/OT evaluation    Mobility Interventions: HOB 30 degrees or less, PT/OT evaluation, Turn and reposition approx.  every two hours(pillow and wedges)    Nutrition Interventions: Document food/fluid/supplement intake    Friction and Shear Interventions: HOB 30 degrees or less, Minimize layers

## 2022-11-03 NOTE — PROGRESS NOTES
OT tx attempted [phone] however pt declining EOB/OOB activity at this time s/t reports 8/10 abdominal pain. Will continue to follow and re-attempt OT at a later time as schedule allows. Thank you.

## 2022-11-03 NOTE — PROGRESS NOTES
Pt left colostomy placed today. NGT to right nares noted. Per report pt received 2 units of PRBC.  Report given to oncoming RN

## 2022-11-04 ENCOUNTER — APPOINTMENT (OUTPATIENT)
Dept: CT IMAGING | Age: 57
DRG: 231 | End: 2022-11-04
Attending: SURGERY
Payer: MEDICAID

## 2022-11-04 LAB
ANION GAP SERPL CALC-SCNC: 6 MMOL/L (ref 5–15)
ANION GAP SERPL CALC-SCNC: 7 MMOL/L (ref 5–15)
ANION GAP SERPL CALC-SCNC: 7 MMOL/L (ref 5–15)
ANION GAP SERPL CALC-SCNC: 8 MMOL/L (ref 5–15)
BASOPHILS # BLD: 0 K/UL (ref 0–0.1)
BASOPHILS NFR BLD: 1 % (ref 0–1)
BUN SERPL-MCNC: 5 MG/DL (ref 6–20)
BUN SERPL-MCNC: 6 MG/DL (ref 6–20)
BUN/CREAT SERPL: 15 (ref 12–20)
BUN/CREAT SERPL: 16 (ref 12–20)
BUN/CREAT SERPL: 17 (ref 12–20)
BUN/CREAT SERPL: 17 (ref 12–20)
CA-I BLD-MCNC: 8.2 MG/DL (ref 8.5–10.1)
CA-I BLD-MCNC: 8.3 MG/DL (ref 8.5–10.1)
CA-I BLD-MCNC: 8.5 MG/DL (ref 8.5–10.1)
CA-I BLD-MCNC: 8.5 MG/DL (ref 8.5–10.1)
CHLORIDE SERPL-SCNC: 108 MMOL/L (ref 97–108)
CHLORIDE SERPL-SCNC: 108 MMOL/L (ref 97–108)
CHLORIDE SERPL-SCNC: 109 MMOL/L (ref 97–108)
CHLORIDE SERPL-SCNC: 109 MMOL/L (ref 97–108)
CO2 SERPL-SCNC: 21 MMOL/L (ref 21–32)
CO2 SERPL-SCNC: 22 MMOL/L (ref 21–32)
CO2 SERPL-SCNC: 22 MMOL/L (ref 21–32)
CO2 SERPL-SCNC: 23 MMOL/L (ref 21–32)
CREAT SERPL-MCNC: 0.33 MG/DL (ref 0.55–1.02)
CREAT SERPL-MCNC: 0.35 MG/DL (ref 0.55–1.02)
CREAT SERPL-MCNC: 0.35 MG/DL (ref 0.55–1.02)
CREAT SERPL-MCNC: 0.37 MG/DL (ref 0.55–1.02)
DIFFERENTIAL METHOD BLD: ABNORMAL
EOSINOPHIL # BLD: 0 K/UL (ref 0–0.4)
EOSINOPHIL NFR BLD: 0 % (ref 0–7)
ERYTHROCYTE [DISTWIDTH] IN BLOOD BY AUTOMATED COUNT: 16.5 % (ref 11.5–14.5)
GLUCOSE BLD STRIP.AUTO-MCNC: 77 MG/DL (ref 65–100)
GLUCOSE SERPL-MCNC: 71 MG/DL (ref 65–100)
GLUCOSE SERPL-MCNC: 74 MG/DL (ref 65–100)
GLUCOSE SERPL-MCNC: 77 MG/DL (ref 65–100)
GLUCOSE SERPL-MCNC: 77 MG/DL (ref 65–100)
HCT VFR BLD AUTO: 29.7 % (ref 35–47)
HGB BLD-MCNC: 9.3 G/DL (ref 11.5–16)
IMM GRANULOCYTES # BLD AUTO: 0.4 K/UL (ref 0–0.04)
IMM GRANULOCYTES NFR BLD AUTO: 6 % (ref 0–0.5)
LYMPHOCYTES # BLD: 1.9 K/UL (ref 0.8–3.5)
LYMPHOCYTES NFR BLD: 30 % (ref 12–49)
MAGNESIUM SERPL-MCNC: 2.2 MG/DL (ref 1.6–2.4)
MAGNESIUM SERPL-MCNC: 2.2 MG/DL (ref 1.6–2.4)
MAGNESIUM SERPL-MCNC: 2.6 MG/DL (ref 1.6–2.4)
MAGNESIUM SERPL-MCNC: 2.6 MG/DL (ref 1.6–2.4)
MCH RBC QN AUTO: 28 PG (ref 26–34)
MCHC RBC AUTO-ENTMCNC: 31.3 G/DL (ref 30–36.5)
MCV RBC AUTO: 89.5 FL (ref 80–99)
MONOCYTES # BLD: 0.5 K/UL (ref 0–1)
MONOCYTES NFR BLD: 7 % (ref 5–13)
NEUTS SEG # BLD: 3.6 K/UL (ref 1.8–8)
NEUTS SEG NFR BLD: 57 % (ref 32–75)
NRBC # BLD: 0 K/UL (ref 0–0.01)
NRBC BLD-RTO: 0 PER 100 WBC
PERFORMED BY, TECHID: NORMAL
PHOSPHATE SERPL-MCNC: 0.6 MG/DL (ref 2.6–4.7)
PHOSPHATE SERPL-MCNC: 1.1 MG/DL (ref 2.6–4.7)
PHOSPHATE SERPL-MCNC: 1.1 MG/DL (ref 2.6–4.7)
PHOSPHATE SERPL-MCNC: 2 MG/DL (ref 2.6–4.7)
PLATELET # BLD AUTO: 208 K/UL (ref 150–400)
PMV BLD AUTO: 9.6 FL (ref 8.9–12.9)
POTASSIUM SERPL-SCNC: 3.4 MMOL/L (ref 3.5–5.1)
POTASSIUM SERPL-SCNC: 3.8 MMOL/L (ref 3.5–5.1)
POTASSIUM SERPL-SCNC: 3.8 MMOL/L (ref 3.5–5.1)
POTASSIUM SERPL-SCNC: 4.1 MMOL/L (ref 3.5–5.1)
RBC # BLD AUTO: 3.32 M/UL (ref 3.8–5.2)
SODIUM SERPL-SCNC: 136 MMOL/L (ref 136–145)
SODIUM SERPL-SCNC: 138 MMOL/L (ref 136–145)
WBC # BLD AUTO: 6.4 K/UL (ref 3.6–11)

## 2022-11-04 PROCEDURE — 74011000258 HC RX REV CODE- 258: Performed by: PHYSICIAN ASSISTANT

## 2022-11-04 PROCEDURE — 74011000258 HC RX REV CODE- 258: Performed by: INTERNAL MEDICINE

## 2022-11-04 PROCEDURE — 36415 COLL VENOUS BLD VENIPUNCTURE: CPT

## 2022-11-04 PROCEDURE — 80048 BASIC METABOLIC PNL TOTAL CA: CPT

## 2022-11-04 PROCEDURE — 74011250636 HC RX REV CODE- 250/636: Performed by: INTERNAL MEDICINE

## 2022-11-04 PROCEDURE — 82962 GLUCOSE BLOOD TEST: CPT

## 2022-11-04 PROCEDURE — 74011250636 HC RX REV CODE- 250/636: Performed by: SURGERY

## 2022-11-04 PROCEDURE — 84100 ASSAY OF PHOSPHORUS: CPT

## 2022-11-04 PROCEDURE — 83735 ASSAY OF MAGNESIUM: CPT

## 2022-11-04 PROCEDURE — 85025 COMPLETE CBC W/AUTO DIFF WBC: CPT

## 2022-11-04 PROCEDURE — 99024 POSTOP FOLLOW-UP VISIT: CPT | Performed by: SURGERY

## 2022-11-04 PROCEDURE — 97530 THERAPEUTIC ACTIVITIES: CPT

## 2022-11-04 PROCEDURE — 74011000250 HC RX REV CODE- 250: Performed by: SURGERY

## 2022-11-04 PROCEDURE — 74011250636 HC RX REV CODE- 250/636: Performed by: PHYSICIAN ASSISTANT

## 2022-11-04 PROCEDURE — 74176 CT ABD & PELVIS W/O CONTRAST: CPT

## 2022-11-04 PROCEDURE — 94762 N-INVAS EAR/PLS OXIMTRY CONT: CPT

## 2022-11-04 PROCEDURE — 74011000250 HC RX REV CODE- 250: Performed by: PHYSICIAN ASSISTANT

## 2022-11-04 PROCEDURE — 65270000029 HC RM PRIVATE

## 2022-11-04 PROCEDURE — 99232 SBSQ HOSP IP/OBS MODERATE 35: CPT | Performed by: INTERNAL MEDICINE

## 2022-11-04 RX ADMIN — CROMOLYN SODIUM 1 DROP: 40 SOLUTION/ DROPS OPHTHALMIC at 17:36

## 2022-11-04 RX ADMIN — HYDROMORPHONE HYDROCHLORIDE 1 MG: 1 INJECTION, SOLUTION INTRAMUSCULAR; INTRAVENOUS; SUBCUTANEOUS at 17:14

## 2022-11-04 RX ADMIN — HYDROMORPHONE HYDROCHLORIDE 1 MG: 1 INJECTION, SOLUTION INTRAMUSCULAR; INTRAVENOUS; SUBCUTANEOUS at 02:25

## 2022-11-04 RX ADMIN — HYDROMORPHONE HYDROCHLORIDE 1 MG: 1 INJECTION, SOLUTION INTRAMUSCULAR; INTRAVENOUS; SUBCUTANEOUS at 21:38

## 2022-11-04 RX ADMIN — MEROPENEM 1 G: 1 INJECTION, POWDER, FOR SOLUTION INTRAVENOUS at 17:23

## 2022-11-04 RX ADMIN — HYDROMORPHONE HYDROCHLORIDE 1 MG: 1 INJECTION, SOLUTION INTRAMUSCULAR; INTRAVENOUS; SUBCUTANEOUS at 11:34

## 2022-11-04 RX ADMIN — SODIUM CHLORIDE, PRESERVATIVE FREE 10 ML: 5 INJECTION INTRAVENOUS at 17:23

## 2022-11-04 RX ADMIN — SODIUM CHLORIDE, PRESERVATIVE FREE 10 ML: 5 INJECTION INTRAVENOUS at 21:38

## 2022-11-04 RX ADMIN — POTASSIUM PHOSPHATE, MONOBASIC POTASSIUM PHOSPHATE, DIBASIC: 224; 236 INJECTION, SOLUTION, CONCENTRATE INTRAVENOUS at 21:45

## 2022-11-04 RX ADMIN — THIAMINE HYDROCHLORIDE 100 MG: 100 INJECTION, SOLUTION INTRAMUSCULAR; INTRAVENOUS at 20:58

## 2022-11-04 RX ADMIN — SODIUM CHLORIDE, PRESERVATIVE FREE 10 ML: 5 INJECTION INTRAVENOUS at 06:43

## 2022-11-04 RX ADMIN — SODIUM CHLORIDE 125 ML/HR: 9 INJECTION, SOLUTION INTRAVENOUS at 11:34

## 2022-11-04 RX ADMIN — HYDROMORPHONE HYDROCHLORIDE 1 MG: 1 INJECTION, SOLUTION INTRAMUSCULAR; INTRAVENOUS; SUBCUTANEOUS at 06:39

## 2022-11-04 RX ADMIN — POTASSIUM PHOSPHATE, MONOBASIC POTASSIUM PHOSPHATE, DIBASIC: 224; 236 INJECTION, SOLUTION, CONCENTRATE INTRAVENOUS at 17:15

## 2022-11-04 RX ADMIN — MEROPENEM 1 G: 1 INJECTION, POWDER, FOR SOLUTION INTRAVENOUS at 08:00

## 2022-11-04 NOTE — PROGRESS NOTES
Patient up in the chair. Appears to be feeling better and states she is feeling better sitting up. Gibson bag attached to urostomy draining clear yellow urine, no leaking noted. NG tube in place and patent, min green colored output in tubing, nothing in cannister at this time. Spoke with patient about CT scan and NPO status. Patient states she wants to get up to chair everyday. Peripheral IV in R hand/wrist leaking, will have removed. Will continue to monitor and treat accordingly.

## 2022-11-04 NOTE — PROGRESS NOTES
Problem: Mobility Impaired (Adult and Pediatric)  Goal: *Acute Goals and Plan of Care (Insert Text)  Description: FUNCTIONAL STATUS PRIOR TO ADMISSION: Patient was independent without use of DME.    HOME SUPPORT PRIOR TO ADMISSION: The patient lived alone with neice to provide assistance. Physical Therapy Goals  Revised 11/4/22- Continue with goals  Initiated 10/26/2022  1. Patient will move from supine to sit and sit to supine  in bed with modified independence within 7 day(s). 2.  Patient will transfer from bed to chair and chair to bed with modified independence using the least restrictive device within 7 day(s). 3.  Patient will perform sit to stand with modified independence within 7 day(s). 4.  Patient will ambulate with modified independence for 100-150 feet with the least restrictive device within 7 day(s). 5.  Patient will ascend/descend 12 stairs with 1 handrail(s) with modified independence within 7 day(s). Outcome: Not Met    PHYSICAL THERAPY TREATMENT: WEEKLY REASSESSMENT  Patient: Sailaja Vogel (4 y.o. female)  Date: 11/4/2022  Primary Diagnosis: Acute blood loss anemia [D62]  Small bowel obstruction (HCC) [K56.609]  Procedure(s) (LRB):  LAPAROSCOPIC LOOP COLOSTOMY PLACEMENT (N/A) 2 Days Post-Op   Precautions: falls         ASSESSMENT  Patient initially evaluated by PT on 10/26/22. Pt received semi-supine, NG tube in place, A&O x self, place, disoriented to time, agreeable for PT reassessment sec to LOS and s/p vaginal wound washout, rectovaginal fistula closure with drain placement, distal rectal repair on 10/31 and s/p laparoscopic loop colostomy placement on 11/2. Based on current observations, pt continues to present with c/o pain at abdomen- 10/10, deficits in generalized strength/AROM, static/dynamic standing balance, functional activity tolerance impacting overall performance of functional transfers/mobility.  Pt found with leakage from IV site and urostomy bag, babar made aware. Pt required assist for change of gown, socks and brief in sitting/standing ( Pls see OT note for details). Pt requires SBA and additional for bed mobility with cues for hand placement, Marcella for STS and functional tranfers, able to ambulate- 25' with RW, CGA , demonstrates slow coy and decreased step length b/l Le. Overall, pt tolerates session fair today, progressing slowly towards the goals, is motivated to participate in therapy, would benefit from continued skilled PT services to address noted deficits and maximize IND/safety with  functional transfers/mobility. PT goals, POC reviewed and remains appropriate at this time. PT recommending IRF at discharge once medically appropriate. Current Level of Function Impacting Discharge (mobility/balance): Pt requires Marcella for transfers and CGA for ambulation with walker    Functional Outcome Measure: The patient scored 18 on the AM PAC outcome measure which is indicative of medium complexity. Other factors to consider for discharge: severity of deficits, time since onset         PLAN :  Goals have been updated based on progression since last assessment. Patient continues to benefit from skilled intervention to address the above impairments. Recommendations and Planned Interventions: bed mobility training, transfer training, gait training, therapeutic exercises, neuromuscular re-education, patient and family training/education, and therapeutic activities      Frequency/Duration: Patient will be followed by physical therapy:  5 times a week to address goals. Recommendation for discharge: (in order for the patient to meet his/her long term goals)  Therapy 3 hours per day 5-7 days per week    This discharge recommendation:  Has been made in collaboration with the attending provider and/or case management    IF patient discharges home will need the following DME: rolling walker         SUBJECTIVE:   Patient stated My abdomen hurts.     OBJECTIVE DATA SUMMARY:   HISTORY:    Past Medical History:   Diagnosis Date    Anemia     pt states had recent blood transfusion 2022    Back pain     Cancer (HCC)     bladder    DVT (deep venous thrombosis) (HCC)     and PE, pt denies lung PE , only leg several years ago    Hepatitis C     pt states dx 1 month ago    Liver disease     Presence of IVC filter     pt states several years ago    Rheumatoid arthritis ([facility] [address]     Sciatic leg pain     pt states both legs    Uterine fibroid      Past Surgical History:   Procedure Laterality Date    HX APPENDECTOMY  10/03/2022    HX  SECTION      HX GYN  10/03/2022    ROBOT ANTERIOR PELVIC EXENTERATION    HX HYSTERECTOMY  10/03/2022    HX SALPINGO-OOPHORECTOMY Bilateral 10/03/2022    HX UROLOGICAL  09/15/2022    CYSTOSCOPY    HX UROLOGICAL  09/15/2022    URETHRAL DILATION    HX UROLOGICAL  09/15/2022    URETERAL STENT PLACEMENT    HX UROLOGICAL  09/15/2022    TRANSURETHRAL RESECTION OF BLADDER TUMOR >2CM    HX UROLOGICAL Bilateral 09/15/2022    RETROGRADE PYELOGRAM    HX UROLOGICAL  10/03/2022    PELVIC LYMPH NODE DISSECTION    HX UROLOGICAL  10/03/2022    ILEAL CONDUIT URINARY DIVERSION    IR THROMBECTOMY [facility] VEIN W PHARM  10/27/2022    MD CARDIAC SURG PROCEDURE UNLIST      stent placement       Personal factors and/or comorbidities impacting plan of care:     Home Situation  Home Environment: Apartment (2nd floor)  # Steps to Enter: 10  Rails to Enter: Yes  Hand Rails : Bilateral  One/Two Story Residence: One story  Living Alone: Yes  Support Systems: Friend/Neighbor  Patient Expects to be Discharged to[de-identified] Rehab hospital/unit acute  Current DME Used/Available at Home: None (pt states she just a got a w/c and BSC)    EXAMINATION/PRESENTATION/DECISION MAKING:   Critical Behavior:  Neurologic State: Alert  Orientation Level: Oriented to person, Oriented to place, Disoriented to time  Cognition: Follows commands  Safety/Judgement: Decreased awareness of environment  Hearing: Auditory  Auditory Impairment: None  Skin:  intact where exposed    Range Of Motion:  AROM: (P) Generally decreased, functional    Strength:    Strength: (P) Generally decreased, functional    Tone & Sensation:   Tone: Normal  Sensation: (P) Intact    Coordination:  Coordination: (P) Generally decreased, functional    Functional Mobility:  Bed Mobility:  Rolling: Stand-by assistance  Supine to Sit: Stand-by assistance; Additional time     Scooting: Stand-by assistance  Transfers:  Sit to Stand: Minimum assistance  Stand to Sit: Minimum assistance        Bed to Chair: Minimum assistance    Balance:   Sitting: Intact; With support  Standing: Impaired; With support  Standing - Static: Good;Constant support  Standing - Dynamic : Fair;Constant support  Ambulation/Gait Training:  Distance (ft): 25 Feet (ft)  Assistive Device: Walker, rolling;Gait belt  Ambulation - Level of Assistance: Contact guard assistance    Speed/Vera: Slow  Step Length: Right shortened;Left shortened         Functional Measure:  [facility] AM-PAC 6 Clicks         Basic Mobility Inpatient Short Form  How much difficulty does the patient currently have. .. Unable A Lot A Little None   1. Turning over in bed (including adjusting bedclothes, sheets and blankets)? [] 1   [] 2   [x] 3   [] 4   2. Sitting down on and standing up from a chair with arms ( e.g., wheelchair, bedside commode, etc.)   [] 1   [] 2   [x] 3   [] 4   3. Moving from lying on back to sitting on the side of the bed? [] 1   [] 2   [x] 3   [] 4          How much help from another person does the patient currently need. .. Total A Lot A Little None   4. Moving to and from a bed to a chair (including a wheelchair)? [] 1   [] 2   [x] 3   [] 4   5. Need to walk in hospital room? [] 1   [] 2   [x] 3   [] 4   6. Climbing 3-5 steps with a railing? [] 1   [] 2   [x] 3   [] 4   © 2007, Trustees of [facility], under license to [employer]. All rights reserved     Score: 18/24    Interpretation of Tool:  Represents activities that are increasingly more difficult (i.e. Bed mobility, Transfers, Gait). Cutoff score 42.9 (18) correlates to a good likelihood of discharging home versus a facility  Raw Score Scale Score Scale Score Standard Error Approximate Degree of Functional Impairment   6 23.55 4.57 100%   7 26.42 4.33 92%   8 28.58 4.04 87%   9 30.55 3.69 81%   10 32.29 3.42 77%   11 33.86 3.22 73%   12 35.33 3.08 69%   13 36.74 2.99 65%   14 38.10 2.95 61%   15 39.45 2.93 58%   16 40.78 2.95 54%   17 42.13 3.03 51%   18 43.63 3.20 47%   19 45.44 3.55 42%   20 47.67 4.06 36%   21 50.25 4.69 29%   22 53.28 5.43 21%   23 56.93 6.22 11%   24 61.14 6.94 0%   Ita Rollene Rather, Cathy Antis, Silvio Tucker Heap, Mitchell Marinas. Edwin Rivera, AM-PAC 6-Clicks Functional Assessment Scores Predict [address] Discharge Destination, Physical Therapy, Volume 94, Issue 9, 1 September 2014, Pages 4004-4091, [email or web address]       Pain Rating:  Pain at abdomen- 10/10    Activity Tolerance:   Fair  Please refer to the flowsheet for vital signs taken during this treatment. After treatment patient left in no apparent distress:   Sitting in chair and Call bell within reach    COMMUNICATION/EDUCATION:   The patients plan of care was discussed with: Occupational therapist and Registered nurse. Fall prevention education was provided and the patient/caregiver indicated understanding. and Patient/family agree to work toward stated goals and plan of care. PT/OT session occurred together for increased pt's safety and maximum functional outcome.   Thank you for this referral.  Dinesh Shin   Time Calculation: 40 mins

## 2022-11-04 NOTE — PROGRESS NOTES
Bedside, Verbal, and Written shift change report given to Francisca (oncoming nurse) by Yoel Tang (offgoing nurse). Report included the following information SBAR, OR Summary, Procedure Summary, and MAR.

## 2022-11-04 NOTE — PROGRESS NOTES
Patient examined this morning. Patient appears comfortable. NG tube output is minimal.    Abdomen soft colostomy has no output. Urostomy intact. I will schedule CT scan abdomen pelvis today. Continue n.p.o. NG tube and adequate hydration.

## 2022-11-04 NOTE — PROGRESS NOTES
UROLOGY Progress Note         [phone]      Daily Progress Note: 11/4/2022      Subjective: The patient is seen for UROLOGIC follow up for bladder cancer, post-cystectomy issues. She is s/p on cystectomy, ileal conduit diversion, pelvic exenteration on 10/3/22. Complicated by rectal injury; repaired at time of surgery. Stents removed in ER on 10/13/22. Readmitted on 10/19/22 with anemia, SBO, weakness, nausea. She was found to have some vaginal bleeding and discharge at that time. DVT hx with thigh and foot pain on admission. Extensive bilateral iliac venous thrombus to the level of the IVC filter. Right  lower extremity edema on CT 10/23/22. Now s/p thrombectomy in IR on 10/27/22. 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 Not seen on CT 10/23/22. Continued vaginal drainage with stool-like appearance. 10/31/22: return to OR for vaginal wash out, suspected cuff dehiscence (confirmed). She is s/p rectovaginal fistula closure with drain placement and distal rectal repair by Dr. Dima Huertas. Now s/p laparoscopic loop colostomy with Dr. Dima Huertas. She is waiting for NGT removal.  Her main complaint is the tube.     Problem List:  Patient Active Problem List   Diagnosis Code    History of blood clots Z86.718    Bladder cancer ([facility] [address] C67.9    Anemia due to acute blood loss D62    Hypercalcemia E83.52    Hydronephrosis of right kidney N13.30    Retained ureteral stent Z96.0    Severe protein-calorie malnutrition (HCC) E43    Acute blood loss anemia D62    Small bowel obstruction (HCC) K56.609    Status post robot-assisted surgical procedure, pelvic exenteration Z98.890    Vaginal cuff cellulitis N76.0         Medications reviewed  Current Facility-Administered Medications   Medication Dose Route Frequency    fat emulsion 20% (LIPOSYN, INTRAlipid) infusion 250 mL  250 mL IntraVENous CONTINUOUS    TPN ADULT - CENTRAL AA 5% D20% W/ CA + ELECTROLYTES   IntraVENous CONTINUOUS    sodium chloride (NS) flush 5-40 mL 5-40 mL IntraVENous Q8H    sodium chloride (NS) flush 5-40 mL  5-40 mL IntraVENous PRN    enoxaparin (LOVENOX) injection 40 mg  40 mg SubCUTAneous DAILY    0.9% sodium chloride infusion  125 mL/hr IntraVENous CONTINUOUS    HYDROmorphone (DILAUDID) syringe 0.25 mg  0.25 mg IntraVENous Q3H PRN    Or    HYDROmorphone (DILAUDID) injection 1 mg  1 mg IntraVENous Q4H PRN    megestroL (MEGACE) tablet 20 mg  20 mg Oral TID WITH MEALS    heparin (porcine) 1,000 unit/mL injection 2,000 Units  2,000 Units IntraVENous Rad Multiple    meropenem (MERREM) 1 g in 0.9% sodium chloride (MBP/ADV) 50 mL MBP  1 g IntraVENous Q8H    midodrine (PROAMATINE) tablet 5 mg  5 mg Oral TID PRN    [Held by provider] heparin 25,000 units in D5W 250 ml infusion  500 Units/hr IntraVENous TITRATE    oxyCODONE IR (ROXICODONE) tablet 5 mg  5 mg Oral Q6H PRN    cromolyn (OPTICROM) 4 % ophthalmic solution 1 Drop  1 Drop Both Eyes DAILY    acetaminophen (TYLENOL) tablet 650 mg  650 mg Oral Q6H PRN    Or    acetaminophen (TYLENOL) suppository 650 mg  650 mg Rectal Q6H PRN    polyethylene glycol (MIRALAX) packet 17 g  17 g Oral DAILY PRN    ondansetron (ZOFRAN ODT) tablet 4 mg  4 mg Oral Q8H PRN    Or    ondansetron (ZOFRAN) injection 4 mg  4 mg IntraVENous Q6H PRN       Review of Systems:   Review of Systems   Constitutional:  Negative for fever. Gastrointestinal:  Positive for abdominal pain. Negative for nausea and vomiting. Objective:   Physical Exam  Vitals and nursing note reviewed. Constitutional:       General: She is not in acute distress. Appearance: She is ill-appearing. Abdominal:      Comments: Ileal conduit, colostomy  NGT with bilious output   Skin:     General: Skin is warm and dry. Neurological:      Mental Status: She is alert and oriented to person, place, and time.    Psychiatric:         Behavior: Behavior normal.        Visit Vitals  BP (!) 146/71 (BP 1 Location: Left lower arm, BP Patient Position: Sitting)   Pulse 62   Temp 97.4 °F (36.3 °C)   Resp 16   Ht 5' 2\" (1.575 m)   Wt 114 lb (51.7 kg)   SpO2 100%   BMI 20.85 kg/m²         Data Review:       Recent Days:  Recent Labs     11/04/22  0508 11/03/22  0548 11/02/22  1111   WBC 6.4 6.7 7.0   HGB 9.3* 8.5* 5.7*   HCT 29.7* 26.7* 18.6*    191 190     Recent Labs     11/04/22  0508 11/04/22  0015 11/03/22  1812 11/03/22  0548 11/02/22  1611 11/02/22  1440 11/02/22  1111    138 136 140  --  138 137   K 3.8 4.1 5.5* 3.4*  --  3.1* 3.1*   * 109* 107 112*  --  107 104   CO2 22 23 23 22  --  27 27   GLU 74 77 84 94  --  81 80   BUN 6 6 6 4*  --  7 7   CREA 0.35* 0.37* 0.38* 0.31*  --  0.43* 0.41*   CA 8.5 8.2* 8.4* 7.9*  --  8.8 9.3   MG 2.6* 2.6* 2.8*  --   --   --   --    PHOS 1.1* 1.1* 1.0*  --   --   --   --    ALB  --   --   --  2.7*  --  3.1* 3.3*   TBILI  --   --   --  0.7  --  0.7 0.8   ALT  --   --   --  <6*  --  10* <6*   INR  --   --   --   --  1.3*  --   --        24 Hour Results:  Recent Results (from the past 24 hour(s))   PHOSPHORUS    Collection Time: 11/03/22  6:12 PM   Result Value Ref Range    Phosphorus 1.0 (L) 2.6 - 4.7 mg/dL   MAGNESIUM    Collection Time: 11/03/22  6:12 PM   Result Value Ref Range    Magnesium 2.8 (H) 1.6 - 2.4 mg/dL   METABOLIC PANEL, BASIC    Collection Time: 11/03/22  6:12 PM   Result Value Ref Range    Sodium 136 136 - 145 mmol/L    Potassium 5.5 (H) 3.5 - 5.1 mmol/L    Chloride 107 97 - 108 mmol/L    CO2 23 21 - 32 mmol/L    Anion gap 6 5 - 15 mmol/L    Glucose 84 65 - 100 mg/dL    BUN 6 6 - 20 mg/dL    Creatinine 0.38 (L) 0.55 - 1.02 mg/dL    BUN/Creatinine ratio 16 12 - 20      eGFR >60 >60 ml/min/1.73m2    Calcium 8.4 (L) 8.5 - 10.1 mg/dL   PHOSPHORUS    Collection Time: 11/04/22 12:15 AM   Result Value Ref Range    Phosphorus 1.1 (L) 2.6 - 4.7 mg/dL   MAGNESIUM    Collection Time: 11/04/22 12:15 AM   Result Value Ref Range    Magnesium 2.6 (H) 1.6 - 2.4 mg/dL   METABOLIC PANEL, BASIC    Collection Time: 11/04/22 12:15 AM   Result Value Ref Range    Sodium 138 136 - 145 mmol/L    Potassium 4.1 3.5 - 5.1 mmol/L    Chloride 109 (H) 97 - 108 mmol/L    CO2 23 21 - 32 mmol/L    Anion gap 6 5 - 15 mmol/L    Glucose 77 65 - 100 mg/dL    BUN 6 6 - 20 mg/dL    Creatinine 0.37 (L) 0.55 - 1.02 mg/dL    BUN/Creatinine ratio 16 12 - 20      eGFR >60 >60 ml/min/1.73m2    Calcium 8.2 (L) 8.5 - 10.1 mg/dL   GLUCOSE, POC    Collection Time: 11/04/22  1:04 AM   Result Value Ref Range    Glucose (POC) 77 65 - 100 mg/dL    Performed by [address] [place] [address] KANE, [facility]    Collection Time: 11/04/22  5:08 AM   Result Value Ref Range    Sodium 138 136 - 145 mmol/L    Potassium 3.8 3.5 - 5.1 mmol/L    Chloride 109 (H) 97 - 108 mmol/L    CO2 22 21 - 32 mmol/L    Anion gap 7 5 - 15 mmol/L    Glucose 74 65 - 100 mg/dL    BUN 6 6 - 20 mg/dL    Creatinine 0.35 (L) 0.55 - 1.02 mg/dL    BUN/Creatinine ratio 17 12 - 20      eGFR >60 >60 ml/min/1.73m2    Calcium 8.5 8.5 - 10.1 mg/dL   CBC WITH AUTOMATED DIFF    Collection Time: 11/04/22  5:08 AM   Result Value Ref Range    WBC 6.4 3.6 - 11.0 K/uL    RBC 3.32 (L) 3.80 - 5.20 M/uL    HGB 9.3 (L) 11.5 - 16.0 g/dL    HCT 29.7 (L) 35.0 - 47.0 %    MCV 89.5 80.0 - 99.0 FL    MCH 28.0 26.0 - 34.0 PG    MCHC 31.3 30.0 - 36.5 g/dL    RDW 16.5 (H) 11.5 - 14.5 %    PLATELET 142 044 - 941 K/uL    MPV 9.6 8.9 - 12.9 FL    NRBC 0.0 0.0  WBC    ABSOLUTE NRBC 0.00 0.00 - 0.01 K/uL    NEUTROPHILS PENDING %    LYMPHOCYTES PENDING %    MONOCYTES PENDING %    EOSINOPHILS PENDING %    BASOPHILS PENDING %    IMMATURE GRANULOCYTES PENDING %    ABS. NEUTROPHILS PENDING K/UL    ABS. LYMPHOCYTES PENDING K/UL    ABS. MONOCYTES PENDING K/UL    ABS. EOSINOPHILS PENDING K/UL    ABS. BASOPHILS PENDING K/UL    ABS. IMM. GRANS.  PENDING K/UL    DF PENDING    MAGNESIUM    Collection Time: 11/04/22  5:08 AM   Result Value Ref Range    Magnesium 2.6 (H) 1.6 - 2.4 mg/dL   PHOSPHORUS    Collection Time: 11/04/22  5:08 AM   Result Value Ref Range    Phosphorus 1.1 (L) 2.6 - 4.7 mg/dL           Assessment/     Patient Active Problem List   Diagnosis Code    History of blood clots Z86.718    Bladder cancer (HCC) C67.9    Anemia due to acute blood loss D62    Hypercalcemia E83.52    Hydronephrosis of right kidney N13.30    Retained ureteral stent Z96.0    Severe protein-calorie malnutrition (HCC) E43    Acute blood loss anemia D62    Small bowel obstruction (HCC) K56.609    Status post robot-assisted surgical procedure, pelvic exenteration Z98.890    Vaginal cuff cellulitis N76.0       Plan:    BLADDER CANCER S/P CYSTECTOMY WITH ILEAL CONDUIT: She is s/p on cystectomy, ileal conduit diversion, pelvic exenteration on 10/3/22. RECTOVAGINAL FISTULA: 10/31/22 she returned to OR for vaginal wash out, suspected cuff dehiscence (confirmed). She is s/p rectovaginal fistula closure with drain placement and distal rectal repair by Dr. Guero Myers. Now s/p laparoscopic loop colostomy with Dr. Guero Myers on 11/2/22. DVT: DVT hx with thigh and foot pain on admission. Extensive bilateral iliac venous thrombus to the level of the IVC filter. Right  lower extremity edema on CT 10/23/22. Now s/p thrombectomy in IR on 10/27/22. FUNGAL UTI: Candida Ciferrii isolated from culture, now s/p 14 days of Fluconazole, ID following. MALNUTRITION: agreeable to TPN.       Mariya Angeles NP

## 2022-11-04 NOTE — PROGRESS NOTES
Hospitalist Progress Note    Subjective:   Daily Progress Note: 11/4/2022 9:56 AM    Hospital Course: Patient is a 61 y.o. female with PMH of bladder cancer s/p cystectomy, hysterectomy, BSO and ileal conduit diversion, anemia, hep C, and left leg DVT s/p IVC filter years ago. She presented to the ED with chief complaint of weakness and nausea for the past week. She reports unable to tolerate PO intake, due to profound nausea, abdominal pain, fever, chills, vomiting or diarrhea. In addition, she also reports unable to walk due to weakness and left inner thigh and left foot pain. Denies trauma. She was seen here on 10/13/2022 with urostomy complications, status post ileal conduit and discharged home on Cipro which she completed. In addition, she also reports having vaginal bleed since surgery 2 weeks ago. Which appears to be more related to a fistula with stool present. CT scan of the abdomen pelvis was performed revealing no obvious rectovaginal fistula although there was air present tracking in that direction without any extravasation of contrast.  There was extensive bilateral iliac venous thrombus to the level of the IVC filter with right leg edema. Cholelithiasis with a distended gallbladder was also noted. Bilateral hydronephrosis with hydroureter most likely from the ileal conduit. Probable small bowel obstruction secondary to adhesions also noted. Patient was found to be anemic and received 1 unit of packed red blood cells. Venous duplex revealed extensive clot burden within the right leg and left leg. Urinalysis was consistent with Candida infection. ID consultation. Surgical consultation, Dr. Joelle Soria. UCX grew michell ciferrii, continue fluconazole. IV heparin being held due to blood loss, hem positive stool,.   Discussed case with interventional radiology who will further investigate the fistula with recommendations and mechanical thrombectomy performed of the bilateral lower extremities with a large volume clot removed. Patient went to the OR on 10/31/2022 for vaginal wound washout, rectovaginal fistula closure with drain placement, distal rectal repair. Believe that patient will need diverting colostomy and general surgery is speaking to patient's niece. She is currently NPO. Give IV albumin. On IV fluids. Megace for appetite stimulant. Patient is now agreeable for laparoscopic loop colostomy placement that will be performed on 11/2/2022. NG tube placed postoperatively. TPN initiated on 11/3/2022. Concern for refeeding syndrome and BMP, mag, Phos every 6 hours    Subjective: No acute issues overnight.      Current Facility-Administered Medications   Medication Dose Route Frequency    fat emulsion 20% (LIPOSYN, INTRAlipid) infusion 250 mL  250 mL IntraVENous CONTINUOUS    TPN ADULT - CENTRAL AA 5% D20% W/ CA + ELECTROLYTES   IntraVENous CONTINUOUS    sodium chloride (NS) flush 5-40 mL  5-40 mL IntraVENous Q8H    sodium chloride (NS) flush 5-40 mL  5-40 mL IntraVENous PRN    enoxaparin (LOVENOX) injection 40 mg  40 mg SubCUTAneous DAILY    0.9% sodium chloride infusion  125 mL/hr IntraVENous CONTINUOUS    HYDROmorphone (DILAUDID) syringe 0.25 mg  0.25 mg IntraVENous Q3H PRN    Or    HYDROmorphone (DILAUDID) injection 1 mg  1 mg IntraVENous Q4H PRN    megestroL (MEGACE) tablet 20 mg  20 mg Oral TID WITH MEALS    heparin (porcine) 1,000 unit/mL injection 2,000 Units  2,000 Units IntraVENous Rad Multiple    meropenem (MERREM) 1 g in 0.9% sodium chloride (MBP/ADV) 50 mL MBP  1 g IntraVENous Q8H    midodrine (PROAMATINE) tablet 5 mg  5 mg Oral TID PRN    [Held by provider] heparin 25,000 units in D5W 250 ml infusion  500 Units/hr IntraVENous TITRATE    oxyCODONE IR (ROXICODONE) tablet 5 mg  5 mg Oral Q6H PRN    cromolyn (OPTICROM) 4 % ophthalmic solution 1 Drop  1 Drop Both Eyes DAILY    acetaminophen (TYLENOL) tablet 650 mg  650 mg Oral Q6H PRN    Or    acetaminophen (TYLENOL) suppository 650 mg  650 mg Rectal Q6H PRN    polyethylene glycol (MIRALAX) packet 17 g  17 g Oral DAILY PRN    ondansetron (ZOFRAN ODT) tablet 4 mg  4 mg Oral Q8H PRN    Or    ondansetron (ZOFRAN) injection 4 mg  4 mg IntraVENous Q6H PRN        Review of Systems  Constitutional: No fevers, No chills, No sweats, ++ fatigue, ++Weakness  Eyes: No redness  Ears, nose, mouth, throat, and face: No nasal congestion, No sore throat, No voice change  Respiratory: No Shortness of Breath, No cough, No wheezing  Cardiovascular: No chest pain, No palpitations, No extremity edema  Gastrointestinal: No nausea, No vomiting, No diarrhea, No abdominal pain  Genitourinary: No frequency, No dysuria, No hematuria  Integument/breast: No skin lesion(s)   Neurological: No Confusion, No headaches, No dizziness      Objective:     Visit Vitals  BP (!) 146/71 (BP 1 Location: Left lower arm, BP Patient Position: Sitting)   Pulse 62   Temp 97.4 °F (36.3 °C)   Resp 16   Ht 5' 2\" (1.575 m)   Wt 51.7 kg (114 lb)   LMP  (LMP Unknown)   SpO2 100%   BMI 20.85 kg/m²      O2 Device: None (Room air)    Temp (24hrs), Av.3 °F (36.3 °C), Min:97.2 °F (36.2 °C), Max:97.4 °F (36.3 °C)      No intake/output data recorded.  1901 -  0700  In: 1025 [I.V.:1025]  Out: 4956 [Urine:3930; Drains:8]    PHYSICAL EXAM:  Constitutional: No acute distress, cachectic  Skin: Extremities and face reveal no rashes. HEENT: Sclerae anicteric. Extra-occular muscles are intact. NG tube in   Cardiovascular: RRR  Respiratory:  Clear breath sounds bilaterally with no wheezes, rales, or rhonchi. GI: Abdomen nondistended, ostomy and colostomy bags noted  Musculoskeletal: No pitting edema of the lower legs. Able to move all ext  Neurological:  Patient is alert and oriented.  Cranial nerves II-XII grossly intact  Psychiatric: Depressed mood/flat affect      Data Review    Recent Results (from the past 24 hour(s))   PHOSPHORUS    Collection Time: 22  6:12 PM   Result Value Ref Range Phosphorus 1.0 (L) 2.6 - 4.7 mg/dL   MAGNESIUM    Collection Time: 11/03/22  6:12 PM   Result Value Ref Range    Magnesium 2.8 (H) 1.6 - 2.4 mg/dL   METABOLIC PANEL, BASIC    Collection Time: 11/03/22  6:12 PM   Result Value Ref Range    Sodium 136 136 - 145 mmol/L    Potassium 5.5 (H) 3.5 - 5.1 mmol/L    Chloride 107 97 - 108 mmol/L    CO2 23 21 - 32 mmol/L    Anion gap 6 5 - 15 mmol/L    Glucose 84 65 - 100 mg/dL    BUN 6 6 - 20 mg/dL    Creatinine 0.38 (L) 0.55 - 1.02 mg/dL    BUN/Creatinine ratio 16 12 - 20      eGFR >60 >60 ml/min/1.73m2    Calcium 8.4 (L) 8.5 - 10.1 mg/dL   PHOSPHORUS    Collection Time: 11/04/22 12:15 AM   Result Value Ref Range    Phosphorus 1.1 (L) 2.6 - 4.7 mg/dL   MAGNESIUM    Collection Time: 11/04/22 12:15 AM   Result Value Ref Range    Magnesium 2.6 (H) 1.6 - 2.4 mg/dL   METABOLIC PANEL, BASIC    Collection Time: 11/04/22 12:15 AM   Result Value Ref Range    Sodium 138 136 - 145 mmol/L    Potassium 4.1 3.5 - 5.1 mmol/L    Chloride 109 (H) 97 - 108 mmol/L    CO2 23 21 - 32 mmol/L    Anion gap 6 5 - 15 mmol/L    Glucose 77 65 - 100 mg/dL    BUN 6 6 - 20 mg/dL    Creatinine 0.37 (L) 0.55 - 1.02 mg/dL    BUN/Creatinine ratio 16 12 - 20      eGFR >60 >60 ml/min/1.73m2    Calcium 8.2 (L) 8.5 - 10.1 mg/dL   GLUCOSE, POC    Collection Time: 11/04/22  1:04 AM   Result Value Ref Range    Glucose (POC) 77 65 - 100 mg/dL    Performed by [address] [place] [address], BASIC    Collection Time: 11/04/22  5:08 AM   Result Value Ref Range    Sodium 138 136 - 145 mmol/L    Potassium 3.8 3.5 - 5.1 mmol/L    Chloride 109 (H) 97 - 108 mmol/L    CO2 22 21 - 32 mmol/L    Anion gap 7 5 - 15 mmol/L    Glucose 74 65 - 100 mg/dL    BUN 6 6 - 20 mg/dL    Creatinine 0.35 (L) 0.55 - 1.02 mg/dL    BUN/Creatinine ratio 17 12 - 20      eGFR >60 >60 ml/min/1.73m2    Calcium 8.5 8.5 - 10.1 mg/dL   CBC WITH AUTOMATED DIFF    Collection Time: 11/04/22  5:08 AM   Result Value Ref Range    WBC 6.4 3.6 - 11.0 K/uL    RBC 3.32 (L) 3.80 - 5.20 M/uL    HGB 9.3 (L) 11.5 - 16.0 g/dL    HCT 29.7 (L) 35.0 - 47.0 %    MCV 89.5 80.0 - 99.0 FL    MCH 28.0 26.0 - 34.0 PG    MCHC 31.3 30.0 - 36.5 g/dL    RDW 16.5 (H) 11.5 - 14.5 %    PLATELET 285 327 - 816 K/uL    MPV 9.6 8.9 - 12.9 FL    NRBC 0.0 0.0  WBC    ABSOLUTE NRBC 0.00 0.00 - 0.01 K/uL    NEUTROPHILS PENDING %    LYMPHOCYTES PENDING %    MONOCYTES PENDING %    EOSINOPHILS PENDING %    BASOPHILS PENDING %    IMMATURE GRANULOCYTES PENDING %    ABS. NEUTROPHILS PENDING K/UL    ABS. LYMPHOCYTES PENDING K/UL    ABS. MONOCYTES PENDING K/UL    ABS. EOSINOPHILS PENDING K/UL    ABS. BASOPHILS PENDING K/UL    ABS. IMM. GRANS. PENDING K/UL    DF PENDING    MAGNESIUM    Collection Time: 11/04/22  5:08 AM   Result Value Ref Range    Magnesium 2.6 (H) 1.6 - 2.4 mg/dL   PHOSPHORUS    Collection Time: 11/04/22  5:08 AM   Result Value Ref Range    Phosphorus 1.1 (L) 2.6 - 4.7 mg/dL       CBC:   Lab Results   Component Value Date/Time    WBC 6.4 11/04/2022 05:08 AM    RBC 3.32 (L) 11/04/2022 05:08 AM    HGB 9.3 (L) 11/04/2022 05:08 AM    HCT 29.7 (L) 11/04/2022 05:08 AM    PLATELET 079 47/14/0362 05:08 AM     BMP:   Lab Results   Component Value Date/Time    Glucose 74 11/04/2022 05:08 AM    Sodium 138 11/04/2022 05:08 AM    Potassium 3.8 11/04/2022 05:08 AM    Chloride 109 (H) 11/04/2022 05:08 AM    CO2 22 11/04/2022 05:08 AM    BUN 6 11/04/2022 05:08 AM    Creatinine 0.35 (L) 11/04/2022 05:08 AM    Calcium 8.5 11/04/2022 05:08 AM       Assessment:   1. Rectal/vaginal fistula status post washout, rectovaginal fistula closure with drain placement and distal rectal repair day #4 and diverting colostomy day #3  2. Bladder cancer  3. Small bowel obstruction  4. Acute on chronic anemia  5. UTI  6. Extensive DVT bilateral lower extremity  7. Emphysema without exacerbation  8. Severe protein malnutrition  9. Hypotension    Plan:   1.   Urology, GI, and general surgery consulted. GI also consulted. Patient currently on meropenem #12/14. Patient is status post diverting colostomy day #2. Pain management per general surgery. NG tube in place. With minimal output. Repeat CT scan pending  2. Urology consulted. Continue supportive care. Urology and oncology following  3. Patient currently NPO. NG tube is in place. Minimal output  4. Hemoglobin yesterday 5.7. Total number of transfusions count of transfusion 3 units. Occult blood positive. Holding heparin drip. 5.  Continue fluconazole for fungal UTI. Urine culture grew Candida cifrrii. ID consulted. Completed 10 doses. 6.  Venous Doppler showed extensive clot burden. Patient already has an IVC filter placed. Started on a heparin drip however on hold due to bleeding. IR did bilateral iliofemoral and IVC aspiration thrombectomy with removal of a large volume of thrombus from both legs extremities and IVC  7. Not in any respiratory distress. Oxygen saturation within normal limits  8. Patient agreeable to TPN. Started. Concern for refeeding syndrome. BMP, Phos, mag every 6 hours. Concern for refeeding syndrome  9. Blood pressure remained soft. As needed midodrine. On IV fluids  10. CBC, CMP in the a.m. Dispo: >48 hours. Barriers include return of bowel function, tolerating diet, nutritional improvement, PT and OT. Suspect patient will need a minimum of home health    CODE STATUS full     DVT prophylaxis: Hold due to bleeding/anemia  Ulcer prophylaxis: N.p.o.    Care Plan discussed with: Patient/Family, Nurse, and     Total time spent with patient: 34 minutes.

## 2022-11-04 NOTE — PROGRESS NOTES
Nutrition Assessment     Type and Reason for Visit: Consult, Reassess (interim)        Nutrition Recommendations/Plan:   Recommend goal rate of TPN @ 60 ml/hr with 5%AA/20%D  2.   20% Lipids 250 ml/daily   This will provide   ~1767 kcal (100%),  72 g pro(100%)  3. Monitor I/Os, weights, labs        Nutrition Assessment:  Admitted for partial SBO. UTI complicated by ileal conduit. Ileus resolving with advancement of diet. Concern for Rectovaginal fistula but no evidence of pelvic abscess or fistula. 2 day hx of Nausea and poor intake. (10/24) Diet advanced to solids (10/25) SBS diet with ONS in place. Appetite remains poor, 1-25%. Pt requested Marinol for appetite. (10/29) Appetite stimulant started. (10/31) NPO today for initially scheduled MRI. Pt endorsed poor ONS acceptance, RD to modify per preferences. S/p rectovaginal fistula closure with ALLI drain(11/1). (11/2) loop colostomy placement. TPN initiated 11/3. NGT in place with minimal OP. Remains NPO. Concern for refeeding, RD recommend reduction of rate. Labs: H/H 9.3/29.7, Cl 104, ALT 6, Na 138, K 3.8, Gluc 74. Phos 1.1, Mag 2.6. Meds: Dilaudid, Merrem. Malnutrition Assessment:  Malnutrition Status: Moderate malnutrition     Estimated Daily Nutrient Needs:  Energy (kcal):  1551-1810kcal (30-35 kcal/kg, CA)  Protein (g):  72-82g (1.4-1.6g/kg)       Fluid (ml/day):  ~1800    Nutrition Related Findings:  No N/V/D/C nor chewing/swallowing difficulties reported. No edema. ALLI drain min OP. NGT min OP. Colotomy - no OP. Urostomy 250 ml OP. Current Nutrition Therapies:  DIET NPO  TPN ADULT - CENTRAL AA 5% D20% W/ CA + ELECTROLYTES  TPN ADULT - CENTRAL AA 5% D20% W/ CA + ELECTROLYTES      Anthropometric Measures:  Height:  5' 2\" (157.5 cm)  Current Body Wt:  51.7 kg (113 lb 15.7 oz)  BMI: 20.8    Nutrition Diagnosis:    In context of acute illness or injury, Moderate malnutrition related to altered GI structure, altered GI function as evidenced by NPO or clear liquid status due to medical condition, nutrition support-parenteral nutrition      Nutrition Interventions:   Food and/or Nutrient Delivery: Continue NPO, Modify parenteral nutrition  Nutrition Education/Counseling: No recommendations at this time  Coordination of Nutrition Care: Continue to monitor while inpatient  Plan of Care discussed with: RN, Pt    Goals:  Previous Goal Met: Progressing toward goal(s)  Goals: Meet at least 75% of estimated needs, Tolerate nutrition support at goal rate  Specify Other Goals: wt maintanence +/- 0.5 kg within 7 days    Nutrition Monitoring and Evaluation:   Behavioral-Environmental Outcomes: None identified  Food/Nutrient Intake Outcomes: Parenteral nutrition intake/tolerance  Physical Signs/Symptoms Outcomes: Biochemical data, GI status, Weight    Discharge Planning:     Too soon to determine    Anton Avitia RD  Contact: [phone]

## 2022-11-04 NOTE — PROGRESS NOTES
Infectious Disease Progress Note           Subjective:   Stable, denies new complaints, no acute events since last seen, notes discomfort from NGT   Objective:   Physical Exam:     Visit Vitals  /85 (BP 1 Location: Left lower arm, BP Patient Position: At rest)   Pulse 78   Temp 97.2 °F (36.2 °C)   Resp 18   Ht 5' 2\" (1.575 m)   Wt 114 lb (51.7 kg)   LMP  (LMP Unknown)   SpO2 100%   BMI 20.85 kg/m²      O2 Device: None (Room air)    Temp (24hrs), Av.3 °F (36.3 °C), Min:97.2 °F (36.2 °C), Max:97.4 °F (36.3 °C)    No intake/output data recorded.  1901 -  0700  In: 1 [I.V.:1025]  Out: 5048 [Urine:3930; Drains:8]    General: NAD, NAD, AAO x 4  HEENT: SIMBA, Moist mucosa, NGT in place   Lungs: CTA b/l, decreased at the bases   Heart: S1S2+, RRR, no murmur  Abdo:  Soft, NT, ND, +BS, + colostomy and urostomy   Exts: decreased b/l leg edema   Skin: no chronic wounds or ulcers     Data Review:       Recent Days:  Recent Labs     22  0508 22  0548 22  1111   WBC 6.4 6.7 7.0   HGB 9.3* 8.5* 5.7*   HCT 29.7* 26.7* 18.6*    191 190       Recent Labs     22  0508 22  0015 22  1812   BUN 6 6 6   CREA 0.35* 0.37* 0.38*       Lab Results   Component Value Date/Time    C-Reactive protein 15.00 (H) 10/25/2022 05:16 AM       Microbiology     Results       ** No results found for the last 336 hours. **           Diagnostics   CXR Results  (Last 48 hours)                 22 1930  XR CHEST PORT Final result    Impression:  1. Gastric tube terminates in the body of the stomach, but with proximal   sidehole at the GE junction. Recommend advancement. Narrative:  EXAM:  XR CHEST PORT       INDICATION:   ng placement       COMPARISON: Chest radiograph 10/19/2022. FINDINGS: AP radiograph of the chest was obtained. Gastric tube terminates in the body of the stomach with proximal sidehole at the   GE junction.  Right upper PICC with tip terminating at the cavoatrial junction. Lungs are clear with no focal consolidation, pleural effusion or pneumothorax. Heart size is normal. No acute osseous abnormality. Assessment/Plan   UTI, complicated by ileal conduit. Candida Ciferrii isolated from Cx         Remains afebrile w a normal WBC on routine labs         S/p 14 days of Fluconazole, remains at high risk for UTI         Clear urine in urostomy and tubing      2. Vaginal cuff cellulitis/suspected abscess on CT,      GPC in pairs and GNR noted on Gram stain of cervical specimen, Cx neg       On Meropenem day # 12/14 tentative d/c on 11/06     3. Rectovaginal fistula, S/p diverting colostomy on 11/02      NGT in place pending functioning of colostomy     4. B/L LE DVT extensive, IVC filter in situ      S/p thrombectomy by IR on 10/27    5.  Acute on chronic anemia: Hgb staying stable after PRBC transfusion     Adela Diallo MD    11/4/2022

## 2022-11-04 NOTE — PROGRESS NOTES
OCCUPATIONAL THERAPY RE-ASSESSMENT  Patient: Simi Ibarra (18 y.o. female)  Date: 11/4/2022  Primary Diagnosis: Acute blood loss anemia [D62]  Small bowel obstruction (HCC) [K56.609]  Procedure(s) (LRB):  LAPAROSCOPIC LOOP COLOSTOMY PLACEMENT (N/A) 2 Days Post-Op   Precautions: fall risk       ASSESSMENT  Pt initially evaluated and placed on OT caseload 10/28/22 and seen for 1 skilled OT sessions since evaluation. Pt seen today for OT re-assessment s/t LOS and s/p vaginal wound washout, rectovaginal fistula closure with drain placement, distal rectal repair on 10/31 and s/p laparoscopic loop colostomy placement on 11/2. Pt received semi-supine in bed upon arrival, AXO to person and place with cues for time and agreeable to working with OT/PT at this time. Based on current observations, pt continues to present with deficits in generalized strength/AROM, bed mobility, static/dynamic sitting balance, static/dynamic standing balance, functional activity tolerance and c/o abdominal pain impacting overall performance of ADLs and functional transfers/mobility (see below for objective details and assist levels). Overall, pt tolerates session fair today, currently with increased time/assist for bed mobility; able to transfer sit><stand to/from EOB to chair with ambulation in room (see PT note for details) using gt belt and RW; noted leaking from urostomy (RN made aware) and pt able to manage gown change EOB, and doffing/donning clean socks/undergarments in sitting/standing. Pt continues to be motivated to participate with therapy and would benefit from continued skilled OT services to address noted deficits and maximize IND/safety with ADLs and functional transfers/mobility. OT goals and POC reviewed on this date and continue to remain appropriate at this time. Current OT recommendation Inpatient Rehabilitation Facility  at discharge once medically appropriate.      Other factors to consider for discharge: family support, DME, time since onset, severity of deficits      Patient will benefit from skilled therapy intervention to address the above noted impairments. PLAN :  Recommendations and Planned Interventions: self care training, functional mobility training, therapeutic exercise, balance training, therapeutic activities, endurance activities, patient education, and family training/education    Recommend with staff: Out of bed to chair for meals and Encourage HEP in prep for ADLs/mobility    Recommend next session: Standing grooming    Frequency/Duration: Patient will be followed by occupational therapy: 3-5x/week to address goals. Recommendation for discharge: (in order for the patient to meet his/her long term goals)  [address]     This discharge recommendation:  Has been made in collaboration with the attending provider and/or case management       SUBJECTIVE:   Patient stated I am so happy I was able to get up today.     OBJECTIVE DATA SUMMARY:   HISTORY:   Past Medical History:   Diagnosis Date    Anemia     pt states had recent blood transfusion sept     Back pain     Cancer (HCC)     bladder    DVT (deep venous thrombosis) (HCC)     and PE, pt denies lung PE , only leg several years ago    Hepatitis C     pt states dx 1 month ago    Liver disease     Presence of IVC filter     pt states several years ago    Rheumatoid arthritis ([address]     Sciatic leg pain     pt states both legs    Uterine fibroid      Past Surgical History:   Procedure Laterality Date    HX APPENDECTOMY  10/03/2022    HX  SECTION      HX GYN  10/03/2022    ROBOT ANTERIOR PELVIC EXENTERATION    HX HYSTERECTOMY  10/03/2022    HX SALPINGO-OOPHORECTOMY Bilateral 10/03/2022    HX UROLOGICAL  09/15/2022    CYSTOSCOPY    HX UROLOGICAL  09/15/2022    URETHRAL DILATION    HX UROLOGICAL  09/15/2022    URETERAL STENT PLACEMENT    HX UROLOGICAL  09/15/2022    TRANSURETHRAL RESECTION OF BLADDER TUMOR >2CM    HX UROLOGICAL Bilateral 09/15/2022    RETROGRADE PYELOGRAM    HX UROLOGICAL  10/03/2022    PELVIC LYMPH NODE DISSECTION    HX UROLOGICAL  10/03/2022    ILEAL CONDUIT URINARY DIVERSION    [facility]  10/[facility]/20[facility]    [place] CARDIAC SURG PROCEDURE UNLIST      stent placement       Per pt report:   Home Situation  Home Environment: Apartment (2nd floor)  # Steps to Enter: 10  Rails to Enter: Yes  Hand Rails : Bilateral  One/Two Story Residence: One story  Living Alone: Yes  Support Systems: Friend/Neighbor  Patient Expects to be Discharged to[de-identified] Rehab hospital/unit acute  Current DME Used/Available at Home: None (pt states she just a got a w/c and BSC)      EXAMINATION OF PERFORMANCE DEFICITS:  Cognitive/Behavioral Status:  Neurologic State: Alert  Orientation Level: Oriented to person;Oriented to place; Disoriented to time  Cognition: Follows commands               Hearing: Auditory  Auditory Impairment: None    Range of Motion:  AROM: (P) Generally decreased, functional                         Strength:  Strength: (P) Generally decreased, functional                Coordination:  Coordination: (P) Generally decreased, functional  Fine Motor Skills-Upper: (P) Left Intact; Right Intact    Gross Motor Skills-Upper: (P) Left Intact; Right Intact    Tone & Sensation:     Sensation: (P) Intact                      Balance:  Sitting: Intact; With support  Standing: Impaired; With support  Standing - Static: Good;Constant support  Standing - Dynamic : Fair;Constant support    Functional Mobility and Transfers for ADLs:  Bed Mobility:  Rolling: Stand-by assistance  Supine to Sit: Stand-by assistance; Additional time  Scooting: Stand-by assistance    Transfers:  Sit to Stand: Minimum assistance  Stand to Sit: Minimum assistance  Bed to Chair: Minimum assistance    ADL Intervention and task modifications:       Grooming  Grooming Assistance: Set-up; Independent  Position Performed: Seated in chair  Brushing Teeth: [address]: Moderate assistance    Lower Body Dressing Assistance  Dressing Assistance: Moderate assistance  Protective Undergarmet: Maximum assistance  Socks: Set-up; Stand-by assistance  Leg Crossed Method Used: Yes  Position Performed: Seated in chair              Therapeutic Exercise:  Pt would benefit from UE HEP to improve overall UE AROM/strength and can be further educated in next treatment session. Functional Measure:    [employer] AM-PACTM \"6 Clicks\"                                                       Daily Activity Inpatient Short Form  How much help from another person does the patient currently need. .. Total; A Lot A Little None   1. Putting on and taking off regular lower body clothing? []  1 [x]  2 []  3 []  4   2. Bathing (including washing, rinsing, drying)? []  1 [x]  2 []  3 []  4   3. Toileting, which includes using toilet, bedpan or urinal? [] 1 [x]  2 []  3 []  4   4. Putting on and taking off regular upper body clothing? []  1 []  2 [x]  3 []  4   5. Taking care of personal grooming such as brushing teeth? []  1 []  2 []  3 [x]  4   6. Eating meals? []  1 []  2 []  3 [x]  4   © 2007, Trustees of [facility] [employer], under license to [employer]. All rights reserved     Score: 17/24     Interpretation of Tool:  Represents clinically-significant functional categories (i.e. Activities of daily living). Percentage of Impairment CH    0%   CI    1-19% CJ    20-39% CK    40-59% CL    60-79% CM    80-99% CN     100%   [facility]  Score 6-24 24 23 20-22 15-19 10-14 7-9 6       Pain Rating:  10/10 abdomen    Activity Tolerance:   Fair    After treatment patient left in no apparent distress:    Sitting in chair, Heels elevated for pressure relief, and Call bell within reach, bed locked and in lowest position    COMMUNICATION/EDUCATION:   The patients plan of care was discussed with: Physical therapist and Registered nurse. OT/PT sessions occurred together for increased patient and clinician safety as pt with decreased activity tolerance at this time. Thank you for this referral.  Ranjana Ramírez  Time Calculation: 38 mins    Problem: Self Care Deficits Care Plan (Adult)  Goal: *Acute Goals and Plan of Care (Insert Text)  Description: FUNCTIONAL STATUS PRIOR TO ADMISSION: Patient was independent and active without use of DME. Patient was independent for basic and instrumental ADLs. HOME SUPPORT: The patient lived alone; [facility]      Occupational Therapy Goals  Initiated 10/28/2022    Pt stated goal \"to get stronger\"  Pt will be IND sup <> sit in prep for EOB ADLs  Pt will be MI grooming standing sink side LRAD  Pt will be IND UB dressing sitting EOB/long sit   Pt will be MI LE dressing sitting EOB/long sit  Pt will be IND sit <>  prep for toileting LRAD  Pt will be IND toileting/toilet transfer/cloth mgmt LRAD  Pt will be IND following UE HEP in prep for self care tasks      Outcome: Progressing Towards Goal

## 2022-11-05 LAB
ALBUMIN SERPL-MCNC: 2.8 G/DL (ref 3.5–5)
ALBUMIN/GLOB SERPL: 0.5 {RATIO} (ref 1.1–2.2)
ALP SERPL-CCNC: 66 U/L (ref 45–117)
ALT SERPL-CCNC: 17 U/L (ref 12–78)
ANION GAP SERPL CALC-SCNC: 7 MMOL/L (ref 5–15)
AST SERPL W P-5'-P-CCNC: 31 U/L (ref 15–37)
BASOPHILS # BLD: 0.1 K/UL (ref 0–0.1)
BASOPHILS NFR BLD: 1 % (ref 0–1)
BILIRUB DIRECT SERPL-MCNC: 0.2 MG/DL (ref 0–0.2)
BILIRUB SERPL-MCNC: 0.7 MG/DL (ref 0.2–1)
BUN SERPL-MCNC: 6 MG/DL (ref 6–20)
BUN/CREAT SERPL: 16 (ref 12–20)
CA-I BLD-MCNC: 8.7 MG/DL (ref 8.5–10.1)
CHLORIDE SERPL-SCNC: 108 MMOL/L (ref 97–108)
CO2 SERPL-SCNC: 22 MMOL/L (ref 21–32)
CREAT SERPL-MCNC: 0.38 MG/DL (ref 0.55–1.02)
DIFFERENTIAL METHOD BLD: ABNORMAL
EOSINOPHIL # BLD: 0 K/UL (ref 0–0.4)
EOSINOPHIL NFR BLD: 0 % (ref 0–7)
ERYTHROCYTE [DISTWIDTH] IN BLOOD BY AUTOMATED COUNT: 17.2 % (ref 11.5–14.5)
GLOBULIN SER CALC-MCNC: 5.4 G/DL (ref 2–4)
GLUCOSE BLD STRIP.AUTO-MCNC: 104 MG/DL (ref 65–100)
GLUCOSE BLD STRIP.AUTO-MCNC: 91 MG/DL (ref 65–100)
GLUCOSE SERPL-MCNC: 105 MG/DL (ref 65–100)
HCT VFR BLD AUTO: 35 % (ref 35–47)
HGB BLD-MCNC: 10.9 G/DL (ref 11.5–16)
IMM GRANULOCYTES # BLD AUTO: 0.3 K/UL (ref 0–0.04)
IMM GRANULOCYTES NFR BLD AUTO: 5 % (ref 0–0.5)
LYMPHOCYTES # BLD: 1.8 K/UL (ref 0.8–3.5)
LYMPHOCYTES NFR BLD: 27 % (ref 12–49)
MAGNESIUM SERPL-MCNC: 2.3 MG/DL (ref 1.6–2.4)
MCH RBC QN AUTO: 27.9 PG (ref 26–34)
MCHC RBC AUTO-ENTMCNC: 31.1 G/DL (ref 30–36.5)
MCV RBC AUTO: 89.5 FL (ref 80–99)
MONOCYTES # BLD: 0.5 K/UL (ref 0–1)
MONOCYTES NFR BLD: 7 % (ref 5–13)
NEUTS SEG # BLD: 4 K/UL (ref 1.8–8)
NEUTS SEG NFR BLD: 60 % (ref 32–75)
NRBC # BLD: 0 K/UL (ref 0–0.01)
NRBC BLD-RTO: 0 PER 100 WBC
PERFORMED BY, TECHID: ABNORMAL
PERFORMED BY, TECHID: NORMAL
PHOSPHATE SERPL-MCNC: 1.9 MG/DL (ref 2.6–4.7)
PLATELET # BLD AUTO: 227 K/UL (ref 150–400)
PMV BLD AUTO: 9 FL (ref 8.9–12.9)
POTASSIUM SERPL-SCNC: 3.8 MMOL/L (ref 3.5–5.1)
PROT SERPL-MCNC: 8.2 G/DL (ref 6.4–8.2)
RBC # BLD AUTO: 3.91 M/UL (ref 3.8–5.2)
RBC MORPH BLD: ABNORMAL
SODIUM SERPL-SCNC: 137 MMOL/L (ref 136–145)
TRIGL SERPL-MCNC: 128 MG/DL (ref ?–150)
WBC # BLD AUTO: 6.7 K/UL (ref 3.6–11)

## 2022-11-05 PROCEDURE — 99232 SBSQ HOSP IP/OBS MODERATE 35: CPT | Performed by: INTERNAL MEDICINE

## 2022-11-05 PROCEDURE — 83735 ASSAY OF MAGNESIUM: CPT

## 2022-11-05 PROCEDURE — 80076 HEPATIC FUNCTION PANEL: CPT

## 2022-11-05 PROCEDURE — 74011000258 HC RX REV CODE- 258: Performed by: PHYSICIAN ASSISTANT

## 2022-11-05 PROCEDURE — 99024 POSTOP FOLLOW-UP VISIT: CPT | Performed by: SURGERY

## 2022-11-05 PROCEDURE — 84478 ASSAY OF TRIGLYCERIDES: CPT

## 2022-11-05 PROCEDURE — 80048 BASIC METABOLIC PNL TOTAL CA: CPT

## 2022-11-05 PROCEDURE — 84100 ASSAY OF PHOSPHORUS: CPT

## 2022-11-05 PROCEDURE — 36415 COLL VENOUS BLD VENIPUNCTURE: CPT

## 2022-11-05 PROCEDURE — 74011000250 HC RX REV CODE- 250: Performed by: SURGERY

## 2022-11-05 PROCEDURE — 74011250636 HC RX REV CODE- 250/636: Performed by: SURGERY

## 2022-11-05 PROCEDURE — 74011000250 HC RX REV CODE- 250: Performed by: PHYSICIAN ASSISTANT

## 2022-11-05 PROCEDURE — 97530 THERAPEUTIC ACTIVITIES: CPT

## 2022-11-05 PROCEDURE — 74011250636 HC RX REV CODE- 250/636: Performed by: PHYSICIAN ASSISTANT

## 2022-11-05 PROCEDURE — 74011250636 HC RX REV CODE- 250/636: Performed by: INTERNAL MEDICINE

## 2022-11-05 PROCEDURE — 74011000258 HC RX REV CODE- 258: Performed by: INTERNAL MEDICINE

## 2022-11-05 PROCEDURE — 74011250637 HC RX REV CODE- 250/637: Performed by: PHYSICIAN ASSISTANT

## 2022-11-05 PROCEDURE — 82962 GLUCOSE BLOOD TEST: CPT

## 2022-11-05 PROCEDURE — 65270000029 HC RM PRIVATE

## 2022-11-05 PROCEDURE — 85025 COMPLETE CBC W/AUTO DIFF WBC: CPT

## 2022-11-05 RX ADMIN — ENOXAPARIN SODIUM 40 MG: 100 INJECTION SUBCUTANEOUS at 09:46

## 2022-11-05 RX ADMIN — TRACE ELEMENTS INJECTION 4: 7.4; .75; 98; 151 INJECTION, SOLUTION INTRAVENOUS at 23:15

## 2022-11-05 RX ADMIN — SODIUM CHLORIDE 125 ML/HR: 9 INJECTION, SOLUTION INTRAVENOUS at 01:11

## 2022-11-05 RX ADMIN — HYDROMORPHONE HYDROCHLORIDE 1 MG: 1 INJECTION, SOLUTION INTRAMUSCULAR; INTRAVENOUS; SUBCUTANEOUS at 12:44

## 2022-11-05 RX ADMIN — MEROPENEM 1 G: 1 INJECTION, POWDER, FOR SOLUTION INTRAVENOUS at 17:20

## 2022-11-05 RX ADMIN — HYDROMORPHONE HYDROCHLORIDE 1 MG: 1 INJECTION, SOLUTION INTRAMUSCULAR; INTRAVENOUS; SUBCUTANEOUS at 02:00

## 2022-11-05 RX ADMIN — HYDROMORPHONE HYDROCHLORIDE 1 MG: 1 INJECTION, SOLUTION INTRAMUSCULAR; INTRAVENOUS; SUBCUTANEOUS at 06:30

## 2022-11-05 RX ADMIN — MEGESTROL ACETATE 20 MG: 20 TABLET ORAL at 12:00

## 2022-11-05 RX ADMIN — SODIUM CHLORIDE 125 ML/HR: 9 INJECTION, SOLUTION INTRAVENOUS at 21:21

## 2022-11-05 RX ADMIN — HYDROMORPHONE HYDROCHLORIDE 1 MG: 1 INJECTION, SOLUTION INTRAMUSCULAR; INTRAVENOUS; SUBCUTANEOUS at 23:09

## 2022-11-05 RX ADMIN — SODIUM CHLORIDE, PRESERVATIVE FREE 10 ML: 5 INJECTION INTRAVENOUS at 23:11

## 2022-11-05 RX ADMIN — MEROPENEM 1 G: 1 INJECTION, POWDER, FOR SOLUTION INTRAVENOUS at 00:49

## 2022-11-05 RX ADMIN — CROMOLYN SODIUM 1 DROP: 40 SOLUTION/ DROPS OPHTHALMIC at 09:54

## 2022-11-05 RX ADMIN — MEROPENEM 1 G: 1 INJECTION, POWDER, FOR SOLUTION INTRAVENOUS at 09:46

## 2022-11-05 RX ADMIN — MEGESTROL ACETATE 20 MG: 20 TABLET ORAL at 17:20

## 2022-11-05 RX ADMIN — MEGESTROL ACETATE 20 MG: 20 TABLET ORAL at 09:46

## 2022-11-05 RX ADMIN — SODIUM CHLORIDE 125 ML/HR: 9 INJECTION, SOLUTION INTRAVENOUS at 09:57

## 2022-11-05 RX ADMIN — SODIUM CHLORIDE, PRESERVATIVE FREE 10 ML: 5 INJECTION INTRAVENOUS at 14:52

## 2022-11-05 RX ADMIN — SODIUM CHLORIDE, PRESERVATIVE FREE 10 ML: 5 INJECTION INTRAVENOUS at 06:31

## 2022-11-05 RX ADMIN — THIAMINE HYDROCHLORIDE 100 MG: 100 INJECTION, SOLUTION INTRAMUSCULAR; INTRAVENOUS at 09:46

## 2022-11-05 RX ADMIN — HYDROMORPHONE HYDROCHLORIDE 1 MG: 1 INJECTION, SOLUTION INTRAMUSCULAR; INTRAVENOUS; SUBCUTANEOUS at 19:18

## 2022-11-05 RX ADMIN — MEROPENEM 1 G: 1 INJECTION, POWDER, FOR SOLUTION INTRAVENOUS at 23:01

## 2022-11-05 NOTE — PROGRESS NOTES
PHYSICAL THERAPY TREATMENT  Patient: Deidra Mishra (56 y.o. female)  Date: 11/5/2022  Diagnosis: Acute blood loss anemia [D62]  Small bowel obstruction (HCC) [K56.609] Small bowel obstruction (HCC)  Procedure(s) (LRB):  LAPAROSCOPIC LOOP COLOSTOMY PLACEMENT (N/A) 3 Days Post-Op  Precautions:    Chart, physical therapy assessment, plan of care and goals were reviewed. ASSESSMENT  Patient continues with skilled PT services and is progressing towards goals. Pt seated in recliner upon PT arrival, agreeable to session. (See below for objective details and assist levels). Overall pt tolerated session well today with semi reclined timed Therapeutic exercise. Will continue to benefit from skilled PT services, and will continue to progress as tolerated. Current Level of Function Impacting Discharge (mobility/balance): current medical history    Other factors to consider for discharge: PMH         PLAN :  Patient continues to benefit from skilled intervention to address the above impairments. Continue treatment per established plan of care to address goals. Recommend with staff: Out of bed to chair for meals, Frequent repositioning to prevent skin breakdown, and LE elevation for management of edema    Recommendation for discharge: (in order for the patient to meet his/her long term goals)  Inpatient Rehabilitation Facility     This discharge recommendation:  Has been made in collaboration with the attending provider and/or case management    IF patient discharges home will need the following DME: rolling walker       SUBJECTIVE:   Patient stated IM OKAY.     OBJECTIVE DATA SUMMARY:   Critical Behavior:  Neurologic State: Alert  Orientation Level: Oriented X4  Cognition: Follows commands  Safety/Judgement: Decreased awareness of environment  Functional Mobility Training:  Bed Mobility:                    Transfers:                                   Balance:     Ambulation/Gait Training: Stairs: Therapeutic Exercises:       EXERCISE   Sets   Reps   Active Active Assist   Passive Self ROM   Comments   Ankle Pumps  3 MIN [x] [] [] []    Quad Sets/Glut Sets  3MIN [x] [] [] []    Hamstring Sets   [] [] [] []    Short Arc Quads  3MIN [x] [] [] []    Heel Slides  3MIN [x] [] [] []    Straight Leg Raises   [] [] [] []    Hip abd/add  3MIN [x] [] [] []    Long Arc Quads   [] [] [] []    Marching   [] [] [] []       [] [] [] []       Pain Ratin    Activity Tolerance:   Fair    After treatment patient left in no apparent distress:   Bed locked and returned to lowest position, Sitting in chair    GOALS:  Bed mobility, transfer, ambulation 100'>, 12 steps stair negotiation    COMMUNICATION/COLLABORATION:   The patients plan of care was discussed with: Physical therapy assistant.          Carla Lockett PTA, PT   Time Calculation: 29 mins

## 2022-11-05 NOTE — PROGRESS NOTES
Hospitalist Progress Note    Subjective:   Daily Progress Note: 11/5/2022 9:56 AM    Hospital Course: Patient is a 61 y.o. female with PMH of bladder cancer s/p cystectomy, hysterectomy, BSO and ileal conduit diversion, anemia, hep C, and left leg DVT s/p IVC filter years ago. She presented to the ED with chief complaint of weakness and nausea for the past week. She reports unable to tolerate PO intake, due to profound nausea, abdominal pain, fever, chills, vomiting or diarrhea. In addition, she also reports unable to walk due to weakness and left inner thigh and left foot pain. Denies trauma. She was seen here on 10/13/2022 with urostomy complications, status post ileal conduit and discharged home on Cipro which she completed. In addition, she also reports having vaginal bleed since surgery 2 weeks ago. Which appears to be more related to a fistula with stool present. CT scan of the abdomen pelvis was performed revealing no obvious rectovaginal fistula although there was air present tracking in that direction without any extravasation of contrast.  There was extensive bilateral iliac venous thrombus to the level of the IVC filter with right leg edema. Cholelithiasis with a distended gallbladder was also noted. Bilateral hydronephrosis with hydroureter most likely from the ileal conduit. Probable small bowel obstruction secondary to adhesions also noted. Patient was found to be anemic and received 1 unit of packed red blood cells. Venous duplex revealed extensive clot burden within the right leg and left leg. Urinalysis was consistent with Candida infection. ID consultation. Surgical consultation, Dr. Lisseth Day. UCX grew candida ciferrii, continue fluconazole. IV heparin being held due to blood loss, hem positive stool,.   Discussed case with interventional radiology who will further investigate the fistula with recommendations and mechanical thrombectomy performed of the bilateral lower extremities with a large volume clot removed. Patient went to the OR on 10/31/2022 for vaginal wound washout, rectovaginal fistula closure with drain placement, distal rectal repair. Believe that patient will need diverting colostomy and general surgery is speaking to patient's niece. She is currently NPO. Give IV albumin. On IV fluids. Megace for appetite stimulant. Patient is now agreeable for laparoscopic loop colostomy placement that will be performed on 11/2/2022. NG tube placed postoperatively. TPN initiated on 11/3/2022. Concern for refeeding syndrome and BMP, mag, Phos every 6 hours. NG tube removeed on 11/5/2022 and started clear liquid diet. Subjective:Patient say sshe would like to get out of the bed and sit in a chair and have the NG tube removed.      Current Facility-Administered Medications   Medication Dose Route Frequency    thiamine (B-1) 100 mg in 0.9% sodium chloride 50 mL IVPB  100 mg IntraVENous DAILY    TPN ADULT-AA 4.25% D 10% + ElectroLYTES CENTRAL STD   IntraVENous CONTINUOUS    sodium chloride (NS) flush 5-40 mL  5-40 mL IntraVENous Q8H    sodium chloride (NS) flush 5-40 mL  5-40 mL IntraVENous PRN    enoxaparin (LOVENOX) injection 40 mg  40 mg SubCUTAneous DAILY    0.9% sodium chloride infusion  125 mL/hr IntraVENous CONTINUOUS    HYDROmorphone (DILAUDID) syringe 0.25 mg  0.25 mg IntraVENous Q3H PRN    Or    HYDROmorphone (DILAUDID) injection 1 mg  1 mg IntraVENous Q4H PRN    megestroL (MEGACE) tablet 20 mg  20 mg Oral TID WITH MEALS    heparin (porcine) 1,000 unit/mL injection 2,000 Units  2,000 Units IntraVENous Rad Multiple    meropenem (MERREM) 1 g in 0.9% sodium chloride (MBP/ADV) 50 mL MBP  1 g IntraVENous Q8H    midodrine (PROAMATINE) tablet 5 mg  5 mg Oral TID PRN    [Held by provider] heparin 25,000 units in D5W 250 ml infusion  500 Units/hr IntraVENous TITRATE    oxyCODONE IR (ROXICODONE) tablet 5 mg  5 mg Oral Q6H PRN    cromolyn (OPTICROM) 4 % ophthalmic solution 1 Drop  1 Drop Both Eyes DAILY    acetaminophen (TYLENOL) tablet 650 mg  650 mg Oral Q6H PRN    Or    acetaminophen (TYLENOL) suppository 650 mg  650 mg Rectal Q6H PRN    polyethylene glycol (MIRALAX) packet 17 g  17 g Oral DAILY PRN    ondansetron (ZOFRAN ODT) tablet 4 mg  4 mg Oral Q8H PRN    Or    ondansetron (ZOFRAN) injection 4 mg  4 mg IntraVENous Q6H PRN        Review of Systems  Constitutional: No fevers, No chills, No sweats, ++ fatigue, ++Weakness  Eyes: No redness  Ears, nose, mouth, throat, and face: No nasal congestion, No sore throat, No voice change  Respiratory: No Shortness of Breath, No cough, No wheezing  Cardiovascular: No chest pain, No palpitations, No extremity edema  Gastrointestinal: No nausea, No vomiting, No diarrhea, No abdominal pain  Genitourinary: No frequency, No dysuria, No hematuria  Integument/breast: No skin lesion(s)   Neurological: No Confusion, No headaches, No dizziness      Objective:     Visit Vitals  /83 (BP 1 Location: Left upper arm, BP Patient Position: At rest)   Pulse 72   Temp 97.7 °F (36.5 °C)   Resp 18   Ht 5' 2\" (1.575 m)   Wt 51.7 kg (114 lb)   LMP  (LMP Unknown)   SpO2 100%   BMI 20.85 kg/m²      O2 Device: None (Room air)    Temp (24hrs), Av.5 °F (36.4 °C), Min:97.2 °F (36.2 °C), Max:97.7 °F (36.5 °C)      No intake/output data recorded.  1901 -  0700  In: 250 [I.V.:250]  Out: 26 [Urine:4700; Drains:15]    PHYSICAL EXAM:  Constitutional: No acute distress, cachectic  Skin: Extremities and face reveal no rashes. HEENT: Sclerae anicteric. Extra-occular muscles are intact. NG tube in   Cardiovascular: RRR  Respiratory:  Clear breath sounds bilaterally with no wheezes, rales, or rhonchi. GI: Abdomen nondistended, ostomy and colostomy bags noted  Musculoskeletal: No pitting edema of the lower legs. Able to move all ext  Neurological:  Patient is alert and oriented.  Cranial nerves II-XII grossly intact  Psychiatric: Depressed mood/flat affect      Data Review    Recent Results (from the past 24 hour(s))   PHOSPHORUS    Collection Time: 11/04/22  5:40 PM   Result Value Ref Range    Phosphorus 0.6 (LL) 2.6 - 4.7 mg/dL   MAGNESIUM    Collection Time: 11/04/22  5:40 PM   Result Value Ref Range    Magnesium 2.2 1.6 - 2.4 mg/dL   METABOLIC PANEL, BASIC    Collection Time: 11/04/22  5:40 PM   Result Value Ref Range    Sodium 136 136 - 145 mmol/L    Potassium 3.4 (L) 3.5 - 5.1 mmol/L    Chloride 108 97 - 108 mmol/L    CO2 21 21 - 32 mmol/L    Anion gap 7 5 - 15 mmol/L    Glucose 71 65 - 100 mg/dL    BUN 6 6 - 20 mg/dL    Creatinine 0.35 (L) 0.55 - 1.02 mg/dL    BUN/Creatinine ratio 17 12 - 20      eGFR >60 >60 ml/min/1.73m2    Calcium 8.3 (L) 8.5 - 10.1 mg/dL   PHOSPHORUS    Collection Time: 11/04/22 10:49 PM   Result Value Ref Range    Phosphorus 2.0 (L) 2.6 - 4.7 mg/dL   MAGNESIUM    Collection Time: 11/04/22 10:49 PM   Result Value Ref Range    Magnesium 2.2 1.6 - 2.4 mg/dL   METABOLIC PANEL, BASIC    Collection Time: 11/04/22 10:49 PM   Result Value Ref Range    Sodium 138 136 - 145 mmol/L    Potassium 3.8 3.5 - 5.1 mmol/L    Chloride 108 97 - 108 mmol/L    CO2 22 21 - 32 mmol/L    Anion gap 8 5 - 15 mmol/L    Glucose 77 65 - 100 mg/dL    BUN 5 (L) 6 - 20 mg/dL    Creatinine 0.33 (L) 0.55 - 1.02 mg/dL    BUN/Creatinine ratio 15 12 - 20      eGFR >60 >60 ml/min/1.73m2    Calcium 8.5 8.5 - 10.1 mg/dL       CBC:   Lab Results   Component Value Date/Time    WBC 6.4 11/04/2022 05:08 AM    RBC 3.32 (L) 11/04/2022 05:08 AM    HGB 9.3 (L) 11/04/2022 05:08 AM    HCT 29.7 (L) 11/04/2022 05:08 AM    PLATELET 378 04/25/4870 05:08 AM     BMP:   Lab Results   Component Value Date/Time    Glucose 77 11/04/2022 10:49 PM    Sodium 138 11/04/2022 10:49 PM    Potassium 3.8 11/04/2022 10:49 PM    Chloride 108 11/04/2022 10:49 PM    CO2 22 11/04/2022 10:49 PM    BUN 5 (L) 11/04/2022 10:49 PM    Creatinine 0.33 (L) 11/04/2022 10:49 PM    Calcium 8.5 11/04/2022 10:49 PM       Assessment: 1.  Rectal/vaginal fistula status post washout, rectovaginal fistula closure with drain placement and distal rectal repair day #5 and diverting colostomy day #4  2. Bladder cancer  3. Small bowel obstruction  4. Acute on chronic anemia  5. UTI  6. Extensive DVT bilateral lower extremity  7. Emphysema without exacerbation  8. Severe protein malnutrition  9. Hypotension    Plan:   1. Urology, GI, and general surgery consulted. GI also consulted. Patient currently on meropenem #13/14. Patient is status post diverting colostomy day #4. Pain management per general surgery. NG tube in place. With minimal output. Repeat CT scan no obstruction. Remove NG Tube and start liquid diet  2. Urology consulted. Continue supportive care. Urology and oncology following  3. Patient currently NPO. NG tube is in place. Minimal output  4. Hemoglobin dropped to 5.7. Total number of transfusions count of transfusion 3 units. Occult blood positive. Holding heparin drip. HGB yesterday 9.3  5. Continue fluconazole for fungal UTI. Urine culture grew Candida cifrrii. ID consulted. Completed 10 doses. 6.  Venous Doppler showed extensive clot burden. Patient already has an IVC filter placed. Started on a heparin drip however on hold due to bleeding. IR did bilateral iliofemoral and IVC aspiration thrombectomy with removal of a large volume of thrombus from both legs extremities and IVC  7. Not in any respiratory distress. Oxygen saturation within normal limits  8. Patient agreeable to TPN. Started. Concern for refeeding syndrome. BMP, Phos, mag every 6 hours. Concern for refeeding syndrome  9. Blood pressure remained soft. As needed midodrine. On IV fluids  10. CBC, CMP in the a.m. Dispo: >48 hours. Barriers include return of bowel function, tolerating diet, nutritional improvement, PT and OT.   Suspect patient will need a minimum of home health or IRF    CODE STATUS full     DVT prophylaxis: Hold due to bleeding/anemia  Ulcer prophylaxis: N.p.o.    Care Plan discussed with: Patient/Family, Nurse, and     Total time spent with patient: 33 minutes.

## 2022-11-05 NOTE — PROGRESS NOTES
UROLOGY Progress Note         [phone]      Daily Progress Note: 11/5/2022      Subjective: The patient is seen for UROLOGIC follow up for bladder cancer, post-cystectomy issues. She is s/p on cystectomy, ileal conduit diversion, pelvic exenteration on 10/3/22. Complicated by rectal injury; repaired at time of surgery. Stents removed in ER on 10/13/22. Readmitted on 10/19/22 with anemia, SBO, weakness, nausea. She was found to have some vaginal bleeding and discharge at that time. DVT hx with thigh and foot pain on admission. Extensive bilateral iliac venous thrombus to the level of the IVC filter. Right  lower extremity edema on CT 10/23/22. Now s/p thrombectomy in IR on 10/27/22. 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 Not seen on CT 10/23/22. Continued vaginal drainage with stool-like appearance. 10/31/22: return to OR for vaginal wash out, suspected cuff dehiscence (confirmed). She is s/p rectovaginal fistula closure with drain placement and distal rectal repair by Dr. Ishan Villalta. Now s/p laparoscopic loop colostomy with Dr. Ishan Villalta. NGT out today; clear liquid diet. Liquid stool from colostomy. CT with resolution of SBO. Drain remains.     Problem List:  Patient Active Problem List   Diagnosis Code    History of blood clots Z86.718    Bladder cancer ([facility] [address] C67.9    Anemia due to acute blood loss D62    Hypercalcemia E83.52    Hydronephrosis of right kidney N13.30    Retained ureteral stent Z96.0    Severe protein-calorie malnutrition (HCC) E43    Acute blood loss anemia D62    Small bowel obstruction (HCC) K56.609    Status post robot-assisted surgical procedure, pelvic exenteration Z98.890    Vaginal cuff cellulitis N76.0         Medications reviewed  Current Facility-Administered Medications   Medication Dose Route Frequency    thiamine (B-1) 100 mg in 0.9% sodium chloride 50 mL IVPB  100 mg IntraVENous DAILY    TPN ADULT-AA 4.25% D 10% + ElectroLYTES CENTRAL STD   IntraVENous CONTINUOUS    sodium chloride (NS) flush 5-40 mL  5-40 mL IntraVENous Q8H    sodium chloride (NS) flush 5-40 mL  5-40 mL IntraVENous PRN    enoxaparin (LOVENOX) injection 40 mg  40 mg SubCUTAneous DAILY    0.9% sodium chloride infusion  125 mL/hr IntraVENous CONTINUOUS    HYDROmorphone (DILAUDID) syringe 0.25 mg  0.25 mg IntraVENous Q3H PRN    Or    HYDROmorphone (DILAUDID) injection 1 mg  1 mg IntraVENous Q4H PRN    megestroL (MEGACE) tablet 20 mg  20 mg Oral TID WITH MEALS    heparin (porcine) 1,000 unit/mL injection 2,000 Units  2,000 Units IntraVENous Rad Multiple    meropenem (MERREM) 1 g in 0.9% sodium chloride (MBP/ADV) 50 mL MBP  1 g IntraVENous Q8H    midodrine (PROAMATINE) tablet 5 mg  5 mg Oral TID PRN    [Held by provider] heparin 25,000 units in D5W 250 ml infusion  500 Units/hr IntraVENous TITRATE    oxyCODONE IR (ROXICODONE) tablet 5 mg  5 mg Oral Q6H PRN    cromolyn (OPTICROM) 4 % ophthalmic solution 1 Drop  1 Drop Both Eyes DAILY    acetaminophen (TYLENOL) tablet 650 mg  650 mg Oral Q6H PRN    Or    acetaminophen (TYLENOL) suppository 650 mg  650 mg Rectal Q6H PRN    polyethylene glycol (MIRALAX) packet 17 g  17 g Oral DAILY PRN    ondansetron (ZOFRAN ODT) tablet 4 mg  4 mg Oral Q8H PRN    Or    ondansetron (ZOFRAN) injection 4 mg  4 mg IntraVENous Q6H PRN       Review of Systems:   Review of Systems   Constitutional:  Negative for fever. Gastrointestinal:  Positive for abdominal pain. Negative for nausea and vomiting. Objective:   Physical Exam  Vitals and nursing note reviewed. Constitutional:       General: She is not in acute distress. Appearance: She is ill-appearing. Abdominal:      Comments: Ileal conduit, colostomy  NGT with bilious output   Skin:     General: Skin is warm and dry. Neurological:      Mental Status: She is alert and oriented to person, place, and time.    Psychiatric:         Behavior: Behavior normal.        Visit Vitals  /83 (BP 1 Location: Left upper arm, BP Patient Position: At rest)   Pulse 72   Temp 97.7 °F (36.5 °C)   Resp 18   Ht 5' 2\" (1.575 m)   Wt 114 lb (51.7 kg)   SpO2 100%   BMI 20.85 kg/m²         Data Review:       Recent Days:  Recent Labs     11/04/22  0508 11/03/22  0548 11/02/22  1111   WBC 6.4 6.7 7.0   HGB 9.3* 8.5* 5.7*   HCT 29.7* 26.7* 18.6*    191 190       Recent Labs     11/04/22  2249 11/04/22  1740 11/04/22  0508 11/03/22  1812 11/03/22  0548 11/02/22  1611 11/02/22  1440 11/02/22  1111    136 138   < > 140  --  138 137   K 3.8 3.4* 3.8   < > 3.4*  --  3.1* 3.1*    108 109*   < > 112*  --  107 104   CO2 22 21 22   < > 22  --  27 27   GLU 77 71 74   < > 94  --  81 80   BUN 5* 6 6   < > 4*  --  7 7   CREA 0.33* 0.35* 0.35*   < > 0.31*  --  0.43* 0.41*   CA 8.5 8.3* 8.5   < > 7.9*  --  8.8 9.3   MG 2.2 2.2 2.6*   < >  --   --   --   --    PHOS 2.0* 0.6* 1.1*   < >  --   --   --   --    ALB  --   --   --   --  2.7*  --  3.1* 3.3*   TBILI  --   --   --   --  0.7  --  0.7 0.8   ALT  --   --   --   --  <6*  --  10* <6*   INR  --   --   --   --   --  1.3*  --   --     < > = values in this interval not displayed.          24 Hour Results:  Recent Results (from the past 24 hour(s))   PHOSPHORUS    Collection Time: 11/04/22  5:40 PM   Result Value Ref Range    Phosphorus 0.6 (LL) 2.6 - 4.7 mg/dL   MAGNESIUM    Collection Time: 11/04/22  5:40 PM   Result Value Ref Range    Magnesium 2.2 1.6 - 2.4 mg/dL   METABOLIC PANEL, BASIC    Collection Time: 11/04/22  5:40 PM   Result Value Ref Range    Sodium 136 136 - 145 mmol/L    Potassium 3.4 (L) 3.5 - 5.1 mmol/L    Chloride 108 97 - 108 mmol/L    CO2 21 21 - 32 mmol/L    Anion gap 7 5 - 15 mmol/L    Glucose 71 65 - 100 mg/dL    BUN 6 6 - 20 mg/dL    Creatinine 0.35 (L) 0.55 - 1.02 mg/dL    BUN/Creatinine ratio 17 12 - 20      eGFR >60 >60 ml/min/1.73m2    Calcium 8.3 (L) 8.5 - 10.1 mg/dL   PHOSPHORUS    Collection Time: 11/04/22 10:49 PM   Result Value Ref Range    Phosphorus 2.0 (L) 2.6 - 4.7 mg/dL   MAGNESIUM    Collection Time: 11/04/22 10:49 PM   Result Value Ref Range    Magnesium 2.2 1.6 - 2.4 mg/dL   METABOLIC PANEL, BASIC    Collection Time: 11/04/22 10:49 PM   Result Value Ref Range    Sodium 138 136 - 145 mmol/L    Potassium 3.8 3.5 - 5.1 mmol/L    Chloride 108 97 - 108 mmol/L    CO2 22 21 - 32 mmol/L    Anion gap 8 5 - 15 mmol/L    Glucose 77 65 - 100 mg/dL    BUN 5 (L) 6 - 20 mg/dL    Creatinine 0.33 (L) 0.55 - 1.02 mg/dL    BUN/Creatinine ratio 15 12 - 20      eGFR >60 >60 ml/min/1.73m2    Calcium 8.5 8.5 - 10.1 mg/dL           Assessment/     Patient Active Problem List   Diagnosis Code    History of blood clots Z86.718    Bladder cancer (HCC) C67.9    Anemia due to acute blood loss D62    Hypercalcemia E83.52    Hydronephrosis of right kidney N13.30    Retained ureteral stent Z96.0    Severe protein-calorie malnutrition (HCC) E43    Acute blood loss anemia D62    Small bowel obstruction (HCC) K56.609    Status post robot-assisted surgical procedure, pelvic exenteration Z98.890    Vaginal cuff cellulitis N76.0       Plan:    BLADDER CANCER S/P CYSTECTOMY WITH ILEAL CONDUIT: She is s/p on cystectomy, ileal conduit diversion, pelvic exenteration on 10/3/22. Pathology significant for pT3bN0; invasive squamous cell carcinoma, G2, with multifocal invasion into perivesical and periureteral soft   tissue. Uninvolved margins/nodes. RECTOVAGINAL FISTULA: 10/31/22 she returned to OR for vaginal wash out, suspected cuff dehiscence (confirmed). She is s/p rectovaginal fistula closure with drain placement and distal rectal repair by Dr. Toya Kruger. Now s/p laparoscopic loop colostomy with Dr. Toya Kruger on 11/2/22. Adv diet. Looking better today. Drain out soon. DVT: DVT hx with thigh and foot pain on admission. Extensive bilateral iliac venous thrombus to the level of the IVC filter. Right  lower extremity edema on CT 10/23/22. Now s/p thrombectomy in IR on 10/27/22.     FUNGAL UTI: Candida Ciferrii isolated from culture, now s/p 14 days of Fluconazole, ID following. MALNUTRITION: NGT removed, clear liquid diet today. Nutrition following. I personally had a face to face encounter with the patient and performed the history, physical, assessment and plan.    MD Srinivasan Miller NP

## 2022-11-05 NOTE — PROGRESS NOTES
Infectious Disease Progress Note           Subjective:   Remains stable, denies new complaints, no acute events since last seen, NGT discontinued, pt thrilled about that   Objective:   Physical Exam:     Visit Vitals  /80 (BP 1 Location: Right upper arm)   Pulse 86   Temp 98 °F (36.7 °C)   Resp 20   Ht 5' 2\" (1.575 m)   Wt 114 lb (51.7 kg)   LMP  (LMP Unknown)   SpO2 100%   BMI 20.85 kg/m²      O2 Device: None (Room air)    Temp (24hrs), Av.7 °F (36.5 °C), Min:97.5 °F (36.4 °C), Max:98 °F (36.7 °C)    701 - 1900  In: -   Out: 15 [Drains:15]   1901 - 700  In: 250 [I.V.:250]  Out: 4815 [Urine:4700; Drains:15]    General: NAD, NAD, AAO x 4  HEENT: SIMBA, Moist mucosa, NGT in place   Lungs: CTA b/l, decreased at the bases   Heart: S1S2+, RRR, no murmur  Abdo:  Soft, NT, ND, +BS, + colostomy and urostomy   Exts: decreased b/l leg edema   Skin: no chronic wounds or ulcers     Data Review:       Recent Days:  Recent Labs     22  1148 22  0508 22  0548   WBC 6.7 6.4 6.7   HGB 10.9* 9.3* 8.5*   HCT 35.0 29.7* 26.7*    208 191       Recent Labs     22  1148 22  2249 22  1740   BUN 6 5* 6   CREA 0.38* 0.33* 0.35*       Lab Results   Component Value Date/Time    C-Reactive protein 15.00 (H) 10/25/2022 05:16 AM       Microbiology     Results       ** No results found for the last 336 hours. **           Diagnostics   CXR Results  (Last 48 hours)      None          Assessment/Plan   UTI, complicated by ileal conduit. Candida Ciferrii isolated from Cx         Afebrile w a normal WBC on routine labs         S/p 14 days of Fluconazole, remains at high risk for UTI      2. Vaginal cuff cellulitis/suspected abscess on CT      GPC in pairs and GNR noted on Gram stain of drainage, Cx neg       On Meropenem day #  tentative d/c on      3.  Rectovaginal fistula, S/p diverting colostomy on       NGT discontinued, started on oral diet, tolerating thus far     4. B/L LE DVT extensive, IVC filter in situ. S/p thrombectomy by IR on 10/27    5.  Acute on chronic anemia: Hgb staying stable after PRBC transfusion     Juancho Allne MD    11/5/2022

## 2022-11-05 NOTE — PROGRESS NOTES
Patient examined at this morning she is doing well. No fever. Colostomy has a liquid stool. Abdomen soft. CT scan shows resolution of small bowel obstruction    We will remove NG tube start clear liquid diet. Ambulate today    We will remove the drain probably tomorrow.

## 2022-11-06 ENCOUNTER — TELEPHONE (OUTPATIENT)
Dept: UROLOGY | Age: 57
End: 2022-11-06

## 2022-11-06 LAB
ABO + RH BLD: NORMAL
ANION GAP SERPL CALC-SCNC: 5 MMOL/L (ref 5–15)
ANION GAP SERPL CALC-SCNC: 7 MMOL/L (ref 5–15)
BASOPHILS # BLD: 0.1 K/UL (ref 0–0.1)
BASOPHILS NFR BLD: 1 % (ref 0–1)
BLD PROD TYP BPU: NORMAL
BLOOD GROUP ANTIBODIES SERPL: NEGATIVE
BPU ID: NORMAL
BUN SERPL-MCNC: 8 MG/DL (ref 6–20)
BUN SERPL-MCNC: 9 MG/DL (ref 6–20)
BUN/CREAT SERPL: 12 (ref 12–20)
BUN/CREAT SERPL: 21 (ref 12–20)
CA-I BLD-MCNC: 8.4 MG/DL (ref 8.5–10.1)
CA-I BLD-MCNC: 8.8 MG/DL (ref 8.5–10.1)
CHLORIDE SERPL-SCNC: 101 MMOL/L (ref 97–108)
CHLORIDE SERPL-SCNC: 108 MMOL/L (ref 97–108)
CO2 SERPL-SCNC: 23 MMOL/L (ref 21–32)
CO2 SERPL-SCNC: 25 MMOL/L (ref 21–32)
CREAT SERPL-MCNC: 0.42 MG/DL (ref 0.55–1.02)
CREAT SERPL-MCNC: 0.65 MG/DL (ref 0.55–1.02)
CROSSMATCH RESULT,%XM: NORMAL
DIFFERENTIAL METHOD BLD: ABNORMAL
EOSINOPHIL # BLD: 0 K/UL (ref 0–0.4)
EOSINOPHIL NFR BLD: 0 % (ref 0–7)
ERYTHROCYTE [DISTWIDTH] IN BLOOD BY AUTOMATED COUNT: 17.4 % (ref 11.5–14.5)
GLUCOSE BLD STRIP.AUTO-MCNC: 108 MG/DL (ref 65–100)
GLUCOSE BLD STRIP.AUTO-MCNC: 109 MG/DL (ref 65–100)
GLUCOSE BLD STRIP.AUTO-MCNC: 95 MG/DL (ref 65–100)
GLUCOSE BLD STRIP.AUTO-MCNC: 98 MG/DL (ref 65–100)
GLUCOSE BLD STRIP.AUTO-MCNC: >600 MG/DL (ref 65–100)
GLUCOSE SERPL-MCNC: 730 MG/DL (ref 65–100)
GLUCOSE SERPL-MCNC: 95 MG/DL (ref 65–100)
HCT VFR BLD AUTO: 33.2 % (ref 35–47)
HGB BLD-MCNC: 10.1 G/DL (ref 11.5–16)
IMM GRANULOCYTES # BLD AUTO: 0.3 K/UL (ref 0–0.04)
IMM GRANULOCYTES NFR BLD AUTO: 4 % (ref 0–0.5)
LYMPHOCYTES # BLD: 2.1 K/UL (ref 0.8–3.5)
LYMPHOCYTES NFR BLD: 30 % (ref 12–49)
MAGNESIUM SERPL-MCNC: 2 MG/DL (ref 1.6–2.4)
MAGNESIUM SERPL-MCNC: 2.4 MG/DL (ref 1.6–2.4)
MCH RBC QN AUTO: 28 PG (ref 26–34)
MCHC RBC AUTO-ENTMCNC: 30.4 G/DL (ref 30–36.5)
MCV RBC AUTO: 92 FL (ref 80–99)
MONOCYTES # BLD: 0.4 K/UL (ref 0–1)
MONOCYTES NFR BLD: 6 % (ref 5–13)
NEUTS SEG # BLD: 4.2 K/UL (ref 1.8–8)
NEUTS SEG NFR BLD: 59 % (ref 32–75)
NRBC # BLD: 0 K/UL (ref 0–0.01)
NRBC BLD-RTO: 0 PER 100 WBC
PERFORMED BY, TECHID: ABNORMAL
PERFORMED BY, TECHID: NORMAL
PERFORMED BY, TECHID: NORMAL
PHOSPHATE SERPL-MCNC: 1.6 MG/DL (ref 2.6–4.7)
PHOSPHATE SERPL-MCNC: 4.4 MG/DL (ref 2.6–4.7)
PLATELET # BLD AUTO: 228 K/UL (ref 150–400)
PMV BLD AUTO: 9.6 FL (ref 8.9–12.9)
POTASSIUM SERPL-SCNC: 4 MMOL/L (ref 3.5–5.1)
POTASSIUM SERPL-SCNC: 5 MMOL/L (ref 3.5–5.1)
RBC # BLD AUTO: 3.61 M/UL (ref 3.8–5.2)
RBC MORPH BLD: ABNORMAL
SODIUM SERPL-SCNC: 131 MMOL/L (ref 136–145)
SODIUM SERPL-SCNC: 138 MMOL/L (ref 136–145)
SPECIMEN EXP DATE BLD: NORMAL
STATUS OF UNIT,%ST: NORMAL
TRANSFUSION STATUS PATIENT QL: NORMAL
UNIT DIVISION, %UDIV: 0
WBC # BLD AUTO: 7.1 K/UL (ref 3.6–11)

## 2022-11-06 PROCEDURE — 74011250636 HC RX REV CODE- 250/636: Performed by: SURGERY

## 2022-11-06 PROCEDURE — 74011250636 HC RX REV CODE- 250/636: Performed by: INTERNAL MEDICINE

## 2022-11-06 PROCEDURE — 74011250637 HC RX REV CODE- 250/637: Performed by: INTERNAL MEDICINE

## 2022-11-06 PROCEDURE — 99024 POSTOP FOLLOW-UP VISIT: CPT | Performed by: SURGERY

## 2022-11-06 PROCEDURE — 74011000250 HC RX REV CODE- 250: Performed by: SURGERY

## 2022-11-06 PROCEDURE — 74011000258 HC RX REV CODE- 258: Performed by: INTERNAL MEDICINE

## 2022-11-06 PROCEDURE — 36415 COLL VENOUS BLD VENIPUNCTURE: CPT

## 2022-11-06 PROCEDURE — 80048 BASIC METABOLIC PNL TOTAL CA: CPT

## 2022-11-06 PROCEDURE — 84100 ASSAY OF PHOSPHORUS: CPT

## 2022-11-06 PROCEDURE — 83735 ASSAY OF MAGNESIUM: CPT

## 2022-11-06 PROCEDURE — 97530 THERAPEUTIC ACTIVITIES: CPT

## 2022-11-06 PROCEDURE — 99232 SBSQ HOSP IP/OBS MODERATE 35: CPT | Performed by: INTERNAL MEDICINE

## 2022-11-06 PROCEDURE — 82962 GLUCOSE BLOOD TEST: CPT

## 2022-11-06 PROCEDURE — 74011250636 HC RX REV CODE- 250/636: Performed by: PHYSICIAN ASSISTANT

## 2022-11-06 PROCEDURE — 85025 COMPLETE CBC W/AUTO DIFF WBC: CPT

## 2022-11-06 PROCEDURE — 74011250637 HC RX REV CODE- 250/637: Performed by: PHYSICIAN ASSISTANT

## 2022-11-06 PROCEDURE — 74011000258 HC RX REV CODE- 258: Performed by: PHYSICIAN ASSISTANT

## 2022-11-06 PROCEDURE — 74011250637 HC RX REV CODE- 250/637: Performed by: NURSE PRACTITIONER

## 2022-11-06 PROCEDURE — 65270000029 HC RM PRIVATE

## 2022-11-06 RX ADMIN — HYDROMORPHONE HYDROCHLORIDE 1 MG: 1 INJECTION, SOLUTION INTRAMUSCULAR; INTRAVENOUS; SUBCUTANEOUS at 20:57

## 2022-11-06 RX ADMIN — OXYCODONE 5 MG: 5 TABLET ORAL at 09:47

## 2022-11-06 RX ADMIN — SODIUM CHLORIDE, PRESERVATIVE FREE 10 ML: 5 INJECTION INTRAVENOUS at 21:00

## 2022-11-06 RX ADMIN — MEGESTROL ACETATE 20 MG: 20 TABLET ORAL at 17:22

## 2022-11-06 RX ADMIN — MEGESTROL ACETATE 20 MG: 20 TABLET ORAL at 09:47

## 2022-11-06 RX ADMIN — HYDROMORPHONE HYDROCHLORIDE 1 MG: 1 INJECTION, SOLUTION INTRAMUSCULAR; INTRAVENOUS; SUBCUTANEOUS at 06:42

## 2022-11-06 RX ADMIN — ENOXAPARIN SODIUM 40 MG: 100 INJECTION SUBCUTANEOUS at 09:47

## 2022-11-06 RX ADMIN — SODIUM CHLORIDE 125 ML/HR: 9 INJECTION, SOLUTION INTRAVENOUS at 02:35

## 2022-11-06 RX ADMIN — MEROPENEM 1 G: 1 INJECTION, POWDER, FOR SOLUTION INTRAVENOUS at 09:47

## 2022-11-06 RX ADMIN — HYDROMORPHONE HYDROCHLORIDE 1 MG: 1 INJECTION, SOLUTION INTRAMUSCULAR; INTRAVENOUS; SUBCUTANEOUS at 02:36

## 2022-11-06 RX ADMIN — ONDANSETRON 4 MG: 2 INJECTION INTRAMUSCULAR; INTRAVENOUS at 19:44

## 2022-11-06 RX ADMIN — MEROPENEM 1 G: 1 INJECTION, POWDER, FOR SOLUTION INTRAVENOUS at 15:39

## 2022-11-06 RX ADMIN — SODIUM CHLORIDE, PRESERVATIVE FREE 10 ML: 5 INJECTION INTRAVENOUS at 06:42

## 2022-11-06 RX ADMIN — HYDROMORPHONE HYDROCHLORIDE 1 MG: 1 INJECTION, SOLUTION INTRAMUSCULAR; INTRAVENOUS; SUBCUTANEOUS at 15:40

## 2022-11-06 RX ADMIN — ACETAMINOPHEN 650 MG: 325 TABLET, FILM COATED ORAL at 09:47

## 2022-11-06 RX ADMIN — THIAMINE HYDROCHLORIDE 100 MG: 100 INJECTION, SOLUTION INTRAMUSCULAR; INTRAVENOUS at 09:47

## 2022-11-06 RX ADMIN — SODIUM CHLORIDE, PRESERVATIVE FREE 10 ML: 5 INJECTION INTRAVENOUS at 15:50

## 2022-11-06 RX ADMIN — SODIUM CHLORIDE 125 ML/HR: 9 INJECTION, SOLUTION INTRAVENOUS at 15:39

## 2022-11-06 NOTE — TELEPHONE ENCOUNTER
Needs cystectomy follow up rescheduled due to readmission. [facility] office fine. She does NOT have stents. I removed them both. Regular office visit. Scheduled for 1 month per Dr. Dax Jordan. May need to contact [facility] to schedule. Thanks!

## 2022-11-06 NOTE — PROGRESS NOTES
Hospitalist Progress Note    Subjective:   Daily Progress Note: 11/6/2022 9:56 AM    Hospital Course: Patient is a 61 y.o. female with PMH of bladder cancer s/p cystectomy, hysterectomy, BSO and ileal conduit diversion, anemia, hep C, and left leg DVT s/p IVC filter years ago. She presented to the ED with chief complaint of weakness and nausea for the past week. She reports unable to tolerate PO intake, due to profound nausea, abdominal pain, fever, chills, vomiting or diarrhea. In addition, she also reports unable to walk due to weakness and left inner thigh and left foot pain. Denies trauma. She was seen here on 10/13/2022 with urostomy complications, status post ileal conduit and discharged home on Cipro which she completed. In addition, she also reports having vaginal bleed since surgery 2 weeks ago. Which appears to be more related to a fistula with stool present. CT scan of the abdomen pelvis was performed revealing no obvious rectovaginal fistula although there was air present tracking in that direction without any extravasation of contrast.  There was extensive bilateral iliac venous thrombus to the level of the IVC filter with right leg edema. Cholelithiasis with a distended gallbladder was also noted. Bilateral hydronephrosis with hydroureter most likely from the ileal conduit. Probable small bowel obstruction secondary to adhesions also noted. Patient was found to be anemic and received 1 unit of packed red blood cells. Venous duplex revealed extensive clot burden within the right leg and left leg. Urinalysis was consistent with Candida infection. ID consultation. Surgical consultation, Dr. Rohini Edgar. UCX grew candida ciferrii, continue fluconazole. IV heparin being held due to blood loss, hem positive stool,.   Discussed case with interventional radiology who will further investigate the fistula with recommendations and mechanical thrombectomy performed of the bilateral lower extremities with a large volume clot removed. Patient went to the OR on 10/31/2022 for vaginal wound washout, rectovaginal fistula closure with drain placement, distal rectal repair. Believe that patient will need diverting colostomy and general surgery is speaking to patient's niece. She is currently NPO. Give IV albumin. On IV fluids. Megace for appetite stimulant. Patient is now agreeable for laparoscopic loop colostomy placement that will be performed on 11/2/2022. NG tube placed postoperatively. TPN initiated on 11/3/2022. Concern for refeeding syndrome and BMP, mag, Phos every 6 hours.  NG tube removeed on 11/5/2022 and started clear liquid diet and advanced as tolerated     Subjective:Patient  says she is feeling better after starting a diet and having the NG tube removed yesterday    Current Facility-Administered Medications   Medication Dose Route Frequency    TPN ADULT - CENTRAL AA 5% D20% W/ CA + ELECTROLYTES   IntraVENous CONTINUOUS    thiamine (B-1) 100 mg in 0.9% sodium chloride 50 mL IVPB  100 mg IntraVENous DAILY    sodium chloride (NS) flush 5-40 mL  5-40 mL IntraVENous Q8H    sodium chloride (NS) flush 5-40 mL  5-40 mL IntraVENous PRN    enoxaparin (LOVENOX) injection 40 mg  40 mg SubCUTAneous DAILY    0.9% sodium chloride infusion  125 mL/hr IntraVENous CONTINUOUS    HYDROmorphone (DILAUDID) syringe 0.25 mg  0.25 mg IntraVENous Q3H PRN    Or    HYDROmorphone (DILAUDID) injection 1 mg  1 mg IntraVENous Q4H PRN    megestroL (MEGACE) tablet 20 mg  20 mg Oral TID WITH MEALS    heparin (porcine) 1,000 unit/mL injection 2,000 Units  2,000 Units IntraVENous Rad Multiple    meropenem (MERREM) 1 g in 0.9% sodium chloride (MBP/ADV) 50 mL MBP  1 g IntraVENous Q8H    midodrine (PROAMATINE) tablet 5 mg  5 mg Oral TID PRN    [Held by provider] heparin 25,000 units in D5W 250 ml infusion  500 Units/hr IntraVENous TITRATE    oxyCODONE IR (ROXICODONE) tablet 5 mg  5 mg Oral Q6H PRN    cromolyn (OPTICROM) 4 % ophthalmic solution 1 Drop  1 Drop Both Eyes DAILY    acetaminophen (TYLENOL) tablet 650 mg  650 mg Oral Q6H PRN    Or    acetaminophen (TYLENOL) suppository 650 mg  650 mg Rectal Q6H PRN    polyethylene glycol (MIRALAX) packet 17 g  17 g Oral DAILY PRN    ondansetron (ZOFRAN ODT) tablet 4 mg  4 mg Oral Q8H PRN    Or    ondansetron (ZOFRAN) injection 4 mg  4 mg IntraVENous Q6H PRN        Review of Systems  Constitutional: No fevers, No chills, No sweats, ++ fatigue, ++Weakness  Eyes: No redness  Ears, nose, mouth, throat, and face: No nasal congestion, No sore throat, No voice change  Respiratory: No Shortness of Breath, No cough, No wheezing  Cardiovascular: No chest pain, No palpitations, No extremity edema  Gastrointestinal: No nausea, No vomiting, No diarrhea, No abdominal pain  Genitourinary: No frequency, No dysuria, No hematuria  Integument/breast: No skin lesion(s)   Neurological: No Confusion, No headaches, No dizziness      Objective:     Visit Vitals  /89 (BP 1 Location: Left upper arm, BP Patient Position: Lying)   Pulse 80   Temp 97.6 °F (36.4 °C)   Resp 18   Ht 5' 2\" (1.575 m)   Wt 51.7 kg (114 lb)   LMP  (LMP Unknown)   SpO2 100%   BMI 20.85 kg/m²      O2 Device: None (Room air)    Temp (24hrs), Av.9 °F (36.6 °C), Min:97.6 °F (36.4 °C), Max:98 °F (36.7 °C)      No intake/output data recorded.  1901 -  0700  In: 250 [I.V.:250]  Out: 4403 [Urine:4700; Drains:25]    PHYSICAL EXAM:  Constitutional: No acute distress, cachectic  Skin: Extremities and face reveal no rashes. HEENT: Sclerae anicteric. Extra-occular muscles are intact. Cardiovascular: RRR  Respiratory:  nonlabored  GI: Abdomen nondistended, ostomy and colostomy bags noted  Musculoskeletal: No pitting edema of the lower legs. Able to move all ext  Neurological:  Patient is alert and oriented.  Cranial nerves II-XII grossly intact  Psychiatric: smiling       Data Review    Recent Results (from the past 24 hour(s))   METABOLIC PANEL, BASIC    Collection Time: 11/05/22 11:48 AM   Result Value Ref Range    Sodium 137 136 - 145 mmol/L    Potassium 3.8 3.5 - 5.1 mmol/L    Chloride 108 97 - 108 mmol/L    CO2 22 21 - 32 mmol/L    Anion gap 7 5 - 15 mmol/L    Glucose 105 (H) 65 - 100 mg/dL    BUN 6 6 - 20 mg/dL    Creatinine 0.38 (L) 0.55 - 1.02 mg/dL    BUN/Creatinine ratio 16 12 - 20      eGFR >60 >60 ml/min/1.73m2    Calcium 8.7 8.5 - 10.1 mg/dL   CBC WITH AUTOMATED DIFF    Collection Time: 11/05/22 11:48 AM   Result Value Ref Range    WBC 6.7 3.6 - 11.0 K/uL    RBC 3.91 3.80 - 5.20 M/uL    HGB 10.9 (L) 11.5 - 16.0 g/dL    HCT 35.0 35.0 - 47.0 %    MCV 89.5 80.0 - 99.0 FL    MCH 27.9 26.0 - 34.0 PG    MCHC 31.1 30.0 - 36.5 g/dL    RDW 17.2 (H) 11.5 - 14.5 %    PLATELET 136 948 - 599 K/uL    MPV 9.0 8.9 - 12.9 FL    NRBC 0.0 0.0  WBC    ABSOLUTE NRBC 0.00 0.00 - 0.01 K/uL    NEUTROPHILS 60 32 - 75 %    LYMPHOCYTES 27 12 - 49 %    MONOCYTES 7 5 - 13 %    EOSINOPHILS 0 0 - 7 %    BASOPHILS 1 0 - 1 %    IMMATURE GRANULOCYTES 5 (H) 0 - 0.5 %    ABS. NEUTROPHILS 4.0 1.8 - 8.0 K/UL    ABS. LYMPHOCYTES 1.8 0.8 - 3.5 K/UL    ABS. MONOCYTES 0.5 0.0 - 1.0 K/UL    ABS. EOSINOPHILS 0.0 0.0 - 0.4 K/UL    ABS. BASOPHILS 0.1 0.0 - 0.1 K/UL    ABS. IMM. GRANS. 0.3 (H) 0.00 - 0.04 K/UL    DF Smear Scanned      RBC COMMENTS Anisocytosis  1+       TRIGLYCERIDE    Collection Time: 11/05/22 11:48 AM   Result Value Ref Range    Triglyceride 128 <150 mg/dL   HEPATIC FUNCTION PANEL    Collection Time: 11/05/22 11:48 AM   Result Value Ref Range    Protein, total 8.2 6.4 - 8.2 g/dL    Albumin 2.8 (L) 3.5 - 5.0 g/dL    Globulin 5.4 (H) 2.0 - 4.0 g/dL    A-G Ratio 0.5 (L) 1.1 - 2.2      Bilirubin, total 0.7 0.2 - 1.0 mg/dL    Bilirubin, direct 0.2 0.0 - 0.2 mg/dL    Alk.  phosphatase 66 45 - 117 U/L    AST (SGOT) 31 15 - 37 U/L    ALT (SGPT) 17 12 - 78 U/L   PHOSPHORUS    Collection Time: 11/05/22 11:48 AM   Result Value Ref Range    Phosphorus 1.9 (L) 2.6 - 4.7 mg/dL MAGNESIUM    Collection Time: 11/05/22 11:48 AM   Result Value Ref Range    Magnesium 2.3 1.6 - 2.4 mg/dL   GLUCOSE, POC    Collection Time: 11/05/22  4:33 PM   Result Value Ref Range    Glucose (POC) 104 (H) 65 - 100 mg/dL    Performed by Trinidad Franklin, POC    Collection Time: 11/05/22  9:21 PM   Result Value Ref Range    Glucose (POC) 91 65 - 100 mg/dL    Performed by Michael Helms    GLUCOSE, POC    Collection Time: 11/06/22 12:46 AM   Result Value Ref Range    Glucose (POC) 95 65 - 100 mg/dL    Performed by Michael Helms    GLUCOSE, POC    Collection Time: 11/06/22  5:28 AM   Result Value Ref Range    Glucose (POC) 98 65 - 100 mg/dL    Performed by Michael Helms        CBC:   Lab Results   Component Value Date/Time    WBC 6.7 11/05/2022 11:48 AM    RBC 3.91 11/05/2022 11:48 AM    HGB 10.9 (L) 11/05/2022 11:48 AM    HCT 35.0 11/05/2022 11:48 AM    PLATELET 794 32/93/0689 11:48 AM     BMP:   Lab Results   Component Value Date/Time    Glucose 105 (H) 11/05/2022 11:48 AM    Sodium 137 11/05/2022 11:48 AM    Potassium 3.8 11/05/2022 11:48 AM    Chloride 108 11/05/2022 11:48 AM    CO2 22 11/05/2022 11:48 AM    BUN 6 11/05/2022 11:48 AM    Creatinine 0.38 (L) 11/05/2022 11:48 AM    Calcium 8.7 11/05/2022 11:48 AM       Assessment:   1. Rectal/vaginal fistula status post washout, rectovaginal fistula closure with drain placement and distal rectal repair day #6 and diverting colostomy day #5  2. Bladder cancer  3. Small bowel obstruction  4. Acute on chronic anemia  5. UTI  6. Extensive DVT bilateral lower extremity  7. Emphysema without exacerbation  8. Severe protein malnutrition  9. Hypotension    Plan:   1. Urology, GI, and general surgery consulted. GI also consulted. Patient currently on meropenem #14/14. Patient is status post diverting colostomy day #5. Pain management per general surgery. With minimal output. Repeat CT scan no obstruction. nG tube removed on 11/5.  Tolerating liquid diet. Advance today per surgery   2. Urology consulted. Continue supportive care. Urology and oncology following  3. Patient currently NPO. NG tube is in place. Minimal output  4. Hemoglobin dropped to 5.7. Total number of transfusions count of transfusion 3 units. Occult blood positive. Holding heparin drip. HGB yesterday 10.6  5. Continue fluconazole for fungal UTI. Urine culture grew Candida cifrrii. ID consulted. Completed 10 doses. 6.  Venous Doppler showed extensive clot burden. Patient already has an IVC filter placed. Started on a heparin drip however on hold due to bleeding. IR did bilateral iliofemoral and IVC aspiration thrombectomy with removal of a large volume of thrombus from both legs extremities and IVC  7. Not in any respiratory distress. Oxygen saturation within normal limits  8. Patient agreeable to TPN. Started. Concern for refeeding syndrome. Will hold TPN today as increasing diet  9. Blood pressure remained soft. As needed midodrine. On IV fluids  10. CBC, CMP in the a.m. Dispo: 24-48 hours. Barriers include return of bowel function, tolerating diet, nutritional improvement, PT and OT. Suspect patient will need a minimum of home health or IRF    CODE STATUS full     DVT prophylaxis: Hold due to bleeding/anemia  Ulcer prophylaxis: tolerating diet. Care Plan discussed with: Patient/Family, Nurse, and     Total time spent with patient: 34 minutes.

## 2022-11-06 NOTE — PROGRESS NOTES
Infectious Disease Progress Note           Subjective:   Stable, denies new complaints, no acute events since last seen, Remains stable   Objective:   Physical Exam:     Visit Vitals  /84 (BP 1 Location: Left upper arm)   Pulse 82   Temp 97.8 °F (36.6 °C)   Resp 18   Ht 5' 2\" (1.575 m)   Wt 114 lb (51.7 kg)   LMP  (LMP Unknown)   SpO2 100%   BMI 20.85 kg/m²      O2 Device: None (Room air)    Temp (24hrs), Av.8 °F (36.6 °C), Min:97.6 °F (36.4 °C), Max:98 °F (36.7 °C)    701 - 1900  In: -   Out: 300 [Urine:300]   1901 - 700  In: 250 [I.V.:250]  Out: 8366 [Urine:4700; Drains:25]    General: NAD, NAD, AAO x 4  HEENT: SIMBA, Moist mucosa,  Lungs: CTA b/l, decreased at the bases   Heart: S1S2+, RRR, no murmur  Abdo:  Soft, NT, ND, +BS, + colostomy and urostomy   Exts: decreased b/l leg edema   Skin: no chronic wounds or ulcers     Data Review:       Recent Days:  Recent Labs     22  1200 22  1148 22  0508   WBC 7.1 6.7 6.4   HGB 10.1* 10.9* 9.3*   HCT 33.2* 35.0 29.7*    227 208       Recent Labs     22  1200 22  1148 22  2249   BUN 8 6 5*   CREA 0.65 0.38* 0.33*       Lab Results   Component Value Date/Time    C-Reactive protein 15.00 (H) 10/25/2022 05:16 AM       Microbiology     Results       ** No results found for the last 336 hours. **           Diagnostics   CXR Results  (Last 48 hours)      None          Assessment/Plan   UTI, complicated by ileal conduit. Candida Ciferrii isolated from Cx         Received 14 days of Fluconazole for yeast coverage         Remains afebrile w a normal WBC on routine labs         Monitor off antibiotics for now. Routine labs in the morning      2. Vaginal cuff cellulitis/suspected abscess on CT      GPC in pairs and GNR noted on Gram stain of drainage, Cx neg       Completed 14 days of meropenem, will d/c and monitor off antibiotics      3.  Rectovaginal fistula, S/p diverting colostomy on 11/02      Tolerating oral intake, denies nausea/vomiting     4. B/L LE DVT extensive, IVC filter in situ.  S/p thrombectomy by IR on 10/27    Dispo: Cleared for d/c from ID stand point       Mahad Galvez MD    11/6/2022

## 2022-11-06 NOTE — PROGRESS NOTES
UROLOGY Progress Note         [phone]      Daily Progress Note: 11/6/2022      Subjective: The patient is seen for UROLOGIC follow up for bladder cancer, post-cystectomy issues. She is s/p on cystectomy, ileal conduit diversion, pelvic exenteration on 10/3/22. Complicated by rectal injury; repaired at time of surgery. Stents removed in ER on 10/13/22. Readmitted on 10/19/22 with anemia, SBO, weakness, nausea. She was found to have some vaginal bleeding and discharge at that time. DVT hx with thigh and foot pain on admission. Extensive bilateral iliac venous thrombus to the level of the IVC filter. Right  lower extremity edema on CT 10/23/22. Now s/p thrombectomy in IR on 10/27/22. 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 Not seen on CT 10/23/22. Continued vaginal drainage with stool-like appearance. 10/31/22: return to OR for vaginal wash out, suspected cuff dehiscence (confirmed). She is s/p rectovaginal fistula closure with drain placement and distal rectal repair by Dr. Samuel Hernandez. Now s/p laparoscopic loop colostomy with Dr. Samuel Hernandez. Tolerating diet. Nutrition added supplements. Liquid stool from colostomy. CT with resolution of SBO. Drain removed 11/5/22. Plan for d/c when appropriate to rehab facility.     Problem List:  Patient Active Problem List   Diagnosis Code    History of blood clots Z86.718    Bladder cancer ([facility] [address] C67.9    Anemia due to acute blood loss D62    Hypercalcemia E83.52    Hydronephrosis of right kidney N13.30    Retained ureteral stent Z96.0    Severe protein-calorie malnutrition (HCC) E43    Acute blood loss anemia D62    Small bowel obstruction (HCC) K56.609    Status post robot-assisted surgical procedure, pelvic exenteration Z98.890    Vaginal cuff cellulitis N76.0         Medications reviewed  Current Facility-Administered Medications   Medication Dose Route Frequency    TPN ADULT - CENTRAL AA 5% D20% W/ CA + ELECTROLYTES   IntraVENous CONTINUOUS thiamine (B-1) 100 mg in 0.9% sodium chloride 50 mL IVPB  100 mg IntraVENous DAILY    sodium chloride (NS) flush 5-40 mL  5-40 mL IntraVENous Q8H    sodium chloride (NS) flush 5-40 mL  5-40 mL IntraVENous PRN    enoxaparin (LOVENOX) injection 40 mg  40 mg SubCUTAneous DAILY    0.9% sodium chloride infusion  125 mL/hr IntraVENous CONTINUOUS    HYDROmorphone (DILAUDID) syringe 0.25 mg  0.25 mg IntraVENous Q3H PRN    Or    HYDROmorphone (DILAUDID) injection 1 mg  1 mg IntraVENous Q4H PRN    megestroL (MEGACE) tablet 20 mg  20 mg Oral TID WITH MEALS    heparin (porcine) 1,000 unit/mL injection 2,000 Units  2,000 Units IntraVENous Rad Multiple    meropenem (MERREM) 1 g in 0.9% sodium chloride (MBP/ADV) 50 mL MBP  1 g IntraVENous Q8H    midodrine (PROAMATINE) tablet 5 mg  5 mg Oral TID PRN    [Held by provider] heparin 25,000 units in D5W 250 ml infusion  500 Units/hr IntraVENous TITRATE    oxyCODONE IR (ROXICODONE) tablet 5 mg  5 mg Oral Q6H PRN    cromolyn (OPTICROM) 4 % ophthalmic solution 1 Drop  1 Drop Both Eyes DAILY    acetaminophen (TYLENOL) tablet 650 mg  650 mg Oral Q6H PRN    Or    acetaminophen (TYLENOL) suppository 650 mg  650 mg Rectal Q6H PRN    polyethylene glycol (MIRALAX) packet 17 g  17 g Oral DAILY PRN    ondansetron (ZOFRAN ODT) tablet 4 mg  4 mg Oral Q8H PRN    Or    ondansetron (ZOFRAN) injection 4 mg  4 mg IntraVENous Q6H PRN       Review of Systems:   Review of Systems   Constitutional:  Negative for fever. Gastrointestinal:  Positive for abdominal pain. Negative for nausea and vomiting. Objective:   Physical Exam  Vitals and nursing note reviewed. Constitutional:       General: She is not in acute distress. Appearance: She is ill-appearing. Abdominal:      Comments: Ileal conduit, colostomy   Skin:     General: Skin is warm and dry. Neurological:      Mental Status: She is alert and oriented to person, place, and time.    Psychiatric:         Behavior: Behavior normal. Visit Vitals  /84 (BP 1 Location: Left upper arm)   Pulse 82   Temp 97.8 °F (36.6 °C)   Resp 18   Ht 5' 2\" (1.575 m)   Wt 114 lb (51.7 kg)   SpO2 100%   BMI 20.85 kg/m²         Data Review:       Recent Days:  Recent Labs     11/05/22  1148 11/04/22  0508   WBC 6.7 6.4   HGB 10.9* 9.3*   HCT 35.0 29.7*    208       Recent Labs     11/05/22  1148 11/04/22  2249 11/04/22  1740    138 136   K 3.8 3.8 3.4*    108 108   CO2 22 22 21   * 77 71   BUN 6 5* 6   CREA 0.38* 0.33* 0.35*   CA 8.7 8.5 8.3*   MG 2.3 2.2 2.2   PHOS 1.9* 2.0* 0.6*   ALB 2.8*  --   --    TBILI 0.7  --   --    ALT 17  --   --          24 Hour Results:  Recent Results (from the past 24 hour(s))   GLUCOSE, POC    Collection Time: 11/05/22  4:33 PM   Result Value Ref Range    Glucose (POC) 104 (H) 65 - 100 mg/dL    Performed by Jolly Boyd, POC    Collection Time: 11/05/22  9:21 PM   Result Value Ref Range    Glucose (POC) 91 65 - 100 mg/dL    Performed by Main Mancia    GLUCOSE, POC    Collection Time: 11/06/22 12:46 AM   Result Value Ref Range    Glucose (POC) 95 65 - 100 mg/dL    Performed by Main Mancia    GLUCOSE, POC    Collection Time: 11/06/22  5:28 AM   Result Value Ref Range    Glucose (POC) 98 65 - 100 mg/dL    Performed by Main Mancia            Assessment/     Patient Active Problem List   Diagnosis Code    History of blood clots Z86.718    Bladder cancer ([facility] [address] C67.9    Anemia due to acute blood loss D62    Hypercalcemia E83.52    Hydronephrosis of right kidney N13.30    Retained ureteral stent Z96.0    Severe protein-calorie malnutrition (HCC) E43    Acute blood loss anemia D62    Small bowel obstruction (HCC) K56.609    Status post robot-assisted surgical procedure, pelvic exenteration Z98.890    Vaginal cuff cellulitis N76.0       Plan:    BLADDER CANCER S/P CYSTECTOMY WITH ILEAL CONDUIT: She is s/p on cystectomy, ileal conduit diversion, pelvic exenteration on 10/3/22.   Pathology significant for pT3bN0; invasive squamous cell carcinoma, G2, with multifocal invasion into perivesical and periureteral soft tissue. Uninvolved margins/nodes. RECTOVAGINAL FISTULA: 10/31/22 she returned to OR for vaginal wash out, suspected cuff dehiscence (confirmed). She is s/p rectovaginal fistula closure with drain placement and distal rectal repair by Dr. Etta Steel. Now s/p laparoscopic loop colostomy with Dr. Etta Steel on 11/2/22. Tolerating diet. DVT: DVT hx with thigh and foot pain on admission. Extensive bilateral iliac venous thrombus to the level of the IVC filter. Right  lower extremity edema on CT 10/23/22. Now s/p thrombectomy in IR on 10/27/22. FUNGAL UTI: Candida Ciferrii isolated from culture, now s/p 14 days of Fluconazole, ID following. MALNUTRITION: nutrition following; Ensure. Tolerating diet.     Romel Luong, HARINI

## 2022-11-06 NOTE — PROGRESS NOTES
Patient is doing well. Colostomy has liquid stool  No fever or chills. Abdomen soft. Urostomy have lots of urine. I remove the colostomy bridge and I also remove the drain from the vaginal vault. We will advance diet.

## 2022-11-07 LAB
ANION GAP SERPL CALC-SCNC: 5 MMOL/L (ref 5–15)
ANION GAP SERPL CALC-SCNC: 6 MMOL/L (ref 5–15)
BASOPHILS # BLD: 0 K/UL (ref 0–0.1)
BASOPHILS NFR BLD: 0 % (ref 0–1)
BUN SERPL-MCNC: 6 MG/DL (ref 6–20)
BUN SERPL-MCNC: 6 MG/DL (ref 6–20)
BUN/CREAT SERPL: 15 (ref 12–20)
BUN/CREAT SERPL: 16 (ref 12–20)
CA-I BLD-MCNC: 8.7 MG/DL (ref 8.5–10.1)
CA-I BLD-MCNC: 8.8 MG/DL (ref 8.5–10.1)
CHLORIDE SERPL-SCNC: 109 MMOL/L (ref 97–108)
CHLORIDE SERPL-SCNC: 109 MMOL/L (ref 97–108)
CO2 SERPL-SCNC: 22 MMOL/L (ref 21–32)
CO2 SERPL-SCNC: 23 MMOL/L (ref 21–32)
CREAT SERPL-MCNC: 0.37 MG/DL (ref 0.55–1.02)
CREAT SERPL-MCNC: 0.41 MG/DL (ref 0.55–1.02)
DIFFERENTIAL METHOD BLD: ABNORMAL
EOSINOPHIL # BLD: 0 K/UL (ref 0–0.4)
EOSINOPHIL NFR BLD: 0 % (ref 0–7)
ERYTHROCYTE [DISTWIDTH] IN BLOOD BY AUTOMATED COUNT: 17.4 % (ref 11.5–14.5)
GLUCOSE BLD STRIP.AUTO-MCNC: 82 MG/DL (ref 65–100)
GLUCOSE SERPL-MCNC: 86 MG/DL (ref 65–100)
GLUCOSE SERPL-MCNC: 89 MG/DL (ref 65–100)
HCT VFR BLD AUTO: 31.7 % (ref 35–47)
HGB BLD-MCNC: 9.8 G/DL (ref 11.5–16)
IMM GRANULOCYTES # BLD AUTO: 0 K/UL
IMM GRANULOCYTES NFR BLD AUTO: 0 %
LYMPHOCYTES # BLD: 2.5 K/UL (ref 0.8–3.5)
LYMPHOCYTES NFR BLD: 40 % (ref 12–49)
MAGNESIUM SERPL-MCNC: 2 MG/DL (ref 1.6–2.4)
MAGNESIUM SERPL-MCNC: 2 MG/DL (ref 1.6–2.4)
MCH RBC QN AUTO: 28.1 PG (ref 26–34)
MCHC RBC AUTO-ENTMCNC: 30.9 G/DL (ref 30–36.5)
MCV RBC AUTO: 90.8 FL (ref 80–99)
MONOCYTES # BLD: 0.6 K/UL (ref 0–1)
MONOCYTES NFR BLD: 9 % (ref 5–13)
NEUTS SEG # BLD: 3.2 K/UL (ref 1.8–8)
NEUTS SEG NFR BLD: 51 % (ref 32–75)
NRBC # BLD: 0 K/UL (ref 0–0.01)
NRBC BLD-RTO: 0 PER 100 WBC
PERFORMED BY, TECHID: NORMAL
PHOSPHATE SERPL-MCNC: 1 MG/DL (ref 2.6–4.7)
PHOSPHATE SERPL-MCNC: 1.2 MG/DL (ref 2.6–4.7)
PLATELET # BLD AUTO: 201 K/UL (ref 150–400)
PMV BLD AUTO: 9.7 FL (ref 8.9–12.9)
POTASSIUM SERPL-SCNC: 3.8 MMOL/L (ref 3.5–5.1)
POTASSIUM SERPL-SCNC: 4 MMOL/L (ref 3.5–5.1)
RBC # BLD AUTO: 3.49 M/UL (ref 3.8–5.2)
RBC MORPH BLD: ABNORMAL
SODIUM SERPL-SCNC: 136 MMOL/L (ref 136–145)
SODIUM SERPL-SCNC: 138 MMOL/L (ref 136–145)
WBC # BLD AUTO: 6.3 K/UL (ref 3.6–11)

## 2022-11-07 PROCEDURE — 83735 ASSAY OF MAGNESIUM: CPT

## 2022-11-07 PROCEDURE — 74011250636 HC RX REV CODE- 250/636: Performed by: SURGERY

## 2022-11-07 PROCEDURE — 97110 THERAPEUTIC EXERCISES: CPT

## 2022-11-07 PROCEDURE — 74011250637 HC RX REV CODE- 250/637: Performed by: INTERNAL MEDICINE

## 2022-11-07 PROCEDURE — 74011000250 HC RX REV CODE- 250: Performed by: SURGERY

## 2022-11-07 PROCEDURE — 74011250637 HC RX REV CODE- 250/637: Performed by: NURSE PRACTITIONER

## 2022-11-07 PROCEDURE — 82962 GLUCOSE BLOOD TEST: CPT

## 2022-11-07 PROCEDURE — 99232 SBSQ HOSP IP/OBS MODERATE 35: CPT | Performed by: INTERNAL MEDICINE

## 2022-11-07 PROCEDURE — 97116 GAIT TRAINING THERAPY: CPT

## 2022-11-07 PROCEDURE — 97530 THERAPEUTIC ACTIVITIES: CPT

## 2022-11-07 PROCEDURE — 99024 POSTOP FOLLOW-UP VISIT: CPT | Performed by: SURGERY

## 2022-11-07 PROCEDURE — 74011250637 HC RX REV CODE- 250/637: Performed by: PHYSICIAN ASSISTANT

## 2022-11-07 PROCEDURE — 65270000029 HC RM PRIVATE

## 2022-11-07 PROCEDURE — 74011250636 HC RX REV CODE- 250/636: Performed by: INTERNAL MEDICINE

## 2022-11-07 PROCEDURE — 80048 BASIC METABOLIC PNL TOTAL CA: CPT

## 2022-11-07 PROCEDURE — 85025 COMPLETE CBC W/AUTO DIFF WBC: CPT

## 2022-11-07 PROCEDURE — 36415 COLL VENOUS BLD VENIPUNCTURE: CPT

## 2022-11-07 PROCEDURE — 84100 ASSAY OF PHOSPHORUS: CPT

## 2022-11-07 RX ORDER — OXYCODONE HYDROCHLORIDE 5 MG/1
5 TABLET ORAL
Status: DISCONTINUED | OUTPATIENT
Start: 2022-11-07 | End: 2022-11-10

## 2022-11-07 RX ORDER — MAG HYDROX/ALUMINUM HYD/SIMETH 200-200-20
30 SUSPENSION, ORAL (FINAL DOSE FORM) ORAL
Status: DISCONTINUED | OUTPATIENT
Start: 2022-11-07 | End: 2022-11-07

## 2022-11-07 RX ORDER — ASPIRIN 325 MG/1
100 TABLET, FILM COATED ORAL DAILY
Status: DISCONTINUED | OUTPATIENT
Start: 2022-11-07 | End: 2022-11-11 | Stop reason: HOSPADM

## 2022-11-07 RX ORDER — MAG HYDROX/ALUMINUM HYD/SIMETH 200-200-20
30 SUSPENSION, ORAL (FINAL DOSE FORM) ORAL
Status: DISCONTINUED | OUTPATIENT
Start: 2022-11-07 | End: 2022-11-11 | Stop reason: HOSPADM

## 2022-11-07 RX ORDER — PANTOPRAZOLE SODIUM 40 MG/1
40 TABLET, DELAYED RELEASE ORAL
Status: DISCONTINUED | OUTPATIENT
Start: 2022-11-07 | End: 2022-11-11 | Stop reason: HOSPADM

## 2022-11-07 RX ADMIN — MEGESTROL ACETATE 20 MG: 20 TABLET ORAL at 09:27

## 2022-11-07 RX ADMIN — MEGESTROL ACETATE 20 MG: 20 TABLET ORAL at 17:01

## 2022-11-07 RX ADMIN — CROMOLYN SODIUM 1 DROP: 40 SOLUTION/ DROPS OPHTHALMIC at 09:30

## 2022-11-07 RX ADMIN — APIXABAN 5 MG: 5 TABLET, FILM COATED ORAL at 09:27

## 2022-11-07 RX ADMIN — SODIUM CHLORIDE 125 ML/HR: 9 INJECTION, SOLUTION INTRAVENOUS at 01:44

## 2022-11-07 RX ADMIN — HYDROMORPHONE HYDROCHLORIDE 0.25 MG: 1 INJECTION, SOLUTION INTRAMUSCULAR; INTRAVENOUS; SUBCUTANEOUS at 06:28

## 2022-11-07 RX ADMIN — ACETAMINOPHEN 650 MG: 325 TABLET, FILM COATED ORAL at 13:35

## 2022-11-07 RX ADMIN — PANTOPRAZOLE SODIUM 40 MG: 40 TABLET, DELAYED RELEASE ORAL at 17:01

## 2022-11-07 RX ADMIN — SODIUM CHLORIDE, PRESERVATIVE FREE 10 ML: 5 INJECTION INTRAVENOUS at 05:27

## 2022-11-07 RX ADMIN — ONDANSETRON 4 MG: 2 INJECTION INTRAMUSCULAR; INTRAVENOUS at 01:44

## 2022-11-07 RX ADMIN — THIAMINE HCL TAB 100 MG 100 MG: 100 TAB at 13:35

## 2022-11-07 RX ADMIN — SODIUM CHLORIDE, PRESERVATIVE FREE 10 ML: 5 INJECTION INTRAVENOUS at 22:22

## 2022-11-07 RX ADMIN — OXYCODONE 5 MG: 5 TABLET ORAL at 09:27

## 2022-11-07 RX ADMIN — APIXABAN 5 MG: 5 TABLET, FILM COATED ORAL at 22:22

## 2022-11-07 RX ADMIN — SODIUM CHLORIDE, PRESERVATIVE FREE 10 ML: 5 INJECTION INTRAVENOUS at 17:04

## 2022-11-07 RX ADMIN — MEGESTROL ACETATE 20 MG: 20 TABLET ORAL at 13:35

## 2022-11-07 RX ADMIN — HYDROMORPHONE HYDROCHLORIDE 1 MG: 1 INJECTION, SOLUTION INTRAMUSCULAR; INTRAVENOUS; SUBCUTANEOUS at 01:44

## 2022-11-07 RX ADMIN — ALUMINUM HYDROXIDE, MAGNESIUM HYDROXIDE, AND SIMETHICONE 30 ML: 200; 200; 20 SUSPENSION ORAL at 17:01

## 2022-11-07 RX ADMIN — OXYCODONE 5 MG: 5 TABLET ORAL at 17:01

## 2022-11-07 NOTE — PROGRESS NOTES
Patient examined this morning. Patient doing well. Tolerating diet  Wounds are clean dry and colostomy is working. Drains removed yesterday    Patient can be discharged home today from surgical point of view. Please have patient come see me for follow-up in 2 weeks.

## 2022-11-07 NOTE — PROGRESS NOTES
Problem: Mobility Impaired (Adult and Pediatric)  Goal: *Acute Goals and Plan of Care (Insert Text)  Description: FUNCTIONAL STATUS PRIOR TO ADMISSION: Patient was independent without use of DME.    HOME SUPPORT PRIOR TO ADMISSION: The patient lived alone with neice to provide assistance. Physical Therapy Goals  Revised 11/4/22- Continue with goals  Initiated 10/26/2022  1. Patient will move from supine to sit and sit to supine  in bed with modified independence within 7 day(s). 2.  Patient will transfer from bed to chair and chair to bed with modified independence using the least restrictive device within 7 day(s). 3.  Patient will perform sit to stand with modified independence within 7 day(s). 4.  Patient will ambulate with modified independence for 100-150 feet with the least restrictive device within 7 day(s). 5.  Patient will ascend/descend 12 stairs with 1 handrail(s) with modified independence within 7 day(s). Outcome: Progressing Towards Goal   PHYSICAL THERAPY TREATMENT  Patient: Ferrel Cranker (81 y.o. female)  Date: 11/7/2022  Diagnosis: Acute blood loss anemia [D62]  Small bowel obstruction (HCC) [K56.609] Small bowel obstruction (HCC)  Procedure(s) (LRB):  LAPAROSCOPIC LOOP COLOSTOMY PLACEMENT (N/A) 5 Days Post-Op     Precautions:    Chart, physical therapy assessment, plan of care and goals were reviewed. ASSESSMENT  Patient continues with skilled PT services and is progressing towards goals. Pt sitting in chair upon arrival upon PT arrival, agreeable to session. (See below for objective details and assist levels). Pt. Continues to require Min assist for Tfs. Pt. Increased gt. Distance and endurance this session. Pt. Noted with decreased step length/stride and coy. Pt. Demonstrated a step to gt. Pattern initially approx 50 then progressed to a step through gt. Pattern. Pt. Required constant vcs for walker advancement and step through gt. Pattern.  Pt. Gt. Slightly unsteady at times but no LOB noted. Pt. Tolerated LE exercises sitting in chair today. Overall pt tolerated session fair today with gt. training. Will continue to benefit from skilled PT services, and will continue to progress as tolerated. Current Level of Function Impacting Discharge (mobility/balance): CG/Min A X 1 for mobility    Other factors to consider for discharge: safety/assistance needed/PLOF         PLAN :  Patient continues to benefit from skilled intervention to address the above impairments. Continue treatment per established plan of care to address goals. Recommend with staff: Out of bed to chair for meals, Encourage HEP in prep for ADLs/mobility, Amb to bathroom for toileting with gt belt and AD, and LE elevation for management of edema    Recommendation for discharge: (in order for the patient to meet his/her long term goals)  [address] [facility]     This discharge recommendation:  Has been made in collaboration with the attending provider and/or case management    IF patient discharges home will need the following DME: rolling walker, to be determined (TBD), and IRF       SUBJECTIVE:   Patient stated I am ready to walk. \" I have been in bed for 2 days. \" I just got to the chair with OT. \"    OBJECTIVE DATA SUMMARY:   Critical Behavior:  Neurologic State: Alert  Orientation Level: Oriented X4  Cognition: Appropriate for age attention/concentration, Follows commands  Safety/Judgement: Decreased awareness of environment  Functional Mobility Training:  Bed Mobility:  Rolling: Modified independent  Supine to Sit: Modified independent     Scooting: Independent        Transfers:  Sit to Stand: Minimum assistance from chair  Stand to Sit: Minimum assistance        Bed to Chair: Contact guard assistance                  Balance:  Sitting: Intact; Without support  Standing: Impaired; With support  Standing - Static: Constant support;Good  Standing - Dynamic : Constant support; Fair  Ambulation/Gait Training:  Distance (ft): 79 Feet (ft)  Assistive Device: Walker, rolling;Gait belt  Ambulation - Level of Assistance: Contact guard assistance        Gait Abnormalities: Step to gait        Base of Support: Narrowed     Speed/Vera: Slow  Step Length: Right shortened;Left shortened            Therapeutic Exercises:       EXERCISE   Sets   Reps   Active Active Assist   Passive Self ROM   Comments   Ankle Pumps  20 [x] [] [] [] seated   Quad Sets/Glut Sets   [] [] [] []    Hamstring Sets   [] [] [] []    Short Arc Quads   [] [] [] []    Heel Slides   [] [] [] []    Straight Leg Raises   [] [] [] []    Hip abd/add  20 [x] [] [] [] seated   Long Arc Quads  20 [x] [] [] [] seated   Mini Marching  20 [x] [] [] [] seated      [] [] [] []       Pain Ratin/10 abdomen      Activity Tolerance:   Fair and requires rest breaks    After treatment patient left in no apparent distress:   Bed locked and returned to lowest position, Sitting in chair, Heels elevated for pressure relief, Call bell within reach, and RN notified of pt. In chair and [address] [place] [id number] 153 session. COMMUNICATION/COLLABORATION:   The patients plan of care was discussed with: Registered nurse.      Sanam Little PT   Time Calculation: 25 mins

## 2022-11-07 NOTE — TELEPHONE ENCOUNTER
Lvm for pt liz to call the office to  schedule a 1 month follow up appt with dr. Binnie Cranker in [place]

## 2022-11-07 NOTE — PROGRESS NOTES
Problem: Pain  Goal: *Control of Pain  Outcome: Progressing Towards Goal     Problem: Falls - Risk of  Goal: *Absence of Falls  Description: Document Iris Fall Risk and appropriate interventions in the flowsheet.   Outcome: Progressing Towards Goal  Note: Fall Risk Interventions:  Mobility Interventions: Bed/chair exit alarm, OT consult for ADLs, PT Consult for mobility concerns, Utilize walker, cane, or other assistive device         Medication Interventions: Bed/chair exit alarm, Patient to call before getting OOB, Teach patient to arise slowly    Elimination Interventions: Bed/chair exit alarm, Call light in reach, Patient to call for help with toileting needs    History of Falls Interventions: Bed/chair exit alarm         Problem: Patient Education: Go to Patient Education Activity  Goal: Patient/Family Education  Outcome: Progressing Towards Goal

## 2022-11-07 NOTE — PROGRESS NOTES
OCCUPATIONAL THERAPY TREATMENT  Patient: Ferrel Cranker (24 y.o. female)  Date: 11/7/2022  Diagnosis: Acute blood loss anemia [D62]  Small bowel obstruction (HCC) [K56.609] Small bowel obstruction (HCC)  Procedure(s) (LRB):  LAPAROSCOPIC LOOP COLOSTOMY PLACEMENT (N/A) 5 Days Post-Op  Precautions: FALL  Chart, occupational therapy assessment, plan of care, and goals were reviewed. ASSESSMENT  Pt continues with skilled OT services and is progressing towards goals. Pt received semi-supine in bed upon arrival, AXO x4 and agreeable to WAGNER tx at this time. Pt cooperative and demonstrated good effort during activities. Overall, pt continues to present with deficits in generalized strength/AROM, static/dynamic standing balance, pain and functional activity tolerance during performance of ADLs/mobility (see below for objective details and assist levels). Pt c/o of 6/10 pain in stomach area. Pt has progressed to mod I for bed mobility>EOB with 1 vc for proper hand placement using bed rail. IND for LB dressing (bharati socks). Pt has shown improvement with STS and bed>chair tf with less assistance and vc's for proper technique for safety. In standing at bedside table, pt completed grooming standing for ~4 minutes with no LOB. Pt declined sink level grooming at this time. Patient engaged in  and completed bilateral UE HEP to increase strength/endurance needed for ADL'S and functional transfers with vc's for pacing self for proper technique, see grid below. Pt has great potential to increase functional independence. Will continue to progress. Recommend d/c to IRF  once medically appropriate. Other factors to consider for discharge: Time of onset, medical prognosis/diagnosis, severity of deficits, PLOF, functional baseline, home environment, and family support          PLAN :  Patient continues to benefit from skilled intervention to address the above impairments. Continue treatment per established plan of care.   to address goals. Recommend with staff: Out of bed to chair for meals and Encourage HEP in prep for ADLs/mobility    Recommend next session: Toileting and Standing grooming    Recommendation for discharge: (in order for the patient to meet his/her long term goals)  [address]     This discharge recommendation:  Has been made in collaboration with the attending provider and/or case management       IF patient discharges home will need the following DME: TBD       SUBJECTIVE:   Patient stated It still hurts.     OBJECTIVE DATA SUMMARY:   Cognitive/Behavioral Status:  Neurologic State: Alert  Orientation Level: Oriented X4  Cognition: Follows commands             Functional Mobility and Transfers for ADLs:  Bed Mobility:  Rolling: Modified independent  Supine to Sit: Modified independent  Scooting: Independent    Transfers:  Sit to Stand: Contact guard assistance     Bed to Chair: Contact guard assistance    Balance:  Sitting: Intact  Standing: With support  Standing - Static: Constant support;Good  Standing - Dynamic : Constant support; Fair    ADL Intervention:       Grooming  Grooming Assistance: Stand-by assistance;Set-up  Position Performed: Standing  Washing Face: Stand-by assistance;Set-up  Washing Hands: Stand-by assistance;Set-up  Brushing Teeth: Stand-by assistance;Set-up       Lower Body Dressing Assistance  Dressing Assistance: Independent  Socks:  Independent  Leg Crossed Method Used: No  Position Performed: Seated edge of bed           Exercise Sets Reps AROM AAROM PROM Self PROM Comments   Elbow E/F 1 15 [x] [] [] [] Chair level   Chest press 1 15 [x] [] [] []    Shoulder flex/ext 1 15 X       Ceiling punches 1 15 [] [] [] []         Pain:  6/10 stomach area    Activity Tolerance:   Fair and requires rest breaks    After treatment patient left in no apparent distress:   Sitting in chair and Call bell within reach, bed locked and in lowest position    COMMUNICATION/COLLABORATION: The patients plan of care was discussed with: Registered nurse. BERNARD Melgar  Time Calculation: 25 mins     Problem: Self Care Deficits Care Plan (Adult)  Goal: *Acute Goals and Plan of Care (Insert Text)  Description: FUNCTIONAL STATUS PRIOR TO ADMISSION: Patient was independent and active without use of DME. Patient was independent for basic and instrumental ADLs. HOME SUPPORT: The patient lived alone; support from [facility].      Occupational Therapy Goals  Initiated 10/28/2022    Pt stated goal \"to get stronger\"  Pt will be IND sup <> sit in prep for EOB ADLs  Pt will be [place] grooming standing sink side LRAD  Pt will be IND UB dressing sitting EOB/long sit   Pt will be MI LE dressing sitting EOB/long sit  Pt will be IND sit <>  prep for toileting LRAD  Pt will be IND toileting/toilet transfer/cloth mgmt LRAD  Pt will be IND following UE HEP in prep for self care tasks      Outcome: Progressing Towards Goal

## 2022-11-07 NOTE — PROGRESS NOTES
Infectious Disease Progress Note           Subjective:   Remains stable, tolerating oral intake, no acute events since last seen,  denies new complaints   Objective:   Physical Exam:     Visit Vitals  /67 (BP 1 Location: Left upper arm, BP Patient Position: At rest;Sitting)   Pulse 85   Temp 98.3 °F (36.8 °C)   Resp 18   Ht 5' 2\" (1.575 m)   Wt 121 lb 4.1 oz (55 kg)   LMP  (LMP Unknown)   SpO2 100%   BMI 22.18 kg/m²      O2 Device: None (Room air)    Temp (24hrs), Av.2 °F (36.8 °C), Min:97.9 °F (36.6 °C), Max:98.6 °F (37 °C)    701 - 1900  In: -   Out: 495 [Urine:775]   1901 - 700  In: 1865 [P.O.:250; I.V.:1615]  Out: 2860 [Urine:2840]    General: NAD, NAD, AAO x 4  HEENT: SIMBA, Moist mucosa,  Lungs: CTA b/l, decreased at the bases   Heart: S1S2+, RRR, no murmur  Abdo:  Soft, NT, ND, +BS, + colostomy and urostomy   Exts: decreased b/l leg edema   Skin: no chronic wounds or ulcers     Data Review:       Recent Days:  Recent Labs     22  0641 22  1200 22  1148   WBC 6.3 7.1 6.7   HGB 9.8* 10.1* 10.9*   HCT 31.7* 33.2* 35.0    228 227       Recent Labs     22  1222 22  0641 22  2115   BUN 6 6 9   CREA 0.37* 0.41* 0.42*       Lab Results   Component Value Date/Time    C-Reactive protein 15.00 (H) 10/25/2022 05:16 AM       Microbiology     Results       ** No results found for the last 336 hours. **           Diagnostics   CXR Results  (Last 48 hours)      None          Assessment/Plan   UTI, complicated by ileal conduit. Candida Ciferrii isolated from Cx         Received 14 days of Fluconazole for yeast coverage         Remains at high risk for recurrent UTI        Continue to monitor off antimicrobials for now      2. Vaginal cuff cellulitis/suspected abscess on CT      GPC in pairs and GNR noted on Gram stain of drainage, Cx neg       S/p 14 days of GNR coverage, Meropenem, currently off antibiotics      3.  Rectovaginal fistula, S/p diverting colostomy on 11/02      Tolerating oral intake, denies abdominal pain     4. B/L LE DVT extensive, IVC filter in situ.  S/p thrombectomy by IR on 10/27    Will sign off since no acute ID related issue, please call w questions or concerns     Munir Marquez MD    11/7/2022

## 2022-11-07 NOTE — PROGRESS NOTES
Hospitalist Progress Note    Subjective:   Daily Progress Note: 11/7/2022 3:53 PM    Hospital Course:  Patient is a 61 y.o. female with PMH of bladder cancer s/p cystectomy, hysterectomy, BSO and ileal conduit diversion, anemia, hep C, and left leg DVT s/p IVC filter years ago. She presented to the ED with chief complaint of weakness and nausea for the past week. She reports unable to tolerate PO intake, due to profound nausea, abdominal pain, fever, chills, vomiting or diarrhea. In addition, she also reports unable to walk due to weakness and left inner thigh and left foot pain. Denies trauma. She was seen here on 10/13/2022 with urostomy complications, status post ileal conduit and discharged home on Cipro which she completed. In addition, she also reports having vaginal bleed since surgery 2 weeks ago. Which appears to be more related to a fistula with stool present. CT scan of the abdomen pelvis was performed revealing no obvious rectovaginal fistula although there was air present tracking in that direction without any extravasation of contrast.  There was extensive bilateral iliac venous thrombus to the level of the IVC filter with right leg edema. Cholelithiasis with a distended gallbladder was also noted. Bilateral hydronephrosis with hydroureter most likely from the ileal conduit. Probable small bowel obstruction secondary to adhesions also noted. Patient was found to be anemic and received 1 unit of packed red blood cells. Venous duplex revealed extensive clot burden within the right leg and left leg. Urinalysis was consistent with Candida infection. ID consultation. Surgical consultation, Dr. Dallas Nur. UCX grew candida ciferrii, continue fluconazole. IV heparin being held due to blood loss, hem positive stool,.   Discussed case with interventional radiology who will further investigate the fistula with recommendations and mechanical thrombectomy performed of the bilateral lower extremities with a large volume clot removed. Patient went to the OR on 10/31/2022 for vaginal wound washout, rectovaginal fistula closure with drain placement, distal rectal repair. Believe that patient will need diverting colostomy and general surgery is speaking to patient's niece. She is currently NPO. Give IV albumin. On IV fluids. Megace for appetite stimulant. Patient is now agreeable for laparoscopic loop colostomy placement that will be performed on 11/2/2022. NG tube placed postoperatively. TPN initiated on 11/3/2022. NG tube removeed on 11/5/2022 and started clear liquid diet and advanced as tolerated . Subjective: Pt seen in room, sitting in chair. Complaints of occasional abdominal pain. Current Facility-Administered Medications   Medication Dose Route Frequency    apixaban (ELIQUIS) tablet 5 mg  5 mg Oral Q12H    thiamine mononitrate (B-1) tablet 100 mg  100 mg Oral DAILY    oxyCODONE IR (ROXICODONE) tablet 5 mg  5 mg Oral Q4H PRN    pantoprazole (PROTONIX) tablet 40 mg  40 mg Oral ACB    alum-mag hydroxide-simeth (MYLANTA) oral suspension 30 mL  30 mL Oral Q4H PRN    sodium chloride (NS) flush 5-40 mL  5-40 mL IntraVENous Q8H    sodium chloride (NS) flush 5-40 mL  5-40 mL IntraVENous PRN    megestroL (MEGACE) tablet 20 mg  20 mg Oral TID WITH MEALS    midodrine (PROAMATINE) tablet 5 mg  5 mg Oral TID PRN    cromolyn (OPTICROM) 4 % ophthalmic solution 1 Drop  1 Drop Both Eyes DAILY    acetaminophen (TYLENOL) tablet 650 mg  650 mg Oral Q6H PRN    Or    acetaminophen (TYLENOL) suppository 650 mg  650 mg Rectal Q6H PRN    polyethylene glycol (MIRALAX) packet 17 g  17 g Oral DAILY PRN    ondansetron (ZOFRAN ODT) tablet 4 mg  4 mg Oral Q8H PRN    Or    ondansetron (ZOFRAN) injection 4 mg  4 mg IntraVENous Q6H PRN        Review of Systems:    Review of Systems   Constitutional:  Negative for chills and fever. Respiratory:  Negative for cough and shortness of breath.     Cardiovascular:  Negative for chest pain and palpitations. Gastrointestinal:  Positive for abdominal pain. Genitourinary:  Negative for dysuria and urgency. Neurological:  Negative for dizziness and headaches. Objective:     Visit Vitals  /67 (BP 1 Location: Left upper arm, BP Patient Position: At rest;Sitting)   Pulse 85   Temp 98.3 °F (36.8 °C)   Resp 18   Ht 5' 2\" (1.575 m)   Wt 55 kg (121 lb 4.1 oz)   LMP  (LMP Unknown)   SpO2 100%   BMI 22.18 kg/m²      O2 Device: None (Room air)    Temp (24hrs), Av.2 °F (36.8 °C), Min:97.9 °F (36.6 °C), Max:98.6 °F (37 °C)      701 - 1900  In: -   Out: 529 [Urine:775]  1901 - 700  In: 1865 [P.O.:250; I.V.:1615]  Out: 2860 [Urine:2840]    PHYSICAL EXAM:    Physical Exam  Constitutional:       General: She is not in acute distress. Cardiovascular:      Rate and Rhythm: Normal rate and regular rhythm. Pulses: Normal pulses. Heart sounds: Normal heart sounds. Pulmonary:      Effort: Pulmonary effort is normal.      Breath sounds: Normal breath sounds. Abdominal:      General: Bowel sounds are normal. There is distension. Palpations: Abdomen is soft. Comments: Left colostomy intact, soft brown stool , stoma pink. Genitourinary:     Vagina: Vaginal discharge present. Comments: Urostomy patent, yellow urine,   Musculoskeletal:         General: Normal range of motion. Skin:     General: Skin is warm and dry. Neurological:      Mental Status: She is oriented to person, place, and time.           Data Review    Recent Results (from the past 24 hour(s))   GLUCOSE, POC    Collection Time: 22  7:01 PM   Result Value Ref Range    Glucose (POC) 109 (H) 65 - 100 mg/dL    Performed by [address] [place] [address]    Collection Time: 22  9:15 PM   Result Value Ref Range    Sodium 138 136 - 145 mmol/L    Potassium 4.0 3.5 - 5.1 mmol/L    Chloride 108 97 - 108 mmol/L    CO2 25 21 - 32 mmol/L    Anion gap 5 5 - 15 mmol/L Glucose 95 65 - 100 mg/dL    BUN 9 6 - 20 mg/dL    Creatinine 0.42 (L) 0.55 - 1.02 mg/dL    BUN/Creatinine ratio 21 (H) 12 - 20      eGFR >60 >60 ml/min/1.73m2    Calcium 8.4 (L) 8.5 - 10.1 mg/dL   MAGNESIUM    Collection Time: 11/06/22  9:15 PM   Result Value Ref Range    Magnesium 2.0 1.6 - 2.4 mg/dL   PHOSPHORUS    Collection Time: 11/06/22  9:15 PM   Result Value Ref Range    Phosphorus 1.6 (L) 2.6 - 4.7 mg/dL   GLUCOSE, POC    Collection Time: 11/07/22  1:54 AM   Result Value Ref Range    Glucose (POC) 82 65 - 100 mg/dL    Performed by [address] [place] [address] PANEL, BASIC    Collection Time: 11/07/22  6:41 AM   Result Value Ref Range    Sodium 138 136 - 145 mmol/L    Potassium 3.8 3.5 - 5.1 mmol/L    Chloride 109 (H) 97 - 108 mmol/L    CO2 23 21 - 32 mmol/L    Anion gap 6 5 - 15 mmol/L    Glucose 86 65 - 100 mg/dL    BUN 6 6 - 20 mg/dL    Creatinine 0.41 (L) 0.55 - 1.02 mg/dL    BUN/Creatinine ratio 15 12 - 20      eGFR >60 >60 ml/min/1.73m2    Calcium 8.7 8.5 - 10.1 mg/dL   MAGNESIUM    Collection Time: 11/07/22  6:41 AM   Result Value Ref Range    Magnesium 2.0 1.6 - 2.4 mg/dL   PHOSPHORUS    Collection Time: 11/07/22  6:41 AM   Result Value Ref Range    Phosphorus 1.2 (L) 2.6 - 4.7 mg/dL   CBC WITH AUTOMATED DIFF    Collection Time: 11/07/22  6:41 AM   Result Value Ref Range    WBC 6.3 3.6 - 11.0 K/uL    RBC 3.49 (L) 3.80 - 5.20 M/uL    HGB 9.8 (L) 11.5 - 16.0 g/dL    HCT 31.7 (L) 35.0 - 47.0 %    MCV 90.8 80.0 - 99.0 FL    MCH 28.1 26.0 - 34.0 PG    MCHC 30.9 30.0 - 36.5 g/dL    RDW 17.4 (H) 11.5 - 14.5 %    PLATELET 807 536 - 212 K/uL    MPV 9.7 8.9 - 12.9 FL    NRBC 0.0 0.0  WBC    ABSOLUTE NRBC 0.00 0.00 - 0.01 K/uL    NEUTROPHILS 51 32 - 75 %    LYMPHOCYTES 40 12 - 49 %    MONOCYTES 9 5 - 13 %    EOSINOPHILS 0 0 - 7 %    BASOPHILS 0 0 - 1 %    IMMATURE GRANULOCYTES 0 %    ABS. NEUTROPHILS 3.2 1.8 - 8.0 K/UL    ABS. LYMPHOCYTES 2.5 0.8 - 3.5 K/UL    ABS. MONOCYTES 0.6 0.0 - 1.0 K/UL    ABS. EOSINOPHILS 0.0 0.0 - 0.4 K/UL    ABS. BASOPHILS 0.0 0.0 - 0.1 K/UL    ABS. IMM. GRANS. 0.0 K/UL    DF Manual      RBC COMMENTS Normocytic, Normochromic     METABOLIC PANEL, BASIC    Collection Time: 11/07/22 12:22 PM   Result Value Ref Range    Sodium 136 136 - 145 mmol/L    Potassium 4.0 3.5 - 5.1 mmol/L    Chloride 109 (H) 97 - 108 mmol/L    CO2 22 21 - 32 mmol/L    Anion gap 5 5 - 15 mmol/L    Glucose 89 65 - 100 mg/dL    BUN 6 6 - 20 mg/dL    Creatinine 0.37 (L) 0.55 - 1.02 mg/dL    BUN/Creatinine ratio 16 12 - 20      eGFR >60 >60 ml/min/1.73m2    Calcium 8.8 8.5 - 10.1 mg/dL   MAGNESIUM    Collection Time: 11/07/22 12:22 PM   Result Value Ref Range    Magnesium 2.0 1.6 - 2.4 mg/dL   PHOSPHORUS    Collection Time: 11/07/22 12:22 PM   Result Value Ref Range    Phosphorus 1.0 (L) 2.6 - 4.7 mg/dL       CT ABD PELV WO CONT   Final Result   1. Postsurgical changes from cystectomy with urinary diversion, partial   colectomy with left lower quadrant colostomy, and hysterectomy with transvaginal   drainage catheter. 2.  No bowel obstruction. 3.  No drainable fluid collection. 4.  Cholelithiasis. 5.  IVC filter in appropriate position. XR CHEST PORT   Final Result   1. Gastric tube terminates in the body of the stomach, but with proximal   sidehole at the GE junction. Recommend advancement. IR THROMBECTOMY [facility] VEIN W PHARM   Final Result   Bilateral iliofemoral and IVC aspiration thrombectomy with removal of a large   volume of thrombus from both lower extremities and IVC. CT ABD PELV W CONT   Final Result   Rectovaginal fistula is not identified   Extensive bilateral iliac venous thrombus to the level of the IVC filter. Right   lower extremity edema. Cholelithiasis with distended gallbladder   Bilateral hydronephrosis and hydroureter in patient with ileal conduit   Probable small bowel obstruction secondary to adhesion      XR ABD (KUB)   Final Result   1.  No significant change in small bowel dilation. Large volume of stool in the   rectum. 2. Cholelithiasis. XR ABD (KUB)   Final Result   Dilated small bowel within the central abdomen is similar to CT from one day   prior. DUPLEX LOWER EXT VENOUS BILAT   Final Result   Addendum (preliminary) 1 of 1      · Thrombus present in the right external iliac vein. · Thrombus present in the right common femoral vein. · Thrombus present in the right femoral vein. · Thrombus present in the right proximal femoral vein. · Thrombus present in the right mid femoral vein. · Thrombus present in the right distal femoral vein. · Thrombus present in the right popliteal vein. · Thrombus present in the right gastrocnemius vein. · Thrombus present in the right peroneal vein. · Thrombus present in the right saphenofemoral junction. · Thrombus present in the left external iliac vein. · Thrombus present in the left common femoral vein. · Thrombus present in the left saphenofemoral junction. · Thrombus present in the left femoral vein. · Thrombus present in the left proximal femoral vein. · Thrombus present in the left mid femoral vein. · Thrombus present in the left popliteal vein. · Thrombus present in the left distal femoral vein. · Thrombus present in the left gastrocnemius vein. · Thrombus present in the left peroneal vein. This is vascular lab report on Krystina Padilla, patient's YOB: 1965   Date of examination: October 20, 2022   Examination: Duplex lower extremity venous bilateral   Technician: Ms. Cindy Mays   Clinical history: This is  61years old woman with suspected renal vein    thrombosis   Indication: femoral vein thrombosis. Technique: Bilateral leg venous structures examined using venous duplex    ultrasound with 2D grayscale, color-flow and spectral Doppler analysis.       Findings:   Right side:   common femoral vein and saphenofemoral junction is not compressible and    there is hypoechoic filling defect noted. common femoral vein shows no venous flow   Distal external iliac vein also not compressible and that there is no    venous flow   Proximal femoral vein is noncompressible and there is no venous flow   Mid femoral vein is not compressible and there is no venous flow   Distal femoral vein is noncompressible and there is no venous flow   Proximal, mid, distal greater saphenous vein is compressible, there is no    filling defect   Distal popliteal vein is not compressible. Proximal popliteal vein is noncompressible and there is no venous flow   Proximal gastrocnemius vein is not compressible   Proximal small saphenous vein is not compressible   Distal, mid, proximal  peroneal vein is not compressible      Left side:   common femoral vein not compressible and there is no venous flow   Saphenofemoral junction is not compressible and there is no venous flow. External iliac vein also not compressible there is no venous flow   Proximal femoral vein is noncompressible and there is no venous flow   Mid femoral vein is noncompressible and there is no venous flow   Distal femoral vein is not compressible and there is no venous flow   Proximal, mid, distal greater saphenous vein is noncompressible and there    is hypoechoic filling defect noted. Distal popliteal vein is noncompressible   Proximal popliteal vein is noncompressible and there is no venous flow   Proximal gastrocnemius vein is not compressible and there is no venous    flow   Proximal small saphenous vein is noncompressible and there is no venous    flow   Distal, mid, proximal peroneal vein is not compressible. Impression:   1. Right external iliac vein, common femoral vein, femoral vein,    popliteal vein, gastrocnemius vein, small saphenous vein, and peroneal    vein shows acute venous thrombosis   2.   Left external iliac vein, common femoral vein, femoral vein, popliteal    vein, gastrocnemius vein, small saphenous vein, peroneal vein, and    saphenofemoral junction and greater saphenous vein shows acute venous    thrombosis. These  extensive bilateral lower extremity deep vein thrombosis notified    to Dr. James Hdz on 10/20/2022 1201 PM.            CT ABD PELV WO CONT   Final Result   Status post cystectomy and hysterectomy with right lower quadrant ileal conduit. Mild bilateral hydroureteronephrosis, evaluation is somewhat limited by the   presence of contrast from previously performed examination. Small bowel   distention with decompressed loops distally concerning for obstruction likely   related to adhesion formation. While this study was done without the use of   intravenous contrast there is questionable right common femoral vein thrombus   with soft tissue edema. CTA CHEST W OR W WO CONT   Final Result   Emphysematous changes. No evidence of pulmonary embolism or acute   abnormality. Cholelithiasis. Bilateral hydronephrosis. 1 cm hypodense lesion   right hepatic lobe cannot be characterized with certainty on the basis of this   examination. Follow-up is recommended to ensure stability. XR CHEST PORT   Final Result   No Acute Disease. XR FOOT LT MIN 3 V   Final Result   Normal study. IR THROMBECTOMY [facility] VEIN W PHARM    (Results Pending)       Principal Problem:    Small bowel obstruction ([address] (10/20/2022)    Active Problems:    Acute blood loss anemia (10/20/2022)      Status post robot-assisted surgical procedure, pelvic exenteration (10/20/2022)      Vaginal cuff cellulitis (10/20/2022)        Assessment/Plan:   Rectal Abisai Stage fistula - s/p closure with distal rectal repair and diverting colostomy- urology, gen surgery following, on PO diet as tolerated, drains d/c'd, monitor vaginal output. 2. Bladder cancer- urostomy, urology following    3.  DVT- bilateral lower extremity- started on eliquis today, d/c heparin drip, has IVC filter placed and s/p aspiration thrombectomy by IR. 4. Severe protein calorie malnutrition- nutrition consulted. On supplements. 5. Discharge- CM following, possible next 48 hrs depending on clinical course, able to tolerate PO diet.          DVT Prophylaxis: elquis  Code Status:  Full Code  POA: Stephanie [id number] discussed with:   ________patient, staff nurse, TAYA, urology_______________________________________________________    Golden Gentile NP

## 2022-11-07 NOTE — ANESTHESIA POSTPROCEDURE EVALUATION
Procedure(s):  ROBOTIC ASSISTED LAPAROSCOPIC CYSTECTOMY WITH URETEROILEAL CONDUIT INCLUDING INTESTINE ANASTOMOSIS/PELVIC NODE DISSECTION AND ANTERIOR PELVIC EXENTERATION, APPENDECTOMY.     general    Anesthesia Post Evaluation      Multimodal analgesia: multimodal analgesia used between 6 hours prior to anesthesia start to PACU discharge  Patient location during evaluation: PACU  Patient participation: complete - patient participated  Level of consciousness: awake  Pain score: 0  Pain management: adequate  Airway patency: patent  Anesthetic complications: no  Cardiovascular status: acceptable  Respiratory status: acceptable  Hydration status: acceptable  Post anesthesia nausea and vomiting:  controlled  Final Post Anesthesia Temperature Assessment:  Normothermia (36.0-37.5 degrees C)

## 2022-11-07 NOTE — PROGRESS NOTES
UROLOGY Progress Note         [phone]      Daily Progress Note: 11/7/2022      Subjective: The patient is seen for UROLOGIC follow up for bladder cancer, post-cystectomy issues. She is s/p on cystectomy, ileal conduit diversion, pelvic exenteration on 10/3/22. Complicated by rectal injury; repaired at time of surgery. Stents removed in ER on 10/13/22. Readmitted on 10/19/22 with anemia, SBO, weakness, nausea. She was found to have some vaginal bleeding and discharge at that time. DVT hx with thigh and foot pain on admission. Extensive bilateral iliac venous thrombus to the level of the IVC filter. Right  lower extremity edema on CT 10/23/22. Now s/p thrombectomy in IR on 10/27/22. 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 Not seen on CT 10/23/22. Continued vaginal drainage with stool-like appearance. 10/31/22: return to OR for vaginal wash out, suspected cuff dehiscence (confirmed). She is s/p rectovaginal fistula closure with drain placement and distal rectal repair by Dr. Eduarda Fitch. Now s/p laparoscopic loop colostomy with Dr. Eduarda Fitch. Tolerating diet. Nutrition added supplements. Liquid stool from colostomy. CT with resolution of SBO. Drain removed 11/5/22. Plan for d/c when appropriate to rehab facility.     Problem List:  Patient Active Problem List   Diagnosis Code    History of blood clots Z86.718    Bladder cancer ([address] C67.9    Anemia due to acute blood loss D62    Hypercalcemia E83.52    Hydronephrosis of right kidney N13.30    Retained ureteral stent Z96.0    Severe protein-calorie malnutrition (HCC) E43    Acute blood loss anemia D62    Small bowel obstruction (HCC) K56.609    Status post robot-assisted surgical procedure, pelvic exenteration Z98.890    Vaginal cuff cellulitis N76.0         Medications reviewed  Current Facility-Administered Medications   Medication Dose Route Frequency    apixaban (ELIQUIS) tablet 5 mg  5 mg Oral Q12H    thiamine mononitrate (B-1) tablet 100 mg  100 mg Oral DAILY    sodium chloride (NS) flush 5-40 mL  5-40 mL IntraVENous Q8H    sodium chloride (NS) flush 5-40 mL  5-40 mL IntraVENous PRN    megestroL (MEGACE) tablet 20 mg  20 mg Oral TID WITH MEALS    midodrine (PROAMATINE) tablet 5 mg  5 mg Oral TID PRN    oxyCODONE IR (ROXICODONE) tablet 5 mg  5 mg Oral Q6H PRN    cromolyn (OPTICROM) 4 % ophthalmic solution 1 Drop  1 Drop Both Eyes DAILY    acetaminophen (TYLENOL) tablet 650 mg  650 mg Oral Q6H PRN    Or    acetaminophen (TYLENOL) suppository 650 mg  650 mg Rectal Q6H PRN    polyethylene glycol (MIRALAX) packet 17 g  17 g Oral DAILY PRN    ondansetron (ZOFRAN ODT) tablet 4 mg  4 mg Oral Q8H PRN    Or    ondansetron (ZOFRAN) injection 4 mg  4 mg IntraVENous Q6H PRN       Review of Systems:   Review of Systems   Constitutional:  Negative for fever. Gastrointestinal:  Positive for abdominal pain and heartburn. Negative for nausea and vomiting. Right sided pain/tightness near colostomy         Objective:   Physical Exam  Vitals and nursing note reviewed. Constitutional:       General: She is not in acute distress. Appearance: She is not ill-appearing. Abdominal:      Comments: Ileal conduit, colostomy with stool   Genitourinary:     Comments: Clear yellow urine  Skin:     General: Skin is warm and dry. Neurological:      Mental Status: She is alert and oriented to person, place, and time.    Psychiatric:         Behavior: Behavior normal.        Visit Vitals  /79 (BP 1 Location: Left upper arm, BP Patient Position: At rest;Sitting)   Pulse 78   Temp 98.1 °F (36.7 °C)   Resp 20   Ht 5' 2\" (1.575 m)   Wt 121 lb 4.1 oz (55 kg)   SpO2 98%   BMI 22.18 kg/m²         Data Review:       Recent Days:  Recent Labs     11/07/22  0641 11/06/22  1200 11/05/22  1148   WBC 6.3 7.1 6.7   HGB 9.8* 10.1* 10.9*   HCT 31.7* 33.2* 35.0    228 227       Recent Labs     11/07/22  1222 11/07/22  0641 11/06/22  2115 11/06/22  1200 11/05/22  1148  138 138   < > 137   K 4.0 3.8 4.0   < > 3.8   * 109* 108   < > 108   CO2 22 23 25   < > 22   GLU 89 86 95   < > 105*   BUN 6 6 9   < > 6   CREA 0.37* 0.41* 0.42*   < > 0.38*   CA 8.8 8.7 8.4*   < > 8.7   MG 2.0 2.0 2.0   < > 2.3   PHOS 1.0* 1.2* 1.6*   < > 1.9*   ALB  --   --   --   --  2.8*   TBILI  --   --   --   --  0.7   ALT  --   --   --   --  17    < > = values in this interval not displayed.          24 Hour Results:  Recent Results (from the past 24 hour(s))   GLUCOSE, POC    Collection Time: 11/06/22  7:01 PM   Result Value Ref Range    Glucose (POC) 109 (H) 65 - 100 mg/dL    Performed by [address] [place] [address], BASIC    Collection Time: 11/06/22  9:15 PM   Result Value Ref Range    Sodium 138 136 - 145 mmol/L    Potassium 4.0 3.5 - 5.1 mmol/L    Chloride 108 97 - 108 mmol/L    CO2 25 21 - 32 mmol/L    Anion gap 5 5 - 15 mmol/L    Glucose 95 65 - 100 mg/dL    BUN 9 6 - 20 mg/dL    Creatinine 0.42 (L) 0.55 - 1.02 mg/dL    BUN/Creatinine ratio 21 (H) 12 - 20      eGFR >60 >60 ml/min/1.73m2    Calcium 8.4 (L) 8.5 - 10.1 mg/dL   MAGNESIUM    Collection Time: 11/06/22  9:15 PM   Result Value Ref Range    Magnesium 2.0 1.6 - 2.4 mg/dL   PHOSPHORUS    Collection Time: 11/06/22  9:15 PM   Result Value Ref Range    Phosphorus 1.6 (L) 2.6 - 4.7 mg/dL   GLUCOSE, POC    Collection Time: 11/07/22  1:54 AM   Result Value Ref Range    Glucose (POC) 82 65 - 100 mg/dL    Performed by Vic Alegreison    METABOLIC PANEL, BASIC    Collection Time: 11/07/22  6:41 AM   Result Value Ref Range    Sodium 138 136 - 145 mmol/L    Potassium 3.8 3.5 - 5.1 mmol/L    Chloride 109 (H) 97 - 108 mmol/L    CO2 23 21 - 32 mmol/L    Anion gap 6 5 - 15 mmol/L    Glucose 86 65 - 100 mg/dL    BUN 6 6 - 20 mg/dL    Creatinine 0.41 (L) 0.55 - 1.02 mg/dL    BUN/Creatinine ratio 15 12 - 20      eGFR >60 >60 ml/min/1.73m2    Calcium 8.7 8.5 - 10.1 mg/dL   MAGNESIUM    Collection Time: 11/07/22  6:41 AM   Result Value Ref Range    Magnesium 2.0 1.6 - 2.4 mg/dL   PHOSPHORUS    Collection Time: 11/07/22  6:41 AM   Result Value Ref Range    Phosphorus 1.2 (L) 2.6 - 4.7 mg/dL   CBC WITH AUTOMATED DIFF    Collection Time: 11/07/22  6:41 AM   Result Value Ref Range    WBC 6.3 3.6 - 11.0 K/uL    RBC 3.49 (L) 3.80 - 5.20 M/uL    HGB 9.8 (L) 11.5 - 16.0 g/dL    HCT 31.7 (L) 35.0 - 47.0 %    MCV 90.8 80.0 - 99.0 FL    MCH 28.1 26.0 - 34.0 PG    MCHC 30.9 30.0 - 36.5 g/dL    RDW 17.4 (H) 11.5 - 14.5 %    PLATELET 277 890 - 960 K/uL    MPV 9.7 8.9 - 12.9 FL    NRBC 0.0 0.0  WBC    ABSOLUTE NRBC 0.00 0.00 - 0.01 K/uL    NEUTROPHILS 51 32 - 75 %    LYMPHOCYTES 40 12 - 49 %    MONOCYTES 9 5 - 13 %    EOSINOPHILS 0 0 - 7 %    BASOPHILS 0 0 - 1 %    IMMATURE GRANULOCYTES 0 %    ABS. NEUTROPHILS 3.2 1.8 - 8.0 K/UL    ABS. LYMPHOCYTES 2.5 0.8 - 3.5 K/UL    ABS. MONOCYTES 0.6 0.0 - 1.0 K/UL    ABS. EOSINOPHILS 0.0 0.0 - 0.4 K/UL    ABS. BASOPHILS 0.0 0.0 - 0.1 K/UL    ABS. IMM.  GRANS. 0.0 K/UL    DF Manual      RBC COMMENTS Normocytic, Normochromic     METABOLIC PANEL, BASIC    Collection Time: 11/07/22 12:22 PM   Result Value Ref Range    Sodium 136 136 - 145 mmol/L    Potassium 4.0 3.5 - 5.1 mmol/L    Chloride 109 (H) 97 - 108 mmol/L    CO2 22 21 - 32 mmol/L    Anion gap 5 5 - 15 mmol/L    Glucose 89 65 - 100 mg/dL    BUN 6 6 - 20 mg/dL    Creatinine 0.37 (L) 0.55 - 1.02 mg/dL    BUN/Creatinine ratio 16 12 - 20      eGFR >60 >60 ml/min/1.73m2    Calcium 8.8 8.5 - 10.1 mg/dL   MAGNESIUM    Collection Time: 11/07/22 12:22 PM   Result Value Ref Range    Magnesium 2.0 1.6 - 2.4 mg/dL   PHOSPHORUS    Collection Time: 11/07/22 12:22 PM   Result Value Ref Range    Phosphorus 1.0 (L) 2.6 - 4.7 mg/dL           Assessment/     Patient Active Problem List   Diagnosis Code    History of blood clots Z86.718    Bladder cancer (HCC) C67.9    Anemia due to acute blood loss D62    Hypercalcemia E83.52    Hydronephrosis of right kidney N13.30    Retained ureteral stent Z96.0    Severe protein-calorie malnutrition (HCC) E43    Acute blood loss anemia D62    Small bowel obstruction ([facility]) K56.609    Status post robot-assisted surgical procedure, pelvic exenteration Z98.890    Vaginal cuff cellulitis N76.0       Plan:    BLADDER CANCER S/P CYSTECTOMY WITH ILEAL CONDUIT: She is s/p on cystectomy, ileal conduit diversion, pelvic exenteration on 10/3/22. Pathology significant for pT3bN0; invasive squamous cell carcinoma, G2, with multifocal invasion into perivesical and periureteral soft tissue. Uninvolved margins/nodes. RECTOVAGINAL FISTULA: 10/31/22 she returned to OR for vaginal wash out, suspected cuff dehiscence (confirmed). She is s/p rectovaginal fistula closure with drain placement and distal rectal repair by Dr. Lio Luu. Now s/p laparoscopic loop colostomy with Dr. Lio Luu on 11/2/22. Tolerating diet. Passing stool. Vagina with some lingering discharge. DVT: DVT hx with thigh and foot pain on admission. Extensive bilateral iliac venous thrombus to the level of the IVC filter. Right  lower extremity edema on CT 10/23/22. Now s/p thrombectomy in IR on 10/27/22. FUNGAL UTI: Candida Ciferrii isolated from culture, now s/p 14 days of Fluconazole, ID following. MALNUTRITION: nutrition following; Ensure. Tolerating diet.     Stef Rodriguez NP

## 2022-11-07 NOTE — WOUND CARE
Physician written order form and supply form in patient chart for provider signature. Patient resting in recliner at this time. Patient colostomy pouch intact with a small amount of brown liquid stool in pouch. Patient called nideloris on the phone in room to discuss pouching and education. Niece has been changing urostomy pouch at home. Advised niece that there are not much differences in caring for colostomy and urostomy. Discussed the following education with both patient and niece, advised if any questions arise that I will be following up with patient daily for any questions or pouching needs. -Measuring the stoma  -Cutting wafer to fit stoma and wafer and pouch application.  -Empty / burp pouch when 1/3-1/2 full of stool or gas. -Change appliance (wafer and pouch) 2-3 per week. If leaking, change as soon as possible to prevent skin irritation.  -Best time for routine change of appliance is first thing in the morning before consuming food or liquids (depending on which type of ostomy). -Clean stoma and peristomal skin with plain water or NS, may use a mild soap. No soaps with lotions as they may prevent adhesive from adhering to skin. May bathe with appliance on or off.  -Purposes and how to use barrier rings, stoma paste, and stoma powder.   -Stoma should always be red and moist.  If stoma appears dry and dusky, notify surgeon immediately. Patient appears to have convex wafer on, not able to visualize stoma at this time due to stool. Patient currently wearing the following 2 piece pouching system for Colostomy:    Coloplast convex wafer #[id number]  [id number] QLQL #[id number]    Will plan to change patient pouch tomorrow.

## 2022-11-08 ENCOUNTER — APPOINTMENT (OUTPATIENT)
Dept: GENERAL RADIOLOGY | Age: 57
DRG: 231 | End: 2022-11-08
Attending: NURSE PRACTITIONER
Payer: MEDICAID

## 2022-11-08 PROCEDURE — 74011250636 HC RX REV CODE- 250/636: Performed by: INTERNAL MEDICINE

## 2022-11-08 PROCEDURE — 74011250637 HC RX REV CODE- 250/637: Performed by: NURSE PRACTITIONER

## 2022-11-08 PROCEDURE — 97530 THERAPEUTIC ACTIVITIES: CPT

## 2022-11-08 PROCEDURE — 65270000029 HC RM PRIVATE

## 2022-11-08 PROCEDURE — 74018 RADEX ABDOMEN 1 VIEW: CPT

## 2022-11-08 PROCEDURE — 74011000250 HC RX REV CODE- 250: Performed by: SURGERY

## 2022-11-08 PROCEDURE — 74011250637 HC RX REV CODE- 250/637: Performed by: PHYSICIAN ASSISTANT

## 2022-11-08 RX ORDER — SUCRALFATE 1 G/1
1 TABLET ORAL
Status: DISCONTINUED | OUTPATIENT
Start: 2022-11-08 | End: 2022-11-11 | Stop reason: HOSPADM

## 2022-11-08 RX ADMIN — SODIUM CHLORIDE, PRESERVATIVE FREE 10 ML: 5 INJECTION INTRAVENOUS at 15:59

## 2022-11-08 RX ADMIN — OXYCODONE 5 MG: 5 TABLET ORAL at 03:49

## 2022-11-08 RX ADMIN — THIAMINE HCL TAB 100 MG 100 MG: 100 TAB at 10:05

## 2022-11-08 RX ADMIN — ALUMINUM HYDROXIDE, MAGNESIUM HYDROXIDE, AND SIMETHICONE 30 ML: 200; 200; 20 SUSPENSION ORAL at 03:49

## 2022-11-08 RX ADMIN — APIXABAN 5 MG: 5 TABLET, FILM COATED ORAL at 22:41

## 2022-11-08 RX ADMIN — OXYCODONE 5 MG: 5 TABLET ORAL at 17:18

## 2022-11-08 RX ADMIN — SODIUM CHLORIDE, PRESERVATIVE FREE 10 ML: 5 INJECTION INTRAVENOUS at 06:25

## 2022-11-08 RX ADMIN — APIXABAN 5 MG: 5 TABLET, FILM COATED ORAL at 10:05

## 2022-11-08 RX ADMIN — SODIUM CHLORIDE, PRESERVATIVE FREE 10 ML: 5 INJECTION INTRAVENOUS at 22:41

## 2022-11-08 RX ADMIN — MEGESTROL ACETATE 20 MG: 20 TABLET ORAL at 10:05

## 2022-11-08 RX ADMIN — OXYCODONE 5 MG: 5 TABLET ORAL at 22:44

## 2022-11-08 RX ADMIN — PANTOPRAZOLE SODIUM 40 MG: 40 TABLET, DELAYED RELEASE ORAL at 07:00

## 2022-11-08 RX ADMIN — SUCRALFATE 1 G: 1 TABLET ORAL at 17:18

## 2022-11-08 RX ADMIN — CROMOLYN SODIUM 1 DROP: 40 SOLUTION/ DROPS OPHTHALMIC at 10:15

## 2022-11-08 RX ADMIN — ONDANSETRON 4 MG: 4 TABLET, ORALLY DISINTEGRATING ORAL at 10:05

## 2022-11-08 RX ADMIN — MEGESTROL ACETATE 20 MG: 20 TABLET ORAL at 17:18

## 2022-11-08 RX ADMIN — SUCRALFATE 1 G: 1 TABLET ORAL at 22:41

## 2022-11-08 RX ADMIN — OXYCODONE 5 MG: 5 TABLET ORAL at 10:05

## 2022-11-08 NOTE — WOUND CARE
WCN met with patient for Colostomy pouch change. Patient states that she has been feeling nauseated. Urostomy pouch emptied of 225ml of urine, nurse advised. Colostomy pouch removed, patient tolerated well. Skin cleansed with NS, lorelei-wound intact with no issues. Stoma is somewhat retracted and oval shaped. Two sutures present to lorelei-stomal area. Barrier sheet applied over suture for protection. Convex ostomy wafer cut to fit stoma, no skin showing between stoma and wafer. Pouch applied with no leaks noted at this time. Patient stated that she felt nauseated again, advised patient Nurse Derrick Lopez of this. OSTOMY Assessment     Surgery Date:__11/2/2022___     Stoma Tissue Assessment: __X_Post-op ___Follow-up        Type of Diversion: _X__New___ Established ___Revision___Permanent____Temporary     _____  Ileostomy   ___X__  Colostomy: ____ Ascending, ____ Transverse, _____.  Sigmoid   _____  Bello Bliss   _____  Ileal Conduit   _____  Mucous Fistul       Appearance of Ostomy:   _X_ Bright Red /Moist/Viable ___ Dark Red ___ Pink ___ Sloughing ___Necrotic____Pallor ____Red  ___Dry ____Moist     Stoma Size: ___~25-28mm__ (mm)       ___Round __X_ Ollie Cardinal ___Irregular      Stoma Height:   __X__Flush ____Retracted ____Budded ____Edematous ____Prolapse      Stoma Location  __X__ Left Side          ____Right Side     _____Umbilicus  _____Incision Line  ____ Above Belt       ____ Below Belt     Abdominal Contours  ____ Firm ____ Flat ____Flabby _X__Soft ___Round ___ Hard ___ Other        Peristomal skin:   __X_ Intact ___ Irritant Dermatitis ___ Allergic Contact Dermatitis ___Candidiasis ___Caput Medusae ___Folliculitis ___Mechanical Trauma ___Mucosal Transplantation    ___Pyoderma Gangrenosum ___Hyperplasia ___Radiation Trauma ___Allergies mpling  ___ Dimpling ____Blistered ____Fragile ____Macerated ___Peeling  ___Ulceration  ___ Fungal ___Peristomal Hernia ___Non-blanchable ___ Hypergranulation        Stoma Complications: __Excessive Bleeding __Ischemia __ Abscess __Necrosis __Prolapse   __Hernia __ Retraction __Stenosis __Mucosal Separation __Melanosis Coli   __Laceration __Other      Application for Patient     Education:     -Measuring the stoma  -Cutting wafer to fit stoma and wafer and pouch application.  -Empty / burp pouch when 1/3-1/2 full of stool or gas. -Change appliance (wafer and pouch) 2-3 per week. If leaking, change as soon as possible to prevent skin irritation.  -Best time for routine change of appliance is first thing in the morning before consuming food or liquids (depending on which type of ostomy). -Clean stoma and peristomal skin with plain water or NS, may use a mild soap. No soaps with lotions as they may prevent adhesive from adhering to skin. May bathe with appliance on or off.  -Purposes and how to use barrier rings, stoma paste, and stoma powder.   -Stoma should always be red and moist.  If stoma appears dry and dusky, notify surgeon immediately. Pouch System Recommended:   __One-Piece _X_Two-Piece ___Custom      Flat:   ___Pre-cut   _X__Cut-to fit      Convexity: ___Shallow ___Deep_X__ Flexible ___Creative Convexity   ___Pre-cut __X_Cut to [employer]      Accessory Products   _X_ Adhesive Seals _X_Adhesive Remover Wipes _X_Barrier Wafer _X_Barrier Wipes   _X_Deodorizer _X_Liquid Adhesive __X Paste __X Powder __X Strip   __X Support Belt __Tape     Appliance was changed. Recommend convex pouching system:  Coloplast convex wafer [id number]  Coloplast pouch #[id number]    WCN to continue to follow for education and pouching needs.     Nevaeh Oliva, RN, BSN  [facility]  11/08/22  10:16 AM

## 2022-11-08 NOTE — PROGRESS NOTES
Bedside shift change report given to FABIOLA Lu (oncoming nurse) by Jovan Funez (offgoing nurse). Report included the following information SBAR.

## 2022-11-08 NOTE — PROGRESS NOTES
Hospitalist Progress Note    Subjective:   Daily Progress Note: 11/8/2022 4:05 PM    Hospital Course:  Patient is a 61 y.o. female with PMH of bladder cancer s/p cystectomy, hysterectomy, BSO and ileal conduit diversion, anemia, hep C, and left leg DVT s/p IVC filter years ago. She presented to the ED with chief complaint of weakness and nausea for the past week. She reports unable to tolerate PO intake, due to profound nausea, abdominal pain, fever, chills, vomiting or diarrhea. In addition, she also reports unable to walk due to weakness and left inner thigh and left foot pain. Denies trauma. She was seen here on 10/13/2022 with urostomy complications, status post ileal conduit and discharged home on Cipro which she completed. In addition, she also reports having vaginal bleed since surgery 2 weeks ago. Which appears to be more related to a fistula with stool present. CT scan of the abdomen pelvis was performed revealing no obvious rectovaginal fistula although there was air present tracking in that direction without any extravasation of contrast.  There was extensive bilateral iliac venous thrombus to the level of the IVC filter with right leg edema. Cholelithiasis with a distended gallbladder was also noted. Bilateral hydronephrosis with hydroureter most likely from the ileal conduit. Probable small bowel obstruction secondary to adhesions also noted. Patient was found to be anemic and received 1 unit of packed red blood cells. Venous duplex revealed extensive clot burden within the right leg and left leg. Urinalysis was consistent with Candida infection. ID consultation. Surgical consultation, Dr. Audra Cobb. UCX grew michell ciferrii, continue fluconazole. IV heparin being held due to blood loss, hem positive stool,.   Discussed case with interventional radiology who will further investigate the fistula with recommendations and mechanical thrombectomy performed of the bilateral lower extremities with a large volume clot removed. Patient went to the OR on 10/31/2022 for vaginal wound washout, rectovaginal fistula closure with drain placement, distal rectal repair. Believe that patient will need diverting colostomy and general surgery is speaking to patient's niece. She is currently NPO. Give IV albumin. On IV fluids. Megace for appetite stimulant. Patient is now agreeable for laparoscopic loop colostomy placement that will be performed on 11/2/2022. NG tube placed postoperatively. TPN initiated on 11/3/2022. NG tube removeed on 11/5/2022 and started clear liquid diet and advanced as tolerated . Subjective:    Current Facility-Administered Medications   Medication Dose Route Frequency    sucralfate (CARAFATE) tablet 1 g  1 g Oral AC&HS    apixaban (ELIQUIS) tablet 5 mg  5 mg Oral Q12H    thiamine mononitrate (B-1) tablet 100 mg  100 mg Oral DAILY    oxyCODONE IR (ROXICODONE) tablet 5 mg  5 mg Oral Q4H PRN    pantoprazole (PROTONIX) tablet 40 mg  40 mg Oral ACB    alum-mag hydroxide-simeth (MYLANTA) oral suspension 30 mL  30 mL Oral Q4H PRN    sodium chloride (NS) flush 5-40 mL  5-40 mL IntraVENous Q8H    sodium chloride (NS) flush 5-40 mL  5-40 mL IntraVENous PRN    megestroL (MEGACE) tablet 20 mg  20 mg Oral TID WITH MEALS    midodrine (PROAMATINE) tablet 5 mg  5 mg Oral TID PRN    cromolyn (OPTICROM) 4 % ophthalmic solution 1 Drop  1 Drop Both Eyes DAILY    acetaminophen (TYLENOL) tablet 650 mg  650 mg Oral Q6H PRN    Or    acetaminophen (TYLENOL) suppository 650 mg  650 mg Rectal Q6H PRN    polyethylene glycol (MIRALAX) packet 17 g  17 g Oral DAILY PRN    ondansetron (ZOFRAN ODT) tablet 4 mg  4 mg Oral Q8H PRN    Or    ondansetron (ZOFRAN) injection 4 mg  4 mg IntraVENous Q6H PRN        Review of Systems:    Review of Systems   Constitutional:  Negative for chills and fever. Respiratory:  Negative for cough and shortness of breath. Cardiovascular:  Negative for chest pain and palpitations. Gastrointestinal:  Positive for nausea and vomiting. Genitourinary:  Negative for dysuria and urgency. Neurological:  Negative for dizziness and headaches. Objective:     Visit Vitals  /68 (BP 1 Location: Left upper arm, BP Patient Position: Sitting)   Pulse 81   Temp 98.5 °F (36.9 °C)   Resp 17   Ht 5' 2\" (1.575 m)   Wt 55 kg (121 lb 4.1 oz)   LMP  (LMP Unknown)   SpO2 98%   BMI 22.18 kg/m²      O2 Device: None (Room air)    Temp (24hrs), Av.7 °F (37.1 °C), Min:98.5 °F (36.9 °C), Max:99 °F (37.2 °C)      701 - 1900  In: -   Out: 800 [Urine:750]  1901 - 700  In: 1865 [P.O.:250; I.V.:1615]  Out: 2515 [Urine:2515]    PHYSICAL EXAM:    Physical Exam   Constitutional:       General: She is not in acute distress. Cardiovascular:      Rate and Rhythm: Normal rate and regular rhythm. Pulses: Normal pulses. Heart sounds: Normal heart sounds. Pulmonary:      Effort: Pulmonary effort is normal.      Breath sounds: Normal breath sounds. Abdominal:      General: Bowel sounds are normal. There is distension. Palpations: Abdomen is soft. Comments: Left colostomy intact, soft brown stool , stoma pink. Genitourinary:     Vagina: small amount brown drainage present. Comments: Urostomy patent, yellow urine,   Musculoskeletal:         General: Normal range of motion. Skin:     General: Skin is warm and dry. Neurological:      Mental Status: She is oriented to person, place, and time. Data Review    No results found for this or any previous visit (from the past 24 hour(s)). XR ABD (KUB)   Final Result   Nonobstructive bowel gas pattern. CT ABD PELV WO CONT   Final Result   1. Postsurgical changes from cystectomy with urinary diversion, partial   colectomy with left lower quadrant colostomy, and hysterectomy with transvaginal   drainage catheter. 2.  No bowel obstruction. 3.  No drainable fluid collection. 4.  Cholelithiasis. 5.  IVC filter in appropriate position. XR CHEST PORT   Final Result   1. Gastric tube terminates in the body of the stomach, but with proximal   sidehole at the GE junction. Recommend advancement. IR THROMBECTOMY \[facility]\"" VEIN W PHARM   Final Result   Bilateral iliofemoral and IVC aspiration thrombectomy with removal of a large   volume of thrombus from both lower extremities and IVC. CT ABD PELV W CONT   Final Result   Rectovaginal fistula is not identified   Extensive bilateral iliac venous thrombus to the level of the IVC filter. Right   lower extremity edema. Cholelithiasis with distended gallbladder   Bilateral hydronephrosis and hydroureter in patient with ileal conduit   Probable small bowel obstruction secondary to adhesion      XR ABD (KUB)   Final Result   1. No significant change in small bowel dilation. Large volume of stool in the   rectum. 2. Cholelithiasis. XR ABD (KUB)   Final Result   Dilated small bowel within the central abdomen is similar to CT from one day   prior. DUPLEX LOWER EXT VENOUS BILAT   Final Result   Addendum (preliminary) 1 of 1      · Thrombus present in the right external iliac vein. · Thrombus present in the right common femoral vein. · Thrombus present in the right femoral vein. · Thrombus present in the right proximal femoral vein. · Thrombus present in the right mid femoral vein. · Thrombus present in the right distal femoral vein. · Thrombus present in the right popliteal vein. · Thrombus present in the right gastrocnemius vein. · Thrombus present in the right peroneal vein. · Thrombus present in the right saphenofemoral junction. · Thrombus present in the left external iliac vein. · Thrombus present in the left common femoral vein. · Thrombus present in the left saphenofemoral junction. · Thrombus present in the left femoral vein. · Thrombus present in the left proximal femoral vein.    · Thrombus present in the left mid femoral vein. · Thrombus present in the left popliteal vein. · Thrombus present in the left distal femoral vein. · Thrombus present in the left gastrocnemius vein. · Thrombus present in the left peroneal vein. This is vascular lab report on Luis Carlos Jurado, patient's YOB: 1965   Date of examination: October 20, 2022   Examination: Duplex lower extremity venous bilateral   Technician: Ms. Teodora Minor   Clinical history: This is  61years old woman with suspected renal vein    thrombosis   Indication: femoral vein thrombosis. Technique: Bilateral leg venous structures examined using venous duplex    ultrasound with 2D grayscale, color-flow and spectral Doppler analysis. Findings:   Right side:   common femoral vein and saphenofemoral junction is not compressible and    there is hypoechoic filling defect noted. common femoral vein shows no venous flow   Distal external iliac vein also not compressible and that there is no    venous flow   Proximal femoral vein is noncompressible and there is no venous flow   Mid femoral vein is not compressible and there is no venous flow   Distal femoral vein is noncompressible and there is no venous flow   Proximal, mid, distal greater saphenous vein is compressible, there is no    filling defect   Distal popliteal vein is not compressible. Proximal popliteal vein is noncompressible and there is no venous flow   Proximal gastrocnemius vein is not compressible   Proximal small saphenous vein is not compressible   Distal, mid, proximal  peroneal vein is not compressible      Left side:   common femoral vein not compressible and there is no venous flow   Saphenofemoral junction is not compressible and there is no venous flow.    External iliac vein also not compressible there is no venous flow   Proximal femoral vein is noncompressible and there is no venous flow   Mid femoral vein is noncompressible and there is no venous flow   Distal femoral vein is not compressible and there is no venous flow   Proximal, mid, distal greater saphenous vein is noncompressible and there    is hypoechoic filling defect noted. Distal popliteal vein is noncompressible   Proximal popliteal vein is noncompressible and there is no venous flow   Proximal gastrocnemius vein is not compressible and there is no venous    flow   Proximal small saphenous vein is noncompressible and there is no venous    flow   Distal, mid, proximal peroneal vein is not compressible. Impression:   1. Right external iliac vein, common femoral vein, femoral vein,    popliteal vein, gastrocnemius vein, small saphenous vein, and peroneal    vein shows acute venous thrombosis   2. Left external iliac vein, common femoral vein, femoral vein, popliteal    vein, gastrocnemius vein, small saphenous vein, peroneal vein, and    saphenofemoral junction and greater saphenous vein shows acute venous    thrombosis. These  extensive bilateral lower extremity deep vein thrombosis notified    to Dr. Audra Cobb on 10/20/2022 1201 PM.            CT ABD PELV WO CONT   Final Result   Status post cystectomy and hysterectomy with right lower quadrant ileal conduit. Mild bilateral hydroureteronephrosis, evaluation is somewhat limited by the   presence of contrast from previously performed examination. Small bowel   distention with decompressed loops distally concerning for obstruction likely   related to adhesion formation. While this study was done without the use of   intravenous contrast there is questionable right common femoral vein thrombus   with soft tissue edema. CTA CHEST W OR W WO CONT   Final Result   Emphysematous changes. No evidence of pulmonary embolism or acute   abnormality. Cholelithiasis. Bilateral hydronephrosis. 1 cm hypodense lesion   right hepatic lobe cannot be characterized with certainty on the basis of this   examination.  Follow-up is recommended to ensure stability. XR CHEST PORT   Final Result   No Acute Disease. XR FOOT LT MIN 3 V   Final Result   Normal study. 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 KUB done showing non obstructive bowel pattern       2. Bladder cancer- urostomy, urology following. 3. DVT- bilateral lower extremity- started on eliquis today, d/c heparin drip, has IVC filter placed and s/p aspiration thrombectomy by IR. 4. Severe protein calorie malnutrition- nutrition consulted. On supplements. 5. Discharge- CM following, possible next 48 hrs depending on clinical course, able to tolerate PO diet.             DVT Prophylaxis: sequential compression  Code Status:  Full Code  POA: Blima Morris [id number] discussed with:   ___patient, staff nurse TAYA____________________________________________________________    Golden Gentile NP

## 2022-11-08 NOTE — PROGRESS NOTES
Problem: Mobility Impaired (Adult and Pediatric)  Goal: *Acute Goals and Plan of Care (Insert Text)  Description: FUNCTIONAL STATUS PRIOR TO ADMISSION: Patient was independent without use of DME.    HOME SUPPORT PRIOR TO ADMISSION: The patient lived alone with neice to provide assistance. Physical Therapy Goals  Revised 11/4/22- Continue with goals  Initiated 10/26/2022  1. Patient will move from supine to sit and sit to supine  in bed with modified independence within 7 day(s). 2.  Patient will transfer from bed to chair and chair to bed with modified independence using the least restrictive device within 7 day(s). 3.  Patient will perform sit to stand with modified independence within 7 day(s). 4.  Patient will ambulate with modified independence for 100-150 feet with the least restrictive device within 7 day(s). 5.  Patient will ascend/descend 12 stairs with 1 handrail(s) with modified independence within 7 day(s). Outcome: Progressing Towards Goal   PHYSICAL THERAPY TREATMENT  Patient: Keya Nunez (49 y.o. female)  Date: 11/8/2022  Diagnosis: Acute blood loss anemia [D62]  Small bowel obstruction (HCC) [K56.609] Small bowel obstruction (HCC)  Procedure(s) (LRB):  LAPAROSCOPIC LOOP COLOSTOMY PLACEMENT (N/A) 6 Days Post-Op  Precautions:    Chart, physical therapy assessment, plan of care and goals were reviewed. ASSESSMENT  Patient continues with skilled PT services and is progressing towards goals. Pt found semi supine upon PT arrival, agreeable to session. (See below for objective details and assist levels). Overall pt tolerated session well today with supine therex, declined out of bed activity due to fatigue and nausea. Performed supine therex, with focus on eccentric control to promote strengthening, req increased time for rest breaks. Will continue to benefit from skilled PT services, and will continue to progress as tolerated.      Current Level of Function Impacting Discharge (mobility/balance): medical condition     Other factors to consider for discharge: PLOF         PLAN :  Patient continues to benefit from skilled intervention to address the above impairments. Continue treatment per established plan of care to address goals. Recommend with staff: Encourage HEP in prep for ADLs/mobility and Amb to bathroom for toileting with gt belt and AD    Recommendation for discharge: (in order for the patient to meet his/her long term goals)  [address]     This discharge recommendation:  Has been made in collaboration with the attending provider and/or case management    IF patient discharges home will need the following DME: to be determined (TBD)       SUBJECTIVE:   Patient stated I don't feel too well, can we just do exercises in the bed.     OBJECTIVE DATA SUMMARY:   Critical Behavior:  Neurologic State: Alert  Orientation Level: Oriented X4  Cognition: Follows commands  Safety/Judgement: Decreased awareness of environment  Functional Mobility Training:  Bed Mobility:  Rolling: Modified independent  Therapeutic Exercises:       EXERCISE   Sets   Reps   Active Active Assist   Passive Self ROM   Comments   Ankle Pumps 1 15 [x] [] [] []    Quad Sets/Glut Sets 1 15 [x] [] [] []    Hamstring Sets   [] [] [] []    Short Arc Quads   [] [] [] []    Heel Slides 1 15 [x] [] [] []    Straight Leg Raises 1 15 [x] [] [] []    Hip abd/add 1 15 [x] [] [] []    Long Arc Quads   [] [] [] []    Marching   [] [] [] []       [] [] [] []        Activity Tolerance:   Fair    After treatment patient left in no apparent distress:   Bed locked and returned to lowest position, Supine in bed, Call bell within reach, and Bed / chair alarm activated      COMMUNICATION/COLLABORATION:   The patients plan of care was discussed with: Registered nurse.      Jeremy Gutierrez, PTA, PT   Time Calculation: 30 mins

## 2022-11-08 NOTE — PROGRESS NOTES
OCCUPATIONAL THERAPY TREATMENT  Patient: Edwin Maldonado (17 y.o. female)  Date: 11/8/2022  Diagnosis: Acute blood loss anemia [D62]  Small bowel obstruction (HCC) [K56.609] Small bowel obstruction (HCC)  Procedure(s) (LRB):  LAPAROSCOPIC LOOP COLOSTOMY PLACEMENT (N/A) 6 Days Post-Op  Precautions: FALL  Chart, occupational therapy assessment, plan of care, and goals were reviewed. ASSESSMENT  Pt continues with skilled OT services and is progressing towards goals. Pt received semi-supine in bed upon arrival, AXO x4 and agreeable to WAGNER tx at this time. Pt cooperative and demonstrated fair effort during activities. Overall, pt continues to present with deficits in generalized strength/AROM, static/dynamic sitting balance, static/dynamic standing balance and functional activity tolerance during performance of ADLs/mobility (see below for objective details and assist levels). Pt reports being nauseated but willing to participate in therapy at bed level. Pt able to complete simple grooming wih set-up. Pt engaged in bharati UE exercises with upgrades to 1/2 lb wt and added exercises with increased sets with vc's for proper technique for optimal benefits. Decreased activity tolerance and illness continues to limit pt's participation in therapy. /68. Will continue to progress. Recommend d/c to IRF once medically appropriate. Other factors to consider for discharge: Time of onset, medical prognosis/diagnosis, severity of deficits, PLOF, functional baseline, home environment, and family support          PLAN :  Patient continues to benefit from skilled intervention to address the above impairments. Continue treatment per established plan of care. to address goals. Recommend with staff: Out of bed to chair for meals and Encourage HEP in prep for ADLs/mobility    Recommend next session:  Toileting    Recommendation for discharge: (in order for the patient to meet his/her long term goals)  Inpatient Rehabilitation Facility     This discharge recommendation:  Has been made in collaboration with the attending provider and/or case management       IF patient discharges home will need the following DME: TBD       SUBJECTIVE:   Patient stated I feel nauseated.     OBJECTIVE DATA SUMMARY:   Cognitive/Behavioral Status:  Neurologic State: Alert  Orientation Level: Oriented X4  Cognition: Follows commands             Functional Mobility and Transfers for ADLs:  Bed Mobility:       Transfers:             Balance:       ADL Intervention:       Grooming  Grooming Assistance: Set-up  Position Performed: Long sitting on bed  Washing Face: Set-up  Washing Hands: Set-up         Exercise Sets Reps AROM AAROM PROM Self PROM Comments   Elbow E/F 2 15 [x] [] [] [] Long sitting, 1/2 lb wt with vc's for proper technique, with rest breaks   Chest press 2 15 [x] [] [] []    Shoulder flex/ext 2 15 X       Ceiling punches 2 15 [x] [] [] []         Pain:  0/10    Activity Tolerance:   Fair and requires rest breaks    After treatment patient left in no apparent distress:   Supine in bed, Call bell within reach, and Side rails x 3, bed locked and in lowest position    COMMUNICATION/COLLABORATION:   The patients plan of care was discussed with: Registered nurse. [facility]  Time Calculation: 23 mins     Problem: Self Care Deficits Care Plan (Adult)  Goal: *Acute Goals and Plan of Care (Insert Text)  Description: FUNCTIONAL STATUS PRIOR TO ADMISSION: Patient was independent and active without use of DME. Patient was independent for basic and instrumental ADLs. HOME SUPPORT: The patient lived alone; support from [facility].      Occupational Therapy Goals  Initiated 10/28/2022    Pt stated goal \"to get stronger\"  Pt will be IND sup <> sit in prep for EOB ADLs  Pt will be MI grooming standing sink side LRAD  Pt will be IND UB dressing sitting EOB/long sit   Pt will be MI LE dressing sitting EOB/long sit  Pt will be IND sit <>  prep for toileting LRAD  Pt will be IND toileting/toilet transfer/cloth mgmt LRAD  Pt will be IND following UE HEP in prep for self care tasks      Outcome: Progressing Towards Goal

## 2022-11-09 PROCEDURE — 74011250636 HC RX REV CODE- 250/636: Performed by: NURSE PRACTITIONER

## 2022-11-09 PROCEDURE — 74011250636 HC RX REV CODE- 250/636: Performed by: INTERNAL MEDICINE

## 2022-11-09 PROCEDURE — 74011250637 HC RX REV CODE- 250/637: Performed by: NURSE PRACTITIONER

## 2022-11-09 PROCEDURE — 74011250637 HC RX REV CODE- 250/637: Performed by: INTERNAL MEDICINE

## 2022-11-09 PROCEDURE — 65270000029 HC RM PRIVATE

## 2022-11-09 PROCEDURE — 74011250637 HC RX REV CODE- 250/637: Performed by: PHYSICIAN ASSISTANT

## 2022-11-09 PROCEDURE — 74011000250 HC RX REV CODE- 250: Performed by: SURGERY

## 2022-11-09 RX ORDER — PROCHLORPERAZINE EDISYLATE 5 MG/ML
10 INJECTION INTRAMUSCULAR; INTRAVENOUS
Status: DISCONTINUED | OUTPATIENT
Start: 2022-11-09 | End: 2022-11-11 | Stop reason: HOSPADM

## 2022-11-09 RX ORDER — DEXTROSE, SODIUM CHLORIDE, AND POTASSIUM CHLORIDE 5; .45; .15 G/100ML; G/100ML; G/100ML
75 INJECTION INTRAVENOUS CONTINUOUS
Status: DISCONTINUED | OUTPATIENT
Start: 2022-11-09 | End: 2022-11-10

## 2022-11-09 RX ORDER — MORPHINE SULFATE 2 MG/ML
2 INJECTION, SOLUTION INTRAMUSCULAR; INTRAVENOUS
Status: DISCONTINUED | OUTPATIENT
Start: 2022-11-09 | End: 2022-11-10

## 2022-11-09 RX ADMIN — CROMOLYN SODIUM 1 DROP: 40 SOLUTION/ DROPS OPHTHALMIC at 10:40

## 2022-11-09 RX ADMIN — SODIUM CHLORIDE, PRESERVATIVE FREE 10 ML: 5 INJECTION INTRAVENOUS at 06:09

## 2022-11-09 RX ADMIN — THIAMINE HCL TAB 100 MG 100 MG: 100 TAB at 09:22

## 2022-11-09 RX ADMIN — MEGESTROL ACETATE 20 MG: 20 TABLET ORAL at 09:22

## 2022-11-09 RX ADMIN — OXYCODONE 5 MG: 5 TABLET ORAL at 15:55

## 2022-11-09 RX ADMIN — ONDANSETRON 4 MG: 2 INJECTION INTRAMUSCULAR; INTRAVENOUS at 11:23

## 2022-11-09 RX ADMIN — PANTOPRAZOLE SODIUM 40 MG: 40 TABLET, DELAYED RELEASE ORAL at 06:30

## 2022-11-09 RX ADMIN — PROCHLORPERAZINE EDISYLATE 10 MG: 5 INJECTION INTRAMUSCULAR; INTRAVENOUS at 17:09

## 2022-11-09 RX ADMIN — SUCRALFATE 1 G: 1 TABLET ORAL at 10:56

## 2022-11-09 RX ADMIN — SUCRALFATE 1 G: 1 TABLET ORAL at 06:30

## 2022-11-09 RX ADMIN — SODIUM CHLORIDE, PRESERVATIVE FREE 10 ML: 5 INJECTION INTRAVENOUS at 22:17

## 2022-11-09 RX ADMIN — APIXABAN 5 MG: 5 TABLET, FILM COATED ORAL at 22:17

## 2022-11-09 RX ADMIN — OXYCODONE 5 MG: 5 TABLET ORAL at 10:56

## 2022-11-09 RX ADMIN — DEXTROSE MONOHYDRATE, SODIUM CHLORIDE, AND POTASSIUM CHLORIDE 75 ML/HR: 50; 4.5; 1.49 INJECTION, SOLUTION INTRAVENOUS at 17:09

## 2022-11-09 RX ADMIN — OXYCODONE 5 MG: 5 TABLET ORAL at 06:08

## 2022-11-09 RX ADMIN — APIXABAN 5 MG: 5 TABLET, FILM COATED ORAL at 09:22

## 2022-11-09 RX ADMIN — MORPHINE SULFATE 2 MG: 2 INJECTION, SOLUTION INTRAMUSCULAR; INTRAVENOUS at 16:51

## 2022-11-09 NOTE — PROGRESS NOTES
Comprehensive Nutrition Assessment    Type and Reason for Visit: Reassess (goals)    Nutrition Recommendations/Plan:   Diet as ordered, advance to Regular as medically appropriate  Add Ensure Pudding once a day at breakfast.  D/C Ensure clear, change to Ensure Enlive 2x day  Monitor & record po intake/supplement, I/Os, weight as able. Malnutrition Assessment:  Malnutrition Status: Moderate malnutrition (10/26/22 1411)    Context:  Acute illness     Findings of the 6 clinical characteristics of malnutrition:   Energy Intake:  75% or less of est energy req for 7 or more days  Weight Loss:  No significant weight loss (3.5 % loss x 30d)     Body Fat Loss:  Mild body fat loss, Orbital, Buccal region   Muscle Mass Loss:  No significant muscle mass loss,    Fluid Accumulation:  Moderate to severe, Extremities   Strength:  Not performed     Nutrition Assessment:    dmitted for partial SBO. UTI complicated by ileal conduit. Ileus resolving with advancement of diet. Concern for Rectovaginal fistula but no evidence of pelvic abscess or fistula. 2 day hx of Nausea and poor intake. (10/24) Diet advanced to solids (10/25) SBS diet with ONS in place. Appetite remains poor, 1-25%. Pt requested Marinol for appetite. (10/29) Appetite stimulant started. (10/31) NPO today for initially scheduled MRI. Pt endorsed poor ONS acceptance, RD to modify per preferences. S/p rectovaginal fistula closure with ALLI drain(11/1). (11/2) loop colostomy placement. TPN initiated 11/3. NGT in place with minimal OP. Remains NPO. Concern for refeeding, RD recommend reduction of rate. NGT removed (11/5)  and clear liq started. Currently on Full liquid, tolerating diet. Modify supplements. Labs: H/H 9.8/31.7, Cl 109, Na 136, K 4.0, Gluc 89. Phos 1.0, Mag 2.0. Meds: Eliquis, megace, PPI, carafate, B1, Dilaudid. Nutrition Related Findings:    No N/V/D/C nor chewing/swallowing difficulties reported. No edema. ALLI drain out 11/6.   Colotomy - +OP noted.  Urostomy patent. Wound Type: Multiple, Surgical incision (abd, vaginal)    Current Nutrition Intake & Therapies:  Average Meal Intake: 26-50%  Average Supplement Intake: Unable to assess  ADULT DIET Full Liquid  ADULT ORAL NUTRITION SUPPLEMENT Lunch, Dinner; Standard High Calorie/High Protein  ADULT ORAL NUTRITION SUPPLEMENT Breakfast; Fortified Pudding    Anthropometric Measures:  Height: 5' 2\" (157.5 cm)  Ideal Body Weight (IBW): 110 lbs (50 kg)  Current Body Wt:  51 kg (112 lb 7 oz), 102.2 % IBW. Bed scale  Current BMI (kg/m2): 20.6  Weight Adjustment: No adjustment  BMI Category: Normal weight (BMI 18.5-24. 9)    Estimated Daily Nutrient Needs:  Energy Requirements Based On: Kcal/kg  Weight Used for Energy Requirements: Current  Energy (kcal/day): 5122-1401 kcal (30-35 kcal/kg, CA)  Weight Used for Protein Requirements: Current  Protein (g/day): 71-82 g (1.4-1.6 g/kg)  Method Used for Fluid Requirements: 1 ml/kcal  Fluid (ml/day): ~1800 ml    Nutrition Diagnosis:    In context of acute illness or injury, Moderate malnutrition related to altered GI structure, altered GI function as evidenced by NPO or clear liquid status due to medical condition, nutrition support-parenteral nutrition    Nutrition Interventions:   Food and/or Nutrient Delivery: Continue current diet, Modify oral nutrition supplement  Nutrition Education/Counseling: No recommendations at this time  Coordination of Nutrition Care: Continue to monitor while inpatient      Goals:  Previous Goal Met: Progressing toward goal(s)  Goals: Meet at least 75% of estimated needs, prior to discharge  Specify Other Goals: wt maintenance +/- 0.5 kg within 7 days    Nutrition Monitoring and Evaluation:   Behavioral-Environmental Outcomes: None identified  Food/Nutrient Intake Outcomes: Food and nutrient intake, Supplement intake  Physical Signs/Symptoms Outcomes: Biochemical data, GI status, Weight, Skin    Discharge Planning:    Continue oral nutrition supplement    Shania Sheppard RD  Contact:[phone]

## 2022-11-09 NOTE — PROGRESS NOTES
Bedside shift change report given to FABIOLA Chung (oncoming nurse) by Jovan Funez (offgoing nurse). Report included the following information SBAR.

## 2022-11-09 NOTE — PROGRESS NOTES
Compazine 10 mg IVP was therapeutically interchanged for Phenergan 25 mg IVPB per the P&T Committee approved Therapeutic Interchanges Policy.     [address], Pharmacist  11/9/2022 4:25 PM

## 2022-11-09 NOTE — PROGRESS NOTES
OTattempted to see pt at 2:11 pm,, however pt declined due to fatigue and not feeling well. Will continue to follow and attempt at a later time. Thank you!

## 2022-11-09 NOTE — PROGRESS NOTES
Hospitalist Progress Note    Subjective:   Daily Progress Note: 11/9/2022 3:22 PM    Hospital Course:  Patient is a 61 y.o. female with PMH of bladder cancer s/p cystectomy, hysterectomy, BSO and ileal conduit diversion, anemia, hep C, and left leg DVT s/p IVC filter years ago. She presented to the ED with chief complaint of weakness and nausea for the past week. She reports unable to tolerate PO intake, due to profound nausea, abdominal pain, fever, chills, vomiting or diarrhea. In addition, she also reports unable to walk due to weakness and left inner thigh and left foot pain. Denies trauma. She was seen here on 10/13/2022 with urostomy complications, status post ileal conduit and discharged home on Cipro which she completed. In addition, she also reports having vaginal bleed since surgery 2 weeks ago. Which appears to be more related to a fistula with stool present. CT scan of the abdomen pelvis was performed revealing no obvious rectovaginal fistula although there was air present tracking in that direction without any extravasation of contrast.  There was extensive bilateral iliac venous thrombus to the level of the IVC filter with right leg edema. Cholelithiasis with a distended gallbladder was also noted. Bilateral hydronephrosis with hydroureter most likely from the ileal conduit. Probable small bowel obstruction secondary to adhesions also noted. Patient was found to be anemic and received 1 unit of packed red blood cells. Venous duplex revealed extensive clot burden within the right leg and left leg. Urinalysis was consistent with Candida infection. ID consultation. Surgical consultation, Dr. Dallas Nur. UCX grew candida ciferrii, continue fluconazole. IV heparin being held due to blood loss, hem positive stool,.   Discussed case with interventional radiology who will further investigate the fistula with recommendations and mechanical thrombectomy performed of the bilateral lower extremities with a large volume clot removed. Patient went to the OR on 10/31/2022 for vaginal wound washout, rectovaginal fistula closure with drain placement, distal rectal repair. Believe that patient will need diverting colostomy and general surgery is speaking to patient's niece. She is currently NPO. Give IV albumin. On IV fluids. Megace for appetite stimulant. Patient is now agreeable for laparoscopic loop colostomy placement that will be performed on 11/2/2022. NG tube placed postoperatively. TPN initiated on 11/3/2022. NG tube removeed on 11/5/2022 and started clear liquid diet and advanced as tolerated . Subjective: Pt seen in room, states feels better. Current Facility-Administered Medications   Medication Dose Route Frequency    sucralfate (CARAFATE) tablet 1 g  1 g Oral AC&HS    apixaban (ELIQUIS) tablet 5 mg  5 mg Oral Q12H    thiamine mononitrate (B-1) tablet 100 mg  100 mg Oral DAILY    oxyCODONE IR (ROXICODONE) tablet 5 mg  5 mg Oral Q4H PRN    pantoprazole (PROTONIX) tablet 40 mg  40 mg Oral ACB    alum-mag hydroxide-simeth (MYLANTA) oral suspension 30 mL  30 mL Oral Q4H PRN    sodium chloride (NS) flush 5-40 mL  5-40 mL IntraVENous Q8H    sodium chloride (NS) flush 5-40 mL  5-40 mL IntraVENous PRN    megestroL (MEGACE) tablet 20 mg  20 mg Oral TID WITH MEALS    midodrine (PROAMATINE) tablet 5 mg  5 mg Oral TID PRN    cromolyn (OPTICROM) 4 % ophthalmic solution 1 Drop  1 Drop Both Eyes DAILY    acetaminophen (TYLENOL) tablet 650 mg  650 mg Oral Q6H PRN    Or    acetaminophen (TYLENOL) suppository 650 mg  650 mg Rectal Q6H PRN    polyethylene glycol (MIRALAX) packet 17 g  17 g Oral DAILY PRN    ondansetron (ZOFRAN ODT) tablet 4 mg  4 mg Oral Q8H PRN    Or    ondansetron (ZOFRAN) injection 4 mg  4 mg IntraVENous Q6H PRN        Review of Systems:    Review of Systems   HENT:  Negative for congestion and sore throat. Eyes:  Negative for blurred vision and double vision.    Respiratory:  Negative for cough and shortness of breath. Cardiovascular:  Negative for chest pain and palpitations. Gastrointestinal:  Negative for heartburn, nausea and vomiting. Objective:     Visit Vitals  /79 (BP 1 Location: Left upper arm, BP Patient Position: Semi fowlers; At rest)   Pulse 80   Temp 98.5 °F (36.9 °C)   Resp 16   Ht 5' 2\" (1.575 m)   Wt 51 kg (112 lb 7 oz)   LMP  (LMP Unknown)   SpO2 100%   BMI 20.56 kg/m²      O2 Device: None (Room air)    Temp (24hrs), Av.4 °F (36.9 °C), Min:98 °F (36.7 °C), Max:98.8 °F (37.1 °C)      701 - 1900  In: -   Out: 250 [Urine:250]  1901 - 700  In: -   Out:  [Urine:1850]    PHYSICAL EXAM:    Physical Exam   Constitutional:       General: She is not in acute distress. Cardiovascular:      Rate and Rhythm: Normal rate and regular rhythm. Pulses: Normal pulses. Heart sounds: Normal heart sounds. Pulmonary:      Effort: Pulmonary effort is normal.      Breath sounds: Normal breath sounds. Abdominal:      General: Bowel sounds are normal.     Palpations: Abdomen is soft. Comments: Left colostomy intact, soft brown stool , stoma pink. Genitourinary:     Vagina:  formed brown drainage present. Comments: Urostomy patent, yellow urine,   Musculoskeletal:         General: Normal range of motion. Skin:     General: Skin is warm and dry. Neurological:      Mental Status: She is oriented to person, place, and time. Data Review    No results found for this or any previous visit (from the past 24 hour(s)). XR ABD (KUB)   Final Result   Nonobstructive bowel gas pattern. CT ABD PELV WO CONT   Final Result   1. Postsurgical changes from cystectomy with urinary diversion, partial   colectomy with left lower quadrant colostomy, and hysterectomy with transvaginal   drainage catheter. 2.  No bowel obstruction. 3.  No drainable fluid collection. 4.  Cholelithiasis. 5.  IVC filter in appropriate position.       XR CHEST PORT   Final Result   1. Gastric tube terminates in the body of the stomach, but with proximal   sidehole at the GE junction. Recommend advancement. IR THROMBECTOMY [facility] VEIN W PHARM   Final Result   Bilateral iliofemoral and IVC aspiration thrombectomy with removal of a large   volume of thrombus from both lower extremities and IVC. CT ABD PELV W CONT   Final Result   Rectovaginal fistula is not identified   Extensive bilateral iliac venous thrombus to the level of the IVC filter. Right   lower extremity edema. Cholelithiasis with distended gallbladder   Bilateral hydronephrosis and hydroureter in patient with ileal conduit   Probable small bowel obstruction secondary to adhesion      XR ABD (KUB)   Final Result   1. No significant change in small bowel dilation. Large volume of stool in the   rectum. 2. Cholelithiasis. XR ABD (KUB)   Final Result   Dilated small bowel within the central abdomen is similar to CT from one day   prior. DUPLEX LOWER EXT VENOUS BILAT   Final Result   Addendum (preliminary) 1 of 1      · Thrombus present in the right external iliac vein. · Thrombus present in the right common femoral vein. · Thrombus present in the right femoral vein. · Thrombus present in the right proximal femoral vein. · Thrombus present in the right mid femoral vein. · Thrombus present in the right distal femoral vein. · Thrombus present in the right popliteal vein. · Thrombus present in the right gastrocnemius vein. · Thrombus present in the right peroneal vein. · Thrombus present in the right saphenofemoral junction. · Thrombus present in the left external iliac vein. · Thrombus present in the left common femoral vein. · Thrombus present in the left saphenofemoral junction. · Thrombus present in the left femoral vein. · Thrombus present in the left proximal femoral vein. · Thrombus present in the left mid femoral vein.    · Thrombus present in the left popliteal vein. · Thrombus present in the left distal femoral vein. · Thrombus present in the left gastrocnemius vein. · Thrombus present in the left peroneal vein. This is vascular lab report on Payal Parker, patient's YOB: 1965   Date of examination: October 20, 2022   Examination: Duplex lower extremity venous bilateral   Technician: Ms. Tedasher Mast   Clinical history: This is  61years old woman with suspected renal vein    thrombosis   Indication: femoral vein thrombosis. Technique: Bilateral leg venous structures examined using venous duplex    ultrasound with 2D grayscale, color-flow and spectral Doppler analysis. Findings:   Right side:   common femoral vein and saphenofemoral junction is not compressible and    there is hypoechoic filling defect noted. common femoral vein shows no venous flow   Distal external iliac vein also not compressible and that there is no    venous flow   Proximal femoral vein is noncompressible and there is no venous flow   Mid femoral vein is not compressible and there is no venous flow   Distal femoral vein is noncompressible and there is no venous flow   Proximal, mid, distal greater saphenous vein is compressible, there is no    filling defect   Distal popliteal vein is not compressible. Proximal popliteal vein is noncompressible and there is no venous flow   Proximal gastrocnemius vein is not compressible   Proximal small saphenous vein is not compressible   Distal, mid, proximal  peroneal vein is not compressible      Left side:   common femoral vein not compressible and there is no venous flow   Saphenofemoral junction is not compressible and there is no venous flow.    External iliac vein also not compressible there is no venous flow   Proximal femoral vein is noncompressible and there is no venous flow   Mid femoral vein is noncompressible and there is no venous flow   Distal femoral vein is not compressible and there is no venous flow   Proximal, mid, distal greater saphenous vein is noncompressible and there    is hypoechoic filling defect noted. Distal popliteal vein is noncompressible   Proximal popliteal vein is noncompressible and there is no venous flow   Proximal gastrocnemius vein is not compressible and there is no venous    flow   Proximal small saphenous vein is noncompressible and there is no venous    flow   Distal, mid, proximal peroneal vein is not compressible. Impression:   1. Right external iliac vein, common femoral vein, femoral vein,    popliteal vein, gastrocnemius vein, small saphenous vein, and peroneal    vein shows acute venous thrombosis   2. Left external iliac vein, common femoral vein, femoral vein, popliteal    vein, gastrocnemius vein, small saphenous vein, peroneal vein, and    saphenofemoral junction and greater saphenous vein shows acute venous    thrombosis. These  extensive bilateral lower extremity deep vein thrombosis notified    to Dr. Kadeem Tom on 10/20/2022 1201 PM.            CT ABD PELV WO CONT   Final Result   Status post cystectomy and hysterectomy with right lower quadrant ileal conduit. Mild bilateral hydroureteronephrosis, evaluation is somewhat limited by the   presence of contrast from previously performed examination. Small bowel   distention with decompressed loops distally concerning for obstruction likely   related to adhesion formation. While this study was done without the use of   intravenous contrast there is questionable right common femoral vein thrombus   with soft tissue edema. CTA CHEST W OR W WO CONT   Final Result   Emphysematous changes. No evidence of pulmonary embolism or acute   abnormality. Cholelithiasis. Bilateral hydronephrosis. 1 cm hypodense lesion   right hepatic lobe cannot be characterized with certainty on the basis of this   examination. Follow-up is recommended to ensure stability.       XR CHEST PORT   Final Result   No Acute Disease. XR FOOT LT MIN 3 V   Final Result   Normal study. 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 KUB done showing non obstructive bowel pattern, advance diet as tolerated,      2. Bladder cancer- urostomy, urology following. 3. DVT- bilateral lower extremity- started on eliquis today, d/c heparin drip, has IVC filter placed and s/p aspiration thrombectomy by IR. 4. Severe protein calorie malnutrition- nutrition consulted. On supplements.      5. Nausea/vomiting- episode today, prn phenergan , zofran,CL diet, start IV fluids, repeat labs in am.      6. Discharge- CM following, accepted to SNF, waiting authorization           DVT Prophylaxis: sequential compression  Code Status:  Full Code  POA: Bhavesh Matias [id number] discussed with:   _______________________________________________________________    Panda Young NP

## 2022-11-09 NOTE — PROGRESS NOTES
Attempted PT 1571, however pt declined due to fatigue and not feeling well. Will continue to follow and attempt at a later time. Thank you.

## 2022-11-10 LAB
ANION GAP SERPL CALC-SCNC: 5 MMOL/L (ref 5–15)
BUN SERPL-MCNC: 6 MG/DL (ref 6–20)
BUN/CREAT SERPL: 14 (ref 12–20)
CA-I BLD-MCNC: 8.5 MG/DL (ref 8.5–10.1)
CHLORIDE SERPL-SCNC: 109 MMOL/L (ref 97–108)
CO2 SERPL-SCNC: 23 MMOL/L (ref 21–32)
CREAT SERPL-MCNC: 0.42 MG/DL (ref 0.55–1.02)
ERYTHROCYTE [DISTWIDTH] IN BLOOD BY AUTOMATED COUNT: 16.6 % (ref 11.5–14.5)
GLUCOSE SERPL-MCNC: 88 MG/DL (ref 65–100)
HCT VFR BLD AUTO: 27.1 % (ref 35–47)
HGB BLD-MCNC: 8.6 G/DL (ref 11.5–16)
MCH RBC QN AUTO: 28.2 PG (ref 26–34)
MCHC RBC AUTO-ENTMCNC: 31.7 G/DL (ref 30–36.5)
MCV RBC AUTO: 88.9 FL (ref 80–99)
NRBC # BLD: 0 K/UL (ref 0–0.01)
NRBC BLD-RTO: 0 PER 100 WBC
PLATELET # BLD AUTO: 229 K/UL (ref 150–400)
PMV BLD AUTO: 9.5 FL (ref 8.9–12.9)
POTASSIUM SERPL-SCNC: 3.6 MMOL/L (ref 3.5–5.1)
RBC # BLD AUTO: 3.05 M/UL (ref 3.8–5.2)
SODIUM SERPL-SCNC: 137 MMOL/L (ref 136–145)
WBC # BLD AUTO: 4.6 K/UL (ref 3.6–11)

## 2022-11-10 PROCEDURE — 65270000029 HC RM PRIVATE

## 2022-11-10 PROCEDURE — 97530 THERAPEUTIC ACTIVITIES: CPT

## 2022-11-10 PROCEDURE — 80048 BASIC METABOLIC PNL TOTAL CA: CPT

## 2022-11-10 PROCEDURE — 36415 COLL VENOUS BLD VENIPUNCTURE: CPT

## 2022-11-10 PROCEDURE — 74011250637 HC RX REV CODE- 250/637: Performed by: PHYSICIAN ASSISTANT

## 2022-11-10 PROCEDURE — 74011250637 HC RX REV CODE- 250/637: Performed by: NURSE PRACTITIONER

## 2022-11-10 PROCEDURE — 74011000250 HC RX REV CODE- 250: Performed by: SURGERY

## 2022-11-10 PROCEDURE — 74011250636 HC RX REV CODE- 250/636: Performed by: NURSE PRACTITIONER

## 2022-11-10 PROCEDURE — 85027 COMPLETE CBC AUTOMATED: CPT

## 2022-11-10 RX ORDER — LANOLIN ALCOHOL/MO/W.PET/CERES
1 CREAM (GRAM) TOPICAL
Status: DISCONTINUED | OUTPATIENT
Start: 2022-11-11 | End: 2022-11-11 | Stop reason: HOSPADM

## 2022-11-10 RX ORDER — ASCORBIC ACID 500 MG
500 TABLET ORAL DAILY
Status: DISCONTINUED | OUTPATIENT
Start: 2022-11-11 | End: 2022-11-11 | Stop reason: HOSPADM

## 2022-11-10 RX ADMIN — DEXTROSE MONOHYDRATE, SODIUM CHLORIDE, AND POTASSIUM CHLORIDE 75 ML/HR: 50; 4.5; 1.49 INJECTION, SOLUTION INTRAVENOUS at 06:29

## 2022-11-10 RX ADMIN — SODIUM CHLORIDE, PRESERVATIVE FREE 10 ML: 5 INJECTION INTRAVENOUS at 06:29

## 2022-11-10 RX ADMIN — SODIUM CHLORIDE, PRESERVATIVE FREE 10 ML: 5 INJECTION INTRAVENOUS at 13:52

## 2022-11-10 RX ADMIN — MEGESTROL ACETATE 20 MG: 20 TABLET ORAL at 08:35

## 2022-11-10 RX ADMIN — SUCRALFATE 1 G: 1 TABLET ORAL at 16:14

## 2022-11-10 RX ADMIN — CROMOLYN SODIUM 1 DROP: 40 SOLUTION/ DROPS OPHTHALMIC at 08:41

## 2022-11-10 RX ADMIN — THIAMINE HCL TAB 100 MG 100 MG: 100 TAB at 08:35

## 2022-11-10 RX ADMIN — SUCRALFATE 1 G: 1 TABLET ORAL at 11:56

## 2022-11-10 RX ADMIN — APIXABAN 5 MG: 5 TABLET, FILM COATED ORAL at 21:57

## 2022-11-10 RX ADMIN — MEGESTROL ACETATE 20 MG: 20 TABLET ORAL at 11:56

## 2022-11-10 RX ADMIN — APIXABAN 5 MG: 5 TABLET, FILM COATED ORAL at 08:35

## 2022-11-10 RX ADMIN — PANTOPRAZOLE SODIUM 40 MG: 40 TABLET, DELAYED RELEASE ORAL at 06:29

## 2022-11-10 RX ADMIN — SODIUM CHLORIDE, PRESERVATIVE FREE 10 ML: 5 INJECTION INTRAVENOUS at 21:41

## 2022-11-10 NOTE — PROGRESS NOTES
OCCUPATIONAL THERAPY TREATMENT  Patient: Simi Ibarra (34 y.o. female)  Date: 11/10/2022  Diagnosis: Acute blood loss anemia [D62]  Small bowel obstruction (HCC) [K56.609] Small bowel obstruction (HCC)  Procedure(s) (LRB):  LAPAROSCOPIC LOOP COLOSTOMY PLACEMENT (N/A) 8 Days Post-Op  Precautions: FALL  Chart, occupational therapy assessment, plan of care, and goals were reviewed. ASSESSMENT  Pt continues with skilled OT services and is progressing towards goals. Pt received semi-supine in bed upon arrival, AXO x4 and agreeable to WAGNER tx at this time. Pt cooperative and demonstrated good effort during activities. Overall, pt continues to present with deficits in generalized strength/AROM, static/dynamic standing balance and functional activity tolerance during performance of ADLs/mobility (see below for objective details and assist levels). Pt tolerated therapy session well. Pt feeling better and no complaints. Pt improved motivation to tf OOB this visit. Pt continues to mod I for bed mobility>EOB with good sitting balance. Pt completed bed>sink>chair tf with CGA-min A for steadying while using RW/gait belt and vc's for proper technique. Pt stood at sink for ~6 minutes to perform grooming with SBA/set-up with no LOB noted. Pt continued UE exercises to improve strength needed to perform ADL'S and functional tf's with added exercises, see grid below. Pt presents good rehab potential. Pt's i Will continue to progress. Recommend d/c to IRF once medically appropriate. Other factors to consider for discharge: Time of onset, medical prognosis/diagnosis, severity of deficits, PLOF, functional baseline, home environment, and family support          PLAN :  Patient continues to benefit from skilled intervention to address the above impairments. Continue treatment per established plan of care. to address goals.     Recommend with staff: Out of bed to chair for meals and Encourage HEP in prep for ADLs/mobility    Recommend next session: Standing grooming    Recommendation for discharge: (in order for the patient to meet his/her long term goals)  [address]     This discharge recommendation:  Has been made in collaboration with the attending provider and/or case management       IF patient discharges home will need the following DME: TBD       SUBJECTIVE:   Patient stated I'm feeling better.     OBJECTIVE DATA SUMMARY:   Cognitive/Behavioral Status:  Neurologic State: Alert  Orientation Level: Oriented X4  Cognition: Follows commands             Functional Mobility and Transfers for ADLs:  Bed Mobility:  Rolling: Modified independent  Supine to Sit: Modified independent  Scooting: Independent    Transfers:  Sit to Stand: Contact guard assistance  Functional Transfers  Bathroom Mobility: Minimum assistance  Bed to Chair: Contact guard assistance;Minimum assistance    Balance:       ADL Intervention:       Grooming  Grooming Assistance: Stand-by assistance;Set-up  Position Performed: Standing  Washing Face: Stand-by assistance;Set-up  Washing Hands: Stand-by assistance;Set-up  Brushing Teeth: Stand-by assistance;Set-up (mouth rinse)       Upper Body Dressing Assistance  Dressing Assistance: Stand-by assistance;Set-up  Hospital Gown: Stand-by assistance;Set-up    Lower Body Dressing Assistance  Dressing Assistance: Stand-by assistance  Socks: Stand-by assistance  Position Performed: Seated in chair         Exercise Sets Reps AROM AAROM PROM Self PROM Comments   Elbow E/F 1 15 [x] [] [] [] Chair level   Ceiling punches 1 15 [x] [] [] []    Scapular retraction 1 15 X            Pain:  0/10    Activity Tolerance:   Fair and requires rest breaks    After treatment patient left in no apparent distress:   Sitting in chair and Call bell within reach, bed locked and in lowest position    COMMUNICATION/COLLABORATION:   The patients plan of care was discussed with: Registered nurse. Lesli Glez BERNARD Bundy  Time Calculation: 24 mins     Problem: Self Care Deficits Care Plan (Adult)  Goal: *Acute Goals and Plan of Care (Insert Text)  Description: FUNCTIONAL STATUS PRIOR TO ADMISSION: Patient was independent and active without use of DME. Patient was independent for basic and instrumental ADLs. HOME SUPPORT: The patient lived alone; support from [facility].      Occupational Therapy Goals  Initiated 10/28/2022    Pt stated goal \"to get stronger\"  Pt will be IND sup <> sit in prep for EOB ADLs  Pt will be MI grooming standing sink side LRAD  Pt will be IND UB dressing sitting EOB/long sit   Pt will be MI LE dressing sitting EOB/long sit  Pt will be IND sit <>  prep for toileting LRAD  Pt will be IND toileting/toilet transfer/cloth mgmt LRAD  Pt will be IND following UE HEP in prep for self care tasks      11/10/2022 0940 by BERNARD Perez  Outcome: Progressing Towards Goal  11/10/2022 0940 by BERNARD Perez  Outcome: Progressing Towards Goal

## 2022-11-10 NOTE — PROGRESS NOTES
Problem: Pain  Goal: *Control of Pain  Outcome: Progressing Towards Goal     Problem: Falls - Risk of  Goal: *Absence of Falls  Description: Document Iris Fall Risk and appropriate interventions in the flowsheet. Outcome: Progressing Towards Goal  Note: Fall Risk Interventions:  Mobility Interventions: Bed/chair exit alarm, OT consult for ADLs, PT Consult for mobility concerns, Patient to call before getting OOB, Strengthening exercises (ROM-active/passive), Utilize walker, cane, or other assistive device         Medication Interventions: Teach patient to arise slowly, Bed/chair exit alarm, Patient to call before getting OOB    Elimination Interventions: Bed/chair exit alarm, Call light in reach, Patient to call for help with toileting needs, Toileting schedule/hourly rounds    History of Falls Interventions: Bed/chair exit alarm         Problem: Patient Education: Go to Patient Education Activity  Goal: Patient/Family Education  Outcome: Progressing Towards Goal     Problem: Patient Education: Go to Patient Education Activity  Goal: Patient/Family Education  Outcome: Progressing Towards Goal     Problem: Pressure Injury - Risk of  Goal: *Prevention of pressure injury  Description: Document Alphonso Scale and appropriate interventions in the flowsheet. Outcome: Progressing Towards Goal  Note: Pressure Injury Interventions:  Sensory Interventions: Maintain/enhance activity level, Minimize linen layers, Monitor skin under medical devices    Moisture Interventions: Absorbent underpads, Maintain skin hydration (lotion/cream), Minimize layers    Activity Interventions: Increase time out of bed, PT/OT evaluation    Mobility Interventions: HOB 30 degrees or less, PT/OT evaluation, Turn and reposition approx.  every two hours(pillow and wedges)    Nutrition Interventions: Document food/fluid/supplement intake    Friction and Shear Interventions: Apply protective barrier, creams and emollients, HOB 30 degrees or less, Minimize layers

## 2022-11-10 NOTE — PROGRESS NOTES
[facility] declined patient [facility] stay. CM informed patient and niece. CM offered SNF stay but patient declined. She wants [facility] instead. CM sent out blank referral due to patient insurance. Patient accepted by [facility].

## 2022-11-10 NOTE — PROGRESS NOTES
Dr. Marleen Dominguez did vaginal and rectal examination, informed regarding moderate amount of vaginal discharge which is same characteristics of the output coming from colostomy.

## 2022-11-10 NOTE — PROGRESS NOTES
Problem: Patient Education: Go to Patient Education Activity  Goal: Patient/Family Education  Outcome: Progressing Towards Goal     Problem: Mobility Impaired (Adult and Pediatric)  Goal: *Acute Goals and Plan of Care (Insert Text)  Description: FUNCTIONAL STATUS PRIOR TO ADMISSION: Patient was independent without use of DME.    HOME SUPPORT PRIOR TO ADMISSION: The patient lived alone with neice to provide assistance. Physical Therapy Goals  Revised 11/4/22- Continue with goals  Initiated 10/26/2022  1. Patient will move from supine to sit and sit to supine  in bed with modified independence within 7 day(s). 2.  Patient will transfer from bed to chair and chair to bed with modified independence using the least restrictive device within 7 day(s). 3.  Patient will perform sit to stand with modified independence within 7 day(s). 4.  Patient will ambulate with modified independence for 100-150 feet with the least restrictive device within 7 day(s). 5.  Patient will ascend/descend 12 stairs with 1 handrail(s) with modified independence within 7 day(s). Outcome: Progressing Towards Goal   PHYSICAL THERAPY TREATMENT  Patient: Simi Ibarra (10 y.o. female)  Date: 11/10/2022  Diagnosis: Acute blood loss anemia [D62]  Small bowel obstruction (HCC) [K56.609] Small bowel obstruction (HCC)  Procedure(s) (LRB):  LAPAROSCOPIC LOOP COLOSTOMY PLACEMENT (N/A) 8 Days Post-Op  Precautions:    Chart, physical therapy assessment, plan of care and goals were reviewed. ASSESSMENT  Patient continues with skilled PT services and is progressing towards goals. Pt found sitting in chair upon PT arrival, agreeable to session. (See below for objective details and assist levels). Overall pt tolerated session well today with ambulation, transfers and stair training limited by activity tolerance and endurance. Pt at [facility] with mobility and transfers, however req increased time.  Performed stair training with B rails and with R rail while facing, demo'd with step to, VC for proximity to step. Will continue to benefit from skilled PT services, and will continue to progress as tolerated. Current Level of Function Impacting Discharge (mobility/balance): activity tolerance and endurance     Other factors to consider for discharge: PLOF         PLAN :  Patient continues to benefit from skilled intervention to address the above impairments. Continue treatment per established plan of care to address goals. Recommend with staff: Out of bed to chair for meals, Encourage HEP in prep for ADLs/mobility, and Amb in hallway    Recommendation for discharge: (in order for the patient to meet his/her long term goals)  [facility],[address]     This discharge recommendation:  Has been made in collaboration with the attending provider and/or case management    IF patient discharges home will need the following DME: patient owns DME required for discharge       SUBJECTIVE:   Patient stated Lets do the stairs again.     OBJECTIVE DATA SUMMARY:   Critical Behavior:  Neurologic State: Alert  Orientation Level: Oriented X4  Cognition: Follows commands  Safety/Judgement: Decreased awareness of environment  Functional Mobility Training:  Bed Mobility:  Rolling: Modified independent  Supine to Sit: Modified independent  Scooting: Independent  Transfers:  Sit to Stand: Contact guard assistance  Stand to Sit: Contact guard assistance  Bed to Chair: Contact guard assistance;Minimum assistance  Balance:  Sitting: Intact; With support  Standing: Impaired; Without support  Standing - Static: Good;Constant support  Standing - Dynamic : Fair;Constant support  Ambulation/Gait Training:  Distance (ft): 75 Feet (ft)  Assistive Device: Walker, rolling;Gait belt  Ambulation - Level of Assistance: Contact guard assistance  Gait Abnormalities: Step to gait  Base of Support: Narrowed  Speed/Vera: Slow;Shuffled  Stairs:  Number of Stairs Trained: 5 (performed on practice steps x2)  Stairs - Level of Assistance: Contact guard assistance   Rail Use: Both       Pain Rating:  Pt denies pain    Activity Tolerance:   Fair    After treatment patient left in no apparent distress:   Bed locked and returned to lowest position, Sitting in chair and Call bell within reach    GOALS:    COMMUNICATION/COLLABORATION:   The patients plan of care was discussed with: Registered nurse.        Alexi Gomez PTA, PT   Time Calculation: 39 mins

## 2022-11-10 NOTE — PROGRESS NOTES
Hospitalist Progress Note    Subjective:   Daily Progress Note: 11/10/2022 4:16 PM    Hospital Course:  Patient is a 61 y.o. female with PMH of bladder cancer s/p cystectomy, hysterectomy, BSO and ileal conduit diversion, anemia, hep C, and left leg DVT s/p IVC filter years ago. She presented to the ED with chief complaint of weakness and nausea for the past week. She reports unable to tolerate PO intake, due to profound nausea, abdominal pain, fever, chills, vomiting or diarrhea. In addition, she also reports unable to walk due to weakness and left inner thigh and left foot pain. Denies trauma. She was seen here on 10/13/2022 with urostomy complications, status post ileal conduit and discharged home on Cipro which she completed. In addition, she also reports having vaginal bleed since surgery 2 weeks ago. Which appears to be more related to a fistula with stool present. CT scan of the abdomen pelvis was performed revealing no obvious rectovaginal fistula although there was air present tracking in that direction without any extravasation of contrast.  There was extensive bilateral iliac venous thrombus to the level of the IVC filter with right leg edema. Cholelithiasis with a distended gallbladder was also noted. Bilateral hydronephrosis with hydroureter most likely from the ileal conduit. Probable small bowel obstruction secondary to adhesions also noted. Patient was found to be anemic and received 1 unit of packed red blood cells. Venous duplex revealed extensive clot burden within the right leg and left leg. Urinalysis was consistent with Candida infection. ID consultation. Surgical consultation, Dr. Isidro Kan. UCX grew michell ciferrii, continue fluconazole. IV heparin being held due to blood loss, hem positive stool,.   Discussed case with interventional radiology who will further investigate the fistula with recommendations and mechanical thrombectomy performed of the bilateral lower extremities with a large volume clot removed. Patient went to the OR on 10/31/2022 for vaginal wound washout, rectovaginal fistula closure with drain placement, distal rectal repair. Believe that patient will need diverting colostomy and general surgery is speaking to patient's niece. She is currently NPO. Give IV albumin. On IV fluids. Megace for appetite stimulant. Patient is now agreeable for laparoscopic loop colostomy placement that will be performed on 11/2/2022. NG tube placed postoperatively. TPN initiated on 11/3/2022. NG tube removeed on 11/5/2022 and started clear liquid diet and advanced as tolerated to regular diet. Subjective: Pt seen in room, sitting in chair, no complaints of nausea/vomiting.      Current Facility-Administered Medications   Medication Dose Route Frequency    [START ON 11/11/2022] ferrous sulfate tablet 325 mg  1 Tablet Oral DAILY WITH BREAKFAST    [START ON 11/11/2022] ascorbic acid (vitamin C) (VITAMIN C) tablet 500 mg  500 mg Oral DAILY    prochlorperazine (COMPAZINE) injection 10 mg  10 mg IntraVENous Q6H PRN    sucralfate (CARAFATE) tablet 1 g  1 g Oral AC&HS    apixaban (ELIQUIS) tablet 5 mg  5 mg Oral Q12H    thiamine mononitrate (B-1) tablet 100 mg  100 mg Oral DAILY    pantoprazole (PROTONIX) tablet 40 mg  40 mg Oral ACB    alum-mag hydroxide-simeth (MYLANTA) oral suspension 30 mL  30 mL Oral Q4H PRN    sodium chloride (NS) flush 5-40 mL  5-40 mL IntraVENous Q8H    sodium chloride (NS) flush 5-40 mL  5-40 mL IntraVENous PRN    megestroL (MEGACE) tablet 20 mg  20 mg Oral TID WITH MEALS    midodrine (PROAMATINE) tablet 5 mg  5 mg Oral TID PRN    cromolyn (OPTICROM) 4 % ophthalmic solution 1 Drop  1 Drop Both Eyes DAILY    acetaminophen (TYLENOL) tablet 650 mg  650 mg Oral Q6H PRN    Or    acetaminophen (TYLENOL) suppository 650 mg  650 mg Rectal Q6H PRN    polyethylene glycol (MIRALAX) packet 17 g  17 g Oral DAILY PRN    ondansetron (ZOFRAN ODT) tablet 4 mg  4 mg Oral Q8H PRN    Or ondansetron (ZOFRAN) injection 4 mg  4 mg IntraVENous Q6H PRN        Review of Systems:    Review of Systems   Constitutional:  Negative for fever. Cardiovascular:  Negative for chest pain and palpitations. Gastrointestinal:  Negative for heartburn, nausea and vomiting. Genitourinary:  Negative for dysuria and urgency. Neurological:  Negative for dizziness and headaches. Objective:     Visit Vitals  /66 (BP 1 Location: Left upper arm, BP Patient Position: At rest)   Pulse 69   Temp 97.9 °F (36.6 °C)   Resp 20   Ht 5' 2\" (1.575 m)   Wt 54 kg (119 lb 0.8 oz)   LMP  (LMP Unknown)   SpO2 100%   BMI 21.77 kg/m²      O2 Device: None (Room air)    Temp (24hrs), Av.2 °F (36.8 °C), Min:97.9 °F (36.6 °C), Max:98.6 °F (37 °C)      11/10 0701 - 11/10 1900  In: -   Out: 350 [Urine:300]  1901 - 11/10 07  In: 1025 [P.O.:200; I.V.:825]  Out: 1000 [Urine:1000]    PHYSICAL EXAM:    Physical Exam     Physical Exam   Constitutional:       General: She is not in acute distress. Cardiovascular:      Rate and Rhythm: Normal rate and regular rhythm. Pulses: Normal pulses. Heart sounds: Normal heart sounds. Pulmonary:      Effort: Pulmonary effort is normal.      Breath sounds: Normal breath sounds. Abdominal:      General: Bowel sounds are normal.     Palpations: Abdomen is soft. Comments: Left colostomy intact, soft brown stool , stoma pink. Genitourinary:     Vagina:       Comments: Urostomy patent, yellow urine,   Musculoskeletal:         General: Normal range of motion. Skin:     General: Skin is warm and dry. Neurological:      Mental Status: She is oriented to person, place, and time.   Data Review    Recent Results (from the past 24 hour(s))   CBC W/O DIFF    Collection Time: 11/10/22  5:43 AM   Result Value Ref Range    WBC 4.6 3.6 - 11.0 K/uL    RBC 3.05 (L) 3.80 - 5.20 M/uL    HGB 8.6 (L) 11.5 - 16.0 g/dL    HCT 27.1 (L) 35.0 - 47.0 %    MCV 88.9 80.0 - 99.0 FL    MCH 28.2 26.0 - 34.0 PG    MCHC 31.7 30.0 - 36.5 g/dL    RDW 16.6 (H) 11.5 - 14.5 %    PLATELET 526 398 - 695 K/uL    MPV 9.5 8.9 - 12.9 FL    NRBC 0.0 0.0  WBC    ABSOLUTE NRBC 0.00 0.00 - 2.08 K/uL   METABOLIC PANEL, BASIC    Collection Time: 11/10/22  5:43 AM   Result Value Ref Range    Sodium 137 136 - 145 mmol/L    Potassium 3.6 3.5 - 5.1 mmol/L    Chloride 109 (H) 97 - 108 mmol/L    CO2 23 21 - 32 mmol/L    Anion gap 5 5 - 15 mmol/L    Glucose 88 65 - 100 mg/dL    BUN 6 6 - 20 mg/dL    Creatinine 0.42 (L) 0.55 - 1.02 mg/dL    BUN/Creatinine ratio 14 12 - 20      eGFR >60 >60 ml/min/1.73m2    Calcium 8.5 8.5 - 10.1 mg/dL       XR ABD (KUB)   Final Result   Nonobstructive bowel gas pattern. CT ABD PELV WO CONT   Final Result   1. Postsurgical changes from cystectomy with urinary diversion, partial   colectomy with left lower quadrant colostomy, and hysterectomy with transvaginal   drainage catheter. 2.  No bowel obstruction. 3.  No drainable fluid collection. 4.  Cholelithiasis. 5.  IVC filter in appropriate position. XR CHEST PORT   Final Result   1. Gastric tube terminates in the body of the stomach, but with proximal   sidehole at the GE junction. Recommend advancement. [facility]   Final Result   Bilateral iliofemoral and IVC aspiration thrombectomy with removal of a large   volume of thrombus from both lower extremities and IVC. CT ABD PELV W CONT   Final Result   Rectovaginal fistula is not identified   Extensive bilateral iliac venous thrombus to the level of the IVC filter. Right   lower extremity edema. Cholelithiasis with distended gallbladder   Bilateral hydronephrosis and hydroureter in patient with ileal conduit   Probable small bowel obstruction secondary to adhesion      XR ABD (KUB)   Final Result   1. No significant change in small bowel dilation. Large volume of stool in the   rectum. 2. Cholelithiasis.          XR ABD (KUB)   Final Result   Dilated small bowel within the central abdomen is similar to CT from one day   prior. DUPLEX LOWER EXT VENOUS BILAT   Final Result   Addendum (preliminary) 1 of 1      · Thrombus present in the right external iliac vein. · Thrombus present in the right common femoral vein. · Thrombus present in the right femoral vein. · Thrombus present in the right proximal femoral vein. · Thrombus present in the right mid femoral vein. · Thrombus present in the right distal femoral vein. · Thrombus present in the right popliteal vein. · Thrombus present in the right gastrocnemius vein. · Thrombus present in the right peroneal vein. · Thrombus present in the right saphenofemoral junction. · Thrombus present in the left external iliac vein. · Thrombus present in the left common femoral vein. · Thrombus present in the left saphenofemoral junction. · Thrombus present in the left femoral vein. · Thrombus present in the left proximal femoral vein. · Thrombus present in the left mid femoral vein. · Thrombus present in the left popliteal vein. · Thrombus present in the left distal femoral vein. · Thrombus present in the left gastrocnemius vein. · Thrombus present in the left peroneal vein. This is vascular lab report on Benitez Eugene, patient's YOB: 1965   Date of examination: October 20, 2022   Examination: Duplex lower extremity venous bilateral   Technician: Ms. Tera Nguyễn   Clinical history: This is  61years old woman with suspected renal vein    thrombosis   Indication: femoral vein thrombosis. Technique: Bilateral leg venous structures examined using venous duplex    ultrasound with 2D grayscale, color-flow and spectral Doppler analysis. Findings:   Right side:   common femoral vein and saphenofemoral junction is not compressible and    there is hypoechoic filling defect noted.    common femoral vein shows no venous flow   Distal external iliac vein also not compressible and that there is no    venous flow   Proximal femoral vein is noncompressible and there is no venous flow   Mid femoral vein is not compressible and there is no venous flow   Distal femoral vein is noncompressible and there is no venous flow   Proximal, mid, distal greater saphenous vein is compressible, there is no    filling defect   Distal popliteal vein is not compressible. Proximal popliteal vein is noncompressible and there is no venous flow   Proximal gastrocnemius vein is not compressible   Proximal small saphenous vein is not compressible   Distal, mid, proximal  peroneal vein is not compressible      Left side:   common femoral vein not compressible and there is no venous flow   Saphenofemoral junction is not compressible and there is no venous flow. External iliac vein also not compressible there is no venous flow   Proximal femoral vein is noncompressible and there is no venous flow   Mid femoral vein is noncompressible and there is no venous flow   Distal femoral vein is not compressible and there is no venous flow   Proximal, mid, distal greater saphenous vein is noncompressible and there    is hypoechoic filling defect noted. Distal popliteal vein is noncompressible   Proximal popliteal vein is noncompressible and there is no venous flow   Proximal gastrocnemius vein is not compressible and there is no venous    flow   Proximal small saphenous vein is noncompressible and there is no venous    flow   Distal, mid, proximal peroneal vein is not compressible. Impression:   1. Right external iliac vein, common femoral vein, femoral vein,    popliteal vein, gastrocnemius vein, small saphenous vein, and peroneal    vein shows acute venous thrombosis   2.   Left external iliac vein, common femoral vein, femoral vein, popliteal    vein, gastrocnemius vein, small saphenous vein, peroneal vein, and    saphenofemoral junction and greater saphenous vein shows acute venous    thrombosis. These  extensive bilateral lower extremity deep vein thrombosis notified    to Dr. Ashley Salamanca on 10/20/2022 1201 PM.            CT ABD PELV WO CONT   Final Result   Status post cystectomy and hysterectomy with right lower quadrant ileal conduit. Mild bilateral hydroureteronephrosis, evaluation is somewhat limited by the   presence of contrast from previously performed examination. Small bowel   distention with decompressed loops distally concerning for obstruction likely   related to adhesion formation. While this study was done without the use of   intravenous contrast there is questionable right common femoral vein thrombus   with soft tissue edema. CTA CHEST W OR W WO CONT   Final Result   Emphysematous changes. No evidence of pulmonary embolism or acute   abnormality. Cholelithiasis. Bilateral hydronephrosis. 1 cm hypodense lesion   right hepatic lobe cannot be characterized with certainty on the basis of this   examination. Follow-up is recommended to ensure stability. XR CHEST PORT   Final Result   No Acute Disease. XR FOOT LT MIN 3 V   Final Result   Normal study. 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 KUB done showing non obstructive bowel pattern,  rectal mucous smears, general surgery aware,      2. Bladder cancer- urostomy, urology following. 3.  DVT- bilateral lower extremity- started on eliquis today, d/c heparin drip, has IVC filter placed and s/p aspiration thrombectomy by IR.      4. Severe protein calorie malnutrition- nutrition consulted. On supplements. 5. Nausea/vomiting- resolved, advance diet to regular. D/c IV fluids  Monitor PO intake. 6. Discharge- CM following, denied to IRF, agreeable to home health, does not want SNF,  attempted to notify her niece today.    Will d/c in am.          DVT Prophylaxis: sequential compression  Code Status:  Full Code  POA:  Maud[address] discussed with:   ______patient, staff nurse, TAYA_________________________________________________________    Janus Hodgkins, NP

## 2022-11-10 NOTE — PROGRESS NOTES
Apparently patient has discharge with possible stool coming out from vaginal area. So I examined the patient with the nursing staff as well. Digital rectal examination vaginal area and the rectal area is has mucousy smears but I did not see any stool. I do recommend the oral contrast with 4-hour bowel prep CT scan however patient says she cannot drink contrast.    So we will continue monitor her clinically. Patient has diverting loop colostomy. Colostomy is working well.

## 2022-11-11 VITALS
OXYGEN SATURATION: 100 % | DIASTOLIC BLOOD PRESSURE: 81 MMHG | BODY MASS INDEX: 21.83 KG/M2 | HEIGHT: 62 IN | RESPIRATION RATE: 18 BRPM | WEIGHT: 118.61 LBS | TEMPERATURE: 97.9 F | HEART RATE: 72 BPM | SYSTOLIC BLOOD PRESSURE: 138 MMHG

## 2022-11-11 PROCEDURE — 74011000250 HC RX REV CODE- 250: Performed by: SURGERY

## 2022-11-11 PROCEDURE — 97530 THERAPEUTIC ACTIVITIES: CPT

## 2022-11-11 PROCEDURE — 74011250637 HC RX REV CODE- 250/637: Performed by: INTERNAL MEDICINE

## 2022-11-11 PROCEDURE — 74011250637 HC RX REV CODE- 250/637: Performed by: NURSE PRACTITIONER

## 2022-11-11 PROCEDURE — 74011250637 HC RX REV CODE- 250/637: Performed by: PHYSICIAN ASSISTANT

## 2022-11-11 RX ORDER — ONDANSETRON 4 MG/1
4 TABLET, ORALLY DISINTEGRATING ORAL
Qty: 42 TABLET | Refills: 0 | Status: SHIPPED | OUTPATIENT
Start: 2022-11-11 | End: 2022-11-25

## 2022-11-11 RX ORDER — ASCORBIC ACID 500 MG
500 TABLET ORAL DAILY
Qty: 30 TABLET | Refills: 0 | Status: SHIPPED | OUTPATIENT
Start: 2022-11-12 | End: 2022-12-12

## 2022-11-11 RX ORDER — PANTOPRAZOLE SODIUM 40 MG/1
40 TABLET, DELAYED RELEASE ORAL
Qty: 30 TABLET | Refills: 0 | Status: SHIPPED | OUTPATIENT
Start: 2022-11-12 | End: 2022-12-12

## 2022-11-11 RX ORDER — ASPIRIN 325 MG/1
100 TABLET, FILM COATED ORAL DAILY
Qty: 30 TABLET | Refills: 0 | Status: SHIPPED | OUTPATIENT
Start: 2022-11-12 | End: 2022-12-12

## 2022-11-11 RX ORDER — MEGESTROL ACETATE 20 MG/1
20 TABLET ORAL
Qty: 90 TABLET | Refills: 0 | Status: SHIPPED | OUTPATIENT
Start: 2022-11-11 | End: 2022-12-11

## 2022-11-11 RX ORDER — SUCRALFATE 1 G/1
1 TABLET ORAL
Qty: 120 TABLET | Refills: 0 | Status: SHIPPED | OUTPATIENT
Start: 2022-11-11 | End: 2022-12-11

## 2022-11-11 RX ADMIN — MEGESTROL ACETATE 20 MG: 20 TABLET ORAL at 09:27

## 2022-11-11 RX ADMIN — SODIUM CHLORIDE, PRESERVATIVE FREE 10 ML: 5 INJECTION INTRAVENOUS at 10:40

## 2022-11-11 RX ADMIN — THIAMINE HCL TAB 100 MG 100 MG: 100 TAB at 09:27

## 2022-11-11 RX ADMIN — FERROUS SULFATE TAB 325 MG (65 MG ELEMENTAL FE) 325 MG: 325 (65 FE) TAB at 09:27

## 2022-11-11 RX ADMIN — CROMOLYN SODIUM 1 DROP: 40 SOLUTION/ DROPS OPHTHALMIC at 10:39

## 2022-11-11 RX ADMIN — SODIUM CHLORIDE, PRESERVATIVE FREE 10 ML: 5 INJECTION INTRAVENOUS at 05:25

## 2022-11-11 RX ADMIN — ACETAMINOPHEN 650 MG: 325 TABLET, FILM COATED ORAL at 09:27

## 2022-11-11 RX ADMIN — OXYCODONE HYDROCHLORIDE AND ACETAMINOPHEN 500 MG: 500 TABLET ORAL at 09:27

## 2022-11-11 RX ADMIN — PANTOPRAZOLE SODIUM 40 MG: 40 TABLET, DELAYED RELEASE ORAL at 05:24

## 2022-11-11 RX ADMIN — APIXABAN 5 MG: 5 TABLET, FILM COATED ORAL at 09:27

## 2022-11-11 NOTE — WOUND CARE
Patient resting in bed, patient to discharge home today with home health. Provided patient with 4 convex wafers and 4 pouches for colostomy until shipment arrives. Colostomy pouch changed, patient tolerated well. Convex wafer cut to fit over stoma with no skin showing between stoma and wafer. Pouch applied, no leaks present at this time. Urostomy pouch changed, patient tolerated well. Barrier ring applied around stoma as it is flush with the skin. Convex urostomy pouching system applied over stoma with no skin showing between stoma and wafer/barrier ring.      Anu Emery, RN, BSN  [facility]  11/11/22  2:35 PM

## 2022-11-11 NOTE — PROGRESS NOTES
Problem: Mobility Impaired (Adult and Pediatric)  Goal: *Acute Goals and Plan of Care (Insert Text)  Description: FUNCTIONAL STATUS PRIOR TO ADMISSION: Patient was independent without use of DME.    HOME SUPPORT PRIOR TO ADMISSION: The patient lived alone with neice to provide assistance. Physical Therapy Goals  Revised 11/4/22- Continue with goals  Initiated 10/26/2022  1. Patient will move from supine to sit and sit to supine  in bed with modified independence within 7 day(s). 2.  Patient will transfer from bed to chair and chair to bed with modified independence using the least restrictive device within 7 day(s). 3.  Patient will perform sit to stand with modified independence within 7 day(s). 4.  Patient will ambulate with modified independence for 100-150 feet with the least restrictive device within 7 day(s). 5.  Patient will ascend/descend 12 stairs with 1 handrail(s) with modified independence within 7 day(s). Outcome: Progressing Towards Goal   PHYSICAL THERAPY TREATMENT  Patient: Simi Ibarra (70 y.o. female)  Date: 11/11/2022  Diagnosis: Acute blood loss anemia [D62]  Small bowel obstruction (HCC) [K56.609] Small bowel obstruction (HCC)  Procedure(s) (LRB):  LAPAROSCOPIC LOOP COLOSTOMY PLACEMENT (N/A) 9 Days Post-Op  Precautions:    Chart, physical therapy assessment, plan of care and goals were reviewed. ASSESSMENT  Patient continues with skilled PT services and is progressing towards goals. Pt found sitting in chair upon PT arrival, agreeable to session. (See below for objective details and assist levels). Overall pt tolerated session fair today with ambulation, transfers and stair training. Pt continues to demo progress towards all goals, however will benefit from skilled services to improve strength, balance and mobility to maximize safety. Demo's improved ambulation distances and stair training trials, no overt LOB noted.  Discussed discharge plans with pt and case management, pt reports reconsidering discharge plans and interested in SNF. Advised pt of obtaining 24 HR A at home for safety, demo'd verbal understanding. Educated on researching SNF with niece to find a location within timeframe allowed per CM. Will continue pt with current POC and progress towards PT goals. Changing recommendation from IRF to [address] [address]  with 24 hr care vs SNF at discharge due to maximizing pt safety and increase rehab potential when medically appropriate. All changes were discussed and agreed upon with supervising PT. Will continue to benefit from skilled PT services, and will continue to progress as tolerated. Current Level of Function Impacting Discharge (mobility/balance): medical concerns     Other factors to consider for discharge: PLOF and safety         PLAN :  Patient continues to benefit from skilled intervention to address the above impairments. Continue treatment per established plan of care to address goals. Recommend with staff: Out of bed to chair for meals, Encourage HEP in prep for ADLs/mobility, and Amb in hallway    Recommendation for discharge: (in order for the patient to meet his/her long term goals)  Home with [address] [place] [address] with 24 hr care vs SNF    This discharge recommendation:  Has been made in collaboration with the attending provider and/or case management    IF patient discharges home will need the following DME: rolling walker       SUBJECTIVE:   Patient stated Noris Bagnell does skilled nursing work? Elaina Sierra    OBJECTIVE DATA SUMMARY:   Critical Behavior:  Neurologic State: Alert  Orientation Level: Oriented X4  Cognition: Appropriate decision making, Appropriate for age attention/concentration, Appropriate safety awareness  Safety/Judgement: Decreased awareness of environment  Functional Mobility Training:  Transfers:  Sit to Stand: Stand-by assistance  Stand to Sit: Stand-by assistance  Balance:  Sitting: Intact  Standing: Impaired; Without support  Standing - Static: Good  Standing - Dynamic : Good;Constant support  Ambulation/Gait Training:  Distance (ft): 170 Feet (ft)  Assistive Device: Walker, rolling  Ambulation - Level of Assistance: Stand-by assistance  Gait Abnormalities: Step to gait; Decreased step clearance  Base of Support: Narrowed  Speed/Vera: Slow;Shuffled  Step Length: Left shortened;Right shortened  Stairs:  Number of Stairs Trained: 5 (x4)  Stairs - Level of Assistance: Contact guard assistance   Rail Use: Left     Pain Ratin/10 R proximal LE     Activity Tolerance:   Fair    After treatment patient left in no apparent distress:   Bed locked and returned to lowest position, Sitting in chair and Call bell within reach      COMMUNICATION/COLLABORATION:   The patients plan of care was discussed with: Registered nurse and Case management.        Simon Regan PTA, PT   Time Calculation: 42 mins

## 2022-11-11 NOTE — PROGRESS NOTES
Patient examined this morning. Vital signs stable. Colostomy is working well  Vaginal examination shows bile stained liquid stool noted. Most likely patient has persistent drainage from repair site, most likely from retained stool rectal area. Rectal examination does show liquid stool as well. Patient currently with diverting loop colostomy. I expect the drainage to slow down. Patient is afebrile and stable. And the patient can be discharged to home today. And I will see her back my office 2 weeks follow-up.

## 2022-11-11 NOTE — PROGRESS NOTES
Discharge plan of care/case management plan validated with provider discharge order. Discharge instructions given to patient. All questions answered and no more questions at this time. Denies Pain,SOB, N/V. Discahrged with uncle in private vehicle.

## 2022-11-11 NOTE — PROGRESS NOTES
Problem: Pain  Goal: *Control of Pain  Outcome: Progressing Towards Goal     Problem: Falls - Risk of  Goal: *Absence of Falls  Description: Document Iris Fall Risk and appropriate interventions in the flowsheet.   Outcome: Progressing Towards Goal  Note: Fall Risk Interventions:  Mobility Interventions: Bed/chair exit alarm, OT consult for ADLs, PT Consult for mobility concerns, Utilize walker, cane, or other assistive device, Strengthening exercises (ROM-active/passive)         Medication Interventions: Bed/chair exit alarm, Patient to call before getting OOB, Teach patient to arise slowly    Elimination Interventions: Bed/chair exit alarm, Call light in reach, Patient to call for help with toileting needs    History of Falls Interventions: Bed/chair exit alarm, Door open when patient unattended, Vital signs minimum Q4HRs X 24 hrs (comment for end date)         Problem: Patient Education: Go to Patient Education Activity  Goal: Patient/Family Education  Outcome: Progressing Towards Goal     Problem: Patient Education: Go to Patient Education Activity  Goal: Patient/Family Education  Outcome: Progressing Towards Goal

## 2022-11-11 NOTE — DISCHARGE SUMMARY
Hospitalist Discharge Summary     Patient ID:    Annabella Camacho  [id number]  61 y.o.  1965    Admit date: 10/19/2022    Discharge date : 11/11/2022        Final Diagnoses:   Principal Problem:    Small bowel obstruction ([address] (10/20/2022)    Active Problems:    Acute blood loss anemia (10/20/2022)      Status post robot-assisted surgical procedure, pelvic exenteration (10/20/2022)      Vaginal cuff cellulitis (10/20/2022)        Reason for Hospitalization:  Weakness, abdominal pain, nausea/vomiting    Hospital Course:   Patient is a 61 y.o. female with PMH of bladder cancer s/p cystectomy, hysterectomy, BSO and ileal conduit diversion, anemia, hep C, and left leg DVT s/p IVC filter years ago. She presented to the ED with chief complaint of weakness and nausea for the past week. She reports unable to tolerate PO intake, due to profound nausea, abdominal pain, fever, chills, vomiting or diarrhea. In addition, she also reports unable to walk due to weakness and left inner thigh and left foot pain. Denies trauma. She was seen here on 10/13/2022 with urostomy complications, status post ileal conduit and discharged home on Cipro which she completed. In addition, she also reports having vaginal bleed since surgery 2 weeks ago. Which appears to be more related to a fistula with stool present. CT scan of the abdomen pelvis was performed revealing no obvious rectovaginal fistula although there was air present tracking in that direction without any extravasation of contrast.  There was extensive bilateral iliac venous thrombus to the level of the IVC filter with right leg edema. Cholelithiasis with a distended gallbladder was also noted. Bilateral hydronephrosis with hydroureter most likely from the ileal conduit. Probable small bowel obstruction secondary to adhesions also noted. Patient was found to be anemic and received 1 unit of packed red blood cells.  Venous duplex revealed extensive clot burden within the right leg and left leg. IV heparin being held due to blood loss, heme positive stool. Case was discussed with  interventional radiology and furt recommended a mechanical thrombectomy performed of the bilateral lower extremities with a large volume clot removed. She was started on eliquis bid and will continue for at least 90 days. Patient went to the OR on 10/31/2022 for vaginal wound washout, rectovaginal fistula closure with drain placement, distal rectal repair with loop colostomy done on 11/2/22. She was on TPN briefly and then placed on PO diet. Pt is tolerating PO diet and recommend continuation of soft foods, and advance as tolerated. Continue current medications prescribed and follow up with PCP , general surgery and urology as listed in instructions. Pt is stable for discharge home today with home health, and is in agreement with plan. Discharge Medications:   Current Discharge Medication List        START taking these medications    Details   apixaban (ELIQUIS) 5 mg tablet Take 1 Tablet by mouth every twelve (12) hours for 90 days. Indications: blood clot in a deep vein of the extremities  Qty: 180 Tablet, Refills: 0  Start date: 11/11/2022, End date: 2/9/2023      sucralfate (CARAFATE) 1 gram tablet Take 1 Tablet by mouth Before breakfast, lunch, dinner and at bedtime for 30 days. Indications: reflux esophagitis, or inflammation of the esophagus from backflow of stomach acid  Qty: 120 Tablet, Refills: 0  Start date: 11/11/2022, End date: 12/11/2022      thiamine mononitrate (B-1) 100 mg tablet Take 1 Tablet by mouth daily for 30 doses. Indications: deficiency in thiamine or vitamin B1  Qty: 30 Tablet, Refills: 0  Start date: 11/12/2022, End date: 12/12/2022      pantoprazole (PROTONIX) 40 mg tablet Take 1 Tablet by mouth Daily (before breakfast) for 30 days.  Indications: gastroesophageal reflux disease  Qty: 30 Tablet, Refills: 0  Start date: 11/12/2022, End date: 12/12/2022 ondansetron (ZOFRAN ODT) 4 mg disintegrating tablet Take 1 Tablet by mouth every eight (8) hours as needed for Nausea or Vomiting for up to 14 days. Qty: 42 Tablet, Refills: 0  Start date: 11/11/2022, End date: 11/25/2022      ascorbic acid, vitamin C, (VITAMIN C) 500 mg tablet Take 1 Tablet by mouth daily for 30 days. Indications: inadequate vitamin C  Qty: 30 Tablet, Refills: 0  Start date: 11/12/2022, End date: 12/12/2022      megestroL (MEGACE) 20 mg tablet Take 1 Tablet by mouth three (3) times daily (with meals) for 30 days. Qty: 90 Tablet, Refills: 0  Start date: 11/11/2022, End date: 12/11/2022           CONTINUE these medications which have NOT CHANGED    Details   diclofenac (VOLTAREN) 1 % gel Apply 2 g to affected area two (2) times a day. famotidine (PEPCID) 20 mg tablet Take 20 mg by mouth daily as needed for Heartburn. acetaminophen (TYLENOL) 650 mg TbER Take 650 mg by mouth every six to eight (6-8) hours as needed for Pain. ferrous sulfate 325 mg (65 mg iron) tablet Take 325 mg by mouth daily. multivitamin with folic acid (One Daily Essential) 400 mcg tab tablet Take 1 Tablet by mouth daily. Qty: 90 Tablet, Refills: 0    Comments: Refill given in the absence of Dr. Dory Ham send the refill request to her after 3 months  Associated Diagnoses: Encounter for gynecological examination without abnormal finding           STOP taking these medications       cromolyn (OPTICROM) 4 % ophthalmic solution Comments:   Reason for Stopping: Follow up Care:    1.  iMlo Madsen MD in 1 month     Follow-up Information       Follow up With Specialties Details Why Contact Info    Nola Ye MD Urology Schedule an appointment as soon as possible for a visit in 1 month(s) Reschedule follow up for cystectomy follow up; JIMI HOYT [place] office [address] [address] [phone]      Milo Madsen MD Internal Medicine Physician Follow up in 3 week(s)  [address] [facility],[address], [place]  [phone]      Lam Brady MD Surgery Physician, General and Vascular Surgery Follow up in 2 week(s)  [address] [facility] [place] [address]  [phone]                * Follow-up Care/Patient Instructions: Activity: Activity as tolerated  Diet: Low fat, Low cholesterol  Wound Care: As directed      Condition at Discharge:  Stable  __________________________________________________________________    [place] [facility]  [place]____    Code Status:  Full Code  ___________________________________________________________________    Discharge Exam:  Patient seen and examined by me on discharge day. Physical Exam   Constitutional:       General: She is not in acute distress. Cardiovascular:      Rate and Rhythm: Normal rate and regular rhythm. Pulses: Normal pulses. Heart sounds: Normal heart sounds. Pulmonary:      Effort: Pulmonary effort is normal.      Breath sounds: Normal breath sounds. Abdominal:      General: Bowel sounds are normal.     Palpations: Abdomen is soft. Comments: Left colostomy intact, soft brown stool , stoma pink. Genitourinary:     Vagina:       Comments: Urostomy patent, yellow urine,   Musculoskeletal:         General: Normal range of motion. Skin:     General: Skin is warm and dry. Neurological:      Mental Status: She is oriented to person, place, and time. CONSULTATIONS: ID, Urology, General surgery, Oncology    Significant Diagnostic Studies:   No results found for this or any previous visit (from the past 24 hour(s)). XR ABD (KUB)   Final Result   Nonobstructive bowel gas pattern. CT ABD PELV WO CONT   Final Result   1.   Postsurgical changes from cystectomy with urinary diversion, partial   colectomy with left lower quadrant colostomy, and hysterectomy with transvaginal drainage catheter. 2.  No bowel obstruction. 3.  No drainable fluid collection. 4.  Cholelithiasis. 5.  IVC filter in appropriate position. XR CHEST PORT   Final Result   1. Gastric tube terminates in the body of the stomach, but with proximal   sidehole at the GE junction. Recommend advancement. IR THROMBECTOMY [facility] VEIN W PHARM   Final Result   Bilateral iliofemoral and IVC aspiration thrombectomy with removal of a large   volume of thrombus from both lower extremities and IVC. CT ABD PELV W CONT   Final Result   Rectovaginal fistula is not identified   Extensive bilateral iliac venous thrombus to the level of the IVC filter. Right   lower extremity edema. Cholelithiasis with distended gallbladder   Bilateral hydronephrosis and hydroureter in patient with ileal conduit   Probable small bowel obstruction secondary to adhesion      XR ABD (KUB)   Final Result   1. No significant change in small bowel dilation. Large volume of stool in the   rectum. 2. Cholelithiasis. XR ABD (KUB)   Final Result   Dilated small bowel within the central abdomen is similar to CT from one day   prior. DUPLEX LOWER EXT VENOUS BILAT   Final Result   Addendum (preliminary) 1 of 1      · Thrombus present in the right external iliac vein. · Thrombus present in the right common femoral vein. · Thrombus present in the right femoral vein. · Thrombus present in the right proximal femoral vein. · Thrombus present in the right mid femoral vein. · Thrombus present in the right distal femoral vein. · Thrombus present in the right popliteal vein. · Thrombus present in the right gastrocnemius vein. · Thrombus present in the right peroneal vein. · Thrombus present in the right saphenofemoral junction. · Thrombus present in the left external iliac vein. · Thrombus present in the left common femoral vein. · Thrombus present in the left saphenofemoral junction.    · Thrombus present in the left femoral vein. · Thrombus present in the left proximal femoral vein. · Thrombus present in the left mid femoral vein. · Thrombus present in the left popliteal vein. · Thrombus present in the left distal femoral vein. · Thrombus present in the left gastrocnemius vein. · Thrombus present in the left peroneal vein. This is vascular lab report on Coleman Rojas, patient's YOB: 1965   Date of examination: October 20, 2022   Examination: Duplex lower extremity venous bilateral   Technician: Ms. Wing Sabillon   Clinical history: This is  61years old woman with suspected renal vein    thrombosis   Indication: femoral vein thrombosis. Technique: Bilateral leg venous structures examined using venous duplex    ultrasound with 2D grayscale, color-flow and spectral Doppler analysis. Findings:   Right side:   common femoral vein and saphenofemoral junction is not compressible and    there is hypoechoic filling defect noted. common femoral vein shows no venous flow   Distal external iliac vein also not compressible and that there is no    venous flow   Proximal femoral vein is noncompressible and there is no venous flow   Mid femoral vein is not compressible and there is no venous flow   Distal femoral vein is noncompressible and there is no venous flow   Proximal, mid, distal greater saphenous vein is compressible, there is no    filling defect   Distal popliteal vein is not compressible. Proximal popliteal vein is noncompressible and there is no venous flow   Proximal gastrocnemius vein is not compressible   Proximal small saphenous vein is not compressible   Distal, mid, proximal  peroneal vein is not compressible      Left side:   common femoral vein not compressible and there is no venous flow   Saphenofemoral junction is not compressible and there is no venous flow.    External iliac vein also not compressible there is no venous flow   Proximal femoral vein is noncompressible and there is no venous flow   Mid femoral vein is noncompressible and there is no venous flow   Distal femoral vein is not compressible and there is no venous flow   Proximal, mid, distal greater saphenous vein is noncompressible and there    is hypoechoic filling defect noted. Distal popliteal vein is noncompressible   Proximal popliteal vein is noncompressible and there is no venous flow   Proximal gastrocnemius vein is not compressible and there is no venous    flow   Proximal small saphenous vein is noncompressible and there is no venous    flow   Distal, mid, proximal peroneal vein is not compressible. Impression:   1. Right external iliac vein, common femoral vein, femoral vein,    popliteal vein, gastrocnemius vein, small saphenous vein, and peroneal    vein shows acute venous thrombosis   2. Left external iliac vein, common femoral vein, femoral vein, popliteal    vein, gastrocnemius vein, small saphenous vein, peroneal vein, and    saphenofemoral junction and greater saphenous vein shows acute venous    thrombosis. These  extensive bilateral lower extremity deep vein thrombosis notified    to Dr. Dagoberto Godinez on 10/20/2022 1201 PM.            CT ABD PELV WO CONT   Final Result   Status post cystectomy and hysterectomy with right lower quadrant ileal conduit. Mild bilateral hydroureteronephrosis, evaluation is somewhat limited by the   presence of contrast from previously performed examination. Small bowel   distention with decompressed loops distally concerning for obstruction likely   related to adhesion formation. While this study was done without the use of   intravenous contrast there is questionable right common femoral vein thrombus   with soft tissue edema. CTA CHEST W OR W WO CONT   Final Result   Emphysematous changes. No evidence of pulmonary embolism or acute   abnormality. Cholelithiasis. Bilateral hydronephrosis.  1 cm hypodense lesion   right hepatic lobe cannot be characterized with certainty on the basis of this   examination. Follow-up is recommended to ensure stability. XR CHEST PORT   Final Result   No Acute Disease. XR FOOT LT MIN 3 V   Final Result   Normal study. IR THROMBECTOMY [facility] VEIN W PHARM    (Results Pending)       Discharge: time spent 35 minutes in discharge  Education and counseling.      Signed:  Dajuan Elizondo NP  11/11/2022  9:36 AM

## 2022-11-11 NOTE — PROGRESS NOTES
Patient will discharge home today with [address]. Anticipated SOC date 11-. CM set up patient colostomy supplies through [employer]. Fax [phone]. Also called  [phone]. [employer] only provide monthly supply shipments. Patient will receive supplies in 10 days. Her monthly delivery date will be on the 11th. CM informed patient who states she understands.

## 2022-11-14 ENCOUNTER — TELEPHONE (OUTPATIENT)
Dept: SURGERY | Age: 57
End: 2022-11-14

## 2022-11-14 NOTE — TELEPHONE ENCOUNTER
Returned pt call. She is concerned that her colostomy supplies do not fit her stoma properly. She feels that the nurse helping her at her discharge was not compassionate enough with her situation. She also complained about other things that happened to her while she was at the hospital.   I ask her to call Paula Wildpt care advocate at the hospital. I gave her the contact LINGTV-[phone]. If she doesn't answer please leave her a message. Pt agreed to do this. She has been happy with her care from [address] [place] [address]. She says the nurse has ordered her the proper colostomy supplies. She will let us know if she any more issues.  Thank you

## 2022-11-14 NOTE — TELEPHONE ENCOUNTER
Patient called to confirm her appointment for Nov 28 and she also requested to speak with Dr. Eduarda Fitch. I asked her the reason she stated she just needed to ask him a question.  Please call back when able [phone]

## 2022-11-15 ENCOUNTER — TELEPHONE (OUTPATIENT)
Dept: SURGERY | Age: 57
End: 2022-11-15

## 2022-11-15 NOTE — TELEPHONE ENCOUNTER
Kaylah Hsu from [address] [place] [address] called to see if the patient can participate in showers please call back when able at [phone]

## 2022-11-15 NOTE — TELEPHONE ENCOUNTER
Returned [employer] call from [facility]ill[facility] [address]. I suggested the pt wait a few more days before showering, but we will ask Dr. Shelly Dubose when he gets in office on Thursday. [facility] ask to get an order for [employer] for the pt since she lives in an apartment upstairs. Pt walks but is weak. She was discharged with wheelchair which doesn't help her getting up and down the stairwell. I told Hui Bassett will ask Dr. Shelly Dubose on Thursday when he is in the office(11/17/22)  [facility] verbalized understanding of information and I will call her back on Thursday.  [facility] also ask if rolator order can be sent to [facility]. Thank you

## 2022-11-16 ENCOUNTER — TELEPHONE (OUTPATIENT)
Dept: INTERNAL MEDICINE CLINIC | Age: 57
End: 2022-11-16

## 2022-11-17 NOTE — TELEPHONE ENCOUNTER
Returned call to [facility] after speaking with Dr. Jessica Lopez. Pt can take a shower. And he wrote an order for Fany Rincon, which I faxed over to [address] [place] [address] is aware and she will notify the pt.  Thank you

## 2022-11-18 ENCOUNTER — TELEPHONE (OUTPATIENT)
Dept: INTERNAL MEDICINE CLINIC | Age: 57
End: 2022-11-18

## 2022-11-18 NOTE — TELEPHONE ENCOUNTER
----- Message from [employer] sent at 11/15/2022  3:08 PM EST -----  Subject: Message to Provider    QUESTIONS  Information for Provider? Vivian Monge from [facility], Vivian just wanted to   make sure Pt was in good standing with Dr. Adria Spann and to ask him if he   will sign off on [facility] and nursing.   ---------------------------------------------------------------------------  --------------  [id number]  [phone]; OK to leave message on voicemail  ---------------------------------------------------------------------------  --------------  SCRIPT ANSWERS  Relationship to Patient? Third Party  Third Party Type? Other  Other Third Party Type? [facility]  Representative Name?  Vivian

## 2022-11-22 ENCOUNTER — TELEPHONE (OUTPATIENT)
Dept: SURGERY | Age: 57
End: 2022-11-22

## 2022-11-22 NOTE — TELEPHONE ENCOUNTER
Jimmy Castaneda from [facility] called this morning  She stated the patient called her this morning and said she has a very hard stool that is stuck in her stoma. Can they recommend stool softener or does she need to be seen. Uncomfortable but not painful.  Please call [phone] when able

## 2022-11-22 NOTE — TELEPHONE ENCOUNTER
Returned call from Eryn Perez at [facility]. After speaking with Dr. Armando Simon he recommended pt to take Colace 200mg twice a day with Senokot at bedtime. Also let Eryn Perez know pt has an appointment with Dr. Armando Simon on 11/28/22.

## 2022-11-28 ENCOUNTER — TELEPHONE (OUTPATIENT)
Dept: SURGERY | Age: 57
End: 2022-11-28

## 2022-11-28 ENCOUNTER — OFFICE VISIT (OUTPATIENT)
Dept: SURGERY | Age: 57
End: 2022-11-28
Payer: MEDICAID

## 2022-11-28 VITALS
TEMPERATURE: 97.9 F | HEART RATE: 84 BPM | SYSTOLIC BLOOD PRESSURE: 147 MMHG | BODY MASS INDEX: 19.3 KG/M2 | OXYGEN SATURATION: 98 % | RESPIRATION RATE: 20 BRPM | DIASTOLIC BLOOD PRESSURE: 100 MMHG | WEIGHT: 105.5 LBS

## 2022-11-28 DIAGNOSIS — Z09 POSTOPERATIVE EXAMINATION: Primary | ICD-10-CM

## 2022-11-28 PROCEDURE — 99024 POSTOP FOLLOW-UP VISIT: CPT | Performed by: SURGERY

## 2022-11-28 NOTE — PROGRESS NOTES
Chief Complaint   Patient presents with    Follow-up     colostomy    Visit Vitals  BP (!) 147/100 (BP 1 Location: Left upper arm, BP Patient Position: Sitting, BP Cuff Size: Adult)   Pulse 84   Temp 97.9 °F (36.6 °C) (Temporal)   Resp 20   Wt 105 lb 8 oz (47.9 kg)   LMP  (LMP Unknown)   SpO2 98%   BMI 19.30 kg/m²    1. Have you been to the ER, urgent care clinic since your last visit? Hospitalized since your last visit? No    2. Have you seen or consulted any other health care providers outside of the [address] [place] [address] since your last visit? Include any pap smears or colon screening.  No

## 2022-11-28 NOTE — TELEPHONE ENCOUNTER
Jessica Horton from occupational therapy called today regarding orders for walker from [facility]. She says that there was a fax sent to out office regarding walker. Please let her know if you received this order.  [phone]

## 2022-11-29 NOTE — TELEPHONE ENCOUNTER
Patient called in wanting to know what type of medication Dr. Leslye Morales would be giving her for her appetite.  Please give her a call back when you can [phone]

## 2022-11-29 NOTE — TELEPHONE ENCOUNTER
Patient called back about her getting a walker and something to take for her appetite. Please give her a call when you can.

## 2022-11-30 PROBLEM — N82.3 RECTOVAGINAL FISTULA: Status: ACTIVE | Noted: 2022-11-30

## 2022-11-30 PROBLEM — N76.0 VAGINAL CUFF CELLULITIS: Status: RESOLVED | Noted: 2022-10-20 | Resolved: 2022-11-30

## 2022-11-30 PROBLEM — Z96.0 RETAINED URETERAL STENT: Status: RESOLVED | Noted: 2022-09-19 | Resolved: 2022-11-30

## 2022-11-30 NOTE — TELEPHONE ENCOUNTER
May Sawyer from [facility]  called to speak with Dr. Henry Ingram nurse about Ms. Coronel.  Please give her a call back when able [phone]

## 2022-11-30 NOTE — PROGRESS NOTES
SUBJECTIVE:  Patient is here today postop follow-up. Patient doing well. Patient denies any vaginal discharge. Colostomy is working well as well as a urostomy. Past Medical History:  No date: Anemia      Comment:  pt states had recent blood transfusion 2022  No date: Back pain  No date: Cancer [facility])      Comment:  bladder  No date: DVT (deep venous thrombosis) (HCC)      Comment:  and PE, pt denies lung PE , only leg several years ago  No date: Hepatitis C      Comment:  pt states dx 1 month ago  No date: Liver disease  No date: Presence of IVC filter      Comment:  pt states several years ago  No date: Rheumatoid arthritis ([address]  No date: Sciatic leg pain      Comment:  pt states both legs  No date: Uterine fibroid  Past Surgical History:  10/03/2022: HX APPENDECTOMY  No date: HX  SECTION  2022: HX GI      Comment:  laparoscopic early postop adhesion of the sigmoid colon  2022: HX GI      Comment:  laparoscopic lysis of adhesion  10/31/2022: HX GI      Comment:  rigid sigmoidoscope examination of the rectum  10/31/2022: HX GI      Comment:  distal rectal repair with primary suturing technique  10/03/2022: HX GYN      Comment:  ROBOT ANTERIOR PELVIC EXENTERATION  10/03/2022: HX HYSTERECTOMY  10/03/2022: HX SALPINGO-OOPHORECTOMY; Bilateral  09/15/2022: HX UROLOGICAL      Comment:  CYSTOSCOPY  09/15/2022: HX UROLOGICAL      Comment:  URETHRAL DILATION  09/15/2022: HX UROLOGICAL      Comment:  URETERAL STENT PLACEMENT  09/15/2022: HX UROLOGICAL      Comment:  TRANSURETHRAL RESECTION OF BLADDER TUMOR >2CM  09/15/2022: HX UROLOGICAL;  Bilateral      Comment:  RETROGRADE PYELOGRAM  10/03/2022: HX UROLOGICAL      Comment:  PELVIC LYMPH NODE DISSECTION  10/03/2022: HX UROLOGICAL      Comment:  ILEAL CONDUIT URINARY DIVERSION  10/27/2022: IR THROMBECTOMY MECH VEIN W PHARM  No date: NM CARDIAC SURG PROCEDURE UNLIST      Comment:  stent placement  [unfilled]  Social History    Tobacco Use Smoking status: Former        Years: 10.00        Types: Cigarettes      Smokeless tobacco: Never      Tobacco comments: Quit 15 years ago    Alcohol use: Not Currently    Prior to Admission medications :  Medication apixaban (ELIQUIS) 5 mg tablet, Sig Take 1 Tablet by mouth every twelve (12) hours for 90 days. Indications: blood clot in a deep vein of the extremities, Start Date 11/11/22, End Date 2/9/23, Taking? Yes, Authorizing Provider Linda WELLINGTON NP    Medication sucralfate (CARAFATE) 1 gram tablet, Sig Take 1 Tablet by mouth Before breakfast, lunch, dinner and at bedtime for 30 days. Indications: reflux esophagitis, or inflammation of the esophagus from backflow of stomach acid, Start Date 11/11/22, End Date 12/11/22, Taking? Yes, Authorizing Provider Linda WELLINGTON NP    Medication thiamine mononitrate (B-1) 100 mg tablet, Sig Take 1 Tablet by mouth daily for 30 doses. Indications: deficiency in thiamine or vitamin B1, Start Date 11/12/22, End Date 12/12/22, Taking? Yes, Authorizing Provider Linda WELLINGTON NP    Medication pantoprazole (PROTONIX) 40 mg tablet, Sig Take 1 Tablet by mouth Daily (before breakfast) for 30 days. Indications: gastroesophageal reflux disease, Start Date 11/12/22, End Date 12/12/22, Taking? Yes, Authorizing Provider Linda WELLINGTON NP    Medication ascorbic acid, vitamin C, (VITAMIN C) 500 mg tablet, Sig Take 1 Tablet by mouth daily for 30 days. Indications: inadequate vitamin C, Start Date 11/12/22, End Date 12/12/22, Taking? Yes, Authorizing Provider Linda WELLINGTON NP    Medication megestroL (MEGACE) 20 mg tablet, Sig Take 1 Tablet by mouth three (3) times daily (with meals) for 30 days. , Start Date 11/11/22, End Date 12/11/22, Taking? Yes, Authorizing Provider Linda WELLINGTON NP    Medication diclofenac (VOLTAREN) 1 % gel, Sig Apply 2 g to affected area two (2) times a day., Start Date , End Date , Taking?  Yes, Authorizing Provider Galo, Historical    Medication famotidine (PEPCID) 20 mg tablet, Sig Take 20 mg by mouth daily as needed for Heartburn., Start Date , End Date , Taking? Yes, Authorizing Provider Provider, Historical    Medication acetaminophen (TYLENOL) 650 mg TbER, Sig Take 650 mg by mouth every six to eight (6-8) hours as needed for Pain., Start Date , End Date , Taking? Yes, Authorizing Provider Provider, Historical    Medication ferrous sulfate 325 mg (65 mg iron) tablet, Sig Take 325 mg by mouth daily. , Start Date 9/11/22, End Date , Taking? Yes, Authorizing Provider Provider, Historical    Medication multivitamin with folic acid (One Daily Essential) 400 mcg tab tablet, Sig Take 1 Tablet by mouth daily. , Start Date 3/4/22, End Date , Taking? Yes, Authorizing Provider Dilma Gray MD       -- Bactrim [Sulfamethoprim] -- Swelling    --  Swelling of the lilps   -- Penicillin V Potassium -- Swelling      OBJECTIVE:  BP (!) 147/100 (BP 1 Location: Left upper arm, BP Patient Position: Sitting, BP Cuff Size: Adult)   Pulse 84   Temp 97.9 °F (36.6 °C) (Temporal)   Resp 20   Wt 105 lb 8 oz (47.9 kg)   LMP  (LMP Unknown)   SpO2 98%   BMI 19.30 kg/m²     PT IS PLEASANT AND AWAKE AND ALERT. WOUND EXAM:  Agitated hysterectomy examination of the vaginal vault. There is no stool noted. ASSESSMENT:  Problem List Items Addressed This Visit    None  Visit Diagnoses     Postoperative examination    -  Primary          PLAN:  Patient was advised continue with sitz bath. Patient has a urology follow-up. At some point patient will need thorough examination of the distal rectal examination prior to reversal of the colostomy. Patient will be reassessed in 1 month.

## 2022-12-01 ENCOUNTER — TELEPHONE (OUTPATIENT)
Dept: SURGERY | Age: 57
End: 2022-12-01

## 2022-12-01 NOTE — TELEPHONE ENCOUNTER
Spoke to pt regarding rollator. I informed her I sent the order over and did get a confirmation that they have gotten the order. Hopefully she will get a delivery or at least hear from them soon. Pt says she wants Dr. Dima Huertas to send RX for gummy vitamins to help her appetite and get stronger. I told her I will ask Dr. Dima Huertas and let her know.  I will try to call her back tomorrow-12/02/22

## 2022-12-01 NOTE — TELEPHONE ENCOUNTER
Returned call to [employer] occupational health. . Left message for her saying I have sent in orders for Rollator walker. Any other questions please call office.  Thank you

## 2022-12-01 NOTE — TELEPHONE ENCOUNTER
Ms Jazzy Russo called this morning and wanted to know if Yashira Chaparro had ordered her a walker yet or sent order to [facility] and also wanted to know if Dr Iraj Lucero could send her a prescription for a daily vitamin.  Please call [phone]

## 2022-12-01 NOTE — TELEPHONE ENCOUNTER
Orders placed in [facility] for rollator walker for this pt. Pending signature, they a faxing signature sheet.  Thank you

## 2022-12-02 NOTE — TELEPHONE ENCOUNTER
Ms Paris Nieves called this afternoon and stated that the tylenol that Essence Bernal told her to take wasn't touching the pain, I advised patient that I would let Essence Bernal know and that we were waiting on Dr Leslye Morales that comes in on Monday for the other orders.

## 2022-12-05 ENCOUNTER — TELEPHONE (OUTPATIENT)
Dept: SURGERY | Age: 57
End: 2022-12-05

## 2022-12-05 NOTE — TELEPHONE ENCOUNTER
I spoke to patient to reschedule her appointment. She asked if Dr. Shelly Dubose was planning on ordering an ultrasound of her legs. She said that both legs are swollen.  Please call her [phone]

## 2022-12-05 NOTE — TELEPHONE ENCOUNTER
Patient called in about the medication for her appetite and wants to know if she can shower now. Ms. Jean Pierre Roman wants to know about an ultrasound for her feet.  Please call back when [phone]

## 2022-12-06 NOTE — TELEPHONE ENCOUNTER
Oscar Moser  from [facility] sent called in stating that the medication that Ms. Coronel was prescribed is not effective her stool is still coming out to hard.  Please call back when able [phone]

## 2022-12-06 NOTE — TELEPHONE ENCOUNTER
Ms Crow Lion called again this morning and asked if Belkys De La Paz knew when her walker would be delivered. Also asked for pain medication and gummies.  If needed please call [phone]

## 2022-12-06 NOTE — TELEPHONE ENCOUNTER
Ms Adolfo Yao called this afternoon and told me that she was with her home health nurse and they called [facility] and they told them they didn't have an order for the walker, I spoke w/ Elan Hard and advised the patient that the order from Dr Evie Velarde for the walker was being mailed to her home.

## 2022-12-07 ENCOUNTER — OFFICE VISIT (OUTPATIENT)
Dept: UROLOGY | Age: 57
End: 2022-12-07
Payer: MEDICAID

## 2022-12-07 VITALS — WEIGHT: 105 LBS | HEIGHT: 62 IN | BODY MASS INDEX: 19.32 KG/M2

## 2022-12-07 DIAGNOSIS — N82.3 RECTOVAGINAL FISTULA: ICD-10-CM

## 2022-12-07 DIAGNOSIS — R19.5 HARD STOOL: ICD-10-CM

## 2022-12-07 DIAGNOSIS — C67.9 MALIGNANT NEOPLASM OF URINARY BLADDER, UNSPECIFIED SITE (HCC): Primary | ICD-10-CM

## 2022-12-07 DIAGNOSIS — D63.0 ANEMIA IN NEOPLASTIC DISEASE: ICD-10-CM

## 2022-12-07 DIAGNOSIS — N13.30 HYDRONEPHROSIS OF RIGHT KIDNEY: ICD-10-CM

## 2022-12-07 DIAGNOSIS — Z86.718 HISTORY OF BLOOD CLOTS: ICD-10-CM

## 2022-12-07 PROBLEM — D62 ANEMIA DUE TO ACUTE BLOOD LOSS: Status: RESOLVED | Noted: 2022-09-13 | Resolved: 2022-12-07

## 2022-12-07 PROCEDURE — 99024 POSTOP FOLLOW-UP VISIT: CPT | Performed by: UROLOGY

## 2022-12-07 RX ORDER — PSYLLIUM SEED
1 PACKET (EA) ORAL DAILY
Qty: 1 EACH | Refills: 3 | Status: SHIPPED | OUTPATIENT
Start: 2022-12-07

## 2022-12-07 NOTE — ASSESSMENT & PLAN NOTE
H/o IVC filter. DVT and thrombectomy 10/27/22. On Eliquis bid. She reports more leg swelling. Ultrasound ordered.

## 2022-12-07 NOTE — PROGRESS NOTES
Chief Complaint   Patient presents with    Post OP Follow Up    Bladder Cancer     1. Have you been to the ER, urgent care clinic since your last visit? Hospitalized since your last visit? No    2. Have you seen or consulted any other health care providers outside of the [address] [place] [address] since your last visit? Include any pap smears or colon screening.  No  Visit Vitals  Ht 5' 2\" (1.575 m)   Wt 105 lb (47.6 kg)   LMP  (LMP Unknown)   BMI 19.20 kg/m²

## 2022-12-07 NOTE — ASSESSMENT & PLAN NOTE
She notes hard stools. She takes docusate and senna. She thinks she is eating fiber and drinking water. She is on iron tablets. I discussed metamucil.

## 2022-12-07 NOTE — LETTER
12/7/2022    Patient: Astrid Chung   YOB: 1965   Date of Visit: 12/7/2022     Jaquelin Trimble MD  [address] [place] [address] Dr Kathy Ge    Dear Jaquelin Trimble MD,      Thank you for referring Ms. Torres Coronel to [facility] for evaluation. My notes for this consultation are attached. If you have questions, please do not hesitate to call me. I look forward to following your patient along with you.       Sincerely,    Homar Mcbride MD

## 2022-12-07 NOTE — PROGRESS NOTES
HISTORY OF PRESENT ILLNESS  Luann Gonzalez is a 61 y.o. female. has a past medical history of Anemia, Back pain, Cancer ([address] DVT (deep venous thrombosis) (HCC), Hepatitis C, Liver disease, Presence of IVC filter, Rheumatoid arthritis ([address] Sciatic leg pain, and Uterine fibroid. has a past surgical history that includes hx  section; hx urological (09/15/2022); hx urological (09/15/2022); hx urological (09/15/2022); hx urological (09/15/2022); hx urological (Bilateral, 09/15/2022); pr cardiac surg procedure unlist; hx hysterectomy (10/03/2022); hx salpingo-oophorectomy (Bilateral, 10/03/2022); hx gyn (10/03/2022); hx appendectomy (10/03/2022); hx urological (10/03/2022); hx urological (10/03/2022); ir thrombectomy [facility] vein w pharm (10/27/2022); hx gi (2022); hx gi (2022); hx gi (10/31/2022); and hx gi (10/31/2022). Chief Complaint   Patient presents with    Post OP Follow Up    Bladder Cancer     HPI  Chronic Conditions Addressed Today       1. History of blood clots     Overview      DVT hx.   Extensive bilateral iliac venous thrombus to the level of the IVC filter. Right lower extremity edema on CT 10/23/22. Now s/p thrombectomy in IR on 10/27/22. Current Assessment & Plan      H/o IVC filter. DVT and thrombectomy 10/27/22. On Eliquis bid. She reports more leg swelling. Ultrasound ordered. 2. Bladder cancer ([address] - Primary     Overview      Presented with right hydro and bladder mass. S/p cystectomy/ant pelvic exent 10/3/22. Invasive squamous cell carcinoma, G2, 4.5 cm in greatest   dimension, with multifocal invasion into perivesical and periureteral soft tissue. [id number]          Current Assessment & Plan      S/p cystectomy 10/3/22 for pT3b squamous bladder cancer. Refer to medical oncology for discussion of adjuvant therapy.           Relevant Orders     METABOLIC PANEL, COMPREHENSIVE     CBC WITH AUTOMATED DIFF     REFERRAL TO ONCOLOGY    3. Hydronephrosis of right kidney     Overview        Right hydronephrosis due to obstructing bladder tumor. Likely chronic changes to persist.          4. Anemia in neoplastic disease     Current Assessment & Plan      Chronic anemia. Check CBC          Relevant Orders     METABOLIC PANEL, COMPREHENSIVE     CBC WITH AUTOMATED DIFF    5. Rectovaginal fistula     Overview      Rectovaginal fistula suspected after cystectomy. Not seen on CT 10/23/22. She had continued vaginal drainage with stool-like appearance. 10/31/22: return to OR for vaginal wash out, suspected cuff dehiscence (confirmed). She is s/p rectovaginal fistula closure with drain placement and distal rectal repair by Dr. Mery Novak. Now s/p laparoscopic loop colostomy with Dr. Mery Novak. Current Assessment & Plan       S/p repair and colostomy. Scant discharge. Follow up with Dr Mery Novak          Relevant Medications     psyllium seed-sucrose (Metamucil, sugar,) powd    6. Hard stool     Current Assessment & Plan      She notes hard stools. She takes docusate and senna. She thinks she is eating fiber and drinking water. She is on iron tablets. I discussed metamucil. Relevant Medications     psyllium seed-sucrose (Metamucil, sugar,) powd       Past Medical History:    PMHx (including negatives):  has a past medical history of Anemia, Back pain, Cancer ([address] DVT (deep venous thrombosis) ([address] Hepatitis C, Liver disease, Presence of IVC filter, Rheumatoid arthritis ([address] Sciatic leg pain, and Uterine fibroid.    PSurgHx:  has a past surgical history that includes hx  section; hx urological (09/15/2022); hx urological (09/15/2022); hx urological (09/15/2022); hx urological (09/15/2022); hx urological (Bilateral, 09/15/2022); pr cardiac surg procedure unlist; hx hysterectomy (10/03/2022); hx salpingo-oophorectomy (Bilateral, 10/03/2022); hx gyn (10/03/2022); hx appendectomy (10/03/2022); hx urological (10/03/2022); hx urological (10/03/2022); ir thrombectomy [facility] vein w pharm (10/27/2022); hx gi (11/02/2022); hx gi (11/02/2022); hx gi (10/31/2022); and hx gi (10/31/2022). PSocHx:  reports that she has quit smoking. Her smoking use included cigarettes. She has never used smokeless tobacco. She reports that she does not currently use alcohol. She reports that she does not use drugs. FamilyHX:   Family History   Problem Relation Age of Onset    Cancer Mother     Lung Cancer Mother     Heart Disease Father     Hypertension Sister     Cancer Maternal Aunt     Breast Cancer Maternal Aunt      ROS  Physical Exam  Vitals reviewed. Constitutional:       General: She is not in acute distress. Appearance: Normal appearance. She is obese. She is not ill-appearing, toxic-appearing or diaphoretic. HENT:      Head: Normocephalic and atraumatic. Nose: Nose normal.   Eyes:      Conjunctiva/sclera: Conjunctivae normal.      Pupils: Pupils are equal, round, and reactive to light. Pulmonary:      Effort: Pulmonary effort is normal. No respiratory distress. Breath sounds: Normal breath sounds. Abdominal:      Comments: RLQ urostomy ok, LLQ colostomy   Musculoskeletal:      Cervical back: Normal range of motion. Neurological:      General: No focal deficit present. Mental Status: She is alert and oriented to person, place, and time. Psychiatric:         Mood and Affect: Mood normal.     Allergies   Allergen Reactions    Bactrim [Sulfamethoprim] Swelling     Swelling of the lilps    Penicillin V Potassium Swelling     Current Outpatient Medications   Medication Sig Dispense Refill    psyllium seed-sucrose (Metamucil, sugar,) powd Take 1 Scoop by mouth daily. 1 Each 3    apixaban (ELIQUIS) 5 mg tablet Take 1 Tablet by mouth every twelve (12) hours for 90 days.  Indications: blood clot in a deep vein of the extremities 180 Tablet 0    sucralfate (CARAFATE) 1 gram tablet Take 1 Tablet by mouth Before breakfast, lunch, dinner and at bedtime for 30 days. Indications: reflux esophagitis, or inflammation of the esophagus from backflow of stomach acid 120 Tablet 0    thiamine mononitrate (B-1) 100 mg tablet Take 1 Tablet by mouth daily for 30 doses. Indications: deficiency in thiamine or vitamin B1 30 Tablet 0    pantoprazole (PROTONIX) 40 mg tablet Take 1 Tablet by mouth Daily (before breakfast) for 30 days. Indications: gastroesophageal reflux disease 30 Tablet 0    ascorbic acid, vitamin C, (VITAMIN C) 500 mg tablet Take 1 Tablet by mouth daily for 30 days. Indications: inadequate vitamin C 30 Tablet 0    megestroL (MEGACE) 20 mg tablet Take 1 Tablet by mouth three (3) times daily (with meals) for 30 days. 90 Tablet 0    diclofenac (VOLTAREN) 1 % gel Apply 2 g to affected area two (2) times a day. famotidine (PEPCID) 20 mg tablet Take 20 mg by mouth daily as needed for Heartburn. acetaminophen (TYLENOL) 650 mg TbER Take 650 mg by mouth every six to eight (6-8) hours as needed for Pain. ferrous sulfate 325 mg (65 mg iron) tablet Take 325 mg by mouth daily. multivitamin with folic acid (One Daily Essential) 400 mcg tab tablet Take 1 Tablet by mouth daily. 90 Tablet 0      Results for orders placed or performed during the hospital encounter of 10/19/22   CULTURE, URINE    Specimen: Urine   Result Value Ref Range    Special Requests: No Special Requests  Reflexed from [id number]        [place] Count >100,000  colonies/ml        Culture result: Candida ciferrii (A)     COVID-19 RAPID TEST   Result Value Ref Range    COVID-19 rapid test Not Detected Not Detected     CULTURE, WOUND W GRAM STAIN    Specimen: Cervical/vaginal swab;  Wound   Result Value Ref Range    Special Requests: No Special Requests      GRAM STAIN Rare WBCs seen      GRAM STAIN 1+ apparent Gram Positive Cocci in pairs      GRAM STAIN Few apparent Gram Negative Rods      Culture result: Light  Mixed skin heriberto isolated       MRSA SCREEN - PCR (NASAL)   Result Value Ref Range    MRSA by PCR, Nasal Not Detected Not Detected     CBC WITH AUTOMATED DIFF   Result Value Ref Range    WBC 9.8 3.6 - 11.0 K/uL    RBC 2.84 (L) 3.80 - 5.20 M/uL    HGB 7.7 (L) 11.5 - 16.0 g/dL    HCT 24.3 (L) 35.0 - 47.0 %    MCV 85.6 80.0 - 99.0 FL    MCH 27.1 26.0 - 34.0 PG    MCHC 31.7 30.0 - 36.5 g/dL    RDW 15.9 (H) 11.5 - 14.5 %    PLATELET 113 537 - 240 K/uL    MPV 9.2 8.9 - 12.9 FL    NRBC 0.0 0.0  WBC    ABSOLUTE NRBC 0.00 0.00 - 0.01 K/uL    NEUTROPHILS 82 (H) 32 - 75 %    LYMPHOCYTES 11 (L) 12 - 49 %    MONOCYTES 7 5 - 13 %    EOSINOPHILS 0 0 - 7 %    BASOPHILS 0 0 - 1 %    IMMATURE GRANULOCYTES 0 %    ABS. NEUTROPHILS 8.0 1.8 - 8.0 K/UL    ABS. LYMPHOCYTES 1.1 0.8 - 3.5 K/UL    ABS. MONOCYTES 0.7 0.0 - 1.0 K/UL    ABS. EOSINOPHILS 0.0 0.0 - 0.4 K/UL    ABS. BASOPHILS 0.0 0.0 - 0.1 K/UL    ABS. IMM. GRANS. 0.0 K/UL    DF Manual      RBC COMMENTS Anisocytosis  1+       METABOLIC PANEL, COMPREHENSIVE   Result Value Ref Range    Sodium 129 (L) 136 - 145 mmol/L    Potassium 3.9 3.5 - 5.1 mmol/L    Chloride 102 97 - 108 mmol/L    CO2 17 (L) 21 - 32 mmol/L    Anion gap 10 5 - 15 mmol/L    Glucose 98 65 - 100 mg/dL    BUN 9 6 - 20 mg/dL    Creatinine 0.70 0.55 - 1.02 mg/dL    BUN/Creatinine ratio 13 12 - 20      eGFR >60 >60 ml/min/1.73m2    Calcium 9.0 8.5 - 10.1 mg/dL    Bilirubin, total 1.0 0.2 - 1.0 mg/dL    AST (SGOT) 67 (H) 15 - 37 U/L    ALT (SGPT) 30 12 - 78 U/L    Alk.  phosphatase 120 (H) 45 - 117 U/L    Protein, total 9.2 (H) 6.4 - 8.2 g/dL    Albumin 1.8 (L) 3.5 - 5.0 g/dL    Globulin 7.4 (H) 2.0 - 4.0 g/dL    A-G Ratio 0.2 (L) 1.1 - 2.2     URINALYSIS W/ REFLEX CULTURE    Specimen: Urine   Result Value Ref Range    Color Yellow      Appearance Turbid (A) Clear      Specific gravity 1.023 1.003 - 1.030      pH (UA) 6.0 5.0 - 8.0      Protein 100 (A) Negative mg/dL    Glucose Negative Negative mg/dL    Ketone 5 (A) Negative mg/dL    Bilirubin Negative Negative      Blood Moderate (A) Negative Urobilinogen 0.1 0.1 - 1.0 EU/dL    Nitrites Negative Negative      Leukocyte Esterase Large (A) Negative      WBC >100 (H) 0 - 4 /hpf    RBC 20-50 0 - 5 /hpf    Bacteria Negative Negative /hpf    UA:UC IF INDICATED Urine Culture Ordered (A) Culture not indicated by UA result      Mucus 2+ (A) Negative /lpf    Budding yeast Present (A) Negative     D DIMER   Result Value Ref Range    D DIMER >20.00 (H) <0.50 ug/ml(FEU)   TROPONIN-HIGH SENSITIVITY   Result Value Ref Range    Troponin-High Sensitivity 4 0 - 51 ng/L   POTASSIUM   Result Value Ref Range    Potassium 3.8 3.5 - 5.1 mmol/L   HEPATIC FUNCTION PANEL   Result Value Ref Range    Protein, total 8.0 6.4 - 8.2 g/dL    Albumin 1.5 (L) 3.5 - 5.0 g/dL    Globulin 6.5 (H) 2.0 - 4.0 g/dL    A-G Ratio 0.2 (L) 1.1 - 2.2      Bilirubin, total 0.8 0.2 - 1.0 mg/dL    Bilirubin, direct 0.2 0.0 - 0.2 mg/dL    Alk.  phosphatase 99 45 - 117 U/L    AST (SGOT) 54 (H) 15 - 37 U/L    ALT (SGPT) 26 12 - 78 U/L   NT-PRO BNP   Result Value Ref Range    NT pro- (H) <125 pg/mL   OCCULT BLOOD, STOOL   Result Value Ref Range    Occult Blood,day 1 Negative Negative      Day 1 date: [phone]     METABOLIC PANEL, BASIC   Result Value Ref Range    Sodium 138 136 - 145 mmol/L    Potassium 3.6 3.5 - 5.1 mmol/L    Chloride 112 (H) 97 - 108 mmol/L    CO2 17 (L) 21 - 32 mmol/L    Anion gap 9 5 - 15 mmol/L    Glucose 70 65 - 100 mg/dL    BUN 8 6 - 20 mg/dL    Creatinine 0.51 (L) 0.55 - 1.02 mg/dL    BUN/Creatinine ratio 16 12 - 20      eGFR >60 >60 ml/min/1.73m2    Calcium 8.0 (L) 8.5 - 10.1 mg/dL   CBC W/O DIFF   Result Value Ref Range    WBC 7.2 3.6 - 11.0 K/uL    RBC 2.76 (L) 3.80 - 5.20 M/uL    HGB 7.5 (L) 11.5 - 16.0 g/dL    HCT 23.7 (L) 35.0 - 47.0 %    MCV 85.9 80.0 - 99.0 FL    MCH 27.2 26.0 - 34.0 PG    MCHC 31.6 30.0 - 36.5 g/dL    RDW 16.8 (H) 11.5 - 14.5 %    PLATELET 354 645 - 554 K/uL    MPV 9.7 8.9 - 12.9 FL    NRBC 0.0 0.0  WBC    ABSOLUTE NRBC 0.00 0.00 - 0.01 K/uL PTT   Result Value Ref Range    aPTT 31.2 21.2 - 34.1 sec    aPTT, therapeutic range   82 - 109 sec   CBC WITH AUTOMATED DIFF   Result Value Ref Range    WBC 6.1 3.6 - 11.0 K/uL    RBC 2.82 (L) 3.80 - 5.20 M/uL    HGB 7.7 (L) 11.5 - 16.0 g/dL    HCT 24.3 (L) 35.0 - 47.0 %    MCV 86.2 80.0 - 99.0 FL    MCH 27.3 26.0 - 34.0 PG    MCHC 31.7 30.0 - 36.5 g/dL    RDW 16.8 (H) 11.5 - 14.5 %    PLATELET 443 558 - 182 K/uL    MPV 9.0 8.9 - 12.9 FL    NRBC 0.0 0.0  WBC    ABSOLUTE NRBC 0.00 0.00 - 0.01 K/uL    NEUTROPHILS 75 32 - 75 %    LYMPHOCYTES 20 12 - 49 %    MONOCYTES 5 5 - 13 %    EOSINOPHILS 0 0 - 7 %    BASOPHILS 0 0 - 1 %    IMMATURE GRANULOCYTES 0 %    ABS. NEUTROPHILS 4.6 1.8 - 8.0 K/UL    ABS. LYMPHOCYTES 1.2 0.8 - 3.5 K/UL    ABS. MONOCYTES 0.3 0.0 - 1.0 K/UL    ABS. EOSINOPHILS 0.0 0.0 - 0.4 K/UL    ABS. BASOPHILS 0.0 0.0 - 0.1 K/UL    ABS. IMM. GRANS. 0.0 K/UL    DF Manual      RBC COMMENTS Microcytosis  1+       CBC WITH AUTOMATED DIFF   Result Value Ref Range    WBC 6.2 3.6 - 11.0 K/uL    RBC 2.73 (L) 3.80 - 5.20 M/uL    HGB 7.5 (L) 11.5 - 16.0 g/dL    HCT 23.5 (L) 35.0 - 47.0 %    MCV 86.1 80.0 - 99.0 FL    MCH 27.5 26.0 - 34.0 PG    MCHC 31.9 30.0 - 36.5 g/dL    RDW 16.7 (H) 11.5 - 14.5 %    PLATELET 082 567 - 256 K/uL    MPV 9.1 8.9 - 12.9 FL    NRBC 0.0 0.0  WBC    ABSOLUTE NRBC 0.00 0.00 - 0.01 K/uL    NEUTROPHILS 83 (H) 32 - 75 %    LYMPHOCYTES 9 (L) 12 - 49 %    MONOCYTES 5 5 - 13 %    EOSINOPHILS 0 0 - 7 %    BASOPHILS 0 0 - 1 %    OTHER CELL 3 %    IMMATURE GRANULOCYTES 0 %    ABS. NEUTROPHILS 5.1 1.8 - 8.0 K/UL    ABS. LYMPHOCYTES 0.6 (L) 0.8 - 3.5 K/UL    ABS. MONOCYTES 0.3 0.0 - 1.0 K/UL    ABS. EOSINOPHILS 0.0 0.0 - 0.4 K/UL    ABS. BASOPHILS 0.0 0.0 - 0.1 K/UL    ABS. IMM.  GRANS. 0.0 K/UL    DF Manual      RBC COMMENTS Anisocytosis  1+       METABOLIC PANEL, BASIC   Result Value Ref Range    Sodium 138 136 - 145 mmol/L    Potassium 3.2 (L) 3.5 - 5.1 mmol/L    Chloride 114 (H) 97 - 108 mmol/L    CO2 18 (L) 21 - 32 mmol/L    Anion gap 6 5 - 15 mmol/L    Glucose 92 65 - 100 mg/dL    BUN 5 (L) 6 - 20 mg/dL    Creatinine 0.55 0.55 - 1.02 mg/dL    BUN/Creatinine ratio 9 (L) 12 - 20      eGFR >60 >60 ml/min/1.73m2    Calcium 8.3 (L) 8.5 - 10.1 mg/dL   PTT   Result Value Ref Range    aPTT 28.8 21.2 - 34.1 sec    aPTT, therapeutic range   82 - 109 sec   PTT   Result Value Ref Range    aPTT 28.9 21.2 - 34.1 sec    aPTT, therapeutic range   82 - 109 sec   PTT   Result Value Ref Range    aPTT 33.3 21.2 - 34.1 sec    aPTT, therapeutic range   82 - 109 sec   PTT   Result Value Ref Range    aPTT 31.2 21.2 - 34.1 sec    aPTT, therapeutic range   82 - 109 sec   PTT   Result Value Ref Range    aPTT 39.3 (H) 21.2 - 34.1 sec    aPTT, therapeutic range   82 - 109 sec   CBC WITH AUTOMATED DIFF   Result Value Ref Range    WBC 4.2 3.6 - 11.0 K/uL    RBC 2.70 (L) 3.80 - 5.20 M/uL    HGB 7.2 (L) 11.5 - 16.0 g/dL    HCT 23.2 (L) 35.0 - 47.0 %    MCV 85.9 80.0 - 99.0 FL    MCH 26.7 26.0 - 34.0 PG    MCHC 31.0 30.0 - 36.5 g/dL    RDW 16.5 (H) 11.5 - 14.5 %    PLATELET 685 348 - 560 K/uL    MPV 9.6 8.9 - 12.9 FL    NRBC 0.0 0.0  WBC    ABSOLUTE NRBC 0.00 0.00 - 0.01 K/uL    NEUTROPHILS 77 (H) 32 - 75 %    BAND NEUTROPHILS 4 0 - 6 %    LYMPHOCYTES 14 12 - 49 %    MONOCYTES 3 (L) 5 - 13 %    EOSINOPHILS 0 0 - 7 %    BASOPHILS 0 0 - 1 %    MYELOCYTES 1 (H) 0 %    OTHER CELL 1 %    IMMATURE GRANULOCYTES 0 %    ABS. NEUTROPHILS 3.4 1.8 - 8.0 K/UL    ABS. LYMPHOCYTES 0.6 (L) 0.8 - 3.5 K/UL    ABS. MONOCYTES 0.1 0.0 - 1.0 K/UL    ABS. EOSINOPHILS 0.0 0.0 - 0.4 K/UL    ABS. BASOPHILS 0.0 0.0 - 0.1 K/UL    ABS. IMM.  GRANS. 0.0 K/UL    DF Manual      RBC COMMENTS Anisocytosis  1+        RBC COMMENTS Microcytosis  1+        RBC COMMENTS Polychromasia  1+        RBC COMMENTS Hypochromia  1+       PTT   Result Value Ref Range    aPTT 41.1 (H) 21.2 - 34.1 sec    aPTT, therapeutic range   82 - 109 sec   PTT   Result Value Ref Range    aPTT 32.9 21.2 - 34.1 sec    aPTT, therapeutic range   82 - 109 sec   IRON PROFILE   Result Value Ref Range    Iron 12 (L) 35 - 150 ug/dL    TIBC 74 (L) 250 - 450 ug/dL    Iron % saturation 16 (L) 20 - 50 %   VITAMIN B12 & FOLATE   Result Value Ref Range    Vitamin B12 482 193 - 986 pg/mL    Folate 19.0 5.0 - 21.0 ng/mL   CBC WITH AUTOMATED DIFF   Result Value Ref Range    WBC 3.9 3.6 - 11.0 K/uL    RBC 2.67 (L) 3.80 - 5.20 M/uL    HGB 7.1 (L) 11.5 - 16.0 g/dL    HCT 23.6 (L) 35.0 - 47.0 %    MCV 88.4 80.0 - 99.0 FL    MCH 26.6 26.0 - 34.0 PG    MCHC 30.1 30.0 - 36.5 g/dL    RDW 16.7 (H) 11.5 - 14.5 %    PLATELET 237 731 - 688 K/uL    MPV 9.8 8.9 - 12.9 FL    NRBC 0.0 0.0  WBC    ABSOLUTE NRBC 0.00 0.00 - 0.01 K/uL    NEUTROPHILS 72 32 - 75 %    LYMPHOCYTES 21 12 - 49 %    MONOCYTES 6 5 - 13 %    EOSINOPHILS 0 0 - 7 %    BASOPHILS 0 0 - 1 %    OTHER CELL 1 %    IMMATURE GRANULOCYTES 0 %    ABS. NEUTROPHILS 2.8 1.8 - 8.0 K/UL    ABS. LYMPHOCYTES 0.8 0.8 - 3.5 K/UL    ABS. MONOCYTES 0.2 0.0 - 1.0 K/UL    ABS. EOSINOPHILS 0.0 0.0 - 0.4 K/UL    ABS. BASOPHILS 0.0 0.0 - 0.1 K/UL    ABS. IMM. GRANS. 0.0 K/UL    DF Manual      PLATELET COMMENTS Large Platelets      RBC COMMENTS Anisocytosis  1+       METABOLIC PANEL, COMPREHENSIVE   Result Value Ref Range    Sodium 140 136 - 145 mmol/L    Potassium 3.0 (L) 3.5 - 5.1 mmol/L    Chloride 114 (H) 97 - 108 mmol/L    CO2 23 21 - 32 mmol/L    Anion gap 3 (L) 5 - 15 mmol/L    Glucose 82 65 - 100 mg/dL    BUN 5 (L) 6 - 20 mg/dL    Creatinine 0.61 0.55 - 1.02 mg/dL    BUN/Creatinine ratio 8 (L) 12 - 20      eGFR >60 >60 ml/min/1.73m2    Calcium 8.2 (L) 8.5 - 10.1 mg/dL    Bilirubin, total 0.5 0.2 - 1.0 mg/dL    AST (SGOT) 33 15 - 37 U/L    ALT (SGPT) 12 12 - 78 U/L    Alk.  phosphatase 89 45 - 117 U/L    Protein, total 7.6 6.4 - 8.2 g/dL    Albumin 1.4 (L) 3.5 - 5.0 g/dL    Globulin 6.2 (H) 2.0 - 4.0 g/dL    A-G Ratio 0.2 (L) 1.1 - 2.2     PTT   Result Value Ref Range    aPTT 38.6 (H) 21.2 - 34.1 sec    aPTT, therapeutic range   82 - 109 sec   PTT   Result Value Ref Range    aPTT 40.7 (H) 21.2 - 34.1 sec    aPTT, therapeutic range   82 - 109 sec   PROCALCITONIN   Result Value Ref Range    Procalcitonin <0.05 (H) 0 ng/mL   C REACTIVE PROTEIN, QT   Result Value Ref Range    C-Reactive protein 15.00 (H) 0.00 - 0.60 mg/dL   PTT   Result Value Ref Range    aPTT 35.8 (H) 21.2 - 34.1 sec    aPTT, therapeutic range   82 - 109 sec   PTT   Result Value Ref Range    aPTT 38.8 (H) 21.2 - 34.1 sec    aPTT, therapeutic range   82 - 109 sec   CBC WITH AUTOMATED DIFF   Result Value Ref Range    WBC 4.1 3.6 - 11.0 K/uL    RBC 2.83 (L) 3.80 - 5.20 M/uL    HGB 7.5 (L) 11.5 - 16.0 g/dL    HCT 24.6 (L) 35.0 - 47.0 %    MCV 86.9 80.0 - 99.0 FL    MCH 26.5 26.0 - 34.0 PG    MCHC 30.5 30.0 - 36.5 g/dL    RDW 16.5 (H) 11.5 - 14.5 %    PLATELET 147 997 - 557 K/uL    MPV 9.4 8.9 - 12.9 FL    NRBC 0.0 0.0  WBC    ABSOLUTE NRBC 0.00 0.00 - 0.01 K/uL    NEUTROPHILS 70 32 - 75 %    LYMPHOCYTES 22 12 - 49 %    MONOCYTES 6 5 - 13 %    EOSINOPHILS 0 0 - 7 %    BASOPHILS 0 0 - 1 %    IMMATURE GRANULOCYTES 2 (H) 0 - 0.5 %    ABS. NEUTROPHILS 2.8 1.8 - 8.0 K/UL    ABS. LYMPHOCYTES 0.9 0.8 - 3.5 K/UL    ABS. MONOCYTES 0.2 0.0 - 1.0 K/UL    ABS. EOSINOPHILS 0.0 0.0 - 0.4 K/UL    ABS. BASOPHILS 0.0 0.0 - 0.1 K/UL    ABS. IMM. GRANS. 0.1 (H) 0.00 - 0.04 K/UL    DF AUTOMATED     METABOLIC PANEL, COMPREHENSIVE   Result Value Ref Range    Sodium 139 136 - 145 mmol/L    Potassium 3.1 (L) 3.5 - 5.1 mmol/L    Chloride 111 (H) 97 - 108 mmol/L    CO2 22 21 - 32 mmol/L    Anion gap 6 5 - 15 mmol/L    Glucose 72 65 - 100 mg/dL    BUN 7 6 - 20 mg/dL    Creatinine 0.54 (L) 0.55 - 1.02 mg/dL    BUN/Creatinine ratio 13 12 - 20      eGFR >60 >60 ml/min/1.73m2    Calcium 8.6 8.5 - 10.1 mg/dL    Bilirubin, total 0.5 0.2 - 1.0 mg/dL    AST (SGOT) 33 15 - 37 U/L    ALT (SGPT) 14 12 - 78 U/L    Alk.  phosphatase 87 45 - 117 U/L Protein, total 7.4 6.4 - 8.2 g/dL    Albumin 1.4 (L) 3.5 - 5.0 g/dL    Globulin 6.0 (H) 2.0 - 4.0 g/dL    A-G Ratio 0.2 (L) 1.1 - 2.2     PTT   Result Value Ref Range    aPTT 39.3 (H) 21.2 - 34.1 sec    aPTT, therapeutic range   82 - 109 sec   PTT   Result Value Ref Range    aPTT 36.9 (H) 21.2 - 34.1 sec    aPTT, therapeutic range   sec   PTT   Result Value Ref Range    aPTT 48.3 (H) 21.2 - 34.1 sec    aPTT, therapeutic range   82 - 109 sec   PTT   Result Value Ref Range    aPTT 61.0 (H) 21.2 - 34.1 sec    aPTT, therapeutic range   82 - 109 sec   CBC WITH AUTOMATED DIFF   Result Value Ref Range    WBC 4.0 3.6 - 11.0 K/uL    RBC 2.85 (L) 3.80 - 5.20 M/uL    HGB 7.7 (L) 11.5 - 16.0 g/dL    HCT 24.9 (L) 35.0 - 47.0 %    MCV 87.4 80.0 - 99.0 FL    MCH 27.0 26.0 - 34.0 PG    MCHC 30.9 30.0 - 36.5 g/dL    RDW 16.8 (H) 11.5 - 14.5 %    PLATELET 084 684 - 967 K/uL    MPV 9.5 8.9 - 12.9 FL    NRBC 0.0 0.0  WBC    ABSOLUTE NRBC 0.00 0.00 - 0.01 K/uL    NEUTROPHILS 66 32 - 75 %    BAND NEUTROPHILS 6 0 - 6 %    LYMPHOCYTES 12 12 - 49 %    MONOCYTES 11 5 - 13 %    EOSINOPHILS 0 0 - 7 %    BASOPHILS 0 0 - 1 %    METAMYELOCYTES 3 (H) 0 %    MYELOCYTES 1 (H) 0 %    OTHER CELL 1 %    IMMATURE GRANULOCYTES 0 %    ABS. NEUTROPHILS 2.9 1.8 - 8.0 K/UL    ABS. LYMPHOCYTES 0.5 (L) 0.8 - 3.5 K/UL    ABS. MONOCYTES 0.4 0.0 - 1.0 K/UL    ABS. EOSINOPHILS 0.0 0.0 - 0.4 K/UL    ABS. BASOPHILS 0.0 0.0 - 0.1 K/UL    ABS. IMM.  GRANS. 0.0 K/UL    DF Manual      RBC COMMENTS Anisocytosis  1+        WBC COMMENTS Toxic Granulation     METABOLIC PANEL, COMPREHENSIVE   Result Value Ref Range    Sodium 142 136 - 145 mmol/L    Potassium 3.4 (L) 3.5 - 5.1 mmol/L    Chloride 112 (H) 97 - 108 mmol/L    CO2 23 21 - 32 mmol/L    Anion gap 7 5 - 15 mmol/L    Glucose 77 65 - 100 mg/dL    BUN 5 (L) 6 - 20 mg/dL    Creatinine 0.52 (L) 0.55 - 1.02 mg/dL    BUN/Creatinine ratio 10 (L) 12 - 20      eGFR >60 >60 ml/min/1.73m2    Calcium 8.7 8.5 - 10.1 mg/dL Bilirubin, total 0.6 0.2 - 1.0 mg/dL    AST (SGOT) 29 15 - 37 U/L    ALT (SGPT) 8 (L) 12 - 78 U/L    Alk. phosphatase 93 45 - 117 U/L    Protein, total 7.7 6.4 - 8.2 g/dL    Albumin 1.4 (L) 3.5 - 5.0 g/dL    Globulin 6.3 (H) 2.0 - 4.0 g/dL    A-G Ratio 0.2 (L) 1.1 - 2.2     PTT   Result Value Ref Range    aPTT 50.0 (H) 21.2 - 34.1 sec    aPTT, therapeutic range   82 - 109 sec   PTT   Result Value Ref Range    aPTT 25.3 21.2 - 34.1 sec    aPTT, therapeutic range   82 - 109 sec   PTT   Result Value Ref Range    aPTT 86.4 (H) 21.2 - 34.1 sec    aPTT, therapeutic range   82 - 109 sec   CBC WITH AUTOMATED DIFF   Result Value Ref Range    WBC 5.4 3.6 - 11.0 K/uL    RBC 2.38 (L) 3.80 - 5.20 M/uL    HGB 6.4 (L) 11.5 - 16.0 g/dL    HCT 21.0 (L) 35.0 - 47.0 %    MCV 88.2 80.0 - 99.0 FL    MCH 26.9 26.0 - 34.0 PG    MCHC 30.5 30.0 - 36.5 g/dL    RDW 17.0 (H) 11.5 - 14.5 %    PLATELET 137 462 - 332 K/uL    MPV 9.7 8.9 - 12.9 FL    NRBC 0.0 0.0  WBC    ABSOLUTE NRBC 0.00 0.00 - 0.01 K/uL    NEUTROPHILS 68 32 - 75 %    LYMPHOCYTES 27 12 - 49 %    MONOCYTES 5 5 - 13 %    EOSINOPHILS 0 0 - 7 %    BASOPHILS 0 0 - 1 %    IMMATURE GRANULOCYTES 0 %    ABS. NEUTROPHILS 3.6 1.8 - 8.0 K/UL    ABS. LYMPHOCYTES 1.5 0.8 - 3.5 K/UL    ABS. MONOCYTES 0.3 0.0 - 1.0 K/UL    ABS. EOSINOPHILS 0.0 0.0 - 0.4 K/UL    ABS. BASOPHILS 0.0 0.0 - 0.1 K/UL    ABS. IMM.  GRANS. 0.0 K/UL    DF Manual      PLATELET COMMENTS Large Platelets      RBC COMMENTS Polychromasia  1+        RBC COMMENTS Anisocytosis  1+       METABOLIC PANEL, COMPREHENSIVE   Result Value Ref Range    Sodium 142 136 - 145 mmol/L    Potassium 3.3 (L) 3.5 - 5.1 mmol/L    Chloride 111 (H) 97 - 108 mmol/L    CO2 23 21 - 32 mmol/L    Anion gap 8 5 - 15 mmol/L    Glucose 88 65 - 100 mg/dL    BUN 7 6 - 20 mg/dL    Creatinine 0.53 (L) 0.55 - 1.02 mg/dL    BUN/Creatinine ratio 13 12 - 20      eGFR >60 >60 ml/min/1.73m2    Calcium 8.4 (L) 8.5 - 10.1 mg/dL    Bilirubin, total 0.4 0.2 - 1.0 mg/dL    AST (SGOT) 31 15 - 37 U/L    ALT (SGPT) 11 (L) 12 - 78 U/L    Alk. phosphatase 82 45 - 117 U/L    Protein, total 7.4 6.4 - 8.2 g/dL    Albumin 1.3 (L) 3.5 - 5.0 g/dL    Globulin 6.1 (H) 2.0 - 4.0 g/dL    A-G Ratio 0.2 (L) 1.1 - 2.2     PTT   Result Value Ref Range    aPTT 75.8 (H) 21.2 - 34.1 sec    aPTT, therapeutic range   82 - 109 sec   PTT   Result Value Ref Range    aPTT 39.8 (H) 21.2 - 34.1 sec    aPTT, therapeutic range   82 - 109 sec   OCCULT BLOOD, STOOL   Result Value Ref Range    Occult Blood,day 1 Positive (A) Negative      Day 1 date: 25,217,[id number]     PTT   Result Value Ref Range    aPTT 112.3 (HH) 21.2 - 34.1 sec    aPTT, therapeutic range   82 - 109 sec   PTT   Result Value Ref Range    aPTT 44.2 (H) 21.2 - 34.1 sec    aPTT, therapeutic range   sec   CBC WITH AUTOMATED DIFF   Result Value Ref Range    WBC 5.0 3.6 - 11.0 K/uL    RBC 2.74 (L) 3.80 - 5.20 M/uL    HGB 7.3 (L) 11.5 - 16.0 g/dL    HCT 23.8 (L) 35.0 - 47.0 %    MCV 86.9 80.0 - 99.0 FL    MCH 26.6 26.0 - 34.0 PG    MCHC 30.7 30.0 - 36.5 g/dL    RDW 17.3 (H) 11.5 - 14.5 %    PLATELET 000 536 - 684 K/uL    MPV 9.1 8.9 - 12.9 FL    NRBC 0.4 (H) 0.0  WBC    ABSOLUTE NRBC 0.02 (H) 0.00 - 0.01 K/uL    NEUTROPHILS 73 32 - 75 %    BAND NEUTROPHILS 1 0 - 6 %    LYMPHOCYTES 14 12 - 49 %    MONOCYTES 10 5 - 13 %    EOSINOPHILS 0 0 - 7 %    BASOPHILS 0 0 - 1 %    METAMYELOCYTES 1 (H) 0 %    MYELOCYTES 1 (H) 0 %    IMMATURE GRANULOCYTES 0 %    ABS. NEUTROPHILS 3.7 1.8 - 8.0 K/UL    ABS. LYMPHOCYTES 0.7 (L) 0.8 - 3.5 K/UL    ABS. MONOCYTES 0.5 0.0 - 1.0 K/UL    ABS. EOSINOPHILS 0.0 0.0 - 0.4 K/UL    ABS. BASOPHILS 0.0 0.0 - 0.1 K/UL    ABS. IMM.  GRANS. 0.0 K/UL    DF Manual      RBC COMMENTS Anisocytosis  1+        RBC COMMENTS Spherocytes  1+        RBC COMMENTS Polychromasia  1+       METABOLIC PANEL, COMPREHENSIVE   Result Value Ref Range    Sodium 142 136 - 145 mmol/L    Potassium 3.9 3.5 - 5.1 mmol/L    Chloride 113 (H) 97 - 108 mmol/L    CO2 25 21 - 32 mmol/L    Anion gap 4 (L) 5 - 15 mmol/L    Glucose 80 65 - 100 mg/dL    BUN 7 6 - 20 mg/dL    Creatinine 0.48 (L) 0.55 - 1.02 mg/dL    BUN/Creatinine ratio 15 12 - 20      eGFR >60 >60 ml/min/1.73m2    Calcium 8.7 8.5 - 10.1 mg/dL    Bilirubin, total 0.4 0.2 - 1.0 mg/dL    AST (SGOT) 25 15 - 37 U/L    ALT (SGPT) 7 (L) 12 - 78 U/L    Alk. phosphatase 84 45 - 117 U/L    Protein, total 7.8 6.4 - 8.2 g/dL    Albumin 1.4 (L) 3.5 - 5.0 g/dL    Globulin 6.4 (H) 2.0 - 4.0 g/dL    A-G Ratio 0.2 (L) 1.1 - 2.2     CBC WITH AUTOMATED DIFF   Result Value Ref Range    WBC 4.7 3.6 - 11.0 K/uL    RBC 2.69 (L) 3.80 - 5.20 M/uL    HGB 7.2 (L) 11.5 - 16.0 g/dL    HCT 23.9 (L) 35.0 - 47.0 %    MCV 88.8 80.0 - 99.0 FL    MCH 26.8 26.0 - 34.0 PG    MCHC 30.1 30.0 - 36.5 g/dL    RDW 17.5 (H) 11.5 - 14.5 %    PLATELET 929 251 - 188 K/uL    MPV 9.8 8.9 - 12.9 FL    NRBC 0.0 0.0  WBC    ABSOLUTE NRBC 0.00 0.00 - 0.01 K/uL    NEUTROPHILS 70 32 - 75 %    BAND NEUTROPHILS 3 0 - 6 %    LYMPHOCYTES 19 12 - 49 %    MONOCYTES 7 5 - 13 %    EOSINOPHILS 0 0 - 7 %    BASOPHILS 0 0 - 1 %    MYELOCYTES 1 (H) 0 %    IMMATURE GRANULOCYTES 0 %    ABS. NEUTROPHILS 3.4 1.8 - 8.0 K/UL    ABS. LYMPHOCYTES 0.9 0.8 - 3.5 K/UL    ABS. MONOCYTES 0.3 0.0 - 1.0 K/UL    ABS. EOSINOPHILS 0.0 0.0 - 0.4 K/UL    ABS. BASOPHILS 0.0 0.0 - 0.1 K/UL    ABS. IMM.  GRANS. 0.0 K/UL    DF Manual      RBC COMMENTS Anisocytosis  1+        RBC COMMENTS Polychromasia  1+       METABOLIC PANEL, COMPREHENSIVE   Result Value Ref Range    Sodium 141 136 - 145 mmol/L    Potassium 4.0 3.5 - 5.1 mmol/L    Chloride 110 (H) 97 - 108 mmol/L    CO2 26 21 - 32 mmol/L    Anion gap 5 5 - 15 mmol/L    Glucose 74 65 - 100 mg/dL    BUN 6 6 - 20 mg/dL    Creatinine 0.50 (L) 0.55 - 1.02 mg/dL    BUN/Creatinine ratio 12 12 - 20      eGFR >60 >60 ml/min/1.73m2    Calcium 9.0 8.5 - 10.1 mg/dL    Bilirubin, total 0.4 0.2 - 1.0 mg/dL    AST (SGOT) 26 15 - 37 U/L    ALT (SGPT) 7 (L) 12 - 78 U/L    Alk. phosphatase 85 45 - 117 U/L    Protein, total 8.0 6.4 - 8.2 g/dL    Albumin 1.6 (L) 3.5 - 5.0 g/dL    Globulin 6.4 (H) 2.0 - 4.0 g/dL    A-G Ratio 0.3 (L) 1.1 - 2.2     CBC WITH AUTOMATED DIFF   Result Value Ref Range    WBC 4.6 3.6 - 11.0 K/uL    RBC 2.90 (L) 3.80 - 5.20 M/uL    HGB 7.7 (L) 11.5 - 16.0 g/dL    HCT 25.7 (L) 35.0 - 47.0 %    MCV 88.6 80.0 - 99.0 FL    MCH 26.6 26.0 - 34.0 PG    MCHC 30.0 30.0 - 36.5 g/dL    RDW 17.7 (H) 11.5 - 14.5 %    PLATELET 701 372 - 301 K/uL    MPV 9.5 8.9 - 12.9 FL    NRBC 0.0 0.0  WBC    ABSOLUTE NRBC 0.00 0.00 - 0.01 K/uL    NEUTROPHILS 51 32 - 75 %    BAND NEUTROPHILS 5 0 - 6 %    LYMPHOCYTES 38 12 - 49 %    MONOCYTES 4 (L) 5 - 13 %    EOSINOPHILS 0 0 - 7 %    BASOPHILS 1 0 - 1 %    MYELOCYTES 1 (H) 0 %    IMMATURE GRANULOCYTES 0 %    ABS. NEUTROPHILS 2.6 1.8 - 8.0 K/UL    ABS. LYMPHOCYTES 1.7 0.8 - 3.5 K/UL    ABS. MONOCYTES 0.2 0.0 - 1.0 K/UL    ABS. EOSINOPHILS 0.0 0.0 - 0.4 K/UL    ABS. BASOPHILS 0.0 0.0 - 0.1 K/UL    ABS. IMM. GRANS. 0.0 K/UL    DF Manual      RBC COMMENTS Normocytic, Normochromic     METABOLIC PANEL, COMPREHENSIVE   Result Value Ref Range    Sodium 140 136 - 145 mmol/L    Potassium 3.8 3.5 - 5.1 mmol/L    Chloride 107 97 - 108 mmol/L    CO2 27 21 - 32 mmol/L    Anion gap 6 5 - 15 mmol/L    Glucose 67 65 - 100 mg/dL    BUN 6 6 - 20 mg/dL    Creatinine 0.50 (L) 0.55 - 1.02 mg/dL    BUN/Creatinine ratio 12 12 - 20      eGFR >60 >60 ml/min/1.73m2    Calcium 9.2 8.5 - 10.1 mg/dL    Bilirubin, total 0.4 0.2 - 1.0 mg/dL    AST (SGOT) 26 15 - 37 U/L    ALT (SGPT) 6 (L) 12 - 78 U/L    Alk.  phosphatase 89 45 - 117 U/L    Protein, total 8.4 (H) 6.4 - 8.2 g/dL    Albumin 1.6 (L) 3.5 - 5.0 g/dL    Globulin 6.8 (H) 2.0 - 4.0 g/dL    A-G Ratio 0.2 (L) 1.1 - 2.2     CBC WITH AUTOMATED DIFF   Result Value Ref Range    WBC 7.0 3.6 - 11.0 K/uL    RBC 2.09 (L) 3.80 - 5.20 M/uL    HGB 5.7 (LL) 11.5 - 16.0 g/dL    HCT 18.6 (L) 35.0 - 47.0 % MCV 89.0 80.0 - 99.0 FL    MCH 27.3 26.0 - 34.0 PG    MCHC 30.6 30.0 - 36.5 g/dL    RDW 17.8 (H) 11.5 - 14.5 %    PLATELET 352 808 - 308 K/uL    MPV 9.6 8.9 - 12.9 FL    NRBC 0.0 0.0  WBC    ABSOLUTE NRBC 0.00 0.00 - 0.01 K/uL    NEUTROPHILS 59 32 - 75 %    LYMPHOCYTES 30 12 - 49 %    MONOCYTES 4 (L) 5 - 13 %    EOSINOPHILS 0 0 - 7 %    BASOPHILS 0 0 - 1 %    IMMATURE GRANULOCYTES 7 (H) 0 - 0.5 %    ABS. NEUTROPHILS 4.1 1.8 - 8.0 K/UL    ABS. LYMPHOCYTES 2.1 0.8 - 3.5 K/UL    ABS. MONOCYTES 0.3 0.0 - 1.0 K/UL    ABS. EOSINOPHILS 0.0 0.0 - 0.4 K/UL    ABS. BASOPHILS 0.0 0.0 - 0.1 K/UL    ABS. IMM. GRANS. 0.5 (H) 0.00 - 0.04 K/UL    DF AUTOMATED     METABOLIC PANEL, COMPREHENSIVE   Result Value Ref Range    Sodium 137 136 - 145 mmol/L    Potassium 3.1 (L) 3.5 - 5.1 mmol/L    Chloride 104 97 - 108 mmol/L    CO2 27 21 - 32 mmol/L    Anion gap 6 5 - 15 mmol/L    Glucose 80 65 - 100 mg/dL    BUN 7 6 - 20 mg/dL    Creatinine 0.41 (L) 0.55 - 1.02 mg/dL    BUN/Creatinine ratio 17 12 - 20      eGFR >60 >60 ml/min/1.73m2    Calcium 9.3 8.5 - 10.1 mg/dL    Bilirubin, total 0.8 0.2 - 1.0 mg/dL    AST (SGOT) 12 (L) 15 - 37 U/L    ALT (SGPT) <6 (L) 12 - 78 U/L    Alk.  phosphatase 56 45 - 117 U/L    Protein, total 7.6 6.4 - 8.2 g/dL    Albumin 3.3 (L) 3.5 - 5.0 g/dL    Globulin 4.3 (H) 2.0 - 4.0 g/dL    A-G Ratio 0.8 (L) 1.1 - 2.2     PTT   Result Value Ref Range    aPTT 27.9 21.2 - 34.1 sec    aPTT, therapeutic range   82 - 109 sec   PROTHROMBIN TIME + INR   Result Value Ref Range    Prothrombin time 15.9 (H) 11.9 - 14.6 sec    INR 1.3 (H) 0.9 - 1.1     METABOLIC PANEL, COMPREHENSIVE   Result Value Ref Range    Sodium 138 136 - 145 mmol/L    Potassium 3.1 (L) 3.5 - 5.1 mmol/L    Chloride 107 97 - 108 mmol/L    CO2 27 21 - 32 mmol/L    Anion gap 4 (L) 5 - 15 mmol/L    Glucose 81 65 - 100 mg/dL    BUN 7 6 - 20 mg/dL    Creatinine 0.43 (L) 0.55 - 1.02 mg/dL    BUN/Creatinine ratio 16 12 - 20      eGFR >60 >60 ml/min/1.73m2 Calcium 8.8 8.5 - 10.1 mg/dL    Bilirubin, total 0.7 0.2 - 1.0 mg/dL    AST (SGOT) 15 15 - 37 U/L    ALT (SGPT) 10 (L) 12 - 78 U/L    Alk. phosphatase 52 45 - 117 U/L    Protein, total 7.5 6.4 - 8.2 g/dL    Albumin 3.1 (L) 3.5 - 5.0 g/dL    Globulin 4.4 (H) 2.0 - 4.0 g/dL    A-G Ratio 0.7 (L) 1.1 - 2.2     CBC WITH AUTOMATED DIFF   Result Value Ref Range    WBC 6.7 3.6 - 11.0 K/uL    RBC 3.00 (L) 3.80 - 5.20 M/uL    HGB 8.5 (L) 11.5 - 16.0 g/dL    HCT 26.7 (L) 35.0 - 47.0 %    MCV 89.0 80.0 - 99.0 FL    MCH 28.3 26.0 - 34.0 PG    MCHC 31.8 30.0 - 36.5 g/dL    RDW 16.1 (H) 11.5 - 14.5 %    PLATELET 944 620 - 071 K/uL    MPV 9.9 8.9 - 12.9 FL    NRBC 0.0 0.0  WBC    ABSOLUTE NRBC 0.00 0.00 - 0.01 K/uL    NEUTROPHILS 69 32 - 75 %    LYMPHOCYTES 20 12 - 49 %    MONOCYTES 2 (L) 5 - 13 %    EOSINOPHILS 0 0 - 7 %    BASOPHILS 0 0 - 1 %    IMMATURE GRANULOCYTES 9 (H) 0 - 0.5 %    ABS. NEUTROPHILS 4.6 1.8 - 8.0 K/UL    ABS. LYMPHOCYTES 1.4 0.8 - 3.5 K/UL    ABS. MONOCYTES 0.1 0.0 - 1.0 K/UL    ABS. EOSINOPHILS 0.0 0.0 - 0.4 K/UL    ABS. BASOPHILS 0.0 0.0 - 0.1 K/UL    ABS. IMM. GRANS. 0.6 (H) 0.00 - 0.04 K/UL    DF AUTOMATED     METABOLIC PANEL, COMPREHENSIVE   Result Value Ref Range    Sodium 140 136 - 145 mmol/L    Potassium 3.4 (L) 3.5 - 5.1 mmol/L    Chloride 112 (H) 97 - 108 mmol/L    CO2 22 21 - 32 mmol/L    Anion gap 6 5 - 15 mmol/L    Glucose 94 65 - 100 mg/dL    BUN 4 (L) 6 - 20 mg/dL    Creatinine 0.31 (L) 0.55 - 1.02 mg/dL    BUN/Creatinine ratio 13 12 - 20      eGFR >60 >60 ml/min/1.73m2    Calcium 7.9 (L) 8.5 - 10.1 mg/dL    Bilirubin, total 0.7 0.2 - 1.0 mg/dL    AST (SGOT) 16 15 - 37 U/L    ALT (SGPT) <6 (L) 12 - 78 U/L    Alk.  phosphatase 60 45 - 117 U/L    Protein, total 6.9 6.4 - 8.2 g/dL    Albumin 2.7 (L) 3.5 - 5.0 g/dL    Globulin 4.2 (H) 2.0 - 4.0 g/dL    A-G Ratio 0.6 (L) 1.1 - 2.2     PHOSPHORUS   Result Value Ref Range    Phosphorus 1.0 (L) 2.6 - 4.7 mg/dL   MAGNESIUM   Result Value Ref Range    Magnesium 2.8 (H) 1.6 - 2.4 mg/dL   METABOLIC PANEL, BASIC   Result Value Ref Range    Sodium 136 136 - 145 mmol/L    Potassium 5.5 (H) 3.5 - 5.1 mmol/L    Chloride 107 97 - 108 mmol/L    CO2 23 21 - 32 mmol/L    Anion gap 6 5 - 15 mmol/L    Glucose 84 65 - 100 mg/dL    BUN 6 6 - 20 mg/dL    Creatinine 0.38 (L) 0.55 - 1.02 mg/dL    BUN/Creatinine ratio 16 12 - 20      eGFR >60 >60 ml/min/1.73m2    Calcium 8.4 (L) 8.5 - 10.1 mg/dL   PHOSPHORUS   Result Value Ref Range    Phosphorus 1.1 (L) 2.6 - 4.7 mg/dL   MAGNESIUM   Result Value Ref Range    Magnesium 2.6 (H) 1.6 - 2.4 mg/dL   METABOLIC PANEL, BASIC   Result Value Ref Range    Sodium 138 136 - 145 mmol/L    Potassium 4.1 3.5 - 5.1 mmol/L    Chloride 109 (H) 97 - 108 mmol/L    CO2 23 21 - 32 mmol/L    Anion gap 6 5 - 15 mmol/L    Glucose 77 65 - 100 mg/dL    BUN 6 6 - 20 mg/dL    Creatinine 0.37 (L) 0.55 - 1.02 mg/dL    BUN/Creatinine ratio 16 12 - 20      eGFR >60 >60 ml/min/1.73m2    Calcium 8.2 (L) 8.5 - 95.5 mg/dL   METABOLIC PANEL, BASIC   Result Value Ref Range    Sodium 138 136 - 145 mmol/L    Potassium 3.8 3.5 - 5.1 mmol/L    Chloride 109 (H) 97 - 108 mmol/L    CO2 22 21 - 32 mmol/L    Anion gap 7 5 - 15 mmol/L    Glucose 74 65 - 100 mg/dL    BUN 6 6 - 20 mg/dL    Creatinine 0.35 (L) 0.55 - 1.02 mg/dL    BUN/Creatinine ratio 17 12 - 20      eGFR >60 >60 ml/min/1.73m2    Calcium 8.5 8.5 - 10.1 mg/dL   CBC WITH AUTOMATED DIFF   Result Value Ref Range    WBC 6.4 3.6 - 11.0 K/uL    RBC 3.32 (L) 3.80 - 5.20 M/uL    HGB 9.3 (L) 11.5 - 16.0 g/dL    HCT 29.7 (L) 35.0 - 47.0 %    MCV 89.5 80.0 - 99.0 FL    MCH 28.0 26.0 - 34.0 PG    MCHC 31.3 30.0 - 36.5 g/dL    RDW 16.5 (H) 11.5 - 14.5 %    PLATELET 557 459 - 567 K/uL    MPV 9.6 8.9 - 12.9 FL    NRBC 0.0 0.0  WBC    ABSOLUTE NRBC 0.00 0.00 - 0.01 K/uL    NEUTROPHILS 57 32 - 75 %    LYMPHOCYTES 30 12 - 49 %    MONOCYTES 7 5 - 13 %    EOSINOPHILS 0 0 - 7 %    BASOPHILS 1 0 - 1 %    IMMATURE GRANULOCYTES 6 (H) 0 - 0.5 %    ABS. NEUTROPHILS 3.6 1.8 - 8.0 K/UL    ABS. LYMPHOCYTES 1.9 0.8 - 3.5 K/UL    ABS. MONOCYTES 0.5 0.0 - 1.0 K/UL    ABS. EOSINOPHILS 0.0 0.0 - 0.4 K/UL    ABS. BASOPHILS 0.0 0.0 - 0.1 K/UL    ABS. IMM.  GRANS. 0.4 (H) 0.00 - 0.04 K/UL    DF AUTOMATED     MAGNESIUM   Result Value Ref Range    Magnesium 2.6 (H) 1.6 - 2.4 mg/dL   PHOSPHORUS   Result Value Ref Range    Phosphorus 1.1 (L) 2.6 - 4.7 mg/dL   PHOSPHORUS   Result Value Ref Range    Phosphorus 0.6 (LL) 2.6 - 4.7 mg/dL   MAGNESIUM   Result Value Ref Range    Magnesium 2.2 1.6 - 2.4 mg/dL   METABOLIC PANEL, BASIC   Result Value Ref Range    Sodium 136 136 - 145 mmol/L    Potassium 3.4 (L) 3.5 - 5.1 mmol/L    Chloride 108 97 - 108 mmol/L    CO2 21 21 - 32 mmol/L    Anion gap 7 5 - 15 mmol/L    Glucose 71 65 - 100 mg/dL    BUN 6 6 - 20 mg/dL    Creatinine 0.35 (L) 0.55 - 1.02 mg/dL    BUN/Creatinine ratio 17 12 - 20      eGFR >60 >60 ml/min/1.73m2    Calcium 8.3 (L) 8.5 - 10.1 mg/dL   PHOSPHORUS   Result Value Ref Range    Phosphorus 2.0 (L) 2.6 - 4.7 mg/dL   MAGNESIUM   Result Value Ref Range    Magnesium 2.2 1.6 - 2.4 mg/dL   METABOLIC PANEL, BASIC   Result Value Ref Range    Sodium 138 136 - 145 mmol/L    Potassium 3.8 3.5 - 5.1 mmol/L    Chloride 108 97 - 108 mmol/L    CO2 22 21 - 32 mmol/L    Anion gap 8 5 - 15 mmol/L    Glucose 77 65 - 100 mg/dL    BUN 5 (L) 6 - 20 mg/dL    Creatinine 0.33 (L) 0.55 - 1.02 mg/dL    BUN/Creatinine ratio 15 12 - 20      eGFR >60 >60 ml/min/1.73m2    Calcium 8.5 8.5 - 11.1 mg/dL   METABOLIC PANEL, BASIC   Result Value Ref Range    Sodium 137 136 - 145 mmol/L    Potassium 3.8 3.5 - 5.1 mmol/L    Chloride 108 97 - 108 mmol/L    CO2 22 21 - 32 mmol/L    Anion gap 7 5 - 15 mmol/L    Glucose 105 (H) 65 - 100 mg/dL    BUN 6 6 - 20 mg/dL    Creatinine 0.38 (L) 0.55 - 1.02 mg/dL    BUN/Creatinine ratio 16 12 - 20      eGFR >60 >60 ml/min/1.73m2    Calcium 8.7 8.5 - 10.1 mg/dL   CBC WITH AUTOMATED DIFF Result Value Ref Range    WBC 6.7 3.6 - 11.0 K/uL    RBC 3.91 3.80 - 5.20 M/uL    HGB 10.9 (L) 11.5 - 16.0 g/dL    HCT 35.0 35.0 - 47.0 %    MCV 89.5 80.0 - 99.0 FL    MCH 27.9 26.0 - 34.0 PG    MCHC 31.1 30.0 - 36.5 g/dL    RDW 17.2 (H) 11.5 - 14.5 %    PLATELET 936 006 - 700 K/uL    MPV 9.0 8.9 - 12.9 FL    NRBC 0.0 0.0  WBC    ABSOLUTE NRBC 0.00 0.00 - 0.01 K/uL    NEUTROPHILS 60 32 - 75 %    LYMPHOCYTES 27 12 - 49 %    MONOCYTES 7 5 - 13 %    EOSINOPHILS 0 0 - 7 %    BASOPHILS 1 0 - 1 %    IMMATURE GRANULOCYTES 5 (H) 0 - 0.5 %    ABS. NEUTROPHILS 4.0 1.8 - 8.0 K/UL    ABS. LYMPHOCYTES 1.8 0.8 - 3.5 K/UL    ABS. MONOCYTES 0.5 0.0 - 1.0 K/UL    ABS. EOSINOPHILS 0.0 0.0 - 0.4 K/UL    ABS. BASOPHILS 0.1 0.0 - 0.1 K/UL    ABS. IMM. GRANS. 0.3 (H) 0.00 - 0.04 K/UL    DF Smear Scanned      RBC COMMENTS Anisocytosis  1+       TRIGLYCERIDE   Result Value Ref Range    Triglyceride 128 <150 mg/dL   HEPATIC FUNCTION PANEL   Result Value Ref Range    Protein, total 8.2 6.4 - 8.2 g/dL    Albumin 2.8 (L) 3.5 - 5.0 g/dL    Globulin 5.4 (H) 2.0 - 4.0 g/dL    A-G Ratio 0.5 (L) 1.1 - 2.2      Bilirubin, total 0.7 0.2 - 1.0 mg/dL    Bilirubin, direct 0.2 0.0 - 0.2 mg/dL    Alk.  phosphatase 66 45 - 117 U/L    AST (SGOT) 31 15 - 37 U/L    ALT (SGPT) 17 12 - 78 U/L   PHOSPHORUS   Result Value Ref Range    Phosphorus 1.9 (L) 2.6 - 4.7 mg/dL   MAGNESIUM   Result Value Ref Range    Magnesium 2.3 1.6 - 2.4 mg/dL   CBC WITH AUTOMATED DIFF   Result Value Ref Range    WBC 7.1 3.6 - 11.0 K/uL    RBC 3.61 (L) 3.80 - 5.20 M/uL    HGB 10.1 (L) 11.5 - 16.0 g/dL    HCT 33.2 (L) 35.0 - 47.0 %    MCV 92.0 80.0 - 99.0 FL    MCH 28.0 26.0 - 34.0 PG    MCHC 30.4 30.0 - 36.5 g/dL    RDW 17.4 (H) 11.5 - 14.5 %    PLATELET 975 460 - 611 K/uL    MPV 9.6 8.9 - 12.9 FL    NRBC 0.0 0.0  WBC    ABSOLUTE NRBC 0.00 0.00 - 0.01 K/uL    NEUTROPHILS 59 32 - 75 %    LYMPHOCYTES 30 12 - 49 %    MONOCYTES 6 5 - 13 %    EOSINOPHILS 0 0 - 7 % BASOPHILS 1 0 - 1 %    IMMATURE GRANULOCYTES 4 (H) 0 - 0.5 %    ABS. NEUTROPHILS 4.2 1.8 - 8.0 K/UL    ABS. LYMPHOCYTES 2.1 0.8 - 3.5 K/UL    ABS. MONOCYTES 0.4 0.0 - 1.0 K/UL    ABS. EOSINOPHILS 0.0 0.0 - 0.4 K/UL    ABS. BASOPHILS 0.1 0.0 - 0.1 K/UL    ABS. IMM.  GRANS. 0.3 (H) 0.00 - 0.04 K/UL    DF Smear Scanned      RBC COMMENTS Anisocytosis  1+       METABOLIC PANEL, BASIC   Result Value Ref Range    Sodium 131 (L) 136 - 145 mmol/L    Potassium 5.0 3.5 - 5.1 mmol/L    Chloride 101 97 - 108 mmol/L    CO2 23 21 - 32 mmol/L    Anion gap 7 5 - 15 mmol/L    Glucose 730 (HH) 65 - 100 mg/dL    BUN 8 6 - 20 mg/dL    Creatinine 0.65 0.55 - 1.02 mg/dL    BUN/Creatinine ratio 12 12 - 20      eGFR >60 >60 ml/min/1.73m2    Calcium 8.8 8.5 - 10.1 mg/dL   MAGNESIUM   Result Value Ref Range    Magnesium 2.4 1.6 - 2.4 mg/dL   PHOSPHORUS   Result Value Ref Range    Phosphorus 4.4 2.6 - 4.7 mg/dL   METABOLIC PANEL, BASIC   Result Value Ref Range    Sodium 138 136 - 145 mmol/L    Potassium 4.0 3.5 - 5.1 mmol/L    Chloride 108 97 - 108 mmol/L    CO2 25 21 - 32 mmol/L    Anion gap 5 5 - 15 mmol/L    Glucose 95 65 - 100 mg/dL    BUN 9 6 - 20 mg/dL    Creatinine 0.42 (L) 0.55 - 1.02 mg/dL    BUN/Creatinine ratio 21 (H) 12 - 20      eGFR >60 >60 ml/min/1.73m2    Calcium 8.4 (L) 8.5 - 10.1 mg/dL   MAGNESIUM   Result Value Ref Range    Magnesium 2.0 1.6 - 2.4 mg/dL   PHOSPHORUS   Result Value Ref Range    Phosphorus 1.6 (L) 2.6 - 4.7 mg/dL   METABOLIC PANEL, BASIC   Result Value Ref Range    Sodium 138 136 - 145 mmol/L    Potassium 3.8 3.5 - 5.1 mmol/L    Chloride 109 (H) 97 - 108 mmol/L    CO2 23 21 - 32 mmol/L    Anion gap 6 5 - 15 mmol/L    Glucose 86 65 - 100 mg/dL    BUN 6 6 - 20 mg/dL    Creatinine 0.41 (L) 0.55 - 1.02 mg/dL    BUN/Creatinine ratio 15 12 - 20      eGFR >60 >60 ml/min/1.73m2    Calcium 8.7 8.5 - 10.1 mg/dL   MAGNESIUM   Result Value Ref Range    Magnesium 2.0 1.6 - 2.4 mg/dL   PHOSPHORUS   Result Value Ref Range Phosphorus 1.2 (L) 2.6 - 4.7 mg/dL   CBC WITH AUTOMATED DIFF   Result Value Ref Range    WBC 6.3 3.6 - 11.0 K/uL    RBC 3.49 (L) 3.80 - 5.20 M/uL    HGB 9.8 (L) 11.5 - 16.0 g/dL    HCT 31.7 (L) 35.0 - 47.0 %    MCV 90.8 80.0 - 99.0 FL    MCH 28.1 26.0 - 34.0 PG    MCHC 30.9 30.0 - 36.5 g/dL    RDW 17.4 (H) 11.5 - 14.5 %    PLATELET 839 735 - 326 K/uL    MPV 9.7 8.9 - 12.9 FL    NRBC 0.0 0.0  WBC    ABSOLUTE NRBC 0.00 0.00 - 0.01 K/uL    NEUTROPHILS 51 32 - 75 %    LYMPHOCYTES 40 12 - 49 %    MONOCYTES 9 5 - 13 %    EOSINOPHILS 0 0 - 7 %    BASOPHILS 0 0 - 1 %    IMMATURE GRANULOCYTES 0 %    ABS. NEUTROPHILS 3.2 1.8 - 8.0 K/UL    ABS. LYMPHOCYTES 2.5 0.8 - 3.5 K/UL    ABS. MONOCYTES 0.6 0.0 - 1.0 K/UL    ABS. EOSINOPHILS 0.0 0.0 - 0.4 K/UL    ABS. BASOPHILS 0.0 0.0 - 0.1 K/UL    ABS. IMM.  GRANS. 0.0 K/UL    DF Manual      RBC COMMENTS Normocytic, Normochromic     METABOLIC PANEL, BASIC   Result Value Ref Range    Sodium 136 136 - 145 mmol/L    Potassium 4.0 3.5 - 5.1 mmol/L    Chloride 109 (H) 97 - 108 mmol/L    CO2 22 21 - 32 mmol/L    Anion gap 5 5 - 15 mmol/L    Glucose 89 65 - 100 mg/dL    BUN 6 6 - 20 mg/dL    Creatinine 0.37 (L) 0.55 - 1.02 mg/dL    BUN/Creatinine ratio 16 12 - 20      eGFR >60 >60 ml/min/1.73m2    Calcium 8.8 8.5 - 10.1 mg/dL   MAGNESIUM   Result Value Ref Range    Magnesium 2.0 1.6 - 2.4 mg/dL   PHOSPHORUS   Result Value Ref Range    Phosphorus 1.0 (L) 2.6 - 4.7 mg/dL   CBC W/O DIFF   Result Value Ref Range    WBC 4.6 3.6 - 11.0 K/uL    RBC 3.05 (L) 3.80 - 5.20 M/uL    HGB 8.6 (L) 11.5 - 16.0 g/dL    HCT 27.1 (L) 35.0 - 47.0 %    MCV 88.9 80.0 - 99.0 FL    MCH 28.2 26.0 - 34.0 PG    MCHC 31.7 30.0 - 36.5 g/dL    RDW 16.6 (H) 11.5 - 14.5 %    PLATELET 603 044 - 794 K/uL    MPV 9.5 8.9 - 12.9 FL    NRBC 0.0 0.0  WBC    ABSOLUTE NRBC 0.00 0.00 - 6.75 K/uL   METABOLIC PANEL, BASIC   Result Value Ref Range    Sodium 137 136 - 145 mmol/L    Potassium 3.6 3.5 - 5.1 mmol/L    Chloride 109 (H) 97 - 108 mmol/L    CO2 23 21 - 32 mmol/L    Anion gap 5 5 - 15 mmol/L    Glucose 88 65 - 100 mg/dL    BUN 6 6 - 20 mg/dL    Creatinine 0.42 (L) 0.55 - 1.02 mg/dL    BUN/Creatinine ratio 14 12 - 20      eGFR >60 >60 ml/min/1.73m2    Calcium 8.5 8.5 - 10.1 mg/dL   GLUCOSE, POC   Result Value Ref Range    Glucose (POC) 77 65 - 100 mg/dL    Performed by Josse Owen 10, POC   Result Value Ref Range    Glucose (POC) 104 (H) 65 - 100 mg/dL    Performed by Krista Khan    GLUCOSE, POC   Result Value Ref Range    Glucose (POC) 91 65 - 100 mg/dL    Performed by Fina Garcia    GLUCOSE, POC   Result Value Ref Range    Glucose (POC) 95 65 - 100 mg/dL    Performed by Fina Garcia    GLUCOSE, POC   Result Value Ref Range    Glucose (POC) 98 65 - 100 mg/dL    Performed by Fina Garcia    GLUCOSE, POC   Result Value Ref Range    Glucose (POC) >600 (HH) 65 - 100 mg/dL    Performed by Azalea Cifuentes    GLUCOSE, POC   Result Value Ref Range    Glucose (POC) 108 (H) 65 - 100 mg/dL    Performed by Azalea Cifuentes    GLUCOSE, POC   Result Value Ref Range    Glucose (POC) 109 (H) 65 - 100 mg/dL    Performed by Latha Horner    GLUCOSE, POC   Result Value Ref Range    Glucose (POC) 82 65 - 100 mg/dL    Performed by Patricia Hussein    EKG, 12 LEAD, INITIAL   Result Value Ref Range    Ventricular Rate 107 BPM    Atrial Rate 107 BPM    P-R Interval 100 ms    QRS Duration 78 ms    Q-T Interval 342 ms    QTC Calculation (Bezet) 456 ms    Calculated P Axis 13 degrees    Calculated R Axis 30 degrees    Calculated T Axis 50 degrees    Diagnosis       Sinus tachycardia with short FL  Otherwise normal ECG  When compared with ECG of 13-SEP-2022 00:27,  No significant change was found  Confirmed by NILSA ELIZABETH, Jas Barrios ([id number]) on 10/20/2022 11:31:02 AM       *Note: Due to a large number of results and/or encounters for the requested time period, some results have not been displayed.  A complete set of results can be found in Results Review. ASSESSMENT and PLAN  Diagnoses and all orders for this visit:    1. Malignant neoplasm of urinary bladder, unspecified site [facility])  Assessment & Plan:  S/p cystectomy 10/3/22 for pT3b squamous bladder cancer. Refer to medical oncology for discussion of adjuvant therapy. Orders:  -     METABOLIC PANEL, COMPREHENSIVE; Future  -     CBC WITH AUTOMATED DIFF; Future  -     REFERRAL TO ONCOLOGY    2. Hydronephrosis of right kidney    3. Rectovaginal fistula  Assessment & Plan:   S/p repair and colostomy. Scant discharge. Follow up with Dr Etta Steel      4. Anemia in neoplastic disease  Assessment & Plan:  Chronic anemia. Check CBC    Orders:  -     METABOLIC PANEL, COMPREHENSIVE; Future  -     CBC WITH AUTOMATED DIFF; Future    5. History of blood clots  Assessment & Plan:  H/o IVC filter. DVT and thrombectomy 10/27/22. On Eliquis bid. She reports more leg swelling. Ultrasound ordered. 6. Hard stool  Assessment & Plan:  She notes hard stools. She takes docusate and senna. She thinks she is eating fiber and drinking water. She is on iron tablets. I discussed metamucil. Other orders  -     psyllium seed-sucrose (Metamucil, sugar,) powd; Take 1 Scoop by mouth daily.        Shyam Meredith MD

## 2022-12-07 NOTE — ASSESSMENT & PLAN NOTE
S/p cystectomy 10/3/22 for pT3b squamous bladder cancer. Refer to medical oncology for discussion of adjuvant therapy.

## 2022-12-08 ENCOUNTER — TELEPHONE (OUTPATIENT)
Dept: INTERNAL MEDICINE CLINIC | Age: 57
End: 2022-12-08

## 2022-12-08 ENCOUNTER — DOCUMENTATION ONLY (OUTPATIENT)
Dept: SURGERY | Age: 57
End: 2022-12-08

## 2022-12-08 LAB
ALBUMIN SERPL-MCNC: 3.8 G/DL (ref 3.8–4.9)
ALBUMIN/GLOB SERPL: 0.8 {RATIO} (ref 1.2–2.2)
ALP SERPL-CCNC: 86 IU/L (ref 44–121)
ALT SERPL-CCNC: 7 IU/L (ref 0–32)
AST SERPL-CCNC: 16 IU/L (ref 0–40)
BASOPHILS # BLD AUTO: 0 X10E3/UL (ref 0–0.2)
BASOPHILS NFR BLD AUTO: 0 %
BILIRUB SERPL-MCNC: 0.5 MG/DL (ref 0–1.2)
BUN SERPL-MCNC: 13 MG/DL (ref 6–24)
BUN/CREAT SERPL: 24 (ref 9–23)
CALCIUM SERPL-MCNC: 9.3 MG/DL (ref 8.7–10.2)
CHLORIDE SERPL-SCNC: 111 MMOL/L (ref 96–106)
CO2 SERPL-SCNC: 17 MMOL/L (ref 20–29)
CREAT SERPL-MCNC: 0.54 MG/DL (ref 0.57–1)
EGFR: 107 ML/MIN/1.73
EOSINOPHIL # BLD AUTO: 0 X10E3/UL (ref 0–0.4)
EOSINOPHIL NFR BLD AUTO: 0 %
ERYTHROCYTE [DISTWIDTH] IN BLOOD BY AUTOMATED COUNT: 17.1 % (ref 11.7–15.4)
GLOBULIN SER CALC-MCNC: 4.8 G/DL (ref 1.5–4.5)
GLUCOSE SERPL-MCNC: 82 MG/DL (ref 70–99)
HCT VFR BLD AUTO: 30.1 % (ref 34–46.6)
HGB BLD-MCNC: 9.7 G/DL (ref 11.1–15.9)
IMM GRANULOCYTES # BLD AUTO: 0 X10E3/UL (ref 0–0.1)
IMM GRANULOCYTES NFR BLD AUTO: 0 %
LYMPHOCYTES # BLD AUTO: 1.9 X10E3/UL (ref 0.7–3.1)
LYMPHOCYTES NFR BLD AUTO: 46 %
MCH RBC QN AUTO: 28.4 PG (ref 26.6–33)
MCHC RBC AUTO-ENTMCNC: 32.2 G/DL (ref 31.5–35.7)
MCV RBC AUTO: 88 FL (ref 79–97)
MONOCYTES # BLD AUTO: 0.2 X10E3/UL (ref 0.1–0.9)
MONOCYTES NFR BLD AUTO: 6 %
NEUTROPHILS # BLD AUTO: 2 X10E3/UL (ref 1.4–7)
NEUTROPHILS NFR BLD AUTO: 48 %
PLATELET # BLD AUTO: 384 X10E3/UL (ref 150–450)
POTASSIUM SERPL-SCNC: 3.3 MMOL/L (ref 3.5–5.2)
PROT SERPL-MCNC: 8.6 G/DL (ref 6–8.5)
RBC # BLD AUTO: 3.42 X10E6/UL (ref 3.77–5.28)
SODIUM SERPL-SCNC: 144 MMOL/L (ref 134–144)
WBC # BLD AUTO: 4.2 X10E3/UL (ref 3.4–10.8)

## 2022-12-08 RX ORDER — DOCUSATE SODIUM 100 MG/1
100 CAPSULE, LIQUID FILLED ORAL 2 TIMES DAILY
Qty: 60 CAPSULE | Refills: 2 | Status: SHIPPED | OUTPATIENT
Start: 2022-12-08 | End: 2023-03-08

## 2022-12-08 RX ORDER — AMOXICILLIN 250 MG
1 CAPSULE ORAL
Qty: 30 TABLET | Refills: 1 | Status: SHIPPED | OUTPATIENT
Start: 2022-12-08 | End: 2022-12-09

## 2022-12-08 RX ORDER — BLOOD-GLUCOSE METER
1 KIT MISCELLANEOUS DAILY
Qty: 30 TABLET | Refills: 2 | Status: SHIPPED | OUTPATIENT
Start: 2022-12-08

## 2022-12-08 NOTE — TELEPHONE ENCOUNTER
Spoke to patient's niece and they have an appointment tomorrow morning with Inocencio Guevara and she said they will get medications refills straight when they come in

## 2022-12-08 NOTE — TELEPHONE ENCOUNTER
Called patient to schedule sooner appointment had to call her niece left message for her to call back on voicemail so we can schedule sooner appointment for Ms. Coronel.       Patient stated that she needs refills for all her medications:

## 2022-12-08 NOTE — TELEPHONE ENCOUNTER
Ms. Eddie Sides called in wanting to know when will her walker be delivered and when will Dr. Steven Onofre will be sending the medication to the pharmacy. Patient states she is running out of all of her medication.

## 2022-12-08 NOTE — TELEPHONE ENCOUNTER
Called and spoke to pt. We have given all information necessary for pt to get her Rollator Walker. According to online DME supplier [employer], nothing else is needed. I told pt she should expect a call from them regarding delivery. Pt says she is about to be pout of her regular medicaations. I told her the PCP should fill those for her. I have called Dr. Zahra Martinez office myself asking them to fill her medications. [facility] says they have been trying to get in touch with the pt to set up a post hospital visit. They haven't been able to get in touch with her. I gave her an additional number of Lilly Chavez her Home health nurse.

## 2022-12-08 NOTE — TELEPHONE ENCOUNTER
Called patient to schedule appointment. She told me to call her niece because she brings her. I called niece and left detailed message on voicemail to call back to schedule appointment.

## 2022-12-09 ENCOUNTER — OFFICE VISIT (OUTPATIENT)
Dept: INTERNAL MEDICINE CLINIC | Age: 57
End: 2022-12-09
Payer: MEDICAID

## 2022-12-09 VITALS
HEIGHT: 62 IN | BODY MASS INDEX: 19.62 KG/M2 | SYSTOLIC BLOOD PRESSURE: 133 MMHG | DIASTOLIC BLOOD PRESSURE: 75 MMHG | HEART RATE: 83 BPM | RESPIRATION RATE: 18 BRPM | OXYGEN SATURATION: 98 % | TEMPERATURE: 98.7 F | WEIGHT: 106.6 LBS

## 2022-12-09 DIAGNOSIS — N82.3 RECTOVAGINAL FISTULA: ICD-10-CM

## 2022-12-09 DIAGNOSIS — D63.0 ANEMIA IN NEOPLASTIC DISEASE: ICD-10-CM

## 2022-12-09 DIAGNOSIS — I82.403 DEEP VEIN THROMBOSIS (DVT) OF BOTH LOWER EXTREMITIES, UNSPECIFIED CHRONICITY, UNSPECIFIED VEIN (HCC): ICD-10-CM

## 2022-12-09 DIAGNOSIS — C67.9 MALIGNANT NEOPLASM OF URINARY BLADDER, UNSPECIFIED SITE (HCC): ICD-10-CM

## 2022-12-09 DIAGNOSIS — Z09 HOSPITAL DISCHARGE FOLLOW-UP: Primary | ICD-10-CM

## 2022-12-09 DIAGNOSIS — K56.609 SMALL BOWEL OBSTRUCTION (HCC): ICD-10-CM

## 2022-12-09 RX ORDER — ONDANSETRON 4 MG/1
TABLET, ORALLY DISINTEGRATING ORAL
COMMUNITY
Start: 2022-11-14

## 2022-12-09 RX ORDER — TRAMADOL HYDROCHLORIDE 50 MG/1
50 TABLET ORAL
Qty: 20 TABLET | Refills: 0 | Status: SHIPPED | OUTPATIENT
Start: 2022-12-09 | End: 2022-12-16

## 2022-12-09 RX ORDER — BISMUTH SUBSALICYLATE 262 MG
1 TABLET,CHEWABLE ORAL DAILY
COMMUNITY

## 2022-12-09 NOTE — PROGRESS NOTES
1. \"Have you been to the ER, urgent care clinic since your last visit? Hospitalized since your last visit? \" Yes When: 10/19/2022 Where: [facility]  Reason for visit: Small Bowel Obstruction     2. \"Have you seen or consulted any other health care providers outside of the [address] [place] [address] since your last visit? \" No     3. For patients aged 39-70: Has the patient had a colonoscopy / FIT/ Cologuard? Yes - Care Gap present. Most recent result on file      If the patient is female:    4. For patients aged 41-77: Has the patient had a mammogram within the past 2 years? Yes - Care Gap present. Most recent result on file      5. For patients aged 21-65: Has the patient had a pap smear?  No    Chief Complaint   Patient presents with    Hospital Follow Up    Bladder Cancer    Other     Small bowel obstruction       Blood Clot    Anemia     Visit Vitals  /75 (BP 1 Location: Right upper arm, BP Patient Position: Sitting, BP Cuff Size: Adult)   Pulse 83   Temp 98.7 °F (37.1 °C) (Oral)   Resp 18   Ht 5' 2\" (1.575 m)   Wt 106 lb 9.6 oz (48.4 kg)   LMP  (LMP Unknown)   SpO2 98%   BMI 19.50 kg/m²

## 2022-12-09 NOTE — PROGRESS NOTES
Negrito Montes (: 1965) is a 61 y.o. female, established patient, here for evaluation of the following chief complaint(s):  Hospital Follow Up, Bladder Cancer, Other (Small bowel obstruction /), Blood Clot, and Anemia     PCP: Dr. Evaristo Pina     ASSESSMENT/PLAN:  Below is the assessment and plan developed based on review of pertinent history, physical exam, labs, studies, and medications. 1. Hospital discharge follow-up  -     traMADoL (ULTRAM) 50 mg tablet; Take 1 Tablet by mouth every eight (8) hours as needed for Pain for up to 7 days. Max Daily Amount: 150 mg., Normal, Disp-20 Tablet, R-0  2. Small bowel obstruction ([address]  3. Rectovaginal fistula  4. Malignant neoplasm of urinary bladder, unspecified site ([address]  5. Anemia in neoplastic disease  6. Deep vein thrombosis (DVT) of both lower extremities, unspecified chronicity, unspecified vein (HCC)    PCP: Dr. Evaristo Pina. Patient presents for hospital discharge follow-up. Reviewed discharge summary as well as notes from surgery and urology. See details below. She is doing okay since discharge, still complains of abdominal pain which is not controlled by Tylenol. Will prescribe her tramadol as needed, short course. She was also recommended to take all her laxatives to prevent constipation. She is to continue following up with urology, surgery as well as heme oncology. Reviewed recent blood work. Continue Eliquis for DVT. No follow-ups on file. SUBJECTIVE/OBJECTIVE:  HUE Montes is a 48-year-old female who presents to the clinic for hospital discharge follow-up. She has a complicated medical history. She was admitted to [facility] on 10/19 and discharged on  for small bowel obstruction, acute blood loss anemia, status post robotic assisted surgical procedure, pelvic exenteration as well as vaginal cuff cellulitis.     She has a history of bladder cancer status post cystectomy, hysterectomy, BSO and ileal conduit diversion, anemia, hep C, left leg DVT status post IVC filter. She was admitted for complaints of weakness, nausea abdominal pain. She had extensive bilateral iliac venous thrombus to the level of the IVC filter, and she underwent a mechanical thrombectomy of bilateral lower extremities with a large volume of clot removed. She was started on Eliquis. She also had bilateral hydronephrosis with hydroureter most likely from the ileal conduit. She also had small bowel obstruction secondary to adhesions. She was taken to the OR on 10/31 for vaginal wound washout, rectovaginal fistula closure with drain placement and distal rectal repair with loop colostomy done on 11/2. She has been following up with urology. She had a recent urology appointment she was told to follow-up with heme oncology. She has an appoint with Dr. Saint Coats  She has an appointment with surgery in January. .  Follow-up with surgery after discharge. She was advised to continue sitz bath. Recently had blood work done 12/7, anemia improving. She complains of abdominal pain near the colostomy site. States Tylenol is not helping. Documented that she has hepatitis C Ab positive, Hep C RNA from 2021 was negative. Allergies   Allergen Reactions    Bactrim [Sulfamethoprim] Swelling     Swelling of the lilps    Penicillin V Potassium Swelling     Current Outpatient Medications   Medication Sig    ondansetron (ZOFRAN ODT) 4 mg disintegrating tablet Take  by mouth.    multivitamin (Daily-Luan) tablet Take 1 Tablet by mouth daily. traMADoL (ULTRAM) 50 mg tablet Take 1 Tablet by mouth every eight (8) hours as needed for Pain for up to 7 days. Max Daily Amount: 150 mg.    docusate sodium (COLACE) 100 mg capsule Take 1 Capsule by mouth two (2) times a day for 90 days. Pediatric Multivit Comb#19-FA (Children's Multi-Vit Gummies) 200 mcg chew Take 1 Dose by mouth daily.     psyllium seed-sucrose (Metamucil, sugar,) powd Take 1 Scoop by mouth daily. apixaban (ELIQUIS) 5 mg tablet Take 1 Tablet by mouth every twelve (12) hours for 90 days. Indications: blood clot in a deep vein of the extremities    sucralfate (CARAFATE) 1 gram tablet Take 1 Tablet by mouth Before breakfast, lunch, dinner and at bedtime for 30 days. Indications: reflux esophagitis, or inflammation of the esophagus from backflow of stomach acid    pantoprazole (PROTONIX) 40 mg tablet Take 1 Tablet by mouth Daily (before breakfast) for 30 days. Indications: gastroesophageal reflux disease    ascorbic acid, vitamin C, (VITAMIN C) 500 mg tablet Take 1 Tablet by mouth daily for 30 days. Indications: inadequate vitamin C    diclofenac (VOLTAREN) 1 % gel Apply 2 g to affected area two (2) times a day. famotidine (PEPCID) 20 mg tablet Take 20 mg by mouth two (2) times a day. acetaminophen (TYLENOL) 650 mg TbER Take 650 mg by mouth every six to eight (6-8) hours as needed for Pain. ferrous sulfate 325 mg (65 mg iron) tablet Take 325 mg by mouth daily. No current facility-administered medications for this visit.      Past Medical History:   Diagnosis Date    Anemia     pt states had recent blood transfusion 2022    Back pain     Cancer (HCC)     bladder    DVT (deep venous thrombosis) (HCC)     and PE, pt denies lung PE , only leg several years ago    Hepatitis C     pt states dx 1 month ago    Liver disease     Presence of IVC filter     pt states several years ago    Rheumatoid arthritis ([address]     Sciatic leg pain     pt states both legs    Uterine fibroid      Past Surgical History:   Procedure Laterality Date    HX APPENDECTOMY  10/03/2022    HX  SECTION      HX GI  2022    laparoscopic early postop adhesion of the sigmoid colon    HX GI  2022    laparoscopic lysis of adhesion    HX GI  10/31/2022    rigid sigmoidoscope examination of the rectum    HX GI  10/31/2022    distal rectal repair with primary suturing technique    HX GYN  10/03/2022 ROBOT ANTERIOR PELVIC EXENTERATION    HX HYSTERECTOMY  10/03/2022    HX SALPINGO-OOPHORECTOMY Bilateral 10/03/2022    HX UROLOGICAL  09/15/2022    CYSTOSCOPY    HX UROLOGICAL  09/15/2022    URETHRAL DILATION    HX UROLOGICAL  09/15/2022    URETERAL STENT PLACEMENT    HX UROLOGICAL  09/15/2022    TRANSURETHRAL RESECTION OF BLADDER TUMOR >2CM    HX UROLOGICAL Bilateral 09/15/2022    RETROGRADE PYELOGRAM    HX UROLOGICAL  10/03/2022    PELVIC LYMPH NODE DISSECTION    HX UROLOGICAL  10/03/2022    ILEAL CONDUIT URINARY DIVERSION    IR THROMBECTOMY [facility] VEIN W PHARM  10/27/2022    AK CARDIAC SURG PROCEDURE UNLIST      stent placement     Family History   Problem Relation Age of Onset    Cancer Mother     Lung Cancer Mother     Heart Disease Father     Hypertension Sister     Cancer Maternal Aunt     Breast Cancer Maternal Aunt      Social History     Tobacco Use   Smoking Status Former    Packs/day: 0.50    Years: 10.00    Pack years: 5.00    Types: Cigarettes    Quit date: 1998    Years since quittin.9   Smokeless Tobacco Never   Tobacco Comments    Quit 15 years ago         Review of Systems   Constitutional:  Positive for malaise/fatigue. Negative for fever. HENT:  Negative for congestion and sore throat. Eyes: Negative. Respiratory:  Negative for shortness of breath. Cardiovascular:  Positive for leg swelling. Negative for chest pain. Gastrointestinal:  Negative for abdominal pain. Colostomy bag, ileal conduit   Genitourinary: Negative. Skin: Negative. Neurological:  Negative for focal weakness, loss of consciousness and headaches. Psychiatric/Behavioral: Negative. Vitals:    22 0929   BP: 133/75   Pulse: 83   Resp: 18   Temp: 98.7 °F (37.1 °C)   TempSrc: Oral   SpO2: 98%   Weight: 106 lb 9.6 oz (48.4 kg)   Height: 5' 2\" (1.575 m)      Physical Exam  Constitutional:       General: She is not in acute distress. Appearance: Normal appearance.    HENT:      Head: Normocephalic. Eyes:      Extraocular Movements: Extraocular movements intact. Conjunctiva/sclera: Conjunctivae normal.      Pupils: Pupils are equal, round, and reactive to light. Cardiovascular:      Rate and Rhythm: Normal rate and regular rhythm. Heart sounds: Normal heart sounds. Pulmonary:      Effort: Pulmonary effort is normal.      Breath sounds: Normal breath sounds. Abdominal:      Palpations: Abdomen is soft. Comments: Colostomy bag with stools, ileal conduit with urine   Musculoskeletal:      Cervical back: Normal range of motion and neck supple. Neurological:      General: No focal deficit present. Mental Status: She is alert. Mental status is at baseline. Motor: No weakness. Psychiatric:         Mood and Affect: Mood normal.         Behavior: Behavior normal.           An electronic signature was used to authenticate this note.   -- Breanna eJan MD

## 2022-12-12 ENCOUNTER — TELEPHONE (OUTPATIENT)
Dept: SURGERY | Age: 57
End: 2022-12-12

## 2022-12-12 ENCOUNTER — TELEPHONE (OUTPATIENT)
Dept: UROLOGY | Age: 57
End: 2022-12-12

## 2022-12-12 NOTE — TELEPHONE ENCOUNTER
PT wanted to know if she could go to her dentist appt on 12/22 due to her recently surgeries she had

## 2022-12-12 NOTE — TELEPHONE ENCOUNTER
Patient called wanting to know when was Dr. Francisco Javier Duffy be sending over gummy bears for her appetite.  Please give her a call back when able [phone]

## 2022-12-12 NOTE — TELEPHONE ENCOUNTER
Returned pt call. Informed her Dr. Eduarda Fitch doesn't do medical marijuana. Also, she should call her primary care about her refill of her meds. They also may be able to help her with where she can get medical marijuana. Pt says she will call them. I told her the gummys will help if she could just try them. She says she will. Pt also says she got her Roll[employer].  [email or web address]

## 2022-12-12 NOTE — TELEPHONE ENCOUNTER
Patient called back stating that she does not want the children vitamins she is an adult, she would like to have medical marijuana instead. She also states that she is running out of her other medication.

## 2022-12-12 NOTE — TELEPHONE ENCOUNTER
Ms Reda Gibbon called this morning and wanted to ask Remy Enriquez if she could go to the dentist, she has a dental appt on 12/23.  Please call back [phone]

## 2022-12-13 NOTE — TELEPHONE ENCOUNTER
Returned pt call. She says she is going to dentist to talk about trying to get dentures. I told her she should be fine to go speak to them about dentures. She was concerned about her blood thinner. I told her they will tell her what they want her to do concerning that before they do any work on her mouth. Then she can let us know how the dentist wants to proceed. Dr. Henry Ingram will give her instructions at that time. Also told her she may want to check with her primary care to make sure there is nothing they would be concerned about with her going to dentist. She says she will call them. Thank you

## 2022-12-14 ENCOUNTER — TELEPHONE (OUTPATIENT)
Dept: SURGERY | Age: 57
End: 2022-12-14

## 2022-12-14 NOTE — TELEPHONE ENCOUNTER
Ms Sterling Lozada called this afternoon and stated her niece went to  prescriptions from [facility], stated that there were no gummies to be picked up, she also said she isnt messing with [facility] anymore she wants all of her medications to be now be sent to [facility].

## 2022-12-16 NOTE — TELEPHONE ENCOUNTER
Ms Maura Roman called this morning again regarding the gummy vitamins Dr Blake Brannon was supposed to call her in.  Please call patient [phone]

## 2022-12-19 ENCOUNTER — TELEPHONE (OUTPATIENT)
Dept: INTERNAL MEDICINE CLINIC | Age: 57
End: 2022-12-19

## 2022-12-19 NOTE — TELEPHONE ENCOUNTER
----- Message from [facility] sent at 12/13/2022 10:02 AM EST -----  Subject: Refill Request    QUESTIONS  Name of Medication? pantoprazole (PROTONIX) 40 mg tablet  Patient-reported dosage and instructions? 40 MG take one tablet by mouth   once daily before breakfast  How many days do you have left? 0  Preferred Pharmacy? [facility]Y #[id number]  Pharmacy phone number (if available)? [phone]  Additional Information for Provider? Patient seen her provider on   12/9/2022 and forget to tell her she needed her medications refilled. Please contact patient to further assist.   ---------------------------------------------------------------------------  --------------  CALL BACK INFO  What is the best way for the office to contact you? OK to leave message on   voicemail  Preferred Call Back Phone Number? [phone]  ---------------------------------------------------------------------------  --------------  SCRIPT ANSWERS  Relationship to Patient?  Self

## 2022-12-19 NOTE — TELEPHONE ENCOUNTER
----- Message from [facility] sent at 12/13/2022 10:00 AM EST -----  Subject: Refill Request    QUESTIONS  Name of Medication? ferrous sulfate 325 mg (65 mg iron) tablet  Patient-reported dosage and instructions? 325 MG take one tablet by mouth   everyday  How many days do you have left? 3  Preferred Pharmacy? [id number]  Pharmacy phone number (if available)? [phone]  Additional Information for Provider? Patient seen her provider on   12/9/2022 and forget to tell her she needed her medications refilled. Please contact patient to further assist.   ---------------------------------------------------------------------------  --------------  CALL BACK INFO  What is the best way for the office to contact you? OK to leave message on   voicemail  Preferred Call Back Phone Number? [phone]  ---------------------------------------------------------------------------  --------------  SCRIPT ANSWERS  Relationship to Patient?  Self

## 2022-12-20 RX ORDER — LANOLIN ALCOHOL/MO/W.PET/CERES
325 CREAM (GRAM) TOPICAL DAILY
Qty: 90 TABLET | Refills: 0 | Status: SHIPPED | OUTPATIENT
Start: 2022-12-20

## 2022-12-20 RX ORDER — DOCUSATE SODIUM 100 MG/1
100 CAPSULE, LIQUID FILLED ORAL 2 TIMES DAILY
Qty: 60 CAPSULE | Refills: 2 | Status: SHIPPED | OUTPATIENT
Start: 2022-12-20 | End: 2023-03-20

## 2022-12-22 DIAGNOSIS — K21.9 GASTROESOPHAGEAL REFLUX DISEASE, UNSPECIFIED WHETHER ESOPHAGITIS PRESENT: Primary | ICD-10-CM

## 2022-12-22 RX ORDER — PANTOPRAZOLE SODIUM 40 MG/1
40 TABLET, DELAYED RELEASE ORAL DAILY
Qty: 90 TABLET | Refills: 1 | Status: SHIPPED | OUTPATIENT
Start: 2022-12-22

## 2023-01-03 ENCOUNTER — TELEPHONE (OUTPATIENT)
Dept: SURGERY | Age: 58
End: 2023-01-03

## 2023-01-03 NOTE — TELEPHONE ENCOUNTER
Spoke to patient and advised she should go to the ER for the symptoms and have an ultrasound done there and to be evaluated. Patient stating she is able to walk now and will wait for her appt. Was advised again if symptoms happen again she needs to go to ER. Patient stated her understanding.

## 2023-01-03 NOTE — TELEPHONE ENCOUNTER
Patient called in stating that she has pain in her legs(ankles) they were swollen she could barley walk and she stated she had blood clots in her legs before. Patient wants to have an ultrasound to make sure there are no blood clots. Ms. Chris Massey has an appointment on 1/05 and wanted to see if she could have it done before her appointment.   Please call back when able [phone]

## 2023-01-05 ENCOUNTER — TELEPHONE (OUTPATIENT)
Dept: SURGERY | Age: 58
End: 2023-01-05

## 2023-01-05 ENCOUNTER — OFFICE VISIT (OUTPATIENT)
Dept: SURGERY | Age: 58
End: 2023-01-05
Payer: MEDICAID

## 2023-01-05 ENCOUNTER — TELEPHONE (OUTPATIENT)
Dept: PODIATRY | Age: 58
End: 2023-01-05

## 2023-01-05 VITALS
HEIGHT: 62 IN | OXYGEN SATURATION: 98 % | HEART RATE: 85 BPM | SYSTOLIC BLOOD PRESSURE: 127 MMHG | BODY MASS INDEX: 20.15 KG/M2 | TEMPERATURE: 98.4 F | DIASTOLIC BLOOD PRESSURE: 81 MMHG | WEIGHT: 109.5 LBS

## 2023-01-05 DIAGNOSIS — K56.609 SMALL BOWEL OBSTRUCTION (HCC): Primary | ICD-10-CM

## 2023-01-05 DIAGNOSIS — Z09 POSTOPERATIVE EXAMINATION: ICD-10-CM

## 2023-01-05 PROCEDURE — 99024 POSTOP FOLLOW-UP VISIT: CPT | Performed by: SURGERY

## 2023-01-05 RX ORDER — DRONABINOL 2.5 MG/1
CAPSULE ORAL
COMMUNITY
Start: 2022-12-16

## 2023-01-05 RX ORDER — TRAMADOL HYDROCHLORIDE 50 MG/1
TABLET ORAL
COMMUNITY
Start: 2022-12-12

## 2023-01-05 NOTE — TELEPHONE ENCOUNTER
Sheila Griffiths would like for the patient to continue home health for Colostomy issues. Drainage and leaking around supplies. I called and left a message for [facility] sent.

## 2023-01-05 NOTE — PROGRESS NOTES
Identified pt with two pt identifiers (name and ). Reviewed chart in preparation for visit and have obtained necessary documentation. Paresh Rueda is a 61 y.o. female  Chief Complaint   Patient presents with    Follow-up     1m follow up-SBO     Visit Vitals  /81 (BP 1 Location: Left upper arm, BP Patient Position: Sitting, BP Cuff Size: Adult)   Pulse 85   Temp 98.4 °F (36.9 °C) (Temporal)   Ht 5' 2\" (1.575 m)   Wt 109 lb 8 oz (49.7 kg)   LMP  (LMP Unknown)   SpO2 98%   BMI 20.03 kg/m²       1. Have you been to the ER, urgent care clinic since your last visit? Hospitalized since your last visit? No    2. Have you seen or consulted any other health care providers outside of the [address] [place] [address] since your last visit? Include any pap smears or colon screening.  No

## 2023-01-05 NOTE — TELEPHONE ENCOUNTER
Spoke with nicci The patient has been educated multiple times but the patient is non compliant. They have tried multiple times to explain to her how to cut the bags to properly fit and she refuses. Ankita Anna states they have documentation that Alexia Harvey has been wearing an abdominal binder at home and they explained to her why she cannot wear that. I called and spoke with Alexia Harvey and Ms. Loreta Castro and explained to them what Home Health states. I told Ms. Loreta Castro that she may need to be there on Tuesday when they come for recert so they can explain everything to her.

## 2023-01-05 NOTE — TELEPHONE ENCOUNTER
[facility] is requesting for pantoprazole 40 mg, Iron, Famotidine, Vit C and Ferrous sulfate.  Patient is new to Pharmacy     LOV 12-9-22 NOV 1-26-23

## 2023-01-06 NOTE — PROGRESS NOTES
SUBJECTIVE:  Patient is here today for follow-up loop colostomy examination. Patient is doing well. Patient has some drainage from the vaginal area but has improved significantly. Patient is also taking sitz bath. Both colostomy and urostomy is working well. Patient denies nausea vomiting symptoms. Past Medical History:  No date: Anemia      Comment:  pt states had recent blood transfusion 2022  No date: Back pain  No date: Cancer [facility])      Comment:  bladder  No date: DVT (deep venous thrombosis) (HCC)      Comment:  and PE, pt denies lung PE , only leg several years ago  No date: Hepatitis C      Comment:  pt states dx 1 month ago  No date: Liver disease  No date: Presence of IVC filter      Comment:  pt states several years ago  No date: Rheumatoid arthritis ([address]  No date: Sciatic leg pain      Comment:  pt states both legs  No date: Uterine fibroid  Past Surgical History:  10/03/2022: HX APPENDECTOMY  No date: HX  SECTION  2022: HX GI      Comment:  laparoscopic early postop adhesion of the sigmoid colon  2022: HX GI      Comment:  laparoscopic lysis of adhesion  10/31/2022: HX GI      Comment:  rigid sigmoidoscope examination of the rectum  10/31/2022: HX GI      Comment:  distal rectal repair with primary suturing technique  10/03/2022: HX GYN      Comment:  ROBOT ANTERIOR PELVIC EXENTERATION  10/03/2022: HX HYSTERECTOMY  10/03/2022: HX SALPINGO-OOPHORECTOMY; Bilateral  09/15/2022: HX UROLOGICAL      Comment:  CYSTOSCOPY  09/15/2022: HX UROLOGICAL      Comment:  URETHRAL DILATION  09/15/2022: HX UROLOGICAL      Comment:  URETERAL STENT PLACEMENT  09/15/2022: HX UROLOGICAL      Comment:  TRANSURETHRAL RESECTION OF BLADDER TUMOR >2CM  09/15/2022: HX UROLOGICAL;  Bilateral      Comment:  RETROGRADE PYELOGRAM  10/03/2022: HX UROLOGICAL      Comment:  PELVIC LYMPH NODE DISSECTION  10/03/2022: HX UROLOGICAL      Comment:  ILEAL CONDUIT URINARY DIVERSION  10/27/2022: IR THROMBECTOMY [facility] VEIN W PHARM  No date: CO UNLISTED PROCEDURE CARDIAC SURGERY      Comment:  stent placement  [unfilled]  Social History    Tobacco Use      Smoking status: Former        Packs/day: 0.50        Years: 10.00        Pack years: 5        Types: Cigarettes        Quit date: 1998        Years since quittin.0      Smokeless tobacco: Never      Tobacco comments: Quit 15 years ago    Alcohol use: Not Currently    Prior to Admission medications :  Medication dronabinoL (MARINOL) 2.5 mg capsule, Sig , Start Date 22, End Date , Taking? Yes, Authorizing Provider Provider, Historical    Medication traMADoL (ULTRAM) 50 mg tablet, Sig Take  by mouth., Start Date 22, End Date , Taking? Yes, Authorizing Provider Provider, Historical    Medication pantoprazole (PROTONIX) 40 mg tablet, Sig Take 1 Tablet by mouth daily. , Start Date 22, End Date , Taking? Yes, Authorizing Provider Lissa Alicea MD    Medication docusate sodium (COLACE) 100 mg capsule, Sig TAKE 1 CAPSULE BY MOUTH TWO (2) TIMES A DAY FOR 90 DAYS., Start Date 22, End Date 3/20/23, Taking? Yes, Authorizing Provider Loc Haynes MD    Medication ferrous sulfate 325 mg (65 mg iron) tablet, Sig Take 1 Tablet by mouth daily. , Start Date 22, End Date , Taking? Yes, Authorizing Provider Lissa Alicae MD    Medication ondansetron (ZOFRAN ODT) 4 mg disintegrating tablet, Sig Take  by mouth., Start Date 22, End Date , Taking? Yes, Authorizing Provider Provider, Historical    Medication multivitamin (ONE A DAY) tablet, Sig Take 1 Tablet by mouth daily. , Start Date , End Date , Taking? Yes, Authorizing Provider Provider, Historical    Medication Pediatric Multivit Comb#19-FA (Children's Multi-Vit Gummies) 200 mcg chew, Sig Take 1 Dose by mouth daily. , Start Date 22, End Date , Taking?  Yes, Authorizing Provider Loc Haynes MD    Medication psyllium seed-sucrose (Metamucil, sugar,) powd, Sig Take 1 Scoop by mouth daily. , Start Date 12/7/22, End Date , Taking? Yes, Authorizing Provider Venita White MD    Medication apixaban (ELIQUIS) 5 mg tablet, Sig Take 1 Tablet by mouth every twelve (12) hours for 90 days. Indications: blood clot in a deep vein of the extremities, Start Date 11/11/22, End Date 2/9/23, Taking? Yes, Authorizing Provider Juanita WELLINGTON NP    Medication diclofenac (VOLTAREN) 1 % gel, Sig Apply 2 g to affected area two (2) times a day., Start Date , End Date , Taking? Yes, Authorizing Provider Provider, Historical    Medication acetaminophen (TYLENOL) 650 mg TbER, Sig Take 650 mg by mouth every six to eight (6-8) hours as needed for Pain., Start Date , End Date , Taking? Yes, Authorizing Provider Provider, Historical       -- Bactrim [Sulfamethoprim] -- Swelling    --  Swelling of the lilps   -- Penicillin V Potassium -- Swelling      OBJECTIVE:  /81 (BP 1 Location: Left upper arm, BP Patient Position: Sitting, BP Cuff Size: Adult)   Pulse 85   Temp 98.4 °F (36.9 °C) (Temporal)   Ht 5' 2\" (1.575 m)   Wt 109 lb 8 oz (49.7 kg)   LMP  (LMP Unknown)   SpO2 98%   BMI 20.03 kg/m²     PT IS PLEASANT AND AWAKE AND ALERT. WOUND EXAM:  Colostomy appears looking okay. I did not examine  area today. ASSESSMENT:  Problem List Items Addressed This Visit        Digestive    Small bowel obstruction ([address] - Primary    Relevant Medications    dronabinoL (MARINOL) 2.5 mg capsule   Other Visit Diagnoses     Postoperative examination              PLAN:  Continue colostomy care. Patient complaining of bilateral lower extremity swelling however this is improved. Patient does have a deep vein thrombosis currently on anticoagulant therapy. At some point patient will need repeat venous duplex ultrasound to follow-up resolution of the vein thrombosis. This will be arranged sometime 6-month. Apparently home nurse has stopped following her because she has been noncompliant with colostomy care.   We will contact home nurse. Patient will be reassessed in 1 month follow-up.

## 2023-01-09 ENCOUNTER — TELEPHONE (OUTPATIENT)
Dept: INTERNAL MEDICINE CLINIC | Age: 58
End: 2023-01-09

## 2023-01-09 RX ORDER — SUCRALFATE 1 G/1
1 TABLET ORAL
Qty: 120 TABLET | Refills: 0 | Status: CANCELLED | OUTPATIENT
Start: 2023-01-09 | End: 2023-02-08

## 2023-01-09 NOTE — TELEPHONE ENCOUNTER
----- Message from Thanh Clark sent at 1/6/2023  9:33 AM EST -----  Subject: Refill Request    QUESTIONS  Name of Medication? Other - Eliquis 5 mg  Patient-reported dosage and instructions? 1 tablet every 12 hours  How many days do you have left? 2  Preferred Pharmacy? [facility] phone number (if available)? [phone]  ---------------------------------------------------------------------------  --------------,  Name of Medication? Pediatric Multivit Comb#19-FA (Children's Multi-Vit   Gummies) 200 mcg chew  Patient-reported dosage and instructions? 1 daily  How many days do you have left? 0  Preferred Pharmacy? [facility] phone number (if available)? [phone]  ---------------------------------------------------------------------------  --------------,  Name of Medication? docusate sodium (COLACE) 100 mg capsule  Patient-reported dosage and instructions? 1 capsule twice daily  How many days do you have left? 0  Preferred Pharmacy? [facility] phone number (if available)? [phone]  ---------------------------------------------------------------------------  --------------,  Name of Medication? sucralfate (CARAFATE) 1 gram tablet  Patient-reported dosage and instructions? 1 tablet 4 times daily  How many days do you have left? 0  Preferred Pharmacy? [facility] phone number (if available)? [phone]  ---------------------------------------------------------------------------  --------------,  Name of Medication? ferrous sulfate 325 mg (65 mg iron) tablet  Patient-reported dosage and instructions? 1 tablet daily  How many days do you have left? 0  Preferred Pharmacy? [facility] phone number (if available)? [phone]  ---------------------------------------------------------------------------  --------------,  Name of Medication? Other - Famotrdine 20 mg  Patient-reported dosage and instructions?  1 tablet twice daily  How many days do you have left? 0  Preferred Pharmacy? [facility] phone number (if available)? [phone]  ---------------------------------------------------------------------------  --------------,  Name of Medication? pantoprazole (PROTONIX) 40 mg tablet  Patient-reported dosage and instructions? 1 tablet daily  How many days do you have left? 0  Preferred Pharmacy? [facility] phone number (if available)? [phone]  ---------------------------------------------------------------------------  --------------,  Name of Medication? Other - tylenol 650 mg  Patient-reported dosage and instructions? 1 tablet 3 times daily as needed  How many days do you have left? 0  Preferred Pharmacy? [facility] phone number (if available)? [phone]  ---------------------------------------------------------------------------  --------------  [facility]  What is the best way for the office to contact you? OK to leave message on   voicemail  Preferred Call Back Phone Number? [phone]  ---------------------------------------------------------------------------  --------------  SCRIPT ANSWERS  Relationship to Patient?  Self

## 2023-01-10 ENCOUNTER — TELEPHONE (OUTPATIENT)
Dept: SURGERY | Age: 58
End: 2023-01-10

## 2023-01-10 NOTE — TELEPHONE ENCOUNTER
Patient called today regarding her home health care. She gave me permission to speak with her niece, Ms. Angelica Covarrubias. She would like to continue her home health care. Today was the last visit. Please send letter of medical necessity to have them to continue to come to her house. She said that she will comply. [phone].

## 2023-01-11 NOTE — TELEPHONE ENCOUNTER
Called and spoke with Ms. Dustin Liz she states that she will be in the home this time when home health comes. I told her I would call [facility] to see if they will come back. I left a message for Boubacar Hendricks at [facility].

## 2023-01-11 NOTE — TELEPHONE ENCOUNTER
I spoke with Jono Bolivar and she states that they have discharged the patient and are not going back due to non compliance. I called and spoke with Ms. Carlos A Barnes and told her that we would ask Rockymaria victoria Jethro if a new order can be put in to a different home health agency. I explained to her that theyre coming to teach them how to care for her Colostomy bag.

## 2023-01-12 RX ORDER — LANOLIN ALCOHOL/MO/W.PET/CERES
325 CREAM (GRAM) TOPICAL DAILY
Qty: 90 TABLET | Refills: 0 | Status: SHIPPED | OUTPATIENT
Start: 2023-01-12

## 2023-01-12 RX ORDER — SUCRALFATE 1 G/1
1 TABLET ORAL 4 TIMES DAILY
Qty: 120 TABLET | Refills: 0 | Status: SHIPPED | OUTPATIENT
Start: 2023-01-12 | End: 2023-02-11

## 2023-01-12 RX ORDER — BLOOD-GLUCOSE METER
1 KIT MISCELLANEOUS DAILY
Qty: 30 TABLET | Refills: 2 | Status: SHIPPED | OUTPATIENT
Start: 2023-01-12

## 2023-01-12 RX ORDER — DEXTROMETHORPHAN HYDROBROMIDE, GUAIFENESIN 5; 100 MG/5ML; MG/5ML
650 LIQUID ORAL
Qty: 30 TABLET | Refills: 0 | Status: SHIPPED | OUTPATIENT
Start: 2023-01-12

## 2023-01-12 RX ORDER — DOCUSATE SODIUM 100 MG/1
100 CAPSULE, LIQUID FILLED ORAL
Qty: 60 CAPSULE | Refills: 2 | Status: SHIPPED | OUTPATIENT
Start: 2023-01-12 | End: 2023-04-12

## 2023-01-13 ENCOUNTER — TELEPHONE (OUTPATIENT)
Dept: UROLOGY | Age: 58
End: 2023-01-13

## 2023-01-13 RX ORDER — ASCORBIC ACID 500 MG
500 TABLET ORAL DAILY
Qty: 90 TABLET | Refills: 0 | Status: SHIPPED | OUTPATIENT
Start: 2023-01-13

## 2023-01-13 NOTE — TELEPHONE ENCOUNTER
PT STATED HER URINARY FREQUENCY HAS INCREASED OVER THE PAST COUPLE DAYS, IS THIS A CONCERN ?     PTS NIECE, Eston Dus, [address] KEEPING AN EYE ON TO SEE HOW FREQUENT SHE IS URINATING       PROVIDED FAX # SO SHE COULD GIVE IT TO HER SUPPLIER ALSO FOR MORE BAGS       CALLBACK # FOR PT: [phone]

## 2023-01-16 ENCOUNTER — TELEPHONE (OUTPATIENT)
Dept: INTERNAL MEDICINE CLINIC | Age: 58
End: 2023-01-16

## 2023-01-16 NOTE — TELEPHONE ENCOUNTER
[facility] is requesting Pantoprazole Sod DR 40 mg Qty 30   Famotidine 20 mg tablets    LOV 12-9-22  NOV 1-26-23

## 2023-01-18 DIAGNOSIS — K21.9 GASTROESOPHAGEAL REFLUX DISEASE, UNSPECIFIED WHETHER ESOPHAGITIS PRESENT: ICD-10-CM

## 2023-01-19 ENCOUNTER — TELEPHONE (OUTPATIENT)
Dept: UROLOGY | Age: 58
End: 2023-01-19

## 2023-01-19 NOTE — TELEPHONE ENCOUNTER
Patient and niece called to see if Dr. Jann Arevalo can order a pet scan    Please advise and they also want to speak to Dr. Jann Arevalo

## 2023-01-20 RX ORDER — PANTOPRAZOLE SODIUM 40 MG/1
40 TABLET, DELAYED RELEASE ORAL DAILY
Qty: 90 TABLET | Refills: 1 | Status: SHIPPED | OUTPATIENT
Start: 2023-01-20

## 2023-01-24 ENCOUNTER — TRANSCRIBE ORDER (OUTPATIENT)
Dept: SCHEDULING | Age: 58
End: 2023-01-24

## 2023-01-24 DIAGNOSIS — N32.9 DISEASE OF BLADDER: Primary | ICD-10-CM

## 2023-01-26 ENCOUNTER — OFFICE VISIT (OUTPATIENT)
Dept: INTERNAL MEDICINE CLINIC | Age: 58
End: 2023-01-26
Payer: MEDICAID

## 2023-01-26 VITALS
OXYGEN SATURATION: 99 % | HEART RATE: 84 BPM | RESPIRATION RATE: 18 BRPM | WEIGHT: 110.8 LBS | TEMPERATURE: 98.1 F | SYSTOLIC BLOOD PRESSURE: 118 MMHG | HEIGHT: 63 IN | DIASTOLIC BLOOD PRESSURE: 80 MMHG | BODY MASS INDEX: 19.63 KG/M2

## 2023-01-26 DIAGNOSIS — D64.9 ANEMIA, UNSPECIFIED TYPE: ICD-10-CM

## 2023-01-26 DIAGNOSIS — C67.9 MALIGNANT NEOPLASM OF URINARY BLADDER, UNSPECIFIED SITE (HCC): Primary | ICD-10-CM

## 2023-01-26 DIAGNOSIS — I81 PVT (PORTAL VEIN THROMBOSIS): Primary | ICD-10-CM

## 2023-01-26 DIAGNOSIS — I82.4Y3 DEEP VEIN THROMBOSIS (DVT) OF PROXIMAL VEIN OF BOTH LOWER EXTREMITIES, UNSPECIFIED CHRONICITY (HCC): ICD-10-CM

## 2023-01-26 DIAGNOSIS — N82.3 RECTOVAGINAL FISTULA: ICD-10-CM

## 2023-01-26 DIAGNOSIS — K56.609 SMALL BOWEL OBSTRUCTION (HCC): ICD-10-CM

## 2023-01-26 DIAGNOSIS — K21.9 GASTROESOPHAGEAL REFLUX DISEASE, UNSPECIFIED WHETHER ESOPHAGITIS PRESENT: ICD-10-CM

## 2023-01-26 RX ORDER — MULTIVITAMIN
1 TABLET ORAL DAILY
Qty: 90 TABLET | Refills: 1 | Status: SHIPPED | OUTPATIENT
Start: 2023-01-26

## 2023-01-26 NOTE — PROGRESS NOTES
Trinidad Honeycutt (: 1965) is a 61 y.o. female, established patient, here for evaluation of the following chief complaint(s):  Follow-up and Other (Small bowel obstruction )       ASSESSMENT/PLAN:  Below is the assessment and plan developed based on review of pertinent history, physical exam, labs, studies, and medications. 1. Malignant neoplasm of urinary bladder, unspecified site ([address]  2. Small bowel obstruction ([address]  3. Rectovaginal fistula  4. Deep vein thrombosis (DVT) of proximal vein of both lower extremities, unspecified chronicity ([address]  5. Gastroesophageal reflux disease, unspecified whether esophagitis present  6. Anemia, unspecified type    Patient has active bladder cancer. See details below. She is following up with heme oncology, has a PET scan scheduled in February. And then they will decide on further course of treatment. She does have a colostomy bag, had a recent appointment with surgery. Continue follow-up. She had a history of rectovaginal fistula, status post closure. She states that she still noticed some abnormal discharge. She was told to discuss this with her surgeon. History of bilateral DVT, continue Eliquis. She does also have an IVC filter. GERD: Continue Protonix. Reviewed recent blood work done in December. Patient was told to increase potassium in her diet. She does complain of abdominal pain, she was prescribed tramadol during the last visit, states she has not taken it as she was worried with the interaction with Eliquis. Told her that she can take it as needed and it should not interact with the Eliquis. Continue iron supplements    Return in about 6 months (around 2023) for follow up. SUBJECTIVE/OBJECTIVE:  HPI    Trinidad Honeycutt is a 14-year-old who presents to the clinic for follow-up. She has she has a significant past medical history of bladder cancer status post cystectomy, hysterectomy, BSO and ileal conduit diversion. She was also admitted recently in November for small bowel obstruction with loop colostomy. And acute blood loss anemia. Underwent robotic assisted surgical procedure, pelvic exenteration. She also had vaginal cuff cellulitis. 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 She underwent mechanical thrombectomy of bilateral lower extremities. She is currently on Eliquis. 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 She also has a rectovaginal fistula, which was closed. She has been following with urology. And heme oncology. She also Follows with surgery. Saw Dr. Alba Galvez in January, reviewed notes. He has been seeing Dr. Jovon Vaughan, heme-oncology, he said that the cancer has spread but he wanted to do PET scan which is scheduled for February. Once they have the results they will decide on the treatment. She states she still has some abnormal discharge from her vagina was told to discuss this with her surgeon. Allergies   Allergen Reactions    Bactrim [Sulfamethoprim] Swelling     Swelling of the lilps    Penicillin V Potassium Swelling     Current Outpatient Medications   Medication Sig    multivitamin (ONE A DAY) tablet Take 1 Tablet by mouth daily. pantoprazole (PROTONIX) 40 mg tablet Take 1 Tablet by mouth daily. ascorbic acid, vitamin C, (VITAMIN C) 500 mg tablet Take 1 Tablet by mouth daily. apixaban (ELIQUIS) 5 mg tablet Take 1 Tablet by mouth every twelve (12) hours for 90 days. Indications: blood clot in a deep vein of the extremities    acetaminophen (TYLENOL) 650 mg TbER Take 1 Tablet by mouth every eight (8) hours as needed for Pain. docusate sodium (COLACE) 100 mg capsule Take 1 Capsule by mouth two (2) times daily as needed for Constipation for up to 90 days. ferrous sulfate 325 mg (65 mg iron) tablet Take 1 Tablet by mouth daily.     ondansetron (ZOFRAN ODT) 4 mg disintegrating tablet Take  by mouth.    psyllium seed-sucrose (Metamucil, sugar,) powd Take 1 Scoop by mouth daily. traMADoL (ULTRAM) 50 mg tablet Take  by mouth. (Patient not taking: Reported on 2023)     No current facility-administered medications for this visit.      Past Medical History:   Diagnosis Date    Anemia     pt states had recent blood transfusion 2022    Back pain     Cancer (HCC)     bladder    DVT (deep venous thrombosis) (HCC)     and PE, pt denies lung PE , only leg several years ago    Hepatitis C     pt states dx 1 month ago    Liver disease     Presence of IVC filter     pt states several years ago    Rheumatoid arthritis ([facility]     Sciatic leg pain     pt states both legs    Uterine fibroid      Past Surgical History:   Procedure Laterality Date    HX APPENDECTOMY  10/03/2022    HX  SECTION      HX GI  2022    laparoscopic early postop adhesion of the sigmoid colon    HX GI  2022    laparoscopic lysis of adhesion    HX GI  10/31/2022    rigid sigmoidoscope examination of the rectum    HX GI  10/31/2022    distal rectal repair with primary suturing technique    HX GYN  10/03/2022    ROBOT ANTERIOR PELVIC EXENTERATION    HX HYSTERECTOMY  10/03/2022    HX SALPINGO-OOPHORECTOMY Bilateral 10/03/2022    HX UROLOGICAL  09/15/2022    CYSTOSCOPY    HX UROLOGICAL  09/15/2022    URETHRAL DILATION    HX UROLOGICAL  09/15/2022    URETERAL STENT PLACEMENT    HX UROLOGICAL  09/15/2022    TRANSURETHRAL RESECTION OF BLADDER TUMOR >2CM    HX UROLOGICAL Bilateral 09/15/2022    RETROGRADE PYELOGRAM    HX UROLOGICAL  10/03/2022    PELVIC LYMPH NODE DISSECTION    HX UROLOGICAL  10/03/2022    ILEAL CONDUIT URINARY DIVERSION    IR THROMBECTOMY [facility]  10/27/2022    KS UNLISTED PROCEDURE CARDIAC SURGERY      stent placement     Family History   Problem Relation Age of Onset    Cancer Mother     Lung Cancer Mother     Heart Disease Father     Hypertension Sister     Cancer Maternal Aunt     Breast Cancer Maternal Aunt      Social History     Tobacco Use   Smoking Status Former    Packs/day: 0.50    Years: 10.00    Pack years: 5.00    Types: Cigarettes    Quit date: 1998    Years since quittin.0   Smokeless Tobacco Never   Tobacco Comments    Quit 15 years ago         Review of Systems   Constitutional:  Positive for malaise/fatigue. Negative for fever. Eyes: Negative. Respiratory:  Negative for cough and shortness of breath. Cardiovascular:  Negative for chest pain, palpitations and leg swelling. Gastrointestinal:  Positive for abdominal pain. Negative for nausea and vomiting. Colostomy bad, urostomy bag    Genitourinary: Negative. Musculoskeletal:  Negative for joint pain. Skin: Negative. Neurological:  Negative for focal weakness, loss of consciousness and headaches. Psychiatric/Behavioral: Negative. Vitals:    23 1339   BP: 118/80   Pulse: 84   Resp: 18   Temp: 98.1 °F (36.7 °C)   TempSrc: Oral   SpO2: 99%   Weight: 110 lb 12.8 oz (50.3 kg)   Height: 5' 3\" (1.6 m)      Physical Exam  Constitutional:       Appearance: Normal appearance. Comments: Uses cane   HENT:      Head: Normocephalic. Eyes:      Extraocular Movements: Extraocular movements intact. Conjunctiva/sclera: Conjunctivae normal.      Pupils: Pupils are equal, round, and reactive to light. Cardiovascular:      Rate and Rhythm: Normal rate and regular rhythm. Heart sounds: Normal heart sounds. Pulmonary:      Effort: Pulmonary effort is normal.      Breath sounds: Normal breath sounds. Abdominal:      General: Bowel sounds are normal.      Palpations: Abdomen is soft. Comments: Colostomy bag in place, site look healthy, urostomy bag in place   Musculoskeletal:      Cervical back: Normal range of motion and neck supple. Neurological:      General: No focal deficit present. Mental Status: She is alert. Mental status is at baseline. Motor: No weakness.    Psychiatric: Mood and Affect: Mood normal.         Behavior: Behavior normal.               An electronic signature was used to authenticate this note.   -- Pattie Laughlin MD

## 2023-01-26 NOTE — TELEPHONE ENCOUNTER
Patient called in stating that the bags that she have are not working the feces is coming out of the bag. Also when she urinates it comes out of the bag as well. She would like to know if there is any other bags she can use.  Please call back when able [phone]

## 2023-01-26 NOTE — TELEPHONE ENCOUNTER
Spoke to patient she stated it is only one bag she has had the problem with. I explained it may just be a defective bag. Patient stated her understanding. Patient asked about the ultrasound that was supposed to be ordered I let her know I would put the order in and the hospital would call her. She asked about a massager for her legs I let her know she needed to call her insurance and see if there is one insurance would pay for or call the pharmacy. Patient asked about the gummy vitamins she was prescribed I advised patient to call the doctor that prescribed them. She stated her understanding.

## 2023-01-26 NOTE — PROGRESS NOTES
1. \"Have you been to the ER, urgent care clinic since your last visit? Hospitalized since your last visit? \" No    2. \"Have you seen or consulted any other health care providers outside of the [address] [place] [address] since your last visit? \" Yes When: 01/19/2023 Where: [facility]  Reason for visit: Bladder Cancer       3. For patients aged 39-70: Has the patient had a colonoscopy / FIT/ Cologuard? Yes - Care Gap present. Most recent result on file      If the patient is female:    4. For patients aged 41-77: Has the patient had a mammogram within the past 2 years? NA - based on age or sex      11. For patients aged 21-65: Has the patient had a pap smear?  NA - based on age or sex    Chief Complaint   Patient presents with    Follow-up    Other     Small bowel obstruction      Visit Vitals  /80 (BP 1 Location: Left upper arm, BP Patient Position: Sitting, BP Cuff Size: Adult)   Pulse 84   Temp 98.1 °F (36.7 °C) (Oral)   Resp 18   Ht 5' 3\" (1.6 m)   Wt 110 lb 12.8 oz (50.3 kg)   LMP  (LMP Unknown)   SpO2 99%   BMI 19.63 kg/m²

## 2023-01-27 ENCOUNTER — TELEPHONE (OUTPATIENT)
Dept: INTERNAL MEDICINE CLINIC | Age: 58
End: 2023-01-27

## 2023-01-27 NOTE — TELEPHONE ENCOUNTER
----- Message from Natali Silva sent at 1/26/2023  4:20 PM EST -----  Subject: Medication Problem    Medication: traMADoL (ULTRAM) 50 mg tablet  Dosage: As directed   Ordering Provider: loly    Question/Problem: Pt called in wanting to know how is she supposed to take   this medication. Pt wants to know is she only taking this for like a week   or would she get refills on this. Pt said she forgot to ask this when she   was in for her appt today. Please call pt back to let her know the answer   please.       Pharmacy: [address]    ---------------------------------------------------------------------------  --------------  [address] [facility]  [phone]; OK to leave message on voicemail  ---------------------------------------------------------------------------  --------------    SCRIPT ANSWERS  Relationship to Patient: Self

## 2023-01-27 NOTE — TELEPHONE ENCOUNTER
----- Message from Gabriel Carranza sent at 1/26/2023  4:20 PM EST -----  Subject: Medication Problem    Medication: traMADoL (ULTRAM) 50 mg tablet  Dosage: As directed   Ordering Provider: loly    Question/Problem: Pt called in wanting to know how is she supposed to take   this medication. Pt wants to know is she only taking this for like a week   or would she get refills on this. Pt said she forgot to ask this when she   was in for her appt today. Please call pt back to let her know the answer   please.       Pharmacy: [facility] [address]    ---------------------------------------------------------------------------  --------------  [facility]  [phone]; OK to leave message on voicemail  ---------------------------------------------------------------------------  --------------    SCRIPT ANSWERS  Relationship to Patient: Self

## 2023-02-01 ENCOUNTER — APPOINTMENT (OUTPATIENT)
Dept: NON INVASIVE DIAGNOSTICS | Age: 58
End: 2023-02-01
Attending: EMERGENCY MEDICINE
Payer: MEDICAID

## 2023-02-01 ENCOUNTER — APPOINTMENT (OUTPATIENT)
Dept: GENERAL RADIOLOGY | Age: 58
End: 2023-02-01
Attending: EMERGENCY MEDICINE
Payer: MEDICAID

## 2023-02-01 ENCOUNTER — HOSPITAL ENCOUNTER (EMERGENCY)
Age: 58
Discharge: HOME | End: 2023-02-01
Attending: EMERGENCY MEDICINE
Payer: MEDICAID

## 2023-02-01 VITALS
BODY MASS INDEX: 20.38 KG/M2 | OXYGEN SATURATION: 100 % | RESPIRATION RATE: 18 BRPM | SYSTOLIC BLOOD PRESSURE: 112 MMHG | TEMPERATURE: 98.1 F | HEIGHT: 63 IN | DIASTOLIC BLOOD PRESSURE: 75 MMHG | WEIGHT: 115 LBS | HEART RATE: 91 BPM

## 2023-02-01 DIAGNOSIS — L03.116 CELLULITIS OF LEFT LOWER EXTREMITY: Primary | ICD-10-CM

## 2023-02-01 DIAGNOSIS — Z86.718 HISTORY OF BLOOD CLOTS: ICD-10-CM

## 2023-02-01 PROCEDURE — 73610 X-RAY EXAM OF ANKLE: CPT

## 2023-02-01 PROCEDURE — 99284 EMERGENCY DEPT VISIT MOD MDM: CPT

## 2023-02-01 PROCEDURE — 93971 EXTREMITY STUDY: CPT

## 2023-02-01 RX ORDER — TRAMADOL HYDROCHLORIDE 50 MG/1
50 TABLET ORAL
Qty: 9 TABLET | Refills: 0 | Status: SHIPPED | OUTPATIENT
Start: 2023-02-01 | End: 2023-02-04

## 2023-02-01 RX ORDER — DOXYCYCLINE HYCLATE 100 MG
100 TABLET ORAL 2 TIMES DAILY
Qty: 14 TABLET | Refills: 0 | Status: SHIPPED | OUTPATIENT
Start: 2023-02-01 | End: 2023-02-08

## 2023-02-01 NOTE — ED PROVIDER NOTES
[facility] EMERGENCY DEPT  EMERGENCY DEPARTMENT HISTORY AND PHYSICAL EXAM      Date: 2023  Patient Name: Blayne Torres  MRN: [id number]  [place]: 1965  Date of evaluation: 2023  Provider: Harper Blakely MD   Note Started: 3:53 PM 23    HISTORY OF PRESENT ILLNESS     Chief Complaint   Patient presents with    Leg Pain       History Provided By: Patient    HPI: Blayne Torres, 65-year-old female with history of GERD, bladder cancer, DVT presenting with left leg pain. Patient reports left lower back pain. However her worst pain is around her left ankle. This started this morning. She denies any falls or trips. She is currently on eliquis.      PAST MEDICAL HISTORY   Past Medical History:  Past Medical History:   Diagnosis Date    Anemia     pt states had recent blood transfusion 2022    Back pain     Cancer ([address]     bladder    DVT (deep venous thrombosis) (HCC)     and PE, pt denies lung PE , only leg several years ago    Hepatitis C     pt states dx 1 month ago    Liver disease     Presence of IVC filter     pt states several years ago    Rheumatoid arthritis ([address]     Sciatic leg pain     pt states both legs    Uterine fibroid        Past Surgical History:  Past Surgical History:   Procedure Laterality Date    HX APPENDECTOMY  10/03/2022    HX  SECTION      HX GI  2022    laparoscopic early postop adhesion of the sigmoid colon    HX GI  2022    laparoscopic lysis of adhesion    HX GI  10/31/2022    rigid sigmoidoscope examination of the rectum    HX GI  10/31/2022    distal rectal repair with primary suturing technique    HX GYN  10/03/2022    ROBOT ANTERIOR PELVIC EXENTERATION    HX HYSTERECTOMY  10/03/2022    HX SALPINGO-OOPHORECTOMY Bilateral 10/03/2022    HX UROLOGICAL  09/15/2022    CYSTOSCOPY    HX UROLOGICAL  09/15/2022    URETHRAL DILATION    HX UROLOGICAL  09/15/2022    URETERAL STENT PLACEMENT    HX UROLOGICAL  09/15/2022    TRANSURETHRAL RESECTION OF BLADDER TUMOR >2CM    HX UROLOGICAL Bilateral 09/15/2022    RETROGRADE PYELOGRAM    HX UROLOGICAL  10/03/2022    PELVIC LYMPH NODE DISSECTION    HX UROLOGICAL  10/03/2022    ILEAL CONDUIT URINARY DIVERSION    IR THROMBECTOMY [facility] VEIN W PHARM  10/27/2022    IN UNLISTED PROCEDURE CARDIAC SURGERY      stent placement       Family History:  Family History   Problem Relation Age of Onset    Cancer Mother     Lung Cancer Mother     Heart Disease Father     Hypertension Sister     Cancer Maternal Aunt     Breast Cancer Maternal Aunt        Social History:  Social History     Tobacco Use    Smoking status: Former     Packs/day: 0.50     Years: 10.00     Pack years: 5.00     Types: Cigarettes     Quit date: 1998     Years since quittin.1    Smokeless tobacco: Never    Tobacco comments:     Quit 15 years ago   Vaping Use    Vaping Use: Never used   Substance Use Topics    Alcohol use: Not Currently    Drug use: Never       Allergies: Allergies   Allergen Reactions    Bactrim [Sulfamethoprim] Swelling     Swelling of the lilps    Penicillin V Potassium Swelling       PCP: Sidney Landaverde MD    Current Meds:   Previous Medications    ACETAMINOPHEN (TYLENOL) 650 MG TBER    Take 1 Tablet by mouth every eight (8) hours as needed for Pain. APIXABAN (ELIQUIS) 5 MG TABLET    Take 1 Tablet by mouth every twelve (12) hours for 90 days. Indications: blood clot in a deep vein of the extremities    ASCORBIC ACID, VITAMIN C, (VITAMIN C) 500 MG TABLET    Take 1 Tablet by mouth daily. DOCUSATE SODIUM (COLACE) 100 MG CAPSULE    Take 1 Capsule by mouth two (2) times daily as needed for Constipation for up to 90 days. FERROUS SULFATE 325 MG (65 MG IRON) TABLET    Take 1 Tablet by mouth daily. MULTIVITAMIN (ONE A DAY) TABLET    Take 1 Tablet by mouth daily. ONDANSETRON (ZOFRAN ODT) 4 MG DISINTEGRATING TABLET    Take  by mouth. PANTOPRAZOLE (PROTONIX) 40 MG TABLET    Take 1 Tablet by mouth daily.     PSYLLIUM SEED-SUCROSE (METAMUCIL, SUGAR,) POWD    Take 1 Scoop by mouth daily. TRAMADOL (ULTRAM) 50 MG TABLET    Take  by mouth. REVIEW OF SYSTEMS   Review of Systems  Positives and Pertinent negatives as per HPI. PHYSICAL EXAM     ED Triage Vitals [02/01/23 1518]   ED Encounter Vitals Group      /75      Pulse (Heart Rate) 91      Resp Rate 18      Temp 98.1 °F (36.7 °C)      Temp src       O2 Sat (%) 100 %      Weight 115 lb      Height 5' 3\"      Physical Exam  Vitals and nursing note reviewed. Constitutional:       General: She is not in acute distress. Appearance: Normal appearance. HENT:      Head: Normocephalic and atraumatic. Mouth/Throat:      Mouth: Mucous membranes are moist.   Eyes:      Conjunctiva/sclera: Conjunctivae normal.   Pulmonary:      Effort: Pulmonary effort is normal. No respiratory distress. Musculoskeletal:      Cervical back: Normal range of motion. Comments: No midline c/t/l spine tenderness  Tender to proximal ankle circumferentially. Mild erythema to anterior aspect      Skin:     General: Skin is warm and dry. Neurological:      General: No focal deficit present. Mental Status: She is alert and oriented to person, place, and time. Mental status is at baseline. SCREENINGS               No data recorded        LAB, EKG AND DIAGNOSTIC RESULTS   Labs:  No results found for this or any previous visit (from the past 12 hour(s)). Radiologic Studies:  Non-plain film images such as CT, Ultrasound and MRI are read by the radiologist. Plain radiographic images are visualized and preliminarily interpreted by the ED Provider with the below findings:    Interpretation per the Radiologist below, if available at the time of this note:  XR ANKLE LT MIN 3 V    Result Date: 2/1/2023  EXAM: XR ANKLE LT MIN 3 V INDICATION: left ankle pain. COMPARISON: None. FINDINGS: Three views of the left ankle demonstrate no fracture or disruption of the ankle mortise. There is no other acute osseous or articular abnormality. The soft tissues are within normal limits. No acute abnormality. ED COURSE and DIFFERENTIAL DIAGNOSIS/MDM   Vitals:    Vitals:    02/01/23 1518   BP: 112/75   Pulse: 91   Resp: 18   Temp: 98.1 °F (36.7 °C)   SpO2: 100%   Weight: 52.2 kg (115 lb)   Height: 5' 3\" (1.6 m)        Patient was given the following medications:  Medications - No data to display    CONSULTS: (Who and What was discussed)  None     Chronic Conditions: see hpi     Records Reviewed (source and summary of external notes): Prior medical records    CC/HPI Summary, DDx, ED Course, and Reassessment:  43-year-old female with history of GERD, bladder cancer, DVT presenting with left leg pain. Ddx includes ankle sprain, fracture, dvt, cellulitis, sciatica, vascular insufficiency. Will obtain xray and ultrasound. Xray negative. Dvt shows no acute clots. Foot is warm and well perfused. Suspect cellulitis given mild erythema and warm to area of msot pain. Will treat with 7 day cours oral doxycycline. ED Course as of 02/01/23 1730   Wed Feb 01, 2023   1637 US shows no acute dvt [KK]      ED Course User Index  [KK] Shyam Jones MD          FINAL IMPRESSION   No diagnosis found. DISPOSITION/PLAN       Discharge Note: The patient is stable for discharge home. The signs, symptoms, diagnosis, and discharge instructions have been discussed, understanding conveyed, and agreed upon. The patient is to follow up as recommended or return to ER should their symptoms worsen. PATIENT REFERRED TO:  Follow-up Information    None           DISCHARGE MEDICATIONS:  Current Discharge Medication List            DISCONTINUED MEDICATIONS:  Current Discharge Medication List          I am the Primary Clinician of Record: Miguel Landon MD (electronically signed)    (Please note that parts of this dictation were completed with voice recognition software.  Quite often unanticipated grammatical, syntax, homophones, and other interpretive errors are inadvertently transcribed by the computer software. Please disregards these errors.  Please excuse any errors that have escaped final proofreading.)

## 2023-02-01 NOTE — ED TRIAGE NOTES
Patient complains of left lower leg pain that started this morning. Patient PCP advised her to come in for evaluation.

## 2023-02-02 ENCOUNTER — TELEPHONE (OUTPATIENT)
Dept: SURGERY | Age: 58
End: 2023-02-02

## 2023-02-02 ENCOUNTER — DOCUMENTATION ONLY (OUTPATIENT)
Dept: SURGERY | Age: 58
End: 2023-02-02

## 2023-02-02 NOTE — TELEPHONE ENCOUNTER
Ms. Prateek Mccoy called in this morning wanting  to know if she  can use a shower chair and use a shower head to rinse her private areas. She cant sit in the bathtub because its hard for her to get up.  Ms. Nena Spaulding can be reached at [phone]

## 2023-02-02 NOTE — TELEPHONE ENCOUNTER
Called and spoke with patient she feels sitting in the tub is what injured her back with trying to get back up. I let patient know if a shower chair is easier for her she can use it to wash her private area. Patient stated her understanding.

## 2023-02-06 ENCOUNTER — TELEPHONE (OUTPATIENT)
Dept: SURGERY | Age: 58
End: 2023-02-06

## 2023-02-06 NOTE — TELEPHONE ENCOUNTER
Ms Javier Harris called this afternoon and wanted Dr Abelardo Blake to know that she went to the hospital on 2/1.

## 2023-02-07 ENCOUNTER — APPOINTMENT (OUTPATIENT)
Dept: GENERAL RADIOLOGY | Age: 58
DRG: 383 | End: 2023-02-07
Attending: EMERGENCY MEDICINE
Payer: MEDICAID

## 2023-02-07 ENCOUNTER — HOSPITAL ENCOUNTER (INPATIENT)
Age: 58
LOS: 8 days | Discharge: HOME | DRG: 383 | End: 2023-02-15
Attending: EMERGENCY MEDICINE | Admitting: FAMILY MEDICINE
Payer: MEDICAID

## 2023-02-07 ENCOUNTER — APPOINTMENT (OUTPATIENT)
Dept: CT IMAGING | Age: 58
DRG: 383 | End: 2023-02-07
Attending: EMERGENCY MEDICINE
Payer: MEDICAID

## 2023-02-07 DIAGNOSIS — N39.0 URINARY TRACT INFECTION WITHOUT HEMATURIA, SITE UNSPECIFIED: Primary | ICD-10-CM

## 2023-02-07 DIAGNOSIS — L03.90 CELLULITIS, UNSPECIFIED CELLULITIS SITE: ICD-10-CM

## 2023-02-07 LAB
ALBUMIN SERPL-MCNC: 2.5 G/DL (ref 3.5–5)
ALBUMIN/GLOB SERPL: 0.3 (ref 1.1–2.2)
ALP SERPL-CCNC: 112 U/L (ref 45–117)
ALT SERPL-CCNC: 13 U/L (ref 12–78)
ANION GAP SERPL CALC-SCNC: 8 MMOL/L (ref 5–15)
APPEARANCE UR: CLEAR
AST SERPL W P-5'-P-CCNC: 33 U/L (ref 15–37)
BACTERIA URNS QL MICRO: ABNORMAL /HPF
BACTERIA URNS QL MICRO: ABNORMAL /HPF
BASOPHILS # BLD: 0 K/UL (ref 0–0.1)
BASOPHILS NFR BLD: 0 % (ref 0–1)
BILIRUB SERPL-MCNC: 0.9 MG/DL (ref 0.2–1)
BILIRUB UR QL: NEGATIVE
BNP SERPL-MCNC: 1760 PG/ML
BUN SERPL-MCNC: 24 MG/DL (ref 6–20)
BUN/CREAT SERPL: 31 (ref 12–20)
CA-I BLD-MCNC: 9.8 MG/DL (ref 8.5–10.1)
CHLORIDE SERPL-SCNC: 106 MMOL/L (ref 97–108)
CO2 SERPL-SCNC: 17 MMOL/L (ref 21–32)
COLOR UR: YELLOW
CREAT SERPL-MCNC: 0.78 MG/DL (ref 0.55–1.02)
D DIMER PPP FEU-MCNC: 6.76 UG/ML(FEU)
DIFFERENTIAL METHOD BLD: ABNORMAL
EOSINOPHIL # BLD: 0 K/UL (ref 0–0.4)
EOSINOPHIL NFR BLD: 0 % (ref 0–7)
EPITH CASTS URNS QL MICRO: ABNORMAL /LPF
ERYTHROCYTE [DISTWIDTH] IN BLOOD BY AUTOMATED COUNT: 12.3 % (ref 11.5–14.5)
GLOBULIN SER CALC-MCNC: 8 G/DL (ref 2–4)
GLUCOSE SERPL-MCNC: 98 MG/DL (ref 65–100)
GLUCOSE UR STRIP.AUTO-MCNC: NEGATIVE MG/DL
HCT VFR BLD AUTO: 33 % (ref 35–47)
HGB BLD-MCNC: 10.9 G/DL (ref 11.5–16)
HGB UR QL STRIP: ABNORMAL
IMM GRANULOCYTES # BLD AUTO: 0 K/UL (ref 0–0.04)
IMM GRANULOCYTES NFR BLD AUTO: 0 % (ref 0–0.5)
KETONES UR QL STRIP.AUTO: 15 MG/DL
LACTATE SERPL-SCNC: 0.7 MMOL/L (ref 0.4–2)
LEUKOCYTE ESTERASE UR QL STRIP.AUTO: ABNORMAL
LYMPHOCYTES # BLD: 1.5 K/UL (ref 0.8–3.5)
LYMPHOCYTES NFR BLD: 23 % (ref 12–49)
MCH RBC QN AUTO: 30.4 PG (ref 26–34)
MCHC RBC AUTO-ENTMCNC: 33 G/DL (ref 30–36.5)
MCV RBC AUTO: 92.2 FL (ref 80–99)
MONOCYTES # BLD: 0.6 K/UL (ref 0–1)
MONOCYTES NFR BLD: 9 % (ref 5–13)
MUCOUS THREADS URNS QL MICRO: ABNORMAL /LPF
MUCOUS THREADS URNS QL MICRO: ABNORMAL /LPF
NEUTS SEG # BLD: 4.5 K/UL (ref 1.8–8)
NEUTS SEG NFR BLD: 68 % (ref 32–75)
NITRITE UR QL STRIP.AUTO: POSITIVE
NRBC # BLD: 0 K/UL (ref 0–0.01)
NRBC BLD-RTO: 0 PER 100 WBC
PH UR STRIP: 6.5 (ref 5–8)
PLATELET # BLD AUTO: 326 K/UL (ref 150–400)
PMV BLD AUTO: 9.8 FL (ref 8.9–12.9)
POTASSIUM SERPL-SCNC: 4.8 MMOL/L (ref 3.5–5.1)
PROT SERPL-MCNC: 10.5 G/DL (ref 6.4–8.2)
PROT UR STRIP-MCNC: 30 MG/DL
RBC # BLD AUTO: 3.58 M/UL (ref 3.8–5.2)
RBC #/AREA URNS HPF: ABNORMAL /HPF (ref 0–5)
RBC #/AREA URNS HPF: ABNORMAL /HPF (ref 0–5)
SODIUM SERPL-SCNC: 131 MMOL/L (ref 136–145)
SP GR UR REFRACTOMETRY: 1.01 (ref 1–1.03)
TROPONIN I SERPL HS-MCNC: 5 NG/L (ref 0–51)
UROBILINOGEN UR QL STRIP.AUTO: 1 EU/DL (ref 0.1–1)
WBC # BLD AUTO: 6.7 K/UL (ref 3.6–11)
WBC URNS QL MICRO: ABNORMAL /HPF (ref 0–4)
WBC URNS QL MICRO: ABNORMAL /HPF (ref 0–4)

## 2023-02-07 PROCEDURE — 85025 COMPLETE CBC W/AUTO DIFF WBC: CPT

## 2023-02-07 PROCEDURE — 71045 X-RAY EXAM CHEST 1 VIEW: CPT

## 2023-02-07 PROCEDURE — 84484 ASSAY OF TROPONIN QUANT: CPT

## 2023-02-07 PROCEDURE — 74011250636 HC RX REV CODE- 250/636: Performed by: EMERGENCY MEDICINE

## 2023-02-07 PROCEDURE — 71250 CT THORAX DX C-: CPT

## 2023-02-07 PROCEDURE — 99285 EMERGENCY DEPT VISIT HI MDM: CPT

## 2023-02-07 PROCEDURE — 83605 ASSAY OF LACTIC ACID: CPT

## 2023-02-07 PROCEDURE — 80053 COMPREHEN METABOLIC PANEL: CPT

## 2023-02-07 PROCEDURE — 81001 URINALYSIS AUTO W/SCOPE: CPT

## 2023-02-07 PROCEDURE — 99223 1ST HOSP IP/OBS HIGH 75: CPT | Performed by: INTERNAL MEDICINE

## 2023-02-07 PROCEDURE — 94760 N-INVAS EAR/PLS OXIMETRY 1: CPT

## 2023-02-07 PROCEDURE — 36415 COLL VENOUS BLD VENIPUNCTURE: CPT

## 2023-02-07 PROCEDURE — 87040 BLOOD CULTURE FOR BACTERIA: CPT

## 2023-02-07 PROCEDURE — 85379 FIBRIN DEGRADATION QUANT: CPT

## 2023-02-07 PROCEDURE — 65270000029 HC RM PRIVATE

## 2023-02-07 PROCEDURE — 96375 TX/PRO/DX INJ NEW DRUG ADDON: CPT

## 2023-02-07 PROCEDURE — 96374 THER/PROPH/DIAG INJ IV PUSH: CPT

## 2023-02-07 PROCEDURE — 83880 ASSAY OF NATRIURETIC PEPTIDE: CPT

## 2023-02-07 RX ORDER — MORPHINE SULFATE 4 MG/ML
4 INJECTION INTRAVENOUS ONCE
Status: COMPLETED | OUTPATIENT
Start: 2023-02-07 | End: 2023-02-07

## 2023-02-07 RX ORDER — POLYETHYLENE GLYCOL 3350 17 G/17G
17 POWDER, FOR SOLUTION ORAL DAILY PRN
Status: DISCONTINUED | OUTPATIENT
Start: 2023-02-07 | End: 2023-02-15 | Stop reason: HOSPADM

## 2023-02-07 RX ORDER — ONDANSETRON 2 MG/ML
4 INJECTION INTRAMUSCULAR; INTRAVENOUS
Status: DISCONTINUED | OUTPATIENT
Start: 2023-02-07 | End: 2023-02-15 | Stop reason: HOSPADM

## 2023-02-07 RX ORDER — PANTOPRAZOLE SODIUM 40 MG/1
40 TABLET, DELAYED RELEASE ORAL DAILY
Status: DISCONTINUED | OUTPATIENT
Start: 2023-02-08 | End: 2023-02-15 | Stop reason: HOSPADM

## 2023-02-07 RX ORDER — TRAMADOL HYDROCHLORIDE 50 MG/1
50 TABLET ORAL
Status: DISCONTINUED | OUTPATIENT
Start: 2023-02-07 | End: 2023-02-15 | Stop reason: HOSPADM

## 2023-02-07 RX ORDER — ONDANSETRON 4 MG/1
4 TABLET, ORALLY DISINTEGRATING ORAL
Status: DISCONTINUED | OUTPATIENT
Start: 2023-02-07 | End: 2023-02-15 | Stop reason: HOSPADM

## 2023-02-07 RX ORDER — ASCORBIC ACID 500 MG
500 TABLET ORAL DAILY
Status: DISCONTINUED | OUTPATIENT
Start: 2023-02-08 | End: 2023-02-15 | Stop reason: HOSPADM

## 2023-02-07 RX ORDER — LANOLIN ALCOHOL/MO/W.PET/CERES
325 CREAM (GRAM) TOPICAL DAILY
Status: DISCONTINUED | OUTPATIENT
Start: 2023-02-08 | End: 2023-02-15 | Stop reason: HOSPADM

## 2023-02-07 RX ORDER — ACETAMINOPHEN 650 MG/1
650 SUPPOSITORY RECTAL
Status: DISCONTINUED | OUTPATIENT
Start: 2023-02-07 | End: 2023-02-15 | Stop reason: HOSPADM

## 2023-02-07 RX ORDER — ONDANSETRON 2 MG/ML
4 INJECTION INTRAMUSCULAR; INTRAVENOUS ONCE
Status: COMPLETED | OUTPATIENT
Start: 2023-02-07 | End: 2023-02-07

## 2023-02-07 RX ORDER — DIPHENHYDRAMINE HYDROCHLORIDE 50 MG/ML
25 INJECTION, SOLUTION INTRAMUSCULAR; INTRAVENOUS
Status: COMPLETED | OUTPATIENT
Start: 2023-02-07 | End: 2023-02-07

## 2023-02-07 RX ORDER — ACETAMINOPHEN 325 MG/1
650 TABLET ORAL
Status: DISCONTINUED | OUTPATIENT
Start: 2023-02-07 | End: 2023-02-15 | Stop reason: HOSPADM

## 2023-02-07 RX ADMIN — ONDANSETRON 4 MG: 2 INJECTION INTRAMUSCULAR; INTRAVENOUS at 10:19

## 2023-02-07 RX ADMIN — DIPHENHYDRAMINE HYDROCHLORIDE 25 MG: 50 INJECTION, SOLUTION INTRAMUSCULAR; INTRAVENOUS at 10:48

## 2023-02-07 RX ADMIN — VANCOMYCIN HYDROCHLORIDE 1250 MG: 1.25 INJECTION, POWDER, LYOPHILIZED, FOR SOLUTION INTRAVENOUS at 10:19

## 2023-02-07 RX ADMIN — MORPHINE SULFATE 4 MG: 4 INJECTION, SOLUTION INTRAMUSCULAR; INTRAVENOUS at 10:19

## 2023-02-07 NOTE — H&P
History and Physical    NAME: Avani Rojas   :  1965   MRN:  [id number]     Date/Time:  2023 5:25 PM    Patient PCP: Vanessa Larson MD  ______________________________________________________________________             Subjective:     CHIEF COMPLAINT:     Left leg pain        HISTORY OF PRESENT ILLNESS:       Patient is a 61y.o. year old female with signal past medical history of multiple DVT history of bladder cancer status post urostomy bag status postsurgery history of colostomy bag came to the ER complaining of left leg pain redness swelling patient was treated as outpatient with doxycycline with no much improvement she came to emergency room seen by the ER physician and patient was recommended to be admitted for acute cellulitis of the left leg    Past Medical History:   Diagnosis Date    Anemia     pt states had recent blood transfusion 2022    Back pain     Cancer ([address]     bladder    DVT (deep venous thrombosis) ([address]     and PE, pt denies lung PE , only leg several years ago    Hepatitis C     pt states dx 1 month ago    Liver disease     Presence of IVC filter     pt states several years ago    Rheumatoid arthritis ([address]     Sciatic leg pain     pt states both legs    Uterine fibroid         Past Surgical History:   Procedure Laterality Date    HX APPENDECTOMY  10/03/2022    HX  SECTION      HX GI  2022    laparoscopic early postop adhesion of the sigmoid colon    HX GI  2022    laparoscopic lysis of adhesion    HX GI  10/31/2022    rigid sigmoidoscope examination of the rectum    HX GI  10/31/2022    distal rectal repair with primary suturing technique    HX GYN  10/03/2022    ROBOT ANTERIOR PELVIC EXENTERATION    HX HYSTERECTOMY  10/03/2022    HX SALPINGO-OOPHORECTOMY Bilateral 10/03/2022    HX UROLOGICAL  09/15/2022    CYSTOSCOPY    HX UROLOGICAL  09/15/2022    URETHRAL DILATION    HX UROLOGICAL  09/15/2022    URETERAL STENT PLACEMENT    HX UROLOGICAL 09/15/2022    TRANSURETHRAL RESECTION OF BLADDER TUMOR >2CM    HX UROLOGICAL Bilateral 09/15/2022    RETROGRADE PYELOGRAM    HX UROLOGICAL  10/03/2022    PELVIC LYMPH NODE DISSECTION    HX UROLOGICAL  10/03/2022    ILEAL CONDUIT URINARY DIVERSION    IR THROMBECTOMY [facility] VEIN W PHARM  10/27/2022    SD UNLISTED PROCEDURE CARDIAC SURGERY      stent placement       Social History     Tobacco Use    Smoking status: Former     Packs/day: 0.50     Years: 10.00     Pack years: 5.00     Types: Cigarettes     Quit date: 1998     Years since quittin.1    Smokeless tobacco: Never    Tobacco comments:     Quit 15 years ago   Substance Use Topics    Alcohol use: Not Currently        Family History   Problem Relation Age of Onset    Cancer Mother     Lung Cancer Mother     Heart Disease Father     Hypertension Sister     Cancer Maternal Aunt     Breast Cancer Maternal Aunt        Allergies   Allergen Reactions    Bactrim [Sulfamethoprim] Swelling     Swelling of the lilps    Penicillin V Potassium Swelling        Prior to Admission medications    Medication Sig Start Date End Date Taking? Authorizing Provider   doxycycline (VIBRA-TABS) 100 mg tablet Take 1 Tablet by mouth two (2) times a day for 7 days. 23  Darci Amador MD   multivitamin (ONE A DAY) tablet Take 1 Tablet by mouth daily. 23   Analia Watson MD   pantoprazole (PROTONIX) 40 mg tablet Take 1 Tablet by mouth daily. 23   Analia Watson MD   ascorbic acid, vitamin C, (VITAMIN C) 500 mg tablet Take 1 Tablet by mouth daily. 23   Analia Watson MD   apixaban (ELIQUIS) 5 mg tablet Take 1 Tablet by mouth every twelve (12) hours for 90 days. Indications: blood clot in a deep vein of the extremities 23  Analia Watson MD   acetaminophen (TYLENOL) 650 mg TbER Take 1 Tablet by mouth every eight (8) hours as needed for Pain.  23   Analia Watson MD   docusate sodium (COLACE) 100 mg capsule Take 1 Capsule by mouth two (2) times daily as needed for Constipation for up to 90 days. 1/12/23 4/12/23  Winnie Sprague MD   ferrous sulfate 325 mg (65 mg iron) tablet Take 1 Tablet by mouth daily. 1/12/23   Winnie Sprague MD   traMADoL Deri Ferns) 50 mg tablet Take  by mouth. Patient not taking: Reported on 1/26/2023 12/12/22   Provider, Historical   ondansetron (ZOFRAN ODT) 4 mg disintegrating tablet Take  by mouth. 11/14/22   Provider, Historical   psyllium seed-sucrose (Metamucil, sugar,) powd Take 1 Scoop by mouth daily. 12/7/22   Ildefonso Chauhan MD         Current Facility-Administered Medications:     meropenem (MERREM) 1 g in 0.9% sodium chloride 20 mL IV syringe, 1 g, IntraVENous, Q8H, Brian Willis MD    apixaban (ELIQUIS) tablet 5 mg, 5 mg, Oral, Q12H, Brian Willis MD Lavonna Lean ON 2/8/2023] ascorbic acid (vitamin C) (VITAMIN C) tablet 500 mg, 500 mg, Oral, DAILY, Brian Willis MD Lavonna Lean ON 2/8/2023] ferrous sulfate tablet 325 mg, 325 mg, Oral, DAILY, Brian Willis MD Lavonna Lean ON 2/8/2023] pantoprazole (PROTONIX) tablet 40 mg, 40 mg, Oral, DAILY, Brian Willis MD    traMADoL (ULTRAM) tablet 50 mg, 50 mg, Oral, Q6H PRN, Keanu Willis MD    acetaminophen (TYLENOL) tablet 650 mg, 650 mg, Oral, Q6H PRN **OR** acetaminophen (TYLENOL) suppository 650 mg, 650 mg, Rectal, Q6H PRN, Keanu Willis MD    polyethylene glycol (MIRALAX) packet 17 g, 17 g, Oral, DAILY PRN, Keanu Willis MD    ondansetron (ZOFRAN ODT) tablet 4 mg, 4 mg, Oral, Q8H PRN **OR** ondansetron (ZOFRAN) injection 4 mg, 4 mg, IntraVENous, Q6H PRN, Keanu Willis MD    Current Outpatient Medications:     doxycycline (VIBRA-TABS) 100 mg tablet, Take 1 Tablet by mouth two (2) times a day for 7 days. , Disp: 14 Tablet, Rfl: 0    multivitamin (ONE A DAY) tablet, Take 1 Tablet by mouth daily. , Disp: 90 Tablet, Rfl: 1    pantoprazole (PROTONIX) 40 mg tablet, Take 1 Tablet by mouth daily. , Disp: 90 Tablet, Rfl: 1 ascorbic acid, vitamin C, (VITAMIN C) 500 mg tablet, Take 1 Tablet by mouth daily. , Disp: 90 Tablet, Rfl: 0    apixaban (ELIQUIS) 5 mg tablet, Take 1 Tablet by mouth every twelve (12) hours for 90 days. Indications: blood clot in a deep vein of the extremities, Disp: 180 Tablet, Rfl: 0    acetaminophen (TYLENOL) 650 mg TbER, Take 1 Tablet by mouth every eight (8) hours as needed for Pain., Disp: 30 Tablet, Rfl: 0    docusate sodium (COLACE) 100 mg capsule, Take 1 Capsule by mouth two (2) times daily as needed for Constipation for up to 90 days. , Disp: 60 Capsule, Rfl: 2    ferrous sulfate 325 mg (65 mg iron) tablet, Take 1 Tablet by mouth daily. , Disp: 90 Tablet, Rfl: 0    traMADoL (ULTRAM) 50 mg tablet, Take  by mouth. (Patient not taking: Reported on 1/26/2023), Disp: , Rfl:     ondansetron (ZOFRAN ODT) 4 mg disintegrating tablet, Take  by mouth., Disp: , Rfl:     psyllium seed-sucrose (Metamucil, sugar,) powd, Take 1 Scoop by mouth daily. , Disp: 1 Each, Rfl: 3    LAB DATA REVIEWED:    Recent Results (from the past 24 hour(s))   TROPONIN-HIGH SENSITIVITY    Collection Time: 02/07/23 10:10 AM   Result Value Ref Range    Troponin-High Sensitivity 5 0 - 51 ng/L   LACTIC ACID    Collection Time: 02/07/23 10:15 AM   Result Value Ref Range    Lactic acid 0.7 0.4 - 2.0 mmol/L   CBC WITH AUTOMATED DIFF    Collection Time: 02/07/23 10:15 AM   Result Value Ref Range    WBC 6.7 3.6 - 11.0 K/uL    RBC 3.58 (L) 3.80 - 5.20 M/uL    HGB 10.9 (L) 11.5 - 16.0 g/dL    HCT 33.0 (L) 35.0 - 47.0 %    MCV 92.2 80.0 - 99.0 FL    MCH 30.4 26.0 - 34.0 PG    MCHC 33.0 30.0 - 36.5 g/dL    RDW 12.3 11.5 - 14.5 %    PLATELET 305 564 - 056 K/uL    MPV 9.8 8.9 - 12.9 FL    NRBC 0.0 0.0  WBC    ABSOLUTE NRBC 0.00 0.00 - 0.01 K/uL    NEUTROPHILS 68 32 - 75 %    LYMPHOCYTES 23 12 - 49 %    MONOCYTES 9 5 - 13 %    EOSINOPHILS 0 0 - 7 %    BASOPHILS 0 0 - 1 %    IMMATURE GRANULOCYTES 0 0 - 0.5 %    ABS. NEUTROPHILS 4.5 1.8 - 8.0 K/UL    ABS. LYMPHOCYTES 1.5 0.8 - 3.5 K/UL    ABS. MONOCYTES 0.6 0.0 - 1.0 K/UL    ABS. EOSINOPHILS 0.0 0.0 - 0.4 K/UL    ABS. BASOPHILS 0.0 0.0 - 0.1 K/UL    ABS. IMM. GRANS. 0.0 0.00 - 0.04 K/UL    DF AUTOMATED     METABOLIC PANEL, COMPREHENSIVE    Collection Time: 02/07/23 10:15 AM   Result Value Ref Range    Sodium 131 (L) 136 - 145 mmol/L    Potassium 4.8 3.5 - 5.1 mmol/L    Chloride 106 97 - 108 mmol/L    CO2 17 (L) 21 - 32 mmol/L    Anion gap 8 5 - 15 mmol/L    Glucose 98 65 - 100 mg/dL    BUN 24 (H) 6 - 20 mg/dL    Creatinine 0.78 0.55 - 1.02 mg/dL    BUN/Creatinine ratio 31 (H) 12 - 20      eGFR >60 >60 ml/min/1.73m2    Calcium 9.8 8.5 - 10.1 mg/dL    Bilirubin, total 0.9 0.2 - 1.0 mg/dL    AST (SGOT) 33 15 - 37 U/L    ALT (SGPT) 13 12 - 78 U/L    Alk.  phosphatase 112 45 - 117 U/L    Protein, total 10.5 (H) 6.4 - 8.2 g/dL    Albumin 2.5 (L) 3.5 - 5.0 g/dL    Globulin 8.0 (H) 2.0 - 4.0 g/dL    A-G Ratio 0.3 (L) 1.1 - 2.2     D DIMER    Collection Time: 02/07/23 10:15 AM   Result Value Ref Range    D DIMER 6.76 (H) <0.50 ug/ml(FEU)   NT-PRO BNP    Collection Time: 02/07/23 10:15 AM   Result Value Ref Range    NT pro-BNP 1,760 (H) <125 pg/mL   URINALYSIS W/ RFLX MICROSCOPIC    Collection Time: 02/07/23 10:35 AM   Result Value Ref Range    Color Yellow      Appearance Clear Clear      Specific gravity 1.015 1.003 - 1.030      pH (UA) 6.5 5.0 - 8.0      Protein 30 (A) Negative mg/dL    Glucose Negative Negative mg/dL    Ketone 15 (A) Negative mg/dL    Bilirubin Negative Negative      Blood Small (A) Negative      Urobilinogen 1.0 0.1 - 1.0 EU/dL    Nitrites Positive (A) Negative      Leukocyte Esterase Large (A) Negative      WBC  0 - 4 /hpf    RBC 5-10 0 - 5 /hpf    Bacteria 4+ (A) Negative /hpf    Mucus Trace /lpf   URINE MICROSCOPIC    Collection Time: 02/07/23 10:35 AM   Result Value Ref Range    WBC  0 - 4 /hpf    RBC 5-10 0 - 5 /hpf    Epithelial cells Few Few /lpf    Bacteria 4+ (A) Negative /hpf    Mucus Trace (A) Negative /lpf       XR Results (most recent):  Results from Hospital Encounter encounter on 02/07/23    XR CHEST PORT    Narrative  EXAM:  XR CHEST PORT    INDICATION: Chest pain    COMPARISON: 11/2/2022    TECHNIQUE: portable chest AP view    FINDINGS: The cardiac silhouette is within normal limits. The pulmonary  vasculature is within normal limits. The lungs and pleural spaces are clear. The bones are osteopenic. Impression  No acute process on portable chest.         CT CHEST WO CONT   Final Result   1. Please note, cannot assess for pulmonary embolism on this noncontrast exam.   2.  Moderate pericardial effusion, nonspecific, but consider pericarditis. 3.  Emphysema, without acute airspace disease. XR CHEST PORT   Final Result      No acute process on portable chest.         DUPLEX LOW EXT ARTERIES WITH BARBARA    (Results Pending)        Review of Systems:  Constitutional: Negative for chills and fever. HENT: Negative. Eyes: Negative. Respiratory: Negative. Cardiovascular: Negative. Gastrointestinal: Negative for abdominal pain and nausea. Skin: Negative. Neurological: Negative. Left leg pain swelling and redness    Objective:   VITALS:    Visit Vitals  BP 99/63 (BP 1 Location: Right upper arm, BP Patient Position: At rest)   Pulse (!) 111   Temp 98.7 °F (37.1 °C)   Resp 18   Ht 5' 3\" (1.6 m)   Wt 51.3 kg (113 lb)   SpO2 100%   BMI 20.02 kg/m²       Physical Exam:   Constitutional: pt is oriented to person, place, and time. HENT:   Head: Normocephalic and atraumatic. Eyes: Pupils are equal, round, and reactive to light. EOM are normal.   Cardiovascular: Normal rate, regular rhythm and normal heart sounds. Pulmonary/Chest: Breath sounds normal. No wheezes. No rales. Exhibits no tenderness. Abdominal: Soft. Bowel sounds are normal. There is no abdominal tenderness. There is no rebound and no guarding. Musculoskeletal: Left leg pain swelling and redness. Neurological: pt is alert and oriented to person, place, and time. Alert. Normal strength. No cranial nerve deficit or sensory deficit. Displays a negative Romberg sign. ASSESSMENT & PLAN:    Acute cellulitis of the left leg  UTI  History of DVTs  History of bladder cancer status postsurgery  History of colostomy bag  GERD      Patient started on IV meropenem after the blood cultures ID consult  Ordered PVR  Continue other home medications      Current Facility-Administered Medications:     meropenem (MERREM) 1 g in 0.9% sodium chloride 20 mL IV syringe, 1 g, IntraVENous, Q8H, Keanu Willis MD    apixaban (ELIQUIS) tablet 5 mg, 5 mg, Oral, Q12H, Keanu Willis MD Sherree Bach ON 2/8/2023] ascorbic acid (vitamin C) (VITAMIN C) tablet 500 mg, 500 mg, Oral, DAILY, Kingsley Willis MD Sherree Bach ON 2/8/2023] ferrous sulfate tablet 325 mg, 325 mg, Oral, DAILY, Kingsley Willis MD Sherree Bach ON 2/8/2023] pantoprazole (PROTONIX) tablet 40 mg, 40 mg, Oral, DAILY, Kingsley Willis MD    traMADoL (ULTRAM) tablet 50 mg, 50 mg, Oral, Q6H PRN, Keanu Willis MD    acetaminophen (TYLENOL) tablet 650 mg, 650 mg, Oral, Q6H PRN **OR** acetaminophen (TYLENOL) suppository 650 mg, 650 mg, Rectal, Q6H PRN, Keanu Willis MD    polyethylene glycol (MIRALAX) packet 17 g, 17 g, Oral, DAILY PRN, Keanu Willis MD    ondansetron (ZOFRAN ODT) tablet 4 mg, 4 mg, Oral, Q8H PRN **OR** ondansetron (ZOFRAN) injection 4 mg, 4 mg, IntraVENous, Q6H PRN, Keanu Willis MD    Current Outpatient Medications:     doxycycline (VIBRA-TABS) 100 mg tablet, Take 1 Tablet by mouth two (2) times a day for 7 days. , Disp: 14 Tablet, Rfl: 0    multivitamin (ONE A DAY) tablet, Take 1 Tablet by mouth daily. , Disp: 90 Tablet, Rfl: 1    pantoprazole (PROTONIX) 40 mg tablet, Take 1 Tablet by mouth daily. , Disp: 90 Tablet, Rfl: 1    ascorbic acid, vitamin C, (VITAMIN C) 500 mg tablet, Take 1 Tablet by mouth daily. , Disp: 90 Tablet, Rfl: 0 apixaban (ELIQUIS) 5 mg tablet, Take 1 Tablet by mouth every twelve (12) hours for 90 days. Indications: blood clot in a deep vein of the extremities, Disp: 180 Tablet, Rfl: 0    acetaminophen (TYLENOL) 650 mg TbER, Take 1 Tablet by mouth every eight (8) hours as needed for Pain., Disp: 30 Tablet, Rfl: 0    docusate sodium (COLACE) 100 mg capsule, Take 1 Capsule by mouth two (2) times daily as needed for Constipation for up to 90 days. , Disp: 60 Capsule, Rfl: 2    ferrous sulfate 325 mg (65 mg iron) tablet, Take 1 Tablet by mouth daily. , Disp: 90 Tablet, Rfl: 0    traMADoL (ULTRAM) 50 mg tablet, Take  by mouth. (Patient not taking: Reported on 1/26/2023), Disp: , Rfl:     ondansetron (ZOFRAN ODT) 4 mg disintegrating tablet, Take  by mouth., Disp: , Rfl:     psyllium seed-sucrose (Metamucil, sugar,) powd, Take 1 Scoop by mouth daily. , Disp: 1 Each, Rfl: 3       ________________________________________________________________________    Signed: Mathew Otoole MD

## 2023-02-07 NOTE — Clinical Note
Status[de-identified] INPATIENT [101]   Type of Bed: Telemetry [19]   Cardiac Monitoring Required?: Yes   Inpatient Hospitalization Certified Necessary for the Following Reasons: 3.  Patient receiving treatment that can only be provided in an inpatient setting (further clarification in H&P documentation)   Admitting Diagnosis: Cellulitis [987135]   Admitting Diagnosis: UTI (urinary tract infection) [537116]   Admitting Physician: Kamilah May [[id number]]   Attending Physician: Kamilah May [[id number]]   Estimated Length of Stay: 2 Midnights   Discharge Plan[de-identified] Home with Office Follow-up

## 2023-02-07 NOTE — PROGRESS NOTES
Reason for Admission:  Leg Pain                     RUR Score:   21%                  Plan for utilizing home health:  TBD        PCP: First and Last name:  Juan Taylor MD     Name of Practice:    Are you a current patient: Yes/No:    Approximate date of last visit: A month ago   Can you participate in a virtual visit with your PCP:                     Current Advanced Directive/Advance Care Plan: Full Code      Healthcare Decision Maker:   Click here to complete [place] including selection of the [place] Relationship (ie \"Primary\")             Primary Decision Maker: Tyson De Anda - Other Relative - [phone]                  Transition of Care Plan:        Met f/f with Pt, she confirmed that the information on the face sheet is correct. Pt stated that she lives by herself but that she has a good support system. Pt stated no HH, she has a walker, cane and wheelchair, and is independent with ADL. Pt stated that family will give her a ride home when she is D/C. CM dispo: home with no needs.

## 2023-02-07 NOTE — ED TRIAGE NOTES
Was seen here on Thursday- diagnosed with cellulitis on left leg  Unable to bear weight this morning.

## 2023-02-07 NOTE — ED PROVIDER NOTES
EMERGENCY DEPARTMENT HISTORY AND PHYSICAL EXAM      Date: 2/7/2023  Patient Name: Augie Joiner    History of Presenting Illness     Chief Complaint   Patient presents with    Leg Pain       History Provided By: Patient and EMS    HPI: Augie Joiner, 61 y.o. female with a past medical history significant No significant past medical history presents to the ED with chief complaint of Leg Pain  .   -year-old female recently in the hospital with evaluation of leg swelling had a negative Doppler found to have cellulitis discharged home on antibiotics however the pain swelling redness is getting worse. No new falls or trauma. Occasional chest pain shortness of breath. There are no other complaints, changes, or physical findings at this time. PCP: Rebecca Briceno MD    Current Facility-Administered Medications   Medication Dose Route Frequency Provider Last Rate Last Admin    meropenem (MERREM) 1 g in 0.9% sodium chloride 20 mL IV syringe  1 g IntraVENous Q8H Guilherme Gibson MD         Current Outpatient Medications   Medication Sig Dispense Refill    doxycycline (VIBRA-TABS) 100 mg tablet Take 1 Tablet by mouth two (2) times a day for 7 days. 14 Tablet 0    multivitamin (ONE A DAY) tablet Take 1 Tablet by mouth daily. 90 Tablet 1    pantoprazole (PROTONIX) 40 mg tablet Take 1 Tablet by mouth daily. 90 Tablet 1    ascorbic acid, vitamin C, (VITAMIN C) 500 mg tablet Take 1 Tablet by mouth daily. 90 Tablet 0    apixaban (ELIQUIS) 5 mg tablet Take 1 Tablet by mouth every twelve (12) hours for 90 days. Indications: blood clot in a deep vein of the extremities 180 Tablet 0    acetaminophen (TYLENOL) 650 mg TbER Take 1 Tablet by mouth every eight (8) hours as needed for Pain. 30 Tablet 0    docusate sodium (COLACE) 100 mg capsule Take 1 Capsule by mouth two (2) times daily as needed for Constipation for up to 90 days.  60 Capsule 2    ferrous sulfate 325 mg (65 mg iron) tablet Take 1 Tablet by mouth daily. 90 Tablet 0    traMADoL (ULTRAM) 50 mg tablet Take  by mouth. (Patient not taking: Reported on 2023)      ondansetron (ZOFRAN ODT) 4 mg disintegrating tablet Take  by mouth.  psyllium seed-sucrose (Metamucil, sugar,) powd Take 1 Scoop by mouth daily.  1 Each 3       Past History     Past Medical History:  Past Medical History:   Diagnosis Date    Anemia     pt states had recent blood transfusion sept     Back pain     Cancer ([address]     bladder    DVT (deep venous thrombosis) ([address]     and PE, pt denies lung PE , only leg several years ago    Hepatitis C     pt states dx 1 month ago    Liver disease     Presence of IVC filter     pt states several years ago    Rheumatoid arthritis ([address]     Sciatic leg pain     pt states both legs    Uterine fibroid        Past Surgical History:  Past Surgical History:   Procedure Laterality Date    HX APPENDECTOMY  10/03/2022    HX  SECTION      HX GI  2022    laparoscopic early postop adhesion of the sigmoid colon    HX GI  2022    laparoscopic lysis of adhesion    HX GI  10/31/2022    rigid sigmoidoscope examination of the rectum    HX GI  10/31/2022    distal rectal repair with primary suturing technique    HX GYN  10/03/2022    ROBOT ANTERIOR PELVIC EXENTERATION    HX HYSTERECTOMY  10/03/2022    HX SALPINGO-OOPHORECTOMY Bilateral 10/03/2022    HX UROLOGICAL  09/15/2022    CYSTOSCOPY    HX UROLOGICAL  09/15/2022    URETHRAL DILATION    HX UROLOGICAL  09/15/2022    URETERAL STENT PLACEMENT    HX UROLOGICAL  09/15/2022    TRANSURETHRAL RESECTION OF BLADDER TUMOR >2CM    HX UROLOGICAL Bilateral 09/15/2022    RETROGRADE PYELOGRAM    HX UROLOGICAL  10/03/2022    PELVIC LYMPH NODE DISSECTION    HX UROLOGICAL  10/03/2022    ILEAL CONDUIT URINARY DIVERSION    IR THROMBECTOMY [facility] VEIN W PHARM  10/27/2022    OK UNLISTED PROCEDURE CARDIAC SURGERY      stent placement       Family History:  Family History   Problem Relation Age of Onset    Cancer Mother     Lung Cancer Mother     Heart Disease Father     Hypertension Sister     Cancer Maternal Aunt     Breast Cancer Maternal Aunt        Social History:  Social History     Tobacco Use    Smoking status: Former     Packs/day: 0.50     Years: 10.00     Pack years: 5.00     Types: Cigarettes     Quit date: 1998     Years since quittin.1    Smokeless tobacco: Never    Tobacco comments:     Quit 15 years ago   Vaping Use    Vaping Use: Never used   Substance Use Topics    Alcohol use: Not Currently    Drug use: Never       Allergies: Allergies   Allergen Reactions    Bactrim [Sulfamethoprim] Swelling     Swelling of the lilps    Penicillin V Potassium Swelling         Review of Systems   Review of Systems   Constitutional: Negative. Negative for chills, fatigue and fever. HENT: Negative. Negative for congestion, nosebleeds and sore throat. Eyes: Negative. Negative for pain, discharge and visual disturbance. Respiratory: Negative. Negative for cough, chest tightness and shortness of breath. Cardiovascular:  Negative for chest pain, palpitations and leg swelling. Gastrointestinal:  Negative for abdominal pain, blood in stool, constipation, diarrhea, nausea and vomiting. Endocrine: Negative. Genitourinary: Negative. Negative for difficulty urinating, dysuria, pelvic pain and vaginal bleeding. Musculoskeletal:  Positive for arthralgias. Negative for back pain and myalgias. Skin: Negative. Negative for rash and wound. Allergic/Immunologic: Negative. Neurological: Negative. Negative for dizziness, syncope, weakness, numbness and headaches. Hematological: Negative. Psychiatric/Behavioral: Negative. Negative for agitation, confusion and suicidal ideas. All other systems reviewed and are negative. Positives and Pertinent negatives as per HPI.   Physical Exam   Patient Vitals for the past 24 hrs:   Temp Pulse Resp BP SpO2   02/07/23 0922 98.7 °F (37.1 °C) (!) 111 18 99/63 100 %         Physical Exam  Vitals and nursing note reviewed. Exam conducted with a chaperone present. Constitutional:       Appearance: Normal appearance. She is normal weight. HENT:      Head: Normocephalic and atraumatic. Nose: Nose normal.      Mouth/Throat:      Mouth: Mucous membranes are moist.      Pharynx: Oropharynx is clear. Eyes:      Extraocular Movements: Extraocular movements intact. Conjunctiva/sclera: Conjunctivae normal.      Pupils: Pupils are equal, round, and reactive to light. Cardiovascular:      Rate and Rhythm: Normal rate and regular rhythm. Pulses: Normal pulses. Heart sounds: Normal heart sounds. Pulmonary:      Effort: Pulmonary effort is normal. No respiratory distress. Breath sounds: Normal breath sounds. Abdominal:      General: Abdomen is flat. Bowel sounds are normal. There is no distension. Palpations: Abdomen is soft. Tenderness: There is no abdominal tenderness. There is no guarding. Musculoskeletal:         General: No swelling, tenderness, deformity or signs of injury. Normal range of motion. Cervical back: Normal range of motion and neck supple. Right lower leg: No edema. Left lower leg: No edema. Comments:  Leg ttp warm erythema, 2+ dp pulse   Skin:     General: Skin is warm and dry. Capillary Refill: Capillary refill takes less than 2 seconds. Findings: No lesion or rash. Neurological:      General: No focal deficit present. Mental Status: She is alert and oriented to person, place, and time. Mental status is at baseline. Cranial Nerves: No cranial nerve deficit. Psychiatric:         Mood and Affect: Mood normal.         Behavior: Behavior normal.         Thought Content:  Thought content normal.         Judgment: Judgment normal.       Diagnostic Study Results     LABS:   Recent Results (from the past 12 hour(s))   TROPONIN-HIGH SENSITIVITY    Collection Time: 02/07/23 10:10 AM   Result Value Ref Range    Troponin-High Sensitivity 5 0 - 51 ng/L   LACTIC ACID    Collection Time: 02/07/23 10:15 AM   Result Value Ref Range    Lactic acid 0.7 0.4 - 2.0 mmol/L   CBC WITH AUTOMATED DIFF    Collection Time: 02/07/23 10:15 AM   Result Value Ref Range    WBC 6.7 3.6 - 11.0 K/uL    RBC 3.58 (L) 3.80 - 5.20 M/uL    HGB 10.9 (L) 11.5 - 16.0 g/dL    HCT 33.0 (L) 35.0 - 47.0 %    MCV 92.2 80.0 - 99.0 FL    MCH 30.4 26.0 - 34.0 PG    MCHC 33.0 30.0 - 36.5 g/dL    RDW 12.3 11.5 - 14.5 %    PLATELET 020 094 - 491 K/uL    MPV 9.8 8.9 - 12.9 FL    NRBC 0.0 0.0  WBC    ABSOLUTE NRBC 0.00 0.00 - 0.01 K/uL    NEUTROPHILS 68 32 - 75 %    LYMPHOCYTES 23 12 - 49 %    MONOCYTES 9 5 - 13 %    EOSINOPHILS 0 0 - 7 %    BASOPHILS 0 0 - 1 %    IMMATURE GRANULOCYTES 0 0 - 0.5 %    ABS. NEUTROPHILS 4.5 1.8 - 8.0 K/UL    ABS. LYMPHOCYTES 1.5 0.8 - 3.5 K/UL    ABS. MONOCYTES 0.6 0.0 - 1.0 K/UL    ABS. EOSINOPHILS 0.0 0.0 - 0.4 K/UL    ABS. BASOPHILS 0.0 0.0 - 0.1 K/UL    ABS. IMM. GRANS. 0.0 0.00 - 0.04 K/UL    DF AUTOMATED     METABOLIC PANEL, COMPREHENSIVE    Collection Time: 02/07/23 10:15 AM   Result Value Ref Range    Sodium 131 (L) 136 - 145 mmol/L    Potassium 4.8 3.5 - 5.1 mmol/L    Chloride 106 97 - 108 mmol/L    CO2 17 (L) 21 - 32 mmol/L    Anion gap 8 5 - 15 mmol/L    Glucose 98 65 - 100 mg/dL    BUN 24 (H) 6 - 20 mg/dL    Creatinine 0.78 0.55 - 1.02 mg/dL    BUN/Creatinine ratio 31 (H) 12 - 20      eGFR >60 >60 ml/min/1.73m2    Calcium 9.8 8.5 - 10.1 mg/dL    Bilirubin, total 0.9 0.2 - 1.0 mg/dL    AST (SGOT) 33 15 - 37 U/L    ALT (SGPT) 13 12 - 78 U/L    Alk.  phosphatase 112 45 - 117 U/L    Protein, total 10.5 (H) 6.4 - 8.2 g/dL    Albumin 2.5 (L) 3.5 - 5.0 g/dL    Globulin 8.0 (H) 2.0 - 4.0 g/dL    A-G Ratio 0.3 (L) 1.1 - 2.2     D DIMER    Collection Time: 02/07/23 10:15 AM   Result Value Ref Range    D DIMER 6.76 (H) <0.50 ug/ml(FEU)   NT-PRO BNP    Collection Time: 02/07/23 10:15 AM   Result Value Ref Range    NT pro-BNP 1,760 (H) <125 pg/mL   URINALYSIS W/ RFLX MICROSCOPIC    Collection Time: 02/07/23 10:35 AM   Result Value Ref Range    Color Yellow      Appearance Clear Clear      Specific gravity 1.015 1.003 - 1.030      pH (UA) 6.5 5.0 - 8.0      Protein 30 (A) Negative mg/dL    Glucose Negative Negative mg/dL    Ketone 15 (A) Negative mg/dL    Bilirubin Negative Negative      Blood Small (A) Negative      Urobilinogen 1.0 0.1 - 1.0 EU/dL    Nitrites Positive (A) Negative      Leukocyte Esterase Large (A) Negative      WBC  0 - 4 /hpf    RBC 5-10 0 - 5 /hpf    Bacteria 4+ (A) Negative /hpf    Mucus Trace /lpf   URINE MICROSCOPIC    Collection Time: 02/07/23 10:35 AM   Result Value Ref Range    WBC  0 - 4 /hpf    RBC 5-10 0 - 5 /hpf    Epithelial cells Few Few /lpf    Bacteria 4+ (A) Negative /hpf    Mucus Trace (A) Negative /lpf        EKG: EKG interpreted independently by ER physician. RADIOLOGY:  Non-plain film images such as CT, Ultrasound and MRI are read by the radiologist. Plain radiographic images are visualized and preliminarily interpreted by the ED Provider with the below findings:          Interpretation per the Radiologist below, if available at the time of this note:     CT CHEST WO CONT    Result Date: 2/7/2023  INDICATION: ? Pulmonary embolism COMPARISON: Chest radiograph 2/7/2023, CTA chest 10/20/2022 CONTRAST: None. TECHNIQUE:  5 mm axial images were obtained through the chest. Coronal and sagittal reformats were generated. CT dose reduction was achieved through use of a standardized protocol tailored for this examination and automatic exposure control for dose modulation. The absence of intravenous contrast reduces the sensitivity for evaluation of the mediastinum, valeria, vasculature, and upper abdominal organs. FINDINGS: CHEST WALL: No mass or axillary lymphadenopathy.  THYROID: Subcentimeter indeterminate right lobe nodule. Bobetta So MEDIASTINUM: No mass or lymphadenopathy. DEONNA: No mass or lymphadenopathy. THORACIC AORTA: No aneurysm. MAIN PULMONARY ARTERY: Normal in caliber. TRACHEA/BRONCHI: Patent. ESOPHAGUS: No wall thickening or dilatation. HEART: Normal in size. Moderate pericardial effusion. Coronary artery calcium: present, mild PLEURA: No effusion or pneumothorax. LUNGS: Emphysema. No nodule, mass, or airspace disease. INCIDENTALLY IMAGED UPPER ABDOMEN: No significant abnormality in the incidentally imaged upper abdomen. BONES: No destructive bone lesion. 1.  Please note, cannot assess for pulmonary embolism on this noncontrast exam. 2.  Moderate pericardial effusion, nonspecific, but consider pericarditis. 3.  Emphysema, without acute airspace disease. XR CHEST PORT    Result Date: 2/7/2023  EXAM:  XR CHEST PORT INDICATION: Chest pain COMPARISON: 11/2/2022 TECHNIQUE: portable chest AP view FINDINGS: The cardiac silhouette is within normal limits. The pulmonary vasculature is within normal limits. The lungs and pleural spaces are clear. The bones are osteopenic. No acute process on portable chest.     CT Results  (Last 48 hours)                 02/07/23 1357  CT CHEST WO CONT Final result    Impression:  1. Please note, cannot assess for pulmonary embolism on this noncontrast exam.   2.  Moderate pericardial effusion, nonspecific, but consider pericarditis. 3.  Emphysema, without acute airspace disease. Narrative:  INDICATION: ? Pulmonary embolism       COMPARISON: Chest radiograph 2/7/2023, CTA chest 10/20/2022       CONTRAST: None. TECHNIQUE:  5 mm axial images were obtained through the chest. Coronal and   sagittal reformats were generated. CT dose reduction was achieved through use   of a standardized protocol tailored for this examination and automatic exposure   control for dose modulation.        The absence of intravenous contrast reduces the sensitivity for evaluation of   the mediastinum, deonna, vasculature, and upper abdominal organs. FINDINGS:       CHEST WALL: No mass or axillary lymphadenopathy. THYROID: Subcentimeter indeterminate right lobe nodule. Dewight Chad MEDIASTINUM: No mass or lymphadenopathy. DEONNA: No mass or lymphadenopathy. THORACIC AORTA: No aneurysm. MAIN PULMONARY ARTERY: Normal in caliber. TRACHEA/BRONCHI: Patent. ESOPHAGUS: No wall thickening or dilatation. HEART: Normal in size. Moderate pericardial effusion. Coronary artery calcium:   present, mild   PLEURA: No effusion or pneumothorax. LUNGS: Emphysema. No nodule, mass, or airspace disease. INCIDENTALLY IMAGED UPPER ABDOMEN: No significant abnormality in the   incidentally imaged upper abdomen. BONES: No destructive bone lesion. CXR Results  (Last 48 hours)                 02/07/23 1002  XR CHEST PORT Final result    Impression:      No acute process on portable chest.           Narrative:  EXAM:  XR CHEST PORT       INDICATION: Chest pain       COMPARISON: 11/2/2022       TECHNIQUE: portable chest AP view       FINDINGS: The cardiac silhouette is within normal limits. The pulmonary   vasculature is within normal limits. The lungs and pleural spaces are clear. The bones are osteopenic. Medical Decision Making and ED Course       CC/HPI Summary, DDx, ED Course, and Reassessment: 59-year-old female recent negative Doppler duplex ultrasound with worsening cellulitis despite outpatient antibiotics. Presents with tachycardia borderline low blood pressure. Differential includes cellulitis versus bacteremia versus PE versus DVT    We will also evaluate for sepsis.     These orders were placed during the ER evaluation:  Orders Placed This Encounter    CULTURE, BLOOD, PAIRED     Standing Status:   Standing     Number of Occurrences:   1    XR CHEST PORT     Standing Status:   Standing     Number of Occurrences:   1     Order Specific Question:   Reason for Exam     Answer:   cp    CT CHEST WO CONT     Standing Status:   Standing     Number of Occurrences:   1     Order Specific Question:   Transport     Answer:   BED [2]     Order Specific Question:   Reason for Exam     Answer:   pe?    LACTIC ACID, PLASMA     If lactic acid level is greater than 2, then a repeat lactic acid level is to be drawn in 6 hours. Standing Status:   Standing     Number of Occurrences:   1    LACTIC ACID, PLASMA     If initial lactic acid level is greater than 2, then a repeat lactic acid level is to be drawn in 6 hours. Standing Status:   Standing     Number of Occurrences:   05379    CBC WITH AUTOMATED DIFF     Standing Status:   Standing     Number of Occurrences:   1    COMPREHENSIVE METABOLIC PANEL     Standing Status:   Standing     Number of Occurrences:   1    URINALYSIS W/ RFLX MICROSCOPIC     Standing Status:   Standing     Number of Occurrences:   1    D DIMER     Standing Status:   Standing     Number of Occurrences:   1    PRO-BNP     Standing Status:   Standing     Number of Occurrences:   1    TROPONIN-HIGH SENSITIVITY     Standing Status:   Standing     Number of Occurrences:   1    URINE MICROSCOPIC     Standing Status:   Standing     Number of Occurrences:   1    CARDIAC MONITORING     Standing Status:   Standing     Number of Occurrences:   1     Order Specific Question:   Type:      Answer:   Remote Telemetry     Order Specific Question:   Patient may go off unit without monitor     Answer:   No    morphine injection 4 mg    ondansetron (ZOFRAN) injection 4 mg    DISCONTD: vancomycin (VANCOCIN) 1,000 mg in 0.9% sodium chloride 250 mL ([id number])     Order Specific Question:   Antibiotic Indications     Answer:   Skin and Soft Tissue Infection    vancomycin (VANCOCIN) 1,250 mg in 0.9% sodium chloride 250 mL ([id number])     Order Specific Question:   Antibiotic Indications     Answer:   Skin and Soft Tissue Infection    diphenhydrAMINE (BENADRYL) injection 25 mg    meropenem (MERREM) 1 g in 0.9% sodium chloride 20 mL IV syringe     Order Specific Question:   Antibiotic Indications     Answer:   Skin and Soft Tissue Infection     Order Specific Question:   Antibiotic Indications     Answer:   Urinary Tract Infection    INITIAL PHYSICIAN ORDER: INPATIENT Telemetry; Yes; 3. Patient receiving treatment that can only be provided in an inpatient setting (further clarification in H&P documentation)     Standing Status:   Standing     Number of Occurrences:   1     Order Specific Question:   Status: Answer:   INPATIENT [101]     Order Specific Question:   Type of Bed     Answer:   Telemetry [19]     Order Specific Question:   Cardiac Monitoring Required? Answer:   Yes     Order Specific Question:   Inpatient Hospitalization Certified Necessary for the Following Reasons     Answer:   3.  Patient receiving treatment that can only be provided in an inpatient setting (further clarification in H&P documentation)     Order Specific Question:   Admitting Diagnosis     Answer:   Cellulitis [164614]     Order Specific Question:   Admitting Diagnosis     Answer:   UTI (urinary tract infection) [[id number]]     Order Specific Question:   Admitting Physician     Answer:   W[facility] Life     Order Specific Question:   Attending Physician     Answer:   [facility] Life     Order Specific Question:   Estimated Length of Stay     Answer:   2 Midnights     Order Specific Question:   Discharge Plan:     Answer:   Home with Office Follow-up        Patient was given the following medications:  Medications   meropenem (MERREM) 1 g in 0.9% sodium chloride 20 mL IV syringe (has no administration in time range)   morphine injection 4 mg (4 mg IntraVENous Given 2/7/23 1019)   ondansetron (ZOFRAN) injection 4 mg (4 mg IntraVENous Given 2/7/23 1019)   vancomycin (VANCOCIN) 1,250 mg in 0.9% sodium chloride 250 mL ([id number]) (1,250 mg IntraVENous Given 2/7/23 1019)   diphenhydrAMINE (BENADRYL) injection 25 mg (25 mg IntraVENous Given 2/7/23 1048)           ED Course:       ED Course as of 02/07/23 1443   Tue Feb 07, 2023   1032 CXR: FINDINGS: The cardiac silhouette is within normal limits. The pulmonary  vasculature is within normal limits. The lungs and pleural spaces are clear. The bones are osteopenic. IMPRESSION     No acute process on portable chest.  ->Chest x-ray reviewed independently by ER physician. No evidence of pneumonia, pleural effusion, rib fracture or pneumothorax. No widened mediastinum. Negative acute. I do agree with radiology interpretation. [HP]   9939 D DIMER(!): 6.76  Lesley dd [HP]      ED Course User Index  [HP] Abhijit Díaz MD         Vital Signs-Reviewed the patient's vital signs. Patient Vitals for the past 24 hrs:   Temp Pulse Resp BP SpO2   02/07/23 0922 98.7 °F (37.1 °C) (!) 111 18 99/63 100 %     Vitals interpreted independently by ER physician. mariza    Medical decision making tools:                No data recorded          Disposition Considerations (Tests not done, Shared Decision Making, Pt Expectation of Test or Tx.): 58-year-old female with tachycardia has infection consistent with cellulitis of the right leg as well as a UTI. We will treat with antibiotics including vancomycin and meropenem has been on doxycycline as well with a penicillin Bactrim allergy. Blood culture sent. Patient has a negative recent Doppler for the leg. She endorsed some shortness of breath however refusing a CTA. At this point she is not hypoxic. CONSULTS: (Who and What was discussed)  None    Chronic Conditions: no    Social Determinants affecting Dx or Tx: None    Records Reviewed (source and summary of external notes): None  Records Reviewed: Previous Hospital chart. EMS run report.   I reviewed the vital signs, available nursing notes, past medical history, past surgical history, family history and social history. Initial assessment performed. The patients presenting problems have been discussed, and they are in agreement with the care plan formulated and outlined with them. I have encouraged them to ask questions as they arise throughout their visit. Admitted      Procedures               Disposition       Emergency Department Disposition:  Admitted      Diagnosis     Clinical Impression:   1. Urinary tract infection without hematuria, site unspecified    2. Cellulitis, unspecified cellulitis site        Attestations:    Colton Dick MD    Please note that this dictation was completed with "[employer] the computer voice recognition software. Quite often unanticipated grammatical, syntax, homophones, and other interpretive errors are inadvertently transcribed by the computer software. Please disregard these errors. Please excuse any errors that have escaped final proofreading. Thank you.

## 2023-02-08 ENCOUNTER — APPOINTMENT (OUTPATIENT)
Dept: NON INVASIVE DIAGNOSTICS | Age: 58
DRG: 383 | End: 2023-02-08
Attending: FAMILY MEDICINE
Payer: MEDICAID

## 2023-02-08 LAB
ALBUMIN SERPL-MCNC: 2.4 G/DL (ref 3.5–5)
ALBUMIN/GLOB SERPL: 0.3 (ref 1.1–2.2)
ALP SERPL-CCNC: 100 U/L (ref 45–117)
ALT SERPL-CCNC: 13 U/L (ref 12–78)
ANION GAP SERPL CALC-SCNC: 8 MMOL/L (ref 5–15)
AST SERPL W P-5'-P-CCNC: 22 U/L (ref 15–37)
BASOPHILS # BLD: 0 K/UL (ref 0–0.1)
BASOPHILS NFR BLD: 0 % (ref 0–1)
BILIRUB SERPL-MCNC: 0.7 MG/DL (ref 0.2–1)
BUN SERPL-MCNC: 28 MG/DL (ref 6–20)
BUN/CREAT SERPL: 39 (ref 12–20)
CA-I BLD-MCNC: 10.4 MG/DL (ref 8.5–10.1)
CHLORIDE SERPL-SCNC: 107 MMOL/L (ref 97–108)
CO2 SERPL-SCNC: 20 MMOL/L (ref 21–32)
CREAT SERPL-MCNC: 0.71 MG/DL (ref 0.55–1.02)
CRP SERPL-MCNC: 23.1 MG/DL (ref 0–0.6)
DIFFERENTIAL METHOD BLD: ABNORMAL
EOSINOPHIL # BLD: 0 K/UL (ref 0–0.4)
EOSINOPHIL NFR BLD: 0 % (ref 0–7)
ERYTHROCYTE [DISTWIDTH] IN BLOOD BY AUTOMATED COUNT: 12 % (ref 11.5–14.5)
GLOBULIN SER CALC-MCNC: 8.1 G/DL (ref 2–4)
GLUCOSE SERPL-MCNC: 94 MG/DL (ref 65–100)
HCT VFR BLD AUTO: 32.2 % (ref 35–47)
HGB BLD-MCNC: 10.2 G/DL (ref 11.5–16)
IMM GRANULOCYTES # BLD AUTO: 0 K/UL (ref 0–0.04)
IMM GRANULOCYTES NFR BLD AUTO: 0 % (ref 0–0.5)
LYMPHOCYTES # BLD: 1.7 K/UL (ref 0.8–3.5)
LYMPHOCYTES NFR BLD: 32 % (ref 12–49)
MCH RBC QN AUTO: 29.7 PG (ref 26–34)
MCHC RBC AUTO-ENTMCNC: 31.7 G/DL (ref 30–36.5)
MCV RBC AUTO: 93.6 FL (ref 80–99)
MONOCYTES # BLD: 0.6 K/UL (ref 0–1)
MONOCYTES NFR BLD: 11 % (ref 5–13)
NEUTS SEG # BLD: 3 K/UL (ref 1.8–8)
NEUTS SEG NFR BLD: 57 % (ref 32–75)
NRBC # BLD: 0 K/UL (ref 0–0.01)
NRBC BLD-RTO: 0 PER 100 WBC
PLATELET # BLD AUTO: 314 K/UL (ref 150–400)
PMV BLD AUTO: 9.4 FL (ref 8.9–12.9)
POTASSIUM SERPL-SCNC: 3.8 MMOL/L (ref 3.5–5.1)
PROCALCITONIN SERPL-MCNC: 0.24 NG/ML
PROT SERPL-MCNC: 10.5 G/DL (ref 6.4–8.2)
RBC # BLD AUTO: 3.44 M/UL (ref 3.8–5.2)
SODIUM SERPL-SCNC: 135 MMOL/L (ref 136–145)
WBC # BLD AUTO: 5.3 K/UL (ref 3.6–11)

## 2023-02-08 PROCEDURE — 85025 COMPLETE CBC W/AUTO DIFF WBC: CPT

## 2023-02-08 PROCEDURE — 99232 SBSQ HOSP IP/OBS MODERATE 35: CPT | Performed by: INTERNAL MEDICINE

## 2023-02-08 PROCEDURE — 65270000029 HC RM PRIVATE

## 2023-02-08 PROCEDURE — 84145 PROCALCITONIN (PCT): CPT

## 2023-02-08 PROCEDURE — 74011250637 HC RX REV CODE- 250/637: Performed by: FAMILY MEDICINE

## 2023-02-08 PROCEDURE — 74011000250 HC RX REV CODE- 250: Performed by: FAMILY MEDICINE

## 2023-02-08 PROCEDURE — 93922 UPR/L XTREMITY ART 2 LEVELS: CPT

## 2023-02-08 PROCEDURE — 74011250636 HC RX REV CODE- 250/636: Performed by: FAMILY MEDICINE

## 2023-02-08 PROCEDURE — 36415 COLL VENOUS BLD VENIPUNCTURE: CPT

## 2023-02-08 PROCEDURE — 87086 URINE CULTURE/COLONY COUNT: CPT

## 2023-02-08 PROCEDURE — 86140 C-REACTIVE PROTEIN: CPT

## 2023-02-08 PROCEDURE — 80053 COMPREHEN METABOLIC PANEL: CPT

## 2023-02-08 RX ADMIN — APIXABAN 5 MG: 5 TABLET, FILM COATED ORAL at 00:35

## 2023-02-08 RX ADMIN — SODIUM CHLORIDE, PRESERVATIVE FREE 1 G: 5 INJECTION INTRAVENOUS at 22:58

## 2023-02-08 RX ADMIN — SODIUM CHLORIDE, PRESERVATIVE FREE 1 G: 5 INJECTION INTRAVENOUS at 15:41

## 2023-02-08 RX ADMIN — SODIUM CHLORIDE, PRESERVATIVE FREE 1 G: 5 INJECTION INTRAVENOUS at 00:35

## 2023-02-08 RX ADMIN — TRAMADOL HYDROCHLORIDE 50 MG: 50 TABLET, COATED ORAL at 00:35

## 2023-02-08 RX ADMIN — APIXABAN 5 MG: 5 TABLET, FILM COATED ORAL at 10:00

## 2023-02-08 RX ADMIN — OXYCODONE HYDROCHLORIDE AND ACETAMINOPHEN 500 MG: 500 TABLET ORAL at 10:00

## 2023-02-08 RX ADMIN — TRAMADOL HYDROCHLORIDE 50 MG: 50 TABLET, COATED ORAL at 22:59

## 2023-02-08 RX ADMIN — PANTOPRAZOLE SODIUM 40 MG: 40 TABLET, DELAYED RELEASE ORAL at 10:00

## 2023-02-08 RX ADMIN — FERROUS SULFATE TAB 325 MG (65 MG ELEMENTAL FE) 325 MG: 325 (65 FE) TAB at 10:01

## 2023-02-08 RX ADMIN — APIXABAN 5 MG: 5 TABLET, FILM COATED ORAL at 22:58

## 2023-02-08 NOTE — CONSULTS
Consult Date: 2023    Consults  Cellulitis of the left leg    Subjective   This is a 61year old female with history of bladder cancer with urostomy and colostomy, RA, DVT, seen in the ED last week with left leg pain. XR left ankle were unremarkable. Duplex scan showed chronic non-occlusive thrombus in  left femoral vein, left popliteal vein and left gastrocnemius vein. Patient was discharged Doxycycline. She returned to the ED today because of pain on weight bearing with worsening redness and swelling. She was afebrile with normal WBC. Urinalysis showed marked pyuria, bacteria and positve nitrite. CXR unremarkable and CT Chest showed moderate pericardial effusion, nonspecific, but suggestive of pericarditis and emphysema, without acute airspace disease. Images reviewed by me. Blood cultures were sent and urine culture ordered. Patient started on Meropenem (penicillin allergy). ID has been consulted for this reason. Patient seen in the ED. She affirms history above and emphasized pain in her left foot and ankle.  at bedside.   Past Medical History:   Diagnosis Date    Anemia     pt states had recent blood transfusion 2022    Back pain     Cancer (HCC)     bladder    DVT (deep venous thrombosis) ([address]     and PE, pt denies lung PE , only leg several years ago    Hepatitis C     pt states dx 1 month ago    Liver disease     Presence of IVC filter     pt states several years ago    Rheumatoid arthritis ([address]     Sciatic leg pain     pt states both legs    Uterine fibroid       Past Surgical History:   Procedure Laterality Date    HX APPENDECTOMY  10/03/2022    HX  SECTION      HX GI  2022    laparoscopic early postop adhesion of the sigmoid colon    HX GI  2022    laparoscopic lysis of adhesion    HX GI  10/31/2022    rigid sigmoidoscope examination of the rectum    HX GI  10/31/2022    distal rectal repair with primary suturing technique    HX GYN  10/03/2022    ROBOT ANTERIOR PELVIC EXENTERATION    HX HYSTERECTOMY  10/03/2022    HX SALPINGO-OOPHORECTOMY Bilateral 10/03/2022    HX UROLOGICAL  09/15/2022    CYSTOSCOPY    HX UROLOGICAL  09/15/2022    URETHRAL DILATION    HX UROLOGICAL  09/15/2022    URETERAL STENT PLACEMENT    HX UROLOGICAL  09/15/2022    TRANSURETHRAL RESECTION OF BLADDER TUMOR >2CM    HX UROLOGICAL Bilateral 09/15/2022    RETROGRADE PYELOGRAM    HX UROLOGICAL  10/03/2022    PELVIC LYMPH NODE DISSECTION    HX UROLOGICAL  10/03/2022    ILEAL CONDUIT URINARY DIVERSION    IR THROMBECTOMY [facility] VEIN W PHARM  10/27/2022    [place] UNLISTED PROCEDURE CARDIAC SURGERY      stent placement     Family History   Problem Relation Age of Onset    Cancer Mother     Lung Cancer Mother     Heart Disease Father     Hypertension Sister     Cancer Maternal Aunt     Breast Cancer Maternal Aunt       Social History     Tobacco Use    Smoking status: Former     Packs/day: 0.50     Years: 10.00     Pack years: 5.00     Types: Cigarettes     Quit date: 1998     Years since quittin.1    Smokeless tobacco: Never    Tobacco comments:     Quit 15 years ago   Substance Use Topics    Alcohol use: Not Currently       Current Facility-Administered Medications   Medication Dose Route Frequency Provider Last Rate Last Admin    meropenem (MERREM) 1 g in 0.9% sodium chloride 20 mL IV syringe  1 g IntraVENous Q8H Keanu Willis MD   1 g at 23 0035    apixaban (ELIQUIS) tablet 5 mg  5 mg Oral Q12H Keanu Willis MD   5 mg at 23 1000    ascorbic acid (vitamin C) (VITAMIN C) tablet 500 mg  500 mg Oral DAILY Keanu Willis MD   500 mg at 23 1000    ferrous sulfate tablet 325 mg  325 mg Oral DAILY Keanu Willis MD   325 mg at 23 1001    pantoprazole (PROTONIX) tablet 40 mg  40 mg Oral DAILY Keanu Willis MD   40 mg at 23 1000    traMADoL (ULTRAM) tablet 50 mg  50 mg Oral Q6H PRN Keanu Willis MD   50 mg at 23 0035    acetaminophen (TYLENOL) tablet 650 mg  650 mg Oral Q6H PRN Nichole Willis MD        Or    acetaminophen (TYLENOL) suppository 650 mg  650 mg Rectal Q6H PRN Nichole Willis MD        polyethylene glycol (MIRALAX) packet 17 g  17 g Oral DAILY PRN Nichole Willis MD        ondansetron (ZOFRAN ODT) tablet 4 mg  4 mg Oral Q8H PRN Keanu Willis MD        Or    ondansetron (ZOFRAN) injection 4 mg  4 mg IntraVENous Q6H PRN Nichole Willis MD            Review of Systems   Constitutional:  Negative for chills and fever. HENT: Negative. Eyes: Negative. Respiratory: Negative. Cardiovascular: Negative. Gastrointestinal: Negative. Endocrine: Negative. Genitourinary: Negative. Urostomy   Musculoskeletal:  Positive for arthralgias. Skin:  Positive for color change. Negative for wound. Allergic/Immunologic: Negative. Neurological: Negative. Hematological: Negative. Psychiatric/Behavioral: Negative. Objective     Vital signs for last 24 hours:  Visit Vitals  /69 (BP 1 Location: Right upper arm, BP Patient Position: At rest)   Pulse (!) 101   Temp 97.5 °F (36.4 °C)   Resp 20   Ht 5' 3\" (1.6 m)   Wt 113 lb (51.3 kg)   LMP  (LMP Unknown)   SpO2 97%   Breastfeeding No   BMI 20.02 kg/m²       Intake/Output this shift:  Current Shift: No intake/output data recorded. Last 3 Shifts: No intake/output data recorded.     Data Review:   Recent Results (from the past 24 hour(s))   URINALYSIS W/ RFLX MICROSCOPIC    Collection Time: 02/07/23 10:35 AM   Result Value Ref Range    Color Yellow      Appearance Clear Clear      Specific gravity 1.015 1.003 - 1.030      pH (UA) 6.5 5.0 - 8.0      Protein 30 (A) Negative mg/dL    Glucose Negative Negative mg/dL    Ketone 15 (A) Negative mg/dL    Bilirubin Negative Negative      Blood Small (A) Negative      Urobilinogen 1.0 0.1 - 1.0 EU/dL    Nitrites Positive (A) Negative      Leukocyte Esterase Large (A) Negative      WBC  0 - 4 /hpf    RBC 5-10 0 - 5 /hpf    Bacteria 4+ (A) Negative /hpf    Mucus Trace /lpf   URINE MICROSCOPIC    Collection Time: 02/07/23 10:35 AM   Result Value Ref Range    WBC  0 - 4 /hpf    RBC 5-10 0 - 5 /hpf    Epithelial cells Few Few /lpf    Bacteria 4+ (A) Negative /hpf    Mucus Trace (A) Negative /lpf   METABOLIC PANEL, COMPREHENSIVE    Collection Time: 02/08/23  7:06 AM   Result Value Ref Range    Sodium 135 (L) 136 - 145 mmol/L    Potassium 3.8 3.5 - 5.1 mmol/L    Chloride 107 97 - 108 mmol/L    CO2 20 (L) 21 - 32 mmol/L    Anion gap 8 5 - 15 mmol/L    Glucose 94 65 - 100 mg/dL    BUN 28 (H) 6 - 20 mg/dL    Creatinine 0.71 0.55 - 1.02 mg/dL    BUN/Creatinine ratio 39 (H) 12 - 20      eGFR >60 >60 ml/min/1.73m2    Calcium 10.4 (H) 8.5 - 10.1 mg/dL    Bilirubin, total 0.7 0.2 - 1.0 mg/dL    AST (SGOT) 22 15 - 37 U/L    ALT (SGPT) 13 12 - 78 U/L    Alk. phosphatase 100 45 - 117 U/L    Protein, total 10.5 (H) 6.4 - 8.2 g/dL    Albumin 2.4 (L) 3.5 - 5.0 g/dL    Globulin 8.1 (H) 2.0 - 4.0 g/dL    A-G Ratio 0.3 (L) 1.1 - 2.2     CBC WITH AUTOMATED DIFF    Collection Time: 02/08/23  7:06 AM   Result Value Ref Range    WBC 5.3 3.6 - 11.0 K/uL    RBC 3.44 (L) 3.80 - 5.20 M/uL    HGB 10.2 (L) 11.5 - 16.0 g/dL    HCT 32.2 (L) 35.0 - 47.0 %    MCV 93.6 80.0 - 99.0 FL    MCH 29.7 26.0 - 34.0 PG    MCHC 31.7 30.0 - 36.5 g/dL    RDW 12.0 11.5 - 14.5 %    PLATELET 473 877 - 081 K/uL    MPV 9.4 8.9 - 12.9 FL    NRBC 0.0 0.0  WBC    ABSOLUTE NRBC 0.00 0.00 - 0.01 K/uL    NEUTROPHILS 57 32 - 75 %    LYMPHOCYTES 32 12 - 49 %    MONOCYTES 11 5 - 13 %    EOSINOPHILS 0 0 - 7 %    BASOPHILS 0 0 - 1 %    IMMATURE GRANULOCYTES 0 0 - 0.5 %    ABS. NEUTROPHILS 3.0 1.8 - 8.0 K/UL    ABS. LYMPHOCYTES 1.7 0.8 - 3.5 K/UL    ABS. MONOCYTES 0.6 0.0 - 1.0 K/UL    ABS. EOSINOPHILS 0.0 0.0 - 0.4 K/UL    ABS. BASOPHILS 0.0 0.0 - 0.1 K/UL    ABS. IMM.  GRANS. 0.0 0.00 - 0.04 K/UL    DF AUTOMATED       CT Chest (2/7)        Physical Exam  Vitals and nursing note reviewed. Exam conducted with a chaperone present (). Constitutional:       General: She is not in acute distress. Appearance: She is ill-appearing. HENT:      Head: Normocephalic and atraumatic. Right Ear: External ear normal.      Left Ear: External ear normal.      Nose: Nose normal.      Mouth/Throat:      Pharynx: Oropharynx is clear. Eyes:      Pupils: Pupils are equal, round, and reactive to light. Cardiovascular:      Rate and Rhythm: Normal rate and regular rhythm. Heart sounds: No murmur heard. Pulmonary:      Effort: Pulmonary effort is normal.      Breath sounds: Normal breath sounds. Abdominal:      General: Bowel sounds are normal. There is no distension. Palpations: Abdomen is soft. Tenderness: There is no abdominal tenderness. Comments: Urostomy with cloudy urine    Colostomy with pink stoma   Genitourinary:     Comments: No Gibson  Musculoskeletal:      Cervical back: Neck supple. Right lower leg: No edema. Left lower leg: Edema present. Comments: Swelling in left calf, ankle and foot is moderate with mild erythema, no open wounds   Skin:     Findings: No erythema or rash. Neurological:      General: No focal deficit present. Mental Status: She is alert and oriented to person, place, and time. Psychiatric:         Mood and Affect: Mood normal.         Behavior: Behavior normal.         Thought Content: Thought content normal.         Judgment: Judgment normal.       ASSESSMENT/PLAN    Presumptive cellulitis, left leg/foot, etiology unclear  Complicated UTI (cystectomy, urostomy) with marked pyuria, bacteriuria and positive nitrite, urine culture ordered  Pericardial effusion, significance unclear  4. Chronic non-occlusive thrombi in left femoral/popliteal and gastrocnemius veins  5. Penicillin allergy    Comment:  Clinical findings not impressive for cellulitis and her pain is out of proportion to those findings.  Unclear what role if any the chronic non-occlusive thrombi are playing. Continue IV Meropenem  Awatiing urine culture  Check CRP and procal  Follow-up blood cultures  TTE for pericardial effusion    Jorge Angulo MD

## 2023-02-08 NOTE — PROGRESS NOTES
History and Physical    NAME: Elena Mcgill   :  1965   MRN:  [id number]     Date/Time:  2023 5:25 PM    Patient PCP: Wild Mendieta MD  ______________________________________________________________________             Subjective:     CHIEF COMPLAINT:     Left leg pain        HISTORY OF PRESENT ILLNESS:       Patient is a 61y.o. year old female with signal past medical history of multiple DVT history of bladder cancer status post urostomy bag status postsurgery history of colostomy bag came to the ER complaining of left leg pain redness swelling patient was treated as outpatient with doxycycline with no much improvement she came to emergency room seen by the ER physician and patient was recommended to be admitted for acute cellulitis of the left leg      pain is constant. Infection has not spread since onset about 1 month ago. Pain has worsened over the past month, now with paresthesia radiating up the leg. Patient states she cannot stand on affected foot which prompted her to present to ER. Chronic DVT found on duplex last week, was given doxy with no improvement. Urinalysis shows bacteriuria, pyuria, positive nitratess. Awaiting urine and blood cultures.      Past Medical History:   Diagnosis Date    Anemia     pt states had recent blood transfusion 2022    Back pain     Cancer (HCC)     bladder    DVT (deep venous thrombosis) ([facility] [address]     and PE, pt denies lung PE , only leg several years ago    Hepatitis C     pt states dx 1 month ago    Liver disease     Presence of IVC filter     pt states several years ago    Rheumatoid arthritis ([address]     Sciatic leg pain     pt states both legs    Uterine fibroid         Past Surgical History:   Procedure Laterality Date    HX APPENDECTOMY  10/03/2022    HX  SECTION      HX GI  2022    laparoscopic early postop adhesion of the sigmoid colon    HX GI  2022    laparoscopic lysis of adhesion    HX GI  10/31/2022    rigid sigmoidoscope examination of the rectum    HX GI  10/31/2022    distal rectal repair with primary suturing technique    HX GYN  10/03/2022    ROBOT ANTERIOR PELVIC EXENTERATION    HX HYSTERECTOMY  10/03/2022    HX SALPINGO-OOPHORECTOMY Bilateral 10/03/2022    HX UROLOGICAL  09/15/2022    CYSTOSCOPY    HX UROLOGICAL  09/15/2022    URETHRAL DILATION    HX UROLOGICAL  09/15/2022    URETERAL STENT PLACEMENT    HX UROLOGICAL  09/15/2022    TRANSURETHRAL RESECTION OF BLADDER TUMOR >2CM    HX UROLOGICAL Bilateral 09/15/2022    RETROGRADE PYELOGRAM    HX UROLOGICAL  10/03/2022    PELVIC LYMPH NODE DISSECTION    HX UROLOGICAL  10/03/2022    ILEAL CONDUIT URINARY DIVERSION    IR THROMBECTOMY [facility] VEIN W PHARM  10/27/2022    [place] UNLISTED PROCEDURE CARDIAC SURGERY      stent placement       Social History     Tobacco Use    Smoking status: Former     Packs/day: 0.50     Years: 10.00     Pack years: 5.00     Types: Cigarettes     Quit date: 1998     Years since quittin.1    Smokeless tobacco: Never    Tobacco comments:     Quit 15 years ago   Substance Use Topics    Alcohol use: Not Currently        Family History   Problem Relation Age of Onset    Cancer Mother     Lung Cancer Mother     Heart Disease Father     Hypertension Sister     Cancer Maternal Aunt     Breast Cancer Maternal Aunt        Allergies   Allergen Reactions    Bactrim [Sulfamethoprim] Swelling     Swelling of the lilps    Penicillin V Potassium Swelling        Prior to Admission medications    Medication Sig Start Date End Date Taking? Authorizing Provider   doxycycline (VIBRA-TABS) 100 mg tablet Take 1 Tablet by mouth two (2) times a day for 7 days. 23 Yes Abilio Rivera MD   multivitamin (ONE A DAY) tablet Take 1 Tablet by mouth daily. 23  Yes Eryn Celis MD   pantoprazole (PROTONIX) 40 mg tablet Take 1 Tablet by mouth daily.  23  Yes Eryn Celis MD   ascorbic acid, vitamin C, (VITAMIN C) 500 mg tablet Take 1 Tablet by mouth daily. 1/13/23  Yes Khadijah Mayer MD   apixaban (ELIQUIS) 5 mg tablet Take 1 Tablet by mouth every twelve (12) hours for 90 days. Indications: blood clot in a deep vein of the extremities 1/12/23 4/12/23 Yes Khadijah Mayer MD   acetaminophen (TYLENOL) 650 mg TbER Take 1 Tablet by mouth every eight (8) hours as needed for Pain. 1/12/23  Yes Khadijah Mayer MD   docusate sodium (COLACE) 100 mg capsule Take 1 Capsule by mouth two (2) times daily as needed for Constipation for up to 90 days. 1/12/23 4/12/23 Yes Khadijah Mayer MD   ferrous sulfate 325 mg (65 mg iron) tablet Take 1 Tablet by mouth daily. 1/12/23  Yes Khadijah Mayer MD   traMADoL Marinell Estrin) 50 mg tablet Take  by mouth. 12/12/22  Yes Provider, Historical   ondansetron (ZOFRAN ODT) 4 mg disintegrating tablet Take  by mouth. 11/14/22  Yes Provider, Historical   psyllium seed-sucrose (Metamucil, sugar,) powd Take 1 Scoop by mouth daily.  12/7/22  Yes Gabbie Garcia MD         Current Facility-Administered Medications:     meropenem (MERREM) 1 g in 0.9% sodium chloride 20 mL IV syringe, 1 g, IntraVENous, Q8H, Keanu Willis MD, 1 g at 02/08/23 0035    apixaban (ELIQUIS) tablet 5 mg, 5 mg, Oral, Q12H, Keanu Willis MD, 5 mg at 02/08/23 1000    ascorbic acid (vitamin C) (VITAMIN C) tablet 500 mg, 500 mg, Oral, DAILY, Keanu Willis MD, 500 mg at 02/08/23 1000    ferrous sulfate tablet 325 mg, 325 mg, Oral, DAILY, Keanu Willis MD, 325 mg at 02/08/23 1001    pantoprazole (PROTONIX) tablet 40 mg, 40 mg, Oral, DAILY, Keanu Willis MD, 40 mg at 02/08/23 1000    traMADoL (ULTRAM) tablet 50 mg, 50 mg, Oral, Q6H PRN, Keanu Willis MD, 50 mg at 02/08/23 6876    acetaminophen (TYLENOL) tablet 650 mg, 650 mg, Oral, Q6H PRN **OR** acetaminophen (TYLENOL) suppository 650 mg, 650 mg, Rectal, Q6H PRN, Keanu Willis MD    polyethylene glycol (MIRALAX) packet 17 g, 17 g, Oral, DAILY PRN, Alex Randhawa MD ondansetron (ZOFRAN ODT) tablet 4 mg, 4 mg, Oral, Q8H PRN **OR** ondansetron (ZOFRAN) injection 4 mg, 4 mg, IntraVENous, Q6H PRN, Keanu Willis MD    LAB DATA REVIEWED:    Recent Results (from the past 24 hour(s))   METABOLIC PANEL, COMPREHENSIVE    Collection Time: 02/08/23  7:06 AM   Result Value Ref Range    Sodium 135 (L) 136 - 145 mmol/L    Potassium 3.8 3.5 - 5.1 mmol/L    Chloride 107 97 - 108 mmol/L    CO2 20 (L) 21 - 32 mmol/L    Anion gap 8 5 - 15 mmol/L    Glucose 94 65 - 100 mg/dL    BUN 28 (H) 6 - 20 mg/dL    Creatinine 0.71 0.55 - 1.02 mg/dL    BUN/Creatinine ratio 39 (H) 12 - 20      eGFR >60 >60 ml/min/1.73m2    Calcium 10.4 (H) 8.5 - 10.1 mg/dL    Bilirubin, total 0.7 0.2 - 1.0 mg/dL    AST (SGOT) 22 15 - 37 U/L    ALT (SGPT) 13 12 - 78 U/L    Alk. phosphatase 100 45 - 117 U/L    Protein, total 10.5 (H) 6.4 - 8.2 g/dL    Albumin 2.4 (L) 3.5 - 5.0 g/dL    Globulin 8.1 (H) 2.0 - 4.0 g/dL    A-G Ratio 0.3 (L) 1.1 - 2.2     CBC WITH AUTOMATED DIFF    Collection Time: 02/08/23  7:06 AM   Result Value Ref Range    WBC 5.3 3.6 - 11.0 K/uL    RBC 3.44 (L) 3.80 - 5.20 M/uL    HGB 10.2 (L) 11.5 - 16.0 g/dL    HCT 32.2 (L) 35.0 - 47.0 %    MCV 93.6 80.0 - 99.0 FL    MCH 29.7 26.0 - 34.0 PG    MCHC 31.7 30.0 - 36.5 g/dL    RDW 12.0 11.5 - 14.5 %    PLATELET 631 932 - 645 K/uL    MPV 9.4 8.9 - 12.9 FL    NRBC 0.0 0.0  WBC    ABSOLUTE NRBC 0.00 0.00 - 0.01 K/uL    NEUTROPHILS 57 32 - 75 %    LYMPHOCYTES 32 12 - 49 %    MONOCYTES 11 5 - 13 %    EOSINOPHILS 0 0 - 7 %    BASOPHILS 0 0 - 1 %    IMMATURE GRANULOCYTES 0 0 - 0.5 %    ABS. NEUTROPHILS 3.0 1.8 - 8.0 K/UL    ABS. LYMPHOCYTES 1.7 0.8 - 3.5 K/UL    ABS. MONOCYTES 0.6 0.0 - 1.0 K/UL    ABS. EOSINOPHILS 0.0 0.0 - 0.4 K/UL    ABS. BASOPHILS 0.0 0.0 - 0.1 K/UL    ABS. IMM.  GRANS. 0.0 0.00 - 0.04 K/UL    DF AUTOMATED     ANKLE BRACHIAL INDEX    Collection Time: 02/08/23 10:55 AM   Result Value Ref Range    Right dorsalis pedis  mmHg    Left dorsalis pedis  mmHg    Left arm BP 88 mmHg    Right arm BP 98 mmHg    Left posterior tibial 94 mmHg    Right posterior tibial 101 mmHg    Left BARBARA 1.11     Right BARBARA 1.11     Left toe pressure 41 mmHg    Right toe pressure 77 mmHg    Left TBI 0.42     Right TBI 0.79        XR Results (most recent):  Results from Hospital Encounter encounter on 02/07/23    XR CHEST PORT    Narrative  EXAM:  XR CHEST PORT    INDICATION: Chest pain    COMPARISON: 11/2/2022    TECHNIQUE: portable chest AP view    FINDINGS: The cardiac silhouette is within normal limits. The pulmonary  vasculature is within normal limits. The lungs and pleural spaces are clear. The bones are osteopenic. Impression  No acute process on portable chest.         ANKLE BRACHIAL INDEX         CT CHEST WO CONT   Final Result   1. Please note, cannot assess for pulmonary embolism on this noncontrast exam.   2.  Moderate pericardial effusion, nonspecific, but consider pericarditis. 3.  Emphysema, without acute airspace disease. XR CHEST PORT   Final Result      No acute process on portable chest.              Review of Systems:  Constitutional: Negative for chills and fever. HENT: Negative. Eyes: Negative. Respiratory: Negative. Cardiovascular: Negative. Gastrointestinal: Negative for abdominal pain and nausea. Skin: Negative. Neurological: Negative. Left leg pain swelling and redness    Objective:   VITALS:    Visit Vitals  /69 (BP 1 Location: Right upper arm, BP Patient Position: At rest)   Pulse (!) 101   Temp 97.5 °F (36.4 °C)   Resp 20   Ht 5' 3\" (1.6 m)   Wt 51.3 kg (113 lb)   SpO2 97%   Breastfeeding No   BMI 20.02 kg/m²       Physical Exam:   Constitutional: pt is oriented to person, place, and time. HENT:   Head: Normocephalic and atraumatic. Eyes: Pupils are equal, round, and reactive to light. EOM are normal.   Cardiovascular: Normal rate, regular rhythm and normal heart sounds.    Pulmonary/Chest: Breath sounds normal. No wheezes. No rales. Exhibits no tenderness. Abdominal: Soft. Bowel sounds are normal. There is no abdominal tenderness. There is no rebound and no guarding. Musculoskeletal: Left leg pain swelling and redness. Neurological: pt is alert and oriented to person, place, and time. Alert. Normal strength. No cranial nerve deficit or sensory deficit. Displays a negative Romberg sign.         ASSESSMENT & PLAN:    Acute cellulitis of the left leg  Infectious consulted, awaiting blood and urine cultures  Continue meropenem  UTI complicated by ostomy bag  Urine positive, awaiting cultures   Continue meropenem   History of DVTs  Chronic DVT non- occlusive  Monitor   History of bladder cancer status postsurgery  History of colostomy bag  GERD      Patient started on IV meropenem after the blood cultures ID consult  Ordered PVR  Continue other home medications      Current Facility-Administered Medications:     meropenem (MERREM) 1 g in 0.9% sodium chloride 20 mL IV syringe, 1 g, IntraVENous, Q8H, Keanu Willis MD, 1 g at 02/08/23 0035    apixaban (ELIQUIS) tablet 5 mg, 5 mg, Oral, Q12H, Keanu Willis MD, 5 mg at 02/08/23 1000    ascorbic acid (vitamin C) (VITAMIN C) tablet 500 mg, 500 mg, Oral, DAILY, Keanu Willis MD, 500 mg at 02/08/23 1000    ferrous sulfate tablet 325 mg, 325 mg, Oral, DAILY, Keanu iWllis MD, 325 mg at 02/08/23 1001    pantoprazole (PROTONIX) tablet 40 mg, 40 mg, Oral, DAILY, Keanu Willis MD, 40 mg at 02/08/23 1000    traMADoL (ULTRAM) tablet 50 mg, 50 mg, Oral, Q6H PRN, Keanu Willis MD, 50 mg at 02/08/23 0035    acetaminophen (TYLENOL) tablet 650 mg, 650 mg, Oral, Q6H PRN **OR** acetaminophen (TYLENOL) suppository 650 mg, 650 mg, Rectal, Q6H PRN, Keanu Willis MD    polyethylene glycol (MIRALAX) packet 17 g, 17 g, Oral, DAILY PRN, Keanu Willis MD    ondansetron (ZOFRAN ODT) tablet 4 mg, 4 mg, Oral, Q8H PRN **OR** ondansetron (ZOFRAN) injection 4 mg, 4 mg, IntraVENous, Q6H Nikunj PEGUERO MD       ________________________________________________________________________    Signed: [id number] Pro Gomez MD

## 2023-02-08 NOTE — PROGRESS NOTES
In [facility] [address] request for Advance Medical Directive (AMD) and spiritual care assessment. Pt not in room at this time. Will follow up as opportunity allows. [facility]. Please contact Hospital  for [phone].    [phone]

## 2023-02-08 NOTE — PROGRESS NOTES
History and Physical    NAME: Avani Rojas   :  1965   MRN:  [id number]     Date/Time:  2023 5:25 PM    Patient PCP: Vanessa Larson MD  ______________________________________________________________________             Subjective:     CHIEF COMPLAINT:     Left leg pain        HISTORY OF PRESENT ILLNESS:       Patient is a 61y.o. year old female with signal past medical history of multiple DVT history of bladder cancer status post urostomy bag status postsurgery history of colostomy bag came to the ER complaining of left leg pain redness swelling patient was treated as outpatient with doxycycline with no much improvement she came to emergency room seen by the ER physician and patient was recommended to be admitted for acute cellulitis of the left leg      pain is constant. Infection has not spread since onset about 1 month ago. Pain has worsened over the past month, now with paresthesia radiating up the leg. Patient states she cannot stand on affected foot which prompted her to present to ER. Chronic DVT found on duplex last week, was given doxy with no improvement. Urinalysis shows bacteriuria, pyuria, positive nitratess. Awaiting urine and blood cultures.      Past Medical History:   Diagnosis Date    Anemia     pt states had recent blood transfusion 2022    Back pain     Cancer (HCC)     bladder    DVT (deep venous thrombosis) ([facility] [address]     and PE, pt denies lung PE , only leg several years ago    Hepatitis C     pt states dx 1 month ago    Liver disease     Presence of IVC filter     pt states several years ago    Rheumatoid arthritis ([address]     Sciatic leg pain     pt states both legs    Uterine fibroid         Past Surgical History:   Procedure Laterality Date    HX APPENDECTOMY  10/03/2022    HX  SECTION      HX GI  2022    laparoscopic early postop adhesion of the sigmoid colon    HX GI  2022    laparoscopic lysis of adhesion    HX GI  10/31/2022    rigid sigmoidoscope examination of the rectum    HX GI  10/31/2022    distal rectal repair with primary suturing technique    HX GYN  10/03/2022    ROBOT ANTERIOR PELVIC EXENTERATION    HX HYSTERECTOMY  10/03/2022    HX SALPINGO-OOPHORECTOMY Bilateral 10/03/2022    HX UROLOGICAL  09/15/2022    CYSTOSCOPY    HX UROLOGICAL  09/15/2022    URETHRAL DILATION    HX UROLOGICAL  09/15/2022    URETERAL STENT PLACEMENT    HX UROLOGICAL  09/15/2022    TRANSURETHRAL RESECTION OF BLADDER TUMOR >2CM    HX UROLOGICAL Bilateral 09/15/2022    RETROGRADE PYELOGRAM    HX UROLOGICAL  10/03/2022    PELVIC LYMPH NODE DISSECTION    HX UROLOGICAL  10/03/2022    ILEAL CONDUIT URINARY DIVERSION    IR THROMBECTOMY [facility]IN [facility] PHARM  10/27/2022    [place] UNLISTED PROCEDURE CARDIAC SURGERY      stent placement       Social History     Tobacco Use    Smoking status: Former     Packs/day: 0.50     Years: 10.00     Pack years: 5.00     Types: Cigarettes     Quit date: 1998     Years since quittin.1    Smokeless tobacco: Never    Tobacco comments:     Quit 15 years ago   Substance Use Topics    Alcohol use: Not Currently        Family History   Problem Relation Age of Onset    Cancer Mother     Lung Cancer Mother     Heart Disease Father     Hypertension Sister     Cancer Maternal Aunt     Breast Cancer Maternal Aunt        Allergies   Allergen Reactions    Bactrim [Sulfamethoprim] Swelling     Swelling of the lilps    Penicillin V Potassium Swelling        Prior to Admission medications    Medication Sig Start Date End Date Taking? Authorizing Provider   doxycycline (VIBRA-TABS) 100 mg tablet Take 1 Tablet by mouth two (2) times a day for 7 days. 23 Yes Sharlene Gann MD   multivitamin (ONE A DAY) tablet Take 1 Tablet by mouth daily. 23  Yes Velia Bernal MD   pantoprazole (PROTONIX) 40 mg tablet Take 1 Tablet by mouth daily.  23  Yes Velia Bernal MD   ascorbic acid, vitamin C, (VITAMIN C) 500 mg tablet Take 1 Tablet by mouth daily. 1/13/23  Yes iJmena Newton MD   apixaban (ELIQUIS) 5 mg tablet Take 1 Tablet by mouth every twelve (12) hours for 90 days. Indications: blood clot in a deep vein of the extremities 1/12/23 4/12/23 Yes Jimena Newton MD   acetaminophen (TYLENOL) 650 mg TbER Take 1 Tablet by mouth every eight (8) hours as needed for Pain. 1/12/23  Yes Jimena Newton MD   docusate sodium (COLACE) 100 mg capsule Take 1 Capsule by mouth two (2) times daily as needed for Constipation for up to 90 days. 1/12/23 4/12/23 Yes Jimena Newton MD   ferrous sulfate 325 mg (65 mg iron) tablet Take 1 Tablet by mouth daily. 1/12/23  Yes Jimena Newton MD   traMADoL Heike Maxim) 50 mg tablet Take  by mouth. 12/12/22  Yes Provider, Historical   ondansetron (ZOFRAN ODT) 4 mg disintegrating tablet Take  by mouth. 11/14/22  Yes Provider, Historical   psyllium seed-sucrose (Metamucil, sugar,) powd Take 1 Scoop by mouth daily.  12/7/22  Yes Lan Lincoln MD         Current Facility-Administered Medications:     meropenem (MERREM) 1 g in 0.9% sodium chloride 20 mL IV syringe, 1 g, IntraVENous, Q8H, Keanu Willis MD, 1 g at 02/08/23 0035    apixaban (ELIQUIS) tablet 5 mg, 5 mg, Oral, Q12H, Keanu Willis MD, 5 mg at 02/08/23 1000    ascorbic acid (vitamin C) (VITAMIN C) tablet 500 mg, 500 mg, Oral, DAILY, Keanu Willis MD, 500 mg at 02/08/23 1000    ferrous sulfate tablet 325 mg, 325 mg, Oral, DAILY, Keanu Willis MD, 325 mg at 02/08/23 1001    pantoprazole (PROTONIX) tablet 40 mg, 40 mg, Oral, DAILY, Keanu Willis MD, 40 mg at 02/08/23 1000    traMADoL (ULTRAM) tablet 50 mg, 50 mg, Oral, Q6H PRN, Keanu Willis MD, 50 mg at 02/08/23 5432    acetaminophen (TYLENOL) tablet 650 mg, 650 mg, Oral, Q6H PRN **OR** acetaminophen (TYLENOL) suppository 650 mg, 650 mg, Rectal, Q6H PRN, Keanu Willis MD    polyethylene glycol (MIRALAX) packet 17 g, 17 g, Oral, DAILY PRN, Adeola Bauman MD ondansetron (ZOFRAN ODT) tablet 4 mg, 4 mg, Oral, Q8H PRN **OR** ondansetron (ZOFRAN) injection 4 mg, 4 mg, IntraVENous, Q6H PRN, Keanu Willis MD    LAB DATA REVIEWED:    Recent Results (from the past 24 hour(s))   METABOLIC PANEL, COMPREHENSIVE    Collection Time: 02/08/23  7:06 AM   Result Value Ref Range    Sodium 135 (L) 136 - 145 mmol/L    Potassium 3.8 3.5 - 5.1 mmol/L    Chloride 107 97 - 108 mmol/L    CO2 20 (L) 21 - 32 mmol/L    Anion gap 8 5 - 15 mmol/L    Glucose 94 65 - 100 mg/dL    BUN 28 (H) 6 - 20 mg/dL    Creatinine 0.71 0.55 - 1.02 mg/dL    BUN/Creatinine ratio 39 (H) 12 - 20      eGFR >60 >60 ml/min/1.73m2    Calcium 10.4 (H) 8.5 - 10.1 mg/dL    Bilirubin, total 0.7 0.2 - 1.0 mg/dL    AST (SGOT) 22 15 - 37 U/L    ALT (SGPT) 13 12 - 78 U/L    Alk. phosphatase 100 45 - 117 U/L    Protein, total 10.5 (H) 6.4 - 8.2 g/dL    Albumin 2.4 (L) 3.5 - 5.0 g/dL    Globulin 8.1 (H) 2.0 - 4.0 g/dL    A-G Ratio 0.3 (L) 1.1 - 2.2     CBC WITH AUTOMATED DIFF    Collection Time: 02/08/23  7:06 AM   Result Value Ref Range    WBC 5.3 3.6 - 11.0 K/uL    RBC 3.44 (L) 3.80 - 5.20 M/uL    HGB 10.2 (L) 11.5 - 16.0 g/dL    HCT 32.2 (L) 35.0 - 47.0 %    MCV 93.6 80.0 - 99.0 FL    MCH 29.7 26.0 - 34.0 PG    MCHC 31.7 30.0 - 36.5 g/dL    RDW 12.0 11.5 - 14.5 %    PLATELET 983 648 - 268 K/uL    MPV 9.4 8.9 - 12.9 FL    NRBC 0.0 0.0  WBC    ABSOLUTE NRBC 0.00 0.00 - 0.01 K/uL    NEUTROPHILS 57 32 - 75 %    LYMPHOCYTES 32 12 - 49 %    MONOCYTES 11 5 - 13 %    EOSINOPHILS 0 0 - 7 %    BASOPHILS 0 0 - 1 %    IMMATURE GRANULOCYTES 0 0 - 0.5 %    ABS. NEUTROPHILS 3.0 1.8 - 8.0 K/UL    ABS. LYMPHOCYTES 1.7 0.8 - 3.5 K/UL    ABS. MONOCYTES 0.6 0.0 - 1.0 K/UL    ABS. EOSINOPHILS 0.0 0.0 - 0.4 K/UL    ABS. BASOPHILS 0.0 0.0 - 0.1 K/UL    ABS. IMM.  GRANS. 0.0 0.00 - 0.04 K/UL    DF AUTOMATED     ANKLE BRACHIAL INDEX    Collection Time: 02/08/23 10:55 AM   Result Value Ref Range    Right dorsalis pedis  mmHg    Left dorsalis pedis  mmHg    Left arm BP 88 mmHg    Right arm BP 98 mmHg    Left posterior tibial 94 mmHg    Right posterior tibial 101 mmHg    Left BARBARA 1.11     Right BARBARA 1.11     Left toe pressure 41 mmHg    Right toe pressure 77 mmHg    Left TBI 0.42     Right TBI 0.79        XR Results (most recent):  Results from Hospital Encounter encounter on 02/07/23    XR CHEST PORT    Narrative  EXAM:  XR CHEST PORT    INDICATION: Chest pain    COMPARISON: 11/2/2022    TECHNIQUE: portable chest AP view    FINDINGS: The cardiac silhouette is within normal limits. The pulmonary  vasculature is within normal limits. The lungs and pleural spaces are clear. The bones are osteopenic. Impression  No acute process on portable chest.         ANKLE BRACHIAL INDEX         CT CHEST WO CONT   Final Result   1. Please note, cannot assess for pulmonary embolism on this noncontrast exam.   2.  Moderate pericardial effusion, nonspecific, but consider pericarditis. 3.  Emphysema, without acute airspace disease. XR CHEST PORT   Final Result      No acute process on portable chest.              Review of Systems:  Constitutional: Negative for chills and fever. HENT: Negative. Eyes: Negative. Respiratory: Negative. Cardiovascular: Negative. Gastrointestinal: Negative for abdominal pain and nausea. Skin: Negative. Neurological: Negative. Left leg pain swelling and redness    Objective:   VITALS:    Visit Vitals  /69 (BP 1 Location: Right upper arm, BP Patient Position: At rest)   Pulse (!) 101   Temp 97.5 °F (36.4 °C)   Resp 20   Ht 5' 3\" (1.6 m)   Wt 113 lb (51.3 kg)   SpO2 97%   Breastfeeding No   BMI 20.02 kg/m²       Physical Exam:   Constitutional: pt is oriented to person, place, and time. HENT:   Head: Normocephalic and atraumatic. Eyes: Pupils are equal, round, and reactive to light. EOM are normal.   Cardiovascular: Normal rate, regular rhythm and normal heart sounds.    Pulmonary/Chest: Breath sounds normal. No wheezes. No rales. Exhibits no tenderness. Abdominal: Soft. Bowel sounds are normal. There is no abdominal tenderness. There is no rebound and no guarding. Musculoskeletal: Left leg pain swelling and redness. Neurological: pt is alert and oriented to person, place, and time. Alert. Normal strength. No cranial nerve deficit or sensory deficit. Displays a negative Romberg sign.         ASSESSMENT & PLAN:    Acute cellulitis of the left leg  Infectious consulted, awaiting blood and urine cultures  Continue meropenem  UTI complicated by ostomy bag  Urine positive, awaiting cultures   Continue meropenem   History of DVTs  Chronic DVT non- occlusive  Monitor   History of bladder cancer status postsurgery  History of colostomy bag  GERD      Patient started on IV meropenem after the blood cultures ID consult  Ordered PVR  Continue other home medications      Current Facility-Administered Medications:     meropenem (MERREM) 1 g in 0.9% sodium chloride 20 mL IV syringe, 1 g, IntraVENous, Q8H, Keanu Willis MD, 1 g at 02/08/23 0035    apixaban (ELIQUIS) tablet 5 mg, 5 mg, Oral, Q12H, Keanu Willis MD, 5 mg at 02/08/23 1000    ascorbic acid (vitamin C) (VITAMIN C) tablet 500 mg, 500 mg, Oral, DAILY, Keanu Willis MD, 500 mg at 02/08/23 1000    ferrous sulfate tablet 325 mg, 325 mg, Oral, DAILY, Keanu Willis MD, 325 mg at 02/08/23 1001    pantoprazole (PROTONIX) tablet 40 mg, 40 mg, Oral, DAILY, Keanu Willis MD, 40 mg at 02/08/23 1000    traMADoL (ULTRAM) tablet 50 mg, 50 mg, Oral, Q6H PRN, Keanu Willis MD, 50 mg at 02/08/23 0035    acetaminophen (TYLENOL) tablet 650 mg, 650 mg, Oral, Q6H PRN **OR** acetaminophen (TYLENOL) suppository 650 mg, 650 mg, Rectal, Q6H PRN, Keanu Willis MD    polyethylene glycol (MIRALAX) packet 17 g, 17 g, Oral, DAILY PRN, Keanu Willis MD    ondansetron (ZOFRAN ODT) tablet 4 mg, 4 mg, Oral, Q8H PRN **OR** ondansetron (ZOFRAN) injection 4 mg, 4 mg, IntraVENous, Q6H Alba PEGUERO Darby Potter, MD       ________________________________________________________________________    Signed: Analy Araujo

## 2023-02-08 NOTE — PROGRESS NOTES
Pt came up from ED. Pt has a colostomy bag an urostomy bag. Pt has redness to L calf and c/o pain in that leg. Pt has no other skin issues.  Skin assessment completed by this writer and Maria Antonia Christianson RN

## 2023-02-08 NOTE — PROGRESS NOTES
Problem: Falls - Risk of  Goal: *Absence of Falls  Description: Document Michelle Mayer Fall Risk and appropriate interventions in the flowsheet. Outcome: Progressing Towards Goal  Note: Fall Risk Interventions:                 Elimination Interventions: Call light in reach              Problem: Patient Education: Go to Patient Education Activity  Goal: Patient/Family Education  Outcome: Progressing Towards Goal     Problem: Pressure Injury - Risk of  Goal: *Prevention of pressure injury  Description: Document Alphonso Scale and appropriate interventions in the flowsheet. Outcome: Progressing Towards Goal  Note: Pressure Injury Interventions:             Activity Interventions: PT/OT evaluation    Mobility Interventions: PT/OT evaluation    Nutrition Interventions: Document food/fluid/supplement intake    Friction and Shear Interventions: HOB 30 degrees or less                Problem: Patient Education: Go to Patient Education Activity  Goal: Patient/Family Education  Outcome: Progressing Towards Goal

## 2023-02-09 ENCOUNTER — TELEPHONE (OUTPATIENT)
Dept: SURGERY | Age: 58
End: 2023-02-09

## 2023-02-09 ENCOUNTER — APPOINTMENT (OUTPATIENT)
Dept: NON INVASIVE DIAGNOSTICS | Age: 58
DRG: 383 | End: 2023-02-09
Attending: INTERNAL MEDICINE
Payer: MEDICAID

## 2023-02-09 LAB
BASOPHILS # BLD: 0 K/UL (ref 0–0.1)
BASOPHILS NFR BLD: 0 % (ref 0–1)
CRP SERPL-MCNC: 15.7 MG/DL (ref 0–0.6)
DIFFERENTIAL METHOD BLD: ABNORMAL
ECHO AO ASC DIAM: 2.7 CM
ECHO AO ASCENDING AORTA INDEX: 1.78 CM/M2
ECHO AO ROOT DIAM: 2.7 CM
ECHO AO ROOT INDEX: 1.78 CM/M2
ECHO AV AREA PEAK VELOCITY: 2.6 CM2
ECHO AV AREA/BSA PEAK VELOCITY: 1.7 CM2/M2
ECHO AV PEAK GRADIENT: 5 MMHG
ECHO AV PEAK VELOCITY: 1.1 M/S
ECHO AV VELOCITY RATIO: 0.82
ECHO IVC EXP: 1.4 CM
ECHO LA AREA 2C: 13.6 CM2
ECHO LA AREA 4C: 13.1 CM2
ECHO LA DIAMETER INDEX: 1.84 CM/M2
ECHO LA DIAMETER: 2.8 CM
ECHO LA MAJOR AXIS: 4.1 CM
ECHO LA MINOR AXIS: 4.5 CM
ECHO LA TO AORTIC ROOT RATIO: 1.04
ECHO LA VOL BP: 33 ML (ref 22–52)
ECHO LA VOL/BSA BIPLANE: 22 ML/M2 (ref 16–34)
ECHO LV E' LATERAL VELOCITY: 9 CM/S
ECHO LV E' SEPTAL VELOCITY: 9 CM/S
ECHO LV EDV A2C: 46 ML
ECHO LV EDV A4C: 49 ML
ECHO LV EDV INDEX A4C: 32 ML/M2
ECHO LV EDV NDEX A2C: 30 ML/M2
ECHO LV EJECTION FRACTION A2C: 63 %
ECHO LV EJECTION FRACTION A4C: 58 %
ECHO LV EJECTION FRACTION BIPLANE: 61 % (ref 55–100)
ECHO LV ESV A2C: 17 ML
ECHO LV ESV A4C: 20 ML
ECHO LV ESV INDEX A2C: 11 ML/M2
ECHO LV ESV INDEX A4C: 13 ML/M2
ECHO LV FRACTIONAL SHORTENING: 33 % (ref 28–44)
ECHO LV INTERNAL DIMENSION DIASTOLE INDEX: 2.63 CM/M2
ECHO LV INTERNAL DIMENSION DIASTOLIC: 4 CM (ref 3.9–5.3)
ECHO LV INTERNAL DIMENSION SYSTOLIC INDEX: 1.78 CM/M2
ECHO LV INTERNAL DIMENSION SYSTOLIC: 2.7 CM
ECHO LV IVSD: 0.7 CM (ref 0.6–0.9)
ECHO LV MASS 2D: 85.8 G (ref 67–162)
ECHO LV MASS INDEX 2D: 56.4 G/M2 (ref 43–95)
ECHO LV POSTERIOR WALL DIASTOLIC: 0.8 CM (ref 0.6–0.9)
ECHO LV RELATIVE WALL THICKNESS RATIO: 0.4
ECHO LVOT AREA: 3.1 CM2
ECHO LVOT DIAM: 2 CM
ECHO LVOT PEAK GRADIENT: 3 MMHG
ECHO LVOT PEAK VELOCITY: 0.9 M/S
ECHO MV A VELOCITY: 0.56 M/S
ECHO MV E DECELERATION TIME (DT): 221 MS
ECHO MV E VELOCITY: 0.55 M/S
ECHO MV E/A RATIO: 0.98
ECHO MV E/E' LATERAL: 6.11
ECHO MV E/E' RATIO (AVERAGED): 6.11
ECHO MV E/E' SEPTAL: 6.11
ECHO PV MAX VELOCITY: 0.7 M/S
ECHO PV PEAK GRADIENT: 2 MMHG
ECHO RA AREA 4C: 10.4 CM2
ECHO RA END SYSTOLIC VOLUME APICAL 4 CHAMBER INDEX BSA: 14 ML/M2
ECHO RA VOLUME: 22 ML
ECHO RV BASAL DIMENSION: 2.7 CM
ECHO RV TAPSE: 1.9 CM (ref 1.7–?)
ECHO TV REGURGITANT MAX VELOCITY: 2.24 M/S
ECHO TV REGURGITANT PEAK GRADIENT: 20 MMHG
EOSINOPHIL # BLD: 0 K/UL (ref 0–0.4)
EOSINOPHIL NFR BLD: 1 % (ref 0–7)
ERYTHROCYTE [DISTWIDTH] IN BLOOD BY AUTOMATED COUNT: 12.1 % (ref 11.5–14.5)
HCT VFR BLD AUTO: 32.2 % (ref 35–47)
HGB BLD-MCNC: 10.2 G/DL (ref 11.5–16)
IMM GRANULOCYTES # BLD AUTO: 0 K/UL
IMM GRANULOCYTES NFR BLD AUTO: 0 %
LEFT ABI: 1.11
LEFT ARM BP: 88 MMHG
LEFT POSTERIOR TIBIAL: 94 MMHG
LEFT TBI: 0.42
LEFT TOE PRESSURE: 41 MMHG
LYMPHOCYTES # BLD: 1.3 K/UL (ref 0.8–3.5)
LYMPHOCYTES NFR BLD: 29 % (ref 12–49)
MCH RBC QN AUTO: 29.8 PG (ref 26–34)
MCHC RBC AUTO-ENTMCNC: 31.7 G/DL (ref 30–36.5)
MCV RBC AUTO: 94.2 FL (ref 80–99)
MONOCYTES # BLD: 0.4 K/UL (ref 0–1)
MONOCYTES NFR BLD: 8 % (ref 5–13)
NEUTS BAND NFR BLD MANUAL: 2 % (ref 0–6)
NEUTS SEG # BLD: 2.7 K/UL (ref 1.8–8)
NEUTS SEG NFR BLD: 58 % (ref 32–75)
NRBC # BLD: 0 K/UL (ref 0–0.01)
NRBC BLD-RTO: 0 PER 100 WBC
OTHER CELLS NFR BLD MANUAL: 2 %
PLATELET # BLD AUTO: 376 K/UL (ref 150–400)
PMV BLD AUTO: 9.5 FL (ref 8.9–12.9)
PROCALCITONIN SERPL-MCNC: 0.15 NG/ML
RBC # BLD AUTO: 3.42 M/UL (ref 3.8–5.2)
RBC MORPH BLD: ABNORMAL
RIGHT ABI: 1.11
RIGHT ARM BP: 98 MMHG
RIGHT POSTERIOR TIBIAL: 101 MMHG
RIGHT TBI: 0.79
RIGHT TOE PRESSURE: 77 MMHG
VAS LEFT DORSALIS PEDIS BP: 109 MMHG
VAS RIGHT DORSALIS PEDIS BP: 109 MMHG
WBC # BLD AUTO: 4.5 K/UL (ref 3.6–11)

## 2023-02-09 PROCEDURE — 99232 SBSQ HOSP IP/OBS MODERATE 35: CPT | Performed by: INTERNAL MEDICINE

## 2023-02-09 PROCEDURE — 74011250636 HC RX REV CODE- 250/636: Performed by: FAMILY MEDICINE

## 2023-02-09 PROCEDURE — 97161 PT EVAL LOW COMPLEX 20 MIN: CPT

## 2023-02-09 PROCEDURE — 74011250637 HC RX REV CODE- 250/637: Performed by: FAMILY MEDICINE

## 2023-02-09 PROCEDURE — 93306 TTE W/DOPPLER COMPLETE: CPT

## 2023-02-09 PROCEDURE — 97530 THERAPEUTIC ACTIVITIES: CPT

## 2023-02-09 PROCEDURE — 74011000250 HC RX REV CODE- 250: Performed by: FAMILY MEDICINE

## 2023-02-09 PROCEDURE — 84145 PROCALCITONIN (PCT): CPT

## 2023-02-09 PROCEDURE — 86140 C-REACTIVE PROTEIN: CPT

## 2023-02-09 PROCEDURE — 65270000029 HC RM PRIVATE

## 2023-02-09 PROCEDURE — 36415 COLL VENOUS BLD VENIPUNCTURE: CPT

## 2023-02-09 PROCEDURE — 85025 COMPLETE CBC W/AUTO DIFF WBC: CPT

## 2023-02-09 RX ADMIN — APIXABAN 5 MG: 5 TABLET, FILM COATED ORAL at 21:43

## 2023-02-09 RX ADMIN — APIXABAN 5 MG: 5 TABLET, FILM COATED ORAL at 15:17

## 2023-02-09 RX ADMIN — OXYCODONE HYDROCHLORIDE AND ACETAMINOPHEN 500 MG: 500 TABLET ORAL at 15:17

## 2023-02-09 RX ADMIN — PANTOPRAZOLE SODIUM 40 MG: 40 TABLET, DELAYED RELEASE ORAL at 15:17

## 2023-02-09 RX ADMIN — SODIUM CHLORIDE, PRESERVATIVE FREE 1 G: 5 INJECTION INTRAVENOUS at 23:39

## 2023-02-09 RX ADMIN — TRAMADOL HYDROCHLORIDE 50 MG: 50 TABLET, COATED ORAL at 15:17

## 2023-02-09 RX ADMIN — SODIUM CHLORIDE, PRESERVATIVE FREE 1 G: 5 INJECTION INTRAVENOUS at 06:00

## 2023-02-09 RX ADMIN — FERROUS SULFATE TAB 325 MG (65 MG ELEMENTAL FE) 325 MG: 325 (65 FE) TAB at 15:17

## 2023-02-09 RX ADMIN — SODIUM CHLORIDE, PRESERVATIVE FREE 1 G: 5 INJECTION INTRAVENOUS at 15:17

## 2023-02-09 RX ADMIN — TRAMADOL HYDROCHLORIDE 50 MG: 50 TABLET, COATED ORAL at 23:38

## 2023-02-09 RX ADMIN — POLYETHYLENE GLYCOL 3350 17 G: 17 POWDER, FOR SOLUTION ORAL at 23:38

## 2023-02-09 RX ADMIN — TRAMADOL HYDROCHLORIDE 50 MG: 50 TABLET, COATED ORAL at 06:05

## 2023-02-09 NOTE — PROGRESS NOTES
History and Physical    NAME: Zita Hendrix   :  1965   MRN:  [id number]     Date/Time:  2023 5:25 PM    Patient PCP: Ana Perez MD  ______________________________________________________________________             Subjective:     CHIEF COMPLAINT:     Left leg pain        HISTORY OF PRESENT ILLNESS:       Patient is a 61y.o. year old female with signal past medical history of multiple DVT history of bladder cancer status post urostomy bag status postsurgery history of colostomy bag came to the ER complaining of left leg pain redness swelling patient was treated as outpatient with doxycycline with no much improvement she came to emergency room seen by the ER physician and patient was recommended to be admitted for acute cellulitis of the left leg      pain is constant. Infection has not spread since onset about 1 month ago. Pain has worsened over the past month, now with paresthesia radiating up the leg. Patient states she cannot stand on affected foot which prompted her to present to ER. Chronic DVT found on duplex last week, was given doxy with no improvement. Urinalysis shows bacteriuria, pyuria, positive nitratess. Awaiting urine and blood cultures.  pain improved since yesterday, pain now isolated to infected region, no longer radiating up her leg. Urine and blood not resulted. On meropenem IV. Duplex not resulted, pt was told verbally that u/s was normal. Asx for UTI, no CVA tenderness. Would like to have help with ambulation today.      Past Medical History:   Diagnosis Date    Anemia     pt states had recent blood transfusion 2022    Back pain     Cancer (HCC)     bladder    DVT (deep venous thrombosis) ([facility] [address]     and PE, pt denies lung PE , only leg several years ago    Hepatitis C     pt states dx 1 month ago    Liver disease     Presence of IVC filter     pt states several years ago    Rheumatoid arthritis ([facility] [address]     Sciatic leg pain     pt states both legs Uterine fibroid         Past Surgical History:   Procedure Laterality Date    HX APPENDECTOMY  10/03/2022    HX  SECTION      HX GI  2022    laparoscopic early postop adhesion of the sigmoid colon    HX GI  2022    laparoscopic lysis of adhesion    HX GI  10/31/2022    rigid sigmoidoscope examination of the rectum    HX GI  10/31/2022    distal rectal repair with primary suturing technique    HX GYN  10/03/2022    ROBOT ANTERIOR PELVIC EXENTERATION    HX HYSTERECTOMY  10/03/2022    HX SALPINGO-OOPHORECTOMY Bilateral 10/03/2022    HX UROLOGICAL  09/15/2022    CYSTOSCOPY    HX UROLOGICAL  09/15/2022    URETHRAL DILATION    HX UROLOGICAL  09/15/2022    URETERAL STENT PLACEMENT    HX UROLOGICAL  09/15/2022    TRANSURETHRAL RESECTION OF BLADDER TUMOR >2CM    HX UROLOGICAL Bilateral 09/15/2022    RETROGRADE PYELOGRAM    HX UROLOGICAL  10/03/2022    PELVIC LYMPH NODE DISSECTION    HX UROLOGICAL  10/03/2022    ILEAL CONDUIT URINARY DIVERSION    IR THROMBECTOMY [facility] VEIN W PHARM  10/27/2022    [place] UNLISTED PROCEDURE CARDIAC SURGERY      stent placement       Social History     Tobacco Use    Smoking status: Former     Packs/day: 0.50     Years: 10.00     Pack years: 5.00     Types: Cigarettes     Quit date: 1998     Years since quittin.1    Smokeless tobacco: Never    Tobacco comments:     Quit 15 years ago   Substance Use Topics    Alcohol use: Not Currently        Family History   Problem Relation Age of Onset    Cancer Mother     Lung Cancer Mother     Heart Disease Father     Hypertension Sister     Cancer Maternal Aunt     Breast Cancer Maternal Aunt        Allergies   Allergen Reactions    Bactrim [Sulfamethoprim] Swelling     Swelling of the lilps    Penicillin V Potassium Swelling        Prior to Admission medications    Medication Sig Start Date End Date Taking? Authorizing Provider   doxycycline (VIBRA-TABS) 100 mg tablet Take 1 Tablet by mouth two (2) times a day for 7 days.  23 2/8/23 Yes Man Arthur MD   multivitamin (ONE A DAY) tablet Take 1 Tablet by mouth daily. 1/26/23  Yes Dulce Maria Bernal MD   pantoprazole (PROTONIX) 40 mg tablet Take 1 Tablet by mouth daily. 1/20/23  Yes Dulce Maria Bernal MD   ascorbic acid, vitamin C, (VITAMIN C) 500 mg tablet Take 1 Tablet by mouth daily. 1/13/23  Yes Dulce Maria Bernal MD   apixaban (ELIQUIS) 5 mg tablet Take 1 Tablet by mouth every twelve (12) hours for 90 days. Indications: blood clot in a deep vein of the extremities 1/12/23 4/12/23 Yes Dulce Maria Bernal MD   acetaminophen (TYLENOL) 650 mg TbER Take 1 Tablet by mouth every eight (8) hours as needed for Pain. 1/12/23  Yes Dulce Maria Bernal MD   docusate sodium (COLACE) 100 mg capsule Take 1 Capsule by mouth two (2) times daily as needed for Constipation for up to 90 days. 1/12/23 4/12/23 Yes Dulce Maria Bernal MD   ferrous sulfate 325 mg (65 mg iron) tablet Take 1 Tablet by mouth daily. 1/12/23  Yes Dulce Maria Bernal MD   traMADoL Lemon Kerbs) 50 mg tablet Take  by mouth. 12/12/22  Yes Provider, Historical   ondansetron (ZOFRAN ODT) 4 mg disintegrating tablet Take  by mouth. 11/14/22  Yes Provider, Historical   psyllium seed-sucrose (Metamucil, sugar,) powd Take 1 Scoop by mouth daily.  12/7/22  Yes Marie Tovar MD         Current Facility-Administered Medications:     meropenem (MERREM) 1 g in 0.9% sodium chloride 20 mL IV syringe, 1 g, IntraVENous, Q8H, Keanu Willis MD, 1 g at 02/09/23 0600    apixaban (ELIQUIS) tablet 5 mg, 5 mg, Oral, Q12H, Keanu Willis MD, 5 mg at 02/08/23 2258    ascorbic acid (vitamin C) (VITAMIN C) tablet 500 mg, 500 mg, Oral, DAILY, Keanu Willis MD, 500 mg at 02/08/23 1000    ferrous sulfate tablet 325 mg, 325 mg, Oral, DAILY, Keanu Willis MD, 325 mg at 02/08/23 1001    pantoprazole (PROTONIX) tablet 40 mg, 40 mg, Oral, DAILY, Keanu Willis MD, 40 mg at 02/08/23 1000    traMADoL (ULTRAM) tablet 50 mg, 50 mg, Oral, Q6H PRN, Kindra Almaraz MD, 50 mg at 02/09/23 9507    acetaminophen (TYLENOL) tablet 650 mg, 650 mg, Oral, Q6H PRN **OR** acetaminophen (TYLENOL) suppository 650 mg, 650 mg, Rectal, Q6H PRN, Keanu Willis MD    polyethylene glycol (MIRALAX) packet 17 g, 17 g, Oral, DAILY PRN, Cain Willis MD    ondansetron (ZOFRAN ODT) tablet 4 mg, 4 mg, Oral, Q8H PRN **OR** ondansetron (ZOFRAN) injection 4 mg, 4 mg, IntraVENous, Q6H PRN, Keanu Willis MD    LAB DATA REVIEWED:    Recent Results (from the past 24 hour(s))   ANKLE BRACHIAL INDEX    Collection Time: 02/08/23 10:55 AM   Result Value Ref Range    Right dorsalis pedis  mmHg    Left dorsalis pedis  mmHg    Left arm BP 88 mmHg    Right arm BP 98 mmHg    Left posterior tibial 94 mmHg    Right posterior tibial 101 mmHg    Left BARBARA 1.11     Right BARBARA 1.11     Left toe pressure 41 mmHg    Right toe pressure 77 mmHg    Left TBI 0.42     Right TBI 0.79        XR Results (most recent):  Results from Hospital Encounter encounter on 02/07/23    XR CHEST PORT    Narrative  EXAM:  XR CHEST PORT    INDICATION: Chest pain    COMPARISON: 11/2/2022    TECHNIQUE: portable chest AP view    FINDINGS: The cardiac silhouette is within normal limits. The pulmonary  vasculature is within normal limits. The lungs and pleural spaces are clear. The bones are osteopenic. Impression  No acute process on portable chest.         ANKLE BRACHIAL INDEX   Final Result      CT CHEST WO CONT   Final Result   1. Please note, cannot assess for pulmonary embolism on this noncontrast exam.   2.  Moderate pericardial effusion, nonspecific, but consider pericarditis. 3.  Emphysema, without acute airspace disease. XR CHEST PORT   Final Result      No acute process on portable chest.              Review of Systems:  Constitutional: Negative for chills and fever. HENT: Negative. Eyes: Negative. Respiratory: Negative. Cardiovascular: Negative.     Gastrointestinal: Negative for abdominal pain and nausea. Skin: Negative. Neurological: Negative. Left leg pain swelling and redness    Objective:   VITALS:    Visit Vitals  BP 94/64 (BP 1 Location: Left upper arm, BP Patient Position: At rest)   Pulse 97   Temp 98.6 °F (37 °C)   Resp 16   Ht 5' 3\" (1.6 m)   Wt 113 lb (51.3 kg)   SpO2 95%   Breastfeeding No   BMI 20.02 kg/m²       Physical Exam:   Constitutional: pt is oriented to person, place, and time. HENT:   Head: Normocephalic and atraumatic. Eyes: Pupils are equal, round, and reactive to light. EOM are normal.   Cardiovascular: Normal rate, regular rhythm and normal heart sounds. Pulmonary/Chest: Breath sounds normal. No wheezes. No rales. Exhibits no tenderness. Abdominal: Soft. Bowel sounds are normal. There is no abdominal tenderness. There is no rebound and no guarding. Musculoskeletal: Left leg pain swelling and redness. Neurological: pt is alert and oriented to person, place, and time. Alert. Normal strength. No cranial nerve deficit or sensory deficit. Displays a negative Romberg sign.         ASSESSMENT & PLAN:    Acute cellulitis of the left leg  Infectious consulted, awaiting blood and urine cultures  Continue meropenem  UTI complicated by ostomy bag  Urine positive, awaiting cultures   Continue meropenem   History of DVTs  Chronic DVT non- occlusive  Monitor   History of bladder cancer status postsurgery  History of colostomy bag  GERD      Patient started on IV meropenem after the blood cultures ID consult  Ordered PVR  Continue other home medications      Current Facility-Administered Medications:     meropenem (MERREM) 1 g in 0.9% sodium chloride 20 mL IV syringe, 1 g, IntraVENous, Q8H, Keanu Willis MD, 1 g at 02/09/23 0600    apixaban (ELIQUIS) tablet 5 mg, 5 mg, Oral, Q12H, Keanu Willis MD, 5 mg at 02/08/23 2258    ascorbic acid (vitamin C) (VITAMIN C) tablet 500 mg, 500 mg, Oral, DAILY, Keanu Willis MD, 500 mg at 02/08/23 1000 ferrous sulfate tablet 325 mg, 325 mg, Oral, DAILY, Keanu Willis MD, 325 mg at 02/08/23 1001    pantoprazole (PROTONIX) tablet 40 mg, 40 mg, Oral, DAILY, Keanu Willis MD, 40 mg at 02/08/23 1000    traMADoL (ULTRAM) tablet 50 mg, 50 mg, Oral, Q6H PRN, Keanu Willis MD, 50 mg at 02/09/23 3817    acetaminophen (TYLENOL) tablet 650 mg, 650 mg, Oral, Q6H PRN **OR** acetaminophen (TYLENOL) suppository 650 mg, 650 mg, Rectal, Q6H PRN, Keanu Willis MD    polyethylene glycol (MIRALAX) packet 17 g, 17 g, Oral, DAILY PRN, Keanu Willis MD    ondansetron (ZOFRAN ODT) tablet 4 mg, 4 mg, Oral, Q8H PRN **OR** ondansetron (ZOFRAN) injection 4 mg, 4 mg, IntraVENous, Q6H PRN, Keanu Willis MD       ________________________________________________________________________    Signed: Yasmin Leonard

## 2023-02-09 NOTE — ACP (ADVANCE CARE PLANNING)
In [employer] request for Advance Medical Directive (AMD). Amilcar Yuen has AMD on file already and she verified that those wishes are still valid. That she does not want to change anything. That her niece Freddie Todd is her desired medical decision maker, that she wishes for life prolonging treatments and she is not an organ donor. She expressed appreciation for letting her know of  and spiritual care availability. The current AMD is still valid. PETEY StanleyDiv. Please contact Hospital  for [phone].    [phone]

## 2023-02-09 NOTE — PROGRESS NOTES
History and Physical    NAME: Jose Luis Stubbs   :  1965   MRN:  [id number]     Date/Time:  2023 5:25 PM    Patient PCP: Анна Baker MD  ______________________________________________________________________             Subjective:     CHIEF COMPLAINT:     Left leg pain        HISTORY OF PRESENT ILLNESS:       Patient is a 61y.o. year old female with signal past medical history of multiple DVT history of bladder cancer status post urostomy bag status postsurgery history of colostomy bag came to the ER complaining of left leg pain redness swelling patient was treated as outpatient with doxycycline with no much improvement she came to emergency room seen by the ER physician and patient was recommended to be admitted for acute cellulitis of the left leg      pain is constant. Infection has not spread since onset about 1 month ago. Pain has worsened over the past month, now with paresthesia radiating up the leg. Patient states she cannot stand on affected foot which prompted her to present to ER. Chronic DVT found on duplex last week, was given doxy with no improvement. Urinalysis shows bacteriuria, pyuria, positive nitratess. Awaiting urine and blood cultures.  pain improved since yesterday, pain now isolated to infected region, no longer radiating up her leg. Urine and blood not resulted. On meropenem IV. Duplex not resulted, pt was told verbally that u/s was normal. Asx for UTI, no CVA tenderness. Would like to have help with ambulation today.      Past Medical History:   Diagnosis Date    Anemia     pt states had recent blood transfusion 2022    Back pain     Cancer (HCC)     bladder    DVT (deep venous thrombosis) ([facility] [address]     and PE, pt denies lung PE , only leg several years ago    Hepatitis C     pt states dx 1 month ago    Liver disease     Presence of IVC filter     pt states several years ago    Rheumatoid arthritis ([facility] [address]     Sciatic leg pain     pt states both legs Uterine fibroid         Past Surgical History:   Procedure Laterality Date    HX APPENDECTOMY  10/03/2022    HX  SECTION      HX GI  2022    laparoscopic early postop adhesion of the sigmoid colon    HX GI  2022    laparoscopic lysis of adhesion    HX GI  10/31/2022    rigid sigmoidoscope examination of the rectum    HX GI  10/31/2022    distal rectal repair with primary suturing technique    HX GYN  10/03/2022    ROBOT ANTERIOR PELVIC EXENTERATION    HX HYSTERECTOMY  10/03/2022    HX SALPINGO-OOPHORECTOMY Bilateral 10/03/2022    HX UROLOGICAL  09/15/2022    CYSTOSCOPY    HX UROLOGICAL  09/15/2022    URETHRAL DILATION    HX UROLOGICAL  09/15/2022    URETERAL STENT PLACEMENT    HX UROLOGICAL  09/15/2022    TRANSURETHRAL RESECTION OF BLADDER TUMOR >2CM    HX UROLOGICAL Bilateral 09/15/2022    RETROGRADE PYELOGRAM    HX UROLOGICAL  10/03/2022    PELVIC LYMPH NODE DISSECTION    HX UROLOGICAL  10/03/2022    ILEAL CONDUIT URINARY DIVERSION    IR THROMBECTOMY \[facility]\"" VEIN W PHARM  10/27/2022    CT UNLISTED PROCEDURE CARDIAC SURGERY      stent placement       Social History     Tobacco Use    Smoking status: Former     Packs/day: 0.50     Years: 10.00     Pack years: 5.00     Types: Cigarettes     Quit date: 1998     Years since quittin.1    Smokeless tobacco: Never    Tobacco comments:     Quit 15 years ago   Substance Use Topics    Alcohol use: Not Currently        Family History   Problem Relation Age of Onset    Cancer Mother     Lung Cancer Mother     Heart Disease Father     Hypertension Sister     Cancer Maternal Aunt     Breast Cancer Maternal Aunt        Allergies   Allergen Reactions    Bactrim [Sulfamethoprim] Swelling     Swelling of the lilps    Penicillin V Potassium Swelling        Prior to Admission medications    Medication Sig Start Date End Date Taking? Authorizing Provider   doxycycline (VIBRA-TABS) 100 mg tablet Take 1 Tablet by mouth two (2) times a day for 7 days.  23 2/8/23 Yes Alf Hope MD   multivitamin (ONE A DAY) tablet Take 1 Tablet by mouth daily. 1/26/23  Yes Gilberto Monroy MD   pantoprazole (PROTONIX) 40 mg tablet Take 1 Tablet by mouth daily. 1/20/23  Yes Gilberto Monroy MD   ascorbic acid, vitamin C, (VITAMIN C) 500 mg tablet Take 1 Tablet by mouth daily. 1/13/23  Yes Gilberto Monroy MD   apixaban (ELIQUIS) 5 mg tablet Take 1 Tablet by mouth every twelve (12) hours for 90 days. Indications: blood clot in a deep vein of the extremities 1/12/23 4/12/23 Yes Gilberto Monroy MD   acetaminophen (TYLENOL) 650 mg TbER Take 1 Tablet by mouth every eight (8) hours as needed for Pain. 1/12/23  Yes Gilberto Monroy MD   docusate sodium (COLACE) 100 mg capsule Take 1 Capsule by mouth two (2) times daily as needed for Constipation for up to 90 days. 1/12/23 4/12/23 Yes Gilberto Monroy MD   ferrous sulfate 325 mg (65 mg iron) tablet Take 1 Tablet by mouth daily. 1/12/23  Yes Gilberto Monroy MD   traMADoL [place] Lois) 50 mg tablet Take  by mouth. 12/12/22  Yes Provider, Historical   ondansetron (ZOFRAN ODT) 4 mg disintegrating tablet Take  by mouth. 11/14/22  Yes Provider, Historical   psyllium seed-sucrose (Metamucil, sugar,) powd Take 1 Scoop by mouth daily.  12/7/22  Yes Jewell Suarez MD         Current Facility-Administered Medications:     meropenem (MERREM) 1 g in 0.9% sodium chloride 20 mL IV syringe, 1 g, IntraVENous, Q8H, Keanu Willis MD, 1 g at 02/09/23 0600    apixaban (ELIQUIS) tablet 5 mg, 5 mg, Oral, Q12H, Keanu Willis MD, 5 mg at 02/08/23 2258    ascorbic acid (vitamin C) (VITAMIN C) tablet 500 mg, 500 mg, Oral, DAILY, Keanu Willis MD, 500 mg at 02/08/23 1000    ferrous sulfate tablet 325 mg, 325 mg, Oral, DAILY, Keanu Willis MD, 325 mg at 02/08/23 1001    pantoprazole (PROTONIX) tablet 40 mg, 40 mg, Oral, DAILY, Keanu Willis MD, 40 mg at 02/08/23 1000    traMADoL (ULTRAM) tablet 50 mg, 50 mg, Oral, Q6H PRN, Nikunj Dugan MD, 50 mg at 02/09/23 8636    acetaminophen (TYLENOL) tablet 650 mg, 650 mg, Oral, Q6H PRN **OR** acetaminophen (TYLENOL) suppository 650 mg, 650 mg, Rectal, Q6H PRN, Keanu Willis MD    polyethylene glycol (MIRALAX) packet 17 g, 17 g, Oral, DAILY PRN, Keanu Willis MD    ondansetron (ZOFRAN ODT) tablet 4 mg, 4 mg, Oral, Q8H PRN **OR** ondansetron (ZOFRAN) injection 4 mg, 4 mg, IntraVENous, Q6H PRN, Keanu Willis MD    LAB DATA REVIEWED:    Recent Results (from the past 24 hour(s))   CBC WITH AUTOMATED DIFF    Collection Time: 02/09/23  7:23 AM   Result Value Ref Range    WBC 4.5 3.6 - 11.0 K/uL    RBC 3.42 (L) 3.80 - 5.20 M/uL    HGB 10.2 (L) 11.5 - 16.0 g/dL    HCT 32.2 (L) 35.0 - 47.0 %    MCV 94.2 80.0 - 99.0 FL    MCH 29.8 26.0 - 34.0 PG    MCHC 31.7 30.0 - 36.5 g/dL    RDW 12.1 11.5 - 14.5 %    PLATELET 226 240 - 895 K/uL    MPV 9.5 8.9 - 12.9 FL    NRBC 0.0 0.0  WBC    ABSOLUTE NRBC 0.00 0.00 - 0.01 K/uL    NEUTROPHILS PENDING %    LYMPHOCYTES PENDING %    MONOCYTES PENDING %    EOSINOPHILS PENDING %    BASOPHILS PENDING %    IMMATURE GRANULOCYTES PENDING %    ABS. NEUTROPHILS PENDING K/UL    ABS. LYMPHOCYTES PENDING K/UL    ABS. MONOCYTES PENDING K/UL    ABS. EOSINOPHILS PENDING K/UL    ABS. BASOPHILS PENDING K/UL    ABS. IMM. GRANS. PENDING K/UL    DF PENDING    C REACTIVE PROTEIN, QT    Collection Time: 02/09/23  7:23 AM   Result Value Ref Range    C-Reactive protein 15.70 (H) 0.00 - 0.60 mg/dL   PROCALCITONIN    Collection Time: 02/09/23  7:23 AM   Result Value Ref Range    Procalcitonin 0.15 (H) 0 ng/mL       XR Results (most recent):  Results from Hospital Encounter encounter on 02/07/23    XR CHEST PORT    Narrative  EXAM:  XR CHEST PORT    INDICATION: Chest pain    COMPARISON: 11/2/2022    TECHNIQUE: portable chest AP view    FINDINGS: The cardiac silhouette is within normal limits. The pulmonary  vasculature is within normal limits.     The lungs and pleural spaces are clear. The bones are osteopenic. Impression  No acute process on portable chest.         ANKLE BRACHIAL INDEX   Final Result      CT CHEST WO CONT   Final Result   1. Please note, cannot assess for pulmonary embolism on this noncontrast exam.   2.  Moderate pericardial effusion, nonspecific, but consider pericarditis. 3.  Emphysema, without acute airspace disease. XR CHEST PORT   Final Result      No acute process on portable chest.              Review of Systems:  Constitutional: Negative for chills and fever. HENT: Negative. Eyes: Negative. Respiratory: Negative. Cardiovascular: Negative. Gastrointestinal: Negative for abdominal pain and nausea. Skin: Negative. Neurological: Negative. Left leg pain swelling and redness    Objective:   VITALS:    Visit Vitals  BP 94/64 (BP 1 Location: Left upper arm, BP Patient Position: At rest)   Pulse 97   Temp 98.6 °F (37 °C)   Resp 16   Ht 5' 3\" (1.6 m)   Wt 51.3 kg (113 lb)   SpO2 95%   Breastfeeding No   BMI 20.02 kg/m²       Physical Exam:   Constitutional: pt is oriented to person, place, and time. HENT:   Head: Normocephalic and atraumatic. Eyes: Pupils are equal, round, and reactive to light. EOM are normal.   Cardiovascular: Normal rate, regular rhythm and normal heart sounds. Pulmonary/Chest: Breath sounds normal. No wheezes. No rales. Exhibits no tenderness. Abdominal: Soft. Bowel sounds are normal. There is no abdominal tenderness. There is no rebound and no guarding. Musculoskeletal: Left leg pain swelling and redness. Neurological: pt is alert and oriented to person, place, and time. Alert. Normal strength. No cranial nerve deficit or sensory deficit. Displays a negative Romberg sign.         ASSESSMENT & PLAN:    Acute cellulitis of the left leg  Infectious consulted, awaiting blood and urine cultures  Continue meropenem  UTI complicated by ostomy bag  Urine positive, awaiting cultures   Continue meropenem History of DVTs  Chronic DVT non- occlusive  Monitor   History of bladder cancer status postsurgery  History of colostomy bag  GERD  BARBARA normal   Pericardial effusion with possible pericarditis      Patient started on IV meropenem after the blood cultures ID consult    Continue other home medications    Will do 2D echo/and cardiology consult      Current Facility-Administered Medications:     meropenem (MERREM) 1 g in 0.9% sodium chloride 20 mL IV syringe, 1 g, IntraVENous, Q8H, Keanu Wlilis MD, 1 g at 02/09/23 0600    apixaban (ELIQUIS) tablet 5 mg, 5 mg, Oral, Q12H, Keanu Willis MD, 5 mg at 02/08/23 2258    ascorbic acid (vitamin C) (VITAMIN C) tablet 500 mg, 500 mg, Oral, DAILY, Keanu Willis MD, 500 mg at 02/08/23 1000    ferrous sulfate tablet 325 mg, 325 mg, Oral, DAILY, Keanu Willis MD, 325 mg at 02/08/23 1001    pantoprazole (PROTONIX) tablet 40 mg, 40 mg, Oral, DAILY, Keanu Willis MD, 40 mg at 02/08/23 1000    traMADoL (ULTRAM) tablet 50 mg, 50 mg, Oral, Q6H PRN, Keanu Willis MD, 50 mg at 02/09/23 1814    acetaminophen (TYLENOL) tablet 650 mg, 650 mg, Oral, Q6H PRN **OR** acetaminophen (TYLENOL) suppository 650 mg, 650 mg, Rectal, Q6H PRN, Keanu Willis MD    polyethylene glycol (MIRALAX) packet 17 g, 17 g, Oral, DAILY PRN, Keanu Willis MD    ondansetron (ZOFRAN ODT) tablet 4 mg, 4 mg, Oral, Q8H PRN **OR** ondansetron (ZOFRAN) injection 4 mg, 4 mg, IntraVENous, Q6H PRN, Kindra Almaraz MD       ________________________________________________________________________    Signed: Clinton Swenson MD

## 2023-02-09 NOTE — TELEPHONE ENCOUNTER
Ms. Erika Gilliland called in to make Dr. Davis Felt aware that she is in the hospital and if he has any questions she can call Erika Jensen at [phone]

## 2023-02-09 NOTE — PROGRESS NOTES
Progress Note    Patient: Jose Luis Stubbs MRN: [id number]  SSN: [id number]    YOB: 1965  Age: 61 y.o. Sex: female      Admit Date: 2/7/2023    LOS: 1 day     Subjective:   Patient followed for cellulitis left leg/foot of unclear etiology and complicated UTI. No urine culture yet. Blood cultures are pending. WBC normal but CRP and procal.   Patient resting comfortably with no new complaints. She complained about having to go to [facility] for PET scan. Objective:     Vitals:    02/08/23 1200 02/08/23 1534 02/08/23 1600 02/08/23 2039   BP:  110/70  94/64   Pulse: (!) 117 (!) 104 (!) 107 97   Resp:  18  16   Temp:  98 °F (36.7 °C)  98.6 °F (37 °C)   SpO2:  99%  95%   Weight:       Height:            Intake and Output:  Current Shift: No intake/output data recorded. Last three shifts: No intake/output data recorded. Physical Exam:   Vitals and nursing note reviewed. Exam conducted with a chaperone present (). Constitutional:       General: She is not in acute distress. Appearance: She is ill-appearing. HENT: unremarkable  Eyes:      Pupils: Pupils are equal, round, and reactive to light. Cardiovascular:      Rate and Rhythm: Normal rate and regular rhythm. Heart sounds: No murmur heard. Pulmonary:      Effort: Pulmonary effort is normal.      Breath sounds: Normal breath sounds. Abdominal:      General: Bowel sounds are normal. There is no distension. Palpations: Abdomen is soft. Tenderness: There is no abdominal tenderness. Comments: Urostomy with cloudy urine  Colostomy with pink stoma   Genitourinary:     Comments: No Gibson  Musculoskeletal:      Right lower leg: No edema. Left lower leg: Edema present. Comments: Swelling in left calf, ankle and foot is moderate with mild erythema, no open wounds   Skin:     Findings: No erythema or rash. Neurological:      General: No focal deficit present.       Mental Status: She is alert and oriented to person, place, and time. Psychiatric:    normal behavior    Lab/Data Review:     WBC 5,300     CRP 23.10  Procal 0.24    Blood cultures (2/7) in process    Assessment:     Active Problems:    Cellulitis (2/7/2023)      UTI (urinary tract infection) (2/7/2023)    Presumptive cellulitis, left leg/foot, etiology unclear  Complicated UTI (cystectomy, urostomy) with marked pyuria, bacteriuria and positive nitrite, urine culture ordered  Pericardial effusion, significance unclear  4. Chronic non-occlusive thrombi in left femoral/popliteal and gastrocnemius veins  5. Penicillin allergy     Comment:  Clinical findings not impressive for cellulitis and her pain is out of proportion to those findings. Unclear what role if any the chronic non-occlusive thrombi are playing. Plan:   Continue IV Meropenem  Awatiing urine culture  In am, repeat CRP and procal  Follow-up blood cultures  TTE for pericardial effusion   6. As a courtesy, will check with her Oncologist, Dr. Rajwinder Moyer about having PET scan done here at [facility].     Signed By: Judy Garnett MD     February 8, 2023

## 2023-02-09 NOTE — PROGRESS NOTES
Bedside and Verbal shift change report given to  Michael Beebe RN(oncoming nurse) by Annetta Garcia (offgoing nurse). Report included the following information medication, patient condition, and shift plan and discharge plan of action.

## 2023-02-09 NOTE — PROGRESS NOTES
PHYSICAL THERAPY EVALUATION  Patient: Eamon Cedillo (40 y.o. female)  Date: 2/9/2023  Primary Diagnosis: Cellulitis [L03.90]  UTI (urinary tract infection) [N39.0]       Precautions: fall    In place during session: Peripheral IV    ASSESSMENT  Pt is a 61 y.o. female admitted on 02/07/2023 for eft leg redness; pt currently being treated for cellulitis . Pt semi supine upon PT arrival, agreeable to evaluation. Pt A&O x 4. Based on the objective data described, the patient presents with generalized weakness, impaired functional mobility, impaired amb, impaired balance, and decreased endurance (See below for objective details and assist levels). Patient exhibits decreased DF strength in left foot. Overall pt tolerated session good today with therapy. Pt will benefit from continued skilled PT to address above deficits and return to PLOF. Current PT DC recommendation Delgado Keenan once medically appropriate. Current Level of Function Impacting Discharge (mobility/balance): decreased balance in ambulation    Other factors to consider for discharge: SNF      PLAN :  Recommendations and Planned Interventions: bed mobility training, transfer training, gait training, therapeutic exercises, patient and family training/education, and therapeutic activities      Recommend for staff: Amb to bathroom for toileting with gt belt and AD    Frequency/Duration: Patient will be followed by physical therapy:  2-3x/week to address goals. Recommendation for discharge: (in order for the patient to meet his/her long term goals)  Delgado Keenan    This discharge recommendation:  Has been made in collaboration with the attending provider and/or case management    IF patient discharges home will need the following DME: patient owns DME required for discharge         SUBJECTIVE:   Patient stated I am doing well.     OBJECTIVE DATA SUMMARY:   HISTORY:    Past Medical History:   Diagnosis Date    Anemia     pt states had recent blood transfusion sept     Back pain     Cancer (HCC)     bladder    DVT (deep venous thrombosis) ([facility] [address]     and PE, pt denies lung PE , only leg several years ago    Hepatitis C     pt states dx 1 month ago    Liver disease     Presence of IVC filter     pt states several years ago    Rheumatoid arthritis ([facility] [address]     Sciatic leg pain     pt states both legs    Uterine fibroid      Past Surgical History:   Procedure Laterality Date    HX APPENDECTOMY  10/03/2022    HX  SECTION      HX GI  2022    laparoscopic early postop adhesion of the sigmoid colon    HX GI  2022    laparoscopic lysis of adhesion    HX GI  10/31/2022    rigid sigmoidoscope examination of the rectum    HX GI  10/31/2022    distal rectal repair with primary suturing technique    HX GYN  10/03/2022    ROBOT ANTERIOR PELVIC EXENTERATION    HX HYSTERECTOMY  10/03/2022    HX SALPINGO-OOPHORECTOMY Bilateral 10/03/2022    HX UROLOGICAL  09/15/2022    CYSTOSCOPY    HX UROLOGICAL  09/15/2022    URETHRAL DILATION    HX UROLOGICAL  09/15/2022    URETERAL STENT PLACEMENT    HX UROLOGICAL  09/15/2022    TRANSURETHRAL RESECTION OF BLADDER TUMOR >2CM    HX UROLOGICAL Bilateral 09/15/2022    RETROGRADE PYELOGRAM    HX UROLOGICAL  10/03/2022    PELVIC LYMPH NODE DISSECTION    HX UROLOGICAL  10/03/2022    ILEAL CONDUIT URINARY DIVERSION    [facility]  10/27/2022    ME UNLISTED PROCEDURE CARDIAC SURGERY      stent placement       Home Situation  Home Environment: (P) Apartment  # Steps to Enter: (P) 13  One/Two Story Residence: (P) Other (Comment)  Living Alone: (P) Yes  Support Systems: (P) Caregiver/Home Care Staff  Patient Expects to be Discharged to[de-identified] Home  Current DME Used/Available at Home: (P) Walker, rollator, Shower chair, Commode, bedside, [employer], straight    EXAMINATION/PRESENTATION/DECISION MAKING:   Critical Behavior:  Neurologic State: (P) Alert  Orientation Level: (P) Oriented X4  Cognition: (P) Follows commands     Hearing:    Skin:  intact  Edema: left foot  Range Of Motion:  AROM: Generally decreased, functional                       Strength:    Strength: Generally decreased, functional                    Tone & Sensation:   Tone: Normal                              Vision: wears glasses     Functional Mobility:  Bed Mobility:  Rolling: Stand-by assistance  Supine to Sit: Stand-by assistance  Sit to Supine: Stand-by assistance  Scooting: Stand-by assistance  Transfers:  Sit to Stand: Minimum assistance  Stand to Sit: Minimum assistance                       Balance:   Sitting: Intact  Standing: Impaired;Pull to stand; With support  Standing - Static: Fair;Constant support  Standing - Dynamic : Fair;Constant support  Ambulation/Gait Training:  Distance (ft): 25 Feet (ft)  Assistive Device: Gait belt;Walker, rolling  Ambulation - Level of Assistance: Minimal assistance     Gait Description (WDL): Exceptions to WDL                                            Functional Measure:  [employer] AM-PAC 6 Clicks         Basic Mobility Inpatient Short Form  How much difficulty does the patient currently have. .. Unable A Lot A Little None   1. Turning over in bed (including adjusting bedclothes, sheets and blankets)? [] 1   [] 2   [x] 3   [] 4   2. Sitting down on and standing up from a chair with arms ( e.g., wheelchair, bedside commode, etc.)   [] 1   [] 2   [x] 3   [] 4   3. Moving from lying on back to sitting on the side of the bed? [] 1   [] 2   [x] 3   [] 4          How much help from another person does the patient currently need. .. Total A Lot A Little None   4. Moving to and from a bed to a chair (including a wheelchair)? [] 1   [] 2   [x] 3   [] 4   5. Need to walk in hospital room? [] 1   [] 2   [x] 3   [] 4   6. Climbing 3-5 steps with a railing? [] 1   [] 2   [x] 3   [] 4   © 2007, Trustees of [employer], under license to [employer].  All rights reserved     Score:  Initial:  Most Recent: X (Date: 2023 )   Interpretation of Tool:  Represents activities that are increasingly more difficult (i.e. Bed mobility, Transfers, Gait). Score 24 23 22-20 19-15 14-10 9-7 6   Modifier CH CI CJ CK CL CM CN         Physical Therapy Evaluation Charge Determination   History Examination Presentation Decision-Making   LOW Complexity : Zero comorbidities / personal factors that will impact the outcome / POC LOW Complexity : 1-2 Standardized tests and measures addressing body structure, function, activity limitation and / or participation in recreation  LOW Complexity : Stable, uncomplicated  Other outcome measures [facility] 6        Based on the above components, the patient evaluation is determined to be of the following complexity level: LOW     Pain Ratin/10 rt leg    Activity Tolerance:   Fair    After treatment patient left in no apparent distress:   Bed locked and in lowest position Supine in bed, Call bell within reach, and Bed / chair alarm activated . GOALS:    Problem: Mobility Impaired (Adult and Pediatric)  Goal: *Acute Goals and Plan of Care (Insert Text)  Description: FUNCTIONAL STATUS PRIOR TO ADMISSION: Patient was modified independent using a rollator for functional mobility. HOME SUPPORT PRIOR TO ADMISSION: The patient lived alone with niece (as an AIDE) to provide assistance. Patient stated goal: get better  Patient will move from supine to sit and sit to supine , scoot up and down, and roll side to side in bed with supervision/set-up within 7 day(s). Patient will transfer from bed to chair and chair to bed with modified independence using the least restrictive device within 7 day(s). Patient will improve static standing balance to modified independence within 1 week(s). Patient will ambulate 50 feet with modified independence with least restrictive device within 1 weeks.      Outcome: Progressing Towards Goal COMMUNICATION/EDUCATION:   The patients plan of care was discussed with: Occupational therapist, Registered nurse, and Case management. Fall prevention education was provided and the patient/caregiver indicated understanding., Patient/family have participated as able in goal setting and plan of care. , and Patient/family agree to work toward stated goals and plan of care.          Thank you for this referral.  Lee Patel, PT   Time Calculation: 22 mins

## 2023-02-09 NOTE — PROGRESS NOTES
Problem: Pressure Injury - Risk of  Goal: *Prevention of pressure injury  Description: Document Alphonso Scale and appropriate interventions in the flowsheet. Note: Pressure Injury Interventions:             Activity Interventions: PT/OT evaluation    Mobility Interventions: PT/OT evaluation    Nutrition Interventions: Document food/fluid/supplement intake, Offer support with meals,snacks and hydration    Friction and Shear Interventions: Minimize layers, Apply protective barrier, creams and emollients

## 2023-02-09 NOTE — PROGRESS NOTES
Spiritual Care Assessment/Progress Note  [facility]      NAME: Avani Rojas      MRN: [id number]  AGE: 61 y.o.  SEX: female  Oriental orthodox Affiliation: No preference   Language: English     2/9/2023     Total Time (in minutes): 35     Spiritual Assessment begun in [facility] MEDICAL ONCOLOGY through conversation with:         [x]Patient        [] Family    [] Friend(s)        Reason for Consult: Advance medical directive consult     Spiritual beliefs: (Please include comment if needed)     [x] Identifies with a drew tradition:    Buddhism/Denominational      [] Supported by a drew community:       none      [] Claims no spiritual orientation:           [] Seeking spiritual identity:                [] Adheres to an individual form of spirituality:           [] Not able to assess:                           Identified resources for coping:      [x] Prayer                               [] Music                  [] Guided Imagery     [x] Family/friends                 [] Pet visits     [] Devotional reading                         [] Unknown     [] Other:                                               Interventions offered during this visit: (See comments for more details)    Patient Interventions: Advance medical directive consult, Affirmation of emotions/emotional suffering, Affirmation of drew, Catharsis/review of pertinent events in supportive environment, Initial/Spiritual assessment, patient floor, Normalization of emotional/spiritual concerns, Prayer (assurance of), Oriental orthodox beliefs/image of God discussed           Plan of Care:     [] Support spiritual and/or cultural needs    [] Support AMD and/or advance care planning process      [] Support grieving process   [] Coordinate Rites and/or Rituals    [] Coordination with community clergy   [] No spiritual needs identified at this time   [] Detailed Plan of Care below (See Comments)  [] Make referral to Music Therapy  [] Make referral to Pet Therapy [] Make referral to Addiction services  [] Make referral to [facility]  [] Make referral to Spiritual Care Partner  [] No future visits requested        [x] Contact Spiritual Care for further referrals     Comments:   In Basket request for Advance Medical Directive (AMD). Reviewed chart prior to visit and consulted with her care worker. Met Ms Ciera Angel as she is sitting up in bed and welcomes visit. She shared events of coming t hospital, confirming that she lives alone and that she called EMS for herself. Her niece Candy Stewart helps and checks on her as often as needed. She shared that she is [place], no home Muslim family. Ciera Angel has AMD on file already and she verified that those wishes are still valid. That her niece Candy Stewart is her desired medical decision maker, that she wishes for life prolonging treatments and she is not an organ donor. She expressed appreciation for letting her know of  and spiritual care availability. The current AMD is still valid. TEO Renee.Div. Please contact Hospital  for [facility].    [phone]

## 2023-02-09 NOTE — CONSULTS
Consult    Patient: Benoit Mejia MRN: [id number]  SSN: [id number]    YOB: 1965  Age: 61 y.o. Sex: female      Subjective: Benoit Mejia is a 61 y.o. female who is being seen for pericardial effusion. Patient is a 69-year-old -American female with history of bladder cancer, DVT/PE, hepatitis C virus, liver disease, status post IVC filter, rheumatoid arthritis. She was brought by EMS. She lives in her own. Her niece help her in her activities of daily living. She presents complaining of bilateral lower limb swelling, left lower extremity pain and redness is being treated for cellulitis. Denied having any chest pain, shortness of breath, palpitation, dizziness or syncope. She is not very active. Denied having any fever or chills or congestion. CT scan was concerning for pericardial effusion hence cardiology were consulted.     Past Medical History:   Diagnosis Date    Anemia     pt states had recent blood transfusion 2022    Back pain     Cancer (HCC)     bladder    DVT (deep venous thrombosis) ([address]     and PE, pt denies lung PE , only leg several years ago    Hepatitis C     pt states dx 1 month ago    Liver disease     Presence of IVC filter     pt states several years ago    Rheumatoid arthritis ([address]     Sciatic leg pain     pt states both legs    Uterine fibroid      Past Surgical History:   Procedure Laterality Date    HX APPENDECTOMY  10/03/2022    HX  SECTION      HX GI  2022    laparoscopic early postop adhesion of the sigmoid colon    HX GI  2022    laparoscopic lysis of adhesion    HX GI  10/31/2022    rigid sigmoidoscope examination of the rectum    HX GI  10/31/2022    distal rectal repair with primary suturing technique    HX GYN  10/03/2022    ROBOT ANTERIOR PELVIC EXENTERATION    HX HYSTERECTOMY  10/03/2022    HX SALPINGO-OOPHORECTOMY Bilateral 10/03/2022    HX UROLOGICAL  09/15/2022    CYSTOSCOPY    HX UROLOGICAL 09/15/2022    URETHRAL DILATION    HX UROLOGICAL  09/15/2022    URETERAL STENT PLACEMENT    HX UROLOGICAL  09/15/2022    TRANSURETHRAL RESECTION OF BLADDER TUMOR >2CM    HX UROLOGICAL Bilateral 09/15/2022    RETROGRADE PYELOGRAM    HX UROLOGICAL  10/03/2022    PELVIC LYMPH NODE DISSECTION    HX UROLOGICAL  10/03/2022    ILEAL CONDUIT URINARY DIVERSION    IR THROMBECTOMY [facility] VEIN W PHARM  10/27/2022    RI UNLISTED PROCEDURE CARDIAC SURGERY      stent placement      Family History   Problem Relation Age of Onset    Cancer Mother     Lung Cancer Mother     Heart Disease Father     Hypertension Sister     Cancer Maternal Aunt     Breast Cancer Maternal Aunt      Social History     Tobacco Use    Smoking status: Former     Packs/day: 0.50     Years: 10.00     Pack years: 5.00     Types: Cigarettes     Quit date: 1998     Years since quittin.1    Smokeless tobacco: Never    Tobacco comments:     Quit 15 years ago   Substance Use Topics    Alcohol use: Not Currently      Current Facility-Administered Medications   Medication Dose Route Frequency Provider Last Rate Last Admin    meropenem (MERREM) 1 g in 0.9% sodium chloride 20 mL IV syringe  1 g IntraVENous Q8H Keanu Willis MD   1 g at 23 1517    apixaban (ELIQUIS) tablet 5 mg  5 mg Oral Q12H Keanu Willis MD   5 mg at 23 1517    ascorbic acid (vitamin C) (VITAMIN C) tablet 500 mg  500 mg Oral DAILY Keanu Willis MD   500 mg at 23 1517    ferrous sulfate tablet 325 mg  325 mg Oral DAILY Keanu Willis MD   325 mg at 23 1517    pantoprazole (PROTONIX) tablet 40 mg  40 mg Oral DAILY Keanu Willis MD   40 mg at 23 1517    traMADoL (ULTRAM) tablet 50 mg  50 mg Oral Q6H PRN Keanu Willis MD   50 mg at 23 1517    acetaminophen (TYLENOL) tablet 650 mg  650 mg Oral Q6H PRN Keanu Willis MD        Or    acetaminophen (TYLENOL) suppository 650 mg  650 mg Rectal Q6H PRN Leandro Boateng MD        polyethylene glycol (MIRALAX) packet 17 g  17 g Oral DAILY PRN Cain Willis MD        ondansetron (ZOFRAN ODT) tablet 4 mg  4 mg Oral Q8H PRN Cain Willis MD        Or    ondansetron [facility]) injection 4 mg  4 mg IntraVENous Q6H PRN Keanu Willis MD            Allergies   Allergen Reactions    Bactrim [Sulfamethoprim] Swelling     Swelling of the lilps    Penicillin V Potassium Swelling       Review of Systems:  A comprehensive review of systems was negative except for that written in the History of Present Illness. Objective:     Vitals:    02/08/23 1600 02/08/23 2039 02/09/23 0900 02/09/23 1454   BP:  94/64 96/66 99/66   Pulse: (!) 107 97 100 96   Resp:  16  21   Temp:  98.6 °F (37 °C) 98.3 °F (36.8 °C) 98.3 °F (36.8 °C)   SpO2:  95% 99% 99%   Weight:       Height:            Physical Exam:  General:  Alert, cooperative, no distress, appears stated age. Eyes:  Conjunctivae/corneas clear. PERRL, EOMs intact. Fundi benign   Ears:  Normal TMs and external ear canals both ears. Nose: Nares normal. Septum midline. Mucosa normal. No drainage or sinus tenderness. Mouth/Throat: Lips, mucosa, and tongue normal. Teeth and gums normal.   Neck: Supple, symmetrical, trachea midline, no adenopathy, thyroid: no enlargment/tenderness/nodules, no carotid bruit and no JVD. Back:   Symmetric, no curvature. ROM normal. No CVA tenderness. Lungs:   Clear to auscultation bilaterally. Heart:  Regular rate and rhythm, S1, S2 normal, no murmur, click, rub or gallop. Abdomen:   Soft, non-tender. Bowel sounds normal. No masses,  No organomegaly. Extremities: Left lower extremity is wrapped. Pulses: 2+ and symmetric all extremities. Skin: Skin color, texture, turgor normal. No rashes or lesions   Lymph nodes: Cervical, supraclavicular, and axillary nodes normal.   Neurologic: CNII-XII intact. Normal strength, sensation and reflexes throughout.          Assessment:     Hospital Problems  Date Reviewed: 1/26/2023 Codes Class Noted POA    Cellulitis ICD-10-CM: L03.90  ICD-9-CM: 682.9  2/7/2023 Unknown        UTI (urinary tract infection) ICD-10-CM: N39.0  ICD-9-CM: 599.0  2/7/2023 Unknown         Patient is a 54-year-old -American female with:  1. Pericardial effusion  2. Hypertensive heart disease  3. Left lower extremity cellulitis  4. Complicated UTI  5. 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 Bladder cancer status post cystectomy, hysterectomy and BSO and ileal conduit diversion  7. Hepatitis C  8. Left leg DVT status post IVC filter  9. Anemia    Plan:     Hemoglobin 10.2, platelet 478. Sodium 135, potassium 3.8. Calcium 10.4. Liver function test within normal limits. Anyhow globulin is elevated at 8.1, albumin is low 2.4, total protein 10.5. Echo is suggestive of hypertensive heart disease, grade 1 diastolic dysfunction with EF of 55 to 60%, small pericardial effusion without signs of tamponade. Currently in sinus rhythm. I personally reviewed telemetry. \      I wonder if work-up for multiple myeloma is warranted. That she has protein albumin dissociation. Patient follows up with oncologist as an outpatient    No further cardiac testing planned at this time. I will be happy to see as an outpatient in 2 to 4 weeks after discharge    Thank you  For involving me in this nice patient's care.   Signed By: Tello Ying MD     February 9, 2023

## 2023-02-10 LAB
ALBUMIN SERPL-MCNC: 2.3 G/DL (ref 3.5–5)
ALBUMIN/GLOB SERPL: 0.3 (ref 1.1–2.2)
ALP SERPL-CCNC: 82 U/L (ref 45–117)
ALT SERPL-CCNC: 15 U/L (ref 12–78)
ANION GAP SERPL CALC-SCNC: 1 MMOL/L (ref 5–15)
AST SERPL W P-5'-P-CCNC: 21 U/L (ref 15–37)
BACTERIA SPEC CULT: NORMAL
BILIRUB SERPL-MCNC: 0.6 MG/DL (ref 0.2–1)
BUN SERPL-MCNC: 19 MG/DL (ref 6–20)
BUN/CREAT SERPL: 30 (ref 12–20)
CA-I BLD-MCNC: 10 MG/DL (ref 8.5–10.1)
CHLORIDE SERPL-SCNC: 107 MMOL/L (ref 97–108)
CO2 SERPL-SCNC: 25 MMOL/L (ref 21–32)
CREAT SERPL-MCNC: 0.63 MG/DL (ref 0.55–1.02)
CRP SERPL-MCNC: 10 MG/DL (ref 0–0.6)
ERYTHROCYTE [DISTWIDTH] IN BLOOD BY AUTOMATED COUNT: 11.9 % (ref 11.5–14.5)
GLOBULIN SER CALC-MCNC: 7.8 G/DL (ref 2–4)
GLUCOSE SERPL-MCNC: 95 MG/DL (ref 65–100)
HCT VFR BLD AUTO: 30.9 % (ref 35–47)
HGB BLD-MCNC: 10 G/DL (ref 11.5–16)
MCH RBC QN AUTO: 30 PG (ref 26–34)
MCHC RBC AUTO-ENTMCNC: 32.4 G/DL (ref 30–36.5)
MCV RBC AUTO: 92.8 FL (ref 80–99)
NRBC # BLD: 0 K/UL (ref 0–0.01)
NRBC BLD-RTO: 0 PER 100 WBC
PLATELET # BLD AUTO: 353 K/UL (ref 150–400)
PMV BLD AUTO: 9.3 FL (ref 8.9–12.9)
POTASSIUM SERPL-SCNC: 3.9 MMOL/L (ref 3.5–5.1)
PROCALCITONIN SERPL-MCNC: 0.08 NG/ML
PROT SERPL-MCNC: 10.1 G/DL (ref 6.4–8.2)
RBC # BLD AUTO: 3.33 M/UL (ref 3.8–5.2)
SODIUM SERPL-SCNC: 133 MMOL/L (ref 136–145)
SPECIAL REQUESTS,SREQ: NORMAL
WBC # BLD AUTO: 3.6 K/UL (ref 3.6–11)

## 2023-02-10 PROCEDURE — 85027 COMPLETE CBC AUTOMATED: CPT

## 2023-02-10 PROCEDURE — 74011250636 HC RX REV CODE- 250/636: Performed by: FAMILY MEDICINE

## 2023-02-10 PROCEDURE — 80053 COMPREHEN METABOLIC PANEL: CPT

## 2023-02-10 PROCEDURE — 97530 THERAPEUTIC ACTIVITIES: CPT

## 2023-02-10 PROCEDURE — 86140 C-REACTIVE PROTEIN: CPT

## 2023-02-10 PROCEDURE — 97165 OT EVAL LOW COMPLEX 30 MIN: CPT

## 2023-02-10 PROCEDURE — 74011250637 HC RX REV CODE- 250/637: Performed by: FAMILY MEDICINE

## 2023-02-10 PROCEDURE — 36415 COLL VENOUS BLD VENIPUNCTURE: CPT

## 2023-02-10 PROCEDURE — 99232 SBSQ HOSP IP/OBS MODERATE 35: CPT | Performed by: INTERNAL MEDICINE

## 2023-02-10 PROCEDURE — 65270000029 HC RM PRIVATE

## 2023-02-10 PROCEDURE — 84145 PROCALCITONIN (PCT): CPT

## 2023-02-10 PROCEDURE — 74011000250 HC RX REV CODE- 250: Performed by: FAMILY MEDICINE

## 2023-02-10 RX ORDER — DOXYCYCLINE 100 MG/1
100 CAPSULE ORAL 2 TIMES DAILY
Qty: 20 CAPSULE | Refills: 0 | Status: SHIPPED | OUTPATIENT
Start: 2023-02-10 | End: 2023-02-13

## 2023-02-10 RX ADMIN — FERROUS SULFATE TAB 325 MG (65 MG ELEMENTAL FE) 325 MG: 325 (65 FE) TAB at 08:19

## 2023-02-10 RX ADMIN — APIXABAN 5 MG: 5 TABLET, FILM COATED ORAL at 08:19

## 2023-02-10 RX ADMIN — SODIUM CHLORIDE, PRESERVATIVE FREE 1 G: 5 INJECTION INTRAVENOUS at 14:16

## 2023-02-10 RX ADMIN — TRAMADOL HYDROCHLORIDE 50 MG: 50 TABLET, COATED ORAL at 14:26

## 2023-02-10 RX ADMIN — SODIUM CHLORIDE, PRESERVATIVE FREE 1 G: 5 INJECTION INTRAVENOUS at 23:04

## 2023-02-10 RX ADMIN — PANTOPRAZOLE SODIUM 40 MG: 40 TABLET, DELAYED RELEASE ORAL at 08:19

## 2023-02-10 RX ADMIN — SODIUM CHLORIDE, PRESERVATIVE FREE 1 G: 5 INJECTION INTRAVENOUS at 08:19

## 2023-02-10 RX ADMIN — TRAMADOL HYDROCHLORIDE 50 MG: 50 TABLET, COATED ORAL at 21:28

## 2023-02-10 RX ADMIN — POLYETHYLENE GLYCOL 3350 17 G: 17 POWDER, FOR SOLUTION ORAL at 21:28

## 2023-02-10 RX ADMIN — OXYCODONE HYDROCHLORIDE AND ACETAMINOPHEN 500 MG: 500 TABLET ORAL at 08:19

## 2023-02-10 RX ADMIN — APIXABAN 5 MG: 5 TABLET, FILM COATED ORAL at 20:35

## 2023-02-10 NOTE — PROGRESS NOTES
History and Physical    NAME: Augie Joiner   :  1965   MRN:  [id number]     Date/Time:  2/10/2023 5:25 PM    Patient PCP: Rebecca Briceno MD  ______________________________________________________________________             Subjective:     CHIEF COMPLAINT:     Left leg pain        HISTORY OF PRESENT ILLNESS:       Patient is a 61y.o. year old female with signal past medical history of multiple DVT history of bladder cancer status post urostomy bag status postsurgery history of colostomy bag came to the ER complaining of left leg pain redness swelling patient was treated as outpatient with doxycycline with no much improvement she came to emergency room seen by the ER physician and patient was recommended to be admitted for acute cellulitis of the left leg      pain is constant. Infection has not spread since onset about 1 month ago. Pain has worsened over the past month, now with paresthesia radiating up the leg. Patient states she cannot stand on affected foot which prompted her to present to ER. Chronic DVT found on duplex last week, was given doxy with no improvement. Urinalysis shows bacteriuria, pyuria, positive nitratess. Awaiting urine and blood cultures.  pain improved since yesterday, pain now isolated to infected region, no longer radiating up her leg. Urine and blood not resulted. On meropenem IV. Duplex not resulted, pt was told verbally that u/s was normal. Asx for UTI, no CVA tenderness. Would like to have help with ambulation today. 2/10 Still awaiting cultures. Pt feeling better. No longer having numbness and tingling.  Saw PT yesterday for evaluation yesterday will need to be discharged to nursing    Past Medical History:   Diagnosis Date    Anemia     pt states had recent blood transfusion 2022    Back pain     Cancer (HCC)     bladder    DVT (deep venous thrombosis) ([facility] [address]     and PE, pt denies lung PE , only leg several years ago    Hepatitis C     pt states dx 1 month ago    Liver disease     Presence of IVC filter     pt states several years ago    Rheumatoid arthritis ([address]     Sciatic leg pain     pt states both legs    Uterine fibroid         Past Surgical History:   Procedure Laterality Date    HX APPENDECTOMY  10/03/2022    HX  SECTION      HX GI  2022    laparoscopic early postop adhesion of the sigmoid colon    HX GI  2022    laparoscopic lysis of adhesion    HX GI  10/31/2022    rigid sigmoidoscope examination of the rectum    HX GI  10/31/2022    distal rectal repair with primary suturing technique    HX GYN  10/03/2022    ROBOT ANTERIOR PELVIC EXENTERATION    HX HYSTERECTOMY  10/03/2022    HX SALPINGO-OOPHORECTOMY Bilateral 10/03/2022    HX UROLOGICAL  09/15/2022    CYSTOSCOPY    HX UROLOGICAL  09/15/2022    URETHRAL DILATION    HX UROLOGICAL  09/15/2022    URETERAL STENT PLACEMENT    HX UROLOGICAL  09/15/2022    TRANSURETHRAL RESECTION OF BLADDER TUMOR >2CM    HX UROLOGICAL Bilateral 09/15/2022    RETROGRADE PYELOGRAM    HX UROLOGICAL  10/03/2022    PELVIC LYMPH NODE DISSECTION    HX UROLOGICAL  10/03/2022    ILEAL CONDUIT URINARY DIVERSION    IR THROMBECTOMY \[facility] [place] [facility]\"" VEIN W PHARM  10/27/2022    OK UNLISTED PROCEDURE CARDIAC SURGERY      stent placement       Social History     Tobacco Use    Smoking status: Former     Packs/day: 0.50     Years: 10.00     Pack years: 5.00     Types: Cigarettes     Quit date: 1998     Years since quittin.1    Smokeless tobacco: Never    Tobacco comments:     Quit 15 years ago   Substance Use Topics    Alcohol use: Not Currently        Family History   Problem Relation Age of Onset    Cancer Mother     Lung Cancer Mother     Heart Disease Father     Hypertension Sister     Cancer Maternal Aunt     Breast Cancer Maternal Aunt        Allergies   Allergen Reactions    Bactrim [Sulfamethoprim] Swelling     Swelling of the lilps    Penicillin V Potassium Swelling        Prior to Admission medications Medication Sig Start Date End Date Taking? Authorizing Provider   multivitamin (ONE A DAY) tablet Take 1 Tablet by mouth daily. 1/26/23  Yes Rebecca Briceno MD   pantoprazole (PROTONIX) 40 mg tablet Take 1 Tablet by mouth daily. 1/20/23  Yes Rebecca Briceno MD   ascorbic acid, vitamin C, (VITAMIN C) 500 mg tablet Take 1 Tablet by mouth daily. 1/13/23  Yes Rebecca Briceno MD   apixaban (ELIQUIS) 5 mg tablet Take 1 Tablet by mouth every twelve (12) hours for 90 days. Indications: blood clot in a deep vein of the extremities 1/12/23 4/12/23 Yes Rebecca Briceno MD   acetaminophen (TYLENOL) 650 mg TbER Take 1 Tablet by mouth every eight (8) hours as needed for Pain. 1/12/23  Yes Rebecca Briceno MD   docusate sodium (COLACE) 100 mg capsule Take 1 Capsule by mouth two (2) times daily as needed for Constipation for up to 90 days. 1/12/23 4/12/23 Yes Rebecca Briceno MD   ferrous sulfate 325 mg (65 mg iron) tablet Take 1 Tablet by mouth daily. 1/12/23  Yes Rebecca Briceno MD   traMADoL Terrie Manual) 50 mg tablet Take  by mouth. 12/12/22  Yes Provider, Historical   ondansetron (ZOFRAN ODT) 4 mg disintegrating tablet Take  by mouth. 11/14/22  Yes Provider, Historical   psyllium seed-sucrose (Metamucil, sugar,) powd Take 1 Scoop by mouth daily.  12/7/22  Yes Nyla Moise MD         Current Facility-Administered Medications:     meropenem (MERREM) 1 g in 0.9% sodium chloride 20 mL IV syringe, 1 g, IntraVENous, Q8H, Keanu Willis MD, 1 g at 02/09/23 2339    apixaban (ELIQUIS) tablet 5 mg, 5 mg, Oral, Q12H, Keanu Willis MD, 5 mg at 02/09/23 2143    ascorbic acid (vitamin C) (VITAMIN C) tablet 500 mg, 500 mg, Oral, DAILY, Keanu Willis MD, 500 mg at 02/09/23 1517    ferrous sulfate tablet 325 mg, 325 mg, Oral, DAILY, Keanu Willis MD, 325 mg at 02/09/23 1517    pantoprazole (PROTONIX) tablet 40 mg, 40 mg, Oral, DAILY, Keanu Willis MD, 40 mg at 02/09/23 1517    traMADoL (ULTRAM) tablet 50 mg, 50 mg, Oral, Q6H PRN, Keanu Willis MD, 50 mg at 02/09/23 2338    acetaminophen (TYLENOL) tablet 650 mg, 650 mg, Oral, Q6H PRN **OR** acetaminophen (TYLENOL) suppository 650 mg, 650 mg, Rectal, Q6H PRN, Keanu Willis MD    polyethylene glycol (MIRALAX) packet 17 g, 17 g, Oral, DAILY PRN, Keanu Willis MD, 17 g at 02/09/23 2338    ondansetron (ZOFRAN ODT) tablet 4 mg, 4 mg, Oral, Q8H PRN **OR** ondansetron (ZOFRAN) injection 4 mg, 4 mg, IntraVENous, Q6H PRN, Keanu Willis MD    LAB DATA REVIEWED:    Recent Results (from the past 24 hour(s))   ECHO ADULT COMPLETE    Collection Time: 02/09/23  2:10 PM   Result Value Ref Range    LV EDV A2C 46 mL    LV EDV A4C 49 mL    LV ESV A2C 17 mL    LV ESV A4C 20 mL    IVSd 0.7 0.6 - 0.9 cm    LVIDd 4.0 3.9 - 5.3 cm    LVIDs 2.7 cm    LVOT Diameter 2.0 cm    LVOT Peak Velocity 0.9 m/s    LVOT Peak Gradient 3 mmHg    LVPWd 0.8 0.6 - 0.9 cm    LV E' Lateral Velocity 9 cm/s    LV E' Septal Velocity 9 cm/s    LV Ejection Fraction A2C 63 %    LV Ejection Fraction A4C 58 %    EF BP 61 55 - 100 %    LVOT Area 3.1 cm2    LA Minor Axis 4.5 cm    LA Major [place] 4.1 cm    LA Area 2C 13.6 cm2    LA Area 4C 13.1 cm2    LA Volume BP 33 22 - 52 mL    LA Diameter 2.8 cm    RA Area 4C 10.4 cm2    RA Volume 22 ml    AV Peak Velocity 1.1 m/s    AV Peak Gradient 5 mmHg    AV Area by Peak Velocity 2.6 cm2    Aortic Root 2.7 cm    Ascending Aorta 2.7 cm    IVC Expiration 1.4 cm    MV E Wave Deceleration Time 221.0 ms    MV A Velocity 0.56 m/s    MV E Velocity 0.55 m/s    PV Max Velocity 0.7 m/s    PV Peak Gradient 2 mmHg    RV Basal Dimension 2.7 cm    TAPSE 1.9 1.7 cm    TR Max Velocity 2.24 m/s    TR Peak Gradient 20 mmHg    Fractional Shortening 2D 33 28 - 44 %    LV ESV Index A4C 13 mL/m2    LV EDV Index A4C 32 mL/m2    LV ESV Index A2C 11 mL/m2    LV EDV Index A2C 30 mL/m2    LVIDd Index 2.63 cm/m2    LVIDs Index 1.78 cm/m2    LV RWT Ratio 0.40     LV Mass 2D 85.8 67 - 162 g    LV Mass 2D Index 56.4 43 - 95 g/m2    MV E/A 0.98     E/E' Ratio (Averaged) 6.11     E/E' Lateral 6.11     E/E' Septal 6.11     LA Volume Index BP 22 16 - 34 ml/m2    LA Size Index 1.84 cm/m2    LA/AO Root Ratio 1.04     RA Volume Index A4C 14 mL/m2    Ao Root Index 1.78 cm/m2    Ascending Aorta Index 1.78 cm/m2    AV Velocity Ratio 0.82     RENATA/BSA Peak Velocity 1.7 cm2/m2   CBC W/O DIFF    Collection Time: 02/10/23  6:48 AM   Result Value Ref Range    WBC 3.6 3.6 - 11.0 K/uL    RBC 3.33 (L) 3.80 - 5.20 M/uL    HGB 10.0 (L) 11.5 - 16.0 g/dL    HCT 30.9 (L) 35.0 - 47.0 %    MCV 92.8 80.0 - 99.0 FL    MCH 30.0 26.0 - 34.0 PG    MCHC 32.4 30.0 - 36.5 g/dL    RDW 11.9 11.5 - 14.5 %    PLATELET 390 758 - 162 K/uL    MPV 9.3 8.9 - 12.9 FL    NRBC 0.0 0.0  WBC    ABSOLUTE NRBC 0.00 0.00 - 4.44 K/uL   METABOLIC PANEL, COMPREHENSIVE    Collection Time: 02/10/23  6:48 AM   Result Value Ref Range    Sodium 133 (L) 136 - 145 mmol/L    Potassium 3.9 3.5 - 5.1 mmol/L    Chloride 107 97 - 108 mmol/L    CO2 25 21 - 32 mmol/L    Anion gap 1 (L) 5 - 15 mmol/L    Glucose 95 65 - 100 mg/dL    BUN 19 6 - 20 mg/dL    Creatinine 0.63 0.55 - 1.02 mg/dL    BUN/Creatinine ratio 30 (H) 12 - 20      eGFR >60 >60 ml/min/1.73m2    Calcium 10.0 8.5 - 10.1 mg/dL    Bilirubin, total 0.6 0.2 - 1.0 mg/dL    AST (SGOT) 21 15 - 37 U/L    ALT (SGPT) 15 12 - 78 U/L    Alk.  phosphatase 82 45 - 117 U/L    Protein, total 10.1 (H) 6.4 - 8.2 g/dL    Albumin 2.3 (L) 3.5 - 5.0 g/dL    Globulin 7.8 (H) 2.0 - 4.0 g/dL    A-G Ratio 0.3 (L) 1.1 - 2.2     C REACTIVE PROTEIN, QT    Collection Time: 02/10/23  6:48 AM   Result Value Ref Range    C-Reactive protein 10.00 (H) 0.00 - 0.60 mg/dL       XR Results (most recent):  Results from Hospital Encounter encounter on 02/07/23    XR CHEST PORT    Narrative  EXAM:  XR CHEST PORT    INDICATION: Chest pain    COMPARISON: 11/2/2022    TECHNIQUE: portable chest AP view    FINDINGS: The cardiac silhouette is within normal limits. The pulmonary  vasculature is within normal limits. The lungs and pleural spaces are clear. The bones are osteopenic. Impression  No acute process on portable chest.         ANKLE BRACHIAL INDEX   Final Result      CT CHEST WO CONT   Final Result   1. Please note, cannot assess for pulmonary embolism on this noncontrast exam.   2.  Moderate pericardial effusion, nonspecific, but consider pericarditis. 3.  Emphysema, without acute airspace disease. XR CHEST PORT   Final Result      No acute process on portable chest.              Review of Systems:  Constitutional: Negative for chills and fever. HENT: Negative. Eyes: Negative. Respiratory: Negative. Cardiovascular: Negative. Gastrointestinal: Negative for abdominal pain and nausea. Skin: Negative. Neurological: Negative. Left leg pain swelling and redness    Objective:   VITALS:    Visit Vitals  BP 98/64 (BP 1 Location: Right upper arm, BP Patient Position: At rest)   Pulse 93   Temp 97.6 °F (36.4 °C)   Resp 18   Ht 5' 3\" (1.6 m)   Wt 113 lb 5.1 oz (51.4 kg)   SpO2 98%   Breastfeeding No   BMI 20.07 kg/m²       Physical Exam:   Constitutional: pt is oriented to person, place, and time. HENT:   Head: Normocephalic and atraumatic. Eyes: Pupils are equal, round, and reactive to light. EOM are normal.   Cardiovascular: Normal rate, regular rhythm and normal heart sounds. Pulmonary/Chest: Breath sounds normal. No wheezes. No rales. Exhibits no tenderness. Abdominal: Soft. Bowel sounds are normal. There is no abdominal tenderness. There is no rebound and no guarding. Musculoskeletal: Left leg pain swelling and redness. Neurological: pt is alert and oriented to person, place, and time. Alert. Normal strength. No cranial nerve deficit or sensory deficit. Displays a negative Romberg sign.         ASSESSMENT & PLAN:    Acute cellulitis of the left leg  Infectious consulted, awaiting blood and urine cultures  Continue meropenem  UTI complicated by ostomy bag  Urine positive, awaiting cultures   Continue meropenem   History of DVTs  Chronic DVT non- occlusive  Monitor   History of bladder cancer status postsurgery  History of colostomy bag  GERD  BARBARA normal   Pericardial effusion with possible pericarditis      Patient started on IV meropenem after the blood cultures ID consult    Continue other home medications    Will do 2D echo/and cardiology consult      Current Facility-Administered Medications:     meropenem (MERREM) 1 g in 0.9% sodium chloride 20 mL IV syringe, 1 g, IntraVENous, Q8H, Keanu Willis MD, 1 g at 02/09/23 2339    apixaban (ELIQUIS) tablet 5 mg, 5 mg, Oral, Q12H, Keanu Willis MD, 5 mg at 02/09/23 2143    ascorbic acid (vitamin C) (VITAMIN C) tablet 500 mg, 500 mg, Oral, DAILY, Keanu Willis MD, 500 mg at 02/09/23 1517    ferrous sulfate tablet 325 mg, 325 mg, Oral, DAILY, Keanu Willis MD, 325 mg at 02/09/23 1517    pantoprazole (PROTONIX) tablet 40 mg, 40 mg, Oral, DAILY, Keanu Willis MD, 40 mg at 02/09/23 1517    traMADoL (ULTRAM) tablet 50 mg, 50 mg, Oral, Q6H PRN, Keanu Willis MD, 50 mg at 02/09/23 2338    acetaminophen (TYLENOL) tablet 650 mg, 650 mg, Oral, Q6H PRN **OR** acetaminophen (TYLENOL) suppository 650 mg, 650 mg, Rectal, Q6H PRN, Keanu Willis MD    polyethylene glycol (MIRALAX) packet 17 g, 17 g, Oral, DAILY PRN, Keanu Willis MD, 17 g at 02/09/23 2338    ondansetron (ZOFRAN ODT) tablet 4 mg, 4 mg, Oral, Q8H PRN **OR** ondansetron (ZOFRAN) injection 4 mg, 4 mg, IntraVENous, Q6H PRN, Keanu Willis MD       ________________________________________________________________________    Signed: Darcella Sierra

## 2023-02-10 NOTE — PROGRESS NOTES
Progress Note    Patient: Marty Chang MRN: [id number]  SSN: [id number]    YOB: 1965  Age: 61 y.o. Sex: female      Admit Date: 2/7/2023    LOS: 3 days     Subjective:   Patient followed for cellulitis left leg/foot of unclear etiology and complicated UTI. Blood cultures are negative and urine culture negative. WBC normal and CRP and procal decreasing. Patient resting comfortably with no new complaints. Left foot pain and swelling improving. Informed patient that she is scheduled for PET scan at [facility] on Monday, but she have to be an outpatient to have it done. Objective:     Vitals:    02/09/23 0900 02/09/23 1454 02/09/23 1943 02/10/23 0826   BP: 93/66 99/66 98/64 106/68   Pulse: 100 96 93 (!) 105   Resp:  21 18 18   Temp: 98.3 °F (36.8 °C) 98.3 °F (36.8 °C) 97.6 °F (36.4 °C) 97.4 °F (36.3 °C)   SpO2: 99% 99% 98% 98%   Weight:   113 lb 5.1 oz (51.4 kg)    Height:            Intake and Output:  Current Shift: 02/10 0701 - 02/10 1900  In: -   Out: 400 [Urine:400]  Last three shifts: 02/08 1901 - 02/10 0700  In: -   Out: 950 [Urine:950]    Physical Exam:   Vitals and nursing note reviewed. Exam conducted with a chaperone present (). Constitutional:       General: She is not in acute distress. Appearance: She is ill-appearing. HENT: unremarkable  Eyes:      Pupils: Pupils are equal, round, and reactive to light. Cardiovascular:      Rate and Rhythm: Normal rate and regular rhythm. Heart sounds: No murmur heard. Pulmonary:      Effort: Pulmonary effort is normal.      Breath sounds: Normal breath sounds. Abdominal:      General: Bowel sounds are normal. There is no distension. Palpations: Abdomen is soft. Tenderness: There is no abdominal tenderness. Comments: Urostomy with cloudy urine  Colostomy with pink stoma   Genitourinary:     Comments: No Gibson  Musculoskeletal:      Right lower leg: No edema. Left lower leg: Edema present. Comments: Swelling in left calf, ankle and foot is moderate with mild erythema, no open wounds   Skin:     Findings: No erythema or rash. Neurological:      General: No focal deficit present. Mental Status: She is alert and oriented to person, place, and time. Psychiatric:    normal behavior    Lab/Data Review:     WBC 3,600     CRP 10.00 <15.70 <23.10  Procal 0.08 < 0.15 <0.24    Blood cultures (2/7) No growth 2 days  Urine culture (2/8) No growth FINAL    TTE (2/9)  small pericardial effusion  Assessment:     Active Problems:    Cellulitis (2/7/2023)      UTI (urinary tract infection) (2/7/2023)  Presumptive cellulitis, left leg/foot, etiology unclear, improving, Day #4 IV Meropenem  Complicated UTI (cystectomy, urostomy) with marked pyuria, bacteriuria and positive nitrite, urine culture negative, on IV Meropenem  Elevated CRP and procal, secondary to above, resolving  Pericardial effusion, significance unclear,  small, clinicaly insignificant  5. Chronic non-occlusive thrombi in left femoral/popliteal and gastrocnemius veins  6. Penicillin allergy  7. Hepatitis C antibody positive    Comment:  Showing clinical improvement with decreasing CRP and procal.  Urine culture may be falsely negative since it was collected while on Meropenem.     Plan:   Continue IV Meropenem; would discharge on Levaquin 750 mg daily for 10 days  Follow-up  blood cultures      Signed By: Marlo Giron MD     February 10, 2023

## 2023-02-10 NOTE — PROGRESS NOTES
Progress Note    Patient: Marty Chang MRN: [id number]  SSN: [id number]    YOB: 1965  Age: 61 y.o. Sex: female      Admit Date: 2/7/2023    LOS: 3 days     Subjective:       No acute events overnight  Objective:     Vitals:    02/08/23 2039 02/09/23 0900 02/09/23 1454 02/09/23 1943   BP: 94/64 93/66 99/66 98/64   Pulse: 97 100 96 93   Resp: 16  21 18   Temp: 98.6 °F (37 °C) 98.3 °F (36.8 °C) 98.3 °F (36.8 °C) 97.6 °F (36.4 °C)   SpO2: 95% 99% 99% 98%   Weight:    51.4 kg (113 lb 5.1 oz)   Height:            Intake and Output:  Current Shift: 02/10 0701 - 02/10 1900  In: -   Out: 400 [Urine:400]  Last three shifts: 02/08 1901 - 02/10 0700  In: -   Out: 950 [Urine:950]    Physical Exam:   General:  Alert, cooperative, no distress, appears stated age. Eyes:  Conjunctivae/corneas clear. PERRL, EOMs intact. Fundi benign   Ears:  Normal TMs and external ear canals both ears. Nose: Nares normal. Septum midline. Mucosa normal. No drainage or sinus tenderness. Mouth/Throat: Lips, mucosa, and tongue normal. Teeth and gums normal.   Neck: Supple, symmetrical, trachea midline, no adenopathy, thyroid: no enlargment/tenderness/nodules, no carotid bruit and no JVD. Back:   Symmetric, no curvature. ROM normal. No CVA tenderness. Lungs:   Clear to auscultation bilaterally. Heart:  Regular rate and rhythm, S1, S2 normal, no murmur, click, rub or gallop. Abdomen:   Soft, non-tender. Bowel sounds normal. No masses,  No organomegaly. Extremities: Extremities normal, atraumatic, no cyanosis or edema. Pulses: 2+ and symmetric all extremities. Skin: Skin color, texture, turgor normal. No rashes or lesions   Lymph nodes: Cervical, supraclavicular, and axillary nodes normal.   Neurologic: CNII-XII intact. Normal strength, sensation and reflexes throughout. Lab/Data Review: All lab results for the last 24 hours reviewed.          Assessment:     Active Problems:    Cellulitis (2/7/2023)      UTI (urinary tract infection) (2/7/2023)    Patient is a 58-year-old -American female with:  1. Pericardial effusion  2. Hypertensive heart disease  3. Left lower extremity cellulitis  4. Complicated UTI  5. 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 Bladder cancer status post cystectomy, hysterectomy and BSO and ileal conduit diversion  7. Hepatitis C  8. Left leg DVT status post IVC filter  9. Anemia    Plan:     Remains in sinus rhythm. Blood pressure is running on the lower side. No significant pericardial effusion noted. No tamponade.   Continue medical management as per primary team.  I will sign off and remain available if needed  Signed By: Nikki Vasquez MD     February 10, 2023

## 2023-02-10 NOTE — PROGRESS NOTES
OCCUPATIONAL THERAPY EVALUATION  Patient: Marty Chang (33 y.o. female)  Date: 2/10/2023  Primary Diagnosis: Cellulitis [L03.90]  UTI (urinary tract infection) [N39.0]       Precautions: falls     In place during session: Peripheral IV    ASSESSMENT  Pt is a 61 y.o. female presenting to [facility] with c/o LLE pain, admitted 2/7/23 and currently being treated for UTI, cellulitis. Pt received semi-supine in bed upon arrival, AXO x4, and agreeable to OT evaluation. Based on current observations, pt presents with deficits in generalized strength/AROM, bed mobility, static/dynamic sitting balance, static/dynamic standing balance (see PT note for gait details), functional activity tolerance, cognition/confusion,  and pain currently impacting overall performance of ADLs and functional transfers/mobility (see below for objective details and assist levels). Pt donned R sock with SUP, required min A for L sock due to pain from cellulitis. Pt CGA-SUP for supine:sit, min A using RW for chair transfer. In chair, completed brushing teeth with SUP. Pt reclined, all needs within reach. Overall, pt tolerates session fair with vcs and rest breaks. Pt would benefit from continued skilled OT services to address current impairments and improve IND and safety with self cares and functional transfers/mobility. Current OT d/c recommendation Delgado Keenan once medically appropriate. Other factors to consider for discharge: family/social support, DME, time since onset, severity of deficits, functional baseline     Patient will benefit from skilled therapy intervention to address the above noted impairments. PLAN :  Recommendations and Planned Interventions: self care training, functional mobility training, therapeutic exercise, and patient education    Recommend with staff: Out of bed to chair for meals    Recommend next session:  Toileting, UB dressing, LB dressing , Seated grooming, and Standing grooming    Frequency/Duration: Patient will be followed by occupational therapy:  3-5x/week to address goals. Recommendation for discharge: (in order for the patient to meet his/her long term goals)  Delgado Herrera discharge recommendation:  Has been made in collaboration with the attending provider and/or case management    IF patient discharges home will need the following DME: patient owns DME required for discharge       SUBJECTIVE:   Patient stated I want to sit up.     OBJECTIVE DATA SUMMARY:   HISTORY:   Past Medical History:   Diagnosis Date    Anemia     pt states had recent blood transfusion 2022    Back pain     Cancer ([address]     bladder    DVT (deep venous thrombosis) ([address]     and PE, pt denies lung PE , only leg several years ago    Hepatitis C     pt states dx 1 month ago    Liver disease     Presence of IVC filter     pt states several years ago    Rheumatoid arthritis ([address]     Sciatic leg pain     pt states both legs    Uterine fibroid      Past Surgical History:   Procedure Laterality Date    HX APPENDECTOMY  10/03/2022    HX  SECTION      HX GI  2022    laparoscopic early postop adhesion of the sigmoid colon    HX GI  2022    laparoscopic lysis of adhesion    HX GI  10/31/2022    rigid sigmoidoscope examination of the rectum    HX GI  10/31/2022    distal rectal repair with primary suturing technique    HX GYN  10/03/2022    ROBOT ANTERIOR PELVIC EXENTERATION    HX HYSTERECTOMY  10/03/2022    HX SALPINGO-OOPHORECTOMY Bilateral 10/03/2022    HX UROLOGICAL  09/15/2022    CYSTOSCOPY    HX UROLOGICAL  09/15/2022    URETHRAL DILATION    HX UROLOGICAL  09/15/2022    URETERAL STENT PLACEMENT    HX UROLOGICAL  09/15/2022    TRANSURETHRAL RESECTION OF BLADDER TUMOR >2CM    HX UROLOGICAL Bilateral 09/15/2022    RETROGRADE PYELOGRAM    HX UROLOGICAL  10/03/2022    PELVIC LYMPH NODE DISSECTION    HX UROLOGICAL  10/03/2022    ILEAL CONDUIT URINARY DIVERSION IR THROMBECTOMY [facility] VEIN W PHARM  10/27/2022    UT UNLISTED PROCEDURE CARDIAC SURGERY      stent placement       Per patient:   Home Situation  Home Environment: Apartment  # Steps to Enter: 18  One/Two Story Residence: (P) Other (Comment)  Living Alone: Yes  Support Systems: Other Family Member(s) (niece)  Patient Expects to be Discharged to[de-identified] Skilled nursing facility  Current DME Used/Available at Home: (P) Wero Edelson, rollator, Shower chair, Commode, bedside, Cane, straight    Hand dominance: Right    EXAMINATION OF PERFORMANCE DEFICITS:  Cognitive/Behavioral Status:  Neurologic State: Alert  Orientation Level: Oriented X4  Cognition: Appropriate decision making                 Hearing: Auditory  Auditory Impairment: None    Vision/Perceptual:                                     Range of Motion:  AROM: Generally decreased, functional                         Strength:  Strength: Generally decreased, functional                Coordination:     Fine Motor Skills-Upper: Left Intact; Right Intact    Gross Motor Skills-Upper: Left Intact; Right Intact    Tone & Sensation:  Tone: Normal                         Balance:  Sitting: Intact  Standing: Impaired; With support  Standing - Static: Fair  Standing - Dynamic : Fair    Functional Mobility and Transfers for ADLs:  Bed Mobility:  Rolling: Stand-by assistance  Supine to Sit: Stand-by assistance  Scooting: Stand-by assistance    Transfers:  Sit to Stand: Minimum assistance  Stand to Sit: Contact guard assistance;Minimum assistance  Bed to Chair: Minimum assistance      ADL Intervention and task modifications:       Grooming  Position Performed: Seated in chair  Washing Face: Supervision  Washing Hands: Supervision  Brushing Teeth: Supervision                   Lower Body Dressing Assistance  Socks: Minimum assistance              Therapeutic Exercise:  Pt will benefit from BUE HEP to improve participation in ADLs and mobility. Plan will be initiated at next session. Functional Measure:    [facility] AM-PACTM \"6 Clicks\"                                                       Daily Activity Inpatient Short Form  How much help from another person does the patient currently need. .. Total; A Lot A Little None   1. Putting on and taking off regular lower body clothing? []  1 []  2 [x]  3 []  4   2. Bathing (including washing, rinsing, drying)? []  1 []  2 [x]  3 []  4   3. Toileting, which includes using toilet, bedpan or urinal? [] 1 []  2 [x]  3 []  4   4. Putting on and taking off regular upper body clothing? []  1 []  2 [x]  3 []  4   5. Taking care of personal grooming such as brushing teeth? []  1 []  2 [x]  3 []  4   6. Eating meals? []  1 []  2 []  3 [x]  4   © , Trustees of [facility], under license to [employer]. All rights reserved     Score:      Interpretation of Tool:  Represents clinically-significant functional categories (i.e. Activities of daily living). Percentage of Impairment CH    0%   CI    1-19% CJ    20-39% CK    40-59% CL    60-79% CM    80-99% CN     100%   [facility]  Score 6-24 24 23 20-22 15-19 10-14 7-9 6     Occupational Therapy Evaluation Charge Determination   History Examination Decision-Making   LOW Complexity : Brief history review  LOW Complexity : 1-3 performance deficits relating to physical, cognitive , or psychosocial skils that result in activity limitations and / or participation restrictions  MEDIUM Complexity : Patient may present with comorbidities that affect occupational performnce.  Miniml to moderate modification of tasks or assistance (eg, physical or verbal ) with assesment(s) is necessary to enable patient to complete evaluation       Based on the above components, the patient evaluation is determined to be of the following complexity level: LOW   Pain Ratin/10    Activity Tolerance:   Fair    After treatment patient left in no apparent distress:    Sitting in chair and Call bell within reach, bed locked and in lowest position    COMMUNICATION/EDUCATION:   The patients plan of care was discussed with: Certified nursing assistant/patient care technician. Patient/family have participated as able in goal setting and plan of care. and Patient/family agree to work toward stated goals and plan of care. This patients plan of care is appropriate for delegation to [place]. PT/OT sessions occurred together for increased safety of pt and clinician. Thank you for this referral.  Malena Richmond, OT  Time Calculation: 24 mins   Problem: Self Care Deficits Care Plan (Adult)  Goal: *Acute Goals and Plan of Care (Insert Text)  Description: FUNCTIONAL STATUS PRIOR TO ADMISSION: Per pt, she was mostly Mod I with rollator. Niece help her 8-2 pm with IADLS. HOME SUPPORT: Niece assists pt.      Occupational Therapy Goals  Initiated 2/10/2023    Pt stated goal: To get stronger  Pt will be Mod I sup <> sit in prep for EOB ADLs  Pt will be Mod I grooming standing sink side LRAD  Pt will be Mod I UB dressing sitting EOB/long sit   Pt will be Mod I LE dressing sitting EOB/long sit  Pt will be Mod I sit <>  prep for toileting LRAD  Pt will be Mod I toileting/toilet transfer/cloth mgmt LRAD    Outcome: Not Met

## 2023-02-10 NOTE — PROGRESS NOTES
Progress Note    Patient: Elaine Araujo MRN: [id number]  SSN: [id number]    YOB: 1965  Age: 61 y.o. Sex: female      Admit Date: 2/7/2023    LOS: 3 days     Subjective:   Patient followed for cellulitis left leg/foot of unclear etiology and complicated UTI. No urine culture yet. Blood cultures are pending. WBC normal but CRP and procal.   Patient resting comfortably with no new complaints. Left foot pain and swelling improve     Objective:     Vitals:    02/08/23 2039 02/09/23 0900 02/09/23 1454 02/09/23 1943   BP: 94/64 93/66 99/66 98/64   Pulse: 97 100 96 93   Resp: 16  21 18   Temp: 98.6 °F (37 °C) 98.3 °F (36.8 °C) 98.3 °F (36.8 °C) 97.6 °F (36.4 °C)   SpO2: 95% 99% 99% 98%   Weight:    113 lb 5.1 oz (51.4 kg)   Height:            Intake and Output:  Current Shift: 02/09 1901 - 02/10 0700  In: -   Out: 100 [Urine:100]  Last three shifts: 02/08 0701 - 02/09 1900  In: -   Out: 850 [Urine:850]    Physical Exam:   Vitals and nursing note reviewed. Exam conducted with a chaperone present (). Constitutional:       General: She is not in acute distress. Appearance: She is ill-appearing. HENT: unremarkable  Eyes:      Pupils: Pupils are equal, round, and reactive to light. Cardiovascular:      Rate and Rhythm: Normal rate and regular rhythm. Heart sounds: No murmur heard. Pulmonary:      Effort: Pulmonary effort is normal.      Breath sounds: Normal breath sounds. Abdominal:      General: Bowel sounds are normal. There is no distension. Palpations: Abdomen is soft. Tenderness: There is no abdominal tenderness. Comments: Urostomy with cloudy urine  Colostomy with pink stoma   Genitourinary:     Comments: No Gibson  Musculoskeletal:      Right lower leg: No edema. Left lower leg: Edema present. Comments: Swelling in left calf, ankle and foot is moderate with mild erythema, no open wounds   Skin:     Findings: No erythema or rash. Neurological:      General: No focal deficit present. Mental Status: She is alert and oriented to person, place, and time. Psychiatric:    normal behavior    Lab/Data Review:     WBC 5,300     CRP 15.70 <23.10  Procal 0.15 <0.24    Blood cultures (2/7) in process  Urine culture (2/8) in process    TTE (2/9)  small pericardial effusion  Assessment:     Active Problems:    Cellulitis (2/7/2023)      UTI (urinary tract infection) (2/7/2023)  Presumptive cellulitis, left leg/foot, etiology unclear, improving  Complicated UTI (cystectomy, urostomy) with marked pyuria, bacteriuria and positive nitrite, urine culture ordered  Pericardial effusion, significance unclear,  small, clinicaly insignificant  4. Chronic non-occlusive thrombi in left femoral/popliteal and gastrocnemius veins  5. Penicillin allergy     Comment:  Showing clinical improvement with decreasing CRP and procal. C     Plan:   Continue IV Meropenem  Follow-up  blood and urine culture  In am, repeat CRP and procal  As a courtesy, will check with her Oncologist, Dr. Maris Sosa about having PET scan done here at [facility].     Signed By: Dahiana Leslie MD     February 10, 2023

## 2023-02-10 NOTE — PROGRESS NOTES
CM discussed discharge planning with patient at bedside. Therapy rec: SNF stay. Patient agreed. CM also spoke with liz Moya [phone] reviewed Choice letter with both. Choice: [address] [place] [address]. Referral sent. CM informed both that patient will haveto use medicaid benefits. She will have to stay for 30 days. Both agreed.

## 2023-02-10 NOTE — DISCHARGE SUMMARY
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    Anemia in neoplastic disease 10/20/2022    Small bowel obstruction ([address] 10/20/2022    Status post robot-assisted surgical procedure, pelvic exenteration 10/20/2022    Severe protein-calorie malnutrition ([address] 10/07/2022    [facility] 09/13/2022    Hypercalcemia 09/13/2022    Hydronephrosis of right kidney 09/13/2022    History of blood clots 07/02/2021       DISCHARGE DIAGNOSES / PLAN:      Acute cellulitis of the left leg  UTI complicated by ostomy bag  History of DVTs  History of bladder cancer status postsurgery  History of colostomy bag  GERD  BARBARA normal   Pericardial effusion with possible pericarditis        DISCHARGE MEDICATIONS:  Current Discharge Medication List        START taking these medications    Details   doxycycline (MONODOX) 100 mg capsule Take 1 Capsule by mouth two (2) times a day. Qty: 20 Capsule, Refills: 0  Start date: 2/10/2023           CONTINUE these medications which have NOT CHANGED    Details   multivitamin (ONE A DAY) tablet Take 1 Tablet by mouth daily. Qty: 90 Tablet, Refills: 1      pantoprazole (PROTONIX) 40 mg tablet Take 1 Tablet by mouth daily.   Qty: 90 Tablet, Refills: 1    Associated Diagnoses: Gastroesophageal reflux disease, unspecified whether esophagitis present      ascorbic acid, vitamin C, (VITAMIN C) 500 mg tablet Take 1 Tablet by mouth daily.  Qty: 90 Tablet, Refills: 0      apixaban (ELIQUIS) 5 mg tablet Take 1 Tablet by mouth every twelve (12) hours for 90 days. Indications: blood clot in a deep vein of the extremities  Qty: 180 Tablet, Refills: 0      traMADoL (ULTRAM) 50 mg tablet Take  by mouth. STOP taking these medications       doxycycline (VIBRA-TABS) 100 mg tablet Comments:   Reason for Stopping:         acetaminophen (TYLENOL) 650 mg TbER Comments:   Reason for Stopping:         docusate sodium (COLACE) 100 mg capsule Comments:   Reason for Stopping:         ferrous sulfate 325 mg (65 mg iron) tablet Comments:   Reason for Stopping:         ondansetron (ZOFRAN ODT) 4 mg disintegrating tablet Comments:   Reason for Stopping:         psyllium seed-sucrose (Metamucil, sugar,) powd Comments:   Reason for Stopping:                 NOTIFY YOUR PHYSICIAN FOR ANY OF THE FOLLOWING:   Fever over 101 degrees for 24 hours. Chest pain, shortness of breath, fever, chills, nausea, vomiting, diarrhea, change in mentation, falling, weakness, bleeding. Severe pain or pain not relieved by medications. Or, any other signs or symptoms that you may have questions about. DISPOSITION:  x  Home With:   OT  PT  HH  RN       Long term SNF/Inpatient Rehab    Independent/assisted living    Hospice    Other:       PATIENT CONDITION AT DISCHARGE: Stable      PHYSICAL EXAMINATION AT DISCHARGE:  General:          Alert, cooperative, no distress, appears stated age. HEENT:           Atraumatic, anicteric sclerae, pink conjunctivae                          No oral ulcers, mucosa moist, throat clear, dentition fair  Neck:               Supple, symmetrical  Lungs:             Clear to auscultation bilaterally. No Wheezing or Rhonchi. No rales. Chest wall:      No tenderness  No Accessory muscle use. Heart:              Regular  rhythm,  No  murmur   No edema  Abdomen:        Soft, non-tender. Not distended.   Bowel sounds normal  Extremities:     No cyanosis. No clubbing,                            Skin turgor normal, Capillary refill normal  Skin:                Not pale. Not Jaundiced  No rashes   Psych:             Not anxious or agitated. Neurologic:      Alert, moves all extremities, answers questions appropriately and responds to commands     ANKLE BRACHIAL INDEX   Final Result      CT CHEST WO CONT   Final Result   1. Please note, cannot assess for pulmonary embolism on this noncontrast exam.   2.  Moderate pericardial effusion, nonspecific, but consider pericarditis. 3.  Emphysema, without acute airspace disease.       XR CHEST PORT   Final Result      No acute process on portable chest.              Recent Results (from the past 24 hour(s))   ECHO ADULT COMPLETE    Collection Time: 02/09/23  2:10 PM   Result Value Ref Range    LV EDV A2C 46 mL    LV EDV A4C 49 mL    LV ESV A2C 17 mL    LV ESV A4C 20 mL    IVSd 0.7 0.6 - 0.9 cm    LVIDd 4.0 3.9 - 5.3 cm    LVIDs 2.7 cm    LVOT Diameter 2.0 cm    LVOT Peak Velocity 0.9 m/s    LVOT Peak Gradient 3 mmHg    LVPWd 0.8 0.6 - 0.9 cm    LV E' Lateral Velocity 9 cm/s    LV E' Septal Velocity 9 cm/s    LV Ejection Fraction A2C 63 %    LV Ejection Fraction A4C 58 %    EF BP 61 55 - 100 %    LVOT Area 3.1 cm2    LA Minor Axis 4.5 cm    LA Major [place] 4.1 cm    LA Area 2C 13.6 cm2    LA Area 4C 13.1 cm2    LA Volume BP 33 22 - 52 mL    LA Diameter 2.8 cm    RA Area 4C 10.4 cm2    RA Volume 22 ml    AV Peak Velocity 1.1 m/s    AV Peak Gradient 5 mmHg    AV Area by Peak Velocity 2.6 cm2    Aortic Root 2.7 cm    Ascending Aorta 2.7 cm    IVC Expiration 1.4 cm    MV E Wave Deceleration Time 221.0 ms    MV A Velocity 0.56 m/s    MV E Velocity 0.55 m/s    PV Max Velocity 0.7 m/s    PV Peak Gradient 2 mmHg    RV Basal Dimension 2.7 cm    TAPSE 1.9 1.7 cm    TR Max Velocity 2.24 m/s    TR Peak Gradient 20 mmHg    Fractional Shortening 2D 33 28 - 44 %    LV ESV Index A4C 13 mL/m2    LV EDV Index A4C 32 mL/m2    LV ESV Index A2C 11 mL/m2    LV EDV Index A2C 30 mL/m2    LVIDd Index 2.63 cm/m2    LVIDs Index 1.78 cm/m2    LV RWT Ratio 0.40     LV Mass 2D 85.8 67 - 162 g    LV Mass 2D Index 56.4 43 - 95 g/m2    MV E/A 0.98     E/E' Ratio (Averaged) 6.11     E/E' Lateral 6.11     E/E' Septal 6.11     LA Volume Index BP 22 16 - 34 ml/m2    LA Size Index 1.84 cm/m2    LA/AO Root Ratio 1.04     RA Volume Index A4C 14 mL/m2    Ao Root Index 1.78 cm/m2    Ascending Aorta Index 1.78 cm/m2    AV Velocity Ratio 0.82     RENATA/BSA Peak Velocity 1.7 cm2/m2   CBC W/O DIFF    Collection Time: 02/10/23  6:48 AM   Result Value Ref Range    WBC 3.6 3.6 - 11.0 K/uL    RBC 3.33 (L) 3.80 - 5.20 M/uL    HGB 10.0 (L) 11.5 - 16.0 g/dL    HCT 30.9 (L) 35.0 - 47.0 %    MCV 92.8 80.0 - 99.0 FL    MCH 30.0 26.0 - 34.0 PG    MCHC 32.4 30.0 - 36.5 g/dL    RDW 11.9 11.5 - 14.5 %    PLATELET 832 155 - 004 K/uL    MPV 9.3 8.9 - 12.9 FL    NRBC 0.0 0.0  WBC    ABSOLUTE NRBC 0.00 0.00 - 7.07 K/uL   METABOLIC PANEL, COMPREHENSIVE    Collection Time: 02/10/23  6:48 AM   Result Value Ref Range    Sodium 133 (L) 136 - 145 mmol/L    Potassium 3.9 3.5 - 5.1 mmol/L    Chloride 107 97 - 108 mmol/L    CO2 25 21 - 32 mmol/L    Anion gap 1 (L) 5 - 15 mmol/L    Glucose 95 65 - 100 mg/dL    BUN 19 6 - 20 mg/dL    Creatinine 0.63 0.55 - 1.02 mg/dL    BUN/Creatinine ratio 30 (H) 12 - 20      eGFR >60 >60 ml/min/1.73m2    Calcium 10.0 8.5 - 10.1 mg/dL    Bilirubin, total 0.6 0.2 - 1.0 mg/dL    AST (SGOT) 21 15 - 37 U/L    ALT (SGPT) 15 12 - 78 U/L    Alk.  phosphatase 82 45 - 117 U/L    Protein, total 10.1 (H) 6.4 - 8.2 g/dL    Albumin 2.3 (L) 3.5 - 5.0 g/dL    Globulin 7.8 (H) 2.0 - 4.0 g/dL    A-G Ratio 0.3 (L) 1.1 - 2.2     PROCALCITONIN    Collection Time: 02/10/23  6:48 AM   Result Value Ref Range    Procalcitonin 0.08 (H) 0 ng/mL   C REACTIVE PROTEIN, QT    Collection Time: 02/10/23  6:48 AM   Result Value Ref Range    C-Reactive protein 10.00 (H) 0.00 - 0.60 mg/dL HOSPITAL COURSE:    Patient is a 61y.o. year old female with signal past medical history of multiple DVT history of bladder cancer status post urostomy bag status postsurgery history of colostomy bag came to the ER complaining of left leg pain redness swelling patient was treated as outpatient with doxycycline with no much improvement she came to emergency room seen by the ER physician and patient was recommended to be admitted for acute cellulitis of the left leg     2/8  pain is constant. Infection has not spread since onset about 1 month ago. Pain has worsened over the past month, now with paresthesia radiating up the leg. Patient states she cannot stand on affected foot which prompted her to present to ER. Chronic DVT found on duplex last week, was given doxy with no improvement. Urinalysis shows bacteriuria, pyuria, positive nitratess. Awaiting urine and blood cultures. 2/9 pain improved since yesterday, pain now isolated to infected region, no longer radiating up her leg. Urine and blood not resulted. On meropenem IV. Duplex not resulted, pt was told verbally that u/s was normal. Asx for UTI, no CVA tenderness. Would like to have help with ambulation today. 2/10 Still awaiting cultures. Pt feeling better. No longer having numbness and tingling.  Saw PT yesterday for evaluation    Patient was seen by the cardiology regarding pericardial effusion no further work-up recommended at this time patient discharged to skilled care rehab on p.o. doxycycline medication reconciliation done    Patient need to follow-up with the oncology as an outpatient    Medication reconciliation done time chart patient 35 minutes 50% time spent counseling and coordination of care      Signed:   Adenike Berrios MD  2/10/2023  11:29 AM

## 2023-02-10 NOTE — PROGRESS NOTES
Bedside and Verbal shift change report given to Amanda Figueredo RN (oncoming nurse) by Romeo Tabares LPN (offgoing nurse). Report included the following information SBAR, Kardex, Intake/Output, MAR, Accordion, Recent Results, and Med Rec Status.

## 2023-02-10 NOTE — DISCHARGE INSTRUCTIONS
Discharge Instructions       PATIENT ID: Abdirahman Pritchett  MRN: [id number]   YOB: 1965    DATE OF ADMISSION: [unfilled]    DATE OF DISCHARGE: 2/10/2023    PRIMARY CARE PROVIDER: @PCP@     ATTENDING PHYSICIAN: [unfilled]  DISCHARGING PROVIDER: Farhad Machado MD    To contact this individual call [phone] and ask the  to page. If unavailable ask to be transferred the Adult Hospitalist Department. DISCHARGE DIAGNOSES cellulitis of the leg    CONSULTATIONS: @REE@    PROCEDURES/SURGERIES: * No surgery found *    PENDING TEST RESULTS:   At the time of discharge the following test results are still pending: None    FOLLOW UP APPOINTMENTS:   [email or web address]     ADDITIONAL CARE RECOMMENDATIONS: PT OT    DIET: Resume previous diet      ACTIVITY: Activity as tolerated    Wound care: Wound Care Order: submitted to [facility] Please view [email or web address]    EQUIPMENT needed: ***      DISCHARGE MEDICATIONS:   See Medication Reconciliation Form    It is important that you take the medication exactly as they are prescribed. Keep your medication in the bottles provided by the pharmacist and keep a list of the medication names, dosages, and times to be taken in your wallet. Do not take other medications without consulting your doctor. NOTIFY YOUR PHYSICIAN FOR ANY OF THE FOLLOWING:   Fever over 101 degrees for 24 hours. Chest pain, shortness of breath, fever, chills, nausea, vomiting, diarrhea, change in mentation, falling, weakness, bleeding. Severe pain or pain not relieved by medications. Or, any other signs or symptoms that you may have questions about.       DISPOSITION:    Home With:   OT  PT  HH  RN       SNF/Inpatient Rehab/LTAC    Independent/assisted living    Hospice    Other:         PROBLEM LIST Updated:  Yes ***       Signed:   Farhad Machado MD  2/10/2023  11:29 AM

## 2023-02-11 PROCEDURE — 65270000029 HC RM PRIVATE

## 2023-02-11 PROCEDURE — 74011000250 HC RX REV CODE- 250: Performed by: FAMILY MEDICINE

## 2023-02-11 PROCEDURE — 74011250637 HC RX REV CODE- 250/637: Performed by: FAMILY MEDICINE

## 2023-02-11 PROCEDURE — 74011250636 HC RX REV CODE- 250/636: Performed by: FAMILY MEDICINE

## 2023-02-11 RX ADMIN — TRAMADOL HYDROCHLORIDE 50 MG: 50 TABLET, COATED ORAL at 21:41

## 2023-02-11 RX ADMIN — PANTOPRAZOLE SODIUM 40 MG: 40 TABLET, DELAYED RELEASE ORAL at 09:06

## 2023-02-11 RX ADMIN — TRAMADOL HYDROCHLORIDE 50 MG: 50 TABLET, COATED ORAL at 04:57

## 2023-02-11 RX ADMIN — FERROUS SULFATE TAB 325 MG (65 MG ELEMENTAL FE) 325 MG: 325 (65 FE) TAB at 09:07

## 2023-02-11 RX ADMIN — SODIUM CHLORIDE, PRESERVATIVE FREE 1 G: 5 INJECTION INTRAVENOUS at 09:06

## 2023-02-11 RX ADMIN — APIXABAN 5 MG: 5 TABLET, FILM COATED ORAL at 21:21

## 2023-02-11 RX ADMIN — SODIUM CHLORIDE, PRESERVATIVE FREE 1 G: 5 INJECTION INTRAVENOUS at 21:21

## 2023-02-11 RX ADMIN — APIXABAN 5 MG: 5 TABLET, FILM COATED ORAL at 09:06

## 2023-02-11 RX ADMIN — OXYCODONE HYDROCHLORIDE AND ACETAMINOPHEN 500 MG: 500 TABLET ORAL at 09:06

## 2023-02-11 RX ADMIN — SODIUM CHLORIDE, PRESERVATIVE FREE 1 G: 5 INJECTION INTRAVENOUS at 15:02

## 2023-02-11 NOTE — PROGRESS NOTES
Comprehensive Nutrition Assessment    Type and Reason for Visit: Initial (low BMI)    Nutrition Recommendations/Plan:   Continue current PO diet, provide extra condiments  Provides frequent snacks  Encourage PO intakes, document % in I/O     Malnutrition Assessment:  Malnutrition Status:  Severe malnutrition (02/11/23 1258)    Context:  Chronic illness     Findings of the 6 clinical characteristics of malnutrition:   Energy Intake:  75% or less est energy requirements for 1 month or longer (po <50% per pt)  Weight Loss:  Greater than 7.5% over 3 months (Wt loss of 6.5kg x3 months (12%))     Body Fat Loss:  No significant body fat loss,     Muscle Mass Loss:  No significant muscle mass loss,    Fluid Accumulation:  No significant fluid accumulation,        Nutrition Assessment:    Admitted for LLE cellulitis. +UTI. RD assessment for low BMI, updated bed wt finding BMI <18.5. Good appetite and no intakes documented in EMR, per pt consuming >50% of meals. Endorsed dislike of Ensure, open to small frequent meals and additional snacks. Educated on ability to ask Rns for snacks and asking for additional condiments that are not salt. Labs: H/H 10.0/30.9, Na 133, Alb 2.3. Meds: Eliquis, Vit C, feSu, meropenem, PP, PRN miralax, tramadol. Nutrition Related Findings:    NFPE with no acute findings, pt lean but with good muscle tone. No hx dysphagia. No N/V, +colostomy and urostomy. Last BM 2/10. Non-pitting LLE edema r/t cellulitis. Wound Type:  (LLE cellulitis)      Current Nutrition Intake & Therapies:  Average Meal Intake: 26-50%  Average Supplement Intake: None ordered  ADULT DIET Regular; Low Fat/Low Chol/High Fiber/2 gm Na    Anthropometric Measures:  Height: 5' 2.99\" (160 cm)  Ideal Body Weight (IBW): 115 lbs (52 kg)     Current Body Wt:  49 kg (108 lb 0.4 oz) (rd OBTAIEND), 93.9 % IBW.  Bed scale  Current BMI (kg/m2): 19.1  Usual Body Weight: 54.4 kg (120 lb) (1 YEAR AGO)  % Weight Change (Calculated): -10  Weight Adjustment: No adjustment                 BMI Category: Normal weight (BMI 18.5-24. 9)  Wt Readings from Last 15 Encounters:   02/10/23 47.3 kg (104 lb 4.4 oz)   02/01/23 52.2 kg (115 lb)   01/26/23 50.3 kg (110 lb 12.8 oz)   01/05/23 49.7 kg (109 lb 8 oz)   12/09/22 48.4 kg (106 lb 9.6 oz)   12/07/22 47.6 kg (105 lb)   11/28/22 47.9 kg (105 lb 8 oz)   11/11/22 53.8 kg (118 lb 9.7 oz)   10/13/22 51.3 kg (113 lb)   10/09/22 59.6 kg (131 lb 6.3 oz)   09/30/22 (P) 52 kg (114 lb 10.2 oz)   09/27/22 51.3 kg (113 lb)   09/16/22 53.7 kg (118 lb 6.2 oz)   09/11/22 52.2 kg (115 lb)   12/13/21 56.8 kg (125 lb 3.2 oz)   Wt loss of 6.5kg x3 months (12%, severe)    Estimated Daily Nutrient Needs:  Energy Requirements Based On: Kcal/kg  Weight Used for Energy Requirements: Current  Energy (kcal/day): 1470-1715kcals (30-35kcals/kg, CA)  Weight Used for Protein Requirements: Current  Protein (g/day): 59--74g (1.2-1.5g/kg)  Method Used for Fluid Requirements: 1 ml/kcal  Fluid (ml/day): 1470-1715ml    Nutrition Diagnosis:   Severe malnutrition, In context of chronic illness related to catabolic illness (cancer) as evidenced by Criteria as identified in malnutrition assessment    Nutrition Interventions:   Food and/or Nutrient Delivery: Continue current diet, Snacks (specify)  Nutrition Education/Counseling: Education initiated (small, frequent meals/snacks)  Coordination of Nutrition Care: Continue to monitor while inpatient  Plan of Care discussed with: pt    Goals:     Goals: PO intake 75% or greater, by next RD assessment, other (specify)  Specify Other Goals: wt maintenance vs gain of 0.5kg in  7days    Nutrition Monitoring and Evaluation:   Behavioral-Environmental Outcomes: None identified  Food/Nutrient Intake Outcomes: Diet advancement/tolerance, Food and nutrient intake  Physical Signs/Symptoms Outcomes: Meal time behavior, Weight, Biochemical data    Discharge Planning:    Continue current diet    Nicolle Nate  Contact: Ext [phone], or via [employer]

## 2023-02-11 NOTE — PROGRESS NOTES
Progress Note    Patient: Sheridan Loomis MRN: [id number]  SSN: [id number]    YOB: 1965  Age: 61 y.o. Sex: female      Admit Date: 2/7/2023    LOS: 4 days     Subjective:     57 years with cellulitis and UTI    Objective:     Vitals:    02/10/23 1457 02/10/23 2036 02/11/23 0904 02/11/23 1257   BP: 104/73 101/73 104/65    Pulse: (!) 108 96 68    Resp: 18 18 18    Temp: 97.7 °F (36.5 °C) 97.5 °F (36.4 °C) 97.6 °F (36.4 °C)    SpO2: 100% 96% 100%    Weight:  47.3 kg (104 lb 4.4 oz)     Height:    5' 2.99\" (1.6 m)        Intake and Output:  Current Shift: No intake/output data recorded. Last three shifts: 02/09 1901 - 02/11 0700  In: -   Out: 1000 [Urine:1000]    Physical Exam:   General:  Alert, cooperative, no distress, appears stated age. Eyes:  Conjunctivae/corneas clear. PERRL, EOMs intact. Fundi benign   Ears:  Normal TMs and external ear canals both ears. Nose: Nares normal. Septum midline. Mucosa normal. No drainage or sinus tenderness. Mouth/Throat: Lips, mucosa, and tongue normal. Teeth and gums normal.   Neck: Supple, symmetrical, trachea midline, no adenopathy, thyroid: no enlargment/tenderness/nodules, no carotid bruit and no JVD. Back:   Symmetric, no curvature. ROM normal. No CVA tenderness. Lungs:   Clear to auscultation bilaterally. Heart:  Regular rate and rhythm, S1, S2 normal, no murmur, click, rub or gallop. Abdomen:   Soft, non-tender. Bowel sounds normal. No masses,  No organomegaly. Extremities: Extremities normal, atraumatic, no cyanosis or edema. Pulses: 2+ and symmetric all extremities. Skin: Skin color, texture, turgor normal. No rashes or lesions   Lymph nodes: Cervical, supraclavicular, and axillary nodes normal.   Neurologic: CNII-XII intact. Normal strength, sensation and reflexes throughout. Lab/Data Review: All lab results for the last 24 hours reviewed.      No results found for this or any previous visit (from the past 24 hour(s)).       Assessment:     Active Problems:    Cellulitis (2/7/2023)      UTI (urinary tract infection) (2/7/2023)        Plan:     Continue with treatment    Signed By: Mateo Fothergill, MD     February 11, 2023

## 2023-02-12 PROCEDURE — 97110 THERAPEUTIC EXERCISES: CPT

## 2023-02-12 PROCEDURE — 74011250637 HC RX REV CODE- 250/637: Performed by: FAMILY MEDICINE

## 2023-02-12 PROCEDURE — 65270000029 HC RM PRIVATE

## 2023-02-12 PROCEDURE — 74011250636 HC RX REV CODE- 250/636: Performed by: FAMILY MEDICINE

## 2023-02-12 PROCEDURE — 74011000250 HC RX REV CODE- 250: Performed by: FAMILY MEDICINE

## 2023-02-12 RX ADMIN — APIXABAN 5 MG: 5 TABLET, FILM COATED ORAL at 21:53

## 2023-02-12 RX ADMIN — ACETAMINOPHEN 650 MG: 325 TABLET ORAL at 09:07

## 2023-02-12 RX ADMIN — OXYCODONE HYDROCHLORIDE AND ACETAMINOPHEN 500 MG: 500 TABLET ORAL at 08:53

## 2023-02-12 RX ADMIN — SODIUM CHLORIDE, PRESERVATIVE FREE 1 G: 5 INJECTION INTRAVENOUS at 14:43

## 2023-02-12 RX ADMIN — SODIUM CHLORIDE, PRESERVATIVE FREE 1 G: 5 INJECTION INTRAVENOUS at 05:01

## 2023-02-12 RX ADMIN — APIXABAN 5 MG: 5 TABLET, FILM COATED ORAL at 08:53

## 2023-02-12 RX ADMIN — TRAMADOL HYDROCHLORIDE 50 MG: 50 TABLET, COATED ORAL at 04:29

## 2023-02-12 RX ADMIN — TRAMADOL HYDROCHLORIDE 50 MG: 50 TABLET, COATED ORAL at 21:53

## 2023-02-12 RX ADMIN — PANTOPRAZOLE SODIUM 40 MG: 40 TABLET, DELAYED RELEASE ORAL at 08:53

## 2023-02-12 RX ADMIN — SODIUM CHLORIDE, PRESERVATIVE FREE 1 G: 5 INJECTION INTRAVENOUS at 21:53

## 2023-02-12 RX ADMIN — FERROUS SULFATE TAB 325 MG (65 MG ELEMENTAL FE) 325 MG: 325 (65 FE) TAB at 08:53

## 2023-02-12 NOTE — PROGRESS NOTES
Progress Note    Patient: Augie Joiner MRN: [id number]  SSN: [id number]    YOB: 1965  Age: 61 y.o. Sex: female      Admit Date: 2/7/2023    LOS: 5 days     Subjective:     57 years with cellulitis and UTI    Objective:     Vitals:    02/11/23 1257 02/11/23 1537 02/11/23 2006 02/12/23 0911   BP:  105/71 109/73 99/65   Pulse:  92 89 93   Resp:  18 18 18   Temp:  98.3 °F (36.8 °C) 98.3 °F (36.8 °C) 97.5 °F (36.4 °C)   SpO2:  99% 100% 100%   Weight:       Height: 5' 2.99\" (1.6 m)           Intake and Output:  Current Shift: 02/12 0701 - 02/12 1900  In: 120 [P.O.:120]  Out: -   Last three shifts: 02/10 1901 - 02/12 0700  In: -   Out: 1800 [Urine:1800]    Physical Exam:   General:  Alert, cooperative, no distress, appears stated age. Eyes:  Conjunctivae/corneas clear. PERRL, EOMs intact. Fundi benign   Ears:  Normal TMs and external ear canals both ears. Nose: Nares normal. Septum midline. Mucosa normal. No drainage or sinus tenderness. Mouth/Throat: Lips, mucosa, and tongue normal. Teeth and gums normal.   Neck: Supple, symmetrical, trachea midline, no adenopathy, thyroid: no enlargment/tenderness/nodules, no carotid bruit and no JVD. Back:   Symmetric, no curvature. ROM normal. No CVA tenderness. Lungs:   Clear to auscultation bilaterally. Heart:  Regular rate and rhythm, S1, S2 normal, no murmur, click, rub or gallop. Abdomen:   Soft, non-tender. Bowel sounds normal. No masses,  No organomegaly. Extremities: Extremities normal, atraumatic, no cyanosis or edema. Pulses: 2+ and symmetric all extremities. Skin: Skin color, texture, turgor normal. No rashes or lesions   Lymph nodes: Cervical, supraclavicular, and axillary nodes normal.   Neurologic: CNII-XII intact. Normal strength, sensation and reflexes throughout. Lab/Data Review: All lab results for the last 24 hours reviewed.      No results found for this or any previous visit (from the past 24 hour(s)).       Assessment:     Active Problems:    Cellulitis (2/7/2023)      UTI (urinary tract infection) (2/7/2023)      Plan:     Continue with treatment  Possibledischarge in am    Signed By: Caryl See MD     February 12, 2023

## 2023-02-12 NOTE — PROGRESS NOTES
PHYSICAL THERAPY TREATMENT  Patient: Mehdi Shukla (5 y.o. female)  Date: 2/12/2023  Diagnosis: Cellulitis [L03.90]  UTI (urinary tract infection) [N39.0] <principal problem not specified>      Precautions: In place during session:   Chart, physical therapy assessment, plan of care and goals were reviewed. ASSESSMENT  Patient continues with skilled PT services and is progressing towards goals. Pt supine upon PT arrival, agreeable to session. (See below for objective details and assist levels). Overall pt tolerated session well today with PT. Decreased assistance required for transfers. Improved static standing balance. Maintained static stand with no UE support while emptying urine bag/colostomy bag. Participated with seated therex while at EOB, reviewed HEP, verbalized understanding. Pt motivated to begin gait training once pain is tolerable in L foot. Will continue to benefit from skilled PT services, and will continue to progress as tolerated. Current Level of Function Impacting Discharge (mobility/balance): Fair balance. Other factors to consider for discharge: Fall risk. PLAN :  Patient continues to benefit from skilled intervention to address the above impairments. Continue treatment per established plan of care to address goals. Recommend with staff: Out of bed to chair for meals, Encourage HEP in prep for ADLs/mobility, and Use of bed/chair alarm for safety    Recommendation for discharge: (in order for the patient to meet his/her long term goals)  [address]:   Patient stated I want to be able to walk again, it just hurts so much to put my heel down.     OBJECTIVE DATA SUMMARY:   Critical Behavior:  Neurologic State: Alert  Orientation Level: Oriented X4  Cognition: Appropriate decision making     Functional Mobility Training:  Bed Mobility:     Supine to Sit: Stand-by assistance  Sit to Supine: Stand-by assistance  Scooting: Stand-by assistance        Transfers:  Sit to Stand: Stand-by assistance  Stand to Sit: Stand-by assistance                             Balance:  Sitting: Intact  Standing: Intact  Standing - Static: Good  Standing - Dynamic : Fair  Ambulation/Gait Training:                                                       Stairs: Therapeutic Exercises:       EXERCISE   Sets   Reps   Active Active Assist   Passive Self ROM   Comments   Ankle Pumps 2 15 [x] [] [] []    Quad Sets/Glut Sets   [] [] [] []    Hamstring Sets   [] [] [] []    Short Arc Quads   [] [] [] []    Heel Slides   [] [] [] []    Straight Leg Raises   [] [] [] []    Hip abd/add   [] [] [] []    Long Arc Quads 2 15 [x] [] [] []    Marching 2 15 [x] [] [] []       [] [] [] []       Pain Ratin/10 L foot    Activity Tolerance:   Good    After treatment patient left in no apparent distress:   Bed locked and returned to lowest position, Supine in bed, Call bell within reach, and Bed / chair alarm activated    GOALS:    Problem: Mobility Impaired (Adult and Pediatric)  Goal: *Acute Goals and Plan of Care (Insert Text)  Description: FUNCTIONAL STATUS PRIOR TO ADMISSION: Patient was modified independent using a rollator for functional mobility. HOME SUPPORT PRIOR TO ADMISSION: The patient lived alone with niece (as an AIDE) to provide assistance. Patient stated goal: get better  Patient will move from supine to sit and sit to supine , scoot up and down, and roll side to side in bed with supervision/set-up within 7 day(s). Patient will transfer from bed to chair and chair to bed with modified independence using the least restrictive device within 7 day(s). Patient will improve static standing balance to modified independence within 1 week(s). Patient will ambulate 50 feet with modified independence with least restrictive device within 1 weeks.      Outcome: Progressing Towards Goal             Willy Delgado, PT   Time Calculation: 27 mins

## 2023-02-13 LAB
CRP SERPL-MCNC: 1.9 MG/DL (ref 0–0.6)
PROCALCITONIN SERPL-MCNC: <0.05 NG/ML

## 2023-02-13 PROCEDURE — 74011000250 HC RX REV CODE- 250: Performed by: FAMILY MEDICINE

## 2023-02-13 PROCEDURE — 36415 COLL VENOUS BLD VENIPUNCTURE: CPT

## 2023-02-13 PROCEDURE — 99232 SBSQ HOSP IP/OBS MODERATE 35: CPT | Performed by: INTERNAL MEDICINE

## 2023-02-13 PROCEDURE — 74011250637 HC RX REV CODE- 250/637: Performed by: FAMILY MEDICINE

## 2023-02-13 PROCEDURE — 65270000029 HC RM PRIVATE

## 2023-02-13 PROCEDURE — 86140 C-REACTIVE PROTEIN: CPT

## 2023-02-13 PROCEDURE — 74011250636 HC RX REV CODE- 250/636: Performed by: FAMILY MEDICINE

## 2023-02-13 PROCEDURE — 97530 THERAPEUTIC ACTIVITIES: CPT

## 2023-02-13 PROCEDURE — 84145 PROCALCITONIN (PCT): CPT

## 2023-02-13 RX ORDER — LEVOFLOXACIN 750 MG/1
750 TABLET ORAL DAILY
Qty: 10 TABLET | Refills: 0 | Status: SHIPPED | OUTPATIENT
Start: 2023-02-13

## 2023-02-13 RX ADMIN — SODIUM CHLORIDE, PRESERVATIVE FREE 1 G: 5 INJECTION INTRAVENOUS at 15:11

## 2023-02-13 RX ADMIN — SODIUM CHLORIDE, PRESERVATIVE FREE 1 G: 5 INJECTION INTRAVENOUS at 06:35

## 2023-02-13 RX ADMIN — APIXABAN 5 MG: 5 TABLET, FILM COATED ORAL at 21:03

## 2023-02-13 RX ADMIN — PANTOPRAZOLE SODIUM 40 MG: 40 TABLET, DELAYED RELEASE ORAL at 08:40

## 2023-02-13 RX ADMIN — FERROUS SULFATE TAB 325 MG (65 MG ELEMENTAL FE) 325 MG: 325 (65 FE) TAB at 08:41

## 2023-02-13 RX ADMIN — TRAMADOL HYDROCHLORIDE 50 MG: 50 TABLET, COATED ORAL at 21:03

## 2023-02-13 RX ADMIN — OXYCODONE HYDROCHLORIDE AND ACETAMINOPHEN 500 MG: 500 TABLET ORAL at 08:40

## 2023-02-13 RX ADMIN — APIXABAN 5 MG: 5 TABLET, FILM COATED ORAL at 08:40

## 2023-02-13 RX ADMIN — TRAMADOL HYDROCHLORIDE 50 MG: 50 TABLET, COATED ORAL at 06:36

## 2023-02-13 RX ADMIN — SODIUM CHLORIDE, PRESERVATIVE FREE 1 G: 5 INJECTION INTRAVENOUS at 21:03

## 2023-02-13 RX ADMIN — TRAMADOL HYDROCHLORIDE 50 MG: 50 TABLET, COATED ORAL at 15:07

## 2023-02-13 NOTE — DISCHARGE SUMMARY
Discharge Summary       PATIENT ID: Oksana Quiñones  MRN: [id number]   YOB: 1965    DATE OF ADMISSION: 2/7/2023  9:17 AM    DATE OF DISCHARGE:   PRIMARY CARE PROVIDER: Neo May MD     ATTENDING PHYSICIAN: Yariel Vazquez  DISCHARGING PROVIDER: Estiven Willis      CONSULTATIONS: IP CONSULT TO INFECTIOUS DISEASES  IP CONSULT TO CARDIOLOGY    PROCEDURES/SURGERIES: * No surgery found *    ADMITTING DIAGNOSES:    Patient Active Problem List    Diagnosis Date Noted    Cellulitis 02/07/2023    UTI (urinary tract infection) 02/07/2023    Hard stool 12/07/2022    Rectovaginal fistula 11/30/2022    Anemia in neoplastic disease 10/20/2022    Small bowel obstruction ([facility] [address] 10/20/2022    Status post robot-assisted surgical procedure, pelvic exenteration 10/20/2022    Severe protein-calorie malnutrition ([facility] [address] 10/07/2022    Bladder cancer ([facility] [address] 09/13/2022    Hypercalcemia 09/13/2022    Hydronephrosis of right kidney 09/13/2022    History of blood clots 07/02/2021       DISCHARGE DIAGNOSES / PLAN:      Acute cellulitis of the left leg  UTI complicated by ostomy bag  History of DVTs  History of bladder cancer status postsurgery  History of colostomy bag  GERD  BARBARA normal   Pericardial effusion with possible pericarditis        DISCHARGE MEDICATIONS:  Current Discharge Medication List        START taking these medications    Details   levoFLOXacin (Levaquin) 750 mg tablet Take 1 Tablet by mouth daily. Qty: 10 Tablet, Refills: 0  Start date: 2/13/2023           CONTINUE these medications which have NOT CHANGED    Details   multivitamin (ONE A DAY) tablet Take 1 Tablet by mouth daily. Qty: 90 Tablet, Refills: 1      pantoprazole (PROTONIX) 40 mg tablet Take 1 Tablet by mouth daily. Qty: 90 Tablet, Refills: 1    Associated Diagnoses: Gastroesophageal reflux disease, unspecified whether esophagitis present      ascorbic acid, vitamin C, (VITAMIN C) 500 mg tablet Take 1 Tablet by mouth daily.   Qty: 90 Tablet, Refills: 0      apixaban (ELIQUIS) 5 mg tablet Take 1 Tablet by mouth every twelve (12) hours for 90 days. Indications: blood clot in a deep vein of the extremities  Qty: 180 Tablet, Refills: 0      traMADoL (ULTRAM) 50 mg tablet Take  by mouth. STOP taking these medications       doxycycline (VIBRA-TABS) 100 mg tablet Comments:   Reason for Stopping:         acetaminophen (TYLENOL) 650 mg TbER Comments:   Reason for Stopping:         docusate sodium (COLACE) 100 mg capsule Comments:   Reason for Stopping:         ferrous sulfate 325 mg (65 mg iron) tablet Comments:   Reason for Stopping:         ondansetron (ZOFRAN ODT) 4 mg disintegrating tablet Comments:   Reason for Stopping:         psyllium seed-sucrose (Metamucil, sugar,) powd Comments:   Reason for Stopping:                 NOTIFY YOUR PHYSICIAN FOR ANY OF THE FOLLOWING:   Fever over 101 degrees for 24 hours. Chest pain, shortness of breath, fever, chills, nausea, vomiting, diarrhea, change in mentation, falling, weakness, bleeding. Severe pain or pain not relieved by medications. Or, any other signs or symptoms that you may have questions about. DISPOSITION:  x  Home With:   OT  PT  HH  RN       Long term SNF/Inpatient Rehab    Independent/assisted living    Hospice    Other:       PATIENT CONDITION AT DISCHARGE: Stable      PHYSICAL EXAMINATION AT DISCHARGE:  General:          Alert, cooperative, no distress, appears stated age. HEENT:           Atraumatic, anicteric sclerae, pink conjunctivae                          No oral ulcers, mucosa moist, throat clear, dentition fair  Neck:               Supple, symmetrical  Lungs:             Clear to auscultation bilaterally. No Wheezing or Rhonchi. No rales. Chest wall:      No tenderness  No Accessory muscle use. Heart:              Regular  rhythm,  No  murmur   No edema  Abdomen:        Soft, non-tender. Not distended. Bowel sounds normal  Extremities:     No cyanosis.   No clubbing,                            Skin turgor normal, Capillary refill normal  Skin:                Not pale. Not Jaundiced  No rashes   Psych:             Not anxious or agitated. Neurologic:      Alert, moves all extremities, answers questions appropriately and responds to commands     ANKLE BRACHIAL INDEX   Final Result      CT CHEST WO CONT   Final Result   1. Please note, cannot assess for pulmonary embolism on this noncontrast exam.   2.  Moderate pericardial effusion, nonspecific, but consider pericarditis. 3.  Emphysema, without acute airspace disease. XR CHEST PORT   Final Result      No acute process on portable chest.              Recent Results (from the past 24 hour(s))   C REACTIVE PROTEIN, QT    Collection Time: 02/13/23  6:24 AM   Result Value Ref Range    C-Reactive protein 1.90 (H) 0.00 - 0.60 mg/dL   PROCALCITONIN    Collection Time: 02/13/23  6:24 AM   Result Value Ref Range    Procalcitonin <0.05 (H) 0 ng/mL          HOSPITAL COURSE:    Patient is a 61y.o. year old female with signal past medical history of multiple DVT history of bladder cancer status post urostomy bag status postsurgery history of colostomy bag came to the ER complaining of left leg pain redness swelling patient was treated as outpatient with doxycycline with no much improvement she came to emergency room seen by the ER physician and patient was recommended to be admitted for acute cellulitis of the left leg     2/8  pain is constant. Infection has not spread since onset about 1 month ago. Pain has worsened over the past month, now with paresthesia radiating up the leg. Patient states she cannot stand on affected foot which prompted her to present to ER. Chronic DVT found on duplex last week, was given doxy with no improvement. Urinalysis shows bacteriuria, pyuria, positive nitratess. Awaiting urine and blood cultures.       2/9 pain improved since yesterday, pain now isolated to infected region, no longer radiating up her leg. Urine and blood not resulted. On meropenem IV. Duplex not resulted, pt was told verbally that u/s was normal. Asx for UTI, no CVA tenderness. Would like to have help with ambulation today. 2/10 Still awaiting cultures. Pt feeling better. No longer having numbness and tingling.  Saw PT yesterday for evaluation    Patient was seen by the cardiology regarding pericardial effusion no further work-up recommended at this time       Patient need to follow-up with the oncology as an outpatient    Medication reconciliation done time chart patient 35 minutes 50% time spent counseling and coordination of care    Discharge on Levaquin 750 mg for total 10 days    Signed:   Dewayne Quintana MD  2/13/2023  11:29 AM

## 2023-02-13 NOTE — PROGRESS NOTES
History and Physical    NAME: Zita Hendrix   :  1965   MRN:  [id number]     Date/Time:  2023 5:25 PM    Patient PCP: Ana Perez MD  ______________________________________________________________________             Subjective:     CHIEF COMPLAINT:     Left leg pain        HISTORY OF PRESENT ILLNESS:       Patient is a 61y.o. year old female with signal past medical history of multiple DVT history of bladder cancer status post urostomy bag status postsurgery history of colostomy bag came to the ER complaining of left leg pain redness swelling patient was treated as outpatient with doxycycline with no much improvement she came to emergency room seen by the ER physician and patient was recommended to be admitted for acute cellulitis of the left leg      pain is constant. Infection has not spread since onset about 1 month ago. Pain has worsened over the past month, now with paresthesia radiating up the leg. Patient states she cannot stand on affected foot which prompted her to present to ER. Chronic DVT found on duplex last week, was given doxy with no improvement. Urinalysis shows bacteriuria, pyuria, positive nitratess. Awaiting urine and blood cultures.  pain improved since yesterday, pain now isolated to infected region, no longer radiating up her leg. Urine and blood not resulted. On meropenem IV. Duplex not resulted, pt was told verbally that u/s was normal. Asx for UTI, no CVA tenderness. Would like to have help with ambulation today. 2/10 Still awaiting cultures. Pt feeling better. No longer having numbness and tingling. Saw PT yesterday for evaluation yesterday will need to be discharged to nursing     Urine culture negative. Cellulitis improving. Significant pain in the left heel making ambulation challenging. CRP improving. Day 7 of Meropenem today. Infectious plans to discharge on levaquin 750 mg qday X10 days.  Seen by cardiovascular surgery on Friday: pericardial effusion deemed insignificant, no tamponade.      Past Medical History:   Diagnosis Date    Anemia     pt states had recent blood transfusion 2022    Back pain     Cancer (HCC)     bladder    DVT (deep venous thrombosis) ([address]     and PE, pt denies lung PE , only leg several years ago    Hepatitis C     pt states dx 1 month ago    Liver disease     Presence of IVC filter     pt states several years ago    Rheumatoid arthritis ([address]     Sciatic leg pain     pt states both legs    Uterine fibroid         Past Surgical History:   Procedure Laterality Date    HX APPENDECTOMY  10/03/2022    HX  SECTION      HX GI  2022    laparoscopic early postop adhesion of the sigmoid colon    HX GI  2022    laparoscopic lysis of adhesion    HX GI  10/31/2022    rigid sigmoidoscope examination of the rectum    HX GI  10/31/2022    distal rectal repair with primary suturing technique    HX GYN  10/03/2022    ROBOT ANTERIOR PELVIC EXENTERATION    HX HYSTERECTOMY  10/03/2022    HX SALPINGO-OOPHORECTOMY Bilateral 10/03/2022    HX UROLOGICAL  09/15/2022    CYSTOSCOPY    HX UROLOGICAL  09/15/2022    URETHRAL DILATION    HX UROLOGICAL  09/15/2022    URETERAL STENT PLACEMENT    HX UROLOGICAL  09/15/2022    TRANSURETHRAL RESECTION OF BLADDER TUMOR >2CM    HX UROLOGICAL Bilateral 09/15/2022    RETROGRADE PYELOGRAM    HX UROLOGICAL  10/03/2022    PELVIC LYMPH NODE DISSECTION    HX UROLOGICAL  10/03/2022    ILEAL CONDUIT URINARY DIVERSION    IR THROMBECTOMY [facility] VEIN [facility]ARM  10/27/2022    ME UNLISTED PROCEDURE CARDIAC SURGERY      stent placement       Social History     Tobacco Use    Smoking status: Former     Packs/day: 0.50     Years: 10.00     Pack years: 5.00     Types: Cigarettes     Quit date: 1998     Years since quittin.1    Smokeless tobacco: Never    Tobacco comments:     Quit 15 years ago   Substance Use Topics    Alcohol use: Not Currently        Family History   Problem Relation Age of Onset Cancer Mother     Lung Cancer Mother     Heart Disease Father     Hypertension Sister     Cancer Maternal Aunt     Breast Cancer Maternal Aunt        Allergies   Allergen Reactions    Bactrim [Sulfamethoprim] Swelling     Swelling of the lilps    Penicillin V Potassium Swelling        Prior to Admission medications    Medication Sig Start Date End Date Taking? Authorizing Provider   doxycycline (MONODOX) 100 mg capsule Take 1 Capsule by mouth two (2) times a day. 2/10/23  Yes Cain Willis MD   multivitamin (ONE A DAY) tablet Take 1 Tablet by mouth daily. 1/26/23  Yes Doris Juarez MD   pantoprazole (PROTONIX) 40 mg tablet Take 1 Tablet by mouth daily. 1/20/23  Yes Doris Juarez MD   ascorbic acid, vitamin C, (VITAMIN C) 500 mg tablet Take 1 Tablet by mouth daily. 1/13/23  Yes Doris Juarez MD   apixaban (ELIQUIS) 5 mg tablet Take 1 Tablet by mouth every twelve (12) hours for 90 days. Indications: blood clot in a deep vein of the extremities 1/12/23 4/12/23 Yes Doris Juarez MD   acetaminophen (TYLENOL) 650 mg TbER Take 1 Tablet by mouth every eight (8) hours as needed for Pain. 1/12/23  Yes Doris Juarez MD   docusate sodium (COLACE) 100 mg capsule Take 1 Capsule by mouth two (2) times daily as needed for Constipation for up to 90 days. 1/12/23 4/12/23 Yes Doris Juarez MD   ferrous sulfate 325 mg (65 mg iron) tablet Take 1 Tablet by mouth daily. 1/12/23  Yes Doris Juarez MD   traMADoL Janak Buddy) 50 mg tablet Take  by mouth. 12/12/22  Yes Provider, Historical   ondansetron (ZOFRAN ODT) 4 mg disintegrating tablet Take  by mouth. 11/14/22  Yes Provider, Historical   psyllium seed-sucrose (Metamucil, sugar,) powd Take 1 Scoop by mouth daily.  12/7/22  Yes Wild Rojas MD         Current Facility-Administered Medications:     meropenem (MERREM) 1 g in 0.9% sodium chloride 20 mL IV syringe, 1 g, IntraVENous, Q8H, Keanu Willis MD, 1 g at 02/13/23 0635    apixaban (ELIQUIS) tablet 5 mg, 5 mg, Oral, Q12H, Keanu Willis MD, 5 mg at 02/12/23 2153    ascorbic acid (vitamin C) (VITAMIN C) tablet 500 mg, 500 mg, Oral, DAILY, Keanu Willis MD, 500 mg at 02/12/23 3505    ferrous sulfate tablet 325 mg, 325 mg, Oral, DAILY, Keanu Willis MD, 325 mg at 02/12/23 0853    pantoprazole (PROTONIX) tablet 40 mg, 40 mg, Oral, DAILY, Keanu Willis MD, 40 mg at 02/12/23 9953    traMADoL (ULTRAM) tablet 50 mg, 50 mg, Oral, Q6H PRN, Keanu Willis MD, 50 mg at 02/13/23 2925    acetaminophen (TYLENOL) tablet 650 mg, 650 mg, Oral, Q6H PRN, 650 mg at 02/12/23 0907 **OR** acetaminophen (TYLENOL) suppository 650 mg, 650 mg, Rectal, Q6H PRN, Keanu Willis MD    polyethylene glycol (MIRALAX) packet 17 g, 17 g, Oral, DAILY PRN, Keanu Willis MD, 17 g at 02/10/23 2128    ondansetron (ZOFRAN ODT) tablet 4 mg, 4 mg, Oral, Q8H PRN **OR** ondansetron (ZOFRAN) injection 4 mg, 4 mg, IntraVENous, Q6H PRN, Keanu Willis MD    LAB DATA REVIEWED:    Recent Results (from the past 24 hour(s))   C REACTIVE PROTEIN, QT    Collection Time: 02/13/23  6:24 AM   Result Value Ref Range    C-Reactive protein 1.90 (H) 0.00 - 0.60 mg/dL       XR Results (most recent):  Results from Hospital Encounter encounter on 02/07/23    XR CHEST PORT    Narrative  EXAM:  XR CHEST PORT    INDICATION: Chest pain    COMPARISON: 11/2/2022    TECHNIQUE: portable chest AP view    FINDINGS: The cardiac silhouette is within normal limits. The pulmonary  vasculature is within normal limits. The lungs and pleural spaces are clear. The bones are osteopenic. Impression  No acute process on portable chest.         ANKLE BRACHIAL INDEX   Final Result      CT CHEST WO CONT   Final Result   1. Please note, cannot assess for pulmonary embolism on this noncontrast exam.   2.  Moderate pericardial effusion, nonspecific, but consider pericarditis. 3.  Emphysema, without acute airspace disease.       XR CHEST PORT   Final Result No acute process on portable chest.              Review of Systems:  Constitutional: Negative for chills and fever. HENT: Negative. Eyes: Negative. Respiratory: Negative. Cardiovascular: Negative. Gastrointestinal: Negative for abdominal pain and nausea. Skin: Negative. Neurological: Negative. Left leg pain swelling and redness    Objective:   VITALS:    Visit Vitals  /76 (BP 1 Location: Right upper arm, BP Patient Position: At rest)   Pulse 81   Temp 97.9 °F (36.6 °C)   Resp 18   Ht 5' 2.99\" (1.6 m)   Wt 104 lb 4.4 oz (47.3 kg)   SpO2 100%   Breastfeeding No   BMI 18.48 kg/m²       Physical Exam:   Constitutional: pt is oriented to person, place, and time. HENT:   Head: Normocephalic and atraumatic. Eyes: Pupils are equal, round, and reactive to light. EOM are normal.   Cardiovascular: Normal rate, regular rhythm and normal heart sounds. Pulmonary/Chest: Breath sounds normal. No wheezes. No rales. Exhibits no tenderness. Abdominal: Soft. Bowel sounds are normal. There is no abdominal tenderness. There is no rebound and no guarding. Musculoskeletal: Left leg pain swelling and redness. Neurological: pt is alert and oriented to person, place, and time. Alert. Normal strength. No cranial nerve deficit or sensory deficit. Displays a negative Romberg sign.         ASSESSMENT & PLAN:    Acute cellulitis of the left leg  Infectious consulted, awaiting blood and urine cultures  Continue meropenem with plan to d/c on levaquin 750 mg   Continue PT  UTI complicated by ostomy bag  Urine positive, cultures negative  Continue meropenem   History of DVTs  Chronic DVT non- occlusive  Monitor   History of bladder cancer status postsurgery  History of colostomy bag  GERD  BARBARA normal   Pericardial effusion  No additional treatment necessary        Patient started on IV meropenem after the blood cultures ID consult    Continue other home medications          Current Facility-Administered Medications:     meropenem (MERREM) 1 g in 0.9% sodium chloride 20 mL IV syringe, 1 g, IntraVENous, Q8H, Keanu Willis MD, 1 g at 02/13/23 0449    apixaban (ELIQUIS) tablet 5 mg, 5 mg, Oral, Q12H, Keanu Willis MD, 5 mg at 02/12/23 2153    ascorbic acid (vitamin C) (VITAMIN C) tablet 500 mg, 500 mg, Oral, DAILY, Keanu Willis MD, 500 mg at 02/12/23 2115    ferrous sulfate tablet 325 mg, 325 mg, Oral, DAILY, Keanu Willis MD, 325 mg at 02/12/23 0853    pantoprazole (PROTONIX) tablet 40 mg, 40 mg, Oral, DAILY, Keanu Willis MD, 40 mg at 02/12/23 0853    traMADoL (ULTRAM) tablet 50 mg, 50 mg, Oral, Q6H PRN, Keanu Willis MD, 50 mg at 02/13/23 0636    acetaminophen (TYLENOL) tablet 650 mg, 650 mg, Oral, Q6H PRN, 650 mg at 02/12/23 0907 **OR** acetaminophen (TYLENOL) suppository 650 mg, 650 mg, Rectal, Q6H PRN, Keanu Wilils MD    polyethylene glycol (MIRALAX) packet 17 g, 17 g, Oral, DAILY PRN, Keanu Willis MD, 17 g at 02/10/23 2128    ondansetron (ZOFRAN ODT) tablet 4 mg, 4 mg, Oral, Q8H PRN **OR** ondansetron (ZOFRAN) injection 4 mg, 4 mg, IntraVENous, Q6H PRN, Keanu Willis MD       ________________________________________________________________________    Signed: Cy Johnson

## 2023-02-13 NOTE — PROGRESS NOTES
OCCUPATIONAL THERAPY TREATMENT  Patient: Elaine Araujo (83 y.o. female)  Date: 2/13/2023  Diagnosis: Cellulitis [L03.90]  UTI (urinary tract infection) [N39.0] <principal problem not specified>      Precautions: In place during session:   Chart, occupational therapy assessment, plan of care, and goals were reviewed. ASSESSMENT  Pt continues with skilled OT services and is progressing towards goals. Pt received semi-supine in bed upon arrival, AXO x4 and agreeable to OT tx at this time. Overall, pt continues to present with deficits in generalized strength/AROM, coordination, bed mobility, static/dynamic sitting and standing balance and functional activity tolerance during performance of ADLs/mobility (see below for objective details and assist levels). Pt tolerated session well, required several rest breaks. Pt completed UE therex semi supine in bed, see grid below for details, to increase strength/ endurance to aid in adl performance. Pt required rest break between sets. Reviewed energy conservation and safety awareness techniques w/ pt vocalizing good understanding. Pt able to return demonstrate yellow theraband exercises w/ good accuracy. Pt encouraged to complete HEP 3-4 times/ day to help aid in increasing strength/ activity tolerance. Pt is very pleasant and cooperative through out session. Will continue to progress. Recommend d/c to [facility] once medically appropriate. Other factors to consider for discharge: PLOF, time since onset, severity of deficits, and decline from functional baseline. PLAN :  Patient continues to benefit from skilled intervention to address the above impairments. Continue treatment per established plan of care. to address goals.     Recommend with staff: Out of bed to chair for meals and Encourage HEP in prep for ADLs/mobility    Recommend next session: Seated grooming    Recommendation for discharge: (in order for the patient to meet his/her long term goals)  Delgado Keenan    This discharge recommendation:  Has been made in collaboration with the attending provider and/or case management    IF patient discharges home will need the following DME: TBD       SUBJECTIVE:   Patient stated I just got back to bed. Amarilis Vincent    OBJECTIVE DATA SUMMARY:   Cognitive/Behavioral Status:  Neurologic State: Alert  Orientation Level: Oriented X4  Cognition: Follows commands; Appropriate for age attention/concentration; Appropriate decision making; Appropriate safety awareness     Therapeutic Exercises:   Exercise Sets Reps AROM AAROM PROM Self PROM Comments   Shoulder flex/ext 1 15 [x] [] [] [] Use yellow theraband for all therex   Shoulder ab/adduction 1 15 [x] [] [] []    Bicep curls 1 15 [x] [] [] []    Tricep presses 1 15 [x] [] [] []        Pain:  No c/o pain    Activity Tolerance:   Good and requires rest breaks    After treatment patient left in no apparent distress:   Supine in bed, Call bell within reach, Bed / chair alarm activated, and Side rails x 3, bed locked and in lowest position    COMMUNICATION/COLLABORATION:   The patients plan of care was discussed with: Registered nurse. Cheryle Laurence, OTA  Time Calculation: 25 mins     Problem: Self Care Deficits Care Plan (Adult)  Goal: *Acute Goals and Plan of Care (Insert Text)  Description: FUNCTIONAL STATUS PRIOR TO ADMISSION: Per pt, she was mostly Mod I with rollator. Niece help her 8-2 pm with IADLS. HOME SUPPORT: Niece assists pt.      Occupational Therapy Goals  Initiated 2/10/2023    Pt stated goal: To get stronger  Pt will be Mod I sup <> sit in prep for EOB ADLs  Pt will be Mod I grooming standing sink side LRAD  Pt will be Mod I UB dressing sitting EOB/long sit   Pt will be Mod I LE dressing sitting EOB/long sit  Pt will be Mod I sit <>  prep for toileting LRAD  Pt will be Mod I toileting/toilet transfer/cloth mgmt LRAD    Outcome: Progressing Towards Goal

## 2023-02-13 NOTE — PROGRESS NOTES
Progress Note    Patient: Augie Joiner MRN: [id number]  SSN: [id number]    YOB: 1965  Age: 61 y.o. Sex: female      Admit Date: 2/7/2023    LOS: 6 days     Subjective:   Patient followed for cellulitis left leg/foot of unclear etiology and complicated UTI. Blood cultures are negative and urine culture negative. WBC and procal normal and CRP decreasing. Patient resting comfortably with no new complaints but she is requesting a boot for her left foot. Objective:     Vitals:    02/13/23 0437 02/13/23 0806 02/13/23 0841 02/13/23 1520   BP: 94/62 108/76  115/82   Pulse: 80 81  96   Resp: 20 18  16   Temp: 97.9 °F (36.6 °C)  97.5 °F (36.4 °C) 97.3 °F (36.3 °C)   SpO2: 100% 100%  100%   Weight:       Height:            Intake and Output:  Current Shift: No intake/output data recorded. Last three shifts: 02/11 1901 - 02/13 0700  In: 120 [P.O.:120]  Out: 1900 [Urine:1900]    Physical Exam:   Vitals and nursing note reviewed. Constitutional:       General: She is not in acute distress. Appearance: She is ill-appearing. HENT: unremarkable  Eyes:      Pupils: Pupils are equal, round, and reactive to light. Cardiovascular:      Rate and Rhythm: Normal rate and regular rhythm. Heart sounds: No murmur heard. Pulmonary:      Effort: Pulmonary effort is normal.      Breath sounds: Normal breath sounds. Abdominal:      General: Bowel sounds are normal. There is no distension. Palpations: Abdomen is soft. Tenderness: There is no abdominal tenderness. Comments: Urostomy with clear urine  Colostomy with pink stoma   Genitourinary:     Comments: No Gibson  Musculoskeletal:      Right lower leg: No edema. Left lower leg: Edema present. Comments: Swelling in left calf, ankle and foot has resolved with no erythema   Skin: only as noted above   Neurological:      General: No focal deficit present.       Mental Status: She is alert and oriented to person, place, and time. Psychiatric:    normal behavior    Lab/Data Review:     WBC 3,600     CRP 1.90 <10.00 <15.70 <23.10  Procal <0.05 <0.08 < 0.15 <0.24    Blood cultures (2/7) No growth 5 days  Urine culture (2/8) No growth FINAL    TTE (2/9)  small pericardial effusion  Assessment:     Active Problems:    Cellulitis (2/7/2023)      UTI (urinary tract infection) (2/7/2023)  Presumptive cellulitis, left leg/foot, etiology unclear, improving, Day #7 IV Meropenem  Complicated UTI (cystectomy, urostomy) with marked pyuria, bacteriuria and positive nitrite, urine culture negative, on IV Meropenem  Elevated CRP and procal, secondary to above, resolving  Pericardial effusion, significance unclear,  small, clinicaly insignificant  5. Chronic non-occlusive thrombi in left femoral/popliteal and gastrocnemius veins  6. Penicillin allergy  7. Hepatitis C antibody positive    Comment:  Showing clinical improvement with decreasing CRP  and now normal procal. Urine culture felt to be falsely negative since it was collected while on Meropenem.     Plan:   Continue IV Meropenem; would discharge on Levaquin 750 mg daily for 7 more days  Follow-up  blood cultures  Cleared for discharge from ID standpoint      Signed By: Lolis Edouard MD     February 13, 2023

## 2023-02-13 NOTE — PROGRESS NOTES
Patient declined by choice facilities. [facility]. Choice for referrals sent . Patient declined by 35  agencies due to payor source or out of service area. CM placed call to liz Palmer to inform. Left message to return call. Patient informed.

## 2023-02-14 LAB
BACTERIA SPEC CULT: NORMAL
CRP SERPL-MCNC: 1.31 MG/DL (ref 0–0.6)
SPECIAL REQUESTS,SREQ: NORMAL

## 2023-02-14 PROCEDURE — 99232 SBSQ HOSP IP/OBS MODERATE 35: CPT | Performed by: INTERNAL MEDICINE

## 2023-02-14 PROCEDURE — 74011000250 HC RX REV CODE- 250: Performed by: FAMILY MEDICINE

## 2023-02-14 PROCEDURE — 74011250636 HC RX REV CODE- 250/636: Performed by: FAMILY MEDICINE

## 2023-02-14 PROCEDURE — 86140 C-REACTIVE PROTEIN: CPT

## 2023-02-14 PROCEDURE — 74011250637 HC RX REV CODE- 250/637: Performed by: FAMILY MEDICINE

## 2023-02-14 PROCEDURE — 97530 THERAPEUTIC ACTIVITIES: CPT

## 2023-02-14 PROCEDURE — 97116 GAIT TRAINING THERAPY: CPT

## 2023-02-14 PROCEDURE — 97110 THERAPEUTIC EXERCISES: CPT

## 2023-02-14 PROCEDURE — 36415 COLL VENOUS BLD VENIPUNCTURE: CPT

## 2023-02-14 PROCEDURE — 65270000029 HC RM PRIVATE

## 2023-02-14 RX ADMIN — TRAMADOL HYDROCHLORIDE 50 MG: 50 TABLET, COATED ORAL at 21:59

## 2023-02-14 RX ADMIN — PANTOPRAZOLE SODIUM 40 MG: 40 TABLET, DELAYED RELEASE ORAL at 08:31

## 2023-02-14 RX ADMIN — SODIUM CHLORIDE, PRESERVATIVE FREE 1 G: 5 INJECTION INTRAVENOUS at 13:19

## 2023-02-14 RX ADMIN — SODIUM CHLORIDE, PRESERVATIVE FREE 1 G: 5 INJECTION INTRAVENOUS at 05:44

## 2023-02-14 RX ADMIN — TRAMADOL HYDROCHLORIDE 50 MG: 50 TABLET, COATED ORAL at 08:31

## 2023-02-14 RX ADMIN — TRAMADOL HYDROCHLORIDE 50 MG: 50 TABLET, COATED ORAL at 03:15

## 2023-02-14 RX ADMIN — APIXABAN 5 MG: 5 TABLET, FILM COATED ORAL at 08:31

## 2023-02-14 RX ADMIN — FERROUS SULFATE TAB 325 MG (65 MG ELEMENTAL FE) 325 MG: 325 (65 FE) TAB at 08:31

## 2023-02-14 RX ADMIN — SODIUM CHLORIDE, PRESERVATIVE FREE 1 G: 5 INJECTION INTRAVENOUS at 21:59

## 2023-02-14 RX ADMIN — OXYCODONE HYDROCHLORIDE AND ACETAMINOPHEN 500 MG: 500 TABLET ORAL at 08:31

## 2023-02-14 RX ADMIN — APIXABAN 5 MG: 5 TABLET, FILM COATED ORAL at 21:59

## 2023-02-14 NOTE — PROGRESS NOTES
Problem: Falls - Risk of  Goal: *Absence of Falls  Description: Document Jaki Lino Fall Risk and appropriate interventions in the flowsheet. Outcome: Progressing Towards Goal  Note: Fall Risk Interventions:  Mobility Interventions: Bed/chair exit alarm         Medication Interventions: Bed/chair exit alarm    Elimination Interventions: Call light in reach              Problem: Patient Education: Go to Patient Education Activity  Goal: Patient/Family Education  Outcome: Progressing Towards Goal     Problem: Pressure Injury - Risk of  Goal: *Prevention of pressure injury  Description: Document Alphonso Scale and appropriate interventions in the flowsheet.   Outcome: Progressing Towards Goal  Note: Pressure Injury Interventions:  Sensory Interventions: Assess changes in LOC    Moisture Interventions: Apply protective barrier, creams and emollients    Activity Interventions: PT/OT evaluation    Mobility Interventions: PT/OT evaluation    Nutrition Interventions: Document food/fluid/supplement intake    Friction and Shear Interventions: Apply protective barrier, creams and emollients                Problem: Patient Education: Go to Patient Education Activity  Goal: Patient/Family Education  Outcome: Progressing Towards Goal     Problem: Patient Education: Go to Patient Education Activity  Goal: Patient/Family Education  Outcome: Progressing Towards Goal     Problem: Patient Education: Go to Patient Education Activity  Goal: Patient/Family Education  Outcome: Progressing Towards Goal

## 2023-02-14 NOTE — PROGRESS NOTES
History and Physical    NAME: Abdirahman Pritchett   :  1965   MRN:  [id number]     Date/Time:  2023 5:25 PM    Patient PCP: Phyllis Mcnulty MD  ______________________________________________________________________             Subjective:     CHIEF COMPLAINT:     Left leg pain        HISTORY OF PRESENT ILLNESS:       Patient is a 61y.o. year old female with signal past medical history of multiple DVT history of bladder cancer status post urostomy bag status postsurgery history of colostomy bag came to the ER complaining of left leg pain redness swelling patient was treated as outpatient with doxycycline with no much improvement she came to emergency room seen by the ER physician and patient was recommended to be admitted for acute cellulitis of the left leg      pain is constant. Infection has not spread since onset about 1 month ago. Pain has worsened over the past month, now with paresthesia radiating up the leg. Patient states she cannot stand on affected foot which prompted her to present to ER. Chronic DVT found on duplex last week, was given doxy with no improvement. Urinalysis shows bacteriuria, pyuria, positive nitratess. Awaiting urine and blood cultures.  pain improved since yesterday, pain now isolated to infected region, no longer radiating up her leg. Urine and blood not resulted. On meropenem IV. Duplex not resulted, pt was told verbally that u/s was normal. Asx for UTI, no CVA tenderness. Would like to have help with ambulation today. 2/10 Still awaiting cultures. Pt feeling better. No longer having numbness and tingling. Saw PT yesterday for evaluation yesterday will need to be discharged to nursing     Urine culture negative. Cellulitis improving. Significant pain in the left heel making ambulation challenging. CRP improving. Day 7 of Meropenem today. Infectious plans to discharge on levaquin 750 mg qday X10 days.  Seen by cardiovascular surgery on Friday: pericardial effusion deemed insignificant, no tamponade.  Pt cleared for discharge by ID on levaquin 750 x 7 days. Blood cultures showed no growth in 6 days. Pt plans to discharge with home health. Pt worked with OT yesterday. Seen with PT today, working on stairs. Still unable to bare weight on her left heel. BP low this morning  91/51.      Past Medical History:   Diagnosis Date    Anemia     pt states had recent blood transfusion 2022    Back pain     Cancer (HCC)     bladder    DVT (deep venous thrombosis) ([address]     and PE, pt denies lung PE , only leg several years ago    Hepatitis C     pt states dx 1 month ago    Liver disease     Presence of IVC filter     pt states several years ago    Rheumatoid arthritis ([address]     Sciatic leg pain     pt states both legs    Uterine fibroid         Past Surgical History:   Procedure Laterality Date    HX APPENDECTOMY  10/03/2022    HX  SECTION      HX GI  2022    laparoscopic early postop adhesion of the sigmoid colon    HX GI  2022    laparoscopic lysis of adhesion    HX GI  10/31/2022    rigid sigmoidoscope examination of the rectum    HX GI  10/31/2022    distal rectal repair with primary suturing technique    HX GYN  10/03/2022    ROBOT ANTERIOR PELVIC EXENTERATION    HX HYSTERECTOMY  10/03/2022    HX SALPINGO-OOPHORECTOMY Bilateral 10/03/2022    HX UROLOGICAL  09/15/2022    CYSTOSCOPY    HX UROLOGICAL  09/15/2022    URETHRAL DILATION    HX UROLOGICAL  09/15/2022    URETERAL STENT PLACEMENT    HX UROLOGICAL  09/15/2022    TRANSURETHRAL RESECTION OF BLADDER TUMOR >2CM    HX UROLOGICAL Bilateral 09/15/2022    RETROGRADE PYELOGRAM    HX UROLOGICAL  10/03/2022    PELVIC LYMPH NODE DISSECTION    HX UROLOGICAL  10/03/2022    ILEAL CONDUIT URINARY DIVERSION    IR THROMBECTOMY [facility] VEIN W PHARM  10/27/2022    NM UNLISTED PROCEDURE CARDIAC SURGERY      stent placement       Social History     Tobacco Use    Smoking status: Former     Packs/day: 0.50 Years: 10.00     Pack years: 5.00     Types: Cigarettes     Quit date: 1998     Years since quittin.1    Smokeless tobacco: Never    Tobacco comments:     Quit 15 years ago   Substance Use Topics    Alcohol use: Not Currently        Family History   Problem Relation Age of Onset    Cancer Mother     Lung Cancer Mother     Heart Disease Father     Hypertension Sister     Cancer Maternal Aunt     Breast Cancer Maternal Aunt        Allergies   Allergen Reactions    Bactrim [Sulfamethoprim] Swelling     Swelling of the lilps    Penicillin V Potassium Swelling        Prior to Admission medications    Medication Sig Start Date End Date Taking? Authorizing Provider   levoFLOXacin (Levaquin) 750 mg tablet Take 1 Tablet by mouth daily. 23  Yes Fabian Willis MD   multivitamin (ONE A DAY) tablet Take 1 Tablet by mouth daily. 23  Yes Vonda Rosado MD   pantoprazole (PROTONIX) 40 mg tablet Take 1 Tablet by mouth daily. 23  Yes Vonda Rosado MD   ascorbic acid, vitamin C, (VITAMIN C) 500 mg tablet Take 1 Tablet by mouth daily. 23  Yes Vonda Rosado MD   apixaban (ELIQUIS) 5 mg tablet Take 1 Tablet by mouth every twelve (12) hours for 90 days. Indications: blood clot in a deep vein of the extremities 23 Yes Vonda Rosado MD   acetaminophen (TYLENOL) 650 mg TbER Take 1 Tablet by mouth every eight (8) hours as needed for Pain. 23  Yes Vonda Rosado MD   docusate sodium (COLACE) 100 mg capsule Take 1 Capsule by mouth two (2) times daily as needed for Constipation for up to 90 days. 23 Yes Vonda Rosado MD   ferrous sulfate 325 mg (65 mg iron) tablet Take 1 Tablet by mouth daily.  23  Yes Vonda Rosado MD   traMADoL Verner [place]) 50 mg tablet Take  by mouth. 22  Yes Provider, Historical   ondansetron (ZOFRAN ODT) 4 mg disintegrating tablet Take  by mouth. 22  Yes Provider, Historical   psyllium seed-sucrose (Metamucil, sugar,) powd Take 1 Scoop by mouth daily. 12/7/22  Yes Janette Cervantes MD         Current Facility-Administered Medications:     meropenem (MERREM) 1 g in 0.9% sodium chloride 20 mL IV syringe, 1 g, IntraVENous, Q8H, Keanu Willis MD, 1 g at 02/14/23 0544    apixaban (ELIQUIS) tablet 5 mg, 5 mg, Oral, Q12H, Keanu Willis MD, 5 mg at 02/13/23 2103    ascorbic acid (vitamin C) (VITAMIN C) tablet 500 mg, 500 mg, Oral, DAILY, Keanu Willis MD, 500 mg at 02/13/23 0840    ferrous sulfate tablet 325 mg, 325 mg, Oral, DAILY, Keanu Willis MD, 325 mg at 02/13/23 0841    pantoprazole (PROTONIX) tablet 40 mg, 40 mg, Oral, DAILY, Layne Herrera MD, 40 mg at 02/13/23 0840    traMADoL (ULTRAM) tablet 50 mg, 50 mg, Oral, Q6H PRN, Keanu Willis MD, 50 mg at 02/14/23 0315    acetaminophen (TYLENOL) tablet 650 mg, 650 mg, Oral, Q6H PRN, 650 mg at 02/12/23 0907 **OR** acetaminophen (TYLENOL) suppository 650 mg, 650 mg, Rectal, Q6H PRN, Keanu Willis MD    polyethylene glycol (MIRALAX) packet 17 g, 17 g, Oral, DAILY PRN, Layne Herrera MD, 17 g at 02/10/23 2128    ondansetron (ZOFRAN ODT) tablet 4 mg, 4 mg, Oral, Q8H PRN **OR** ondansetron (ZOFRAN) injection 4 mg, 4 mg, IntraVENous, Q6H PRN, Keanu Willis MD    LAB DATA REVIEWED:    No results found for this or any previous visit (from the past 24 hour(s)). XR Results (most recent):  Results from Hospital Encounter encounter on 02/07/23    XR CHEST PORT    Narrative  EXAM:  XR CHEST PORT    INDICATION: Chest pain    COMPARISON: 11/2/2022    TECHNIQUE: portable chest AP view    FINDINGS: The cardiac silhouette is within normal limits. The pulmonary  vasculature is within normal limits. The lungs and pleural spaces are clear. The bones are osteopenic. Impression  No acute process on portable chest.         ANKLE BRACHIAL INDEX   Final Result      CT CHEST WO CONT   Final Result   1.   Please note, cannot assess for pulmonary embolism on this noncontrast exam.   2.  Moderate pericardial effusion, nonspecific, but consider pericarditis. 3.  Emphysema, without acute airspace disease. XR CHEST PORT   Final Result      No acute process on portable chest.              Review of Systems:  Constitutional: Negative for chills and fever. HENT: Negative. Eyes: Negative. Respiratory: Negative. Cardiovascular: Negative. Gastrointestinal: Negative for abdominal pain and nausea. Skin: Negative. Neurological: Negative. Left leg pain swelling and redness    Objective:   VITALS:    Visit Vitals  BP (!) 91/59 (BP 1 Location: Right upper arm, BP Patient Position: Semi fowlers; At rest)   Pulse 83   Temp 97.8 °F (36.6 °C)   Resp 18   Ht 5' 2.99\" (1.6 m)   Wt 104 lb 4.4 oz (47.3 kg)   SpO2 99%   Breastfeeding No   BMI 18.48 kg/m²       Physical Exam:   Constitutional: pt is oriented to person, place, and time. HENT:   Head: Normocephalic and atraumatic. Eyes: Pupils are equal, round, and reactive to light. EOM are normal.   Cardiovascular: Normal rate, regular rhythm and normal heart sounds. Pulmonary/Chest: Breath sounds normal. No wheezes. No rales. Exhibits no tenderness. Abdominal: Soft. Bowel sounds are normal. There is no abdominal tenderness. There is no rebound and no guarding. Musculoskeletal: Left leg pain swelling and redness. Neurological: pt is alert and oriented to person, place, and time. Alert. Normal strength. No cranial nerve deficit or sensory deficit. Displays a negative Romberg sign.         ASSESSMENT & PLAN:    Acute cellulitis of the left leg  Infectious consulted, awaiting blood and urine cultures  Continue meropenem with plan to d/c on levaquin 750 mg   Continue PT  UTI complicated by ostomy bag  Urine positive, cultures negative  Continue meropenem   History of DVTs  Chronic DVT non- occlusive  Monitor   History of bladder cancer status postsurgery  History of colostomy bag  GERD  BARBARA normal   Pericardial effusion  No additional treatment necessary        Patient started on IV meropenem after the blood cultures ID consult    Continue other home medications     New hypotension  IVF NS       Current Facility-Administered Medications:     meropenem (MERREM) 1 g in 0.9% sodium chloride 20 mL IV syringe, 1 g, IntraVENous, Q8H, Keanu Willis MD, 1 g at 02/14/23 0544    apixaban (ELIQUIS) tablet 5 mg, 5 mg, Oral, Q12H, Keanu Willis MD, 5 mg at 02/13/23 2103    ascorbic acid (vitamin C) (VITAMIN C) tablet 500 mg, 500 mg, Oral, DAILY, Keanu Willis MD, 500 mg at 02/13/23 0840    ferrous sulfate tablet 325 mg, 325 mg, Oral, DAILY, Keanu Willis MD, 325 mg at 02/13/23 0841    pantoprazole (PROTONIX) tablet 40 mg, 40 mg, Oral, DAILY, Keanu Willis MD, 40 mg at 02/13/23 0840    traMADoL (ULTRAM) tablet 50 mg, 50 mg, Oral, Q6H PRN, Keanu Willis MD, 50 mg at 02/14/23 0315    acetaminophen (TYLENOL) tablet 650 mg, 650 mg, Oral, Q6H PRN, 650 mg at 02/12/23 0907 **OR** acetaminophen (TYLENOL) suppository 650 mg, 650 mg, Rectal, Q6H PRN, Keanu Willis MD    polyethylene glycol (MIRALAX) packet 17 g, 17 g, Oral, DAILY PRN, Keanu Willis MD, 17 g at 02/10/23 2128    ondansetron (ZOFRAN ODT) tablet 4 mg, 4 mg, Oral, Q8H PRN **OR** ondansetron (ZOFRAN) injection 4 mg, 4 mg, IntraVENous, Q6H PRN, Keanu Willis MD       ________________________________________________________________________    Signed: Roger Gomez

## 2023-02-14 NOTE — PROGRESS NOTES
History and Physical    NAME: Jessie Dominguez   :  1965   MRN:  [id number]     Date/Time:  2023 5:25 PM    Patient PCP: Vincent Gutierrez MD  ______________________________________________________________________             Subjective:     CHIEF COMPLAINT:     Left leg pain        HISTORY OF PRESENT ILLNESS:       Patient is a 61y.o. year old female with signal past medical history of multiple DVT history of bladder cancer status post urostomy bag status postsurgery history of colostomy bag came to the ER complaining of left leg pain redness swelling patient was treated as outpatient with doxycycline with no much improvement she came to emergency room seen by the ER physician and patient was recommended to be admitted for acute cellulitis of the left leg      pain is constant. Infection has not spread since onset about 1 month ago. Pain has worsened over the past month, now with paresthesia radiating up the leg. Patient states she cannot stand on affected foot which prompted her to present to ER. Chronic DVT found on duplex last week, was given doxy with no improvement. Urinalysis shows bacteriuria, pyuria, positive nitratess. Awaiting urine and blood cultures.  pain improved since yesterday, pain now isolated to infected region, no longer radiating up her leg. Urine and blood not resulted. On meropenem IV. Duplex not resulted, pt was told verbally that u/s was normal. Asx for UTI, no CVA tenderness. Would like to have help with ambulation today. 2/10 Still awaiting cultures. Pt feeling better. No longer having numbness and tingling. Saw PT yesterday for evaluation yesterday will need to be discharged to nursing     Urine culture negative. Cellulitis improving. Significant pain in the left heel making ambulation challenging. CRP improving. Day 7 of Meropenem today. Infectious plans to discharge on levaquin 750 mg qday X10 days.  Seen by cardiovascular surgery on Friday: pericardial effusion deemed insignificant, no tamponade.  Pt cleared for discharge by ID on levaquin 750 x 7 days. Blood cultures showed no growth in 6 days. Pt plans to discharge with home health. Pt worked with OT yesterday. Seen with PT today, working on stairs. Still unable to bare weight on her left heel. BP low this morning  91/51.      Past Medical History:   Diagnosis Date    Anemia     pt states had recent blood transfusion 2022    Back pain     Cancer (HCC)     bladder    DVT (deep venous thrombosis) ([address]     and PE, pt denies lung PE , only leg several years ago    Hepatitis C     pt states dx 1 month ago    Liver disease     Presence of IVC filter     pt states several years ago    Rheumatoid arthritis ([address]     Sciatic leg pain     pt states both legs    Uterine fibroid         Past Surgical History:   Procedure Laterality Date    HX APPENDECTOMY  10/03/2022    HX  SECTION      HX GI  2022    laparoscopic early postop adhesion of the sigmoid colon    HX GI  2022    laparoscopic lysis of adhesion    HX GI  10/31/2022    rigid sigmoidoscope examination of the rectum    HX GI  10/31/2022    distal rectal repair with primary suturing technique    HX GYN  10/03/2022    ROBOT ANTERIOR PELVIC EXENTERATION    HX HYSTERECTOMY  10/03/2022    HX SALPINGO-OOPHORECTOMY Bilateral 10/03/2022    HX UROLOGICAL  09/15/2022    CYSTOSCOPY    HX UROLOGICAL  09/15/2022    URETHRAL DILATION    HX UROLOGICAL  09/15/2022    URETERAL STENT PLACEMENT    HX UROLOGICAL  09/15/2022    TRANSURETHRAL RESECTION OF BLADDER TUMOR >2CM    HX UROLOGICAL Bilateral 09/15/2022    RETROGRADE PYELOGRAM    HX UROLOGICAL  10/03/2022    PELVIC LYMPH NODE DISSECTION    HX UROLOGICAL  10/03/2022    ILEAL CONDUIT URINARY DIVERSION    IR THROMBECTOMY [facility] VEIN W PHARM  10/27/2022    [place] UNLISTED PROCEDURE CARDIAC SURGERY      stent placement       Social History     Tobacco Use    Smoking status: Former     Packs/day: 0.50 Years: 10.00     Pack years: 5.00     Types: Cigarettes     Quit date: 1998     Years since quittin.1    Smokeless tobacco: Never    Tobacco comments:     Quit 15 years ago   Substance Use Topics    Alcohol use: Not Currently        Family History   Problem Relation Age of Onset    Cancer Mother     Lung Cancer Mother     Heart Disease Father     Hypertension Sister     Cancer Maternal Aunt     Breast Cancer Maternal Aunt        Allergies   Allergen Reactions    Bactrim [Sulfamethoprim] Swelling     Swelling of the lilps    Penicillin V Potassium Swelling        Prior to Admission medications    Medication Sig Start Date End Date Taking? Authorizing Provider   levoFLOXacin (Levaquin) 750 mg tablet Take 1 Tablet by mouth daily. 23  Yes Merari Willis MD   multivitamin (ONE A DAY) tablet Take 1 Tablet by mouth daily. 23  Yes Velia Bernal MD   pantoprazole (PROTONIX) 40 mg tablet Take 1 Tablet by mouth daily. 23  Yes Velia Bernal MD   ascorbic acid, vitamin C, (VITAMIN C) 500 mg tablet Take 1 Tablet by mouth daily. 23  Yes Velia Bernal MD   apixaban (ELIQUIS) 5 mg tablet Take 1 Tablet by mouth every twelve (12) hours for 90 days. Indications: blood clot in a deep vein of the extremities 23 Yes Velia Bernal MD   acetaminophen (TYLENOL) 650 mg TbER Take 1 Tablet by mouth every eight (8) hours as needed for Pain. 23  Yes Velia Bernal MD   docusate sodium (COLACE) 100 mg capsule Take 1 Capsule by mouth two (2) times daily as needed for Constipation for up to 90 days. 23 Yes Velia Bernal MD   ferrous sulfate 325 mg (65 mg iron) tablet Take 1 Tablet by mouth daily.  23  Yes Velia Bernal MD   traMADoL Andre Aldana) 50 mg tablet Take  by mouth. 22  Yes Provider, Historical   ondansetron (ZOFRAN ODT) 4 mg disintegrating tablet Take  by mouth. 22  Yes Provider, Historical   psyllium seed-sucrose (Metamucil, sugar,) powd Take 1 Scoop by mouth daily. 12/7/22  Yes Ildefonso Chauhan MD         Current Facility-Administered Medications:     meropenem (MERREM) 1 g in 0.9% sodium chloride 20 mL IV syringe, 1 g, IntraVENous, Q8H, Keanu Willis MD, 1 g at 02/14/23 0544    apixaban (ELIQUIS) tablet 5 mg, 5 mg, Oral, Q12H, Keanu Willis MD, 5 mg at 02/14/23 8961    ascorbic acid (vitamin C) (VITAMIN C) tablet 500 mg, 500 mg, Oral, DAILY, Keanu Willis MD, 500 mg at 02/14/23 0831    ferrous sulfate tablet 325 mg, 325 mg, Oral, DAILY, Keanu Willis MD, 325 mg at 02/14/23 0831    pantoprazole (PROTONIX) tablet 40 mg, 40 mg, Oral, DAILY, Keanu Willis MD, 40 mg at 02/14/23 0831    traMADoL (ULTRAM) tablet 50 mg, 50 mg, Oral, Q6H PRN, Keanu Willis MD, 50 mg at 02/14/23 0831    acetaminophen (TYLENOL) tablet 650 mg, 650 mg, Oral, Q6H PRN, 650 mg at 02/12/23 0907 **OR** acetaminophen (TYLENOL) suppository 650 mg, 650 mg, Rectal, Q6H PRN, Keanu Willis MD    polyethylene glycol (MIRALAX) packet 17 g, 17 g, Oral, DAILY PRN, Keanu Willis MD, 17 g at 02/10/23 2128    ondansetron (ZOFRAN ODT) tablet 4 mg, 4 mg, Oral, Q8H PRN **OR** ondansetron (ZOFRAN) injection 4 mg, 4 mg, IntraVENous, Q6H PRN, Keanu Willis MD    LAB DATA REVIEWED:    Recent Results (from the past 24 hour(s))   C REACTIVE PROTEIN, QT    Collection Time: 02/14/23  7:56 AM   Result Value Ref Range    C-Reactive protein 1.31 (H) 0.00 - 0.60 mg/dL         XR Results (most recent):  Results from Hospital Encounter encounter on 02/07/23    XR CHEST PORT    Narrative  EXAM:  XR CHEST PORT    INDICATION: Chest pain    COMPARISON: 11/2/2022    TECHNIQUE: portable chest AP view    FINDINGS: The cardiac silhouette is within normal limits. The pulmonary  vasculature is within normal limits. The lungs and pleural spaces are clear. The bones are osteopenic.     Impression  No acute process on portable chest.         ANKLE BRACHIAL INDEX   Final Result CT CHEST WO CONT   Final Result   1. Please note, cannot assess for pulmonary embolism on this noncontrast exam.   2.  Moderate pericardial effusion, nonspecific, but consider pericarditis. 3.  Emphysema, without acute airspace disease. XR CHEST PORT   Final Result      No acute process on portable chest.              Review of Systems:  Constitutional: Negative for chills and fever. HENT: Negative. Eyes: Negative. Respiratory: Negative. Cardiovascular: Negative. Gastrointestinal: Negative for abdominal pain and nausea. Skin: Negative. Neurological: Negative. Left leg pain swelling and redness    Objective:   VITALS:    Visit Vitals  BP (!) 91/59 (BP 1 Location: Right upper arm, BP Patient Position: Semi fowlers; At rest)   Pulse 83   Temp 97.8 °F (36.6 °C)   Resp 18   Ht 5' 2.99\" (1.6 m)   Wt 47.3 kg (104 lb 4.4 oz)   SpO2 99%   Breastfeeding No   BMI 18.48 kg/m²       Physical Exam:   Constitutional: pt is oriented to person, place, and time. HENT:   Head: Normocephalic and atraumatic. Eyes: Pupils are equal, round, and reactive to light. EOM are normal.   Cardiovascular: Normal rate, regular rhythm and normal heart sounds. Pulmonary/Chest: Breath sounds normal. No wheezes. No rales. Exhibits no tenderness. Abdominal: Soft. Bowel sounds are normal. There is no abdominal tenderness. There is no rebound and no guarding. Musculoskeletal: Left leg pain swelling and redness. Neurological: pt is alert and oriented to person, place, and time. Alert. Normal strength. No cranial nerve deficit or sensory deficit. Displays a negative Romberg sign.         ASSESSMENT & PLAN:    Acute cellulitis of the left leg  Infectious consulted, awaiting blood and urine cultures  Continue meropenem with plan to d/c on levaquin 750 mg   Continue PT  UTI complicated by ostomy bag  Urine positive, cultures negative  Continue meropenem   History of DVTs  Chronic DVT non- occlusive  Monitor   History of bladder cancer status postsurgery  History of colostomy bag  GERD  BARBARA normal   Pericardial effusion  No additional treatment necessary        Patient started on IV meropenem after the blood cultures ID consult    Continue other home medications     New hypotension  IVF NS       Waiting for placement      Current Facility-Administered Medications:     meropenem (MERREM) 1 g in 0.9% sodium chloride 20 mL IV syringe, 1 g, IntraVENous, Q8H, Keanu Willis MD, 1 g at 02/14/23 0544    apixaban (ELIQUIS) tablet 5 mg, 5 mg, Oral, Q12H, Keanu Willis MD, 5 mg at 02/14/23 6001    ascorbic acid (vitamin C) (VITAMIN C) tablet 500 mg, 500 mg, Oral, DAILY, Keanu Willis MD, 500 mg at 02/14/23 0831    ferrous sulfate tablet 325 mg, 325 mg, Oral, DAILY, Keanu Willis MD, 325 mg at 02/14/23 0831    pantoprazole (PROTONIX) tablet 40 mg, 40 mg, Oral, DAILY, Keanu Willis MD, 40 mg at 02/14/23 0831    traMADoL (ULTRAM) tablet 50 mg, 50 mg, Oral, Q6H PRN, Keanu Willis MD, 50 mg at 02/14/23 0831    acetaminophen (TYLENOL) tablet 650 mg, 650 mg, Oral, Q6H PRN, 650 mg at 02/12/23 0907 **OR** acetaminophen (TYLENOL) suppository 650 mg, 650 mg, Rectal, Q6H PRN, Keanu Willis MD    polyethylene glycol (MIRALAX) packet 17 g, 17 g, Oral, DAILY PRN, Keanu Willis MD, 17 g at 02/10/23 2128    ondansetron (ZOFRAN ODT) tablet 4 mg, 4 mg, Oral, Q8H PRN **OR** ondansetron (ZOFRAN) injection 4 mg, 4 mg, IntraVENous, Q6H PRN, Alex Randhawa MD       ________________________________________________________________________    Signed: Anupam Stephens MD

## 2023-02-14 NOTE — PROGRESS NOTES
[address] OCCUPATIONAL THERAPY TREATMENT  Patient: Mehdi Shukla (12 y.o. female)  Date: 2/14/2023  Diagnosis: Cellulitis [L03.90]  UTI (urinary tract infection) [N39.0] <principal problem not specified>      Precautions: FALL  In place during session:  none  Chart, occupational therapy assessment, plan of care, and goals were reviewed. ASSESSMENT  Pt continues with skilled OT services and is progressing towards goals. Pt received semi-supine in bed upon arrival, AXO x4 and agreeable to WAGNER tx at this time. Pt cooperative and demonstrated good effort during activities. Pt has made much improvement with ADL performance and functional mobility with decreased assistance. Pt continue to c/o of foot pain and mild swelling noted. Pt educated on support hose that could be useful to decrease swelling, CM notified. Pt noted with no LOB during sink level grooming. Patient educated on and completed bilateral UE HEP in prep for self care task and strength for OOB ADL'S, see grid below. Pt has great potential to meet goals. Overall, pt continues to present with deficits in generalized strength/AROM, dynamic standing balance, pain and functional activity tolerance during performance of ADLs/mobility (see below for objective details and assist levels). Pt has met goals #1 and #2. D/C recommendation updated at this time from SNF to Home with HHOT due to pt's improvement with ADL performance and functional tf's requiring decreased assistance . Discussed pt progress with OTR/L with OTR/L in agreement with d/c plan update. Other factors to consider for discharge: Time of onset, medical prognosis/diagnosis, severity of deficits, PLOF, functional baseline, home environment, and family support          PLAN :  Patient continues to benefit from skilled intervention to address the above impairments. Continue treatment per established plan of care. to address goals.     Recommend with staff: Out of bed to chair for meals and Encourage HEP in prep for ADLs/mobility    Recommend next session: Toileting    Recommendation for discharge: (in order for the patient to meet his/her long term goals)  Home with [address] [place] [address]    This discharge recommendation:  Has been made in collaboration with the attending provider and/or case management       IF patient discharges home will need the following DME: TBD       SUBJECTIVE:   Patient stated I have some pain in my leg. Amy Frankel    OBJECTIVE DATA SUMMARY:   Cognitive/Behavioral Status:  Neurologic State: Alert  Orientation Level: Oriented X4  Cognition: Follows commands             Functional Mobility and Transfers for ADLs:  Bed Mobility:  Supine to Sit: Modified independent  Sit to Supine: Independent  Scooting: Independent    Transfers:  Sit to Stand: Modified independent  Functional Transfers  Bathroom Mobility: Modified independent       Balance:  Sitting: Intact  Standing: Intact  Standing - Static: Good  Standing - Dynamic : Fair    ADL Intervention:       Grooming  Grooming Assistance: Modified independent  Position Performed: Standing  Washing Face: Modified independent  Washing Hands: Modified independent  Brushing Teeth: Modified independent         Exercise Sets Reps AROM AAROM PROM Self PROM Comments   Chest press 3 12 [x] [] [] [] EOB, 1/2 lb wt (water bottle) rest breaks   Ceiling punches 3 12 [x]  [] []    Lateral raises 2 12 [x]            Pain:  8/10 LLE    Activity Tolerance:   Good    After treatment patient left in no apparent distress:   Supine in bed, Call bell within reach, Bed / chair alarm activated, and Side rails x 3, bed locked and in lowest position    COMMUNICATION/COLLABORATION:   The patients plan of care was discussed with: Registered nurse.      BERNARD Escudero  Time Calculation: 24 mins       Occupational Therapy Goals  Initiated 2/10/2023    Pt stated goal: To get stronger  Pt will be Mod I sup <> sit in prep for EOB ADLs  Pt will be Mod I grooming standing sink side LRAD  Pt will be Mod I UB dressing sitting EOB/long sit   Pt will be Mod I LE dressing sitting EOB/long sit  Pt will be Mod I sit <>  prep for toileting LRAD  Pt will be Mod I toileting/toilet transfer/cloth mgmt LRAD

## 2023-02-14 NOTE — PROGRESS NOTES
Bedside and Verbal shift change report given to [facility], LPN (oncoming nurse) by Nolvia Fox (offgoing nurse). Report included the following information medication, patient condition, and shift plan and discharge plan of action.

## 2023-02-14 NOTE — PROGRESS NOTES
Progress Note    Patient: Juan Carlos Schumacher MRN: [id number]  SSN: [id number]    YOB: 1965  Age: 61 y.o. Sex: female      Admit Date: 2/7/2023    LOS: 7 days     Subjective:   Patient followed for cellulitis left leg/foot of unclear etiology and complicated UTI. Blood cultures are negative and urine culture negative. WBC and procal normal and CRP decreasing. Patient resting comfortably with no new complaints but she is requesting a boot for her left foot. Objective:     Vitals:    02/13/23 1520 02/13/23 2125 02/14/23 0310 02/14/23 0720   BP: 115/82 98/62 94/66 (!) 91/59   Pulse: 96 74 70 83   Resp: 16 16 16 18   Temp: 97.3 °F (36.3 °C) 97.8 °F (36.6 °C) 97.8 °F (36.6 °C) 97.8 °F (36.6 °C)   SpO2: 100% 100% 100% 99%   Weight:       Height:            Intake and Output:  Current Shift: No intake/output data recorded. Last three shifts: 02/12 1901 - 02/14 0700  In: -   Out: 1200 [Urine:1200]    Physical Exam:   Vitals and nursing note reviewed. Constitutional:       General: She is not in acute distress. Appearance: She is ill-appearing. HENT: unremarkable  Eyes:      Pupils: Pupils are equal, round, and reactive to light. Cardiovascular:      Rate and Rhythm: Normal rate and regular rhythm. Heart sounds: No murmur heard. Pulmonary:      Effort: Pulmonary effort is normal.      Breath sounds: Normal breath sounds. Abdominal:      General: Bowel sounds are normal. There is no distension. Palpations: Abdomen is soft. Tenderness: There is no abdominal tenderness. Comments: Urostomy with clear urine  Colostomy with pink stoma   Genitourinary:     Comments: No Gibson  Musculoskeletal:      Right lower leg: No edema. Left lower leg: Edema present. Comments: Swelling in left calf, ankle and foot has resolved with no erythema   Skin: only as noted above   Neurological:      General: No focal deficit present.       Mental Status: She is alert and oriented to person, place, and time. Psychiatric:    normal behavior    Lab/Data Review:     WBC 3,600     CRP 1.31 <1.90 <10.00 <15.70 <23.10  Procal <0.05 <0.08 < 0.15 <0.24    Blood cultures (2/7) No growth FINAL  Urine culture (2/8) No growth FINAL    TTE (2/9)  small pericardial effusion  Assessment:     Active Problems:    Cellulitis (2/7/2023)      UTI (urinary tract infection) (2/7/2023)  Presumptive cellulitis, left leg/foot, etiology unclear, improving, Day #8 IV Meropenem  Complicated UTI (cystectomy, urostomy) with marked pyuria, bacteriuria and positive nitrite, urine culture negative, on IV Meropenem  Elevated CRP and procal, secondary to above, resolving  Pericardial effusion, significance unclear,  small, clinicaly insignificant  5. Chronic non-occlusive thrombi in left femoral/popliteal and gastrocnemius veins  6. Penicillin allergy  7. Hepatitis C antibody positive    Comment:  Showing clinical improvement with decreasing CRP  and now normal procal. Urine culture felt to be falsely negative since it was collected while on Meropenem.     Plan:   Continue IV Meropenem; would discharge on Levaquin 750 mg daily for 6 more days  Cleared for discharge from ID standpoint      Signed By: Padmini Lopez MD     February 14, 2023

## 2023-02-14 NOTE — PROGRESS NOTES
Problem: Falls - Risk of  Goal: *Absence of Falls  Description: Document Bartolo Pearson Fall Risk and appropriate interventions in the flowsheet. Outcome: Progressing Towards Goal  Note: Fall Risk Interventions:  Mobility Interventions: Bed/chair exit alarm         Medication Interventions: Bed/chair exit alarm    Elimination Interventions: Call light in reach              Problem: Patient Education: Go to Patient Education Activity  Goal: Patient/Family Education  Outcome: Progressing Towards Goal     Problem: Pressure Injury - Risk of  Goal: *Prevention of pressure injury  Description: Document Alphonso Scale and appropriate interventions in the flowsheet.   Outcome: Progressing Towards Goal  Note: Pressure Injury Interventions:  Sensory Interventions: Assess need for specialty bed    Moisture Interventions: Apply protective barrier, creams and emollients    Activity Interventions: PT/OT evaluation    Mobility Interventions: Float heels    Nutrition Interventions: Document food/fluid/supplement intake    Friction and Shear Interventions: Apply protective barrier, creams and emollients                Problem: Patient Education: Go to Patient Education Activity  Goal: Patient/Family Education  Outcome: Progressing Towards Goal     Problem: Patient Education: Go to Patient Education Activity  Goal: Patient/Family Education  Outcome: Progressing Towards Goal     Problem: Patient Education: Go to Patient Education Activity  Goal: Patient/Family Education  Outcome: Progressing Towards Goal

## 2023-02-14 NOTE — PROGRESS NOTES
Bedside shift change report given to Robbin Jonas LPN (oncoming nurse) by Pete Brooks LPN (offgoing nurse). Report included the following information SBAR and MAR.

## 2023-02-14 NOTE — PROGRESS NOTES
PHYSICAL THERAPY TREATMENT  Patient: Elaine Araujo (13 y.o. female)  Date: 2/14/2023  Diagnosis: Cellulitis [L03.90]  UTI (urinary tract infection) [N39.0] <principal problem not specified>      Precautions: In place during session:   Chart, physical therapy assessment, plan of care and goals were reviewed. ASSESSMENT  Patient continues with skilled PT services and is progressing towards goals. Pt supine upon PT arrival, agreeable to session. (See below for objective details and assist levels). Overall pt tolerated session well today with PT. Decreased assistance required for all mobility. Increased amb distance significantly, slow coy with no overt LOB noted. Able to place heel on floor and tolerate weight bearing today. Progressed to stair training, verbal cues provided for safest technique, no difficulty noted. Reviewed supine and seated therex, printed list of exercises given to pt. Pt requesting Cam boot, explained to pt this was unnecessary, however pt continued to request, so also discussed [place] [facility] [place] and supervising PT. Discussed with OT, rec use of compression stockings to limit feeling of pressure pt is experiencing. Also educated pt on importance of elevation of LLE after amb to decrease swelling. Will continue to benefit from skilled PT services, and will continue to progress as tolerated. Other factors to consider for discharge: Pain         PLAN :  Patient continues to benefit from skilled intervention to address the above impairments. Continue treatment per established plan of care to address goals.     Recommend with staff: Out of bed to chair for meals, Encourage HEP in prep for ADLs/mobility, Amb to bathroom for toileting with gt belt and AD, LE elevation for management of edema, and Amb in hallway    Recommendation for discharge: (in order for the patient to meet his/her long term goals)  Home with Home Health Therapy             SUBJECTIVE:   Patient stated I'm going home tomorrow. Isa Guillaume    OBJECTIVE DATA SUMMARY:   Critical Behavior:  Neurologic State: Alert  Orientation Level: Oriented X4  Cognition: Follows commands     Functional Mobility Training:  Bed Mobility:     Supine to Sit: Modified independent  Sit to Supine: Independent  Scooting: Independent        Transfers:  Sit to Stand: Modified independent  Stand to Sit: Modified independent                             Balance:  Sitting: Intact  Standing: Intact  Standing - Static: Good  Standing - Dynamic : Fair  Ambulation/Gait Training:  Distance (ft): 400 Feet (ft)  Assistive Device: Gait belt;Walker, rolling  Ambulation - Level of Assistance: Supervision        Gait Abnormalities: Antalgic           Stance: Left decreased  Speed/Vera: Slow  Step Length: Left shortened;Right shortened        Interventions: Verbal cues           Stairs:  Number of Stairs Trained: 8  Stairs - Level of Assistance: Supervision   Rail Use: Both    Therapeutic Exercises:     Printed hand out given for supine and seated LE therex. Pain Ratin/10 after amb L ankle. Activity Tolerance:   Good    After treatment patient left in no apparent distress:   Bed locked and returned to lowest position, Supine in bed, Heels elevated for pressure relief, Call bell within reach, and Bed / chair alarm activated    GOALS:    Problem: Mobility Impaired (Adult and Pediatric)  Goal: *Acute Goals and Plan of Care (Insert Text)  Description: FUNCTIONAL STATUS PRIOR TO ADMISSION: Patient was modified independent using a rollator for functional mobility. HOME SUPPORT PRIOR TO ADMISSION: The patient lived alone with niece (as an AIDE) to provide assistance. Patient stated goal: get better  Patient will move from supine to sit and sit to supine , scoot up and down, and roll side to side in bed with supervision/set-up within 7 day(s).     Patient will transfer from bed to chair and chair to bed with modified independence using the least restrictive device within 7 day(s). Patient will improve static standing balance to modified independence within 1 week(s). Patient will ambulate 50 feet with modified independence with least restrictive device within 1 weeks. Outcome: Progressing Towards Goal       COMMUNICATION/COLLABORATION:   The patients plan of care was discussed with: Physical therapist, Occupational therapy assistant, and Registered nurse.          Rocio Bhagat, PT   Time Calculation: 56 mins

## 2023-02-15 ENCOUNTER — TELEPHONE (OUTPATIENT)
Dept: INTERNAL MEDICINE CLINIC | Age: 58
End: 2023-02-15

## 2023-02-15 VITALS
RESPIRATION RATE: 18 BRPM | WEIGHT: 104.28 LBS | BODY MASS INDEX: 18.48 KG/M2 | OXYGEN SATURATION: 100 % | TEMPERATURE: 97.9 F | SYSTOLIC BLOOD PRESSURE: 100 MMHG | HEART RATE: 77 BPM | HEIGHT: 63 IN | DIASTOLIC BLOOD PRESSURE: 72 MMHG

## 2023-02-15 PROCEDURE — 99232 SBSQ HOSP IP/OBS MODERATE 35: CPT | Performed by: INTERNAL MEDICINE

## 2023-02-15 PROCEDURE — 74011000250 HC RX REV CODE- 250: Performed by: FAMILY MEDICINE

## 2023-02-15 PROCEDURE — 74011250637 HC RX REV CODE- 250/637: Performed by: FAMILY MEDICINE

## 2023-02-15 PROCEDURE — 74011250636 HC RX REV CODE- 250/636: Performed by: FAMILY MEDICINE

## 2023-02-15 RX ADMIN — SODIUM CHLORIDE, PRESERVATIVE FREE 1 G: 5 INJECTION INTRAVENOUS at 06:06

## 2023-02-15 RX ADMIN — APIXABAN 5 MG: 5 TABLET, FILM COATED ORAL at 10:07

## 2023-02-15 RX ADMIN — TRAMADOL HYDROCHLORIDE 50 MG: 50 TABLET, COATED ORAL at 06:06

## 2023-02-15 RX ADMIN — FERROUS SULFATE TAB 325 MG (65 MG ELEMENTAL FE) 325 MG: 325 (65 FE) TAB at 10:08

## 2023-02-15 RX ADMIN — PANTOPRAZOLE SODIUM 40 MG: 40 TABLET, DELAYED RELEASE ORAL at 10:07

## 2023-02-15 RX ADMIN — OXYCODONE HYDROCHLORIDE AND ACETAMINOPHEN 500 MG: 500 TABLET ORAL at 10:07

## 2023-02-15 RX ADMIN — TRAMADOL HYDROCHLORIDE 50 MG: 50 TABLET, COATED ORAL at 12:48

## 2023-02-15 RX ADMIN — ACETAMINOPHEN 650 MG: 325 TABLET ORAL at 10:08

## 2023-02-15 NOTE — PROGRESS NOTES
Progress Note    Patient: Elena Mcgill MRN: [id number]  SSN: [id number]    YOB: 1965  Age: 61 y.o. Sex: female      Admit Date: 2/7/2023    LOS: 8 days     Subjective:   Patient followed for cellulitis left leg/foot of unclear etiology and complicated UTI. Blood cultures are negative and urine culture negative. WBC and procal normal and CRP decreasing. Patient has been discharged. She remained afebrile. Objective:     Vitals:    02/14/23 1400 02/14/23 2034 02/15/23 0400 02/15/23 0747   BP: 93/64 102/69 106/61 100/72   Pulse: 72 78 74 77   Resp: 18 20 20 18   Temp: 98.1 °F (36.7 °C) 98.2 °F (36.8 °C) 98.2 °F (36.8 °C) 97.9 °F (36.6 °C)   SpO2: 99% 100% 100% 100%   Weight:       Height:            Intake and Output:  Current Shift: No intake/output data recorded. Last three shifts: 02/13 1901 - 02/15 0700  In: -   Out: 700 [Urine:700]    Physical Exam:   Vitals and nursing note reviewed. Patient unavailable for re-examination  Constitutional:       General: She is not in acute distress. Appearance: She is ill-appearing. HENT: unremarkable  Eyes:      Pupils: Pupils are equal, round, and reactive to light. Cardiovascular:      Rate and Rhythm: Normal rate and regular rhythm. Heart sounds: No murmur heard. Pulmonary:      Effort: Pulmonary effort is normal.      Breath sounds: Normal breath sounds. Abdominal:      General: Bowel sounds are normal. There is no distension. Palpations: Abdomen is soft. Tenderness: There is no abdominal tenderness. Comments: Urostomy with clear urine  Colostomy with pink stoma   Genitourinary:     Comments: No Gibson  Musculoskeletal:      Right lower leg: No edema. Left lower leg: Edema present. Comments: Swelling in left calf, ankle and foot has resolved with no erythema   Skin: only as noted above   Neurological:      General: No focal deficit present.       Mental Status: She is alert and oriented to person, place, and time. Psychiatric:    normal behavior    Lab/Data Review:     WBC 3,600     CRP 1.31 <1.90 <10.00 <15.70 <23.10  Procal <0.05 <0.08 < 0.15 <0.24    Blood cultures (2/7) No growth FINAL  Urine culture (2/8) No growth FINAL    TTE (2/9)  small pericardial effusion  Assessment:     Active Problems:    Cellulitis (2/7/2023)      UTI (urinary tract infection) (2/7/2023)  Presumptive cellulitis, left leg/foot, etiology unclear, improving, Day #9 IV Meropenem  Complicated UTI (cystectomy, urostomy) with marked pyuria, bacteriuria and positive nitrite, urine culture negative, on IV Meropenem  Elevated CRP and procal, secondary to above, resolving  Pericardial effusion, significance unclear,  small, clinicaly insignificant  5. Chronic non-occlusive thrombi in left femoral/popliteal and gastrocnemius veins  6. Penicillin allergy  7. Hepatitis C antibody positive, HCV not detected in 9/2021    Comment:  Showing clinical improvement with decreasing CRP  and now normal procal. Urine culture felt to be falsely negative since it was collected while on Meropenem.     Plan:   Agree with discharge on Levaquin 750 mg daily for 5 more days  Cleared for discharge from ID standpoint      Signed By: Padmini Lopez MD     February 15, 2023

## 2023-02-15 NOTE — PROGRESS NOTES
Problem: Falls - Risk of  Goal: *Absence of Falls  Description: Document Kenyatta Cardoso Fall Risk and appropriate interventions in the flowsheet. Outcome: Progressing Towards Goal  Note: Fall Risk Interventions:  Mobility Interventions: Bed/chair exit alarm         Medication Interventions: Bed/chair exit alarm    Elimination Interventions: Call light in reach              Problem: Patient Education: Go to Patient Education Activity  Goal: Patient/Family Education  Outcome: Progressing Towards Goal     Problem: Pressure Injury - Risk of  Goal: *Prevention of pressure injury  Description: Document Alphonso Scale and appropriate interventions in the flowsheet.   Outcome: Progressing Towards Goal  Note: Pressure Injury Interventions:  Sensory Interventions: Assess changes in LOC    Moisture Interventions: Absorbent underpads, Apply protective barrier, creams and emollients, Assess need for specialty bed, Maintain skin hydration (lotion/cream), Minimize layers, Moisture barrier    Activity Interventions: Increase time out of bed    Mobility Interventions: Assess need for specialty bed    Nutrition Interventions: Document food/fluid/supplement intake    Friction and Shear Interventions: Apply protective barrier, creams and emollients                Problem: Patient Education: Go to Patient Education Activity  Goal: Patient/Family Education  Outcome: Progressing Towards Goal     Problem: Patient Education: Go to Patient Education Activity  Goal: Patient/Family Education  Outcome: Progressing Towards Goal     Problem: Patient Education: Go to Patient Education Activity  Goal: Patient/Family Education  Outcome: Progressing Towards Goal

## 2023-02-15 NOTE — PROGRESS NOTES
Pt will D/C to home today, was not able to set up [facility] f/f with Pt, she stated that her Niece will take care of her. Pt stated that her Niece will pick her up. Pt to D/C to home with no CM needs identified. Discharge plan of care/case management plan validated with provider discharge order.

## 2023-02-15 NOTE — PROGRESS NOTES
Bedside shift change report given to Teresia Cowden, LPN (oncoming nurse) by Zoe Frost LPN (offgoing nurse). Report included the following information SBAR and MAR.

## 2023-02-15 NOTE — PROGRESS NOTES
History and Physical    NAME: Bryan Timmons   :  1965   MRN:  [id number]     Date/Time:  2/15/2023 5:25 PM    Patient PCP: Prachi Mayo MD  ______________________________________________________________________             Subjective:     CHIEF COMPLAINT:     Left leg pain        HISTORY OF PRESENT ILLNESS:       Patient is a 61y.o. year old female with signal past medical history of multiple DVT history of bladder cancer status post urostomy bag status postsurgery history of colostomy bag came to the ER complaining of left leg pain redness swelling patient was treated as outpatient with doxycycline with no much improvement she came to emergency room seen by the ER physician and patient was recommended to be admitted for acute cellulitis of the left leg      pain is constant. Infection has not spread since onset about 1 month ago. Pain has worsened over the past month, now with paresthesia radiating up the leg. Patient states she cannot stand on affected foot which prompted her to present to ER. Chronic DVT found on duplex last week, was given doxy with no improvement. Urinalysis shows bacteriuria, pyuria, positive nitratess. Awaiting urine and blood cultures.  pain improved since yesterday, pain now isolated to infected region, no longer radiating up her leg. Urine and blood not resulted. On meropenem IV. Duplex not resulted, pt was told verbally that u/s was normal. Asx for UTI, no CVA tenderness. Would like to have help with ambulation today. 2/10 Still awaiting cultures. Pt feeling better. No longer having numbness and tingling. Saw PT yesterday for evaluation yesterday will need to be discharged to nursing     Urine culture negative. Cellulitis improving. Significant pain in the left heel making ambulation challenging. CRP improving. Day 7 of Meropenem today. Infectious plans to discharge on levaquin 750 mg qday X10 days.  Seen by cardiovascular surgery on Friday: pericardial effusion deemed insignificant, no tamponade.  Pt cleared for discharge by ID on levaquin 750 x 7 days. Blood cultures showed no growth in 6 days. Pt plans to discharge with home health. Pt worked with OT yesterday. Seen with PT today, working on stairs. Still unable to bare weight on her left heel. BP low this morning  91/51.     2/15 cleared for discharge today. Will continue on levaquin 750 mg x 7 days on dc. Feeling well. Has been able to bare weight on her left heel this morning. CRP 1.31.      Past Medical History:   Diagnosis Date    Anemia     pt states had recent blood transfusion 2022    Back pain     Cancer (HCC)     bladder    DVT (deep venous thrombosis) ([facility] [address]     and PE, pt denies lung PE , only leg several years ago    Hepatitis C     pt states dx 1 month ago    Liver disease     Presence of IVC filter     pt states several years ago    Rheumatoid arthritis ([facility] [address]     Sciatic leg pain     pt states both legs    Uterine fibroid         Past Surgical History:   Procedure Laterality Date    HX APPENDECTOMY  10/03/2022    HX  SECTION      HX GI  2022    laparoscopic early postop adhesion of the sigmoid colon    HX GI  2022    laparoscopic lysis of adhesion    HX GI  10/31/2022    rigid sigmoidoscope examination of the rectum    HX GI  10/31/2022    distal rectal repair with primary suturing technique    HX GYN  10/03/2022    ROBOT ANTERIOR PELVIC EXENTERATION    HX HYSTERECTOMY  10/03/2022    HX SALPINGO-OOPHORECTOMY Bilateral 10/03/2022    HX UROLOGICAL  09/15/2022    CYSTOSCOPY    HX UROLOGICAL  09/15/2022    URETHRAL DILATION    HX UROLOGICAL  09/15/2022    URETERAL STENT PLACEMENT    HX UROLOGICAL  09/15/2022    TRANSURETHRAL RESECTION OF BLADDER TUMOR >2CM    HX UROLOGICAL Bilateral 09/15/2022    RETROGRADE PYELOGRAM    HX UROLOGICAL  10/03/2022    PELVIC LYMPH NODE DISSECTION    HX UROLOGICAL  10/03/2022    ILEAL CONDUIT URINARY DIVERSION    IR THROMBECTOMY [facility]  10/27/2022    KY UNLISTED PROCEDURE CARDIAC SURGERY      stent placement       Social History     Tobacco Use    Smoking status: Former     Packs/day: 0.50     Years: 10.00     Pack years: 5.00     Types: Cigarettes     Quit date: 1998     Years since quittin.1    Smokeless tobacco: Never    Tobacco comments:     Quit 15 years ago   Substance Use Topics    Alcohol use: Not Currently        Family History   Problem Relation Age of Onset    Cancer Mother     Lung Cancer Mother     Heart Disease Father     Hypertension Sister     Cancer Maternal Aunt     Breast Cancer Maternal Aunt        Allergies   Allergen Reactions    Bactrim [Sulfamethoprim] Swelling     Swelling of the lilps    Penicillin V Potassium Swelling        Prior to Admission medications    Medication Sig Start Date End Date Taking? Authorizing Provider   levoFLOXacin (Levaquin) 750 mg tablet Take 1 Tablet by mouth daily. 23  Yes Reyes Willis MD   multivitamin (ONE A DAY) tablet Take 1 Tablet by mouth daily. 23  Yes Harden Hodgkin, MD   pantoprazole (PROTONIX) 40 mg tablet Take 1 Tablet by mouth daily. 23  Yes Harden Hodgkin, MD   ascorbic acid, vitamin C, (VITAMIN C) 500 mg tablet Take 1 Tablet by mouth daily. 23  Yes Harden Hodgkin, MD   apixaban (ELIQUIS) 5 mg tablet Take 1 Tablet by mouth every twelve (12) hours for 90 days. Indications: blood clot in a deep vein of the extremities 23 Yes Harden Hodgkin, MD   acetaminophen (TYLENOL) 650 mg TbER Take 1 Tablet by mouth every eight (8) hours as needed for Pain. 23  Yes Harden Hodgkin, MD   docusate sodium (COLACE) 100 mg capsule Take 1 Capsule by mouth two (2) times daily as needed for Constipation for up to 90 days. 23 Yes Harden Hodgkin, MD   ferrous sulfate 325 mg (65 mg iron) tablet Take 1 Tablet by mouth daily.  23  Yes Harden Hodgkin, MD   traMADoL Marleni Retort) 50 mg tablet Take  by mouth. 22 Yes Provider, Historical   ondansetron (ZOFRAN ODT) 4 mg disintegrating tablet Take  by mouth. 11/14/22  Yes Provider, Historical   psyllium seed-sucrose (Metamucil, sugar,) powd Take 1 Scoop by mouth daily. 12/7/22  Yes Donya Corona MD         Current Facility-Administered Medications:     meropenem (MERREM) 1 g in 0.9% sodium chloride 20 mL IV syringe, 1 g, IntraVENous, Q8H, Keanu Willis MD, 1 g at 02/15/23 0606    apixaban (ELIQUIS) tablet 5 mg, 5 mg, Oral, Q12H, Keanu Willis MD, 5 mg at 02/14/23 2159    ascorbic acid (vitamin C) (VITAMIN C) tablet 500 mg, 500 mg, Oral, DAILY, Keanu Willis MD, 500 mg at 02/14/23 0831    ferrous sulfate tablet 325 mg, 325 mg, Oral, DAILY, Keanu Willis MD, 325 mg at 02/14/23 0831    pantoprazole (PROTONIX) tablet 40 mg, 40 mg, Oral, DAILY, Keanu Willis MD, 40 mg at 02/14/23 0831    traMADoL (ULTRAM) tablet 50 mg, 50 mg, Oral, Q6H PRN, Keanu Willis MD, 50 mg at 02/15/23 0606    acetaminophen (TYLENOL) tablet 650 mg, 650 mg, Oral, Q6H PRN, 650 mg at 02/12/23 0907 **OR** acetaminophen (TYLENOL) suppository 650 mg, 650 mg, Rectal, Q6H PRN, Keanu Willis MD    polyethylene glycol (MIRALAX) packet 17 g, 17 g, Oral, DAILY PRN, Krupa Steen MD, 17 g at 02/10/23 2128    ondansetron (ZOFRAN ODT) tablet 4 mg, 4 mg, Oral, Q8H PRN **OR** ondansetron (ZOFRAN) injection 4 mg, 4 mg, IntraVENous, Q6H PRN, Keanu Willis MD    LAB DATA REVIEWED:    No results found for this or any previous visit (from the past 24 hour(s)). XR Results (most recent):  Results from Hospital Encounter encounter on 02/07/23    XR CHEST PORT    Narrative  EXAM:  XR CHEST PORT    INDICATION: Chest pain    COMPARISON: 11/2/2022    TECHNIQUE: portable chest AP view    FINDINGS: The cardiac silhouette is within normal limits. The pulmonary  vasculature is within normal limits. The lungs and pleural spaces are clear. The bones are osteopenic.     Impression  No acute process on portable chest.         ANKLE BRACHIAL INDEX   Final Result      CT CHEST WO CONT   Final Result   1. Please note, cannot assess for pulmonary embolism on this noncontrast exam.   2.  Moderate pericardial effusion, nonspecific, but consider pericarditis. 3.  Emphysema, without acute airspace disease. XR CHEST PORT   Final Result      No acute process on portable chest.              Review of Systems:  Constitutional: Negative for chills and fever. HENT: Negative. Eyes: Negative. Respiratory: Negative. Cardiovascular: Negative. Gastrointestinal: Negative for abdominal pain and nausea. Skin: Negative. Neurological: Negative. Left leg pain swelling and redness    Objective:   VITALS:    Visit Vitals  /72 (BP 1 Location: Right upper arm, BP Patient Position: At rest)   Pulse 77   Temp 97.9 °F (36.6 °C)   Resp 18   Ht 5' 2.99\" (1.6 m)   Wt 104 lb 4.4 oz (47.3 kg)   SpO2 100%   Breastfeeding No   BMI 18.48 kg/m²       Physical Exam:   Constitutional: pt is oriented to person, place, and time. HENT:   Head: Normocephalic and atraumatic. Eyes: Pupils are equal, round, and reactive to light. EOM are normal.   Cardiovascular: Normal rate, regular rhythm and normal heart sounds. Pulmonary/Chest: Breath sounds normal. No wheezes. No rales. Exhibits no tenderness. Abdominal: Soft. Bowel sounds are normal. There is no abdominal tenderness. There is no rebound and no guarding. Musculoskeletal: Left leg pain swelling and redness. Neurological: pt is alert and oriented to person, place, and time. Alert. Normal strength. No cranial nerve deficit or sensory deficit. Displays a negative Romberg sign.         ASSESSMENT & PLAN:    Acute cellulitis of the left leg  Continue meropenem with plan to d/c on levaquin 750 mg   Continue PT  UTI complicated by ostomy bag  Continue meropenem   History of DVTs  Chronic DVT non- occlusive  Monitor   History of bladder cancer status postsurgery  History of colostomy bag  GERD  BARBARA normal   Pericardial effusion  No additional treatment necessary        Patient started on IV meropenem after the blood cultures ID consult    Continue other home medications       Patient discharged home with home health PT OT      Current Facility-Administered Medications:     meropenem (MERREM) 1 g in 0.9% sodium chloride 20 mL IV syringe, 1 g, IntraVENous, Q8H, Keanu Willis MD, 1 g at 02/15/23 0606    apixaban (ELIQUIS) tablet 5 mg, 5 mg, Oral, Q12H, Keanu Willis MD, 5 mg at 02/14/23 2159    ascorbic acid (vitamin C) (VITAMIN C) tablet 500 mg, 500 mg, Oral, DAILY, Keanu Willis MD, 500 mg at 02/14/23 0831    ferrous sulfate tablet 325 mg, 325 mg, Oral, DAILY, Keanu Willis MD, 325 mg at 02/14/23 0831    pantoprazole (PROTONIX) tablet 40 mg, 40 mg, Oral, DAILY, Keanu Willis MD, 40 mg at 02/14/23 0831    traMADoL (ULTRAM) tablet 50 mg, 50 mg, Oral, Q6H PRN, Keanu Willis MD, 50 mg at 02/15/23 0606    acetaminophen (TYLENOL) tablet 650 mg, 650 mg, Oral, Q6H PRN, 650 mg at 02/12/23 0907 **OR** acetaminophen (TYLENOL) suppository 650 mg, 650 mg, Rectal, Q6H PRN, Keanu Willis MD    polyethylene glycol (MIRALAX) packet 17 g, 17 g, Oral, DAILY PRN, Keanu Willis MD, 17 g at 02/10/23 2128    ondansetron (ZOFRAN ODT) tablet 4 mg, 4 mg, Oral, Q8H PRN **OR** ondansetron (ZOFRAN) injection 4 mg, 4 mg, IntraVENous, Q6H PRN, Keanu Willis MD       ________________________________________________________________________    Signed: Flavia Anders

## 2023-02-18 NOTE — PROGRESS NOTES
Physician Progress Note      Lauren Atkins  CSN #:                  [id number]  :                       1965  ADMIT DATE:       2023 9:17 AM  DISCH DATE:        2/15/2023 2:20 PM  RESPONDING  PROVIDER #:        Nancy Barnett MD          QUERY TEXT:    Patient admitted with cellulitis. Noted to have dietician assessment with malnutrition diagnosis in  note . If possible, please document in progress notes and discharge summary if you are evaluating and /or treating any of the following: The medical record reflects the following:  Risk Factors: 60 yo female with cellulitis, UTI, pericardial effusion  Clinical Indicators: BMI 18.4,  Albumin 2.3;  Nutritional Consult: Severe malnutrition-  Greater than 7.5% weight loss over 3 months (Wt loss of 6.5kg x3 months (12%))  Treatment: Provides frequent snacks, Encourage PO intakes, document % in I/O    Thank you,  Dory John RN, CCDS    ASPEN Criteria:  https://aspenjournals. onlinelibrary. bradley. com/doi/full/10.1177/5593494754094514  Options provided:  -- Protein calorie malnutrition mild  -- Protein calorie malnutrition moderate  -- Protein calorie malnutrition severe  -- Other - I will add my own diagnosis  -- Disagree - Not applicable / Not valid  -- Disagree - Clinically unable to determine / Unknown  -- Refer to Clinical Documentation Reviewer    PROVIDER RESPONSE TEXT:    This patient has mild protein calorie malnutrition.     Query created by: Farzana Paniagua on 2023 11:30 AM      Electronically signed by:  Nancy Barnett MD 2023 7:26 AM

## 2023-02-22 ENCOUNTER — TELEPHONE (OUTPATIENT)
Dept: UROLOGY | Age: 58
End: 2023-02-22

## 2023-02-22 NOTE — TELEPHONE ENCOUNTER
Patient called stating she needs more of her bags, jak sent us a form, I see we faxed it back sucessfully 02/17, so I am unsure of what else is needed.  Patient would like Panda to call back to go over this with her

## 2023-03-03 ENCOUNTER — OFFICE VISIT (OUTPATIENT)
Dept: INTERNAL MEDICINE CLINIC | Age: 58
End: 2023-03-03

## 2023-03-03 VITALS
HEIGHT: 62 IN | HEART RATE: 68 BPM | TEMPERATURE: 97.1 F | WEIGHT: 108 LBS | DIASTOLIC BLOOD PRESSURE: 62 MMHG | OXYGEN SATURATION: 99 % | SYSTOLIC BLOOD PRESSURE: 112 MMHG | BODY MASS INDEX: 19.88 KG/M2

## 2023-03-03 DIAGNOSIS — I82.403 DEEP VEIN THROMBOSIS (DVT) OF BOTH LOWER EXTREMITIES, UNSPECIFIED CHRONICITY, UNSPECIFIED VEIN (HCC): ICD-10-CM

## 2023-03-03 DIAGNOSIS — N39.0 COMPLICATED UTI (URINARY TRACT INFECTION): ICD-10-CM

## 2023-03-03 DIAGNOSIS — C67.9 MALIGNANT NEOPLASM OF URINARY BLADDER, UNSPECIFIED SITE (HCC): ICD-10-CM

## 2023-03-03 DIAGNOSIS — L03.116 CELLULITIS OF LEFT LOWER EXTREMITY: ICD-10-CM

## 2023-03-03 DIAGNOSIS — Z09 HOSPITAL DISCHARGE FOLLOW-UP: Primary | ICD-10-CM

## 2023-03-03 NOTE — PROGRESS NOTES
Transitional Care Management Progress Note    Patient: Stephanie Figueroa  : 1965  PCP: Kaylee Roberts MD    Date of office visit: 3/3/2023   Date of admission: 23  Date of discharge: 2/15/23  Hospital: [facility]    Call initiated w/i 2 business dates of discharge: *No response documented in the last 14 days   Date of the most recent call to the patient: *No documented post hospital discharge outreach found in the last 14 days      Assessment/Plan:   Diagnoses and all orders for this visit:    1. Hospital discharge follow-up  -     [place] [facility] [employer] MEDS RECONCILED W/CURRENT MED LIST    2. Cellulitis of left lower extremity    3. Complicated UTI (urinary tract infection)    4. Malignant neoplasm of urinary bladder, unspecified site ([facility] [address]    5. Deep vein thrombosis (DVT) of both lower extremities, unspecified chronicity, unspecified vein (HCC)    The complexity of medical decision making for this patient's transitional care is moderate     Cellulitis is resolved, no signs of it during physical exam. Today she finishes antibiotic for both cellulitis and UTI. She is feeling much better, she has her scheduled visits with oncology, urology. Continue Apixaban or #5    Follow-up and Dispositions    Return in 3 months (on 6/3/2023), or if symptoms worsen or fail to improve, for 30 minutes. Subjective: Stephanie Figueroa is a 61 y.o. female presenting today for follow-up after hospital discharge. This encounter and supporting documentation was reviewed if available. Medication reconciliation was performed today. The main problem requiring admission was Acute cellulitis of left leg, complicated UTI, pericardial effusion, but not significant, seen by Cardio at the hospital. She was discharged on 2/15. Complications during admission: none      Interval history/Current status: She's here with here niece, they live in the same building complex.  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 She has ileal conduit ad colostomy bag as well as urostomy. She says the swelling and darkness/redness on her left lower extrmity resolved, she has some mild pain distally (chronic). Overall she feels good. She saw her oncologist at [facility] Dr. Eulalai Callahan, note reviewed in care evrywhere, she did PET tehre are no metastasis, she's on surveillance. No adjuvant therapy  right now. She was given tramadol 60 tablets, his notes is deferring continuing tramadol by us. Admitting symptoms have: improved      Medications marked \"taking\" at this time:  Prior to Admission medications    Medication Sig Start Date End Date Taking? Authorizing Provider   multivitamin (ONE A DAY) tablet Take 1 Tablet by mouth daily. 1/26/23  Yes Mika Duran MD   pantoprazole (PROTONIX) 40 mg tablet Take 1 Tablet by mouth daily. 1/20/23  Yes Mika Duran MD   ascorbic acid, vitamin C, (VITAMIN C) 500 mg tablet Take 1 Tablet by mouth daily. 1/13/23  Yes Mika Duran MD   apixaban (ELIQUIS) 5 mg tablet Take 1 Tablet by mouth every twelve (12) hours for 90 days. Indications: blood clot in a deep vein of the extremities 1/12/23 4/12/23 Yes Mika Duran MD   traMADoL Chris Mouse) 50 mg tablet Take  by mouth. 12/12/22  Yes Provider, Historical        ROS:  Negative except some pain in her left flank intermittently for which she takes tramadol. Chronic distal left lower extrmiety pain where shhe had DVT before.           Objective:   Visit Vitals  /62 (BP 1 Location: Left upper arm, BP Patient Position: Sitting, BP Cuff Size: Adult)   Pulse 68   Temp 97.1 °F (36.2 °C) (Temporal)   Ht 5' 2\" (1.575 m)   Wt 108 lb (49 kg)   LMP  (LMP Unknown)   SpO2 99%   BMI 19.75 kg/m²        Physical Examination: General appearance - alert, well appearing, and in no distress  Mental status - alert, oriented to person, place, and time  Neck - supple, no significant adenopathy, thyroid exam: thyroid is normal in size without nodules or tenderness  Chest - clear to auscultation, no wheezes, rales or rhonchi, symmetric air entry  Heart - normal rate, regular rhythm, normal S1, S2, no murmurs, rubs, clicks or gallops  Abdomen - soft, nontender, nondistended, no masses or organomegaly, urostomy and colostomy present, mucosa is pink and healthy, urine is clear, stool is brown  Extremities - peripheral pulses normal, no pedal edema, no clubbing or cyanosis  Skin - normal coloration and turgor, no rashes, no suspicious skin lesions noted       We discussed the expected course, resolution and complications of the diagnosis(es) in detail. Medication risks, benefits, costs, interactions, and alternatives were discussed as indicated. I advised her to contact the office if her condition worsens, changes or fails to improve as anticipated. She expressed understanding with the diagnosis(es) and plan.      Adriana Ortiz MD

## 2023-03-03 NOTE — PROGRESS NOTES
Chief Complaint   Patient presents with    Hospital Follow Up    Visit Vitals  /62 (BP 1 Location: Left upper arm, BP Patient Position: Sitting, BP Cuff Size: Adult)   Pulse 68   Temp 97.1 °F (36.2 °C) (Temporal)   Ht 5' 2\" (1.575 m)   Wt 108 lb (49 kg)   LMP  (LMP Unknown)   SpO2 99%   BMI 19.75 kg/m²    1. Have you been to the ER, urgent care clinic since your last visit? [facility]    2. Have you seen or consulted any other health care providers outside of the [address] [place] [address] since your last visit? Include any pap smears or colon screening.  No

## 2023-03-03 NOTE — PATIENT INSTRUCTIONS
For compression stockings:   [facility]   Address: [address] [facility]  Phone: [phone]

## 2023-03-09 PROBLEM — N39.0 UTI (URINARY TRACT INFECTION): Status: RESOLVED | Noted: 2023-02-07 | Resolved: 2023-03-09

## 2023-03-13 ENCOUNTER — TELEPHONE (OUTPATIENT)
Dept: INTERNAL MEDICINE CLINIC | Age: 58
End: 2023-03-13

## 2023-03-13 NOTE — TELEPHONE ENCOUNTER
----- Message from Isabel Owens sent at 3/13/2023 10:48 AM EDT -----  Subject: Message to Provider    QUESTIONS  Information for Provider? PT needs to speak with some one about ordering   her compression socks and a heating pad   ---------------------------------------------------------------------------  --------------  Lakisha Putnam INFO  [phone]; OK to leave message on voicemail  ---------------------------------------------------------------------------  --------------  SCRIPT ANSWERS  Relationship to Patient?  Self

## 2023-03-15 NOTE — TELEPHONE ENCOUNTER
----- Message from BRYCEkieran  sent at 3/13/2023 10:46 AM EDT -----  Subject: Refill Request    QUESTIONS  Name of Medication? traMADoL (ULTRAM) 50 mg tablet  Patient-reported dosage and instructions? 50mg daily every 6hrs  How many days do you have left? 2  Preferred Pharmacy? [facility] phone number (if available)? [phone]  ---------------------------------------------------------------------------  --------------  Rolene Cuff INFO  What is the best way for the office to contact you? OK to leave message on   voicemail  Preferred Call Back Phone Number? [phone]  ---------------------------------------------------------------------------  --------------  SCRIPT ANSWERS  Relationship to Patient?  Self

## 2023-03-16 ENCOUNTER — TELEPHONE (OUTPATIENT)
Dept: SURGERY | Age: 58
End: 2023-03-16

## 2023-03-16 NOTE — TELEPHONE ENCOUNTER
5 Linda Scanlon called in to see if its ok to take a shower, instead of soaking in the bathtub as Dr. Tim Sandoval advised her to do. Patient wants to use the shower chair and take showers. Its hard for her to get out of the bath tub because of her legs.  Patient can be reached at [phone]

## 2023-03-16 NOTE — TELEPHONE ENCOUNTER
Spoke with patient and made her aware it is ok for her to shower.  Patient wanted to make appt to come see dr rashid may 9, she was transferred up front to [facility] to schedule appt

## 2023-03-18 RX ORDER — TRAMADOL HYDROCHLORIDE 50 MG/1
50 TABLET ORAL
Qty: 60 TABLET | Refills: 0 | OUTPATIENT
Start: 2023-03-18 | End: 2023-04-17

## 2023-03-21 NOTE — PROGRESS NOTES
HISTORY OF PRESENT ILLNESS  Josh Amaya is a 61 y.o. female. Chief Complaint   Patient presents with    Follow-up    Bladder Cancer    Hydronephrosis         Past Medical History:  PMHx (including negatives):  has a past medical history of Anemia, Back pain, Cancer ([address] DVT (deep venous thrombosis) ([address] (), Hepatitis C, Liver disease, Presence of IVC filter, Rheumatoid arthritis ([address] Sciatic leg pain, and Uterine fibroid. PSurgHx:  has a past surgical history that includes hx  section; hx urological (09/15/2022); hx urological (09/15/2022); hx urological (09/15/2022); hx urological (09/15/2022); hx urological (Bilateral, 09/15/2022); pr unlisted procedure cardiac surgery; hx hysterectomy (10/03/2022); hx salpingo-oophorectomy (Bilateral, 10/03/2022); hx gyn (10/03/2022); hx appendectomy (10/03/2022); hx urological (10/03/2022); hx urological (10/03/2022); ir thrombectomy [facility] vein w pharm (10/27/2022); hx gi (2022); hx gi (2022); hx gi (10/31/2022); and hx gi (10/31/2022). PSocHx:  reports that she quit smoking about 25 years ago. Her smoking use included cigarettes. She has a 5.00 pack-year smoking history. She has never used smokeless tobacco. She reports that she does not currently use alcohol. She reports that she does not use drugs. She had right hydronephrosis due to an obstructing bladder tumor. She is s/p cystectomy/ant pelvic exent 10/3/22. Invasive squamous cell carcinoma, G2, 4.5 cm in greatest dimension, with multifocal invasion into perivesical and periureteral soft tissue. [id number]    Extensive bilateral iliac venous thrombus to the level of the IVC filter. Right lower extremity edema on CT 10/23/22. Now s/p thrombectomy in IR on 10/27/22. Rectovaginal fistula was suspected after cystectomy, but was not seen on CT 10/23/22. She had continued vaginal drainage with stool-like appearance.   On 10/31/22 she returned to the OR for a vaginal wash out, suspected cuff dehiscence was confirmed. She is s/p rectovaginal fistula closure and distal rectal repair and laparoscopic loop colostomy with Dr. Trey Lopez on 11/2/22. She was hospitalized in early February for cellulitis of the left lower extremity. She first saw I and was sent to [facility] by Dr. Stacey Whitley. She is now seeing Silvia Helms, Dr. Alicia Kong (notes reviewed). PET scan (not available in this EMR) was noted to be negative for any obvious metastasis or recurrence. Oncology discussed adjuvant therapy. They noted that the literature supports adjuvant post-operative radiation therapy in pelvis over chemotherapy. They discussed Nivolumab, which was approved for urothelial histology. After discussion and careful consideration of her delayed recovery from surgery (>4 months), radical resection with negative margin, node-negativity and logistic of radiation (she lives 2 h from [place]) and overall frailty, active surveillance was recommended. Chronic Conditions Addressed Today       1. History of blood clots     Overview      DVT hx.   Extensive bilateral iliac venous thrombus to the level of the IVC filter. Right lower extremity edema on CT 10/23/22. Now s/p thrombectomy in IR on 10/27/22. 2. Bladder cancer ([address] - Primary     Overview      Presented with right hydro and bladder mass. S/p cystectomy/ant pelvic exent 10/3/22. Invasive squamous cell carcinoma, G2, 4.5 cm in greatest   dimension, with multifocal invasion into perivesical and periureteral soft tissue. [id number]         3. Hydronephrosis of right kidney     Overview        Right hydronephrosis due to obstructing bladder tumor. Likely chronic changes to persist.          4. Small bowel obstruction ([address]    5. Rectovaginal fistula     Overview      Rectovaginal fistula suspected after cystectomy. Not seen on CT 10/23/22. She had continued vaginal drainage with stool-like appearance.     10/31/22: return to OR for vaginal wash out, suspected cuff dehiscence (confirmed). She is s/p rectovaginal fistula closure with drain placement and distal rectal repair by Dr. Tammy Astudillo. Now s/p laparoscopic loop colostomy with Dr. Tammy Astudillo.                 ROS  Patient denies the symptoms of COVID-19 per routine screening guidelines.     Physical Exam    Component Ref Range & Units 2/10/23 0648 2/8/23 0706 2/7/23 1015 12/7/22 0854 11/10/22 0543 11/7/22 1222 11/7/22 0641   Sodium 136 - 145 mmol/L 133 Low   135 Low   131 Low   144 R  137  136  138    Potassium 3.5 - 5.1 mmol/L 3.9  3.8 CM  4.8 CM  3.3 Low  R  3.6  4.0  3.8    Chloride 97 - 108 mmol/L 107  107  106  111 High  R  109 High   109 High   109 High     CO2 21 - 32 mmol/L 25  20 Low   17 Low   17 Low  R  23  22  23    Anion gap 5 - 15 mmol/L 1 Low   8  8   5  5  6    Glucose 65 - 100 mg/dL 95  94  98  82 R  88  89  86    BUN 6 - 20 mg/dL 19  28 High   24 High   13 R  6  6  6    Creatinine 0.55 - 1.02 mg/dL 0.63  0.71  0.78  0.54 Low  R  0.42 Low   0.37 Low   0.41 Low     BUN/Creatinine ratio 12 - 20   30 High   39 High   31 High   24 High  R  14  16  15    eGFR >60 ml/min/1.73m2 >60  >60 CM  >60 CM   >60 CM  >60 CM  >60 CM                 Component Ref Range & Units 2/10/23 0648 2/9/23 0723 2/8/23 0706 2/7/23 1015 12/7/22 0854 11/10/22 0543 11/7/22 0641   WBC 3.6 - 11.0 K/uL 3.6  4.5  5.3  6.7  4.2 R  4.6  6.3    RBC 3.80 - 5.20 M/uL 3.33 Low   3.42 Low   3.44 Low   3.58 Low   3.42 Low  R  3.05 Low   3.49 Low     HGB 11.5 - 16.0 g/dL 10.0 Low   10.2 Low   10.2 Low   10.9 Low   9.7 Low  R  8.6 Low   9.8 Low     HCT 35.0 - 47.0 % 30.9 Low   32.2 Low   32.2 Low   33.0 Low   30.1 Low  R  27.1 Low   31.7 Low     MCV 80.0 - 99.0 FL 92.8  94.2  93.6  92.2  88 R  88.9  90.8    MCH 26.0 - 34.0 PG 30.0  29.8  29.7  30.4  28.4 R  28.2  28.1    MCHC 30.0 - 36.5 g/dL 32.4  31.7  31.7  33.0  32.2 R  31.7  30.9    RDW 11.5 - 14.5 % 11.9  12.1  12.0  12.3  17.1 High  R  16.6 High   17.4 High     PLATELET 247 - 677 K/uL 353  376  314 326  384 R  229  201    MPV 8.9 - 12.9 FL 9.3  9.5  9.4  9.8   9.5  9.7    NRBC 0.0  WBC 0.0  0.0  0.0  0.0   0.0  0.0    ABSOLUTE NRBC 0.00 - 0.01 K/uL 0.00  0.00  0.00  0.00   0.00  0.00    Resulting [facility]           ASSESSMENT and PLAN  Diagnoses and all orders for this visit:    1. Malignant neoplasm of urinary bladder, unspecified site [facility])  Assessment & Plan:  Last imaging without obvious recurrence. She was seen at [facility] and consideration for active surveillance was discussed. She will follow-up with U oncology, Dr. Britt Leonardo. 2. Hydronephrosis of right kidney  Assessment & Plan:   Obstruction addressed by cystectomy. It not clinically significant she may or may not have chronic dilation but not clinically significant at this time. 3. Rectovaginal fistula  Assessment & Plan:   History of loop colostomy. She should follow-up with Dr. Jovon Loja.      4. History of blood clots  Assessment & Plan:  On Eliquis      5. History of ileal conduit  Assessment & Plan:   Urinary conduit. Labs reviewed from February. No acidosis on that or renal impairment noted. Chronic anemia. Not macrocytic. Observe for B12 deficiency. Orders:  -     REFERRAL TO WOUND CARE         Follow-up and Dispositions    Return in about 6 months (around 9/28/2023). Lalito Alfred may have a reminder for a \"due or due soon\" health maintenance. The patient has been encouraged to contact their primary care provider for follow-up on this health maintenance or other necessary and/or routine health screening. Please note that portions of this note were completed with Dragon dictation, the computer voice recognition software.   Quite often unanticipated grammatical, syntax, homophones, and other interpretive errors are inadvertently transcribed by the computer software. Please disregard these errors and any other errors that may have escaped final proofreading. Thank you.

## 2023-03-24 RX ORDER — TRAMADOL HYDROCHLORIDE 50 MG/1
50 TABLET ORAL
Qty: 40 TABLET | Refills: 1 | OUTPATIENT
Start: 2023-03-24 | End: 2023-04-23

## 2023-03-28 ENCOUNTER — OFFICE VISIT (OUTPATIENT)
Dept: UROLOGY | Age: 58
End: 2023-03-28
Payer: MEDICAID

## 2023-03-28 ENCOUNTER — TELEPHONE (OUTPATIENT)
Dept: INTERNAL MEDICINE CLINIC | Age: 58
End: 2023-03-28

## 2023-03-28 ENCOUNTER — TELEPHONE (OUTPATIENT)
Dept: UROLOGY | Age: 58
End: 2023-03-28

## 2023-03-28 VITALS — WEIGHT: 109 LBS | BODY MASS INDEX: 20.06 KG/M2 | HEIGHT: 62 IN

## 2023-03-28 DIAGNOSIS — N13.30 HYDRONEPHROSIS OF RIGHT KIDNEY: ICD-10-CM

## 2023-03-28 DIAGNOSIS — M25.50 PAIN IN JOINT, MULTIPLE SITES: Primary | ICD-10-CM

## 2023-03-28 DIAGNOSIS — C67.9 MALIGNANT NEOPLASM OF URINARY BLADDER, UNSPECIFIED SITE (HCC): Primary | ICD-10-CM

## 2023-03-28 DIAGNOSIS — Z98.890 HISTORY OF ILEAL CONDUIT: ICD-10-CM

## 2023-03-28 DIAGNOSIS — G89.4 CHRONIC PAIN SYNDROME: ICD-10-CM

## 2023-03-28 DIAGNOSIS — C67.9 MALIGNANT NEOPLASM OF URINARY BLADDER, UNSPECIFIED SITE (HCC): ICD-10-CM

## 2023-03-28 DIAGNOSIS — N82.3 RECTOVAGINAL FISTULA: ICD-10-CM

## 2023-03-28 DIAGNOSIS — Z86.718 HISTORY OF BLOOD CLOTS: ICD-10-CM

## 2023-03-28 DIAGNOSIS — Z51.81 MEDICATION MONITORING ENCOUNTER: ICD-10-CM

## 2023-03-28 PROCEDURE — 99214 OFFICE O/P EST MOD 30 MIN: CPT | Performed by: UROLOGY

## 2023-03-28 NOTE — LETTER
3/28/2023    Patient: Juan Carlos Schumacher   YOB: 1965   Date of Visit: 3/28/2023     Carmel Pitts MD  [address] [facility] [address]  Via In [facility] MD [place]  [address] [facility] [address]  Via In [address] [place] [address] 77 [phone]  Via Fax: [phone]    Dear MD Idania Grant MD Ricka Crane, MD,      Thank you for referring Ms. Torres Coronel to [place] [address] for evaluation. My notes for this consultation are attached. If you have questions, please do not hesitate to call me. I look forward to following your patient along with you.       Sincerely,    Anne Han MD

## 2023-03-28 NOTE — TELEPHONE ENCOUNTER
The patient called and wants a refill on her Tramadol. She was getting this from the oncologist at one time but she says that Dr. Angelita Trotter filled it for her. Can you prescribe this to her or does she need to call her oncologist to get this. She also stated that she is supposed to get a quad cane with a seat and she said that paperwork is coming for that for us to fill out. I called [employer] and they stated that the quad cane is being shipped to her home. She said that she also was ordered compression stocking and never go them I did call [address] to find out what I need to do to help her get the compression stockings.

## 2023-03-28 NOTE — ASSESSMENT & PLAN NOTE
Obstruction addressed by cystectomy. It not clinically significant she may or may not have chronic dilation but not clinically significant at this time.

## 2023-03-28 NOTE — TELEPHONE ENCOUNTER
I called [facility], spoke with Garry Lemus, had 2 questions, does patient need urostomy supplies? YES. Does patient have fecal pouch? YES. New form was faxed to our old fax number so I gave our new fax number and asked Garry Lemus to please refax. Once the new form arrives I will have Rl sign and I will fax back.

## 2023-03-28 NOTE — ASSESSMENT & PLAN NOTE
Last imaging without obvious recurrence. She was seen at [facility] and consideration for active surveillance was discussed. She will follow-up with U oncology, Dr. Emily Kendrick.

## 2023-03-28 NOTE — PROGRESS NOTES
Chief Complaint   Patient presents with    Follow-up    Bladder Cancer    Hydronephrosis     1. Have you been to the ER, urgent care clinic since your last visit? Hospitalized since your last visit? Yes Where: [facility]    2. Have you seen or consulted any other health care providers outside of the [address] [place] [address] since your last visit? Include any pap smears or colon screening.  No  Visit Vitals  Ht 5' 2\" (1.575 m)   Wt 109 lb (49.4 kg)   LMP  (LMP Unknown)   BMI 19.94 kg/m²

## 2023-03-28 NOTE — ASSESSMENT & PLAN NOTE
Urinary conduit. Labs reviewed from February. No acidosis on that or renal impairment noted. Chronic anemia. Not macrocytic. Observe for B12 deficiency.

## 2023-03-28 NOTE — TELEPHONE ENCOUNTER
Sudha,     Please call to help this patient schedule a visit with Fany Hedrick RN at the [facility] [place] in [place]. She is going through too many supplies.

## 2023-03-28 NOTE — TELEPHONE ENCOUNTER
Please call [facility] as pt states she needs Urostomy supplies. I filled out paperwork for supplies on 2/14/23 and re-signed on 2/17/23. I'm not sure what she needs but I will need them to send me the order form to complete.     Their number is [phone]   Fax: [phone]

## 2023-03-30 RX ORDER — TRAMADOL HYDROCHLORIDE 50 MG/1
50 TABLET ORAL
Qty: 21 TABLET | Refills: 0 | Status: SHIPPED | OUTPATIENT
Start: 2023-03-30 | End: 2023-04-06

## 2023-03-30 NOTE — TELEPHONE ENCOUNTER
Spoke with Ms. Horacio Jacqueline and let her know that the patient needs to come and do a drug screen and get the patient to sign a pain agreement. I advised Ms. Horacio Phillips that the patient needs to get her compression stockings OTC at her local pharmacy or online. I called the company that she got her cane with a seat from they a shipping that order to her.

## 2023-03-31 ENCOUNTER — OFFICE VISIT (OUTPATIENT)
Dept: SURGERY | Age: 58
End: 2023-03-31

## 2023-03-31 VITALS
HEART RATE: 86 BPM | BODY MASS INDEX: 19.32 KG/M2 | TEMPERATURE: 98.4 F | OXYGEN SATURATION: 99 % | SYSTOLIC BLOOD PRESSURE: 125 MMHG | RESPIRATION RATE: 17 BRPM | WEIGHT: 105 LBS | DIASTOLIC BLOOD PRESSURE: 84 MMHG | HEIGHT: 62 IN

## 2023-03-31 DIAGNOSIS — Z93.3 COLOSTOMY STATUS (HCC): Primary | ICD-10-CM

## 2023-03-31 NOTE — PROGRESS NOTES
Identified pt with two pt identifiers (name and ). Reviewed chart in preparation for visit and have obtained necessary documentation. Abdirahman Pritchett is a 61 y.o. female  Chief Complaint   Patient presents with    Follow-up     1 month follow up SBO     Visit Vitals  /84 (BP 1 Location: Right upper arm, BP Patient Position: Sitting)   Pulse 86   Temp 98.4 °F (36.9 °C) (Temporal)   Resp 17   Ht 5' 2\" (1.575 m)   Wt 105 lb (47.6 kg)   LMP  (LMP Unknown)   SpO2 99%   BMI 19.20 kg/m²       1. Have you been to the ER, urgent care clinic since your last visit? Hospitalized since your last visit? Yes [facility] 23 for swelling of her left leg. 2. Have you seen or consulted any other health care providers outside of the [address] [place] [address] since your last visit? Include any pap smears or colon screening.  No

## 2023-04-01 NOTE — PROGRESS NOTES
General surgery history and Physical    Patient: Matthew Garcia  MRN: [id number]    YOB: 1965  Age: 61 y.o. Sex: female     Chief Complaint:  Chief Complaint   Patient presents with    Follow-up     1 month follow up SBO       History of Present Illness: Matthew Garcia is a 61 y.o. very pleasant woman is here today for follow-up. Patient had a previously diverting loop colostomy placement done due to rectal injury. Patient is due to have another CT scan in May. Patient currently doing well. Urostomy is working well. Patient has no GI symptoms.   Drainage from the vaginal area also minimal.    Social History:  Social Connections: Not on file       Past Medical History:  Past Medical History:   Diagnosis Date    Anemia     pt states had recent blood transfusion 2022    Back pain     Cancer (HCC)     bladder    DVT (deep venous thrombosis) ([address]     and PE, pt denies lung PE , only leg several years ago    Hepatitis C     pt states dx 1 month ago    Liver disease     Presence of IVC filter     pt states several years ago    Rheumatoid arthritis ([address]     Sciatic leg pain     pt states both legs    Uterine fibroid        Surgical History:  Past Surgical History:   Procedure Laterality Date    HX APPENDECTOMY  10/03/2022    HX  SECTION      HX GI  2022    laparoscopic early postop adhesion of the sigmoid colon    HX GI  2022    laparoscopic lysis of adhesion    HX GI  10/31/2022    rigid sigmoidoscope examination of the rectum    HX GI  10/31/2022    distal rectal repair with primary suturing technique    HX GYN  10/03/2022    ROBOT ANTERIOR PELVIC EXENTERATION    HX HYSTERECTOMY  10/03/2022    HX SALPINGO-OOPHORECTOMY Bilateral 10/03/2022    HX UROLOGICAL  09/15/2022    CYSTOSCOPY    HX UROLOGICAL  09/15/2022    URETHRAL DILATION    HX UROLOGICAL  09/15/2022    URETERAL STENT PLACEMENT    HX UROLOGICAL  09/15/2022    TRANSURETHRAL RESECTION OF BLADDER TUMOR >2CM HX UROLOGICAL Bilateral 09/15/2022    RETROGRADE PYELOGRAM    HX UROLOGICAL  10/03/2022    PELVIC LYMPH NODE DISSECTION    HX UROLOGICAL  10/03/2022    ILEAL CONDUIT URINARY DIVERSION    IR THROMBECTOMY [facility] VEIN W PHARM  10/27/2022    VT UNLISTED PROCEDURE CARDIAC SURGERY      stent placement       Allergies: Allergies   Allergen Reactions    Bactrim [Sulfamethoprim] Swelling     Swelling of the lilps    Penicillin V Potassium Swelling       Current Meds:  Current Outpatient Medications   Medication Sig Dispense Refill    traMADoL (ULTRAM) 50 mg tablet Take 1 Tablet by mouth every eight (8) hours as needed for Pain for up to 7 days. Max Daily Amount: 150 mg. 21 Tablet 0    multivitamin (ONE A DAY) tablet Take 1 Tablet by mouth daily. 90 Tablet 1    pantoprazole (PROTONIX) 40 mg tablet Take 1 Tablet by mouth daily. 90 Tablet 1    ascorbic acid, vitamin C, (VITAMIN C) 500 mg tablet Take 1 Tablet by mouth daily. 90 Tablet 0    apixaban (ELIQUIS) 5 mg tablet Take 1 Tablet by mouth every twelve (12) hours for 90 days. Indications: blood clot in a deep vein of the extremities 180 Tablet 0       Review of Systems:  I reviewed the rest of organ systems personally and they are negative. Pertinent findings discussed in HPI. Physical Examination    Visit Vitals  /84 (BP 1 Location: Right upper arm, BP Patient Position: Sitting)   Pulse 86   Temp 98.4 °F (36.9 °C) (Temporal)   Resp 17   Ht 5' 2\" (1.575 m)   Wt 105 lb (47.6 kg)   LMP  (LMP Unknown)   SpO2 99%   BMI 19.20 kg/m²     Gen: Well developed, well nourished 61 y.o. female in no acute distress  Head: normocephalic, atraumatic  EYES: Pupils are equal and reactive. Chest wall: Excursion normal with respiration cycle, no obvious audible wheeze.   Mouth: Clear, no overt lesions, oral mucosa pink and moist  Neck: supple, no masses, no adenopathy or carotid bruits, trachea midline  Resp: clear to auscultation bilaterally, no wheeze, rhonchi or rales, excursions normal and symmetrical  Cardio: Regular rate and rhythm, no murmurs, clicks, gallops or rubs, no edema or varicosities  Abdomen: Soft nontender. Neuro: sensation and strength grossly intact and symmetrical  Psych: alert and oriented to person, place and time  Skin: warm and moist.  Hematologic system: No bruising. Vascular examination: Intact in lower extremity. Labs:  No results found for this or any previous visit (from the past 24 hour(s)). Images:  X-ray reviewed. CT scan reviewed. 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 We will discuss further testing about the bowel including oral contrast with delayed imaging CT scan abdomen pelvis followed by also examination under anesthesia examined vaginal area and rectal area make sure there is no fistula. We will discuss further next visit after CT scan.           Art Espitia MD

## 2023-04-18 ENCOUNTER — TELEPHONE (OUTPATIENT)
Dept: INTERNAL MEDICINE CLINIC | Age: 58
End: 2023-04-18

## 2023-04-21 DIAGNOSIS — N32.9 DISEASE OF BLADDER: Primary | ICD-10-CM

## 2023-04-21 DIAGNOSIS — R10.9 STOMACH ACHE: Primary | ICD-10-CM

## 2023-04-21 RX ORDER — ASCORBIC ACID 500 MG
TABLET ORAL
Qty: 90 TABLET | Refills: 3 | Status: SHIPPED | OUTPATIENT
Start: 2023-04-21

## 2023-04-21 RX ORDER — APIXABAN 5 MG/1
TABLET, FILM COATED ORAL
Qty: 60 TABLET | Refills: 0 | OUTPATIENT
Start: 2023-04-21

## 2023-04-22 DIAGNOSIS — N32.9 DISEASE OF BLADDER: Primary | ICD-10-CM

## 2023-05-02 DIAGNOSIS — I81 PORTAL VEIN THROMBOSIS: Primary | ICD-10-CM

## 2023-05-06 DIAGNOSIS — M25.50 CHRONIC PAIN OF MULTIPLE JOINTS: ICD-10-CM

## 2023-05-06 DIAGNOSIS — G89.4 CHRONIC PAIN SYNDROME: Primary | ICD-10-CM

## 2023-05-06 DIAGNOSIS — G89.29 CHRONIC PAIN OF MULTIPLE JOINTS: ICD-10-CM

## 2023-05-06 DIAGNOSIS — Z51.81 MEDICATION MONITORING ENCOUNTER: ICD-10-CM

## 2023-05-09 ENCOUNTER — TELEPHONE (OUTPATIENT)
Age: 58
End: 2023-05-09

## 2023-05-09 DIAGNOSIS — Z51.81 MEDICATION MONITORING ENCOUNTER: ICD-10-CM

## 2023-05-09 RX ORDER — TRAMADOL HYDROCHLORIDE 50 MG/1
TABLET ORAL
Qty: 21 TABLET | Refills: 0 | Status: SHIPPED | OUTPATIENT
Start: 2023-05-09 | End: 2023-05-09

## 2023-05-09 RX ORDER — TRAMADOL HYDROCHLORIDE 50 MG/1
50 TABLET ORAL EVERY 8 HOURS PRN
Qty: 21 TABLET | Refills: 0 | Status: SHIPPED | OUTPATIENT
Start: 2023-05-09 | End: 2023-05-16

## 2023-05-09 NOTE — TELEPHONE ENCOUNTER
LM for patient. We will cancel 5/11/23 appt until ct is done. Have to discuss with dr curiel what kind he wants.

## 2023-05-10 ENCOUNTER — HOSPITAL ENCOUNTER (OUTPATIENT)
Facility: HOSPITAL | Age: 58
Discharge: HOME | End: 2023-05-12
Payer: MEDICAID

## 2023-05-10 DIAGNOSIS — I81 PORTAL VEIN THROMBOSIS: ICD-10-CM

## 2023-05-10 PROCEDURE — 93970 EXTREMITY STUDY: CPT

## 2023-05-11 LAB
VAS LEFT POP RFX: 1.7 S
VAS RIGHT POP RFX: 2.8 S

## 2023-05-12 ENCOUNTER — OFFICE VISIT (OUTPATIENT)
Age: 58
End: 2023-05-12
Payer: MEDICAID

## 2023-05-12 ENCOUNTER — TELEPHONE (OUTPATIENT)
Age: 58
End: 2023-05-12

## 2023-05-12 VITALS
OXYGEN SATURATION: 99 % | HEIGHT: 64 IN | BODY MASS INDEX: 18.1 KG/M2 | DIASTOLIC BLOOD PRESSURE: 82 MMHG | HEART RATE: 71 BPM | SYSTOLIC BLOOD PRESSURE: 126 MMHG | TEMPERATURE: 98.2 F | RESPIRATION RATE: 18 BRPM | WEIGHT: 106 LBS

## 2023-05-12 DIAGNOSIS — I73.9 PERIPHERAL VASCULAR DISEASE (HCC): Primary | ICD-10-CM

## 2023-05-12 DIAGNOSIS — I87.303 CHRONIC VENOUS HYPERTENSION INVOLVING BOTH SIDES: ICD-10-CM

## 2023-05-12 PROCEDURE — 99213 OFFICE O/P EST LOW 20 MIN: CPT | Performed by: SURGERY

## 2023-05-12 ASSESSMENT — PATIENT HEALTH QUESTIONNAIRE - PHQ9
2. FEELING DOWN, DEPRESSED OR HOPELESS: 0
SUM OF ALL RESPONSES TO PHQ9 QUESTIONS 1 & 2: 0
SUM OF ALL RESPONSES TO PHQ QUESTIONS 1-9: 0
1. LITTLE INTEREST OR PLEASURE IN DOING THINGS: 0
SUM OF ALL RESPONSES TO PHQ QUESTIONS 1-9: 0

## 2023-05-12 NOTE — TELEPHONE ENCOUNTER
Owen Wood CALLED THIS MORNING AND WANTED TO KNOW IF SHE NEEDS TO CONTINUE OR STOP TAKING HER BLOOD THINNERS. SHE FORGOT TO ASK DR. ERAZO WHEN SHE WAS HERE. PLEASE CALL [phone].

## 2023-05-12 NOTE — PROGRESS NOTES
Identified pt with two pt identifiers (name and ). Reviewed chart in preparation for visit and have obtained necessary documentation. Jeanine Lorenzo is a 61 y.o. female  Chief Complaint   Patient presents with    Follow-up     Follow up after scan     /82 (Site: Left Upper Arm, Position: Sitting, Cuff Size: Small Adult)   Pulse 71   Temp 98.2 °F (36.8 °C) (Oral)   Resp 18   Ht 5' 4\" (1.626 m)   Wt 106 lb (48.1 kg)   SpO2 99%   BMI 18.19 kg/m²     1. Have you been to the ER, urgent care clinic since your last visit? Hospitalized since your last visit? No    2. Have you seen or consulted any other health care providers outside of the [address] [place] [address] since your last visit? Include any pap smears or colon screening.  No

## 2023-05-14 PROBLEM — I87.303 CHRONIC VENOUS HYPERTENSION INVOLVING BOTH SIDES: Status: ACTIVE | Noted: 2023-05-14

## 2023-05-14 PROBLEM — I73.9 PERIPHERAL VASCULAR DISEASE (HCC): Status: ACTIVE | Noted: 2023-05-14

## 2023-05-21 PROBLEM — I81 PORTAL VEIN THROMBOSIS: Status: ACTIVE | Noted: 2023-05-21

## 2023-05-21 PROBLEM — Z86.718 HISTORY OF DVT IN ADULTHOOD: Status: ACTIVE | Noted: 2021-07-02

## 2023-06-01 RX ORDER — APIXABAN 5 MG/1
TABLET, FILM COATED ORAL
Qty: 60 TABLET | Refills: 0 | Status: SHIPPED | OUTPATIENT
Start: 2023-06-01

## 2023-06-02 ENCOUNTER — TELEPHONE (OUTPATIENT)
Facility: CLINIC | Age: 58
End: 2023-06-02

## 2023-06-05 ENCOUNTER — TELEPHONE (OUTPATIENT)
Age: 58
End: 2023-06-05

## 2023-06-05 ENCOUNTER — HOSPITAL ENCOUNTER (OUTPATIENT)
Facility: HOSPITAL | Age: 58
Discharge: HOME | End: 2023-06-08
Payer: MEDICAID

## 2023-06-05 VITALS
BODY MASS INDEX: 18.61 KG/M2 | RESPIRATION RATE: 18 BRPM | OXYGEN SATURATION: 99 % | WEIGHT: 105 LBS | SYSTOLIC BLOOD PRESSURE: 129 MMHG | HEIGHT: 63 IN | TEMPERATURE: 97.6 F | HEART RATE: 77 BPM | DIASTOLIC BLOOD PRESSURE: 71 MMHG

## 2023-06-05 LAB
ALBUMIN SERPL-MCNC: 3.3 G/DL (ref 3.5–5)
ALBUMIN/GLOB SERPL: 0.4 (ref 1.1–2.2)
ALP SERPL-CCNC: 53 U/L (ref 45–117)
ALT SERPL-CCNC: 14 U/L (ref 12–78)
ANION GAP SERPL CALC-SCNC: 5 MMOL/L (ref 5–15)
APTT PPP: 32.7 SEC (ref 21.2–34.1)
AST SERPL W P-5'-P-CCNC: 20 U/L (ref 15–37)
BILIRUB SERPL-MCNC: 0.5 MG/DL (ref 0.2–1)
BUN SERPL-MCNC: 18 MG/DL (ref 6–20)
BUN/CREAT SERPL: 23 (ref 12–20)
CA-I BLD-MCNC: 9.6 MG/DL (ref 8.5–10.1)
CHLORIDE SERPL-SCNC: 112 MMOL/L (ref 97–108)
CO2 SERPL-SCNC: 22 MMOL/L (ref 21–32)
CREAT SERPL-MCNC: 0.78 MG/DL (ref 0.55–1.02)
ERYTHROCYTE [DISTWIDTH] IN BLOOD BY AUTOMATED COUNT: 13.5 % (ref 11.5–14.5)
GLOBULIN SER CALC-MCNC: 8.3 G/DL (ref 2–4)
GLUCOSE SERPL-MCNC: 78 MG/DL (ref 65–100)
HCT VFR BLD AUTO: 34.9 % (ref 35–47)
HGB BLD-MCNC: 11 G/DL (ref 11.5–16)
INR PPP: 1.5 (ref 0.9–1.1)
MCH RBC QN AUTO: 29 PG (ref 26–34)
MCHC RBC AUTO-ENTMCNC: 31.5 G/DL (ref 30–36.5)
MCV RBC AUTO: 92.1 FL (ref 80–99)
NRBC # BLD: 0 K/UL (ref 0–0.01)
NRBC BLD-RTO: 0 PER 100 WBC
PLATELET # BLD AUTO: 178 K/UL (ref 150–400)
PMV BLD AUTO: 9.4 FL (ref 8.9–12.9)
POTASSIUM SERPL-SCNC: 3.6 MMOL/L (ref 3.5–5.1)
PROT SERPL-MCNC: 11.6 G/DL (ref 6.4–8.2)
PROTHROMBIN TIME: 17.9 SEC (ref 11.9–14.6)
RBC # BLD AUTO: 3.79 M/UL (ref 3.8–5.2)
SODIUM SERPL-SCNC: 139 MMOL/L (ref 136–145)
THERAPEUTIC RANGE: NORMAL SEC (ref 82–109)
WBC # BLD AUTO: 3 K/UL (ref 3.6–11)

## 2023-06-05 PROCEDURE — 85730 THROMBOPLASTIN TIME PARTIAL: CPT

## 2023-06-05 PROCEDURE — 85027 COMPLETE CBC AUTOMATED: CPT

## 2023-06-05 PROCEDURE — 80053 COMPREHEN METABOLIC PANEL: CPT

## 2023-06-05 PROCEDURE — 85610 PROTHROMBIN TIME: CPT

## 2023-06-05 PROCEDURE — 36415 COLL VENOUS BLD VENIPUNCTURE: CPT

## 2023-06-05 ASSESSMENT — PAIN SCALES - GENERAL: PAINLEVEL_OUTOF10: 0

## 2023-06-05 NOTE — PERIOP NOTE
Called office to make Dr. Chava Dominguez aware of lab work, states Dr. Chava Dominguez is out of office due to emergency and may not be doing surgery this week. Will follow up when necessary.

## 2023-06-05 NOTE — TELEPHONE ENCOUNTER
Pt called about [facility] stating that they have sent us a form and waiting for us to send back I then looked in chart and told pt form was signed and sent 3 times  twice  on 05/09/23 and again on 05/20 at 8:47am pt said ok and that she would try and reach out to them

## 2023-06-09 ENCOUNTER — TELEPHONE (OUTPATIENT)
Age: 58
End: 2023-06-09

## 2023-06-09 NOTE — TELEPHONE ENCOUNTER
Spoke to patient and made her aware per dr curiel surgery is going to be changed to 6/28.  Patient stated her understanding

## 2023-06-09 NOTE — TELEPHONE ENCOUNTER
Called and spoke with [employer] [place] and made her aware that she can continue her Vitamin C.  She understood and just wanted to make us aware that she has a Dental Appointment on 06/14/2023

## 2023-06-09 NOTE — TELEPHONE ENCOUNTER
Jorge L Danielle called in wanting to know if she needs to stop taking her vitamin C prior to surgery. Please call her at [phone] when available.

## 2023-06-22 ENCOUNTER — TELEPHONE (OUTPATIENT)
Age: 58
End: 2023-06-22

## 2023-06-22 RX ORDER — APIXABAN 5 MG/1
TABLET, FILM COATED ORAL
Qty: 60 TABLET | Refills: 0 | Status: SHIPPED | OUTPATIENT
Start: 2023-06-22

## 2023-06-22 RX ORDER — PANTOPRAZOLE SODIUM 40 MG/1
TABLET, DELAYED RELEASE ORAL
Qty: 30 TABLET | Refills: 0 | Status: SHIPPED | OUTPATIENT
Start: 2023-06-22

## 2023-06-27 RX ORDER — TRAMADOL HYDROCHLORIDE 50 MG/1
50 TABLET ORAL EVERY 6 HOURS PRN
COMMUNITY

## 2023-06-28 ENCOUNTER — HOSPITAL ENCOUNTER (OUTPATIENT)
Facility: HOSPITAL | Age: 58
Discharge: HOME | End: 2023-06-28
Attending: SURGERY | Admitting: SURGERY
Payer: MEDICAID

## 2023-06-28 ENCOUNTER — ANESTHESIA EVENT (OUTPATIENT)
Facility: HOSPITAL | Age: 58
End: 2023-06-28
Payer: MEDICAID

## 2023-06-28 ENCOUNTER — ANESTHESIA (OUTPATIENT)
Facility: HOSPITAL | Age: 58
End: 2023-06-28
Payer: MEDICAID

## 2023-06-28 VITALS
OXYGEN SATURATION: 99 % | DIASTOLIC BLOOD PRESSURE: 69 MMHG | HEART RATE: 77 BPM | SYSTOLIC BLOOD PRESSURE: 109 MMHG | TEMPERATURE: 98.5 F | RESPIRATION RATE: 18 BRPM

## 2023-06-28 PROBLEM — K94.00 COLOSTOMY COMPLICATION, UNSPECIFIED (HCC): Status: ACTIVE | Noted: 2023-06-28

## 2023-06-28 PROBLEM — Z93.3 COLOSTOMY IN PLACE (HCC): Status: ACTIVE | Noted: 2023-06-28

## 2023-06-28 PROCEDURE — 45990 SURG DX EXAM ANORECTAL: CPT | Performed by: SURGERY

## 2023-06-28 PROCEDURE — 6370000000 HC RX 637 (ALT 250 FOR IP): Performed by: ANESTHESIOLOGY

## 2023-06-28 PROCEDURE — 7100000000 HC PACU RECOVERY - FIRST 15 MIN: Performed by: SURGERY

## 2023-06-28 PROCEDURE — 2500000003 HC RX 250 WO HCPCS: Performed by: NURSE ANESTHETIST, CERTIFIED REGISTERED

## 2023-06-28 PROCEDURE — 6360000002 HC RX W HCPCS: Performed by: NURSE ANESTHETIST, CERTIFIED REGISTERED

## 2023-06-28 PROCEDURE — 7100000001 HC PACU RECOVERY - ADDTL 15 MIN: Performed by: SURGERY

## 2023-06-28 PROCEDURE — 7100000011 HC PHASE II RECOVERY - ADDTL 15 MIN: Performed by: SURGERY

## 2023-06-28 PROCEDURE — 3700000001 HC ADD 15 MINUTES (ANESTHESIA): Performed by: SURGERY

## 2023-06-28 PROCEDURE — 3700000000 HC ANESTHESIA ATTENDED CARE: Performed by: SURGERY

## 2023-06-28 PROCEDURE — 3600000012 HC SURGERY LEVEL 2 ADDTL 15MIN: Performed by: SURGERY

## 2023-06-28 PROCEDURE — 7100000010 HC PHASE II RECOVERY - FIRST 15 MIN: Performed by: SURGERY

## 2023-06-28 PROCEDURE — 3600000002 HC SURGERY LEVEL 2 BASE: Performed by: SURGERY

## 2023-06-28 PROCEDURE — 2709999900 HC NON-CHARGEABLE SUPPLY: Performed by: SURGERY

## 2023-06-28 RX ORDER — PROPOFOL 10 MG/ML
INJECTION, EMULSION INTRAVENOUS PRN
Status: DISCONTINUED | OUTPATIENT
Start: 2023-06-28 | End: 2023-06-28 | Stop reason: SDUPTHER

## 2023-06-28 RX ORDER — FENTANYL CITRATE 50 UG/ML
50 INJECTION, SOLUTION INTRAMUSCULAR; INTRAVENOUS EVERY 5 MIN PRN
Status: DISCONTINUED | OUTPATIENT
Start: 2023-06-28 | End: 2023-06-28 | Stop reason: HOSPADM

## 2023-06-28 RX ORDER — LABETALOL HYDROCHLORIDE 5 MG/ML
10 INJECTION, SOLUTION INTRAVENOUS
Status: DISCONTINUED | OUTPATIENT
Start: 2023-06-28 | End: 2023-06-28 | Stop reason: HOSPADM

## 2023-06-28 RX ORDER — LORAZEPAM 2 MG/ML
0.5 INJECTION INTRAMUSCULAR
Status: DISCONTINUED | OUTPATIENT
Start: 2023-06-28 | End: 2023-06-28 | Stop reason: HOSPADM

## 2023-06-28 RX ORDER — DIPHENHYDRAMINE HYDROCHLORIDE 50 MG/ML
12.5 INJECTION INTRAMUSCULAR; INTRAVENOUS
Status: DISCONTINUED | OUTPATIENT
Start: 2023-06-28 | End: 2023-06-28 | Stop reason: HOSPADM

## 2023-06-28 RX ORDER — LIDOCAINE HYDROCHLORIDE 20 MG/ML
INJECTION, SOLUTION EPIDURAL; INFILTRATION; INTRACAUDAL; PERINEURAL PRN
Status: DISCONTINUED | OUTPATIENT
Start: 2023-06-28 | End: 2023-06-28 | Stop reason: SDUPTHER

## 2023-06-28 RX ORDER — SODIUM CHLORIDE 0.9 % (FLUSH) 0.9 %
5-40 SYRINGE (ML) INJECTION PRN
Status: DISCONTINUED | OUTPATIENT
Start: 2023-06-28 | End: 2023-06-28 | Stop reason: HOSPADM

## 2023-06-28 RX ORDER — ENOXAPARIN SODIUM 100 MG/ML
40 INJECTION SUBCUTANEOUS DAILY
Status: DISCONTINUED | OUTPATIENT
Start: 2023-06-28 | End: 2023-06-28 | Stop reason: HOSPADM

## 2023-06-28 RX ORDER — ONDANSETRON 2 MG/ML
4 INJECTION INTRAMUSCULAR; INTRAVENOUS EVERY 6 HOURS PRN
Status: DISCONTINUED | OUTPATIENT
Start: 2023-06-28 | End: 2023-06-28 | Stop reason: HOSPADM

## 2023-06-28 RX ORDER — ONDANSETRON 4 MG/1
4 TABLET, ORALLY DISINTEGRATING ORAL EVERY 8 HOURS PRN
Status: DISCONTINUED | OUTPATIENT
Start: 2023-06-28 | End: 2023-06-28 | Stop reason: HOSPADM

## 2023-06-28 RX ORDER — SODIUM CHLORIDE 0.9 % (FLUSH) 0.9 %
5-40 SYRINGE (ML) INJECTION EVERY 12 HOURS SCHEDULED
Status: DISCONTINUED | OUTPATIENT
Start: 2023-06-28 | End: 2023-06-28 | Stop reason: HOSPADM

## 2023-06-28 RX ORDER — SODIUM CHLORIDE 9 MG/ML
INJECTION, SOLUTION INTRAVENOUS PRN
Status: DISCONTINUED | OUTPATIENT
Start: 2023-06-28 | End: 2023-06-28 | Stop reason: HOSPADM

## 2023-06-28 RX ORDER — FENTANYL CITRATE 50 UG/ML
INJECTION, SOLUTION INTRAMUSCULAR; INTRAVENOUS PRN
Status: DISCONTINUED | OUTPATIENT
Start: 2023-06-28 | End: 2023-06-28 | Stop reason: SDUPTHER

## 2023-06-28 RX ORDER — HYDROMORPHONE HYDROCHLORIDE 1 MG/ML
0.5 INJECTION, SOLUTION INTRAMUSCULAR; INTRAVENOUS; SUBCUTANEOUS EVERY 5 MIN PRN
Status: DISCONTINUED | OUTPATIENT
Start: 2023-06-28 | End: 2023-06-28 | Stop reason: HOSPADM

## 2023-06-28 RX ORDER — ONDANSETRON 2 MG/ML
4 INJECTION INTRAMUSCULAR; INTRAVENOUS
Status: DISCONTINUED | OUTPATIENT
Start: 2023-06-28 | End: 2023-06-28 | Stop reason: HOSPADM

## 2023-06-28 RX ORDER — ONDANSETRON 2 MG/ML
INJECTION INTRAMUSCULAR; INTRAVENOUS PRN
Status: DISCONTINUED | OUTPATIENT
Start: 2023-06-28 | End: 2023-06-28 | Stop reason: SDUPTHER

## 2023-06-28 RX ORDER — HYDRALAZINE HYDROCHLORIDE 20 MG/ML
10 INJECTION INTRAMUSCULAR; INTRAVENOUS
Status: DISCONTINUED | OUTPATIENT
Start: 2023-06-28 | End: 2023-06-28 | Stop reason: HOSPADM

## 2023-06-28 RX ORDER — MEPERIDINE HYDROCHLORIDE 25 MG/ML
12.5 INJECTION INTRAMUSCULAR; INTRAVENOUS; SUBCUTANEOUS EVERY 5 MIN PRN
Status: DISCONTINUED | OUTPATIENT
Start: 2023-06-28 | End: 2023-06-28 | Stop reason: HOSPADM

## 2023-06-28 RX ORDER — METOCLOPRAMIDE HYDROCHLORIDE 5 MG/ML
10 INJECTION INTRAMUSCULAR; INTRAVENOUS
Status: DISCONTINUED | OUTPATIENT
Start: 2023-06-28 | End: 2023-06-28 | Stop reason: HOSPADM

## 2023-06-28 RX ORDER — SODIUM CHLORIDE, SODIUM LACTATE, POTASSIUM CHLORIDE, CALCIUM CHLORIDE 600; 310; 30; 20 MG/100ML; MG/100ML; MG/100ML; MG/100ML
INJECTION, SOLUTION INTRAVENOUS CONTINUOUS
Status: DISCONTINUED | OUTPATIENT
Start: 2023-06-28 | End: 2023-06-28 | Stop reason: HOSPADM

## 2023-06-28 RX ORDER — SODIUM CHLORIDE, SODIUM LACTATE, POTASSIUM CHLORIDE, CALCIUM CHLORIDE 600; 310; 30; 20 MG/100ML; MG/100ML; MG/100ML; MG/100ML
INJECTION, SOLUTION INTRAVENOUS ONCE
Status: DISCONTINUED | OUTPATIENT
Start: 2023-06-28 | End: 2023-06-28 | Stop reason: HOSPADM

## 2023-06-28 RX ORDER — OXYCODONE HYDROCHLORIDE 5 MG/1
10 TABLET ORAL PRN
Status: COMPLETED | OUTPATIENT
Start: 2023-06-28 | End: 2023-06-28

## 2023-06-28 RX ORDER — IPRATROPIUM BROMIDE AND ALBUTEROL SULFATE 2.5; .5 MG/3ML; MG/3ML
1 SOLUTION RESPIRATORY (INHALATION)
Status: DISCONTINUED | OUTPATIENT
Start: 2023-06-28 | End: 2023-06-28 | Stop reason: HOSPADM

## 2023-06-28 RX ORDER — DEXAMETHASONE SODIUM PHOSPHATE 4 MG/ML
INJECTION, SOLUTION INTRA-ARTICULAR; INTRALESIONAL; INTRAMUSCULAR; INTRAVENOUS; SOFT TISSUE PRN
Status: DISCONTINUED | OUTPATIENT
Start: 2023-06-28 | End: 2023-06-28 | Stop reason: SDUPTHER

## 2023-06-28 RX ORDER — OXYCODONE HYDROCHLORIDE 5 MG/1
5 TABLET ORAL PRN
Status: COMPLETED | OUTPATIENT
Start: 2023-06-28 | End: 2023-06-28

## 2023-06-28 RX ADMIN — FENTANYL CITRATE 25 MCG: 50 INJECTION, SOLUTION INTRAMUSCULAR; INTRAVENOUS at 07:44

## 2023-06-28 RX ADMIN — OXYCODONE HYDROCHLORIDE 5 MG: 5 TABLET ORAL at 08:38

## 2023-06-28 RX ADMIN — PROPOFOL 100 MG: 10 INJECTION, EMULSION INTRAVENOUS at 07:35

## 2023-06-28 RX ADMIN — LIDOCAINE HYDROCHLORIDE 5 ML: 20 INJECTION, SOLUTION EPIDURAL; INFILTRATION; INTRACAUDAL; PERINEURAL at 07:35

## 2023-06-28 RX ADMIN — ONDANSETRON 4 MG: 2 INJECTION INTRAMUSCULAR; INTRAVENOUS at 07:44

## 2023-06-28 RX ADMIN — DEXAMETHASONE SODIUM PHOSPHATE 4 MG: 4 INJECTION, SOLUTION INTRAMUSCULAR; INTRAVENOUS at 07:41

## 2023-06-28 RX ADMIN — FENTANYL CITRATE 25 MCG: 50 INJECTION, SOLUTION INTRAMUSCULAR; INTRAVENOUS at 07:40

## 2023-06-28 ASSESSMENT — PAIN - FUNCTIONAL ASSESSMENT: PAIN_FUNCTIONAL_ASSESSMENT: 0-10

## 2023-06-28 ASSESSMENT — PAIN DESCRIPTION - LOCATION
LOCATION: BACK

## 2023-06-28 ASSESSMENT — PAIN DESCRIPTION - DESCRIPTORS
DESCRIPTORS: SHOOTING
DESCRIPTORS: ACHING

## 2023-06-28 ASSESSMENT — PAIN SCALES - GENERAL
PAINLEVEL_OUTOF10: 4
PAINLEVEL_OUTOF10: 9
PAINLEVEL_OUTOF10: 9

## 2023-06-28 ASSESSMENT — PAIN DESCRIPTION - ORIENTATION: ORIENTATION: RIGHT

## 2023-06-29 ENCOUNTER — TELEPHONE (OUTPATIENT)
Facility: CLINIC | Age: 58
End: 2023-06-29

## 2023-06-29 NOTE — TELEPHONE ENCOUNTER
Patient called and stated that she would an order for orthopedic mattress (jerry size) Please give her a call.        Call back [phone]

## 2023-07-06 ENCOUNTER — OFFICE VISIT (OUTPATIENT)
Age: 58
End: 2023-07-06
Payer: MEDICAID

## 2023-07-06 VITALS
RESPIRATION RATE: 18 BRPM | SYSTOLIC BLOOD PRESSURE: 120 MMHG | HEIGHT: 63 IN | BODY MASS INDEX: 18.52 KG/M2 | OXYGEN SATURATION: 100 % | TEMPERATURE: 98 F | WEIGHT: 104.5 LBS | DIASTOLIC BLOOD PRESSURE: 77 MMHG | HEART RATE: 67 BPM

## 2023-07-06 DIAGNOSIS — K94.00 COLOSTOMY COMPLICATION, UNSPECIFIED (HCC): Primary | ICD-10-CM

## 2023-07-06 PROCEDURE — 99213 OFFICE O/P EST LOW 20 MIN: CPT | Performed by: SURGERY

## 2023-07-06 ASSESSMENT — PATIENT HEALTH QUESTIONNAIRE - PHQ9
1. LITTLE INTEREST OR PLEASURE IN DOING THINGS: 0
SUM OF ALL RESPONSES TO PHQ QUESTIONS 1-9: 0
2. FEELING DOWN, DEPRESSED OR HOPELESS: 0
SUM OF ALL RESPONSES TO PHQ QUESTIONS 1-9: 0
SUM OF ALL RESPONSES TO PHQ9 QUESTIONS 1 & 2: 0

## 2023-07-06 NOTE — PROGRESS NOTES
Identified pt with two pt identifiers (name and ). Reviewed chart in preparation for visit and have obtained necessary documentation. Leonid Sandoval is a 60 y.o. female  Chief Complaint   Patient presents with    Post-Op Check     I week post Op: anal exam under anesthesia. There were no vitals taken for this visit. 1. Have you been to the ER, urgent care clinic since your last visit? Hospitalized since your last visit? No    2. Have you seen or consulted any other health care providers outside of the [address] [place] [address] since your last visit? Include any pap smears or colon screening.  No

## 2023-07-10 RX ORDER — MULTIVITAMIN WITH FOLIC ACID 400 MCG
TABLET ORAL
Qty: 90 TABLET | Refills: 1 | Status: SHIPPED | OUTPATIENT
Start: 2023-07-10 | End: 2023-07-26 | Stop reason: SDUPTHER

## 2023-07-13 ENCOUNTER — TELEPHONE (OUTPATIENT)
Facility: CLINIC | Age: 58
End: 2023-07-13

## 2023-07-13 DIAGNOSIS — M25.50 CHRONIC PAIN OF MULTIPLE JOINTS: Primary | ICD-10-CM

## 2023-07-13 DIAGNOSIS — G89.29 CHRONIC PAIN OF MULTIPLE JOINTS: Primary | ICD-10-CM

## 2023-07-13 RX ORDER — ACETAMINOPHEN 325 MG/1
650 TABLET ORAL EVERY 8 HOURS PRN
Qty: 80 TABLET | Refills: 0 | Status: SHIPPED | OUTPATIENT
Start: 2023-07-13 | End: 2023-07-27

## 2023-07-13 NOTE — TELEPHONE ENCOUNTER
The patient called and stated that she wants to be prescribed Tylenol 650mg and have it sent to her pharmacy because she said that she can't afford to buy it OTC.

## 2023-07-18 RX ORDER — PANTOPRAZOLE SODIUM 40 MG/1
TABLET, DELAYED RELEASE ORAL
Qty: 30 TABLET | Refills: 0 | Status: SHIPPED | OUTPATIENT
Start: 2023-07-18

## 2023-07-18 RX ORDER — APIXABAN 5 MG/1
TABLET, FILM COATED ORAL
Qty: 60 TABLET | Refills: 0 | Status: SHIPPED | OUTPATIENT
Start: 2023-07-18

## 2023-07-24 NOTE — PROGRESS NOTES
General surgery history and Physical    Patient: Daria Cline  MRN: [id number]    YOB: 1965  Age: 60 y.o. Sex: female     Chief Complaint:  Chief Complaint   Patient presents with    Post-Op Check     I week post Op: anal exam under anesthesia. History of Present Illness: Daria Cline is a 60 y.o. patient recently underwent examination under anesthesia of the rectal area patient still has persistent posterior wall vaginal wall to distal rectal fistula. Patient denies any drainage from vaginal or rectal area.     Social History:  Social Connections: Not on file       Past Medical History:  Past Medical History:   Diagnosis Date    Anemia     pt states had recent blood transfusion 2022    Back pain     Cancer ([address])     bladder- pt states in remission    DVT (deep venous thrombosis) ([address])     and PE, pt denies lung PE , only leg several years ago    Hepatitis C     pt states dx 1 month ago    Liver disease     Presence of IVC filter     pt states several years ago    Rheumatoid arthritis ([address])     Sciatic leg pain     pt states both legs    Uterine fibroid      Surgical History:  Past Surgical History:   Procedure Laterality Date    APPENDECTOMY  10/03/2022     SECTION      COLOSTOMY      GI  10/31/2022    rigid sigmoidoscope examination of the rectum    GI  10/31/2022    distal rectal repair with primary suturing technique    GI  2022    laparoscopic early postop adhesion of the sigmoid colon    GI  2022    laparoscopic lysis of adhesion    GYN  10/03/2022    ROBOT ANTERIOR PELVIC EXENTERATION    HYSTERECTOMY (CERVIX STATUS UNKNOWN)  10/03/2022    IR THROMB MECH VEIN  10/27/2022    IR THROMB [id number] [facility] 10/27/2022 SSR RAD ANGIO IR    IR THROMB [id number] [facility]  10/27/2022    MT UNLISTED PROCEDURE CARDIAC SURGERY      stent placement    RECTAL SURGERY N/A 2023    Examination under anesthesia with rigid sigmoidoscope and vaginal examination performed by Jose Manuel Bar

## 2023-07-26 ENCOUNTER — OFFICE VISIT (OUTPATIENT)
Facility: CLINIC | Age: 58
End: 2023-07-26
Payer: MEDICAID

## 2023-07-26 ENCOUNTER — TELEPHONE (OUTPATIENT)
Facility: CLINIC | Age: 58
End: 2023-07-26

## 2023-07-26 VITALS
BODY MASS INDEX: 18.96 KG/M2 | WEIGHT: 107 LBS | HEART RATE: 78 BPM | HEIGHT: 63 IN | DIASTOLIC BLOOD PRESSURE: 78 MMHG | OXYGEN SATURATION: 99 % | RESPIRATION RATE: 16 BRPM | SYSTOLIC BLOOD PRESSURE: 112 MMHG | TEMPERATURE: 98.1 F

## 2023-07-26 DIAGNOSIS — I82.5Y3 CHRONIC DEEP VEIN THROMBOSIS (DVT) OF PROXIMAL VEIN OF BOTH LOWER EXTREMITIES (HCC): Primary | ICD-10-CM

## 2023-07-26 DIAGNOSIS — M25.50 CHRONIC PAIN OF MULTIPLE JOINTS: ICD-10-CM

## 2023-07-26 DIAGNOSIS — G89.29 CHRONIC PAIN OF MULTIPLE JOINTS: ICD-10-CM

## 2023-07-26 DIAGNOSIS — K21.9 GASTROESOPHAGEAL REFLUX DISEASE, UNSPECIFIED WHETHER ESOPHAGITIS PRESENT: ICD-10-CM

## 2023-07-26 DIAGNOSIS — G89.29 CHRONIC BACK PAIN, UNSPECIFIED BACK LOCATION, UNSPECIFIED BACK PAIN LATERALITY: ICD-10-CM

## 2023-07-26 DIAGNOSIS — Z13.220 SCREENING FOR HYPERLIPIDEMIA: ICD-10-CM

## 2023-07-26 DIAGNOSIS — M54.9 CHRONIC BACK PAIN, UNSPECIFIED BACK LOCATION, UNSPECIFIED BACK PAIN LATERALITY: ICD-10-CM

## 2023-07-26 DIAGNOSIS — C67.9 MALIGNANT NEOPLASM OF URINARY BLADDER, UNSPECIFIED SITE (HCC): ICD-10-CM

## 2023-07-26 DIAGNOSIS — N82.3 RECTOVAGINAL FISTULA: ICD-10-CM

## 2023-07-26 DIAGNOSIS — H92.02 LEFT EAR PAIN: ICD-10-CM

## 2023-07-26 DIAGNOSIS — Z93.3 COLOSTOMY IN PLACE (HCC): ICD-10-CM

## 2023-07-26 PROCEDURE — 99214 OFFICE O/P EST MOD 30 MIN: CPT | Performed by: STUDENT IN AN ORGANIZED HEALTH CARE EDUCATION/TRAINING PROGRAM

## 2023-07-26 RX ORDER — MULTIVITAMIN WITH FOLIC ACID 400 MCG
1 TABLET ORAL DAILY
Qty: 90 TABLET | Refills: 1 | Status: SHIPPED | OUTPATIENT
Start: 2023-07-26

## 2023-07-26 RX ORDER — TRAMADOL HYDROCHLORIDE 50 MG/1
50 TABLET ORAL 2 TIMES DAILY PRN
Qty: 60 TABLET | Refills: 0 | Status: SHIPPED | OUTPATIENT
Start: 2023-07-26 | End: 2023-08-25

## 2023-07-26 RX ORDER — PANTOPRAZOLE SODIUM 40 MG/1
40 TABLET, DELAYED RELEASE ORAL DAILY
Qty: 30 TABLET | Refills: 2 | Status: SHIPPED | OUTPATIENT
Start: 2023-07-26

## 2023-07-26 RX ORDER — ASCORBIC ACID 500 MG
500 TABLET ORAL DAILY
Qty: 30 TABLET | Refills: 2 | Status: SHIPPED | OUTPATIENT
Start: 2023-07-26

## 2023-07-26 SDOH — ECONOMIC STABILITY: FOOD INSECURITY: WITHIN THE PAST 12 MONTHS, YOU WORRIED THAT YOUR FOOD WOULD RUN OUT BEFORE YOU GOT MONEY TO BUY MORE.: NEVER TRUE

## 2023-07-26 SDOH — ECONOMIC STABILITY: FOOD INSECURITY: WITHIN THE PAST 12 MONTHS, THE FOOD YOU BOUGHT JUST DIDN'T LAST AND YOU DIDN'T HAVE MONEY TO GET MORE.: NEVER TRUE

## 2023-07-26 SDOH — ECONOMIC STABILITY: INCOME INSECURITY: HOW HARD IS IT FOR YOU TO PAY FOR THE VERY BASICS LIKE FOOD, HOUSING, MEDICAL CARE, AND HEATING?: NOT HARD AT ALL

## 2023-07-26 SDOH — ECONOMIC STABILITY: HOUSING INSECURITY
IN THE LAST 12 MONTHS, WAS THERE A TIME WHEN YOU DID NOT HAVE A STEADY PLACE TO SLEEP OR SLEPT IN A SHELTER (INCLUDING NOW)?: NO

## 2023-07-26 ASSESSMENT — ENCOUNTER SYMPTOMS
DIARRHEA: 0
ABDOMINAL PAIN: 1
CONSTIPATION: 0
COUGH: 0
SHORTNESS OF BREATH: 0
BACK PAIN: 1

## 2023-07-26 NOTE — TELEPHONE ENCOUNTER
Can you call patient and ask if she ever followed with rheumatology for previous concerns of rheumatoid arthritis.  If not I will place referral.

## 2023-07-27 LAB
CHOLEST SERPL-MCNC: 152 MG/DL (ref 100–199)
HDLC SERPL-MCNC: 66 MG/DL
LDLC SERPL CALC-MCNC: 70 MG/DL (ref 0–99)
TRIGL SERPL-MCNC: 84 MG/DL (ref 0–149)
VLDLC SERPL CALC-MCNC: 16 MG/DL (ref 5–40)

## 2023-08-02 ENCOUNTER — TELEPHONE (OUTPATIENT)
Age: 58
End: 2023-08-02

## 2023-08-02 NOTE — TELEPHONE ENCOUNTER
SPOKE W PT ABOUT [employer]     NOTIFIED HER THAT THE ONLY ONE WE HAVE RECEIVED IS THE ONE FROM 06/21 WHICH WAS FAXED BACK BUT NOTHING NEW    TOLD PT THAT THEY WOULD HAVE TO HAVE ANY PAPERS FAXED OVER   PROVIDED PT WITH FAX #, [phone]  TO GIVE TO THEM     SHE WILL BE LETTING [facility] [address] [facility]

## 2023-08-04 ENCOUNTER — TELEPHONE (OUTPATIENT)
Facility: CLINIC | Age: 58
End: 2023-08-04

## 2023-08-04 NOTE — TELEPHONE ENCOUNTER
----- Message from Stone Gorman sent at 7/26/2023  1:21 PM EDT -----  Subject: Message to Provider    QUESTIONS  Information for Provider? Pt is asking if the provider was able to put in   orders for a pillow top queen mattress, box springs and frame. She does   not want a hospital bed. Please call pt to advise.  ---------------------------------------------------------------------------  --------------  [employer] ANU  [phone]; OK to leave message on voicemail  ---------------------------------------------------------------------------  --------------  SCRIPT ANSWERS  Relationship to Patient?  Self

## 2023-08-10 NOTE — TELEPHONE ENCOUNTER
Order has been placed in paracte for gel mattress overlay. Called and spoke to pt regarding her request for queen size pillow top mattress. Her insurance will not cover that. I informed her I will try to get a gel over-lay mattress to put in top of her regular mattress. She says that will be fine.

## 2023-08-11 ENCOUNTER — TELEPHONE (OUTPATIENT)
Facility: CLINIC | Age: 58
End: 2023-08-11

## 2023-08-11 NOTE — TELEPHONE ENCOUNTER
Diaz sent message, they will be contacting pt to set up delivery for her gel mattress. I called the pt to let her know. She verbalized understanding of this information and wrote the phone number down so she could go ahead and call them today. Thank you.

## 2023-08-15 RX ORDER — APIXABAN 5 MG/1
TABLET, FILM COATED ORAL
Qty: 60 TABLET | Refills: 0 | Status: SHIPPED | OUTPATIENT
Start: 2023-08-15

## 2023-08-17 ENCOUNTER — TELEPHONE (OUTPATIENT)
Facility: CLINIC | Age: 58
End: 2023-08-17

## 2023-08-22 ENCOUNTER — TELEPHONE (OUTPATIENT)
Age: 58
End: 2023-08-22

## 2023-08-22 NOTE — TELEPHONE ENCOUNTER
Patient called regarding her supplies for ostomy. She needs more supplies and is also asking if she can get authorization for shower caps and bandages to take shower.  Please call her at [phone]

## 2023-08-23 ENCOUNTER — TELEPHONE (OUTPATIENT)
Age: 58
End: 2023-08-23

## 2023-08-23 DIAGNOSIS — Z43.3 COLOSTOMY CARE (HCC): Primary | ICD-10-CM

## 2023-08-29 ENCOUNTER — OFFICE VISIT (OUTPATIENT)
Age: 58
End: 2023-08-29
Payer: MEDICAID

## 2023-08-29 ENCOUNTER — TELEPHONE (OUTPATIENT)
Age: 58
End: 2023-08-29

## 2023-08-29 VITALS
BODY MASS INDEX: 19.67 KG/M2 | WEIGHT: 111 LBS | SYSTOLIC BLOOD PRESSURE: 118 MMHG | DIASTOLIC BLOOD PRESSURE: 78 MMHG | HEIGHT: 63 IN

## 2023-08-29 DIAGNOSIS — H91.90 HEARING LOSS, UNSPECIFIED HEARING LOSS TYPE, UNSPECIFIED LATERALITY: Primary | ICD-10-CM

## 2023-08-29 DIAGNOSIS — J30.89 NON-SEASONAL ALLERGIC RHINITIS, UNSPECIFIED TRIGGER: ICD-10-CM

## 2023-08-29 DIAGNOSIS — H69.81 ETD (EUSTACHIAN TUBE DYSFUNCTION), RIGHT: Primary | ICD-10-CM

## 2023-08-29 PROCEDURE — 99203 OFFICE O/P NEW LOW 30 MIN: CPT | Performed by: NURSE PRACTITIONER

## 2023-08-29 RX ORDER — FLUTICASONE PROPIONATE 50 MCG
2 SPRAY, SUSPENSION (ML) NASAL DAILY
Qty: 16 G | Refills: 5 | Status: SHIPPED | OUTPATIENT
Start: 2023-08-29

## 2023-08-29 RX ORDER — LORATADINE 10 MG/1
10 TABLET ORAL DAILY
Qty: 30 TABLET | Refills: 0 | Status: SHIPPED | OUTPATIENT
Start: 2023-08-29

## 2023-08-29 RX ORDER — TRAMADOL HYDROCHLORIDE 50 MG/1
TABLET ORAL
COMMUNITY
Start: 2023-08-25

## 2023-08-29 ASSESSMENT — ENCOUNTER SYMPTOMS
GASTROINTESTINAL NEGATIVE: 1
EYES NEGATIVE: 1
RESPIRATORY NEGATIVE: 1

## 2023-08-29 NOTE — PROGRESS NOTES
Subjective: Rashid Khan   60 y.o.   1965     New Patient Visit  This is a 60 y.o. female who is here today to discuss issues with ears. Location - right ear     Quality - fullness     Severity -  mild     Duration - about one week     Timing - constant     Context - She states she was seen by her PCP and was told she should have the ear evaluated by ENT. She denies pain or drainage from the ear. Modifying Features - none     Associated symptoms/signs - none      Review of Systems  Review of Systems   Constitutional: Negative. HENT:          Ear fullness   Eyes: Negative. Respiratory: Negative. Cardiovascular: Negative. Gastrointestinal: Negative. Endocrine: Negative. Genitourinary: Negative. Musculoskeletal: Negative. Skin: Negative. Allergic/Immunologic: Positive for environmental allergies. Neurological: Negative. Hematological: Negative. Psychiatric/Behavioral: Negative.            Past Medical History:   Diagnosis Date    Anemia     pt states had recent blood transfusion 2022    Back pain     Cancer (HCC)     bladder- pt states in remission    DVT (deep venous thrombosis) ([address])     and PE, pt denies lung PE , only leg several years ago    Hepatitis C     pt states dx 1 month ago    Liver disease     Presence of IVC filter     pt states several years ago    Rheumatoid arthritis ([address])     Sciatic leg pain     pt states both legs    Uterine fibroid      Past Surgical History:   Procedure Laterality Date    APPENDECTOMY  10/03/2022     SECTION      COLOSTOMY      GI  10/31/2022    rigid sigmoidoscope examination of the rectum    GI  10/31/2022    distal rectal repair with primary suturing technique    GI  2022    laparoscopic early postop adhesion of the sigmoid colon    GI  2022    laparoscopic lysis of adhesion    GYN  10/03/2022    ROBOT ANTERIOR PELVIC EXENTERATION    HYSTERECTOMY (CERVIX STATUS UNKNOWN)  10/03/2022    IR THROMB

## 2023-08-29 NOTE — TELEPHONE ENCOUNTER
Pt called wanting to know if Familia David was supposed to send over any medications for her ears. She stated they spoke about that during her appointment but only flonase and claritin was advised. Also, patient needs clarification about medications to avoid before allergy testing. She asked if we are able to contact her PCP to ask if she needs to hold any of her meds/what is safe to hold prior to testing. Please contact pt for clarification on these concerns. Thanks!

## 2023-08-29 NOTE — TELEPHONE ENCOUNTER
Pt requested to schedule a hearing test. I went ahead and scheduled her for 9/19/2023. Could you ask Edgard Lerma to put a referral order in for it?

## 2023-08-30 ENCOUNTER — TELEPHONE (OUTPATIENT)
Age: 58
End: 2023-08-30

## 2023-08-30 NOTE — TELEPHONE ENCOUNTER
Pt called and would like a call back from Kristopher Gusman or his medical assistant regarding the medication that was sent in.  She would like to know why there was no medicine for her ear sent in for her

## 2023-09-14 RX ORDER — APIXABAN 5 MG/1
TABLET, FILM COATED ORAL
Qty: 60 TABLET | Refills: 2 | Status: SHIPPED | OUTPATIENT
Start: 2023-09-14

## 2023-09-19 ENCOUNTER — OFFICE VISIT (OUTPATIENT)
Age: 58
End: 2023-09-19
Payer: MEDICAID

## 2023-09-19 DIAGNOSIS — H90.3 SENSORINEURAL HEARING LOSS (SNHL) OF BOTH EARS: Primary | ICD-10-CM

## 2023-09-19 PROCEDURE — 92557 COMPREHENSIVE HEARING TEST: CPT | Performed by: AUDIOLOGIST

## 2023-09-19 PROCEDURE — 92567 TYMPANOMETRY: CPT | Performed by: AUDIOLOGIST

## 2023-09-19 NOTE — PROGRESS NOTES
Mary Cassidy, a 60y.o. year old female, was seen in clinic today for a hearing evaluation on referral from Gwendlyn Cowden, [facility]. Patient complains of hearing loss and tinnitus. She also reports ear itching (L>R) with some pressure. No recent audiogram. History of cancer treatment (patient is currently in remission). Otoscopy: normal external ear canals and visible tympanic membranes, bilaterally. Tympanometry: RE Type A, normal  LE Type A, normal    SRT: RE Speech Reception Threshold (SRT) was obtained at 30 dBHL   LE Speech Reception Threshold (SRT) was obtained at 30 dBHL    WRS: RE Excellent in quiet when words were presented at 70 dBHL. WRS: LE Excellent in quiet when words were presented at 65 dBHL. Pure tone audiometry:  RE: Borderline WNL/mild sensorineural hearing loss (WNL at 2000Hz)   LE: Borderline WNL/mild sensorineural hearing loss rising to WNL    sensorineural hearing loss bilaterally    Impressions:  hearing loss requiring medical/otologic and audiologic follow-up    Note: I did discuss with patient that I will be leaving the practice in October; reassured that we are currently working on finding locum audiology/temporary staff to provide continuity of care until replacement audiologist can be hired. Also discussed that if a replacement cannot be found, patients will be provided with a list of local practices for audiology services. Plan:  Follow-up with NP. Hearing aid evaluation recommended. Patient has been added to waitlist for incoming new provider. Repeat audiogram 1 year or sooner if change is noted.     Sanchez Sifuentes   Doctor of Audiology

## 2023-09-21 ENCOUNTER — TELEPHONE (OUTPATIENT)
Age: 58
End: 2023-09-21

## 2023-09-21 NOTE — TELEPHONE ENCOUNTER
Pt asked could Dr. David Bartholomew give her some pee bags as well as wraps and wash clothes from [facility] because She is having a hard time with urinating on herself and asked could u give her a call so she could explain it more to you

## 2023-09-22 PROBLEM — N13.30 HYDRONEPHROSIS OF RIGHT KIDNEY: Status: RESOLVED | Noted: 2022-09-13 | Resolved: 2023-09-22

## 2023-09-22 PROBLEM — N82.3 RECTOVAGINAL FISTULA: Status: ACTIVE | Noted: 2022-11-30

## 2023-09-22 PROBLEM — C67.9 BLADDER CANCER (HCC): Status: ACTIVE | Noted: 2022-09-13

## 2023-09-26 DIAGNOSIS — M25.50 CHRONIC PAIN OF MULTIPLE JOINTS: ICD-10-CM

## 2023-09-26 DIAGNOSIS — G89.29 CHRONIC PAIN OF MULTIPLE JOINTS: ICD-10-CM

## 2023-09-27 RX ORDER — ACETAMINOPHEN 325 MG/1
TABLET ORAL
Qty: 80 TABLET | Refills: 0 | Status: SHIPPED | OUTPATIENT
Start: 2023-09-27

## 2023-09-28 ENCOUNTER — TELEPHONE (OUTPATIENT)
Facility: CLINIC | Age: 58
End: 2023-09-28

## 2023-09-28 ENCOUNTER — TELEPHONE (OUTPATIENT)
Age: 58
End: 2023-09-28

## 2023-09-28 NOTE — TELEPHONE ENCOUNTER
Advised of a new temporary phone number: [phone]. She can be reached at this number until October 16th. Dr. Marlee Perez is sending her to [facility] to get a bag reversal but Selma Davis advised her that he doesn't do that. Pt also advised that she needs more 8331, 8460 and barrier strips. She advised that her supplier is [email or web address]. She also advised that she needs to speak with the doctor about the supplies.

## 2023-09-28 NOTE — TELEPHONE ENCOUNTER
----- Message from Lucina Mcgraw sent at 9/28/2023 10:27 AM EDT -----  Subject: Message to Provider    QUESTIONS  Information for Provider? Patient requests call back as she needs a full   sized gel mattress the 1/2 on does not help.   ---------------------------------------------------------------------------  --------------  Naomie Liu KQNW  [phone]; OK to leave message on voicemail  ---------------------------------------------------------------------------  --------------  SCRIPT ANSWERS  Relationship to Patient?  Self

## 2023-09-29 ENCOUNTER — OFFICE VISIT (OUTPATIENT)
Age: 58
End: 2023-09-29

## 2023-09-29 VITALS
BODY MASS INDEX: 19.67 KG/M2 | TEMPERATURE: 97.8 F | DIASTOLIC BLOOD PRESSURE: 79 MMHG | OXYGEN SATURATION: 100 % | WEIGHT: 111 LBS | HEIGHT: 63 IN | HEART RATE: 73 BPM | SYSTOLIC BLOOD PRESSURE: 131 MMHG

## 2023-09-29 DIAGNOSIS — N82.3 RECTOVAGINAL FISTULA: ICD-10-CM

## 2023-09-29 DIAGNOSIS — Z98.890 HISTORY OF ILEAL CONDUIT: ICD-10-CM

## 2023-09-29 DIAGNOSIS — Z93.3 COLOSTOMY IN PLACE (HCC): ICD-10-CM

## 2023-09-29 DIAGNOSIS — C67.9 MALIGNANT NEOPLASM OF URINARY BLADDER, UNSPECIFIED SITE (HCC): ICD-10-CM

## 2023-09-29 DIAGNOSIS — K94.00 COLOSTOMY COMPLICATION, UNSPECIFIED (HCC): ICD-10-CM

## 2023-09-29 DIAGNOSIS — Z86.718 HISTORY OF DVT IN ADULTHOOD: ICD-10-CM

## 2023-09-29 LAB
BILIRUBIN, URINE, POC: NEGATIVE
BLOOD URINE, POC: ABNORMAL
GLUCOSE URINE, POC: NEGATIVE
KETONES, URINE, POC: NEGATIVE
LEUKOCYTE ESTERASE, URINE, POC: ABNORMAL
NITRITE, URINE, POC: NEGATIVE
PH, URINE, POC: 6.5 (ref 4.6–8)
PROTEIN,URINE, POC: NEGATIVE
SPECIFIC GRAVITY, URINE, POC: <1.005 (ref 1–1.03)
URINALYSIS CLARITY, POC: CLEAR
URINALYSIS COLOR, POC: YELLOW
UROBILINOGEN, POC: NORMAL

## 2023-09-29 NOTE — ASSESSMENT & PLAN NOTE
She has a colonoscopy and persistent drainage. She is should see the stoma nurse.   She should discuss further management with Dr. Lilian Cao or see a colorectal specialist specialist.

## 2023-09-29 NOTE — PROGRESS NOTES
HISTORY OF PRESENT ILLNESS  Carl Singh is a 60 y.o. female. has a past medical history of Anemia, Back pain, Cancer ([address]), DVT (deep venous thrombosis) (HCC), Hepatitis C, Liver disease, Presence of IVC filter, Rheumatoid arthritis ([address]), Sciatic leg pain, and Uterine fibroid. has a past surgical history that includes IR GUIDED THROMB [id number] [facility] (10/27/2022); gi (10/31/2022); gi (10/31/2022);  section; Urological Surgery (09/15/2022); Urological Surgery (09/15/2022); Urological Surgery (09/15/2022); Urological Surgery (09/15/2022); Urological Surgery (Bilateral, 09/15/2022); pr unlisted procedure cardiac surgery; Hysterectomy (10/03/2022); Salpingo-oophorectomy (Bilateral, 10/03/2022); gyn (10/03/2022); Appendectomy (10/03/2022); Urological Surgery (10/03/2022); Urological Surgery (10/03/2022); IR GUIDED THROMB [id number] [facility] (10/27/2022); gi (2022); gi (2022); colostomy; and Rectal surgery (N/A, 2023). 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 Post cystectomy and ileal conduit 10/3/2022. She is complicated by rectovaginal fistula. She underwent loop colostomy. She is followed by Dr. Edward Pinon. She has elected active surveillance over adjuvant therapy per Dr. Charles Claude note her last scan showed subcentimeter lung nodules and prominent lymph nodes. She has history of bilateral DVT. Sometimes she has a urine leak. She is not using paste or a ring. She sometimes has the valve turn on her and leaks. She has a colostomy. Sometimes she leaks a little. She leaks a little bloody discharge vaginally. It is more than weekly but not daily. She saw Dr. Debra Lopez and says she is referred by him to another specialist.      Overall she leaks a little.          1. Malignant neoplasm of urinary bladder, unspecified site [facility])  Overview:  Invasive squamous cell carcinoma,

## 2023-10-02 ENCOUNTER — TELEPHONE (OUTPATIENT)
Age: 58
End: 2023-10-02

## 2023-10-02 DIAGNOSIS — N82.3 RECTOVAGINAL FISTULA: Primary | ICD-10-CM

## 2023-10-02 DIAGNOSIS — Z98.890 HISTORY OF ILEAL CONDUIT: ICD-10-CM

## 2023-10-02 DIAGNOSIS — Z43.3 COLOSTOMY CARE (HCC): ICD-10-CM

## 2023-10-02 DIAGNOSIS — C67.9 MALIGNANT NEOPLASM OF URINARY BLADDER, UNSPECIFIED SITE (HCC): ICD-10-CM

## 2023-10-02 NOTE — TELEPHONE ENCOUNTER
Patient needs help with ostomy care and education and has limited resources and cannot travel to [facility] to an [facility]. Can you try to obtain home health to assist with education and care? Referral is in.

## 2023-10-03 ENCOUNTER — TELEPHONE (OUTPATIENT)
Facility: CLINIC | Age: 58
End: 2023-10-03

## 2023-10-03 NOTE — TELEPHONE ENCOUNTER
----- Message from Gerardo Perales sent at 10/3/2023  9:55 AM EDT -----  Subject: Referral Request    Reason for referral request? lee  Provider patient wants to be referred to(if known):     Provider Phone Number(if known): Additional Information for Provider? FELISHA johnson. Pt needs a queen or   jerry. What she has now doesnt fit her bed.  Please contact the pt  ---------------------------------------------------------------------------  --------------  CALL BACK INFO    [phone]; OK to leave message on voicemail  ---------------------------------------------------------------------------  --------------

## 2023-10-06 ENCOUNTER — TELEPHONE (OUTPATIENT)
Facility: CLINIC | Age: 58
End: 2023-10-06

## 2023-10-06 NOTE — TELEPHONE ENCOUNTER
----- Message from Antonio Napoles sent at 10/6/2023 10:37 AM EDT -----  Subject: Message to Provider    QUESTIONS  Information for Provider? Patient needs a prescription for abreva. Can the   nurse or  send that in for the patient.   ---------------------------------------------------------------------------  --------------  [address]  [phone]; OK to leave message on voicemail  ---------------------------------------------------------------------------  --------------  SCRIPT ANSWERS  Relationship to Patient?  Self

## 2023-10-10 ENCOUNTER — TELEPHONE (OUTPATIENT)
Facility: CLINIC | Age: 58
End: 2023-10-10

## 2023-10-10 NOTE — TELEPHONE ENCOUNTER
----- Message from Sharri Amato sent at 10/9/2023  4:05 PM EDT -----  Subject: Message to Provider    QUESTIONS  Information for Provider? Pt is calling in to speak to someone regarding   her cold sore medication and the DME mattress. Pt would like a call back. ---------------------------------------------------------------------------  --------------  Jos January [facility]  [phone]; OK to leave message on voicemail  ---------------------------------------------------------------------------  --------------  SCRIPT ANSWERS  Relationship to Patient?  Self

## 2023-10-11 ENCOUNTER — TELEPHONE (OUTPATIENT)
Facility: CLINIC | Age: 58
End: 2023-10-11

## 2023-10-11 DIAGNOSIS — C67.9 MALIGNANT NEOPLASM OF URINARY BLADDER, UNSPECIFIED SITE (HCC): ICD-10-CM

## 2023-10-11 DIAGNOSIS — K21.9 GASTROESOPHAGEAL REFLUX DISEASE, UNSPECIFIED WHETHER ESOPHAGITIS PRESENT: ICD-10-CM

## 2023-10-11 RX ORDER — PANTOPRAZOLE SODIUM 40 MG/1
40 TABLET, DELAYED RELEASE ORAL DAILY
Qty: 90 TABLET | Refills: 0 | Status: SHIPPED | OUTPATIENT
Start: 2023-10-11

## 2023-10-11 RX ORDER — ASCORBIC ACID 500 MG
500 TABLET ORAL DAILY
Qty: 90 TABLET | Refills: 0 | Status: SHIPPED | OUTPATIENT
Start: 2023-10-11

## 2023-10-11 NOTE — TELEPHONE ENCOUNTER
Attempted to call patient regarding mattress and message sent to Dr. Fany Boswell. I have duplicate message regarding this in my box so I will close this one.

## 2023-10-11 NOTE — TELEPHONE ENCOUNTER
Attempted to call patient. I have a duplicate message regarding the mattress. I will close this message.

## 2023-10-11 NOTE — TELEPHONE ENCOUNTER
Patient is calling to request a prescription for abreva for a cold sore. Also the mattress cover is not working, she's saying it only covers half the bed and makes it uncomfortable for her.   Will be here on 10/26/23

## 2023-10-16 ENCOUNTER — TELEPHONE (OUTPATIENT)
Age: 58
End: 2023-10-16

## 2023-10-16 DIAGNOSIS — N82.3 RECTOVAGINAL FISTULA: Primary | ICD-10-CM

## 2023-10-16 DIAGNOSIS — B00.1 COLD SORE: Primary | ICD-10-CM

## 2023-10-16 RX ORDER — DOCOSANOL 100 MG/G
CREAM TOPICAL
Qty: 2 G | Refills: 0 | COMMUNITY
Start: 2023-10-16

## 2023-10-16 NOTE — TELEPHONE ENCOUNTER
CAN YOU PLEASE HAVE THE PT REACH OUT TO THE COMPANY SHE IS RECEIVING SUPPLIES FROM AND ASK THEM TO FAX US A ORDER FORM.  PROVIDE HER WITH OUR CORRECT FAX

## 2023-10-16 NOTE — TELEPHONE ENCOUNTER
Patient identified with two patient identifiers. Patient informed per Dr. Roderick Sy he will refer her to Dr. Duarte File with [facility]. Patient informed new patient appt request faxed over to their office with confirmation they should call sometime this week to schedule. Patient expressed understanding will return call if she has not heard from them by next week.

## 2023-10-16 NOTE — TELEPHONE ENCOUNTER
Called pt and asked her to call company and ask to fax us an order form and I also give fax number to fax back and pt was please and I told her I would call her tomorrow to check on her to make sure she was able to contact them

## 2023-10-16 NOTE — TELEPHONE ENCOUNTER
Pt called to ask if you can please order some more urine and stool bags and the tape xlarge and regular tape I told her I would relay message to you and call her back if you need me to and she also told me that her number [phone] is back on so we can reach her and we don't need to call dianne anymore

## 2023-10-16 NOTE — TELEPHONE ENCOUNTER
Scar Silva called in regarding the colostomy bag reversal, Dr. Vicenta Solo is sending her to [facility] to get a bag reversal but Raudel Goldberg advised her that he doesn't do that. She wants to know if Dr. Vicenta Solo has referred her to a specialist for that.  Please give her a call at [phone]

## 2023-10-19 ENCOUNTER — TELEPHONE (OUTPATIENT)
Age: 58
End: 2023-10-19

## 2023-10-19 NOTE — TELEPHONE ENCOUNTER
I made the patient aware that the DME company only has 1 size hospital bed. I also let the patient know that the [facility] was ordered and sent to her pharmacy.

## 2023-10-22 DIAGNOSIS — M25.50 CHRONIC PAIN OF MULTIPLE JOINTS: ICD-10-CM

## 2023-10-22 DIAGNOSIS — G89.29 CHRONIC PAIN OF MULTIPLE JOINTS: ICD-10-CM

## 2023-10-23 ENCOUNTER — TELEPHONE (OUTPATIENT)
Facility: CLINIC | Age: 58
End: 2023-10-23

## 2023-10-23 NOTE — TELEPHONE ENCOUNTER
Patient called and stated that she the [facility] told her there was not a prescription for Abreva and I looked and it doesn't;t look like the order went through. Can you please resend it. She would like a few refills as well.

## 2023-10-24 DIAGNOSIS — B00.1 COLD SORE: ICD-10-CM

## 2023-10-24 RX ORDER — DOCOSANOL 100 MG/G
CREAM TOPICAL
Qty: 2 G | Refills: 2 | Status: SHIPPED | OUTPATIENT
Start: 2023-10-24

## 2023-10-24 RX ORDER — ACETAMINOPHEN 325 MG/1
TABLET ORAL
Qty: 80 TABLET | Refills: 0 | Status: SHIPPED | OUTPATIENT
Start: 2023-10-24

## 2023-10-26 ENCOUNTER — OFFICE VISIT (OUTPATIENT)
Facility: CLINIC | Age: 58
End: 2023-10-26
Payer: MEDICAID

## 2023-10-26 ENCOUNTER — TELEPHONE (OUTPATIENT)
Age: 58
End: 2023-10-26

## 2023-10-26 VITALS
HEIGHT: 63 IN | BODY MASS INDEX: 20.2 KG/M2 | TEMPERATURE: 97.7 F | OXYGEN SATURATION: 98 % | SYSTOLIC BLOOD PRESSURE: 119 MMHG | DIASTOLIC BLOOD PRESSURE: 79 MMHG | HEART RATE: 84 BPM | RESPIRATION RATE: 16 BRPM | WEIGHT: 114 LBS

## 2023-10-26 DIAGNOSIS — N82.3 RECTOVAGINAL FISTULA: ICD-10-CM

## 2023-10-26 DIAGNOSIS — R76.8 RHEUMATOID FACTOR POSITIVE: ICD-10-CM

## 2023-10-26 DIAGNOSIS — Z12.31 ENCOUNTER FOR SCREENING MAMMOGRAM FOR MALIGNANT NEOPLASM OF BREAST: ICD-10-CM

## 2023-10-26 DIAGNOSIS — Z98.890 HISTORY OF ILEAL CONDUIT: ICD-10-CM

## 2023-10-26 DIAGNOSIS — C67.9 MALIGNANT NEOPLASM OF URINARY BLADDER, UNSPECIFIED SITE (HCC): Primary | ICD-10-CM

## 2023-10-26 DIAGNOSIS — Z86.718 HISTORY OF DVT IN ADULTHOOD: ICD-10-CM

## 2023-10-26 DIAGNOSIS — R76.8 ANA POSITIVE: ICD-10-CM

## 2023-10-26 DIAGNOSIS — Z93.3 COLOSTOMY IN PLACE (HCC): ICD-10-CM

## 2023-10-26 DIAGNOSIS — C67.9 MALIGNANT NEOPLASM OF URINARY BLADDER, UNSPECIFIED SITE (HCC): ICD-10-CM

## 2023-10-26 PROCEDURE — 99214 OFFICE O/P EST MOD 30 MIN: CPT | Performed by: STUDENT IN AN ORGANIZED HEALTH CARE EDUCATION/TRAINING PROGRAM

## 2023-10-26 RX ORDER — CROMOLYN SODIUM 40 MG/ML
SOLUTION/ DROPS OPHTHALMIC
COMMUNITY
Start: 2023-10-22

## 2023-10-26 ASSESSMENT — ENCOUNTER SYMPTOMS
FACIAL SWELLING: 0
COUGH: 0
SORE THROAT: 0
CONSTIPATION: 0
DIARRHEA: 0
SHORTNESS OF BREATH: 0
ABDOMINAL PAIN: 1

## 2023-10-26 NOTE — PROGRESS NOTES
Donn Stevens (: 1965) is a 60 y.o. female, Established patient patient, here for evaluation of the following chief complaint(s):  Follow-up Chronic Condition       ASSESSMENT/PLAN:  Below is the assessment and plan developed based on review of pertinent history, physical exam, labs, studies, and medications. 1. Malignant neoplasm of urinary bladder, unspecified site (HCC)  -     CBC; Future  -     Comprehensive Metabolic Panel; Future  2. BILLY positive  -     BILLY BY IFA W/REFLEX; Future  -     CBC; Future  -     Comprehensive Metabolic Panel; Future  -     SJOGREN'S ANTIBODIES (SS-A, SS-B); Future  -     Amb External Referral To Rheumatology  3. Rheumatoid factor positive  -     RheumAssure; Future  -     Amb External Referral To Rheumatology  4. Rectovaginal fistula  -     Comprehensive Metabolic Panel; Future  5. Colostomy in place [facility])  6. History of DVT in adulthood  7. Encounter for screening mammogram for malignant neoplasm of breast  -     NABEEL DIGITAL SCREEN W OR WO CAD BILATERAL; Future  -     BILLY BY IFA W/REFLEX; Future  8. History of ileal conduit    Reviewed notes from [facility] oncology/urology. On active surveillance for bladder cancer. Patient is interested in rheumatology follow-up. Repeat BILLY, RF and Sjogren's antibodies have been ordered. 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 Unsure if she followed up in regards to this, will discuss this with her hematologist.    She was recommended to discuss with Dr. Lon Owens in regards to getting help for stomal care. Blood work as above. Continue eliquis. Return in about 3 months (around 2024) for follow up. SUBJECTIVE/OBJECTIVE:  HPI    Donn Stevens is a 60 yr old who presents to the clinic for a follow up.      She has a significant past medical history of bladder cancer status post cystectomy, hysterectomy, BSO and ileal conduit

## 2023-10-26 NOTE — TELEPHONE ENCOUNTER
Spoke with patient and informed her that per Sarah So they tried home health 6 months ago and she was not compliant so they refuse to go out. Pt understood and said that is fine she does not want home health she was confused as to what they actually did and she is in contact with another company to get someone sent out to care for her the way she needs. pt also stated that she is getting a supply of disposable ostomy bags sent to her and if she likes them she will be reaching back out to see if they can be ordered for her in bulk (at least 6 boxes) so she does not run out too fast.

## 2023-10-26 NOTE — TELEPHONE ENCOUNTER
Patient called today regarding home health. She wants to know if he set her up for home health.  [phone]

## 2023-10-27 LAB
ENA SS-A AB SER-ACNC: >8 AI (ref 0–0.9)
ENA SS-B AB SER-ACNC: >8 AI (ref 0–0.9)
ERYTHROCYTE [DISTWIDTH] IN BLOOD BY AUTOMATED COUNT: 11.9 % (ref 11.7–15.4)
HCT VFR BLD AUTO: 30 % (ref 34–46.6)
HGB BLD-MCNC: 9.8 G/DL (ref 11.1–15.9)
MCH RBC QN AUTO: 28.8 PG (ref 26.6–33)
MCHC RBC AUTO-ENTMCNC: 32.7 G/DL (ref 31.5–35.7)
MCV RBC AUTO: 88 FL (ref 79–97)
PLATELET # BLD AUTO: 203 X10E3/UL (ref 150–450)
RBC # BLD AUTO: 3.4 X10E6/UL (ref 3.77–5.28)
WBC # BLD AUTO: 3.4 X10E3/UL (ref 3.4–10.8)

## 2023-10-28 LAB
ALBUMIN SERPL-MCNC: 3.6 G/DL (ref 3.8–4.9)
ALBUMIN/GLOB SERPL: 0.5 {RATIO} (ref 1.2–2.2)
ALP SERPL-CCNC: 63 IU/L (ref 44–121)
ALT SERPL-CCNC: 8 IU/L (ref 0–32)
AST SERPL-CCNC: 17 IU/L (ref 0–40)
BILIRUB SERPL-MCNC: 0.6 MG/DL (ref 0–1.2)
BUN SERPL-MCNC: 18 MG/DL (ref 6–24)
BUN/CREAT SERPL: 25 (ref 9–23)
CALCIUM SERPL-MCNC: 9.3 MG/DL (ref 8.7–10.2)
CHLORIDE SERPL-SCNC: 103 MMOL/L (ref 96–106)
CO2 SERPL-SCNC: 21 MMOL/L (ref 20–29)
CREAT SERPL-MCNC: 0.73 MG/DL (ref 0.57–1)
EGFRCR SERPLBLD CKD-EPI 2021: 95 ML/MIN/1.73
GLOBULIN SER CALC-MCNC: 6.8 G/DL (ref 1.5–4.5)
GLUCOSE SERPL-MCNC: 91 MG/DL (ref 70–99)
POTASSIUM SERPL-SCNC: 3.8 MMOL/L (ref 3.5–5.2)
PROT SERPL-MCNC: 10.4 G/DL (ref 6–8.5)
SODIUM SERPL-SCNC: 137 MMOL/L (ref 134–144)

## 2023-10-31 LAB
ANA SER QL IF: POSITIVE
ANA SPECKLED TITR SER: ABNORMAL {TITER}
CENTROMERE B AB SER-ACNC: <0.2 AI (ref 0–0.9)
CHROMATIN AB SERPL-ACNC: <0.2 AI (ref 0–0.9)
DSDNA AB SER-ACNC: <1 IU/ML (ref 0–9)
ENA JO1 AB SER-ACNC: <0.2 AI (ref 0–0.9)
ENA RNP AB SER-ACNC: 0.3 AI (ref 0–0.9)
ENA SCL70 AB SER-ACNC: <0.2 AI (ref 0–0.9)
ENA SM AB SER-ACNC: 0.3 AI (ref 0–0.9)
LABORATORY COMMENT REPORT: ABNORMAL
Lab: ABNORMAL
Lab: ABNORMAL

## 2023-11-07 DIAGNOSIS — J30.89 NON-SEASONAL ALLERGIC RHINITIS, UNSPECIFIED TRIGGER: Primary | ICD-10-CM

## 2023-11-08 ENCOUNTER — NURSE ONLY (OUTPATIENT)
Age: 58
End: 2023-11-08
Payer: MEDICAID

## 2023-11-08 ENCOUNTER — OFFICE VISIT (OUTPATIENT)
Age: 58
End: 2023-11-08
Payer: MEDICAID

## 2023-11-08 ENCOUNTER — TELEPHONE (OUTPATIENT)
Age: 58
End: 2023-11-08

## 2023-11-08 VITALS
SYSTOLIC BLOOD PRESSURE: 122 MMHG | HEART RATE: 60 BPM | DIASTOLIC BLOOD PRESSURE: 64 MMHG | BODY MASS INDEX: 20.2 KG/M2 | HEIGHT: 63 IN | WEIGHT: 114 LBS

## 2023-11-08 VITALS — RESPIRATION RATE: 18 BRPM | DIASTOLIC BLOOD PRESSURE: 64 MMHG | HEART RATE: 60 BPM | SYSTOLIC BLOOD PRESSURE: 122 MMHG

## 2023-11-08 DIAGNOSIS — J30.89 NON-SEASONAL ALLERGIC RHINITIS, UNSPECIFIED TRIGGER: Primary | ICD-10-CM

## 2023-11-08 PROCEDURE — 95004 PERQ TESTS W/ALRGNC XTRCS: CPT | Performed by: NURSE PRACTITIONER

## 2023-11-08 PROCEDURE — 95024 IQ TESTS W/ALLERGENIC XTRCS: CPT | Performed by: NURSE PRACTITIONER

## 2023-11-08 PROCEDURE — 99213 OFFICE O/P EST LOW 20 MIN: CPT | Performed by: NURSE PRACTITIONER

## 2023-11-08 RX ORDER — LORATADINE 10 MG/1
10 TABLET ORAL DAILY
Qty: 90 TABLET | Refills: 1 | Status: SHIPPED | OUTPATIENT
Start: 2023-11-08

## 2023-11-08 RX ORDER — MONTELUKAST SODIUM 10 MG/1
10 TABLET ORAL NIGHTLY
Qty: 90 TABLET | Refills: 1 | Status: SHIPPED | OUTPATIENT
Start: 2023-11-08

## 2023-11-08 RX ORDER — FLUTICASONE PROPIONATE 50 MCG
2 SPRAY, SUSPENSION (ML) NASAL DAILY
Qty: 16 G | Refills: 5 | Status: SHIPPED | OUTPATIENT
Start: 2023-11-08

## 2023-11-08 ASSESSMENT — ENCOUNTER SYMPTOMS
EYES NEGATIVE: 1
RESPIRATORY NEGATIVE: 1
GASTROINTESTINAL NEGATIVE: 1

## 2023-11-08 NOTE — TELEPHONE ENCOUNTER
Patient identified with two patient identifiers. Confirmed the medication patient was inquiring about was Eliquis. Informed patient that Dr. Radha Gutierrez was not the provider to prescribe her the medication it was her PCP. Informed patient that I would still send a message to the provider and follow up. Patient stated she would call her PCP as well. Perfect serve sent.

## 2023-11-08 NOTE — TELEPHONE ENCOUNTER
Benjamin Heck called in regarding the blood thinners she's taking. Her question is when did Dr Willie Antonio was going to get her off the blood thinners.  Give her a call at [phone]

## 2023-11-08 NOTE — PROGRESS NOTES
Subjective: Franklin Hem   60 y.o.   1965     New Patient Visit  This is a 60 y.o. female who is here today to discuss issues with ears. Location - right ear     Quality - fullness     Severity -  mild     Duration - about one week     Timing - constant     Context - She states she was seen by her PCP and was told she should have the ear evaluated by ENT. She denies pain or drainage from the ear. Modifying Features - none     Associated symptoms/signs - none    2023 Patient returns today for environmental allergy testing. Skin testing does not reveal allergies to items tested. Review of Systems  Review of Systems   Constitutional: Negative. HENT:          Ear fullness   Eyes: Negative. Respiratory: Negative. Cardiovascular: Negative. Gastrointestinal: Negative. Endocrine: Negative. Genitourinary: Negative. Musculoskeletal: Negative. Skin: Negative. Allergic/Immunologic: Positive for environmental allergies. Neurological: Negative. Hematological: Negative. Psychiatric/Behavioral: Negative.              Past Medical History:   Diagnosis Date    Anemia     pt states had recent blood transfusion 2022    Back pain     Cancer (HCC)     bladder- pt states in remission    DVT (deep venous thrombosis) ([address])     and PE, pt denies lung PE , only leg several years ago    Hepatitis C     pt states dx 1 month ago    Liver disease     Presence of IVC filter     pt states several years ago    Rheumatoid arthritis ([address])     Sciatic leg pain     pt states both legs    Uterine fibroid      Past Surgical History:   Procedure Laterality Date    APPENDECTOMY  10/03/2022     SECTION      COLOSTOMY      GI  10/31/2022    rigid sigmoidoscope examination of the rectum    GI  10/31/2022    distal rectal repair with primary suturing technique    GI  2022    laparoscopic early postop adhesion of the sigmoid colon    GI  2022    laparoscopic lysis of

## 2023-11-09 LAB
14-3-3 ETA AG SER IA-MCNC: 1.86 NG/ML
CCP IGA+IGG SERPL IA-ACNC: <20 UNITS
RHEUMATOID FACT SERPL-ACNC: 96 UNITS/ML

## 2023-11-10 ENCOUNTER — TELEPHONE (OUTPATIENT)
Age: 58
End: 2023-11-10

## 2023-11-10 DIAGNOSIS — Z86.718 HISTORY OF DVT IN ADULTHOOD: Primary | ICD-10-CM

## 2023-11-10 NOTE — TELEPHONE ENCOUNTER
Spoke with patient. Informed her per provider she needs to continue Eliquis until blood clots dissolve, her ultrasound from may looks better but she still has some blood clots. Per provider inform her that he rescheduled ultrasound in December 2023.  Patient expressed understanding and has no other complaints

## 2023-11-17 DIAGNOSIS — G89.29 CHRONIC PAIN OF MULTIPLE JOINTS: ICD-10-CM

## 2023-11-17 DIAGNOSIS — M25.50 CHRONIC PAIN OF MULTIPLE JOINTS: ICD-10-CM

## 2023-11-21 ENCOUNTER — TELEPHONE (OUTPATIENT)
Facility: CLINIC | Age: 58
End: 2023-11-21

## 2023-11-21 RX ORDER — ACETAMINOPHEN 325 MG/1
TABLET ORAL
Qty: 80 TABLET | Refills: 0 | Status: SHIPPED | OUTPATIENT
Start: 2023-11-21

## 2023-11-21 NOTE — TELEPHONE ENCOUNTER
Patient states she is coming down with cold and would like some cough syrup sent to [facility].  She also states constant runny nose with a little bleeding

## 2023-11-22 ENCOUNTER — TELEPHONE (OUTPATIENT)
Facility: CLINIC | Age: 58
End: 2023-11-22

## 2023-11-22 DIAGNOSIS — J06.9 UPPER RESPIRATORY TRACT INFECTION, UNSPECIFIED TYPE: Primary | ICD-10-CM

## 2023-11-22 DIAGNOSIS — R05.9 COUGH, UNSPECIFIED TYPE: Primary | ICD-10-CM

## 2023-11-22 RX ORDER — ECHINACEA PURPUREA EXTRACT 125 MG
1 TABLET ORAL PRN
Qty: 1 EACH | Refills: 3 | Status: SHIPPED | OUTPATIENT
Start: 2023-11-22

## 2023-11-22 RX ORDER — FEXOFENADINE HCL 180 MG/1
180 TABLET ORAL DAILY
Qty: 30 TABLET | Refills: 2 | Status: SHIPPED | OUTPATIENT
Start: 2023-11-22

## 2023-11-22 NOTE — TELEPHONE ENCOUNTER
----- Message from Connie Chua sent at 11/22/2023  8:17 AM EST -----  Subject: Message to Provider    QUESTIONS  Information for Provider? Pt is calling back to check to see if the   provider can call in cough medication. Please call pt to advise.  ---------------------------------------------------------------------------  --------------  Edilberto Eastman INFO  [phone]; OK to leave message on voicemail  ---------------------------------------------------------------------------  --------------  SCRIPT ANSWERS  Relationship to Patient?  Self

## 2023-12-07 RX ORDER — APIXABAN 5 MG/1
TABLET, FILM COATED ORAL
Qty: 60 TABLET | Refills: 2 | Status: SHIPPED | OUTPATIENT
Start: 2023-12-07

## 2023-12-13 ENCOUNTER — TELEPHONE (OUTPATIENT)
Age: 58
End: 2023-12-13

## 2023-12-29 ENCOUNTER — TELEPHONE (OUTPATIENT)
Facility: CLINIC | Age: 58
End: 2023-12-29

## 2023-12-29 DIAGNOSIS — K21.9 GASTROESOPHAGEAL REFLUX DISEASE, UNSPECIFIED WHETHER ESOPHAGITIS PRESENT: ICD-10-CM

## 2023-12-29 DIAGNOSIS — C67.9 MALIGNANT NEOPLASM OF URINARY BLADDER, UNSPECIFIED SITE (HCC): ICD-10-CM

## 2023-12-29 NOTE — TELEPHONE ENCOUNTER
Patient called stating that she has a stye in her left eye lid and wants to know if you can send a prescription in to the pharmacy.    Please advise.

## 2024-01-01 RX ORDER — PANTOPRAZOLE SODIUM 40 MG/1
40 TABLET, DELAYED RELEASE ORAL DAILY
Qty: 30 TABLET | Refills: 2 | Status: SHIPPED | OUTPATIENT
Start: 2024-01-01

## 2024-01-01 RX ORDER — ASCORBIC ACID 500 MG
500 TABLET ORAL DAILY
Qty: 30 TABLET | Refills: 2 | Status: SHIPPED | OUTPATIENT
Start: 2024-01-01

## 2024-01-01 RX ORDER — MULTIVITAMIN WITH FOLIC ACID 400 MCG
1 TABLET ORAL DAILY
Qty: 30 TABLET | Refills: 2 | Status: SHIPPED | OUTPATIENT
Start: 2024-01-01

## 2024-01-03 ENCOUNTER — TELEPHONE (OUTPATIENT)
Age: 59
End: 2024-01-03

## 2024-01-03 ENCOUNTER — TELEPHONE (OUTPATIENT)
Facility: CLINIC | Age: 59
End: 2024-01-03

## 2024-01-03 NOTE — TELEPHONE ENCOUNTER
Pt complains she has a stye on her eye. She wants something called in for it.   Pt says she has a tiny white bump on the bottom edge of her eye. She has had it for 3 days. She says it does not itch, it is not red, nor has it made her eye swell up.  I suggested OTC stye eye. But she says she doesn't have any money to pay for anything and wants something sent in so her insurance will pay for it.  I also let her know that normally eyes and ears need to be checked in order for the doctor to treat them. She again ask if we could ask the doctor and let her know.

## 2024-01-03 NOTE — TELEPHONE ENCOUNTER
----- Message from Holli Lackey sent at 1/2/2024  3:14 PM EST -----  Subject: Message to Provider    QUESTIONS  Information for Provider? Patient is asking for something to be called in   she had a sti in her left eye she is asking for something to be called in   and does not want to come in   ---------------------------------------------------------------------------  --------------  CALL BACK INFO  [phone]; OK to leave message on voicemail  ---------------------------------------------------------------------------  --------------  SCRIPT ANSWERS  Relationship to Patient? Self

## 2024-01-04 NOTE — TELEPHONE ENCOUNTER
Pt doesn't have any symptoms other than a white bump on her lower eye lid. It doesn't hurt, it doesn't itch, it is not red. She noticed it 3 days ago.  She can't afford any medication.    I called and spoke to pt regarding her eye. She says it seems some better. I told her to use warm compress on it and let us know if it doesn't help. Thank you

## 2024-01-05 RX ORDER — CIPROFLOXACIN AND DEXAMETHASONE 3; 1 MG/ML; MG/ML
4 SUSPENSION/ DROPS AURICULAR (OTIC) 2 TIMES DAILY
Qty: 7.5 ML | Refills: 0 | Status: SHIPPED | OUTPATIENT
Start: 2024-01-05 | End: 2024-01-12

## 2024-01-08 ENCOUNTER — OFFICE VISIT (OUTPATIENT)
Age: 59
End: 2024-01-08
Payer: MEDICAID

## 2024-01-08 ENCOUNTER — TELEPHONE (OUTPATIENT)
Age: 59
End: 2024-01-08

## 2024-01-08 VITALS
WEIGHT: 122 LBS | HEART RATE: 80 BPM | DIASTOLIC BLOOD PRESSURE: 75 MMHG | RESPIRATION RATE: 16 BRPM | TEMPERATURE: 98.1 F | OXYGEN SATURATION: 98 % | SYSTOLIC BLOOD PRESSURE: 131 MMHG | BODY MASS INDEX: 21.62 KG/M2 | HEIGHT: 63 IN

## 2024-01-08 DIAGNOSIS — Z86.718 HISTORY OF DVT IN ADULTHOOD: ICD-10-CM

## 2024-01-08 DIAGNOSIS — Z93.3 COLOSTOMY IN PLACE (HCC): ICD-10-CM

## 2024-01-08 DIAGNOSIS — B00.1 COLD SORE: ICD-10-CM

## 2024-01-08 DIAGNOSIS — N82.3 RECTOVAGINAL FISTULA: Primary | ICD-10-CM

## 2024-01-08 PROCEDURE — 99213 OFFICE O/P EST LOW 20 MIN: CPT | Performed by: SURGERY

## 2024-01-08 RX ORDER — DOXYCYCLINE HYCLATE 100 MG
100 TABLET ORAL 2 TIMES DAILY
Qty: 14 TABLET | Refills: 0 | Status: SHIPPED | OUTPATIENT
Start: 2024-01-08 | End: 2024-01-13

## 2024-01-08 ASSESSMENT — PATIENT HEALTH QUESTIONNAIRE - PHQ9
1. LITTLE INTEREST OR PLEASURE IN DOING THINGS: 0
SUM OF ALL RESPONSES TO PHQ9 QUESTIONS 1 & 2: 0
SUM OF ALL RESPONSES TO PHQ QUESTIONS 1-9: 0
SUM OF ALL RESPONSES TO PHQ QUESTIONS 1-9: 0
2. FEELING DOWN, DEPRESSED OR HOPELESS: 0
SUM OF ALL RESPONSES TO PHQ QUESTIONS 1-9: 0
SUM OF ALL RESPONSES TO PHQ QUESTIONS 1-9: 0

## 2024-01-08 NOTE — TELEPHONE ENCOUNTER
Spoke with  he sent the prescription to the pharmacy and is going to order the ultrasound.     I called and spoke with Owen Wood 2 patient identifiers used and made her aware.

## 2024-01-08 NOTE — TELEPHONE ENCOUNTER
Patient wants call back at [phone] to know if Dr. Cao has put in prescription for doxylamine due to being allergic to penicillin and vectrine and that stated was supposed to take medication for 5days and call back if does not work  Patient stated uses [place] pharmacy and provided pharmacy phone number [phone]    Patient wanted to know If order for ultrasound for leg blood clots has already been put in as well.

## 2024-01-09 ENCOUNTER — TELEPHONE (OUTPATIENT)
Age: 59
End: 2024-01-09

## 2024-01-09 RX ORDER — NYSTATIN 100000 [USP'U]/G
POWDER TOPICAL 3 TIMES DAILY
Qty: 30 G | Refills: 1 | Status: SHIPPED | OUTPATIENT
Start: 2024-01-09

## 2024-01-09 NOTE — TELEPHONE ENCOUNTER
Owen Wood she stated that her skin is dark and it is irratitated around the bag and if she can get something sent to [facility]. Give her a call at [phone]

## 2024-01-10 ENCOUNTER — OFFICE VISIT (OUTPATIENT)
Age: 59
End: 2024-01-10
Payer: MEDICAID

## 2024-01-10 VITALS
HEIGHT: 63 IN | WEIGHT: 122 LBS | DIASTOLIC BLOOD PRESSURE: 73 MMHG | SYSTOLIC BLOOD PRESSURE: 122 MMHG | BODY MASS INDEX: 21.62 KG/M2 | OXYGEN SATURATION: 94 % | HEART RATE: 77 BPM

## 2024-01-10 DIAGNOSIS — R04.0 EPISTAXIS: ICD-10-CM

## 2024-01-10 DIAGNOSIS — H60.8X3 CHRONIC ECZEMATOUS OTITIS EXTERNA OF BOTH EARS: Primary | ICD-10-CM

## 2024-01-10 DIAGNOSIS — J30.89 NON-SEASONAL ALLERGIC RHINITIS, UNSPECIFIED TRIGGER: ICD-10-CM

## 2024-01-10 PROCEDURE — 30901 CONTROL OF NOSEBLEED: CPT | Performed by: NURSE PRACTITIONER

## 2024-01-10 PROCEDURE — 99213 OFFICE O/P EST LOW 20 MIN: CPT | Performed by: NURSE PRACTITIONER

## 2024-01-10 RX ORDER — FLUOCINOLONE ACETONIDE 0.11 MG/ML
3 OIL AURICULAR (OTIC) 2 TIMES DAILY
Qty: 20 ML | Refills: 0 | Status: SHIPPED | OUTPATIENT
Start: 2024-01-10

## 2024-01-10 RX ORDER — DOCOSANOL 100 MG/G
CREAM TOPICAL
Qty: 2 G | Refills: 2 | Status: SHIPPED | OUTPATIENT
Start: 2024-01-10

## 2024-01-10 ASSESSMENT — ENCOUNTER SYMPTOMS
RESPIRATORY NEGATIVE: 1
EYES NEGATIVE: 1
GASTROINTESTINAL NEGATIVE: 1

## 2024-01-10 NOTE — PROGRESS NOTES
Subjective:    Owen Wood   58 y.o.   1965     New Patient Visit  This is a 58 y.o. female who is here today to discuss issues with ears.   Location - right ear     Quality - fullness     Severity -  mild     Duration - about one week     Timing - constant     Context - She states she was seen by her PCP and was told she should have the ear evaluated by ENT. She denies pain or drainage from the ear.     Modifying Features - none     Associated symptoms/signs - none    11/8/2023 Patient returns today for environmental allergy testing. Skin testing does not reveal allergies to items tested.     1/9/24- Patient returns today for 2 month follow-up. She has was last seen in November 2023 and was tested for environmental allergies which was negative. At that time she declined food testing. She states she has feels as though her ears are full of wax. She also states she had a nose bleed this morning.   Review of Systems  Review of Systems   Constitutional: Negative.    HENT:          Ear fullness   Eyes: Negative.    Respiratory: Negative.     Cardiovascular: Negative.    Gastrointestinal: Negative.    Endocrine: Negative.    Genitourinary: Negative.    Musculoskeletal: Negative.    Skin: Negative.    Allergic/Immunologic: Positive for environmental allergies.   Neurological: Negative.    Hematological: Negative.    Psychiatric/Behavioral: Negative.             Past Medical History:   Diagnosis Date    Anemia     pt states had recent blood transfusion sept 2022    Back pain     Cancer (HCC)     bladder- pt states in remission    DVT (deep venous thrombosis) (HCC) 2022    and PE, pt denies lung PE , only leg several years ago    Hepatitis C     pt states dx 1 month ago    Liver disease     Presence of IVC filter     pt states several years ago    Rheumatoid arthritis (HCC)     Sciatic leg pain     pt states both legs    Uterine fibroid      Past Surgical History:   Procedure Laterality Date    APPENDECTOMY

## 2024-01-11 NOTE — PROGRESS NOTES
Identified pt with two pt identifiers (name and ). Reviewed chart in preparation for visit and have obtained necessary documentation.    Owen Wood is a 58 y.o. female  Chief Complaint   Patient presents with    Follow-up     States she has no pain today      /75 (Site: Left Upper Arm, Position: Sitting)   Pulse 80   Temp 98.1 °F (36.7 °C) (Temporal)   Resp 16   Ht 1.6 m (5' 3\")   Wt 55.3 kg (122 lb)   SpO2 98%   BMI 21.61 kg/m²     1. Have you been to the ER, urgent care clinic since your last visit?  Hospitalized since your last visit?NO    2. Have you seen or consulted any other health care providers outside of the [facility] since your last visit?  Include any pap smears or colon screening. NO  
06/28/2023    Examination under anesthesia with rigid sigmoidoscope and vaginal examination performed by Rick Cao MD at [facility] MAIN OR    SALPINGO-OOPHORECTOMY Bilateral 10/03/2022    UROLOGICAL SURGERY  09/15/2022    CYSTOSCOPY    UROLOGICAL SURGERY  09/15/2022    URETHRAL DILATION    UROLOGICAL SURGERY  09/15/2022    URETERAL STENT PLACEMENT    UROLOGICAL SURGERY  09/15/2022    TRANSURETHRAL RESECTION OF BLADDER TUMOR >2CM    UROLOGICAL SURGERY Bilateral 09/15/2022    RETROGRADE PYELOGRAM    UROLOGICAL SURGERY  10/03/2022    PELVIC LYMPH NODE DISSECTION    UROLOGICAL SURGERY  10/03/2022    ILEAL CONDUIT URINARY DIVERSION       Allergies:  Allergies   Allergen Reactions    Penicillin V Potassium Swelling    Sulfamethoxazole-Trimethoprim Swelling     Swelling of the lilps    Morphine Nausea And Vomiting       Current Meds:  Current Outpatient Medications   Medication Sig Dispense Refill    nystatin (MYCOSTATIN) 977245 UNIT/GM powder Apply topically 3 times daily Apply 3 times daily. 30 g 1    doxycycline hyclate (VIBRA-TABS) 100 MG tablet Take 1 tablet by mouth 2 times daily for 5 days 14 tablet 0    ciprofloxacin-dexAMETHasone (CIPRODEX) 0.3-0.1 % otic suspension Place 4 drops into both ears 2 times daily for 7 days 7.5 mL 0    pantoprazole (PROTONIX) 40 MG tablet TAKE ONE TABLET BY MOUTH DAILY. 30 tablet 2    ascorbic acid (VITAMIN C) 500 MG tablet TAKE ONE TABLET BY MOUTH DAILY. 30 tablet 2    Multiple Vitamin (MULTIVITAMIN) tablet TAKE ONE TABLET BY MOUTH DAILY. 30 tablet 2    apixaban (ELIQUIS) 5 MG TABS tablet TAKE ONE TABLET BY MOUTH EVERY TWELVE HOURS 60 tablet 2    fexofenadine (ALLEGRA) 180 MG tablet Take 1 tablet by mouth daily 30 tablet 2    sodium chloride (OCEAN) 0.65 % nasal spray 1 spray by Nasal route as needed for Congestion 1 each 3    acetaminophen (TYLENOL) 325 MG tablet TAKE 2 TABLETS EVERY 8 HOURS AS NEEDED FOR PAIN . 80 tablet 0    montelukast (SINGULAIR) 10 MG tablet Take 1 tablet by

## 2024-01-12 ENCOUNTER — TELEPHONE (OUTPATIENT)
Age: 59
End: 2024-01-12

## 2024-01-12 NOTE — TELEPHONE ENCOUNTER
Pt stating that she wasn't able to receive her medication. The pharmacy stated that she needs an authorization to receive her medication.     Please advise

## 2024-01-16 NOTE — TELEPHONE ENCOUNTER
This is an old message from 2 weeks ago. I have since spoken to pt and given her instructions for her eye. Thank you

## 2024-01-22 ENCOUNTER — TELEPHONE (OUTPATIENT)
Age: 59
End: 2024-01-22

## 2024-01-22 ENCOUNTER — HOSPITAL ENCOUNTER (OUTPATIENT)
Facility: HOSPITAL | Age: 59
Discharge: HOME | End: 2024-01-24
Attending: SURGERY
Payer: MEDICAID

## 2024-01-22 DIAGNOSIS — Z86.718 HISTORY OF DVT IN ADULTHOOD: ICD-10-CM

## 2024-01-22 PROCEDURE — 93970 EXTREMITY STUDY: CPT

## 2024-01-22 NOTE — TELEPHONE ENCOUNTER
The ultrasound was just done today so once it results I will ask  to review so we can call the patient.

## 2024-01-22 NOTE — TELEPHONE ENCOUNTER
Patient wanted to know if can stop staking blood thinner and wanted to know how soon he would like to see her to go over leg ultrasound results. Patient stated that has an appt on 1/25/24 to go to [facility] and wanted to know if Dr. Cao can go over the results over the phone or if patient has to come in person to go over ultrasound results.     Patient would like call back at [phone].

## 2024-01-23 LAB — VAS RIGHT POP RFX: 0.9 S

## 2024-01-23 PROCEDURE — 93970 EXTREMITY STUDY: CPT | Performed by: SURGERY

## 2024-01-24 ENCOUNTER — TELEPHONE (OUTPATIENT)
Age: 59
End: 2024-01-24

## 2024-01-24 DIAGNOSIS — G89.29 CHRONIC PAIN OF MULTIPLE JOINTS: ICD-10-CM

## 2024-01-24 DIAGNOSIS — M25.50 CHRONIC PAIN OF MULTIPLE JOINTS: ICD-10-CM

## 2024-01-24 NOTE — TELEPHONE ENCOUNTER
Please see what forms are needed- everything is scanned in under [employer]. If its for ostomy supplies see didier message below and deffer her

## 2024-01-24 NOTE — TELEPHONE ENCOUNTER
Per  will see her in the office next week to go over results. I called and spoke with Owen Wood and scheduled her an appointment for next Thursday.

## 2024-01-24 NOTE — TELEPHONE ENCOUNTER
Patient wants to know when she can come off the blood thinners, and wants to speak to a nurse about it.

## 2024-01-24 NOTE — TELEPHONE ENCOUNTER
Spoke with  he states that she needs to continue the medication after looking at the imaging results. I called the patient and she states that the lady that did her testing told her there wasn't any clots so now she is very upset. She is demanding that  call her because its not adding up. I explained to her that I would send him a message and get back with her.

## 2024-01-24 NOTE — TELEPHONE ENCOUNTER
Patient called today regarding her colostomy supplies to be approved. Please send Dr. Nash dejesus. [employer] at [phone]

## 2024-01-25 RX ORDER — ACETAMINOPHEN 325 MG/1
TABLET ORAL
Qty: 80 TABLET | Refills: 0 | Status: SHIPPED | OUTPATIENT
Start: 2024-01-25

## 2024-01-25 NOTE — TELEPHONE ENCOUNTER
Spoke with Owen Wood 2 patient identifiers used informed her that she needs to call the billing department with [employer] because we cannot order anymore supplies until she speaks with them.

## 2024-01-25 NOTE — TELEPHONE ENCOUNTER
Pt called with concerns about her supplies again I advised her the form we have scanned in is for colostomy supplies and she needs to reach out to dr tadeo office to sign for that.

## 2024-01-29 ENCOUNTER — OFFICE VISIT (OUTPATIENT)
Facility: CLINIC | Age: 59
End: 2024-01-29
Payer: MEDICAID

## 2024-01-29 VITALS
WEIGHT: 127 LBS | HEART RATE: 75 BPM | TEMPERATURE: 98.2 F | HEIGHT: 63 IN | DIASTOLIC BLOOD PRESSURE: 75 MMHG | SYSTOLIC BLOOD PRESSURE: 118 MMHG | OXYGEN SATURATION: 99 % | BODY MASS INDEX: 22.5 KG/M2 | RESPIRATION RATE: 16 BRPM

## 2024-01-29 DIAGNOSIS — R73.03 PREDIABETES: ICD-10-CM

## 2024-01-29 DIAGNOSIS — Z98.890 HISTORY OF ILEAL CONDUIT: ICD-10-CM

## 2024-01-29 DIAGNOSIS — C67.9 MALIGNANT NEOPLASM OF URINARY BLADDER, UNSPECIFIED SITE (HCC): ICD-10-CM

## 2024-01-29 DIAGNOSIS — M35.00 SJOGREN'S SYNDROME, WITH UNSPECIFIED ORGAN INVOLVEMENT (HCC): ICD-10-CM

## 2024-01-29 DIAGNOSIS — I82.4Y9 DEEP VEIN THROMBOSIS (DVT) OF PROXIMAL LOWER EXTREMITY, UNSPECIFIED CHRONICITY, UNSPECIFIED LATERALITY (HCC): ICD-10-CM

## 2024-01-29 DIAGNOSIS — Z93.3 COLOSTOMY IN PLACE (HCC): ICD-10-CM

## 2024-01-29 DIAGNOSIS — N82.3 RECTOVAGINAL FISTULA: Primary | ICD-10-CM

## 2024-01-29 PROCEDURE — 99214 OFFICE O/P EST MOD 30 MIN: CPT | Performed by: STUDENT IN AN ORGANIZED HEALTH CARE EDUCATION/TRAINING PROGRAM

## 2024-01-29 RX ORDER — HYDROXYCHLOROQUINE SULFATE 200 MG/1
200 TABLET, FILM COATED ORAL 2 TIMES DAILY
COMMUNITY
Start: 2024-01-10

## 2024-01-29 RX ORDER — LORATADINE 10 MG/1
10 TABLET ORAL DAILY
COMMUNITY
Start: 2024-01-11

## 2024-01-29 RX ORDER — TRAMADOL HYDROCHLORIDE 50 MG/1
50 TABLET ORAL 2 TIMES DAILY PRN
Qty: 30 TABLET | Refills: 0 | Status: CANCELLED | OUTPATIENT
Start: 2024-01-29 | End: 2024-02-28

## 2024-01-29 ASSESSMENT — ENCOUNTER SYMPTOMS
ABDOMINAL PAIN: 0
DIARRHEA: 0
SHORTNESS OF BREATH: 0
CONSTIPATION: 0
SORE THROAT: 0
COUGH: 0
FACIAL SWELLING: 0

## 2024-01-29 NOTE — PROGRESS NOTES
Chief Complaint   Patient presents with    Follow-up Chronic Condition    Medication Refill    Eye Problem     ? Stye     Eczema         /75 (Site: Left Upper Arm, Position: Sitting)   Pulse 75   Temp 98.2 °F (36.8 °C) (Temporal)   Resp 16   Ht 1.6 m (5' 3\")   Wt 57.6 kg (127 lb)   SpO2 99%   BMI 22.50 kg/m²         1. \"Have you been to the ER, urgent care clinic since your last visit?  Hospitalized since your last visit?\" No    2. \"Have you seen or consulted any other health care providers outside of the [facility] since your last visit?\" No     3. For patients aged 45-75: Has the patient had a colonoscopy / FIT/ Cologuard? No      If the patient is female:    4. For patients aged 40-74: Has the patient had a mammogram within the past 2 years? Yes - no Care Gap present      5. For patients aged 21-65: Has the patient had a pap smear? Yes - no Care Gap present

## 2024-01-29 NOTE — PROGRESS NOTES
Owen Wood (: 1965) is a 58 y.o. female, Established patient patient, here for evaluation of the following chief complaint(s):  Follow-up Chronic Condition and Eye Problem (? Stye )       ASSESSMENT/PLAN:  Below is the assessment and plan developed based on review of pertinent history, physical exam, labs, studies, and medications.    1. Rectovaginal fistula  2. Colostomy in place (HCC)  3. History of ileal conduit  4. Malignant neoplasm of urinary bladder, unspecified site (HCC)  5. Prediabetes  -     AMB POC HEMOGLOBIN A1C  6. Deep vein thrombosis (DVT) of proximal lower extremity, unspecified chronicity, unspecified laterality (HCC)  7. Sjogren's syndrome, with unspecified organ involvement (HCC)    Reviewed notes from oncology, vascular surgery and urogynecology.  She did have recent blood work, as well as imaging.  She is doing well, has gained weight.    Continue follow-up with specialists.    She was started on Plaquenil, for likely Sjogren's syndrome.  Will try to get rheumatology records.  She states she does get some abdominal discomfort, at times at the colostomy site.  Asking for tramadol, recommended that she take Tylenol as needed.    Continue Eliquis.    A1C today 5.1 which is normal     Refused flu shot     Return in about 3 months (around 2024) for follow up.         SUBJECTIVE/OBJECTIVE:  HPI    Owen Wood is a 58 yr old who presents for a follow up.    She has a significant past medical history of bladder cancer status post cystectomy, hysterectomy, BSO and ileal conduit diversion.  Currently cancer free,  on active surveillance.  Follows with Dr. Fernandez.She had imaging done recently, she has pulumonary nodules being monitored  She also has a colostomy and urostomy bag. Following with urology.  She also has a rectovaginal fistula, following with vascular surgery.  She was referred to urogynecology Dr. Westbrook for repair of rectovaginal fistula as well as possible colostomy

## 2024-01-30 ENCOUNTER — TELEPHONE (OUTPATIENT)
Age: 59
End: 2024-01-30

## 2024-01-30 NOTE — TELEPHONE ENCOUNTER
Called and spoke with Connie solorio [facility] she states that nothing needs a prior auth. Part of her order was shipped yesterday and the other stuff is on back order once that comes in they will ship to her. I called and spoke with Owen Wood 2 patient identifiers used I explained to her that nothing needs a prior auth. She states that she needs more bags and needs  to order them. I told her to speak with  at her appointment to discuss ordering more bags.

## 2024-02-01 ENCOUNTER — OFFICE VISIT (OUTPATIENT)
Age: 59
End: 2024-02-01
Payer: MEDICAID

## 2024-02-01 VITALS
BODY MASS INDEX: 21 KG/M2 | OXYGEN SATURATION: 99 % | DIASTOLIC BLOOD PRESSURE: 83 MMHG | HEART RATE: 89 BPM | HEIGHT: 63 IN | TEMPERATURE: 97.7 F | WEIGHT: 118.5 LBS | SYSTOLIC BLOOD PRESSURE: 125 MMHG | RESPIRATION RATE: 16 BRPM

## 2024-02-01 DIAGNOSIS — N82.3 RECTOVAGINAL FISTULA: ICD-10-CM

## 2024-02-01 DIAGNOSIS — I87.303 CHRONIC VENOUS HYPERTENSION INVOLVING BOTH SIDES: Primary | ICD-10-CM

## 2024-02-01 PROCEDURE — 99212 OFFICE O/P EST SF 10 MIN: CPT | Performed by: SURGERY

## 2024-02-01 ASSESSMENT — PATIENT HEALTH QUESTIONNAIRE - PHQ9
SUM OF ALL RESPONSES TO PHQ9 QUESTIONS 1 & 2: 0
2. FEELING DOWN, DEPRESSED OR HOPELESS: 0
1. LITTLE INTEREST OR PLEASURE IN DOING THINGS: 0
SUM OF ALL RESPONSES TO PHQ QUESTIONS 1-9: 0

## 2024-02-01 NOTE — PROGRESS NOTES
Identified pt with two pt identifiers (name and ). Reviewed chart in preparation for visit and have obtained necessary documentation.    Owen Wood is a 58 y.o. female  Chief Complaint   Patient presents with    Follow-up     Results     /83 (Site: Right Upper Arm, Position: Sitting, Cuff Size: Small Adult)   Pulse 89   Temp 97.7 °F (36.5 °C) (Oral)   Resp 16   Ht 1.6 m (5' 3\")   Wt 53.8 kg (118 lb 8 oz)   SpO2 99%   BMI 20.99 kg/m²     1. Have you been to the ER, urgent care clinic since your last visit?  Hospitalized since your last visit?no    2. Have you seen or consulted any other health care providers outside of the [facility] since your last visit?  Include any pap smears or colon screening. no

## 2024-02-13 ENCOUNTER — TELEPHONE (OUTPATIENT)
Facility: CLINIC | Age: 59
End: 2024-02-13

## 2024-02-13 NOTE — TELEPHONE ENCOUNTER
----- Message from Amparo Castelan sent at 2/12/2024 10:18 AM EST -----  Subject: Message to Provider    QUESTIONS  Information for Provider? patient would like a medication called in for   her cough and runny nose if possible  ---------------------------------------------------------------------------  --------------  CALL BACK INFO  [phone]; OK to leave message on voicemail  ---------------------------------------------------------------------------  --------------  SCRIPT ANSWERS  Relationship to Patient? Self

## 2024-02-15 ENCOUNTER — TELEPHONE (OUTPATIENT)
Age: 59
End: 2024-02-15

## 2024-02-15 NOTE — TELEPHONE ENCOUNTER
Patient requesting that we call [employer] because theyre calling her stating they have not reicieved orders from us yet (order not for the Ready Bath Shampoo conditioner Cap item #[id number]/ [id number] but something else and patient not sure what orders are for and patient states that they told her they cant call us at the providers office that we have to call them). Patient also stated that is needing more gauze then [employer] is currently giving her.     [employer] phone [phone]  Patient call back [phone]     Patient 3-way called [employer] while I was one the phone. We spoke with rep. Calderon who stated that they have already received orders for most the supplies however there is a couple from October for ostomy supplies that they never received but [employer] stated that they will re-fax to us. [employer] also explained to patient the the Ready Bath Shampoo caps are not something that they cover and so they will not be able to send request to Dr. Cao.

## 2024-02-16 ENCOUNTER — TELEPHONE (OUTPATIENT)
Facility: CLINIC | Age: 59
End: 2024-02-16

## 2024-02-16 NOTE — TELEPHONE ENCOUNTER
Patient is calling asking for medication for her cough and runny nose. She is asking for a prescription for Mucinex.  This her second call since Monday. Please have this sent to [facility]. Please call patient [phone]

## 2024-02-21 NOTE — TELEPHONE ENCOUNTER
The patient wants to know if you can send mucinex to [facility] for cough and runny nose. She says that she is feeling better but she wants the mucinex.

## 2024-02-22 ENCOUNTER — TELEPHONE (OUTPATIENT)
Facility: CLINIC | Age: 59
End: 2024-02-22

## 2024-02-22 NOTE — TELEPHONE ENCOUNTER
The patient wants to know if she can be ordered a get overlay for her bed because she states that she is having back pain. Can you addend your note from Lucas and put a note in stating that she needs a gel overlay for her mattress?

## 2024-02-23 RX ORDER — APIXABAN 5 MG/1
TABLET, FILM COATED ORAL
Qty: 180 TABLET | Refills: 1 | Status: SHIPPED | OUTPATIENT
Start: 2024-02-23

## 2024-03-01 DIAGNOSIS — J06.9 UPPER RESPIRATORY TRACT INFECTION, UNSPECIFIED TYPE: ICD-10-CM

## 2024-03-02 RX ORDER — NYSTATIN 100000 [USP'U]/G
POWDER TOPICAL
Qty: 30 G | Refills: 1 | Status: SHIPPED | OUTPATIENT
Start: 2024-03-02

## 2024-03-04 ENCOUNTER — TELEPHONE (OUTPATIENT)
Age: 59
End: 2024-03-04

## 2024-03-04 NOTE — TELEPHONE ENCOUNTER
Pt contacted the office this morning, stating she had a tooth fall out and she is having ear pain where her tooth came out, she wants to know if we can send in a antibiotic for her. I advised her she should reach out to her PCP as we can not treat her unless its for a urological issue

## 2024-03-05 ENCOUNTER — TELEPHONE (OUTPATIENT)
Facility: CLINIC | Age: 59
End: 2024-03-05

## 2024-03-05 NOTE — TELEPHONE ENCOUNTER
The patient called and stated that one of her teeth feel out. She stated that she thinks that she needs an Antibiotic. I let her know that she needs to be seen by her dentist or call her dentist. She stated that the dentist is out of the office right now. I let her know that the provider is also out of the office and that she needs to be seen at urgent care.

## 2024-03-15 ENCOUNTER — TELEPHONE (OUTPATIENT)
Age: 59
End: 2024-03-15

## 2024-03-15 NOTE — TELEPHONE ENCOUNTER
Patient called today regarding supplies that are needed. She says that [employer] faxed us a request for authorization to order supplies. She says they faxed us a request on 3/19/24 for additional supplies. She asked that Dr. Cao give her a call today. [phone].

## 2024-03-18 ENCOUNTER — TELEPHONE (OUTPATIENT)
Age: 59
End: 2024-03-18

## 2024-03-18 NOTE — TELEPHONE ENCOUNTER
Patient wants to know if can be taken off blood thinners and wanted to know when should have ultrasound done that Dr. Cao ordered.     Patient call back number [phone]

## 2024-03-19 NOTE — TELEPHONE ENCOUNTER
Called and spoke with Owen Wood per 's last office note the patient is supposed to continue her Eliquis and repeat her ultrasound in June.     I called and spoke with [facility] Scheduling and scheduled her ultrasound for June 4th @ 10am she will need to arrive at 930am . I called and spoke with Owen Wood 2 patient identifiers used I gave her the information for her testing.

## 2024-03-19 NOTE — TELEPHONE ENCOUNTER
I asked [employer] if she had any paperwork on Owen Wood she states that she does not. I called and spoke with Owen Wood 2 patient identifiers used I informed her that [employer] has not received the paperwork. Owen is going to call them back to have them fax it again.

## 2024-03-20 DIAGNOSIS — K21.9 GASTROESOPHAGEAL REFLUX DISEASE, UNSPECIFIED WHETHER ESOPHAGITIS PRESENT: ICD-10-CM

## 2024-03-20 DIAGNOSIS — C67.9 MALIGNANT NEOPLASM OF URINARY BLADDER, UNSPECIFIED SITE (HCC): ICD-10-CM

## 2024-03-20 DIAGNOSIS — B00.1 COLD SORE: ICD-10-CM

## 2024-03-21 RX ORDER — DOCOSANOL 100 MG/G
CREAM TOPICAL
Qty: 1 EACH | Refills: 2 | Status: SHIPPED | OUTPATIENT
Start: 2024-03-21

## 2024-03-21 RX ORDER — PANTOPRAZOLE SODIUM 40 MG/1
40 TABLET, DELAYED RELEASE ORAL DAILY
Qty: 90 TABLET | Refills: 1 | Status: SHIPPED | OUTPATIENT
Start: 2024-03-21

## 2024-03-21 RX ORDER — ASCORBIC ACID 500 MG
500 TABLET ORAL DAILY
Qty: 90 TABLET | Refills: 3 | Status: SHIPPED | OUTPATIENT
Start: 2024-03-21

## 2024-03-21 RX ORDER — MULTIVITAMIN WITH FOLIC ACID 400 MCG
1 TABLET ORAL DAILY
Qty: 90 TABLET | Refills: 3 | Status: SHIPPED | OUTPATIENT
Start: 2024-03-21

## 2024-03-28 ENCOUNTER — TELEPHONE (OUTPATIENT)
Facility: CLINIC | Age: 59
End: 2024-03-28

## 2024-03-28 DIAGNOSIS — G47.00 INSOMNIA, UNSPECIFIED TYPE: Primary | ICD-10-CM

## 2024-03-28 RX ORDER — TRAZODONE HYDROCHLORIDE 50 MG/1
50 TABLET ORAL NIGHTLY PRN
Qty: 30 TABLET | Refills: 2 | Status: SHIPPED | OUTPATIENT
Start: 2024-03-28

## 2024-03-28 NOTE — TELEPHONE ENCOUNTER
Patient called stating that her son got killed in [place] and she is leaving at 2:00 am in the morning to head to [place] to make arrangements and she needs something to help her sleep.  She is having a hard time dealing with all this.    Can you please send something in to help her sleep?    Please send to [facility].

## 2024-04-08 ENCOUNTER — OFFICE VISIT (OUTPATIENT)
Age: 59
End: 2024-04-08
Payer: MEDICAID

## 2024-04-08 VITALS
TEMPERATURE: 97.9 F | SYSTOLIC BLOOD PRESSURE: 119 MMHG | BODY MASS INDEX: 22.15 KG/M2 | WEIGHT: 125 LBS | DIASTOLIC BLOOD PRESSURE: 74 MMHG | HEART RATE: 82 BPM | HEIGHT: 63 IN | RESPIRATION RATE: 14 BRPM | OXYGEN SATURATION: 99 %

## 2024-04-08 DIAGNOSIS — Z86.718 HISTORY OF DVT IN ADULTHOOD: ICD-10-CM

## 2024-04-08 DIAGNOSIS — N82.3 RECTOVAGINAL FISTULA: ICD-10-CM

## 2024-04-08 DIAGNOSIS — I87.303 CHRONIC VENOUS HYPERTENSION INVOLVING BOTH SIDES: Primary | ICD-10-CM

## 2024-04-08 PROCEDURE — 99212 OFFICE O/P EST SF 10 MIN: CPT | Performed by: SURGERY

## 2024-04-08 NOTE — PROGRESS NOTES
Chief Complaint   Patient presents with    Follow-up     ANAL EXAM        /74 (Site: Left Upper Arm, Position: Sitting, Cuff Size: Small Adult)   Pulse 82   Temp 97.9 °F (36.6 °C) (Oral)   Resp 14   Ht 1.6 m (5' 3\")   Wt 56.7 kg (125 lb)   SpO2 99%   BMI 22.14 kg/m²      1. Have you been to the ER, urgent care clinic since your last visit?  Hospitalized since your last visit?No    2. Have you seen or consulted any other health care providers outside of the [facility] since your last visit?  Include any pap smears or colon screening. Yes Where: [facility] oncology

## 2024-04-10 ENCOUNTER — OFFICE VISIT (OUTPATIENT)
Age: 59
End: 2024-04-10
Payer: MEDICAID

## 2024-04-10 VITALS
WEIGHT: 125 LBS | HEIGHT: 63 IN | DIASTOLIC BLOOD PRESSURE: 80 MMHG | RESPIRATION RATE: 16 BRPM | SYSTOLIC BLOOD PRESSURE: 126 MMHG | BODY MASS INDEX: 22.15 KG/M2 | OXYGEN SATURATION: 99 % | HEART RATE: 91 BPM

## 2024-04-10 DIAGNOSIS — H60.8X3 CHRONIC ECZEMATOUS OTITIS EXTERNA OF BOTH EARS: Primary | ICD-10-CM

## 2024-04-10 DIAGNOSIS — G89.29 CHRONIC PAIN OF MULTIPLE JOINTS: ICD-10-CM

## 2024-04-10 DIAGNOSIS — M25.50 CHRONIC PAIN OF MULTIPLE JOINTS: ICD-10-CM

## 2024-04-10 DIAGNOSIS — R04.0 EPISTAXIS: ICD-10-CM

## 2024-04-10 PROCEDURE — 99213 OFFICE O/P EST LOW 20 MIN: CPT | Performed by: OTOLARYNGOLOGY

## 2024-04-10 RX ORDER — SODIUM CHLORIDE/ALOE VERA
GEL (GRAM) NASAL PRN
Qty: 1 EACH | Refills: 3 | Status: SHIPPED | OUTPATIENT
Start: 2024-04-10

## 2024-04-10 RX ORDER — FLUOCINOLONE ACETONIDE 0.11 MG/ML
OIL AURICULAR (OTIC)
Qty: 20 ML | Refills: 1 | Status: SHIPPED | OUTPATIENT
Start: 2024-04-10

## 2024-04-10 ASSESSMENT — ENCOUNTER SYMPTOMS
APNEA: 0
STRIDOR: 0
WHEEZING: 0
SHORTNESS OF BREATH: 0
SORE THROAT: 0
EYE DISCHARGE: 0
SINUS PAIN: 0
VOMITING: 0
BACK PAIN: 0
CHOKING: 0
EYE ITCHING: 0
SINUS PRESSURE: 0
NAUSEA: 0
TROUBLE SWALLOWING: 0
PHOTOPHOBIA: 0
ABDOMINAL PAIN: 0
VOICE CHANGE: 0
COUGH: 0

## 2024-04-10 NOTE — PROGRESS NOTES
Subjective:    Owen Wood   58 y.o.   1965     Followup Visit    Chief Complaint   Patient presents with    Follow-up     Chronic eczematous otitis externa of both ears     History of Present Illness:    4/10/2024-[place] office  58-year-old female follows up today.  Last seen in January by nurse practitioner for chronic otitis externa and epistaxis.  Had left septum cauterized at that time, doing well.  C/o ears itching somewhat.  Does not have any more drops to use.    Review of Systems  Review of Systems   Constitutional:  Negative for appetite change, chills, fatigue and fever.   HENT:  Negative for congestion, ear discharge, ear pain, nosebleeds, postnasal drip, sinus pressure, sinus pain, sneezing, sore throat, tinnitus, trouble swallowing and voice change.    Eyes:  Negative for photophobia, discharge, itching and visual disturbance.   Respiratory:  Negative for apnea, cough, choking, shortness of breath, wheezing and stridor.    Cardiovascular:  Negative for chest pain and palpitations.   Gastrointestinal:  Negative for abdominal pain, nausea and vomiting.   Endocrine: Negative for cold intolerance and heat intolerance.   Genitourinary:  Negative for difficulty urinating and dysuria.   Musculoskeletal:  Positive for arthralgias and gait problem. Negative for back pain, myalgias, neck pain and neck stiffness.   Skin:  Negative for rash and wound.   Allergic/Immunologic: Negative for environmental allergies and food allergies.   Neurological:  Negative for dizziness, speech difficulty, light-headedness and headaches.   Hematological:  Negative for adenopathy. Does not bruise/bleed easily.   Psychiatric/Behavioral:  Negative for confusion and sleep disturbance. The patient is not nervous/anxious.           Past Medical History:   Diagnosis Date    Anemia     pt states had recent blood transfusion sept 2022    Back pain     Cancer (HCC)     bladder- pt states in remission    DVT (deep venous

## 2024-04-11 RX ORDER — ACETAMINOPHEN 325 MG/1
TABLET ORAL
Qty: 80 TABLET | Refills: 0 | Status: SHIPPED | OUTPATIENT
Start: 2024-04-11

## 2024-04-18 ENCOUNTER — TELEPHONE (OUTPATIENT)
Age: 59
End: 2024-04-18

## 2024-04-18 NOTE — TELEPHONE ENCOUNTER
Deb from [facility] called needing authorization for DME equipment called into them [phone]. She needs 3 in 1 shampoo, washcloth, etc.

## 2024-05-01 ENCOUNTER — TELEPHONE (OUTPATIENT)
Age: 59
End: 2024-05-01

## 2024-05-01 NOTE — TELEPHONE ENCOUNTER
Pt called asking does she need a refill on her singular medication? If so, she would like it sent to [facility]

## 2024-05-01 NOTE — TELEPHONE ENCOUNTER
05/01/24- Patient called in wanting to speak to a nurse about RX, shampoo, conditioner and also wanting to know if she's doing the right thing. Pt is asking about DM supplies She's requesting a call back please. Thanks DCR

## 2024-05-06 NOTE — ASSESSMENT & PLAN NOTE
She is s/p cystectomy and ileal conduit diversion 10/2022; [id number].  [facility] Fern[facility]. Doing well.  She has some issues with supplies.  I am not sure I can get her what she needs, but will have the MA's try.    Follow up in 6 months with Dr. Azevedo.  She has imaging planned this month at [facility].

## 2024-05-08 ENCOUNTER — TELEPHONE (OUTPATIENT)
Age: 59
End: 2024-05-08

## 2024-05-08 NOTE — TELEPHONE ENCOUNTER
I spoke with Owen Wood 2 patient identifiers used. I informed the patient that  wouldn't be the one that does your shampoo, conditioner, or body wash. She would need to discuss this with her primary care doctor.

## 2024-05-08 NOTE — TELEPHONE ENCOUNTER
Tried to call the patient back but unable to leave a voicemail, No name on voicemail. This is not something that  would do. She would need to discuss with her PCP about a shower cap, shampoo, and body wash.

## 2024-05-08 NOTE — TELEPHONE ENCOUNTER
Patient called today regarding her DME request from [employer] [place]. She says that she needs those things, shower cap, shampoo and rinse, baby wipes. I told her that Sushma tried to call her but her voicemail does not have her name. She refuses to correct that. Please have Sushma call her again.  [phone]

## 2024-05-09 ENCOUNTER — TELEPHONE (OUTPATIENT)
Age: 59
End: 2024-05-09

## 2024-05-09 ENCOUNTER — OFFICE VISIT (OUTPATIENT)
Age: 59
End: 2024-05-09
Payer: MEDICAID

## 2024-05-09 VITALS — DIASTOLIC BLOOD PRESSURE: 64 MMHG | SYSTOLIC BLOOD PRESSURE: 105 MMHG | HEART RATE: 77 BPM

## 2024-05-09 DIAGNOSIS — N82.3 RECTOVAGINAL FISTULA: ICD-10-CM

## 2024-05-09 DIAGNOSIS — C67.9 MALIGNANT NEOPLASM OF URINARY BLADDER, UNSPECIFIED SITE (HCC): Primary | ICD-10-CM

## 2024-05-09 DIAGNOSIS — Z86.718 HISTORY OF DVT IN ADULTHOOD: ICD-10-CM

## 2024-05-09 PROCEDURE — 99212 OFFICE O/P EST SF 10 MIN: CPT | Performed by: NURSE PRACTITIONER

## 2024-05-09 NOTE — PROGRESS NOTES
HISTORY OF PRESENT ILLNESS  Owen Wood is a 58 y.o. female.  Chief Complaint   Patient presents with    Follow-up    Bladder Cancer     HPI    Invasive squamous cell carcinoma, G2, 4.5 cm in greatest dimension, with multifocal invasion into perivesical and periureteral soft tissue.  [id number]  Now s/p cystectomy, pelvic exenteration and ileal conduit diversion on 10/3/22.     Following with Dr. Pablito Fernandez at [facility]; on observation.  She reports she had/has scans with them this month and follow up's scheduled.    She lost her son in a hit and run accident in [place] on 3/18/24.  She is devastated about this loss. She has lost both of her children.  Her relationship with her niece is also somewhat strained.      She reports that she is having trouble with supplies such as: bath wipes, shower caps, briefs, deodorant sprays and body shampoos.  She would like me to reach out to [facility].    She hopes to have her colostomy reversed soon.  She has had several discussions about this and has been seeking opinions.    PAST MEDICAL HISTORY:  PMHx (including negatives):  has a past medical history of Anemia, Back pain, Cancer (HCC), DVT (deep venous thrombosis) (HCC), Hepatitis C, Liver disease, Presence of IVC filter, Rheumatoid arthritis (HCC), Sciatic leg pain, and Uterine fibroid.   PSurgHx:  has a past surgical history that includes IR GUIDED THROMB MECH VEIN (10/27/2022); gi (10/31/2022); gi (10/31/2022);  section; Urological Surgery (09/15/2022); Urological Surgery (09/15/2022); Urological Surgery (09/15/2022); Urological Surgery (09/15/2022); Urological Surgery (Bilateral, 09/15/2022); pr unlisted procedure cardiac surgery; Hysterectomy (10/03/2022); Salpingo-oophorectomy (Bilateral, 10/03/2022); gyn (10/03/2022); Appendectomy (10/03/2022); Urological Surgery (10/03/2022); Urological Surgery (10/03/2022); IR GUIDED THROMB MECH VEIN (10/27/2022); gi (2022); gi (2022); colostomy; and Rectal surgery

## 2024-05-09 NOTE — TELEPHONE ENCOUNTER
She reports that she is having trouble with supplies such as: bath wipes, shower caps, briefs, deodorant sprays and body shampoos.     She is using [employer] for her supplies.  I would need some sort of order form from them to sign if you can look into that for her, please?

## 2024-05-09 NOTE — PROGRESS NOTES
Chief Complaint   Patient presents with    Follow-up    Bladder Cancer      /64 (Site: Right Upper Arm, Position: Sitting, Cuff Size: Medium Adult)   Pulse 77     1. Have you been to the ER, urgent care clinic since your last visit?  Hospitalized since your last visit?No    2. Have you seen or consulted any other health care providers outside of the [facility] since your last visit?  Include any pap smears or colon screening. No

## 2024-05-10 ENCOUNTER — TELEPHONE (OUTPATIENT)
Age: 59
End: 2024-05-10

## 2024-05-10 NOTE — TELEPHONE ENCOUNTER
Spoke with Sidra sethi she states it's being process, All of the items are not available until 6/5/2024. A new order will need to be sent to them. She explained they do not carry shower caps, they do not contract with insurance for bath wipes and body shampoos. The patient already ordered briefs and deodorant spray for her ostomy.

## 2024-05-11 NOTE — TELEPHONE ENCOUNTER
Thank you for doing this.  Can you please call patient and let her know that [facility] does not have some of the supplies she is requesting and that she may have to purchase out of pocket and then submit receipts to her insurance company?    If I were her, I would call the insurance company and ask them what they require if she is unable to obtain those supplies from [facility].    Thanks a bunch for your help.

## 2024-05-13 ENCOUNTER — TELEPHONE (OUTPATIENT)
Age: 59
End: 2024-05-13

## 2024-05-13 DIAGNOSIS — M25.50 CHRONIC PAIN OF MULTIPLE JOINTS: ICD-10-CM

## 2024-05-13 DIAGNOSIS — G89.29 CHRONIC PAIN OF MULTIPLE JOINTS: ICD-10-CM

## 2024-05-13 NOTE — TELEPHONE ENCOUNTER
Pt called stating that her pharmacy informed her that the prescription for Wallace Gel needs a prior authorization done

## 2024-05-15 RX ORDER — ACETAMINOPHEN 325 MG/1
650 TABLET ORAL EVERY 8 HOURS PRN
Qty: 80 TABLET | Refills: 0 | Status: SHIPPED | OUTPATIENT
Start: 2024-05-15

## 2024-05-21 DIAGNOSIS — B00.1 COLD SORE: ICD-10-CM

## 2024-05-21 RX ORDER — DOCOSANOL 100 MG/G
CREAM TOPICAL
Qty: 2 G | Refills: 2 | Status: SHIPPED | OUTPATIENT
Start: 2024-05-21

## 2024-05-23 ENCOUNTER — TELEPHONE (OUTPATIENT)
Age: 59
End: 2024-05-23

## 2024-05-23 NOTE — TELEPHONE ENCOUNTER
Inga from [facility] called asking to speak with Dr. Cao or nurse. I informed her that both are in clinic and I can take message. Inga calling to ask if she can hold Eliquis for 2 days for patient procedure? She would like a call back and she states if she does not answer please leave the message on her answering machine. You can reach Inga at [phone].

## 2024-05-23 NOTE — TELEPHONE ENCOUNTER
Left message for Inga at [facility] to let her know  states it is okay for her to hold the Eliquis for 2 days before her procedure.

## 2024-05-23 NOTE — TELEPHONE ENCOUNTER
I looked to see who the patient saw at [facility]. She was seen by Dr. Inga Childers yesterday. Looks they're planning to do a Colonoscopy and she needs to stop her Eliquis. I spoke with  he states that is okay to hold for 2 days.

## 2024-06-03 DIAGNOSIS — M25.50 CHRONIC PAIN OF MULTIPLE JOINTS: ICD-10-CM

## 2024-06-03 DIAGNOSIS — G89.29 CHRONIC PAIN OF MULTIPLE JOINTS: ICD-10-CM

## 2024-06-04 ENCOUNTER — HOSPITAL ENCOUNTER (OUTPATIENT)
Facility: HOSPITAL | Age: 59
Discharge: HOME | End: 2024-06-06
Attending: SURGERY
Payer: MEDICAID

## 2024-06-04 DIAGNOSIS — I87.303 CHRONIC VENOUS HYPERTENSION INVOLVING BOTH SIDES: ICD-10-CM

## 2024-06-04 LAB — VAS RIGHT POP RFX: 1.7 S

## 2024-06-04 PROCEDURE — 93970 EXTREMITY STUDY: CPT

## 2024-06-05 RX ORDER — ACETAMINOPHEN 325 MG/1
650 TABLET ORAL EVERY 8 HOURS PRN
Qty: 80 TABLET | Refills: 0 | Status: SHIPPED | OUTPATIENT
Start: 2024-06-05

## 2024-06-17 ENCOUNTER — TELEPHONE (OUTPATIENT)
Age: 59
End: 2024-06-17

## 2024-06-17 DIAGNOSIS — G47.00 INSOMNIA, UNSPECIFIED TYPE: ICD-10-CM

## 2024-06-17 NOTE — TELEPHONE ENCOUNTER
I called and spoke with Owen Wood 2 patient identifiers used. I informed her that  said to stop the medication 3 days prior. I told her that she can have them fax over a clearance letter for  and I provided her with the fax number.

## 2024-06-17 NOTE — TELEPHONE ENCOUNTER
Patient wants to know if she can come off blood thinners for dentist appointment she has to have a tooth taken out. Please call [phone]

## 2024-06-19 RX ORDER — TRAZODONE HYDROCHLORIDE 50 MG/1
TABLET ORAL
Qty: 90 TABLET | Refills: 1 | Status: SHIPPED | OUTPATIENT
Start: 2024-06-19

## 2024-06-21 ENCOUNTER — TELEPHONE (OUTPATIENT)
Age: 59
End: 2024-06-21

## 2024-06-21 NOTE — TELEPHONE ENCOUNTER
Pt called stating that she needs a refill on her nasal spray and an authorization sent to her pharmacy for nasal gel.

## 2024-06-24 DIAGNOSIS — J30.89 NON-SEASONAL ALLERGIC RHINITIS, UNSPECIFIED TRIGGER: ICD-10-CM

## 2024-06-24 RX ORDER — FLUTICASONE PROPIONATE 50 MCG
2 SPRAY, SUSPENSION (ML) NASAL DAILY
Qty: 16 G | Refills: 5 | Status: SHIPPED | OUTPATIENT
Start: 2024-06-24

## 2024-06-27 ENCOUNTER — TELEPHONE (OUTPATIENT)
Age: 59
End: 2024-06-27

## 2024-06-27 NOTE — TELEPHONE ENCOUNTER
Patient called stating she has scrapped herself and she needs something prescribed for her skin - pt states she on blood thinners and wants Dr. Cao to give her a call please . Thanks DCR

## 2024-06-27 NOTE — TELEPHONE ENCOUNTER
I called and spoke with Owen Wood 2 patient identifiers used she states she hit her self with her finger nail when changing her bag. She is using the wound care  and just wanted to make sure that was okay that it is healing up. I told her to continue to do that and if she has any other issues to call back.

## 2024-07-05 ENCOUNTER — OFFICE VISIT (OUTPATIENT)
Facility: CLINIC | Age: 59
End: 2024-07-05
Payer: MEDICAID

## 2024-07-05 VITALS
TEMPERATURE: 98.2 F | HEIGHT: 63 IN | WEIGHT: 135 LBS | RESPIRATION RATE: 18 BRPM | BODY MASS INDEX: 23.92 KG/M2 | OXYGEN SATURATION: 100 % | HEART RATE: 80 BPM | SYSTOLIC BLOOD PRESSURE: 119 MMHG | DIASTOLIC BLOOD PRESSURE: 74 MMHG

## 2024-07-05 DIAGNOSIS — Z98.890 HISTORY OF ILEAL CONDUIT: ICD-10-CM

## 2024-07-05 DIAGNOSIS — M35.00 SJOGREN'S SYNDROME, WITH UNSPECIFIED ORGAN INVOLVEMENT (HCC): Primary | ICD-10-CM

## 2024-07-05 DIAGNOSIS — C67.9 MALIGNANT NEOPLASM OF URINARY BLADDER, UNSPECIFIED SITE (HCC): ICD-10-CM

## 2024-07-05 DIAGNOSIS — F41.8 SITUATIONAL ANXIETY: ICD-10-CM

## 2024-07-05 DIAGNOSIS — Z93.3 COLOSTOMY IN PLACE (HCC): ICD-10-CM

## 2024-07-05 DIAGNOSIS — Z12.31 ENCOUNTER FOR SCREENING MAMMOGRAM FOR MALIGNANT NEOPLASM OF BREAST: ICD-10-CM

## 2024-07-05 DIAGNOSIS — N82.3 RECTOVAGINAL FISTULA: ICD-10-CM

## 2024-07-05 DIAGNOSIS — M06.9 RHEUMATOID ARTHRITIS, INVOLVING UNSPECIFIED SITE, UNSPECIFIED WHETHER RHEUMATOID FACTOR PRESENT (HCC): ICD-10-CM

## 2024-07-05 PROCEDURE — 99214 OFFICE O/P EST MOD 30 MIN: CPT | Performed by: STUDENT IN AN ORGANIZED HEALTH CARE EDUCATION/TRAINING PROGRAM

## 2024-07-05 NOTE — PROGRESS NOTES
Chief Complaint   Patient presents with    Follow-up   /74 (Site: Left Upper Arm, Position: Sitting)   Pulse 80   Temp 98.2 °F (36.8 °C)   Resp 18   Ht 1.6 m (5' 2.99\")   Wt 61.2 kg (135 lb)   SpO2 100%   BMI 23.92 kg/m²     \"Have you been to the ER, urgent care clinic since your last visit?  Hospitalized since your last visit?\"    NO    “Have you seen or consulted any other health care providers outside of [facility] since your last visit?”    2 months ago U FU R/T cancer  free and CT    Have you had a mammogram?”   NO    Date of last Mammogram: 9/17/2021         “Have you had a colorectal cancer screening such as a colonoscopy/FIT/Cologuard?    NO    No colonoscopy on file  No cologuard on file  Date of last FIT: 10/28/2022   No flexible sigmoidoscopy on file         Click Here for Release of Records Request    
RETROGRADE PYELOGRAM    UROLOGICAL SURGERY  10/03/2022    PELVIC LYMPH NODE DISSECTION    UROLOGICAL SURGERY  10/03/2022    ILEAL CONDUIT URINARY DIVERSION     Family History   Problem Relation Age of Onset    Cancer Mother     Lung Cancer Mother     Heart Disease Father     Hypertension Sister     Cancer Maternal Aunt     Breast Cancer Maternal Aunt      Social History     Tobacco Use   Smoking Status Former    Current packs/day: 0.00    Average packs/day: 0.5 packs/day for 15.0 years (7.5 ttl pk-yrs)    Types: Cigarettes    Start date: 1983    Quit date: 1998    Years since quittin.5   Smokeless Tobacco Never         Review of Systems   Constitutional:  Negative for fever.   Respiratory:  Negative for shortness of breath.    Cardiovascular:  Negative for chest pain and palpitations.   Gastrointestinal:  Negative for abdominal pain.   Neurological:  Negative for dizziness, seizures and syncope.   Psychiatric/Behavioral:  Negative for agitation and behavioral problems.            Vitals:    24 1014   BP: 119/74   Site: Left Upper Arm   Position: Sitting   Pulse: 80   Resp: 18   Temp: 98.2 °F (36.8 °C)   SpO2: 100%   Weight: 61.2 kg (135 lb)   Height: 1.6 m (5' 2.99\")      Physical Exam  HENT:      Head: Normocephalic.   Cardiovascular:      Rate and Rhythm: Normal rate and regular rhythm.   Pulmonary:      Effort: Pulmonary effort is normal.      Breath sounds: Normal breath sounds.   Neurological:      General: No focal deficit present.      Mental Status: She is alert. Mental status is at baseline.   Psychiatric:         Mood and Affect: Mood normal.         Behavior: Behavior normal.                 An electronic signature was used to authenticate this note.  -- Tracy Russell MD

## 2024-07-08 ENCOUNTER — TELEPHONE (OUTPATIENT)
Facility: CLINIC | Age: 59
End: 2024-07-08

## 2024-07-08 RX ORDER — DIAZEPAM 5 MG/1
5 TABLET ORAL
Qty: 1 TABLET | Refills: 0 | Status: SHIPPED | OUTPATIENT
Start: 2024-07-08 | End: 2024-07-08

## 2024-07-08 NOTE — TELEPHONE ENCOUNTER
Patient is calling because she was looking for a prescription for DIAPEMZAM that she states was suppose to be sent to [facility] on the day of her appointment which was on 7-5-24.  She states she needs this prescription before her MRI appointment on 7-15-24.  Please advise

## 2024-07-10 NOTE — TELEPHONE ENCOUNTER
Attempted to call patient regarding diazepam order being sent to the pharmacy. Detailed VM left for patient.

## 2024-07-11 ENCOUNTER — TELEPHONE (OUTPATIENT)
Age: 59
End: 2024-07-11

## 2024-07-11 ENCOUNTER — OFFICE VISIT (OUTPATIENT)
Age: 59
End: 2024-07-11
Payer: MEDICAID

## 2024-07-11 VITALS
OXYGEN SATURATION: 98 % | HEART RATE: 75 BPM | RESPIRATION RATE: 18 BRPM | BODY MASS INDEX: 24.93 KG/M2 | DIASTOLIC BLOOD PRESSURE: 84 MMHG | HEIGHT: 62 IN | WEIGHT: 135.5 LBS | SYSTOLIC BLOOD PRESSURE: 128 MMHG | TEMPERATURE: 98 F

## 2024-07-11 DIAGNOSIS — I87.303 CHRONIC VENOUS HYPERTENSION INVOLVING BOTH SIDES: Primary | ICD-10-CM

## 2024-07-11 PROCEDURE — 99212 OFFICE O/P EST SF 10 MIN: CPT | Performed by: SURGERY

## 2024-07-11 ASSESSMENT — PATIENT HEALTH QUESTIONNAIRE - PHQ9
1. LITTLE INTEREST OR PLEASURE IN DOING THINGS: NOT AT ALL
SUM OF ALL RESPONSES TO PHQ QUESTIONS 1-9: 0
SUM OF ALL RESPONSES TO PHQ QUESTIONS 1-9: 0
2. FEELING DOWN, DEPRESSED OR HOPELESS: NOT AT ALL
SUM OF ALL RESPONSES TO PHQ QUESTIONS 1-9: 0
SUM OF ALL RESPONSES TO PHQ9 QUESTIONS 1 & 2: 0
SUM OF ALL RESPONSES TO PHQ QUESTIONS 1-9: 0

## 2024-07-11 NOTE — TELEPHONE ENCOUNTER
I called and spoke with Tomeka at [facility] she states that the patient received 4 pairs of compression stockings in June. She states that they made a mistake because technically her insurance only covers 1 pair every 180 days. So they will fix that on their end and call her within 24-48 hours.     I called and spoke with Owen Wood 2 patient identifiers used. I informed her of what Tomeka at [facility] stated. She states that  needs to authorize the stockings. I informed her that on 05/23/2024  did authorize the compression stockings and it was faxed back to them. Which we have on file also. He wrote the original prescription last 06/2023. Owen Wood states that her insurance told her that as long as  authorized them then they will do it. I told her that unfortunately that is something she will need to call [facility] about and have them discuss with her insurance company. She states that I need to call them to authorize them. I explained to  that  already did authorize them that she has to call them. There is nothing I can do regarding the supplies at this time. She started cussing stating that she needs ankle compression socks. I told her that is also something that  does not prescribe he prefers the stockings. I tried to calm her down and inform her we have this issue all the time it is not just her. She states she will handle it with them.

## 2024-07-11 NOTE — TELEPHONE ENCOUNTER
Patient was here to see Dr. Cao but forgot to ask him about her support stockings. She would like for us to notify [facility] about ordering her stockings.  [phone]

## 2024-07-15 ENCOUNTER — TELEPHONE (OUTPATIENT)
Facility: CLINIC | Age: 59
End: 2024-07-15

## 2024-07-15 NOTE — TELEPHONE ENCOUNTER
Attempted to contact patient regarding scheduling of mammogram. LVM for patient to please return call at [phone] or [phone] to schedule at her earliest convenience.

## 2024-07-15 NOTE — TELEPHONE ENCOUNTER
Per patient was supposed to have clearance document sent to [facility] [phone]. Per patient they sent something over but I don't see anything in the chart in reference to this.     Please advise.

## 2024-07-17 NOTE — PROGRESS NOTES
Identified patient with two patient identifiers (name and ). Reviewed chart in preparation for visit and have obtained necessary documentation.    Owen Wood is a 59 y.o. female  Chief Complaint   Patient presents with    Results     US     /84 (Site: Left Upper Arm, Position: Sitting, Cuff Size: Small Adult)   Pulse 75   Temp 98 °F (36.7 °C) (Oral)   Resp 18   Ht 1.575 m (5' 2\")   Wt 61.5 kg (135 lb 8 oz)   SpO2 98%   BMI 24.78 kg/m²     1. Have you been to the ER, urgent care clinic since your last visit?  Hospitalized since your last visit?no    2. Have you seen or consulted any other health care providers outside of the [facility] since your last visit?  Include any pap smears or colon screening. no   
Severe protein-calorie malnutrition (HCC)    Small bowel obstruction (HCC)    Rectovaginal fistula    Anemia in neoplastic disease    Hard stool    Cellulitis    History of ileal conduit    Chronic venous hypertension involving both sides    Peripheral vascular disease (HCC)    Portal vein thrombosis    Colostomy in place (HCC)    Colostomy complication, unspecified (HCC)       Plans: Patient was advised to continue her compression stocking.  Will maintain current Eliquis therapy.  And we will repeat venous duplex ultrasound in 6 months.  Patient currently seeing urogynecology for possibly repeating rectovaginal fistula and the colostomy reversal at the same time.      **Note: This lupillo is pertinent to patient with PAD.    Vascular risk factor modification regimen:  5 regimens include: Optimal cholesterol control, exercise program, medication modifications including antiplatelet therapy and antihypertensives, optimal blood pressure control, and optimal hydration.  6 regimens include: 5 regimens plus tobacco cessation.  7 regimens include: 5 regimens plus strict diabetic control.  8 regimens include: 6 plus 7 regimens    Nash Cabrera MD

## 2024-07-18 ASSESSMENT — ENCOUNTER SYMPTOMS
SHORTNESS OF BREATH: 0
ABDOMINAL PAIN: 0

## 2024-07-24 DIAGNOSIS — M25.50 CHRONIC PAIN OF MULTIPLE JOINTS: ICD-10-CM

## 2024-07-24 DIAGNOSIS — G89.29 CHRONIC PAIN OF MULTIPLE JOINTS: ICD-10-CM

## 2024-07-24 NOTE — TELEPHONE ENCOUNTER
Patient called and stated that she is having a runny nose and coughing. She stated that it has been going on for two days and she would like something sent to her pharmacy. Please advise.

## 2024-07-24 NOTE — TELEPHONE ENCOUNTER
Called and spoke to pt. She says she has had no fever. No headache, no shortness of breath with cough. Cough is occasional and maybe due to drainage.   Dr. Schumacher ordered flonase. She is getting that refilled. But she wants Dr. Russell to send in Mucinex dm to [place]. Thank you

## 2024-07-25 RX ORDER — ACETAMINOPHEN 325 MG/1
650 TABLET ORAL EVERY 8 HOURS PRN
Qty: 80 TABLET | Refills: 0 | Status: SHIPPED | OUTPATIENT
Start: 2024-07-25

## 2024-08-09 DIAGNOSIS — B00.1 COLD SORE: ICD-10-CM

## 2024-08-12 RX ORDER — DOCOSANOL 100 MG/G
CREAM TOPICAL
Qty: 2 G | Refills: 0 | Status: SHIPPED | OUTPATIENT
Start: 2024-08-12

## 2024-08-12 RX ORDER — APIXABAN 5 MG/1
TABLET, FILM COATED ORAL
Qty: 180 TABLET | Refills: 1 | Status: SHIPPED | OUTPATIENT
Start: 2024-08-12

## 2024-08-12 RX ORDER — SODIUM CHLORIDE/ALOE VERA
GEL (GRAM) NASAL PRN
Qty: 1 EACH | Refills: 3 | Status: SHIPPED | OUTPATIENT
Start: 2024-08-12

## 2024-08-12 RX ORDER — ACETAMINOPHEN 80 MG
TABLET,DISINTEGRATING ORAL
Qty: 44 ML | Refills: 0 | OUTPATIENT
Start: 2024-08-12

## 2024-08-19 DIAGNOSIS — J06.9 UPPER RESPIRATORY TRACT INFECTION, UNSPECIFIED TYPE: ICD-10-CM

## 2024-08-19 RX ORDER — ACETAMINOPHEN 80 MG
TABLET,DISINTEGRATING ORAL
Qty: 44 ML | Refills: 0 | OUTPATIENT
Start: 2024-08-19

## 2024-08-26 ENCOUNTER — TELEPHONE (OUTPATIENT)
Age: 59
End: 2024-08-26

## 2024-08-26 NOTE — TELEPHONE ENCOUNTER
Contacted patient two identifiers let patient know that she won't be able to stop the blood thinners because she still have blood clots. Patient asked how long will she need to be on the blood thinners because she has been on them a long time. I let patient know that she will see Dr. Cao in Jan of 2025 and at that time he will re-evaluate her. She says she is having a dental extraction of her tooth in October and the dentist has said that she will need to be off the blood thinners for two days before he procedure. Patient was told to follow the dentist guidance and then go back on her blood thinners. Patient states she would do so and thanked me for the call.

## 2024-09-02 DIAGNOSIS — K21.9 GASTROESOPHAGEAL REFLUX DISEASE, UNSPECIFIED WHETHER ESOPHAGITIS PRESENT: ICD-10-CM

## 2024-09-02 DIAGNOSIS — J06.9 UPPER RESPIRATORY TRACT INFECTION, UNSPECIFIED TYPE: ICD-10-CM

## 2024-09-02 DIAGNOSIS — B00.1 COLD SORE: ICD-10-CM

## 2024-09-03 RX ORDER — ACETAMINOPHEN 80 MG
TABLET,DISINTEGRATING ORAL
Qty: 44 ML | Refills: 0 | Status: SHIPPED | OUTPATIENT
Start: 2024-09-03

## 2024-09-03 RX ORDER — DOCOSANOL 100 MG/G
CREAM TOPICAL
Qty: 2 G | Refills: 0 | Status: SHIPPED | OUTPATIENT
Start: 2024-09-03

## 2024-09-03 RX ORDER — PANTOPRAZOLE SODIUM 40 MG/1
40 TABLET, DELAYED RELEASE ORAL DAILY
Qty: 90 TABLET | Refills: 0 | OUTPATIENT
Start: 2024-09-03

## 2024-09-12 DIAGNOSIS — J30.89 NON-SEASONAL ALLERGIC RHINITIS, UNSPECIFIED TRIGGER: ICD-10-CM

## 2024-09-12 RX ORDER — FLUTICASONE PROPIONATE 50 MCG
2 SPRAY, SUSPENSION (ML) NASAL DAILY
Qty: 16 G | Refills: 5 | Status: SHIPPED | OUTPATIENT
Start: 2024-09-12

## 2024-09-16 DIAGNOSIS — K21.9 GASTROESOPHAGEAL REFLUX DISEASE, UNSPECIFIED WHETHER ESOPHAGITIS PRESENT: ICD-10-CM

## 2024-09-16 RX ORDER — PANTOPRAZOLE SODIUM 40 MG/1
40 TABLET, DELAYED RELEASE ORAL DAILY
Qty: 30 TABLET | Refills: 0 | OUTPATIENT
Start: 2024-09-16

## 2024-09-26 ENCOUNTER — TELEPHONE (OUTPATIENT)
Age: 59
End: 2024-09-26

## 2024-10-01 DIAGNOSIS — M25.50 CHRONIC PAIN OF MULTIPLE JOINTS: ICD-10-CM

## 2024-10-01 DIAGNOSIS — G89.29 CHRONIC PAIN OF MULTIPLE JOINTS: ICD-10-CM

## 2024-10-02 ENCOUNTER — TELEPHONE (OUTPATIENT)
Age: 59
End: 2024-10-02

## 2024-10-02 RX ORDER — ACETAMINOPHEN 325 MG/1
650 TABLET ORAL EVERY 8 HOURS PRN
Qty: 80 TABLET | Refills: 0 | Status: SHIPPED | OUTPATIENT
Start: 2024-10-02

## 2024-10-02 NOTE — TELEPHONE ENCOUNTER
Patient called today regarding dental surgery. She stopped taking them on 9/29/24. She had her surgery yesterday. When can she start taking her blood thinners. [phone]

## 2024-10-02 NOTE — TELEPHONE ENCOUNTER
I called and spoke with Owen Wood per 's paperwork it states to resume next day once hemostasis achieved. She asked if I was sure. I informed her that is what the paperwork states but if she would like further clarification she can call her dentist to discuss with them also. She said okay and is going to call them.

## 2024-10-03 ENCOUNTER — TELEPHONE (OUTPATIENT)
Age: 59
End: 2024-10-03

## 2024-10-03 NOTE — TELEPHONE ENCOUNTER
Patient called in wanting Dr. Cao to know she has gotten back on her bld thinners. She has already had her surgery and was also wondering if Dr. Cao can give her a call to let her know when she should come off bld thinners. Please contact patient to assist. Thanks - DCR

## 2024-10-03 NOTE — TELEPHONE ENCOUNTER
I called and spoke with Owen Wood 2 patient identifiers used. I asked her if there was anyone helping her at home or if we could contact someone to explain further about the blood thinners. She states she doesn't and she just wanted  to know she started taking the blood thinners again and wants to know when will she be able to stop. I informed her that  and myself has explained to her that she still needs to continue taking the medication and when she comes back in January he will decide if she needs to continue or stop them. I told her I was just trying to help her and see if someone else was there to help explain all of this too. She states she has this under control and will talk to him in January.

## 2024-10-08 DIAGNOSIS — J06.9 UPPER RESPIRATORY TRACT INFECTION, UNSPECIFIED TYPE: ICD-10-CM

## 2024-10-08 DIAGNOSIS — B00.1 COLD SORE: ICD-10-CM

## 2024-10-08 RX ORDER — ACETAMINOPHEN 80 MG
TABLET,DISINTEGRATING ORAL
Qty: 44 ML | Refills: 0 | Status: SHIPPED | OUTPATIENT
Start: 2024-10-08

## 2024-10-09 RX ORDER — DOCOSANOL 100 MG/G
CREAM TOPICAL
Qty: 2 G | Refills: 2 | Status: SHIPPED | OUTPATIENT
Start: 2024-10-09

## 2024-10-21 DIAGNOSIS — G89.29 CHRONIC PAIN OF MULTIPLE JOINTS: ICD-10-CM

## 2024-10-21 DIAGNOSIS — M25.50 CHRONIC PAIN OF MULTIPLE JOINTS: ICD-10-CM

## 2024-10-21 RX ORDER — ACETAMINOPHEN 325 MG/1
650 TABLET ORAL EVERY 8 HOURS PRN
Qty: 80 TABLET | Refills: 0 | Status: SHIPPED | OUTPATIENT
Start: 2024-10-21

## 2024-10-25 ENCOUNTER — OFFICE VISIT (OUTPATIENT)
Facility: CLINIC | Age: 59
End: 2024-10-25

## 2024-10-25 VITALS
HEART RATE: 85 BPM | DIASTOLIC BLOOD PRESSURE: 76 MMHG | SYSTOLIC BLOOD PRESSURE: 124 MMHG | WEIGHT: 146 LBS | OXYGEN SATURATION: 94 % | TEMPERATURE: 98 F | HEIGHT: 63 IN | BODY MASS INDEX: 25.87 KG/M2 | RESPIRATION RATE: 18 BRPM

## 2024-10-25 DIAGNOSIS — Z93.3 COLOSTOMY IN PLACE (HCC): ICD-10-CM

## 2024-10-25 DIAGNOSIS — I82.5Y3 CHRONIC DEEP VEIN THROMBOSIS (DVT) OF PROXIMAL VEIN OF BOTH LOWER EXTREMITIES (HCC): ICD-10-CM

## 2024-10-25 DIAGNOSIS — G47.00 INSOMNIA, UNSPECIFIED TYPE: ICD-10-CM

## 2024-10-25 DIAGNOSIS — M35.00 SJOGREN'S SYNDROME, WITH UNSPECIFIED ORGAN INVOLVEMENT (HCC): ICD-10-CM

## 2024-10-25 DIAGNOSIS — R73.03 PREDIABETES: Primary | ICD-10-CM

## 2024-10-25 DIAGNOSIS — N82.3 RECTOVAGINAL FISTULA: ICD-10-CM

## 2024-10-25 DIAGNOSIS — Z86.19 H/O COLD SORES: ICD-10-CM

## 2024-10-25 DIAGNOSIS — C67.9 MALIGNANT NEOPLASM OF URINARY BLADDER, UNSPECIFIED SITE (HCC): ICD-10-CM

## 2024-10-25 LAB — HBA1C MFR BLD: 5.2 %

## 2024-10-25 RX ORDER — DOCOSANOL 100 MG/G
CREAM TOPICAL
COMMUNITY
Start: 2024-10-25

## 2024-10-25 RX ORDER — TRAZODONE HYDROCHLORIDE 50 MG/1
50 TABLET, FILM COATED ORAL NIGHTLY PRN
Qty: 90 TABLET | Refills: 1 | Status: SHIPPED | OUTPATIENT
Start: 2024-10-25

## 2024-10-25 SDOH — ECONOMIC STABILITY: FOOD INSECURITY: WITHIN THE PAST 12 MONTHS, THE FOOD YOU BOUGHT JUST DIDN'T LAST AND YOU DIDN'T HAVE MONEY TO GET MORE.: NEVER TRUE

## 2024-10-25 SDOH — ECONOMIC STABILITY: INCOME INSECURITY: HOW HARD IS IT FOR YOU TO PAY FOR THE VERY BASICS LIKE FOOD, HOUSING, MEDICAL CARE, AND HEATING?: NOT HARD AT ALL

## 2024-10-25 SDOH — ECONOMIC STABILITY: FOOD INSECURITY: WITHIN THE PAST 12 MONTHS, YOU WORRIED THAT YOUR FOOD WOULD RUN OUT BEFORE YOU GOT MONEY TO BUY MORE.: NEVER TRUE

## 2024-10-25 ASSESSMENT — ENCOUNTER SYMPTOMS
ABDOMINAL PAIN: 1
SHORTNESS OF BREATH: 0

## 2024-10-25 NOTE — PROGRESS NOTES
Owen Wood (: 1965) is a 59 y.o. female, Established patient patient, here for evaluation of the following chief complaint(s):  Follow-up Chronic Condition       ASSESSMENT/PLAN:  Below is the assessment and plan developed based on review of pertinent history, physical exam, labs, studies, and medications.    1. Prediabetes  -     AMB POC HEMOGLOBIN A1C  2. Insomnia, unspecified type  -     traZODone (DESYREL) 50 MG tablet; Take 1 tablet by mouth nightly as needed for Sleep, Disp-90 tablet, R-1Normal  3. Colostomy in place (HCC)  4. Chronic deep vein thrombosis (DVT) of proximal vein of both lower extremities (HCC)  -     apixaban (ELIQUIS) 5 MG TABS tablet; Take 1 tablet by mouth 2 times daily, Disp-180 tablet, R-1Normal  5. Malignant neoplasm of urinary bladder, unspecified site (HCC)  6. Rectovaginal fistula  7. H/O cold sores  -     docosanol (ABREVA) 10 % CREA cream; APPLY TO AFFECTED AREA(S) OF FACE/LIP 5 TIMES A DAY IF NEEDED FOR COLD SORE/FEVER BLISTER.OTC  8. Sjogren's syndrome, with unspecified organ involvement ([facility])    A1c is normal.  Results for orders placed or performed in visit on 10/25/24   AMB POC HEMOGLOBIN A1C   Result Value Ref Range    Hemoglobin A1C, POC 5.2 %     Refills as above.  Reviewed specialist notes.  Takes Tylenol as needed for abdominal discomfort.  She had a recent colonoscopy.    Needs to schedule mammogram   Reviewed blood work in careeverywhere.  She has appoitment with oncology in November     She has left eye cataract seeing ophthalmology.   Continue follow-up with rheumatology.    Return in about 6 months (around 2025) for follow up.         SUBJECTIVE/OBJECTIVE:  HPI    Owen Wood is a 59 yr old who presents for a follow up.     She has a significant past medical history of bladder cancer status post cystectomy, hysterectomy, BSO and ileal conduit diversion.  Currently cancer free,  on active surveillance.  Follows with Dr. Fernandez. she has pulmonary

## 2024-10-25 NOTE — PROGRESS NOTES
\"Have you been to the ER, urgent care clinic since your last visit?  Hospitalized since your last visit?\"    NO    “Have you seen or consulted any other health care providers outside of [facility] since your last visit?”    NO    Have you had a mammogram?”   NO    Date of last Mammogram: 9/17/2021         “Have you had a colorectal cancer screening such as a colonoscopy/FIT/Cologuard?    YES - Type: Colonoscopy - Where: [facility] Nurse/CMA to request most recent records if not in the chart     No colonoscopy on file  No cologuard on file  Date of last FIT: 10/28/2022   No flexible sigmoidoscopy on file     Chief Complaint   Patient presents with    Follow-up Chronic Condition     /76 (Site: Right Upper Arm, Position: Sitting, Cuff Size: Medium Adult)   Pulse 85   Temp 98 °F (36.7 °C) (Temporal)   Resp 18   Ht 1.6 m (5' 3\")   Wt 66.2 kg (146 lb)   SpO2 94%   BMI 25.86 kg/m²       Click Here for Release of Records Request

## 2024-11-04 DIAGNOSIS — J06.9 UPPER RESPIRATORY TRACT INFECTION, UNSPECIFIED TYPE: ICD-10-CM

## 2024-11-04 DIAGNOSIS — Z86.19 H/O COLD SORES: ICD-10-CM

## 2024-11-05 RX ORDER — DOCOSANOL 100 MG/G
CREAM TOPICAL
Qty: 2 G | Refills: 0 | OUTPATIENT
Start: 2024-11-05

## 2024-11-05 RX ORDER — ACETAMINOPHEN 80 MG
TABLET,DISINTEGRATING ORAL
Qty: 44 ML | Refills: 0 | Status: SHIPPED | OUTPATIENT
Start: 2024-11-05

## 2024-11-08 ENCOUNTER — OFFICE VISIT (OUTPATIENT)
Age: 59
End: 2024-11-08
Payer: MEDICAID

## 2024-11-08 VITALS
SYSTOLIC BLOOD PRESSURE: 120 MMHG | DIASTOLIC BLOOD PRESSURE: 78 MMHG | RESPIRATION RATE: 18 BRPM | HEIGHT: 63 IN | OXYGEN SATURATION: 96 % | HEART RATE: 86 BPM | BODY MASS INDEX: 25.86 KG/M2

## 2024-11-08 DIAGNOSIS — C67.9 MALIGNANT NEOPLASM OF URINARY BLADDER, UNSPECIFIED SITE (HCC): Primary | ICD-10-CM

## 2024-11-08 DIAGNOSIS — Z93.3 COLOSTOMY IN PLACE (HCC): ICD-10-CM

## 2024-11-08 DIAGNOSIS — Z98.890 HISTORY OF ILEAL CONDUIT: ICD-10-CM

## 2024-11-08 DIAGNOSIS — N82.3 RECTOVAGINAL FISTULA: ICD-10-CM

## 2024-11-08 LAB
BILIRUBIN, URINE, POC: NEGATIVE
BLOOD URINE, POC: ABNORMAL
GLUCOSE URINE, POC: NEGATIVE
KETONES, URINE, POC: NEGATIVE
LEUKOCYTE ESTERASE, URINE, POC: ABNORMAL
NITRITE, URINE, POC: POSITIVE
PH, URINE, POC: 7 (ref 4.6–8)
PROTEIN,URINE, POC: 30
SPECIFIC GRAVITY, URINE, POC: 1.02 (ref 1–1.03)
URINALYSIS CLARITY, POC: CLEAR
URINALYSIS COLOR, POC: YELLOW
UROBILINOGEN, POC: ABNORMAL

## 2024-11-08 PROCEDURE — 99214 OFFICE O/P EST MOD 30 MIN: CPT | Performed by: UROLOGY

## 2024-11-08 PROCEDURE — 81003 URINALYSIS AUTO W/O SCOPE: CPT | Performed by: UROLOGY

## 2024-11-08 NOTE — PROGRESS NOTES
1. Have you been to the ER, urgent care clinic since your last visit?  Hospitalized since your last visit?no    2. Have you seen or consulted any other health care providers outside of the [facility] since your last visit?  Include any pap smears or colon screening. no  Vitals:    11/08/24 0957   BP: 120/78   Pulse: 86   Resp: 18   SpO2: 96%

## 2024-11-08 NOTE — ASSESSMENT & PLAN NOTE
H/o bladder cancer s/p cystectomy 11/2022 at [facility].  She follow with Dr. Fernandez at [facility].

## 2024-11-08 NOTE — ASSESSMENT & PLAN NOTE
H/o colostomy for rectovaginal fistula.  She does not have much vaginal discharge at this point.  When she eats she has more discharge.

## 2024-11-08 NOTE — PROGRESS NOTES
HISTORY OF PRESENT ILLNESS  Owen Wood is a 59 y.o. female.   has a past medical history of Anemia, Back pain, Cancer (HCC), DVT (deep venous thrombosis) (HCC), Hepatitis C, Liver disease, Presence of IVC filter, Rheumatoid arthritis (HCC), Sciatic leg pain, and Uterine fibroid.  has a past surgical history that includes IR GUIDED THROMB MECH VEIN (10/27/2022); gi (10/31/2022); gi (10/31/2022);  section; Urological Surgery (09/15/2022); Urological Surgery (09/15/2022); Urological Surgery (09/15/2022); Urological Surgery (09/15/2022); Urological Surgery (Bilateral, 09/15/2022); pr unlisted procedure cardiac surgery; Hysterectomy (10/03/2022); Salpingo-oophorectomy (Bilateral, 10/03/2022); gyn (10/03/2022); Appendectomy (10/03/2022); Urological Surgery (10/03/2022); Urological Surgery (10/03/2022); IR GUIDED THROMB MECH VEIN (10/27/2022); gi (2022); gi (2022); colostomy; and Rectal surgery (N/A, 2023).  Chief Complaint   Patient presents with    Follow-up     6 Month follow-up     She is s/p cystectomy and ileal conduit 10/2022 at [facility].  Complicated by rectovaginal fistula managed with colostomy.  She was told of the risks of reversal and she is happy to continue with both bags.     She lost her 34 year old son in 3/18/24.  He was a pedestrian struck in [facility].     She is on anticoagulation for h/o DVT.         1. Malignant neoplasm of urinary bladder, unspecified site (HCC)  Overview:  Invasive squamous cell carcinoma, G2, 4.5 cm in greatest dimension, with multifocal invasion into perivesical and periureteral soft tissue.  [id number]  Now s/p cystectomy, pelvic exenteration and ileal conduit diversion on 10/3/22.   MRI pelvis with prominent external iliac LN.   Following with Dr. Pablito Fernandez at [facility]; on observation.    Assessment & Plan:   H/o bladder cancer s/p cystectomy 2022 at [facility].  She follow with Dr. Fernandez at [facility].   Orders:  -     AMB POC URINALYSIS DIP STICK AUTO W/O MICRO  2.

## 2024-11-29 DIAGNOSIS — J06.9 UPPER RESPIRATORY TRACT INFECTION, UNSPECIFIED TYPE: ICD-10-CM

## 2024-11-29 RX ORDER — ACETAMINOPHEN 80 MG
TABLET,DISINTEGRATING ORAL
Qty: 44 ML | Refills: 0 | Status: SHIPPED | OUTPATIENT
Start: 2024-11-29

## 2024-12-09 ENCOUNTER — TELEPHONE (OUTPATIENT)
Age: 59
End: 2024-12-09

## 2024-12-09 RX ORDER — SODIUM CHLORIDE/ALOE VERA
GEL (GRAM) NASAL PRN
Qty: 1 EACH | Refills: 3 | Status: SHIPPED | OUTPATIENT
Start: 2024-12-09

## 2024-12-09 NOTE — TELEPHONE ENCOUNTER
Patient states is having nosebleeds again.  Is using the nasal sprays, but would like a RX for the nasal gel to help with the dryness.  Would like sent into [facility].

## 2024-12-12 RX ORDER — LORATADINE 10 MG/1
10 TABLET ORAL DAILY
Qty: 30 TABLET | Refills: 0 | Status: SHIPPED | OUTPATIENT
Start: 2024-12-12

## 2024-12-18 ENCOUNTER — TELEPHONE (OUTPATIENT)
Age: 59
End: 2024-12-18

## 2024-12-18 RX ORDER — MUPIROCIN 20 MG/G
OINTMENT TOPICAL
Qty: 15 G | Refills: 3 | Status: SHIPPED | OUTPATIENT
Start: 2024-12-18 | End: 2024-12-25

## 2024-12-18 NOTE — TELEPHONE ENCOUNTER
Spoke with patient after speaking to [facility]. Advised the patient that her insurance [employer] medicaid does not cover the GABY gel. Patient stated she would like it if Dr. Schumacher could prescribe a similar saline gel that may be covered.  Please advise

## 2025-01-02 DIAGNOSIS — M25.50 CHRONIC PAIN OF MULTIPLE JOINTS: ICD-10-CM

## 2025-01-02 DIAGNOSIS — G89.29 CHRONIC PAIN OF MULTIPLE JOINTS: ICD-10-CM

## 2025-01-03 RX ORDER — ACETAMINOPHEN 325 MG/1
650 TABLET ORAL EVERY 8 HOURS PRN
Qty: 80 TABLET | Refills: 0 | Status: SHIPPED | OUTPATIENT
Start: 2025-01-03

## 2025-01-07 NOTE — TELEPHONE ENCOUNTER
I called the insurance company to find out about the chair with the lift and the insurance company said that if something cost over $500 it requires an authorization. They said that they are in network with [facility] so whatevery [facility] is telling her the information is correct. I tried to call [facility] to find out what is going on with the chair and what they are willing to pay for it, if at all. I also asked the insurance company if where she could be seen as far as hematology and rheumatology. I told the insurance company that the patient does not want to go to [place] to be seen. Unfortunately, with this patients insurance she can only be seen at the [facility]/[place]  area . I did speak to the patient about this and she was not happy about this. I did let her know that the insurance company does provide transportation as long as it is in a 60 mile radius I told her that she can use [facility] the number is [phone]. I let the patient know about this and she stated I have used this transportation system before and they had be sitting waiting on them after an appointment for 3 hours. The patient will only be able to use [facility] for Hematology. The other option for her to see a rheumatologist is called [facility] located in [place]. I did not cancel her appt for the rheumatologist yet she said that she wants to see you first. I told her that if she needs transportation that she needs to call then 7 days in advance to her appointments. She gave me a number to her . The name of the  is Lisandro Rojas and her number is [phone] she wants me to talk to the  about the lift chair. The child was noted to have a patch of eczema on the back of his left lower leg.  Photo was obtained for comparison.  Parents were asked to apply petroleum jelly to the affected area with every diaper change and at night to cover the whole leg with a clean cotton sock after it has been covered with a thick layer of emollient. It should improve in a few weeks and if not mom will bring him back and we may consider topical steroid cream for that area.

## 2025-01-10 ENCOUNTER — TELEPHONE (OUTPATIENT)
Age: 60
End: 2025-01-10

## 2025-01-10 NOTE — TELEPHONE ENCOUNTER
Patient called this morning she wants to know if she can come off of the blood thinner she said she has been on the blood thinner since discharged from the hospital please advise. Call back number is [phone].

## 2025-01-10 NOTE — TELEPHONE ENCOUNTER
Patient will need to discuss with  at her follow up appointment. I called and spoke with Owen Wood 2 patient identifiers used. Informed her she will need to discuss with  at her appointment.

## 2025-01-16 DIAGNOSIS — J06.9 UPPER RESPIRATORY TRACT INFECTION, UNSPECIFIED TYPE: ICD-10-CM

## 2025-01-16 RX ORDER — ACETAMINOPHEN 80 MG
TABLET,DISINTEGRATING ORAL
Qty: 44 ML | Refills: 0 | Status: SHIPPED | OUTPATIENT
Start: 2025-01-16

## 2025-01-23 DIAGNOSIS — J06.9 UPPER RESPIRATORY TRACT INFECTION, UNSPECIFIED TYPE: ICD-10-CM

## 2025-01-24 ENCOUNTER — OFFICE VISIT (OUTPATIENT)
Age: 60
End: 2025-01-24
Payer: MEDICAID

## 2025-01-24 VITALS
HEIGHT: 63 IN | WEIGHT: 144.5 LBS | TEMPERATURE: 97.8 F | HEART RATE: 79 BPM | SYSTOLIC BLOOD PRESSURE: 127 MMHG | BODY MASS INDEX: 25.6 KG/M2 | DIASTOLIC BLOOD PRESSURE: 80 MMHG | OXYGEN SATURATION: 98 % | RESPIRATION RATE: 14 BRPM

## 2025-01-24 DIAGNOSIS — Z86.718 HISTORY OF DVT IN ADULTHOOD: ICD-10-CM

## 2025-01-24 DIAGNOSIS — I87.303 CHRONIC VENOUS HYPERTENSION INVOLVING BOTH SIDES: Primary | ICD-10-CM

## 2025-01-24 PROCEDURE — 99212 OFFICE O/P EST SF 10 MIN: CPT | Performed by: SURGERY

## 2025-01-24 RX ORDER — ACETAMINOPHEN 80 MG
TABLET,DISINTEGRATING ORAL
Qty: 44 ML | Refills: 0 | OUTPATIENT
Start: 2025-01-24

## 2025-01-24 ASSESSMENT — PATIENT HEALTH QUESTIONNAIRE - PHQ9
SUM OF ALL RESPONSES TO PHQ QUESTIONS 1-9: 0
SUM OF ALL RESPONSES TO PHQ QUESTIONS 1-9: 0
1. LITTLE INTEREST OR PLEASURE IN DOING THINGS: NOT AT ALL
SUM OF ALL RESPONSES TO PHQ9 QUESTIONS 1 & 2: 0
2. FEELING DOWN, DEPRESSED OR HOPELESS: NOT AT ALL
SUM OF ALL RESPONSES TO PHQ QUESTIONS 1-9: 0
SUM OF ALL RESPONSES TO PHQ QUESTIONS 1-9: 0

## 2025-01-28 DIAGNOSIS — M25.50 CHRONIC PAIN OF MULTIPLE JOINTS: ICD-10-CM

## 2025-01-28 DIAGNOSIS — G89.29 CHRONIC PAIN OF MULTIPLE JOINTS: ICD-10-CM

## 2025-01-28 RX ORDER — ACETAMINOPHEN 325 MG/1
650 TABLET ORAL EVERY 8 HOURS PRN
Qty: 80 TABLET | Refills: 0 | Status: SHIPPED | OUTPATIENT
Start: 2025-01-28

## 2025-01-30 NOTE — PROGRESS NOTES
Identified pt with two pt identifiers (name and ). Reviewed chart in preparation for visit and have obtained necessary documentation.    Owen Wood is a 59 y.o. female  Chief Complaint   Patient presents with    Follow-up     Venous Hypertension      /80 (Site: Left Upper Arm, Position: Sitting, Cuff Size: Large Adult)   Pulse 79   Temp 97.8 °F (36.6 °C) (Oral)   Resp 14   Ht 1.6 m (5' 3\")   Wt 65.5 kg (144 lb 8 oz)   SpO2 98%   BMI 25.60 kg/m²     1. Have you been to the ER, urgent care clinic since your last visit?  Hospitalized since your last visit?no    2. Have you seen or consulted any other health care providers outside of the [facility] since your last visit?  Include any pap smears or colon screening. no    
tablet Take 1 tablet by mouth every 12 hours as needed for Congestion (Patient not taking: Reported on 11/8/2024)      traZODone (DESYREL) 50 MG tablet Take 1 tablet by mouth nightly as needed for Sleep (Patient not taking: Reported on 11/8/2024) 90 tablet 1    ascorbic acid (VITAMIN C) 500 MG tablet TAKE ONE TABLET BY MOUTH DAILY. (Patient not taking: Reported on 11/8/2024) 90 tablet 3    cromolyn (OPTICROM) 4 % ophthalmic solution  (Patient not taking: Reported on 11/8/2024)       No current facility-administered medications for this visit.       Review of Systems:  I reviewed the rest of organ systems personally and they are negative.  Pertinent findings discussed in HPI.    Physical Examination    /80 (Site: Left Upper Arm, Position: Sitting, Cuff Size: Large Adult)   Pulse 79   Temp 97.8 °F (36.6 °C) (Oral)   Resp 14   Ht 1.6 m (5' 3\")   Wt 65.5 kg (144 lb 8 oz)   SpO2 98%   BMI 25.60 kg/m²   Gen: Well developed, well nourished 59 y.o. female in no acute distress  Head: normocephalic, atraumatic  Mouth: Clear, no overt lesions, oral mucosa pink and moist  Neck: supple, no masses, no adenopathy or carotid bruits, trachea midline  Resp: clear to auscultation bilaterally, no wheeze, rhonchi or rales, excursions normal and symmetrical  Cardio: Regular rate and rhythm, no murmurs, clicks, gallops or rubs, no edema or varicosities  Abdomen: soft, nontender, nondistended, normoactive bowel sounds, no hernias, no hepatosplenomegaly   Neuro: sensation and strength grossly intact and symmetrical  Psych: alert and oriented to person, place and time  Vascular examination: Bilateral lower extremity examination shows some mild edema noted.  Homans' sign is negative.  Skin: warm and moist.    Labs:  No visits with results within 1 Month(s) from this visit.   Latest known visit with results is:   Office Visit on 11/08/2024   Component Date Value Ref Range Status    Color, Urine, POC 11/08/2024 Yellow   Final

## 2025-02-18 DIAGNOSIS — J06.9 UPPER RESPIRATORY TRACT INFECTION, UNSPECIFIED TYPE: ICD-10-CM

## 2025-02-19 DIAGNOSIS — J06.9 UPPER RESPIRATORY TRACT INFECTION, UNSPECIFIED TYPE: ICD-10-CM

## 2025-02-19 RX ORDER — ECHINACEA PURPUREA EXTRACT 125 MG
1 TABLET ORAL PRN
Qty: 44 ML | Refills: 0 | Status: SHIPPED | OUTPATIENT
Start: 2025-02-19

## 2025-02-20 RX ORDER — ACETAMINOPHEN 80 MG
TABLET,DISINTEGRATING ORAL
Qty: 44 ML | Refills: 0 | OUTPATIENT
Start: 2025-02-20

## 2025-03-04 ENCOUNTER — TELEPHONE (OUTPATIENT)
Facility: CLINIC | Age: 60
End: 2025-03-04

## 2025-03-04 DIAGNOSIS — Z86.19 H/O COLD SORES: ICD-10-CM

## 2025-03-04 RX ORDER — DOCOSANOL 100 MG/G
CREAM TOPICAL
Qty: 2 G | Refills: 3 | Status: SHIPPED | OUTPATIENT
Start: 2025-03-04

## 2025-03-04 NOTE — TELEPHONE ENCOUNTER
Patient called requesting a refill for the following medication:      docosanol (ABREVA) 10 % CREA cream         Please send to [facility]

## 2025-03-17 ENCOUNTER — HOSPITAL ENCOUNTER (OUTPATIENT)
Facility: HOSPITAL | Age: 60
Discharge: HOME | End: 2025-03-19
Attending: SURGERY
Payer: MEDICAID

## 2025-03-17 DIAGNOSIS — C67.9 MALIGNANT NEOPLASM OF URINARY BLADDER, UNSPECIFIED SITE (HCC): ICD-10-CM

## 2025-03-17 DIAGNOSIS — J06.9 UPPER RESPIRATORY TRACT INFECTION, UNSPECIFIED TYPE: ICD-10-CM

## 2025-03-17 DIAGNOSIS — Z86.718 HISTORY OF DVT IN ADULTHOOD: ICD-10-CM

## 2025-03-17 LAB
VAS LEFT POP RFX: 1.3 S
VAS RIGHT POP RFX: 1.1 S

## 2025-03-17 PROCEDURE — 93970 EXTREMITY STUDY: CPT

## 2025-03-17 RX ORDER — ACETAMINOPHEN 80 MG
1 TABLET,DISINTEGRATING ORAL PRN
Qty: 44 ML | Refills: 0 | Status: SHIPPED | OUTPATIENT
Start: 2025-03-17

## 2025-03-18 RX ORDER — ASCORBIC ACID 500 MG
500 TABLET ORAL DAILY
Qty: 90 TABLET | Refills: 3 | Status: SHIPPED | OUTPATIENT
Start: 2025-03-18

## 2025-03-18 RX ORDER — MULTIVITAMIN WITH FOLIC ACID 400 MCG
1 TABLET ORAL DAILY
Qty: 90 TABLET | Refills: 3 | Status: SHIPPED | OUTPATIENT
Start: 2025-03-18

## 2025-03-28 ENCOUNTER — TELEPHONE (OUTPATIENT)
Age: 60
End: 2025-03-28

## 2025-03-28 NOTE — TELEPHONE ENCOUNTER
Patient called today regarding her colostomy bag. She said that when she was changing her bag this morning she scratched her skin under the stoma. It bleed a little bit. She wants to know if he thinks she needs antiobotics or something to put on skin. [phone]

## 2025-03-28 NOTE — TELEPHONE ENCOUNTER
Spoke with patient and she stated that while moving her bag she scrapped herself a little bit and there was a little blood so she used alcohol to clean it, placed a gauze over it until it stopped bleeding, washed it with soap and water and then left it alone. Her concern was that she is currently on blood thinners. I assured her that if the area stopped bleeding already then she does not have to be concerned she should be fine. She asked if she could just get the antibiotics sent to the pharmacy just in case, I told her No antibiotics has nothing to do with her scraping herself and having a little bleeding that has stopped already. She said she was also concerned about the burning she experienced when cleaning the area with alcohol but she guess that was just the alcohol working and I agreed. Patient asked what she should do if it happens again I told her to try and be more careful when changing her bag so she doesn't scratch herself again but if she does she can do as she did before hold a gauze on it to stop the bleeding if it is bleeding constantly and for an extended period of time she should go to the ER due to the bleeding and being on blood thinners.Patient understood.

## 2025-04-03 RX ORDER — LORATADINE 10 MG/1
10 TABLET ORAL DAILY
Qty: 90 TABLET | Refills: 1 | Status: SHIPPED | OUTPATIENT
Start: 2025-04-03

## 2025-04-08 ENCOUNTER — OFFICE VISIT (OUTPATIENT)
Age: 60
End: 2025-04-08
Payer: MEDICAID

## 2025-04-08 VITALS
BODY MASS INDEX: 24.84 KG/M2 | WEIGHT: 140.2 LBS | SYSTOLIC BLOOD PRESSURE: 104 MMHG | HEART RATE: 64 BPM | DIASTOLIC BLOOD PRESSURE: 72 MMHG | OXYGEN SATURATION: 98 % | HEIGHT: 63 IN | RESPIRATION RATE: 18 BRPM

## 2025-04-08 DIAGNOSIS — J30.89 NON-SEASONAL ALLERGIC RHINITIS, UNSPECIFIED TRIGGER: ICD-10-CM

## 2025-04-08 DIAGNOSIS — H60.8X3 CHRONIC ECZEMATOUS OTITIS EXTERNA OF BOTH EARS: ICD-10-CM

## 2025-04-08 DIAGNOSIS — R04.0 EPISTAXIS: Primary | ICD-10-CM

## 2025-04-08 PROCEDURE — 99213 OFFICE O/P EST LOW 20 MIN: CPT | Performed by: OTOLARYNGOLOGY

## 2025-04-08 RX ORDER — SODIUM CHLORIDE/ALOE VERA
GEL (GRAM) NASAL PRN
Qty: 1 EACH | Refills: 5 | Status: SHIPPED | OUTPATIENT
Start: 2025-04-08

## 2025-04-08 RX ORDER — FLUOCINOLONE ACETONIDE 0.11 MG/ML
OIL AURICULAR (OTIC)
Qty: 20 ML | Refills: 3 | Status: SHIPPED | OUTPATIENT
Start: 2025-04-08

## 2025-04-08 RX ORDER — LIFITEGRAST 50 MG/ML
SOLUTION/ DROPS OPHTHALMIC
COMMUNITY
Start: 2025-03-17

## 2025-04-08 RX ORDER — ECHINACEA PURPUREA EXTRACT 125 MG
2 TABLET ORAL PRN
Qty: 1 EACH | Refills: 3 | Status: SHIPPED | OUTPATIENT
Start: 2025-04-08

## 2025-04-08 ASSESSMENT — ENCOUNTER SYMPTOMS
SORE THROAT: 0
APNEA: 0
SINUS PAIN: 0
EYE ITCHING: 0
PHOTOPHOBIA: 0
ABDOMINAL PAIN: 0
NAUSEA: 0
VOMITING: 0
STRIDOR: 0
COUGH: 0
BACK PAIN: 0
VOICE CHANGE: 0
SHORTNESS OF BREATH: 0
WHEEZING: 0
SINUS PRESSURE: 0
TROUBLE SWALLOWING: 0
EYE DISCHARGE: 0
CHOKING: 0

## 2025-04-08 NOTE — PROGRESS NOTES
Subjective:    Owen Wood   59 y.o.   1965     Followup Visit    Chief Complaint   Patient presents with    Follow-up     Chronic eczematous otitis externa of both ears       History of Present Illness:    4/10/2024-[facility] office  58-year-old female follows up today.  Last seen in January by nurse practitioner for chronic otitis externa and epistaxis.  Had left septum cauterized at that time, doing well.  C/o ears itching somewhat.  Does not have any more drops to use.    4/8/2025  1 year follow-up - pt has been doing well.  Using dermotic oil occasionally as needed.  Nose feels dry sometimes with some blood but no heavy bleeding.    Review of Systems  Review of Systems   Constitutional:  Negative for appetite change, chills, fatigue and fever.   HENT:  Negative for congestion, ear discharge, ear pain, nosebleeds, postnasal drip, sinus pressure, sinus pain, sneezing, sore throat, tinnitus, trouble swallowing and voice change.    Eyes:  Negative for photophobia, discharge, itching and visual disturbance.   Respiratory:  Negative for apnea, cough, choking, shortness of breath, wheezing and stridor.    Cardiovascular:  Negative for chest pain and palpitations.   Gastrointestinal:  Negative for abdominal pain, nausea and vomiting.   Endocrine: Negative for cold intolerance and heat intolerance.   Genitourinary:  Negative for difficulty urinating and dysuria.   Musculoskeletal:  Positive for arthralgias and gait problem. Negative for back pain, myalgias, neck pain and neck stiffness.   Skin:  Negative for rash and wound.   Allergic/Immunologic: Negative for environmental allergies and food allergies.   Neurological:  Negative for dizziness, speech difficulty, light-headedness and headaches.   Hematological:  Negative for adenopathy. Does not bruise/bleed easily.   Psychiatric/Behavioral:  Negative for confusion and sleep disturbance. The patient is not nervous/anxious.         Past Medical History:

## 2025-04-10 ENCOUNTER — TELEPHONE (OUTPATIENT)
Age: 60
End: 2025-04-10

## 2025-04-10 NOTE — TELEPHONE ENCOUNTER
Called patient she said she would like to talk to  because she would like to come off of the blood thinners please advise. Call back number is [phone]. Patient also said that she had the ultrasound done.

## 2025-04-11 NOTE — TELEPHONE ENCOUNTER
I left message for Owen Wood    If patient calls back she really needs to speak with a nurse.....    Per  if patient wants to come off the blood thinner she can BUT she also needs to understand she is at HIGHHH risk for a blood clot.  said she needs to know the pain, burning, and swelling in her legs can come back.... He recommends that she stays on the blood thinner but if she would like to stop she can. If she starts to experience any swelling, burning, warm to touch areas, shortness of breath she needs to go to the ER.

## 2025-04-14 NOTE — TELEPHONE ENCOUNTER
Patient called returning the nurse's call. I told her Sushma is in clinic. Please call her back. [phone]

## 2025-04-15 ENCOUNTER — TELEPHONE (OUTPATIENT)
Age: 60
End: 2025-04-15

## 2025-04-15 NOTE — TELEPHONE ENCOUNTER
Good afternoon,    Pt called and is requesting a prior authorization for nose gel and ear drops to  at [facility].

## 2025-04-15 NOTE — TELEPHONE ENCOUNTER
I called and spoke with Owen Wood 2 patient identifiers used. I informed her of what  stated. She states she will talk with  at her follow up appointment. I informed her that she is at HIGH risk for a blood clot if she stops her medication.

## 2025-04-16 NOTE — TELEPHONE ENCOUNTER
Good morning,    Pt has been contacted and aware of the prior authorization for ear drops is being processed.

## 2025-04-25 ENCOUNTER — OFFICE VISIT (OUTPATIENT)
Facility: CLINIC | Age: 60
End: 2025-04-25
Payer: MEDICAID

## 2025-04-25 VITALS
OXYGEN SATURATION: 99 % | BODY MASS INDEX: 24.63 KG/M2 | HEIGHT: 63 IN | SYSTOLIC BLOOD PRESSURE: 127 MMHG | TEMPERATURE: 97.7 F | WEIGHT: 139 LBS | RESPIRATION RATE: 18 BRPM | DIASTOLIC BLOOD PRESSURE: 77 MMHG | HEART RATE: 80 BPM

## 2025-04-25 DIAGNOSIS — M35.00 SJOGREN'S SYNDROME, WITH UNSPECIFIED ORGAN INVOLVEMENT: Primary | ICD-10-CM

## 2025-04-25 DIAGNOSIS — Z93.3 COLOSTOMY IN PLACE (HCC): ICD-10-CM

## 2025-04-25 DIAGNOSIS — M35.00 SJOGREN'S SYNDROME, WITH UNSPECIFIED ORGAN INVOLVEMENT: ICD-10-CM

## 2025-04-25 DIAGNOSIS — C67.9 MALIGNANT NEOPLASM OF URINARY BLADDER, UNSPECIFIED SITE (HCC): ICD-10-CM

## 2025-04-25 DIAGNOSIS — R05.9 COUGH, UNSPECIFIED TYPE: ICD-10-CM

## 2025-04-25 DIAGNOSIS — R73.03 PREDIABETES: ICD-10-CM

## 2025-04-25 DIAGNOSIS — Z86.718 HISTORY OF DVT IN ADULTHOOD: ICD-10-CM

## 2025-04-25 DIAGNOSIS — Z12.31 ENCOUNTER FOR SCREENING MAMMOGRAM FOR MALIGNANT NEOPLASM OF BREAST: ICD-10-CM

## 2025-04-25 DIAGNOSIS — Z98.890 HISTORY OF ILEAL CONDUIT: ICD-10-CM

## 2025-04-25 LAB — HBA1C MFR BLD: 5.1 %

## 2025-04-25 PROCEDURE — 83036 HEMOGLOBIN GLYCOSYLATED A1C: CPT | Performed by: STUDENT IN AN ORGANIZED HEALTH CARE EDUCATION/TRAINING PROGRAM

## 2025-04-25 PROCEDURE — 99214 OFFICE O/P EST MOD 30 MIN: CPT | Performed by: STUDENT IN AN ORGANIZED HEALTH CARE EDUCATION/TRAINING PROGRAM

## 2025-04-25 SDOH — ECONOMIC STABILITY: FOOD INSECURITY: WITHIN THE PAST 12 MONTHS, YOU WORRIED THAT YOUR FOOD WOULD RUN OUT BEFORE YOU GOT MONEY TO BUY MORE.: NEVER TRUE

## 2025-04-25 SDOH — ECONOMIC STABILITY: FOOD INSECURITY: WITHIN THE PAST 12 MONTHS, THE FOOD YOU BOUGHT JUST DIDN'T LAST AND YOU DIDN'T HAVE MONEY TO GET MORE.: NEVER TRUE

## 2025-04-25 ASSESSMENT — ENCOUNTER SYMPTOMS
COUGH: 1
ABDOMINAL PAIN: 0
SHORTNESS OF BREATH: 0

## 2025-04-25 NOTE — PROGRESS NOTES
\"Have you been to the ER, urgent care clinic since your last visit?  Hospitalized since your last visit?\"    NO    “Have you seen or consulted any other health care providers outside of [facility] since your last visit?”    NO    Have you had a mammogram?”   NO    Date of last Mammogram: 9/17/2021         “Have you had a colorectal cancer screening such as a colonoscopy/FIT/Cologuard?    YES - Type: Colonoscopy - Where: [facility] to request most recent records if not in the chart     No colonoscopy on file  No cologuard on file  Date of last FIT: 10/28/2022   No flexible sigmoidoscopy on file     Chief Complaint   Patient presents with    Follow-up Chronic Condition     /77 (BP Site: Right Upper Arm, Patient Position: Sitting, BP Cuff Size: Medium Adult)   Pulse 80   Temp 97.7 °F (36.5 °C) (Temporal)   Resp 18   Ht 1.6 m (5' 3\")   Wt 63 kg (139 lb)   SpO2 99%   BMI 24.62 kg/m²       Click Here for Release of Records Request   
Temporal   SpO2: 99%   Weight: 63 kg (139 lb)   Height: 1.6 m (5' 3\")      Physical Exam  Constitutional:       Appearance: Normal appearance.   Cardiovascular:      Rate and Rhythm: Normal rate and regular rhythm.   Pulmonary:      Effort: Pulmonary effort is normal.      Breath sounds: Normal breath sounds.   Abdominal:      Palpations: Abdomen is soft.      Comments: Colostomy and urostomy bag in place   Neurological:      General: No focal deficit present.      Mental Status: She is alert. Mental status is at baseline.   Psychiatric:         Mood and Affect: Mood normal.         Behavior: Behavior normal.                 An electronic signature was used to authenticate this note.  -- Tracy Russell MD

## 2025-04-26 LAB
ALBUMIN SERPL-MCNC: 4.1 G/DL (ref 3.8–4.9)
ALP SERPL-CCNC: 72 IU/L (ref 44–121)
ALT SERPL-CCNC: 10 IU/L (ref 0–32)
AST SERPL-CCNC: 21 IU/L (ref 0–40)
BILIRUB SERPL-MCNC: 0.7 MG/DL (ref 0–1.2)
BUN SERPL-MCNC: 12 MG/DL (ref 8–27)
BUN/CREAT SERPL: 16 (ref 12–28)
CALCIUM SERPL-MCNC: 9.6 MG/DL (ref 8.7–10.3)
CHLORIDE SERPL-SCNC: 105 MMOL/L (ref 96–106)
CHOLEST SERPL-MCNC: 155 MG/DL (ref 100–199)
CO2 SERPL-SCNC: 22 MMOL/L (ref 20–29)
CREAT SERPL-MCNC: 0.76 MG/DL (ref 0.57–1)
EGFRCR SERPLBLD CKD-EPI 2021: 90 ML/MIN/1.73
ERYTHROCYTE [DISTWIDTH] IN BLOOD BY AUTOMATED COUNT: 12 % (ref 11.7–15.4)
GLOBULIN SER CALC-MCNC: 4.8 G/DL (ref 1.5–4.5)
GLUCOSE SERPL-MCNC: 77 MG/DL (ref 70–99)
HCT VFR BLD AUTO: 33.4 % (ref 34–46.6)
HDLC SERPL-MCNC: 66 MG/DL
HGB BLD-MCNC: 11 G/DL (ref 11.1–15.9)
LDLC SERPL CALC-MCNC: 79 MG/DL (ref 0–99)
MCH RBC QN AUTO: 28.9 PG (ref 26.6–33)
MCHC RBC AUTO-ENTMCNC: 32.9 G/DL (ref 31.5–35.7)
MCV RBC AUTO: 88 FL (ref 79–97)
PLATELET # BLD AUTO: 207 X10E3/UL (ref 150–450)
POTASSIUM SERPL-SCNC: 3.9 MMOL/L (ref 3.5–5.2)
PROT SERPL-MCNC: 8.9 G/DL (ref 6–8.5)
RBC # BLD AUTO: 3.81 X10E6/UL (ref 3.77–5.28)
SODIUM SERPL-SCNC: 140 MMOL/L (ref 134–144)
TRIGL SERPL-MCNC: 48 MG/DL (ref 0–149)
VLDLC SERPL CALC-MCNC: 10 MG/DL (ref 5–40)
WBC # BLD AUTO: 2.9 X10E3/UL (ref 3.4–10.8)

## 2025-04-27 ENCOUNTER — RESULTS FOLLOW-UP (OUTPATIENT)
Facility: CLINIC | Age: 60
End: 2025-04-27

## 2025-04-28 ENCOUNTER — TELEPHONE (OUTPATIENT)
Facility: CLINIC | Age: 60
End: 2025-04-28

## 2025-05-14 DIAGNOSIS — M25.50 CHRONIC PAIN OF MULTIPLE JOINTS: ICD-10-CM

## 2025-05-14 DIAGNOSIS — G89.29 CHRONIC PAIN OF MULTIPLE JOINTS: ICD-10-CM

## 2025-05-16 ENCOUNTER — OFFICE VISIT (OUTPATIENT)
Age: 60
End: 2025-05-16
Payer: MEDICAID

## 2025-05-16 VITALS
BODY MASS INDEX: 24.63 KG/M2 | RESPIRATION RATE: 20 BRPM | TEMPERATURE: 97.6 F | HEART RATE: 82 BPM | WEIGHT: 139 LBS | SYSTOLIC BLOOD PRESSURE: 124 MMHG | HEIGHT: 63 IN | OXYGEN SATURATION: 98 % | DIASTOLIC BLOOD PRESSURE: 79 MMHG

## 2025-05-16 DIAGNOSIS — I87.303 CHRONIC VENOUS HYPERTENSION INVOLVING BOTH SIDES: Primary | ICD-10-CM

## 2025-05-16 DIAGNOSIS — I81 PORTAL VEIN THROMBOSIS: ICD-10-CM

## 2025-05-16 PROCEDURE — 99212 OFFICE O/P EST SF 10 MIN: CPT | Performed by: SURGERY

## 2025-05-16 RX ORDER — ACETAMINOPHEN 325 MG/1
650 TABLET ORAL EVERY 8 HOURS PRN
Qty: 80 TABLET | Refills: 0 | Status: SHIPPED | OUTPATIENT
Start: 2025-05-16

## 2025-05-16 ASSESSMENT — PATIENT HEALTH QUESTIONNAIRE - PHQ9
SUM OF ALL RESPONSES TO PHQ QUESTIONS 1-9: 0
2. FEELING DOWN, DEPRESSED OR HOPELESS: NOT AT ALL
1. LITTLE INTEREST OR PLEASURE IN DOING THINGS: NOT AT ALL
SUM OF ALL RESPONSES TO PHQ QUESTIONS 1-9: 0

## 2025-05-16 NOTE — PROGRESS NOTES
Identified pt with two pt identifiers (name and ). Reviewed chart in preparation for visit and have obtained necessary documentation.    Owen Wood is a 59 y.o. female  Chief Complaint   Patient presents with    Other     Colostomy      /79 (BP Site: Right Upper Arm, Patient Position: Sitting, BP Cuff Size: Medium Adult)   Pulse 82   Temp 97.6 °F (36.4 °C) (Temporal)   Resp 20   Ht 1.6 m (5' 3\")   Wt 63 kg (139 lb)   SpO2 98%   BMI 24.62 kg/m²     1. Have you been to the ER, urgent care clinic since your last visit?  Hospitalized since your last visit?no    2. Have you seen or consulted any other health care providers outside of the [facility] since your last visit?  Include any pap smears or colon screening. no

## 2025-05-19 NOTE — PROGRESS NOTES
Vascular History and Physical    Patient: Owen oWod  MRN: [id number]    YOB: 1965  Age: 59 y.o.  Sex: female     Chief Complaint:  Chief Complaint   Patient presents with    Other     Colostomy        History of Present Illness: Owen Wood is a 59 y.o. very pleasant woman is here today follow-up bilateral leg chronic DVT.  Patient still have moderate swelling both leg.  Patient has been compliant with her compression stocking.  Patient denies any chest pain shortness of breath or worsening leg pain.    Social History:  Social Connections: Not on file       Past Medical History:  Past Medical History:   Diagnosis Date    Anemia     pt states had recent blood transfusion 2022    Back pain     Cancer (HCC)     bladder- pt states in remission    DVT (deep venous thrombosis) (HCC)     and PE, pt denies lung PE , only leg several years ago    Hepatitis C     pt states dx 1 month ago    Liver disease     Presence of IVC filter     pt states several years ago    Rheumatoid arthritis (HCC)     Sciatic leg pain     pt states both legs    Uterine fibroid        Surgical History:  Past Surgical History:   Procedure Laterality Date    APPENDECTOMY  10/03/2022     SECTION      COLOSTOMY      GI  10/31/2022    rigid sigmoidoscope examination of the rectum    GI  10/31/2022    distal rectal repair with primary suturing technique    GI  2022    laparoscopic early postop adhesion of the sigmoid colon    GI  2022    laparoscopic lysis of adhesion    GYN  10/03/2022    ROBOT ANTERIOR PELVIC EXENTERATION    HYSTERECTOMY (CERVIX STATUS UNKNOWN)  10/03/2022    IR GUIDED THROMB MECH VEIN  10/27/2022    IR THROMB MECH VEIN 10/27/2022 [facility] ANGIO IR    IR GUIDED THROMB MECH VEIN  10/27/2022    [place] UNLISTED PROCEDURE CARDIAC SURGERY      stent placement    RECTAL SURGERY N/A 2023    Examination under anesthesia with rigid sigmoidoscope and vaginal examination performed by Rick

## 2025-06-03 ENCOUNTER — TELEPHONE (OUTPATIENT)
Facility: CLINIC | Age: 60
End: 2025-06-03

## 2025-06-03 NOTE — TELEPHONE ENCOUNTER
Patient called stating that she has not received the raised toilet seat that you ordered for her on April 30th.    Can you please check on this for her and find out what is going on and let her know.

## 2025-06-07 DIAGNOSIS — J30.89 NON-SEASONAL ALLERGIC RHINITIS, UNSPECIFIED TRIGGER: ICD-10-CM

## 2025-06-09 RX ORDER — FLUTICASONE PROPIONATE 50 MCG
SPRAY, SUSPENSION (ML) NASAL
Qty: 16 G | Refills: 0 | Status: SHIPPED | OUTPATIENT
Start: 2025-06-09 | End: 2025-06-13

## 2025-06-10 NOTE — TELEPHONE ENCOUNTER
Called [facility] and they stated the order was put in for a raised toilet seat and they stated that it should have been received by the patient. I called the patient and let her know that she needs to call [facility] and get this straight.

## 2025-06-13 DIAGNOSIS — J30.89 NON-SEASONAL ALLERGIC RHINITIS, UNSPECIFIED TRIGGER: ICD-10-CM

## 2025-06-13 RX ORDER — FLUTICASONE PROPIONATE 50 MCG
SPRAY, SUSPENSION (ML) NASAL
Qty: 16 G | Refills: 0 | Status: SHIPPED | OUTPATIENT
Start: 2025-06-13

## 2025-06-25 ENCOUNTER — TELEPHONE (OUTPATIENT)
Age: 60
End: 2025-06-25

## 2025-06-25 NOTE — TELEPHONE ENCOUNTER
Patient called today regarding some supplies she needs. She said \"Dr. Cao said to call when you need something\". She needs Aqua guard cover bags-10 x 12 and face masks. [phone]

## 2025-06-26 NOTE — TELEPHONE ENCOUNTER
I called and spoke with Owen Wood 2 patient identifiers used. I informed her that  can order her vascular supplies but what she wants needs to go through her PCP.

## 2025-06-27 ENCOUNTER — COMMUNITY OUTREACH (OUTPATIENT)
Facility: CLINIC | Age: 60
End: 2025-06-27

## 2025-06-27 NOTE — PROGRESS NOTES
Patient's HM shows they are overdue for Colorectal Screening.   Care Everywhere and  files searched.  Results attached to order and HM updated.

## 2025-07-11 DIAGNOSIS — J30.89 NON-SEASONAL ALLERGIC RHINITIS, UNSPECIFIED TRIGGER: ICD-10-CM

## 2025-07-11 DIAGNOSIS — R04.0 EPISTAXIS: ICD-10-CM

## 2025-07-12 DIAGNOSIS — R05.9 COUGH, UNSPECIFIED TYPE: ICD-10-CM

## 2025-07-14 RX ORDER — ACETAMINOPHEN 80 MG
TABLET,DISINTEGRATING ORAL
Qty: 1 ML | Refills: 0 | Status: SHIPPED | OUTPATIENT
Start: 2025-07-14

## 2025-07-14 RX ORDER — FLUTICASONE PROPIONATE 50 MCG
SPRAY, SUSPENSION (ML) NASAL
Qty: 16 G | Refills: 0 | Status: SHIPPED | OUTPATIENT
Start: 2025-07-14

## 2025-07-15 RX ORDER — GUAIFENESIN AND DEXTROMETHORPHAN HYDROBROMIDE 600; 30 MG/1; MG/1
TABLET, EXTENDED RELEASE ORAL
Qty: 60 TABLET | Refills: 0 | Status: SHIPPED | OUTPATIENT
Start: 2025-07-15

## 2025-08-02 DIAGNOSIS — I82.5Y3 CHRONIC DEEP VEIN THROMBOSIS (DVT) OF PROXIMAL VEIN OF BOTH LOWER EXTREMITIES (HCC): ICD-10-CM

## 2025-08-04 RX ORDER — APIXABAN 5 MG/1
5 TABLET, FILM COATED ORAL 2 TIMES DAILY
Qty: 180 TABLET | Refills: 3 | Status: SHIPPED | OUTPATIENT
Start: 2025-08-04

## 2025-08-08 ENCOUNTER — OFFICE VISIT (OUTPATIENT)
Facility: CLINIC | Age: 60
End: 2025-08-08
Payer: MEDICAID

## 2025-08-08 VITALS
OXYGEN SATURATION: 99 % | TEMPERATURE: 97.7 F | HEART RATE: 79 BPM | RESPIRATION RATE: 18 BRPM | BODY MASS INDEX: 23.28 KG/M2 | DIASTOLIC BLOOD PRESSURE: 80 MMHG | HEIGHT: 63 IN | WEIGHT: 131.4 LBS | SYSTOLIC BLOOD PRESSURE: 120 MMHG

## 2025-08-08 DIAGNOSIS — G89.29 CHRONIC PAIN OF MULTIPLE JOINTS: ICD-10-CM

## 2025-08-08 DIAGNOSIS — I25.10 CALCIFICATION OF CORONARY ARTERY: ICD-10-CM

## 2025-08-08 DIAGNOSIS — C67.9 MALIGNANT NEOPLASM OF URINARY BLADDER, UNSPECIFIED SITE (HCC): Primary | ICD-10-CM

## 2025-08-08 DIAGNOSIS — Z93.3 COLOSTOMY IN PLACE (HCC): ICD-10-CM

## 2025-08-08 DIAGNOSIS — M25.50 CHRONIC PAIN OF MULTIPLE JOINTS: ICD-10-CM

## 2025-08-08 DIAGNOSIS — M35.00 SJOGREN'S SYNDROME, WITH UNSPECIFIED ORGAN INVOLVEMENT: ICD-10-CM

## 2025-08-08 DIAGNOSIS — I82.5Y9 CHRONIC DEEP VEIN THROMBOSIS (DVT) OF PROXIMAL VEIN OF LOWER EXTREMITY, UNSPECIFIED LATERALITY (HCC): ICD-10-CM

## 2025-08-08 DIAGNOSIS — J43.9 PULMONARY EMPHYSEMA, UNSPECIFIED EMPHYSEMA TYPE (HCC): ICD-10-CM

## 2025-08-08 DIAGNOSIS — Z98.890 HISTORY OF ILEAL CONDUIT: ICD-10-CM

## 2025-08-08 PROCEDURE — 99214 OFFICE O/P EST MOD 30 MIN: CPT | Performed by: STUDENT IN AN ORGANIZED HEALTH CARE EDUCATION/TRAINING PROGRAM

## 2025-08-08 RX ORDER — ACETAMINOPHEN 325 MG/1
650 TABLET ORAL EVERY 8 HOURS PRN
Qty: 80 TABLET | Refills: 0 | Status: SHIPPED | OUTPATIENT
Start: 2025-08-08

## 2025-08-08 RX ORDER — MUPIROCIN 2 %
OINTMENT (GRAM) TOPICAL
COMMUNITY
Start: 2025-07-12

## 2025-08-08 RX ORDER — ALBUTEROL SULFATE 90 UG/1
2 INHALANT RESPIRATORY (INHALATION) 4 TIMES DAILY PRN
Qty: 54 G | Refills: 2 | Status: SHIPPED | OUTPATIENT
Start: 2025-08-08

## 2025-08-08 ASSESSMENT — ENCOUNTER SYMPTOMS
COUGH: 1
ABDOMINAL PAIN: 0
SHORTNESS OF BREATH: 0

## 2025-08-11 ENCOUNTER — TELEPHONE (OUTPATIENT)
Facility: CLINIC | Age: 60
End: 2025-08-11

## 2025-08-30 DIAGNOSIS — J30.89 NON-SEASONAL ALLERGIC RHINITIS, UNSPECIFIED TRIGGER: ICD-10-CM

## 2025-08-30 DIAGNOSIS — R04.0 EPISTAXIS: ICD-10-CM

## 2025-09-02 RX ORDER — ACETAMINOPHEN 80 MG
TABLET,DISINTEGRATING ORAL
Qty: 1 ML | Refills: 0 | Status: SHIPPED | OUTPATIENT
Start: 2025-09-02

## 2025-09-02 RX ORDER — FLUTICASONE PROPIONATE 50 MCG
SPRAY, SUSPENSION (ML) NASAL
Qty: 16 G | Refills: 0 | Status: SHIPPED | OUTPATIENT
Start: 2025-09-02

## 2025-09-02 RX ORDER — LORATADINE 10 MG/1
10 TABLET ORAL DAILY
Qty: 30 TABLET | Refills: 0 | Status: SHIPPED | OUTPATIENT
Start: 2025-09-02

## (undated) DEVICE — Device

## (undated) DEVICE — SUTURE 1 36 VIO MONO PDS PDP371T

## (undated) DEVICE — DRAPE,UNDERBUTTOCKS,PCH,STERILE: Brand: MEDLINE

## (undated) DEVICE — PAD,NON-ADHERENT,2X3,STERILE,LF,1/PK: Brand: MEDLINE

## (undated) DEVICE — SOUTHSIDE TURNOVER: Brand: MEDLINE INDUSTRIES, INC.

## (undated) DEVICE — DRAIN SURG 15FR SIL SMOOTH RND 1/8IN END PERF W/ TRCR RADPQ

## (undated) DEVICE — ARM DRAPE

## (undated) DEVICE — SOLUTION IRRIG 1000ML 0.9% SOD CHL USP POUR PLAS BTL

## (undated) DEVICE — GLOVE ORANGE PI 7 1/2   MSG9075

## (undated) DEVICE — DRAIN SURG L18IN DIA025IN 100% SIL RADPQ FOR CLS WND DRNAGE

## (undated) DEVICE — STAPLER INT L34CM 60MM LNG ENDOSCP ARTC PWR + ECHELON FLX

## (undated) DEVICE — SUTURE SZ 0 27IN 5/8 CIR UR-6  TAPER PT VIOLET ABSRB VICRYL J603H

## (undated) DEVICE — SYRINGE,TOOMEY,IRRIGATION,70CC,STERILE: Brand: MEDLINE

## (undated) DEVICE — VISUALIZATION SYSTEM: Brand: CLEARIFY

## (undated) DEVICE — PAD POSITIONING WNG STD KIT W/BODY STRP LF DISP

## (undated) DEVICE — DRAPE SURG UTIL 26X15 IN W/ TAPE N INVASIVE MULT LAYR DISP

## (undated) DEVICE — ELECTRODE PT RET AD L9FT HI MOIST COND ADH HYDRGEL CORDED

## (undated) DEVICE — COVER MPLR TIP CRV SCIS ACC DA VINCI

## (undated) DEVICE — TISSUE RETRIEVAL SYSTEM: Brand: INZII RETRIEVAL SYSTEM

## (undated) DEVICE — GARMENT,MEDLINE,DVT,INT,CALF,MED, GEN2: Brand: MEDLINE

## (undated) DEVICE — SUTURE NONABSORBABLE MONOFILAMENT 2-0 FS 18 IN ETHILON 664H

## (undated) DEVICE — SPONGE LAP 18X18IN STRL -- 5/PK

## (undated) DEVICE — INTENDED FOR TISSUE SEPARATION, AND OTHER PROCEDURES THAT REQUIRE A SHARP SURGICAL BLADE TO PUNCTURE OR CUT.: Brand: BARD-PARKER ® CARBON RIB-BACK BLADES

## (undated) DEVICE — CUTTING ELECTRODE MONO 24FR 12/30°  FOR RESECTOSCOPES, TELESCOPE Ø 4 MM, FOR SHEATHS, INTERMITTENT/CONTINUOUS IRRIGATION, 24/26 FR, LOOP: ROUND, WIRE Ø 0.25 MM, FORK COLOR YELLOW, STEM COLOR TRANSPARENT, PACK = 10 PCS, FOR SHARK RESECTOSCOPE, STERILE, FOR SINGLE USE: Brand: SHARK

## (undated) DEVICE — Device: Brand: JELCO

## (undated) DEVICE — LAPAROSCOPIC SCISSORS: Brand: EPIX LAPAROSCOPIC SCISSORS

## (undated) DEVICE — YANKAUER,BULB TIP,W/O VENT,RIGID,STERILE: Brand: MEDLINE

## (undated) DEVICE — TUBING, SUCTION, 1/4" X 12', STRAIGHT: Brand: MEDLINE

## (undated) DEVICE — SOLUTION IRRIG 500ML 0.9% SOD CHL USP POUR PLAS BTL

## (undated) DEVICE — MINOR VAGINAL PACK: Brand: MEDLINE INDUSTRIES, INC.

## (undated) DEVICE — PREP SKN CHLRAPRP APL 26ML STR --

## (undated) DEVICE — REM POLYHESIVE ADULT PATIENT RETURN ELECTRODE: Brand: VALLEYLAB

## (undated) DEVICE — SYR 10ML LUER LOK 1/5ML GRAD --

## (undated) DEVICE — MONOPOLAR CORD: Brand: VALLEYLAB

## (undated) DEVICE — CATHETER ETER URET 5FR L70CM 0038IN FLX OPN TIP NO SIDE EYE

## (undated) DEVICE — SOL INJ SOD CL 0.9% 1000ML BG --

## (undated) DEVICE — SUTURE ETHLN SZ 2-0 L18IN NONABSORBABLE BLK L26MM FS 3/8 664G

## (undated) DEVICE — VAGINAL PREP TRAY: Brand: MEDLINE INDUSTRIES, INC.

## (undated) DEVICE — SUT MONOCRYL PLUS UD 4-0 --

## (undated) DEVICE — TOTAL TRAY, 16FR 10ML SIL FOLEY, URN: Brand: MEDLINE

## (undated) DEVICE — 2-PIECE UROSTOMY POUCH: Brand: NEW IMAGE

## (undated) DEVICE — TUBING INSUFFLATOR HEAT HUMIDIFIED SMK EVAC SET PNEUMOCLEAR

## (undated) DEVICE — ROCKER SWITCH PENCIL BLADE ELECTRODE, HOLSTER: Brand: EDGE

## (undated) DEVICE — SPONGE DRAIN NONWOVEN 4X4IN -- 2/PK

## (undated) DEVICE — GLOVE SURG SZ 65 L12IN FNGR THK79MIL GRN LTX FREE

## (undated) DEVICE — SEAL UNIV 5-8MM DISP BX/10 -- DA VINCI XI - SNGL USE

## (undated) DEVICE — CYSTO PACK: Brand: MEDLINE INDUSTRIES, INC.

## (undated) DEVICE — 3-0 COATED VICRYL PLUS UNDYED 1X27" SH --

## (undated) DEVICE — DERMABOND SKIN ADH 0.7ML -- DERMABOND ADVANCED 12/BX

## (undated) DEVICE — TUBING, SUCTION, 1/4" X 10', STRAIGHT: Brand: MEDLINE

## (undated) DEVICE — TROCAR LAP L100MM DIA5MM BLDELSS W/ STBL SL ENDOPATH XCEL

## (undated) DEVICE — SUT VCRL + 2-0 27IN SH UD --

## (undated) DEVICE — HYPODERMIC SAFETY NEEDLE: Brand: MONOJECT

## (undated) DEVICE — COLUMN DRAPE

## (undated) DEVICE — SOLUTION SCRB 4OZ 4% CHG H2O AIDED FOR PREOPERATIVE SKIN

## (undated) DEVICE — Device: Brand: SENSURA MIO CONVEX FLIP

## (undated) DEVICE — TROCARS: Brand: KII® BALLOON BLUNT TIP SYSTEM

## (undated) DEVICE — SUTURE ABSORBABLE MONOFILAMENT 4-0 RB1 27 IN VIO MONOCRYL + MCP304H

## (undated) DEVICE — PREP PAD BNS: Brand: CONVERTORS

## (undated) DEVICE — RELOAD STPL L60MM H1.5-3.6MM REG TISS BLU GRIPPING SURF B

## (undated) DEVICE — SOLUTION IRRIG 500ML STRL H2O NONPYROGENIC

## (undated) DEVICE — DBD-PACK,LAVH,STERILE: Brand: MEDLINE

## (undated) DEVICE — SYRINGE MED 10ML LUERLOCK TIP W/O SFTY DISP

## (undated) DEVICE — VESSEL SEALER XI EXTENDED DISP -- VESSEL

## (undated) DEVICE — BLADE,CARBON-STEEL,15,STRL,DISPOSABLE,TB: Brand: MEDLINE

## (undated) DEVICE — DRAPE,TOP,102X53,STERILE: Brand: MEDLINE

## (undated) DEVICE — SUT SLK 3-0 30IN SH BLK --

## (undated) DEVICE — GLOVE SURG SZ 75 L12IN FNGR THK79MIL GRN LTX FREE

## (undated) DEVICE — SYR LR LCK 1ML GRAD NSAF 30ML --

## (undated) DEVICE — GLOVE SURG SZ 65 THK91MIL LTX FREE SYN POLYISOPRENE

## (undated) DEVICE — TROCAR: Brand: KII SLEEVE

## (undated) DEVICE — MASTISOL ADHESIVE LIQ 2/3ML

## (undated) DEVICE — 3M™ STERI-STRIP™ REINFORCED ADHESIVE SKIN CLOSURES, R1542, 1/4 IN X 1-1/2 IN (6 MM X 38 MM), 6 STRIPS/ENVELOPE: Brand: 3M™ STERI-STRIP™

## (undated) DEVICE — SUT PROL 4-0 36IN RB1 DA BLU --

## (undated) DEVICE — SUT CHRMC 3-0 27IN SH BRN --

## (undated) DEVICE — SOLUTION IRRIG 3000ML STRL H2O USP UROMATIC PLAS CONT

## (undated) DEVICE — CORD LAPAROSCOPIC MONOPOLAR

## (undated) DEVICE — BLADELESS OBTURATOR, LONG: Brand: WECK VISTA

## (undated) DEVICE — UNDERPANTS INCONT 3XL BARIATRIC FOR 65-85IN WAIST MESH KNIT

## (undated) DEVICE — SUTURE MCRYL SZ 4-0 L18IN ABSRB UD L19MM PS-2 3/8 CIR PRIM Y496G

## (undated) DEVICE — TROCAR: Brand: KII FIOS FIRST ENTRY

## (undated) DEVICE — KIT,ANTI FOG,W/SPONGE & FLUID,SOFT PACK: Brand: MEDLINE

## (undated) DEVICE — SOLUTION IRRIG 3000ML H2O STRL BAG

## (undated) DEVICE — LAPAROSCOPIC CHOLE PACK: Brand: MEDLINE INDUSTRIES, INC.

## (undated) DEVICE — 2, DISPOSABLE SUCTION/IRRIGATOR WITHOUT DISPOSABLE TIP: Brand: STRYKEFLOW

## (undated) DEVICE — SUTURE PERMAHAND SZ 0 L18IN NONABSORBABLE BLK SILK BRAID A186H

## (undated) DEVICE — GDWIRE UROL STR 150CM FLX TP -- BX/5 SENSOR

## (undated) DEVICE — GOWN,PREVENTION PLUS,XLN/XL,ST,24/CS: Brand: MEDLINE

## (undated) DEVICE — SUTURE VCRL + SZ 3-0 L18IN ABSRB UD SH 1/2 CIR TAPERCUT NDL VCP864D

## (undated) DEVICE — TOWEL,OR,DSP,ST,BLUE,DLX,6/PK,12PK/CS: Brand: MEDLINE

## (undated) DEVICE — SUTURE ABSORBABLE MONOFILAMENT 4-0 RB1 27 IN UD MONOCRYL + MCP214H

## (undated) DEVICE — VINYL ACETATE UROLOGICAL DRAINAGE BAG FOR GE/OEC SYSTEMS W/ 8MM PLUG - NON-STERILE: Brand: CUSTOM MEDICAL SPECIALTIES, INC.

## (undated) DEVICE — E-Z CLEAN, PTFE COATED, ELECTROSURGICAL LAPAROSCOPIC ELECTRODE, L-HOOK, 33 CM., SINGLE-USE, FOR USE WITH HAND CONTROL PENCIL: Brand: MEGADYNE

## (undated) DEVICE — BAG URIN DRNAGE 2000ML TB L48IN NDL SAMP ANTIREFLX CHMBR DRN

## (undated) DEVICE — SUT CHRMC 4-0 27IN RB1 BRN --

## (undated) DEVICE — 3M™ STERI-DRAPE™ INCISE DRAPE 1050 (60CM X 45CM): Brand: STERI-DRAPE™

## (undated) DEVICE — SYRINGE IRRIG 60ML SFT PLIABLE BLB EZ TO GRP 1 HND USE W/

## (undated) DEVICE — CLIP INT L POLYMER LOK LIG HEM O LOK

## (undated) DEVICE — TROCAR ENDOSCP L100MM DIA12MM STBL SL BLDELSS ENDOPATH XCEL

## (undated) DEVICE — SPONGE GZ W4XL4IN COT 12 PLY TYP VII WVN C FLD DSGN

## (undated) DEVICE — IRRIGATION KT CYSTO/TUR 10ML -- BX/5 720-100

## (undated) DEVICE — 2-PIECE OSTOMY SKIN BARRIER, FLEXWEAR: Brand: NEW IMAGE

## (undated) DEVICE — JELLY,LUBE,STERILE,FLIP TOP,TUBE,4-OZ: Brand: MEDLINE

## (undated) DEVICE — BOWL UTIL 16OZ STRL --

## (undated) DEVICE — SUTURE SILK PERMAHAND PRECUT 6 X 30 IN SZ 1 BLK BRAID A307H

## (undated) DEVICE — DRAPE EQUIP C ARM 74X42 IN MOB XR W/ TIE RUBBER BND LF

## (undated) DEVICE — COVER,MAYO STAND,STERILE: Brand: MEDLINE

## (undated) DEVICE — BLADELESS OBTURATOR: Brand: WECK VISTA

## (undated) DEVICE — CATHETER,URETHRAL,REDRUBBER,STRL,18FR: Brand: MEDLINE

## (undated) DEVICE — CATH URETH FOL 2W MED 18FRX5 --